# Patient Record
Sex: MALE | Race: WHITE | NOT HISPANIC OR LATINO | Employment: OTHER | URBAN - METROPOLITAN AREA
[De-identification: names, ages, dates, MRNs, and addresses within clinical notes are randomized per-mention and may not be internally consistent; named-entity substitution may affect disease eponyms.]

---

## 2017-12-11 ENCOUNTER — HOSPITAL ENCOUNTER (INPATIENT)
Facility: HOSPITAL | Age: 57
LOS: 6 days | Discharge: HOME | DRG: 280 | End: 2017-12-17
Attending: EMERGENCY MEDICINE | Admitting: INTERNAL MEDICINE
Payer: COMMERCIAL

## 2017-12-11 ENCOUNTER — APPOINTMENT (INPATIENT)
Dept: RADIOLOGY | Facility: HOSPITAL | Age: 57
DRG: 280 | End: 2017-12-11
Payer: COMMERCIAL

## 2017-12-11 ENCOUNTER — APPOINTMENT (EMERGENCY)
Dept: RADIOLOGY | Facility: HOSPITAL | Age: 57
DRG: 280 | End: 2017-12-11
Payer: COMMERCIAL

## 2017-12-11 ENCOUNTER — GENERIC CONVERSION - ENCOUNTER (OUTPATIENT)
Dept: OTHER | Facility: OTHER | Age: 57
End: 2017-12-11

## 2017-12-11 DIAGNOSIS — I50.9 ACUTE HEART FAILURE, UNSPECIFIED HEART FAILURE TYPE (HCC): ICD-10-CM

## 2017-12-11 DIAGNOSIS — I50.9 CHF EXACERBATION (HCC): ICD-10-CM

## 2017-12-11 DIAGNOSIS — I48.91 ATRIAL FIBRILLATION WITH RAPID VENTRICULAR RESPONSE (HCC): Primary | ICD-10-CM

## 2017-12-11 DIAGNOSIS — R17 TOTAL BILIRUBIN, ELEVATED: ICD-10-CM

## 2017-12-11 DIAGNOSIS — I50.21 ACUTE SYSTOLIC HEART FAILURE (HCC): ICD-10-CM

## 2017-12-11 PROBLEM — R03.0 ELEVATED BLOOD PRESSURE READING: Status: ACTIVE | Noted: 2017-12-11

## 2017-12-11 PROBLEM — R06.02 SOB (SHORTNESS OF BREATH): Status: ACTIVE | Noted: 2017-12-11

## 2017-12-11 PROBLEM — I21.4 NON-ST ELEVATION MYOCARDIAL INFARCTION (NSTEMI) (HCC): Status: ACTIVE | Noted: 2017-12-11

## 2017-12-11 LAB
ALBUMIN SERPL BCP-MCNC: 3.5 G/DL (ref 3.5–5)
ALP SERPL-CCNC: 79 U/L (ref 46–116)
ALT SERPL W P-5'-P-CCNC: 39 U/L (ref 12–78)
ANION GAP BLD CALC-SCNC: 16 MMOL/L (ref 4–13)
ANION GAP SERPL CALCULATED.3IONS-SCNC: 10 MMOL/L (ref 4–13)
AST SERPL W P-5'-P-CCNC: 23 U/L (ref 5–45)
ATRIAL RATE: 192 BPM
BASOPHILS # BLD AUTO: 0.05 THOUSANDS/ΜL (ref 0–0.1)
BASOPHILS NFR BLD AUTO: 1 % (ref 0–1)
BILIRUB DIRECT SERPL-MCNC: 0.81 MG/DL (ref 0–0.2)
BILIRUB SERPL-MCNC: 3.02 MG/DL (ref 0.2–1)
BILIRUB UR QL STRIP: NEGATIVE
BUN BLD-MCNC: 21 MG/DL (ref 5–25)
BUN SERPL-MCNC: 19 MG/DL (ref 5–25)
CA-I BLD-SCNC: 1.1 MMOL/L (ref 1.12–1.32)
CALCIUM SERPL-MCNC: 9.3 MG/DL (ref 8.3–10.1)
CHLORIDE BLD-SCNC: 103 MMOL/L (ref 100–108)
CHLORIDE SERPL-SCNC: 102 MMOL/L (ref 100–108)
CLARITY UR: CLEAR
CO2 SERPL-SCNC: 25 MMOL/L (ref 21–32)
COLOR UR: YELLOW
CREAT BLD-MCNC: 1.1 MG/DL (ref 0.6–1.3)
CREAT SERPL-MCNC: 1.21 MG/DL (ref 0.6–1.3)
EOSINOPHIL # BLD AUTO: 0.05 THOUSAND/ΜL (ref 0–0.61)
EOSINOPHIL NFR BLD AUTO: 1 % (ref 0–6)
ERYTHROCYTE [DISTWIDTH] IN BLOOD BY AUTOMATED COUNT: 13.6 % (ref 11.6–15.1)
GFR SERPL CREATININE-BSD FRML MDRD: 66 ML/MIN/1.73SQ M
GFR SERPL CREATININE-BSD FRML MDRD: 74 ML/MIN/1.73SQ M
GLUCOSE SERPL-MCNC: 101 MG/DL (ref 65–140)
GLUCOSE SERPL-MCNC: 107 MG/DL (ref 65–140)
GLUCOSE UR STRIP-MCNC: NEGATIVE MG/DL
HCT VFR BLD AUTO: 49.7 % (ref 36.5–49.3)
HCT VFR BLD CALC: 54 % (ref 36.5–49.3)
HGB BLD-MCNC: 16.6 G/DL (ref 12–17)
HGB BLDA-MCNC: 18.4 G/DL (ref 12–17)
HGB UR QL STRIP.AUTO: NEGATIVE
KETONES UR STRIP-MCNC: NEGATIVE MG/DL
LEUKOCYTE ESTERASE UR QL STRIP: NEGATIVE
LYMPHOCYTES # BLD AUTO: 1.4 THOUSANDS/ΜL (ref 0.6–4.47)
LYMPHOCYTES NFR BLD AUTO: 19 % (ref 14–44)
MCH RBC QN AUTO: 31.6 PG (ref 26.8–34.3)
MCHC RBC AUTO-ENTMCNC: 33.4 G/DL (ref 31.4–37.4)
MCV RBC AUTO: 95 FL (ref 82–98)
MONOCYTES # BLD AUTO: 0.57 THOUSAND/ΜL (ref 0.17–1.22)
MONOCYTES NFR BLD AUTO: 8 % (ref 4–12)
NEUTROPHILS # BLD AUTO: 5.46 THOUSANDS/ΜL (ref 1.85–7.62)
NEUTS SEG NFR BLD AUTO: 71 % (ref 43–75)
NITRITE UR QL STRIP: NEGATIVE
NRBC BLD AUTO-RTO: 0 /100 WBCS
NT-PROBNP SERPL-MCNC: 5242 PG/ML
PCO2 BLD: 25 MMOL/L (ref 21–32)
PH UR STRIP.AUTO: 5 [PH] (ref 4.5–8)
PLATELET # BLD AUTO: 275 THOUSANDS/UL (ref 149–390)
PMV BLD AUTO: 10.1 FL (ref 8.9–12.7)
POTASSIUM BLD-SCNC: 4.5 MMOL/L (ref 3.5–5.3)
POTASSIUM SERPL-SCNC: 4.4 MMOL/L (ref 3.5–5.3)
PROT SERPL-MCNC: 7.8 G/DL (ref 6.4–8.2)
PROT UR STRIP-MCNC: NEGATIVE MG/DL
QRS AXIS: 127 DEGREES
QRSD INTERVAL: 84 MS
QT INTERVAL: 248 MS
QTC INTERVAL: 419 MS
RBC # BLD AUTO: 5.26 MILLION/UL (ref 3.88–5.62)
SODIUM BLD-SCNC: 139 MMOL/L (ref 136–145)
SODIUM SERPL-SCNC: 137 MMOL/L (ref 136–145)
SP GR UR STRIP.AUTO: 1.01 (ref 1–1.03)
SPECIMEN SOURCE: ABNORMAL
SPECIMEN SOURCE: ABNORMAL
T WAVE AXIS: 25 DEGREES
T4 FREE SERPL-MCNC: 1.36 NG/DL (ref 0.76–1.46)
TROPONIN I BLD-MCNC: 0.14 NG/ML (ref 0–0.08)
TROPONIN I SERPL-MCNC: 0.78 NG/ML
TROPONIN I SERPL-MCNC: 0.83 NG/ML
TROPONIN I SERPL-MCNC: 0.88 NG/ML
TROPONIN I SERPL-MCNC: 0.9 NG/ML
TSH SERPL DL<=0.05 MIU/L-ACNC: 1.81 UIU/ML (ref 0.36–3.74)
UROBILINOGEN UR QL STRIP.AUTO: 0.2 E.U./DL
VENTRICULAR RATE: 172 BPM
WBC # BLD AUTO: 7.55 THOUSAND/UL (ref 4.31–10.16)

## 2017-12-11 PROCEDURE — 99285 EMERGENCY DEPT VISIT HI MDM: CPT

## 2017-12-11 PROCEDURE — 84484 ASSAY OF TROPONIN QUANT: CPT | Performed by: STUDENT IN AN ORGANIZED HEALTH CARE EDUCATION/TRAINING PROGRAM

## 2017-12-11 PROCEDURE — 36415 COLL VENOUS BLD VENIPUNCTURE: CPT

## 2017-12-11 PROCEDURE — 84439 ASSAY OF FREE THYROXINE: CPT | Performed by: EMERGENCY MEDICINE

## 2017-12-11 PROCEDURE — 82248 BILIRUBIN DIRECT: CPT | Performed by: INTERNAL MEDICINE

## 2017-12-11 PROCEDURE — 84443 ASSAY THYROID STIM HORMONE: CPT | Performed by: EMERGENCY MEDICINE

## 2017-12-11 PROCEDURE — 76705 ECHO EXAM OF ABDOMEN: CPT

## 2017-12-11 PROCEDURE — 96365 THER/PROPH/DIAG IV INF INIT: CPT

## 2017-12-11 PROCEDURE — 85014 HEMATOCRIT: CPT

## 2017-12-11 PROCEDURE — 81003 URINALYSIS AUTO W/O SCOPE: CPT | Performed by: INTERNAL MEDICINE

## 2017-12-11 PROCEDURE — 84484 ASSAY OF TROPONIN QUANT: CPT | Performed by: INTERNAL MEDICINE

## 2017-12-11 PROCEDURE — 96376 TX/PRO/DX INJ SAME DRUG ADON: CPT

## 2017-12-11 PROCEDURE — 84484 ASSAY OF TROPONIN QUANT: CPT

## 2017-12-11 PROCEDURE — 80053 COMPREHEN METABOLIC PANEL: CPT | Performed by: EMERGENCY MEDICINE

## 2017-12-11 PROCEDURE — 80047 BASIC METABLC PNL IONIZED CA: CPT

## 2017-12-11 PROCEDURE — 36415 COLL VENOUS BLD VENIPUNCTURE: CPT | Performed by: INTERNAL MEDICINE

## 2017-12-11 PROCEDURE — 71010 HB CHEST X-RAY 1 VIEW FRONTAL (PORTABLE): CPT

## 2017-12-11 PROCEDURE — 93005 ELECTROCARDIOGRAM TRACING: CPT | Performed by: STUDENT IN AN ORGANIZED HEALTH CARE EDUCATION/TRAINING PROGRAM

## 2017-12-11 PROCEDURE — 85025 COMPLETE CBC W/AUTO DIFF WBC: CPT | Performed by: EMERGENCY MEDICINE

## 2017-12-11 PROCEDURE — 93005 ELECTROCARDIOGRAM TRACING: CPT | Performed by: EMERGENCY MEDICINE

## 2017-12-11 PROCEDURE — 83880 ASSAY OF NATRIURETIC PEPTIDE: CPT | Performed by: EMERGENCY MEDICINE

## 2017-12-11 RX ORDER — ASPIRIN 81 MG/1
324 TABLET, CHEWABLE ORAL ONCE
Status: COMPLETED | OUTPATIENT
Start: 2017-12-11 | End: 2017-12-11

## 2017-12-11 RX ORDER — FUROSEMIDE 10 MG/ML
20 INJECTION INTRAMUSCULAR; INTRAVENOUS ONCE
Status: COMPLETED | OUTPATIENT
Start: 2017-12-11 | End: 2017-12-11

## 2017-12-11 RX ORDER — DILTIAZEM HYDROCHLORIDE 5 MG/ML
20 INJECTION INTRAVENOUS ONCE
Status: COMPLETED | OUTPATIENT
Start: 2017-12-11 | End: 2017-12-11

## 2017-12-11 RX ORDER — ONDANSETRON 2 MG/ML
4 INJECTION INTRAMUSCULAR; INTRAVENOUS EVERY 6 HOURS PRN
Status: DISCONTINUED | OUTPATIENT
Start: 2017-12-11 | End: 2017-12-11

## 2017-12-11 RX ORDER — DILTIAZEM HYDROCHLORIDE 5 MG/ML
INJECTION INTRAVENOUS
Status: COMPLETED
Start: 2017-12-11 | End: 2017-12-11

## 2017-12-11 RX ORDER — METOPROLOL TARTRATE 50 MG/1
50 TABLET, FILM COATED ORAL EVERY 12 HOURS SCHEDULED
Status: DISCONTINUED | OUTPATIENT
Start: 2017-12-11 | End: 2017-12-11

## 2017-12-11 RX ORDER — ACETAMINOPHEN 325 MG/1
650 TABLET ORAL EVERY 4 HOURS PRN
Status: DISCONTINUED | OUTPATIENT
Start: 2017-12-11 | End: 2017-12-17 | Stop reason: HOSPADM

## 2017-12-11 RX ORDER — FUROSEMIDE 10 MG/ML
40 INJECTION INTRAMUSCULAR; INTRAVENOUS
Status: DISCONTINUED | OUTPATIENT
Start: 2017-12-11 | End: 2017-12-11

## 2017-12-11 RX ORDER — HEPARIN SODIUM 5000 [USP'U]/ML
5000 INJECTION, SOLUTION INTRAVENOUS; SUBCUTANEOUS EVERY 8 HOURS SCHEDULED
Status: DISCONTINUED | OUTPATIENT
Start: 2017-12-12 | End: 2017-12-12

## 2017-12-11 RX ADMIN — FUROSEMIDE 40 MG: 10 INJECTION, SOLUTION INTRAMUSCULAR; INTRAVENOUS at 12:07

## 2017-12-11 RX ADMIN — DILTIAZEM HYDROCHLORIDE 15 MG/HR: 5 INJECTION INTRAVENOUS at 17:29

## 2017-12-11 RX ADMIN — DILTIAZEM HYDROCHLORIDE 20 MG: 5 INJECTION INTRAVENOUS at 08:40

## 2017-12-11 RX ADMIN — DILTIAZEM HYDROCHLORIDE 20 MG: 5 INJECTION INTRAVENOUS at 09:19

## 2017-12-11 RX ADMIN — METOPROLOL TARTRATE 50 MG: 25 TABLET ORAL at 18:43

## 2017-12-11 RX ADMIN — DILTIAZEM HYDROCHLORIDE 5 MG/HR: 5 INJECTION INTRAVENOUS at 09:24

## 2017-12-11 RX ADMIN — FUROSEMIDE 20 MG: 10 INJECTION, SOLUTION INTRAMUSCULAR; INTRAVENOUS at 11:00

## 2017-12-11 RX ADMIN — DILTIAZEM HYDROCHLORIDE 20 MG: 5 INJECTION INTRAVENOUS at 12:01

## 2017-12-11 RX ADMIN — ENOXAPARIN SODIUM 40 MG: 40 INJECTION SUBCUTANEOUS at 12:00

## 2017-12-11 RX ADMIN — SODIUM CHLORIDE 250 ML: 0.9 INJECTION, SOLUTION INTRAVENOUS at 21:29

## 2017-12-11 RX ADMIN — ONDANSETRON 4 MG: 2 INJECTION INTRAMUSCULAR; INTRAVENOUS at 21:54

## 2017-12-11 RX ADMIN — ASPIRIN 81 MG 324 MG: 81 TABLET ORAL at 10:16

## 2017-12-11 NOTE — ED NOTES
Spoke with SOB  And stated would be down shortly  House tray ordered       Vikas Care, RN  92/06/60 [phone]

## 2017-12-11 NOTE — ED NOTES
Dr Marlene Drake and Dr Lyric Ochoa notified of pt elevated trop     Gomez Montero RN  83/45/13 0709

## 2017-12-11 NOTE — ED NOTES
Pt found to have hr in 180s ER resident notified and pt bedside for pt evlauation     Gabino Ruby RN  16/84/72 [id number]

## 2017-12-11 NOTE — ED PROVIDER NOTES
History  Chief Complaint   Patient presents with    Shortness of Breath     Pt c o increased sob and feet and abdomen swelling for awhile now  pt states "I cant take it anymore"     68-year-old man with a history of gout and shingles presents for evaluation of dyspnea  Patient reports a 2-month history of progressive dyspnea, abdominal distension, and lower extremity edema  His dyspnea acutely worsened this morning prompting him to seek evaluation  No recent fevers, chills, chest pain, dyspnea, lightheadedness, dizziness, abdominal pain, nausea, vomiting, diarrhea, or urinary symptoms  He does not see a doctor regularly and does not take daily medications  No history of cardiac disease or VTE  No PE risk factors  Significant family cardiac history with his brother recently undergoing a CABG  Patient is a previous 1 pack per day smoker and denies drug use  On arrival, patient is afebrile, hypertensive to 200/100, and tachycardic to 190 in rapid AFib  Tachycardia responded to 20 mg bolus of diltiazem  Will give anoother 20 mg bolus and start a drip  Will evaluate for underlying cause of patient's new rapid AFib with troponin, CBC, CMP, BNP, TSH, T4, chest x-ray, and urine dip  None       Past Medical History:   Diagnosis Date    Coronary artery disease     Gout     Hypertension     Shingles        History reviewed  No pertinent surgical history  History reviewed  No pertinent family history  I have reviewed and agree with the history as documented  Social History   Substance Use Topics    Smoking status: Former Smoker     Quit date: 8/11/2017    Smokeless tobacco: Never Used    Alcohol use No        Review of Systems   Constitutional: Negative for chills and fever  HENT: Negative for rhinorrhea and sore throat  Eyes: Negative for photophobia and visual disturbance  Respiratory: Positive for shortness of breath  Negative for cough      Cardiovascular: Negative for chest pain, palpitations and leg swelling  Gastrointestinal: Positive for abdominal distention  Negative for abdominal pain, diarrhea, nausea and vomiting  Genitourinary: Negative for dysuria, frequency and hematuria  Musculoskeletal: Negative for back pain and neck pain  Skin: Negative for rash and wound  Neurological: Negative for light-headedness and headaches  Physical Exam  ED Triage Vitals   Temperature Pulse Respirations Blood Pressure SpO2   12/11/17 0836 12/11/17 0826 12/11/17 0826 12/11/17 0826 12/11/17 0826   (!) 97 1 °F (36 2 °C) (!) 182 22 (!) 207/100 93 %      Temp Source Heart Rate Source Patient Position - Orthostatic VS BP Location FiO2 (%)   12/11/17 1948 12/11/17 0826 12/11/17 1737 12/11/17 1737 --   Axillary Monitor Sitting Right arm       Pain Score       12/11/17 1045       No Pain           Orthostatic Vital Signs  Vitals:    12/12/17 0900 12/12/17 1117 12/12/17 1203 12/12/17 1303   BP: 102/62 117/72 105/50 100/50   Pulse:  (!) 131     Patient Position - Orthostatic VS:  Sitting Sitting Sitting       Physical Exam   Constitutional: He is oriented to person, place, and time  He appears well-developed and well-nourished  No distress  HENT:   Head: Normocephalic and atraumatic  Eyes: Conjunctivae are normal  Pupils are equal, round, and reactive to light  No scleral icterus  Neck: Neck supple  No JVD present  Cardiovascular: Normal heart sounds  Exam reveals no gallop and no friction rub  No murmur heard  Tachycardic, irregularly irregular rhythm   Pulmonary/Chest: Effort normal and breath sounds normal  No respiratory distress  He has no wheezes  He has no rales  Abdominal: Soft  He exhibits distension  There is no tenderness  There is no rebound and no guarding  Musculoskeletal: He exhibits edema (  2+ symmetric pitting lower extremity edema)  He exhibits no tenderness  Neurological: He is alert and oriented to person, place, and time  Skin: Skin is warm and dry  He is not diaphoretic  Psychiatric: He has a normal mood and affect  His behavior is normal  Thought content normal    Vitals reviewed        ED Medications  Medications   acetaminophen (TYLENOL) tablet 650 mg (not administered)   heparin (porcine) subcutaneous injection 5,000 Units (not administered)   metoprolol tartrate (LOPRESSOR) tablet 25 mg (25 mg Oral Given 12/12/17 1118)   furosemide (LASIX) injection 20 mg (not administered)   diltiazem (CARDIZEM) injection 20 mg (20 mg Intravenous Given 12/11/17 0840)   diltiazem (CARDIZEM) 125 mg in sodium chloride 0 9 % 125 mL infusion (0 mg/hr Intravenous Stopped 12/11/17 1729)   diltiazem (CARDIZEM) injection 20 mg (20 mg Intravenous Given 12/11/17 0919)   aspirin chewable tablet 324 mg (324 mg Oral Given 12/11/17 1016)   furosemide (LASIX) injection 20 mg (20 mg Intravenous Given 12/11/17 1100)   diltiazem (CARDIZEM) injection 20 mg (20 mg Intravenous Given 12/11/17 1201)   diltiazem (CARDIZEM) 125 mg in sodium chloride 0 9 % 125 mL infusion (0 mg/hr Intravenous Stopped 12/12/17 0927)   sodium chloride 0 9 % bolus 250 mL (0 mL Intravenous Stopped 12/12/17 0927)       Diagnostic Studies  Results Reviewed     Procedure Component Value Units Date/Time    Chronic Hepatitis Panel [[id number]]  (Normal) Collected:  12/12/17 0511    Lab Status:  Final result Specimen:  Blood from Arm, Right Updated:  12/12/17 1059     Hepatitis B Surface Ag Non-reactive     Hepatitis C Ab Non-reactive     Hep B C IgM Non-reactive     Hep B Core Total Ab Non-reactive    Lipid panel [[id number]]  (Abnormal) Collected:  12/12/17 0511    Lab Status:  Final result Specimen:  Blood from Arm, Right Updated:  12/12/17 0718     Cholesterol 120 mg/dL      Triglycerides 82 mg/dL      HDL, Direct 31 (L) mg/dL      LDL Calculated 73 mg/dL     Narrative:         Triglyceride:        Normal               <150 mg/dl        Borderline High     150-199 mg/dl        High               200-499 mg/dl        Very High           >499 mg/dl      Cholesterol:       Desirable         <200 mg/dl       Borderline High   200-239 mg/dl       High             >239 mg/dl      HDL Cholesterol:       High    >59 mg/dL       Low     <41 mg/dL      This screening LDL is a calculated result  It does not have the accuracy of the Direct Measured LDL in the monitoring of patients with hyperlipidemia and/or statin therapy  Direct Measure LDL (OUV934) must be ordered separately in these patients  Hepatic function panel [[id number]]  (Abnormal) Collected:  12/12/17 0511    Lab Status:  Final result Specimen:  Blood from Arm, Right Updated:  12/12/17 0718     Total Bilirubin 1 87 (H) mg/dL      Bilirubin, Direct 0 51 (H) mg/dL      Alkaline Phosphatase 62 U/L      AST 14 U/L      ALT 26 U/L      Total Protein 6 3 (L) g/dL      Albumin 2 7 (L) g/dL     Hemoglobin A1c [[id number]]  (Normal) Collected:  12/12/17 0511    Lab Status:  Final result Specimen:  Blood from Arm, Right Updated:  12/12/17 0714     Hemoglobin A1C 6 2 %       mg/dl     CBC (With Platelets) [[id number]]  (Normal) Collected:  12/12/17 0511    Lab Status:  Final result Specimen:  Blood from Arm, Right Updated:  12/12/17 0640     WBC 7 01 Thousand/uL      RBC 4 49 Million/uL      Hemoglobin 13 9 g/dL      Hematocrit 42 7 %      MCV 95 fL      MCH 31 0 pg      MCHC 32 6 g/dL      RDW 13 9 %      Platelets 287 Thousands/uL      MPV 11 0 fL     Troponin I [[id number]]  (Abnormal) Collected:  12/11/17 1955    Lab Status:  Final result Specimen:  Blood from Arm, Right Updated:  12/11/17 2026     Troponin I 0 83 (H) ng/mL     Narrative:         [employer] Chemistry analyzer 99% cutoff is > 0 04 ng/mL in network labs    o cTnI 99% cutoff is useful only when applied to patients in the clinical setting of myocardial ischemia  o cTnI 99% cutoff should be interpreted in the context of clinical history, ECG findings and possibly cardiac imaging to establish correct diagnosis    o cTnI 99% cutoff may be suggestive but clearly not indicative of a coronary event without the clinical setting of myocardial ischemia  UA w Reflex to Microscopic w Reflex to Culture [[id number]]  (Normal) Collected:  12/11/17 1731    Lab Status:  Final result Specimen:  Urine from Urine, Clean Catch Updated:  12/11/17 1813     Color, UA Yellow     Clarity, UA Clear     Specific Gravity, UA 1 010     pH, UA 5 0     Leukocytes, UA Negative     Nitrite, UA Negative     Protein, UA Negative mg/dl      Glucose, UA Negative mg/dl      Ketones, UA Negative mg/dl      Urobilinogen, UA 0 2 E U /dl      Bilirubin, UA Negative     Blood, UA Negative    Troponin I [[id number]]  (Abnormal) Collected:  12/11/17 1431    Lab Status:  Final result Specimen:  Blood from Arm, Left Updated:  12/11/17 1517     Troponin I 0 88 (H) ng/mL     Narrative:         [employer] Chemistry analyzer 99% cutoff is > 0 04 ng/mL in network labs    o cTnI 99% cutoff is useful only when applied to patients in the clinical setting of myocardial ischemia  o cTnI 99% cutoff should be interpreted in the context of clinical history, ECG findings and possibly cardiac imaging to establish correct diagnosis  o cTnI 99% cutoff may be suggestive but clearly not indicative of a coronary event without the clinical setting of myocardial ischemia      Bilirubin, direct [[id number]]  (Abnormal) Collected:  12/11/17 0834    Lab Status:  Final result Specimen:  Blood from Arm, Right Updated:  12/11/17 1334     Bilirubin, Direct 0 81 (H) mg/dL     Troponin I [[id number]]  (Abnormal) Collected:  12/11/17 1157    Lab Status:  Final result Specimen:  Blood from Arm, Left Updated:  12/11/17 1227     Troponin I 0 90 (H) ng/mL     Narrative:         [employer] Chemistry analyzer 99% cutoff is > 0 04 ng/mL in network labs    o cTnI 99% cutoff is useful only when applied to patients in the clinical setting of myocardial ischemia  o cTnI 99% cutoff should be interpreted in the context of clinical history, ECG findings and possibly cardiac imaging to establish correct diagnosis  o cTnI 99% cutoff may be suggestive but clearly not indicative of a coronary event without the clinical setting of myocardial ischemia  TSH [[id number]]  (Normal) Collected:  12/11/17 0834    Lab Status:  Final result Specimen:  Blood from Arm, Right Updated:  12/11/17 0949     TSH 3RD GENERATON 1 810 uIU/mL     Narrative:         Patients undergoing fluorescein dye angiography may retain small amounts of fluorescein in the body for 48-72 hours post procedure  Samples containing fluorescein can produce falsely depressed TSH values  If the patient had this procedure,a specimen should be resubmitted post fluorescein clearance  BNP [[id number]]  (Abnormal) Collected:  12/11/17 0834    Lab Status:  Final result Specimen:  Blood from Arm, Right Updated:  12/11/17 0949     NT-proBNP 5,242 (H) pg/mL     T4, free [[id number]]  (Normal) Collected:  12/11/17 0834    Lab Status:  Final result Specimen:  Blood from Arm, Right Updated:  12/11/17 0949     Free T4 1 36 ng/dL     Comprehensive metabolic panel [[id number]]  (Abnormal) Collected:  12/11/17 0834    Lab Status:  Final result Specimen:  Blood from Arm, Right Updated:  12/11/17 3316     Sodium 137 mmol/L      Potassium 4 4 mmol/L      Chloride 102 mmol/L      CO2 25 mmol/L      Anion Gap 10 mmol/L      BUN 19 mg/dL      Creatinine 1 21 mg/dL      Glucose 101 mg/dL      Calcium 9 3 mg/dL      AST 23 U/L      ALT 39 U/L      Alkaline Phosphatase 79 U/L      Total Protein 7 8 g/dL      Albumin 3 5 g/dL      Total Bilirubin 3 02 (H) mg/dL      eGFR 66 ml/min/1 73sq m     Narrative:         National Kidney Disease Education Program recommendations are as follows:  GFR calculation is accurate only with a steady state creatinine  Chronic Kidney disease less than 60 ml/min/1 73 sq  meters  Kidney failure less than 15 ml/min/1 73 sq  meters      POCT troponin [[id number]]  (Abnormal) Collected: 12/11/17 0836    Lab Status:  Final result Updated:  12/11/17 0850     POC Troponin I 0 14 (H) ng/ml      Specimen Type VENOUS    Narrative:         [employer] i-Stat handheld analyzer 99% cutoff is > 0 08ng/mL in [facility]    o cTnI 99% cutoff is useful only when applied to patients in the clinical setting of myocardial ischemia  o cTnI 99% cutoff should be interpreted in the context of clinical history, ECG findings and possibly cardiac imaging to establish correct diagnosis  o cTnI 99% cutoff may be suggestive but clearly not indicative of a coronary event without the clinical setting of myocardial ischemia      POCT Chem 8+ [[id number]]  (Abnormal) Collected:  12/11/17 0837    Lab Status:  Final result Updated:  12/11/17 0842     SODIUM, I-STAT 139 mmol/l      Potassium, i-STAT 4 5 mmol/L      Chloride, istat 103 mmol/L      CO2, i-STAT 25 mmol/L      Anion Gap, Istat 16 (H) mmol/L      Calcium, Ionized i-STAT 1 10 (L) mmol/L      BUN, I-STAT 21 mg/dl      Creatinine, i-STAT 1 1 mg/dl      eGFR 74 ml/min/1 73sq m      Glucose, i-STAT 107 mg/dl      Hct, i-STAT 54 (H) %      Hgb, i-STAT 18 4 (H) g/dl      Specimen Type VENOUS    CBC and differential [[id number]]  (Abnormal) Collected:  12/11/17 0834    Lab Status:  Final result Specimen:  Blood from Arm, Right Updated:  12/11/17 0841     WBC 7 55 Thousand/uL      RBC 5 26 Million/uL      Hemoglobin 16 6 g/dL      Hematocrit 49 7 (H) %      MCV 95 fL      MCH 31 6 pg      MCHC 33 4 g/dL      RDW 13 6 %      MPV 10 1 fL      Platelets 649 Thousands/uL      nRBC 0 /100 WBCs      Neutrophils Relative 71 %      Lymphocytes Relative 19 %      Monocytes Relative 8 %      Eosinophils Relative 1 %      Basophils Relative 1 %      Neutrophils Absolute 5 46 Thousands/µL      Lymphocytes Absolute 1 40 Thousands/µL      Monocytes Absolute 0 57 Thousand/µL      Eosinophils Absolute 0 05 Thousand/µL      Basophils Absolute 0 05 Thousands/µL                  US changes  Total bilirubin elevated >3 without transaminitis or elevated alk phos, unclear etiology  Patient was given aspirin, a dose of IV Lasix, and admitted to SOD for further management  CritCare Time    Disposition  Final diagnoses:   Atrial fibrillation with rapid ventricular response (HCC)   CHF exacerbation (HCC)   Total bilirubin, elevated     Time reflects when diagnosis was documented in both MDM as applicable and the Disposition within this note     Time User Action Codes Description Comment    12/11/2017 10:21 AM Myah Maxwell Add [I48 91] Atrial fibrillation with rapid ventricular response ([facility] [address] )     12/11/2017 10:21 AM Myah Maxwell Add [I50 9] CHF exacerbation ([facility] [address] )     12/11/2017 10:22 AM Myah Maxwell Add [R17] Total bilirubin, elevated     12/11/2017  4:10 PM Javier Solorio Add [I50 9] [facility] heart failure, [facility]     12/11/2017  4:10 PM Javier Solorio Modify [I50 9] Acute heart failure, unspecified heart failure type [facility])       ED Disposition     ED Disposition Condition Comment    Admit  Case was discussed with SOD senior resident and the patient's admission status was agreed to be Admission Status: inpatient status to the service of Dr Pa Rivera   Follow-up Information    None       There are no discharge medications for this patient  No discharge procedures on file  ED Provider  Attending physically available and evaluated Ashish Schaefer I managed the patient along with the ED Attending      Electronically Signed by         Mely Riggs MD  Resident  12/12/17 6922

## 2017-12-11 NOTE — PROGRESS NOTES
[facility] Senior Admission Note   Unit/Bed # @DBLINK (FARNAZ,26296)@ Encounter: [id number]  SOD Team Ova Men 62 y o  male [id number]       Patient seen and examined  Reviewed H&P per Dr Jayden Mathur  Agree with the assessment and plan       Assessment/Plan: Principal Problem:    Acute heart failure (HCC)  Active Problems:    Atrial fibrillation with rapid ventricular response (HCC)    Non-ST elevation myocardial infarction (NSTEMI) (HCC)    Serum total bilirubin elevated    Elevated blood pressure reading         Disposition:  INPATIENT     Expected LOS: >2 Midnights      Vangie Aschoff

## 2017-12-11 NOTE — ED ATTENDING ATTESTATION
Wallace Schmidt MD, saw and evaluated the patient  I have discussed the patient with the resident/non-physician practitioner and agree with the resident's/non-physician practitioner's findings, Plan of Care, and MDM as documented in the resident's/non-physician practitioner's note, except where noted  All available labs and Radiology studies were reviewed  At this point I agree with the current assessment done in the Emergency Department  I have conducted an independent evaluation of this patient a history and physical is as follows:      Critical Care Time  The patient presented with a condition in which there was a high probability of imminent or life-threatening deterioration, and critical care services (excluding separately billable procedures) totalled 30-74 minutes  61 yo male presents with sob for the last two months that worsened this morning  Pt with no palpitations  Pt with increased swelling, cough with white sputum production for two months  Pt with no fever  Pt has noted hoarse voice, swelling in legs for few months  Pt with no pmh, does not follow with pcp  Pt quit smoking  Vss, tachy, afebrile, hypertensive, lungs cta, rrr, abdomen soft nontender, pedal edema  Cardiac workup, rapid afib, tsh, cardizem and gtt for rate control  Bnp, urine

## 2017-12-11 NOTE — H&P
INTERNAL MEDICINE HISTORY AND PHYSICAL  ED 05 SOD Team B     NAME: Power Thomas  AGE: 62 y o  SEX: male  : 1960   MRN: [id number]  ENCOUNTER: [id number]    DATE: 2017  TIME: 2:17 PM    Primary Care Physician: No primary care provider on file  Admitting Provider: Vladimir Morales MD  Patient Active Problem List   Diagnosis    Acute heart failure ([facility] [address] )    Atrial fibrillation with rapid ventricular response (HCC)    Non-ST elevation myocardial infarction (NSTEMI) (HCC)    Serum total bilirubin elevated    Elevated blood pressure reading     Decompensated Heart Failure, with unknown chronicity (acute with possible chronic component), suspect tachycardia mediated cardiomyopathy  Overall, patient reports all signs and symptoms of classic CHF including dyspnea on exertion, PND, orthopnea, JVD, and peripheral edema with significant weight  Given that the patient's shortness of breath started suddenly and patient never had symptoms before 3 months ago, I believe this is most likely an acute exacerbation of CHF most likely secondary to atrial fibrillation with RVR causing tachycardia mediated cardiomyopathy  However, it could be possible that this is an acute exacerbation of chronic CHF, as it is hard to determine the chronicity due to the fact the patient has not seen a PCP since childhood  · IV Lasix BID  · Echo  · Cardiology consulted  · Cardizem drip for Afib, with plan to transition to po med (metoprolol)  · Telemetry  · Trend Troponins  · Daily Weights, I/O's  · Cardiac Diet with Fluid Restriction  · F/u Mg, BMP,CBC  · TSH/T4 normal     Atrial Fibrillation with RVR  Most likely occurring for at least the past 3 months and causing (or least worsening of CHF)     · Cardizem drip, with plan to transition to PO  · F/u Echo  · [id number] unable to be reliably calculated 2/2 to lack of medical f/u, though I suspect given the patient unhealthy lifestlye, he will have enough comorbidity to require AC   · See plan for #1    Type II NSTEMI 2/2 decompensated CHF  Patient is not experiencing any chest pain and no history of chest pain, so doubt type 2 NSTEMI  · Trend troponins    Distended Abdomen (likely Ascites) with Hyperbilirubinemia  Suspect distended abdomen and ascites is 2/2 to decompensated CHF, however can't r/o cirrhosis at this time (despite normal platelets)  Patient had extensive alcohol drinking history, stating he drinks 7-10 beers per day on Friday, Saturday, Sunday and also drinks 10 beers total on the other days of the week (with no hx of seizures or withdrawal) for a total of at least 30 years  Elevated Bilirubin is predominantly indirect bilirubin, so could be 2/2 Alcohol, McCarley syndrome, or even congestive hepatopathy  Pt does have Hep C risk factor by his date of birth, otherwise denies needle use and transfusions  Does report hx of unprotected sex, so at risk for Hep B  On Physical exam, patient was noted to have scleral icterus  Will need further to determine etiology  · F/U Chronic Hep Panel  · F/U U/S to assess for ascites, cirrhosis, and obstruction (less likely)  Patient will likely need peritoneal fluid analysis to determine etiology and r/o SBP (though highly doubt)  · F/u PT/INR  · F/u Hepatic Function Panel    Lack of PCP  Patient advised to follow up with PCP and possibly cardiologist outpatient and was agreeable  He states he is ready to start taking care of his health  · Lipid Panel, A1C  · Establish PCP outpatient after discharge    Elevated BP  · Resolved    Chief complaint:  Shortness of breath    History of Present Illness     Richarjulio Duarte is a 62 y o  male with no relevant past medical history (2/2 to not seeing a PCP since childhood) presents with shortness of breath  Shortness of breath began 3 months ago(on September 12th, 2017, he remembers the exact date as it was quite abnormal), began acutely, lasting 15 minutes, resolved spontaneously    Over the next 3 months symptoms would increase in frequency and duration, occurring daily, lasting all day  Shortness of breath is worse with exertion, as now patient can only walk a few steps before getting short of breath  He also endorses 2 pillow orthopnea and paroxysmal nocturnal dyspnea  During this time period, patient has also noticed increased swelling in his legs that has gradually worsened  Reports a productive cough of white sputum, that is worse at night  Patient has noticed that his abdomen has slowly become more distended and that his weight is increased 15-20 lb(dry weight is 210-215lbs, and pt weighed himself at 230lbs)  Associated with early satiety, fatigue, weakness and decreased urination  Patient denies chest pain, palpitations, sore throat, abdominal pain, nausea, vomiting, constipation, diarrhea, fevers/chills, URI symptoms  Patient has never had these symptoms previous to September 12, 2017  He takes no medicines at home  He reports a very poor diet, high in salt and fat, and eats whatever he wants  He does have a family history of coronary artery disease, as his 61year old brother recently had a stent placed in his LAD  Also, his mother has CAD and CABG in her 63's with a hx fo Afib  He quit smoking cigarettes for 6 months ago, but previously had a 20 pack-year history of smoking  He drinks 7-10 drinks for three days of the weeks and then drinks 10 total beers on the other days of the week (so a total of up to 40 drinks per week) for at least 3 decades  No hx of seizures or withdrawal   He denies IV drug abuse, recent travel  He does report of history of unprotected sex when he was younger, but can't articulate any further details  In the ED, patient arrived with a blood pressure of 207/100, pulse of 182  He is found to be in AFib and was started on a Cardizem drip  He was also given 2 doses of IV Lasix 20 mg    CBC was unremarkable CMP was unremarkable, aside from a total bilirubin of 3 02 with a direct bilirubin of 0 81  BNP was 5242  TSH/T4 were normal   Point of care troponin was 0 14 and next troponin was measured at 0 90  Patient was saturating at 93% on room air when I was in the room  Chest x-ray showed cardiomegaly    Review of Systems   Review of Systems   All other systems reviewed and are negative  Past Medical History     Past Medical History:   Diagnosis Date    Gout     Shingles        Past Surgical History   History reviewed  No pertinent surgical history  Social History     History   Alcohol Use No     History   Drug Use No     History   Smoking Status    Former Smoker   Smokeless Tobacco    Never Used       Family History   History reviewed  No pertinent family history  Medications Prior to Admission     Prior to Admission medications    Not on File       Allergies   No Known Allergies    Objective     Vitals:    12/11/17 1045 12/11/17 1145 12/11/17 1200 12/11/17 1345   BP: 126/98 135/95  108/69   Pulse: (!) 124 (!) 122 (!) 120 (!) 112   Resp: 20 20 22 22   Temp:       SpO2: 97% 96% 95% 94%   Weight:         There is no height or weight on file to calculate BMI  Intake/Output Summary (Last 24 hours) at 12/11/17 1417  Last data filed at 12/11/17 1232   Gross per 24 hour   Intake                0 ml   Output              450 ml   Net             -450 ml     Invasive Devices     Peripheral Intravenous Line            Peripheral IV 12/11/17 Right Antecubital less than 1 day                Physical Exam  GENERAL: Appears well-developed and well-nourished  Appears in no acute distress   HEENT: Normocephalic and atraumatic  Scleral Icterus  PERRLA  EOMI B/L  No oropharyngeal edema  MM moist    NECK: Positive JVD Neck supple with no lymphadenopathy  Trachea midline  No JVD  CARDIOVASCULAR: S1 and S2 are present  Irregular irregular rate and rhythm  No murmurs, rubs, or gallops  RESPIRATORY: Mild crackles in the lower lung bases, no rales, rhonci or wheezes  Normal respiratory expansion  BREAST: Deferred  ABDOMINAL: Hard, distended abdomen  Decreased bowel sounds  No tenderness to palpation  EXTREMITIES: 2+ edema in the bilateral lower extremities the from the distal extremities up to the knee  1+ pulses in DP/PT  Both feet are cold to touch, but no cyanosis noted  Lab Results: I have personally reviewed pertinent reports  Imaging: I have personally reviewed pertinent films in PACS  Xr Chest Portable    Result Date: 12/11/2017  Narrative: CHEST INDICATION:  Shortness of breath, leg swelling COMPARISON:  None VIEWS:   AP frontal IMAGES:  1 FINDINGS:     There is significant enlargement of the cardiac silhouette  There is mild pulmonary vascular congestion  No focal infiltrate is seen  No pneumothorax  Probable very small right-sided pleural effusion  Visualized osseous structures appear within normal limits for the patient's age  Impression: Significant enlargement of cardiac silhouette with mild pulmonary vascular congestion and probable very small right effusion  Findings concur with the referring clinician's preliminary interpretation already in the patient's electronic health record  Workstation performed: [id number]           Urinalysis:       Invalid input(s): URIBILINOGEN     Urine Micro:        EKG, Pathology, and Other Studies: I have personally reviewed pertinent reports        Medications given in Emergency Department     Medication Administration - last 24 hours from 12/10/2017 1417 to 12/11/2017 1417       Date/Time Order Dose Route Action Action by     12/11/2017 0840 diltiazem (CARDIZEM) injection 20 mg 20 mg Intravenous Given Dexter Veras RN     20/42/0152 1100 diltiazem (CARDIZEM) 125 mg in sodium chloride 0 9 % 125 mL infusion 15 mg/hr Intravenous Rate/Dose Change Dexter Veras RN     62/17/7268 1038 diltiazem (CARDIZEM) 125 mg in sodium chloride 0 9 % 125 mL infusion 12 5 mg/hr Intravenous Rate/Dose Change Vida Villavicencio RN     14/64/3618 1020 diltiazem (CARDIZEM) 125 mg in sodium chloride 0 9 % 125 mL infusion 10 mg/hr Intravenous Rate/Dose Change Vida Villavicencio RN     76/57/9150 1005 diltiazem (CARDIZEM) 125 mg in sodium chloride 0 9 % 125 mL infusion 7 5 mg/hr Intravenous Rate/Dose Change Vida Villavicencio RN     97/97/4188 0924 diltiazem (CARDIZEM) 125 mg in sodium chloride 0 9 % 125 mL infusion 5 mg/hr Intravenous New Bag Za aSntiago, ELLYN     32/66/4458 0919 diltiazem (CARDIZEM) injection 20 mg 20 mg Intravenous Given Vida Villavicencio RN     63/28/2384 1016 aspirin chewable tablet 324 mg 324 mg Oral Given Vida Villavicencio RN     10/75/0418 1100 furosemide (LASIX) injection 20 mg 20 mg Intravenous Given Vida Villavicencio RN     70/61/3890 1200 enoxaparin (LOVENOX) subcutaneous injection 40 mg 40 mg Subcutaneous Given Vida Villavicencio RN     60/54/8158 1207 furosemide (LASIX) injection 40 mg 40 mg Intravenous Given Vida Villavicencio RN     91/64/6672 1201 diltiazem (CARDIZEM) injection 20 mg 20 mg Intravenous Given Vida Villavicencio RN          Assessment and Plan     Problem List      Code Status: Level 1 - Full Code  VTE Pharmacologic Prophylaxis: Sequential compression device (Venodyne)    VTE Mechanical Prophylaxis: sequential compression device  Admission Status: INPATIENT     Admission Time  I spent 1 hour admitting the patient  This involved direct patient contact where I performed a full history and physical, reviewing previous records, and reviewing laboratory and other diagnostic studies      Aidee Hernandez MD  Internal Medicine  PGY-1

## 2017-12-12 ENCOUNTER — APPOINTMENT (INPATIENT)
Dept: NON INVASIVE DIAGNOSTICS | Facility: HOSPITAL | Age: 57
DRG: 280 | End: 2017-12-12
Payer: COMMERCIAL

## 2017-12-12 LAB
ALBUMIN SERPL BCP-MCNC: 2.7 G/DL (ref 3.5–5)
ALP SERPL-CCNC: 62 U/L (ref 46–116)
ALT SERPL W P-5'-P-CCNC: 26 U/L (ref 12–78)
ANION GAP SERPL CALCULATED.3IONS-SCNC: 9 MMOL/L (ref 4–13)
AST SERPL W P-5'-P-CCNC: 14 U/L (ref 5–45)
ATRIAL RATE: 357 BPM
BILIRUB DIRECT SERPL-MCNC: 0.51 MG/DL (ref 0–0.2)
BILIRUB SERPL-MCNC: 1.87 MG/DL (ref 0.2–1)
BUN SERPL-MCNC: 24 MG/DL (ref 5–25)
CALCIUM SERPL-MCNC: 8.4 MG/DL (ref 8.3–10.1)
CHLORIDE SERPL-SCNC: 104 MMOL/L (ref 100–108)
CHOLEST SERPL-MCNC: 120 MG/DL (ref 50–200)
CO2 SERPL-SCNC: 25 MMOL/L (ref 21–32)
CREAT SERPL-MCNC: 1.33 MG/DL (ref 0.6–1.3)
ERYTHROCYTE [DISTWIDTH] IN BLOOD BY AUTOMATED COUNT: 13.9 % (ref 11.6–15.1)
EST. AVERAGE GLUCOSE BLD GHB EST-MCNC: 131 MG/DL
GFR SERPL CREATININE-BSD FRML MDRD: 59 ML/MIN/1.73SQ M
GLUCOSE SERPL-MCNC: 98 MG/DL (ref 65–140)
HBA1C MFR BLD: 6.2 % (ref 4.2–6.3)
HBV CORE AB SER QL: NORMAL
HBV CORE IGM SER QL: NORMAL
HBV SURFACE AG SER QL: NORMAL
HCT VFR BLD AUTO: 42.7 % (ref 36.5–49.3)
HCV AB SER QL: NORMAL
HDLC SERPL-MCNC: 31 MG/DL (ref 40–60)
HGB BLD-MCNC: 13.9 G/DL (ref 12–17)
HIV 1+2 AB+HIV1 P24 AG SERPL QL IA: NORMAL
HIV1 P24 AG SER QL: NORMAL
INR PPP: 1.15 (ref 0.86–1.16)
LDLC SERPL CALC-MCNC: 73 MG/DL (ref 0–100)
MAGNESIUM SERPL-MCNC: 2.3 MG/DL (ref 1.6–2.6)
MCH RBC QN AUTO: 31 PG (ref 26.8–34.3)
MCHC RBC AUTO-ENTMCNC: 32.6 G/DL (ref 31.4–37.4)
MCV RBC AUTO: 95 FL (ref 82–98)
PLATELET # BLD AUTO: 264 THOUSANDS/UL (ref 149–390)
PMV BLD AUTO: 11 FL (ref 8.9–12.7)
POTASSIUM SERPL-SCNC: 4 MMOL/L (ref 3.5–5.3)
PROT SERPL-MCNC: 6.3 G/DL (ref 6.4–8.2)
PROTHROMBIN TIME: 14.7 SECONDS (ref 12.1–14.4)
QRS AXIS: 100 DEGREES
QRSD INTERVAL: 92 MS
QT INTERVAL: 484 MS
QTC INTERVAL: 544 MS
RBC # BLD AUTO: 4.49 MILLION/UL (ref 3.88–5.62)
SODIUM SERPL-SCNC: 138 MMOL/L (ref 136–145)
T WAVE AXIS: 133 DEGREES
TRIGL SERPL-MCNC: 82 MG/DL
VENTRICULAR RATE: 76 BPM
WBC # BLD AUTO: 7.01 THOUSAND/UL (ref 4.31–10.16)

## 2017-12-12 PROCEDURE — 93306 TTE W/DOPPLER COMPLETE: CPT

## 2017-12-12 PROCEDURE — 86705 HEP B CORE ANTIBODY IGM: CPT | Performed by: INTERNAL MEDICINE

## 2017-12-12 PROCEDURE — 80076 HEPATIC FUNCTION PANEL: CPT | Performed by: INTERNAL MEDICINE

## 2017-12-12 PROCEDURE — 84165 PROTEIN E-PHORESIS SERUM: CPT | Performed by: STUDENT IN AN ORGANIZED HEALTH CARE EDUCATION/TRAINING PROGRAM

## 2017-12-12 PROCEDURE — 85027 COMPLETE CBC AUTOMATED: CPT | Performed by: INTERNAL MEDICINE

## 2017-12-12 PROCEDURE — 86803 HEPATITIS C AB TEST: CPT | Performed by: INTERNAL MEDICINE

## 2017-12-12 PROCEDURE — 87806 HIV AG W/HIV1&2 ANTB W/OPTIC: CPT | Performed by: STUDENT IN AN ORGANIZED HEALTH CARE EDUCATION/TRAINING PROGRAM

## 2017-12-12 PROCEDURE — 86334 IMMUNOFIX E-PHORESIS SERUM: CPT | Performed by: STUDENT IN AN ORGANIZED HEALTH CARE EDUCATION/TRAINING PROGRAM

## 2017-12-12 PROCEDURE — 85610 PROTHROMBIN TIME: CPT | Performed by: INTERNAL MEDICINE

## 2017-12-12 PROCEDURE — 83036 HEMOGLOBIN GLYCOSYLATED A1C: CPT | Performed by: INTERNAL MEDICINE

## 2017-12-12 PROCEDURE — 80061 LIPID PANEL: CPT | Performed by: INTERNAL MEDICINE

## 2017-12-12 PROCEDURE — 83735 ASSAY OF MAGNESIUM: CPT | Performed by: INTERNAL MEDICINE

## 2017-12-12 PROCEDURE — 86038 ANTINUCLEAR ANTIBODIES: CPT | Performed by: STUDENT IN AN ORGANIZED HEALTH CARE EDUCATION/TRAINING PROGRAM

## 2017-12-12 PROCEDURE — 87340 HEPATITIS B SURFACE AG IA: CPT | Performed by: INTERNAL MEDICINE

## 2017-12-12 PROCEDURE — 80048 BASIC METABOLIC PNL TOTAL CA: CPT | Performed by: INTERNAL MEDICINE

## 2017-12-12 PROCEDURE — 86704 HEP B CORE ANTIBODY TOTAL: CPT | Performed by: INTERNAL MEDICINE

## 2017-12-12 RX ORDER — DIGOXIN 0.25 MG/ML
250 INJECTION INTRAMUSCULAR; INTRAVENOUS ONCE
Status: COMPLETED | OUTPATIENT
Start: 2017-12-12 | End: 2017-12-12

## 2017-12-12 RX ORDER — DIGOXIN 0.25 MG/ML
500 INJECTION INTRAMUSCULAR; INTRAVENOUS ONCE
Status: COMPLETED | OUTPATIENT
Start: 2017-12-12 | End: 2017-12-12

## 2017-12-12 RX ORDER — FUROSEMIDE 10 MG/ML
20 INJECTION INTRAMUSCULAR; INTRAVENOUS
Status: DISCONTINUED | OUTPATIENT
Start: 2017-12-12 | End: 2017-12-13

## 2017-12-12 RX ORDER — DIGOXIN 0.05 MG/ML
125 SOLUTION ORAL DAILY
Status: DISCONTINUED | OUTPATIENT
Start: 2017-12-13 | End: 2017-12-13

## 2017-12-12 RX ORDER — METOPROLOL TARTRATE 50 MG/1
50 TABLET, FILM COATED ORAL EVERY 12 HOURS SCHEDULED
Status: DISCONTINUED | OUTPATIENT
Start: 2017-12-12 | End: 2017-12-12

## 2017-12-12 RX ORDER — DIGOXIN 0.25 MG/ML
125 INJECTION INTRAMUSCULAR; INTRAVENOUS ONCE
Status: COMPLETED | OUTPATIENT
Start: 2017-12-13 | End: 2017-12-13

## 2017-12-12 RX ADMIN — METOPROLOL TARTRATE 25 MG: 25 TABLET ORAL at 17:05

## 2017-12-12 RX ADMIN — METOPROLOL TARTRATE 25 MG: 25 TABLET ORAL at 11:18

## 2017-12-12 RX ADMIN — DIGOXIN 250 MCG: 250 INJECTION, SOLUTION INTRAMUSCULAR; INTRAVENOUS; PARENTERAL at 20:18

## 2017-12-12 RX ADMIN — METOPROLOL TARTRATE 25 MG: 25 TABLET ORAL at 23:18

## 2017-12-12 RX ADMIN — APIXABAN 5 MG: 5 TABLET, FILM COATED ORAL at 17:05

## 2017-12-12 RX ADMIN — DIGOXIN 500 MCG: 250 INJECTION, SOLUTION INTRAMUSCULAR; INTRAVENOUS; PARENTERAL at 14:56

## 2017-12-12 RX ADMIN — FUROSEMIDE 20 MG: 10 INJECTION, SOLUTION INTRAMUSCULAR; INTRAVENOUS at 13:40

## 2017-12-12 RX ADMIN — FUROSEMIDE 20 MG: 10 INJECTION, SOLUTION INTRAMUSCULAR; INTRAVENOUS at 17:56

## 2017-12-12 NOTE — PROGRESS NOTES
Called to evaluate patient complaining of dizziness, shortness of breath; he was hypotensive to 80/60  Physical exam notable for crackles in all lung fields, patient appeared fluid overloaded  Likely etiology from cardizem drip  EKG notable for T wave inversions and prolonged Qt  Patient given 250 cc bolus  Discontinued hypertension medications  Will recheck a troponin as his was initially elevated in the 0 8 range

## 2017-12-12 NOTE — CONSULTS
Consultation - Cardiology   Angela July 62 y o  male MRN: [id number]  Unit/Bed#: -01 Encounter: [id number]      Assessment:  Principal Problem:    Acute heart failure ([facility] )  Active Problems:    Atrial fibrillation with rapid ventricular response (HCC)    Non-ST elevation myocardial infarction (NSTEMI) ([facility] )    Serum total bilirubin elevated    Elevated blood pressure reading      Plan:  1  New onset heart failure- mostly nonischemic related to tachycardia induced cardiomyopathy versus alcohol use- HFrEF- Stage C with NYHA Class III/IV symptoms - - clinically patient is hypervolemic and warm  - Recommend IV diuresis with lasix 20mg BID  Strict I&O, Daily standing weight, Fluid restriction to 1 5L, Na restriction to 2gm  - GDMT with Metoprolol tartrate 12 5mg twice daily  If BP tolerates will start low dose ACEI-tomorrow  - Check HIV, GRACE, Hepatitis C, SPEP  Outpatient sleep study  - Will need ischemic workup with nuclear stress test prior to discharge or as outpatient  - Echo reviewed LVEF-25-30%, severe global hypokinesis with regional variation, moderately reduced RV function  Dilated Left atrium  Pericardial effusion no signs of tamponade  2  New onset Atrial fibrillation with RVR  - Metoprolol tartrate 12 5mg twice daily  - Will start the patient on Digoxin loading dose  - After adequate diuresis with set the patient for JOSE LUIS cardioversion   - Recommend anticoagulation with Heparin drip  - CHADVASC-2(Hypertension, Cardiomyopathy)    3  Mild troponin elevation-  - Troponin flat at 0 88, due to RVR  History of Present Illness   Physician Requesting Consult: Antolin Temple MD  Reason for Consult / Principal Problem: new onset heart failure  HPI: Angela July is a 62y o  year old male with no significant past medical history (thinks he may have untreated hypertension) prior to coming in came in with a chief complaint of shortness of breath    Patient has been having ongoing shortness of breath for the past 3 months  Initially started off with exertion however in the past few weeks, has been having shortness of breath with daily activities and sometimes even at rest   Also has 2 pillow orthopnea and PND  No chest pain or pressure  No palpitations or feeling of skipped heartbeats  Also noticed swelling of the bilateral lower legs  Patient also reports of a cough with whitish expectoration  Also had Abdominal distension  Atleast 15-20 lb weight gain  Complains of generalized weakness and fatigue  No dizziness or diaphoresis  Smoked 1 pack per day for the past 30 years-quit 4 months ago  Alcohol use -10 beers over the weekend, 5-6 beers on a week day  No drug use  Denied any recent sick contacts  Strong family history of coronary artery disease-both grandfathers with MI at age 79, mother with CABG at age 72, brother with coronary artery disease at age 61  Works as a pest   In the ED, patient was found to be in new onset atrial fibrillation with RVR and was given IV Cardizem 15mg and started on Cardizem drip at 15mg/hr  However after started on a Cardizem drip, patient complained of worsening dizziness and hypotension to 80/60, his Cardizem drip was stopped and he was given 250 of fluid bolus  He was also given 40 mg of IV Lasix, chest x-ray showed mild pulmonary vascular congestion  During my encounter today, patient reports slight improvement and shortness of breath with IV diuretics  Denies any chest pain or pressure  No palpitations  No dizziness or diaphoresis  EKG on presentation-atrial fibrillation with a rapid ventricular rate of 170  Right axis deviation  Nonspecific T-wave abnormality  Incomplete right bundle branch block  Telemetry-atrial fibrillation with RVR on presentation, heart rate around  with episodes of RVR this morning  3-7 beats of NSVT with PVCs    Inpatient consult to Cardiology  Performed by: Tegan Laurent by: GIOVANNI VILA           Review of Systems:  Review of Systems   Constitutional: Positive for activity change, appetite change, fatigue and unexpected weight change  Negative for chills, diaphoresis and fever  HENT: Negative for congestion  Respiratory: Positive for shortness of breath  Negative for cough, chest tightness and wheezing  Cardiovascular: Positive for leg swelling  Negative for chest pain and palpitations  Gastrointestinal: Positive for abdominal distention  Negative for abdominal pain, constipation, diarrhea, nausea and vomiting  Genitourinary: Negative for difficulty urinating and dysuria  Musculoskeletal: Negative for arthralgias and back pain  Skin: Negative for color change  Neurological: Positive for dizziness  Negative for syncope, facial asymmetry, weakness, light-headedness, numbness and headaches  Hematological: Negative for adenopathy  Psychiatric/Behavioral: Negative for confusion  14 systems reviewed and negative with the exception of the above and the following    Historical Information   Past Medical History:   Diagnosis Date    Coronary artery disease     Gout     Hypertension     Shingles      History reviewed  No pertinent surgical history  History   Alcohol Use No     History   Drug Use No     History   Smoking Status    Former Smoker    Quit date: 8/11/2017   Smokeless Tobacco    Never Used     Family History: CAD in family as listed in HPI    Meds/Allergies   all current active meds have been reviewed  No Known Allergies    Objective   Vitals: Blood pressure 102/62, pulse (!) 111, temperature 98 5 °F (36 9 °C), temperature source Oral, resp  rate 18, weight 102 kg (223 lb 12 8 oz), SpO2 94 %  , There is no height or weight on file to calculate BMI , Orthostatic Blood Pressures    Flowsheet Row Most Recent Value   Blood Pressure  102/62 filed at 12/12/2017 0900   Patient Position - Orthostatic VS  Lying filed at 12/12/2017 3126 Intake/Output Summary (Last 24 hours) at 12/12/17 1029  Last data filed at 12/12/17 0501   Gross per 24 hour   Intake           110 13 ml   Output             1950 ml   Net         -1839 87 ml       Invasive Devices     Peripheral Intravenous Line            Peripheral IV 12/11/17 Right Antecubital 1 day                    Physical Exam:  Physical Exam   Constitutional: He is oriented to person, place, and time  No distress  Obese woman   HENT:   Head: Normocephalic and atraumatic  Eyes: Conjunctivae are normal  No scleral icterus  Neck: Neck supple  JVD present  Cardiovascular: Normal heart sounds and intact distal pulses  Exam reveals no gallop and no friction rub  No murmur heard  Tachycardic, irregular rhythm   Pulmonary/Chest: Effort normal  He has rales  Crackles at the right lung base   Abdominal: Soft  Bowel sounds are normal  He exhibits distension  There is no tenderness  Musculoskeletal: Normal range of motion  2+ pitting edema of bilateral lower extremities   Neurological: He is alert and oriented to person, place, and time  Skin: Skin is warm and dry  He is not diaphoretic  Psychiatric: He has a normal mood and affect       Lab Results:     Lab Results   Component Value Date    TROPONINI 0 78 (H) 12/11/2017    TROPONINI 0 83 (H) 12/11/2017    TROPONINI 0 88 (H) 12/11/2017       Lab Results   Component Value Date    GLUCOSE 98 12/12/2017    CALCIUM 8 4 12/12/2017     12/12/2017    K 4 0 12/12/2017    CO2 25 12/12/2017     12/12/2017    BUN 24 12/12/2017    CREATININE 1 33 (H) 12/12/2017       Lab Results   Component Value Date    WBC 7 01 12/12/2017    HGB 13 9 12/12/2017    HCT 42 7 12/12/2017    MCV 95 12/12/2017     12/12/2017       Lab Results   Component Value Date    CHOL 120 12/12/2017     Lab Results   Component Value Date    HDL 31 (L) 12/12/2017     Lab Results   Component Value Date    LDLCALC 73 12/12/2017     Lab Results   Component Value Date TRIG 82 12/12/2017       Lab Results   Component Value Date    ALT 26 12/12/2017    AST 14 12/12/2017         Results from last 7 days  Lab Units 12/12/17  0511   INR  1 15       Cardiac testing:   No results found for this or any previous visit  No results found for this or any previous visit  No procedure found  No results found for this or any previous visit  Imaging: I have personally reviewed pertinent reports  Xr Chest Portable    Result Date: 12/11/2017  Narrative: CHEST INDICATION:  Shortness of breath, leg swelling COMPARISON:  None VIEWS:   AP frontal IMAGES:  1 FINDINGS:     There is significant enlargement of the cardiac silhouette  There is mild pulmonary vascular congestion  No focal infiltrate is seen  No pneumothorax  Probable very small right-sided pleural effusion  Visualized osseous structures appear within normal limits for the patient's age  Impression: Significant enlargement of cardiac silhouette with mild pulmonary vascular congestion and probable very small right effusion  Findings concur with the referring clinician's preliminary interpretation already in the patient's electronic health record  Workstation performed: [id number]     Us Abdomen Limited    Result Date: 12/11/2017  Narrative: RIGHT UPPER QUADRANT ULTRASOUND INDICATION:  Ascites COMPARISON:  None  TECHNIQUE:   Real-time ultrasound of the right upper quadrant was performed with a curvilinear transducer with both volumetric sweeps and still imaging techniques  FINDINGS: PANCREAS:  Visualized portions of the pancreas are within normal limits  AORTA AND IVC:  Visualized portions are normal for patient age  LIVER: Size:  Within normal range  The liver measures 16 cm in the midclavicular line  Contour:  Surface contour is smooth  Parenchyma:  Mildly nodular contour of the liver  No evidence of suspicious mass   Limited imaging of the main portal vein shows it to be patent and hepatopetal although demonstrates pulsatility which could be due to heart disease  BILIARY: There is comet tail artifact/ringdown artifact along the gallbladder wall suggesting adenomyomatosis  5 mm mild gallbladder wall thickening  Multiple mobile dependent calculi  No sludge  No sonographic Walters's sign  No intrahepatic biliary dilatation  CBD measures 4 mm  No choledocholithiasis  KIDNEY: Right kidney normal size  Within normal limits  ASCITES:   Moderate perihepatic fluid  Impression: 1  Moderate perihepatic ascites  Mildly nodular contour of the liver  2  Cholelithiasis  5 mm mild bladder wall thickening  Negative sonographic Walters sign  3  Adenomyomatosis  4  Pulsatility of the portal vein could relate to heart disease, correlate clinically   Workstation performed: [id number]

## 2017-12-12 NOTE — SOCIAL WORK
Chart review shows no previous hospitalizations  CM met with patient to discuss DC plans  Patient reports living alone in a 2nd floor apartment without elevator access  IPTA  Drives  Employed  Preferred pharmacy is [facility] on [address]  No hx of VNA or STR  No living will or POA  No hx of MH or D+A treatment  Patient states that his DC plan is to return home and resume work  Patient educated on the importance of understanding their medical course and encouraged to ask questions of the medical team  [address] discussed  Patient has limited assistance around his apartment  Emergency Contact: Aye Barba (Mother) [phone]    Patient expresses no needs from CM at this time  Patient encouraged to request CM if any needs or questions arise

## 2017-12-12 NOTE — PLAN OF CARE
Problem: DISCHARGE PLANNING - CARE MANAGEMENT  Goal: Discharge to post-acute care or home with appropriate resources  INTERVENTIONS:  - Conduct assessment to determine patient/family and health care team treatment goals, and need for post-acute services based on payer coverage, community resources, and patient preferences, and barriers to discharge  - Address psychosocial, clinical, and financial barriers to discharge as identified in assessment in conjunction with the patient/family and health care team  - Arrange appropriate level of post-acute services according to patient's   needs and preference and payer coverage in collaboration with the physician and health care team  - Communicate with and update the patient/family, physician, and health care team regarding progress on the discharge plan  - Arrange appropriate transportation to post-acute venues  - Sindy Mason states that he will drive himself home once discharged  He does not currently have needs from CM     Outcome: Progressing

## 2017-12-12 NOTE — CASE MANAGEMENT
Initial Clinical Review    Admission: Date/Time/Statement: 12/11/17 @ 1030     Orders Placed This Encounter   Procedures    Inpatient Admission (expected length of stay for this patient is greater than two midnights)     Standing Status:   Standing     Number of Occurrences:   1     Order Specific Question:   Admitting Physician     Answer:   Bibiana Otoole     Order Specific Question:   Level of Care     Answer:   Level 2 Stepdown / HOT [14]     Order Specific Question:   Estimated length of stay     Answer:   More than 2 Midnights     Order Specific Question:   Certification     Answer:   I certify that inpatient services are medically necessary for this patient for a duration of greater than two midnights  See H&P and MD Progress Notes for additional information about the patient's course of treatment  ED: Date/Time/Mode of Arrival:   ED Arrival Information     Expected Arrival Acuity Means of Arrival Escorted By Service Admission Type    - 12/11/2017 08:26 Emergent Walk-In - General Medicine Emergency    Arrival Complaint    -          Chief Complaint:   Chief Complaint   Patient presents with    Shortness of Breath     Pt c o increased sob and feet and abdomen swelling for awhile now  pt states "I cant take it anymore"       History of Illness: 62 y o  male with no relevant past medical history (2/2 to not seeing a PCP since childhood) presents with shortness of breath  Shortness of breath began 3 months ago(on September 12th, 2017, he remembers the exact date as it was quite abnormal), began acutely, lasting 15 minutes, resolved spontaneously  Over the next 3 months symptoms would increase in frequency and duration, occurring daily, lasting all day  Shortness of breath is worse with exertion, as now patient can only walk a few steps before getting short of breath  He also endorses 2 pillow orthopnea and paroxysmal nocturnal dyspnea    During this time period, patient has also noticed increased swelling in his legs that has gradually worsened  Reports a productive cough of white sputum, that is worse at night  Patient has noticed that his abdomen has slowly become more distended and that his weight is increased 15-20 lb(dry weight is 210-215lbs, and pt weighed himself at 230lbs)  Associated with early satiety, fatigue, weakness and decreased urination  Patient denies chest pain, palpitations, sore throat, abdominal pain, nausea, vomiting, constipation, diarrhea, fevers/chills, URI symptoms  Patient has never had these symptoms previous to September 12, 2017  He takes no medicines at home  He reports a very poor diet, high in salt and fat, and eats whatever he wants  He does have a family history of coronary artery disease, as his 61year old brother recently had a stent placed in his LAD  Also, his mother has CAD and CABG in her 63's with a hx fo Afib  He quit smoking cigarettes for 6 months ago, but previously had a 20 pack-year history of smoking  He drinks 7-10 drinks for three days of the weeks and then drinks 10 total beers on the other days of the week (so a total of up to 40 drinks per week) for at least 3 decades  No hx of seizures or withdrawal   He denies IV drug abuse, recent travel  He does report of history of unprotected sex when he was younger, but can't articulate any further details  In the ED, patient arrived with a blood pressure of 207/100, pulse of 182  He is found to be in AFib and was started on a Cardizem drip  He was also given 2 doses of IV Lasix 20 mg  CBC was unremarkable CMP was unremarkable, aside from a total bilirubin of 3 02 with a direct bilirubin of 0 81  BNP was 5242  TSH/T4 were normal   Point of care troponin was 0 14 and next troponin was measured at 0 90  Patient was saturating at 93% on room air when I was in the room    Chest x-ray showed cardiomegaly       ED Vital Signs:   ED Triage Vitals   Temperature Pulse Respirations Blood Pressure SpO2   12/11/17 0836 12/11/17 0826 12/11/17 0826 12/11/17 0826 12/11/17 0826   (!) 97 1 °F (36 2 °C) (!) 182 22 (!) 207/100 93 %      Temp Source Heart Rate Source Patient Position - Orthostatic VS BP Location FiO2 (%)   12/11/17 1948 12/11/17 0826 12/11/17 1737 12/11/17 1737 --   Axillary Monitor Sitting Right arm       Pain Score       12/11/17 1045       No Pain        Wt Readings from Last 1 Encounters:   12/12/17 102 kg (223 lb 12 8 oz)       Vital Signs (abnormal):   Temp Pulse Resp BP SpO2 Weight [facility]    12/11/17 1815 --  108 20 126/79 95 % -- [facility]   12/11/17 1737 --  109 18 115/86 95 % -- [facility]   12/11/17 1615 --  114 18 123/77 95 % -- [facility]   12/11/17 1545 --  106 20 121/96 95 % -- [facility]   12/11/17 1430 --  114 20 115/78 93 % -- [facility]   12/11/17 1345 --  112 22 108/69 94 % -- [facility]   12/11/17 1200 --  120 22 -- 95 % -- [facility]   12/11/17 1145 --  122 20 135/95 96 % -- [facility]   12/11/17 1045 --  124 20 126/98 97 % -- [facility]   12/11/17 1015 --  122 20 144/91 97 % -- [facility]   12/11/17 0945 --  114 20 125/75 97 % -- [facility]   12/11/17 0915 --  138 20  146/109 97 % -- [facility]   12/11/17 0844 --  138 20  168/124 96 % -- [facility]   12/11/17 0836  97 1 °F (36 2 °C) -- -- -- -- 104 kg (230 lb) [facility]   12/11/17 0826 --  182 22  207/100            Abnormal Labs/Diagnostic Test Results: bnp 5242-------------t  Bili 3 02--------hct 49 7  Gp 16--- ca ionized 1 10--hgb 18 4/54  Trop 0 14    U/s of abd--       1  Moderate perihepatic ascites  Mildly nodular contour of the liver  2  Cholelithiasis  5 mm mild bladder wall thickening  Negative sonographic Walters sign  3  Adenomyomatosis  4  Pulsatility of the portal vein could relate to heart disease, correlate clinically        Chest-Significant enlargement of cardiac silhouette with mild pulmonary vascular congestion and probable very small right effusion     ekg--    Age and gender specific ECG analysis   Atrial fibrillation with rapid ventricular response  Right axis deviation  Low voltage QRS  Cannot rule out Anterior infarct , age undetermined  Abnormal ECG  No previous ECGs available          ED Treatment:   Medication Administration from 12/11/2017 0826 to 12/11/2017 1914       Date/Time Order Dose Route Action Action by Comments     12/11/2017 0840 diltiazem (CARDIZEM) injection 20 mg 20 mg Intravenous Given Sheri Crane RN      96/17/3178 1729 diltiazem (CARDIZEM) 125 mg in sodium chloride 0 9 % 125 mL infusion 0 mg/hr Intravenous Stopped Alpesh Watts RN      12/11/2017 1100 diltiazem (CARDIZEM) 125 mg in sodium chloride 0 9 % 125 mL infusion 15 mg/hr Intravenous Rate/Dose Change Sheri Crane RN      02/81/1259 1038 diltiazem (CARDIZEM) 125 mg in sodium chloride 0 9 % 125 mL infusion 12 5 mg/hr Intravenous Rate/Dose Change Sheri Crane RN      66/86/3368 1020 diltiazem (CARDIZEM) 125 mg in sodium chloride 0 9 % 125 mL infusion 10 mg/hr Intravenous Rate/Dose Change Sheri Crane RN      93/93/1563 1005 diltiazem (CARDIZEM) 125 mg in sodium chloride 0 9 % 125 mL infusion 7 5 mg/hr Intravenous Rate/Dose Change Sheri Crane RN      98/51/6895 0924 diltiazem (CARDIZEM) 125 mg in sodium chloride 0 9 % 125 mL infusion 5 mg/hr Intravenous New Bag Za Newell, ELLYN      15/44/0303 0919 diltiazem (CARDIZEM) injection 20 mg 20 mg Intravenous Given Sheri Crane RN      90/13/0915 1016 aspirin chewable tablet 324 mg 324 mg Oral Given hSeri Crane RN      46/93/5631 1100 furosemide (LASIX) injection 20 mg 20 mg Intravenous Given Sheri Crane RN      59/27/5325 1200 enoxaparin (LOVENOX) subcutaneous injection 40 mg 40 mg Subcutaneous Given Sheri Crane RN      88/13/2516 1207 furosemide (LASIX) injection 40 mg 40 mg Intravenous Given Sheri Crane RN      12/89/2997 1201 diltiazem (CARDIZEM) injection 20 mg 20 mg Intravenous Given Sheri Crane RN      53/31/8060 1843 metoprolol tartrate (LOPRESSOR) tablet 50 mg 50 mg Oral Given Alpesh Watts RN      12/11/2017 1729 diltiazem (CARDIZEM) 125 mg in sodium chloride 0 9 % 125 mL infusion 15 mg/hr Intravenous New Bag Johnnie Bumpers, RN           Past Medical/Surgical History: Active Ambulatory Problems     Diagnosis Date Noted    No Active Ambulatory Problems     Resolved Ambulatory Problems     Diagnosis Date Noted    No Resolved Ambulatory Problems     Past Medical History:   Diagnosis Date    Coronary artery disease     Gout     Hypertension     Shingles        Admitting Diagnosis: SOB (shortness of breath) [R06 02]  CHF exacerbation (HCC) [I50 9]  Atrial fibrillation with rapid ventricular response (HCC) [I48 91]  Total bilirubin, elevated [R17]  Acute heart failure, unspecified heart failure type ([facility] [address] ) [I50 9]    Age/Sex: 62 y o  male    Assessment/Plan:    Acute heart failure (HCC)    Atrial fibrillation with rapid ventricular response (HCC)    Non-ST elevation myocardial infarction (NSTEMI) (HCC)    Serum total bilirubin elevated    Elevated blood pressure reading      Decompensated Heart Failure, with unknown chronicity (acute with possible chronic component), suspect tachycardia mediated cardiomyopathy  Overall, patient reports all signs and symptoms of classic CHF including dyspnea on exertion, PND, orthopnea, JVD, and peripheral edema with significant weight  Given that the patient's shortness of breath started suddenly and patient never had symptoms before 3 months ago, I believe this is most likely an acute exacerbation of CHF most likely secondary to atrial fibrillation with RVR causing tachycardia mediated cardiomyopathy  However, it could be possible that this is an acute exacerbation of chronic CHF, as it is hard to determine the chronicity due to the fact the patient has not seen a PCP since childhood    · IV Lasix BID  · Echo  · Cardiology consulted  · Cardizem drip for Afib, with plan to transition to po med (metoprolol)  · Telemetry  · Trend Troponins  · Daily Weights, I/O's  · Cardiac Diet with Fluid Restriction  · F/u Mg, BMP,CBC  · TSH/T4 normal      Atrial Fibrillation with RVR  Most likely occurring for at least the past 3 months and causing (or least worsening of CHF)  · Cardizem drip, with plan to transition to PO  · F/u Echo  · [id number] unable to be reliably calculated 2/2 to lack of medical f/u, though I suspect given the patient unhealthy lifestlye, he will have enough comorbidity to require AC  · See plan for #1     Type II NSTEMI 2/2 decompensated CHF  Patient is not experiencing any chest pain and no history of chest pain, so doubt type 2 NSTEMI  · Trend troponins     Distended Abdomen (likely Ascites) with Hyperbilirubinemia  Suspect distended abdomen and ascites is 2/2 to decompensated CHF, however can't r/o cirrhosis at this time (despite normal platelets)  Patient had extensive alcohol drinking history, stating he drinks 7-10 beers per day on Friday, Saturday, Sunday and also drinks 10 beers total on the other days of the week (with no hx of seizures or withdrawal) for a total of at least 30 years  Elevated Bilirubin is predominantly indirect bilirubin, so could be 2/2 Alcohol, [place] syndrome, or even congestive hepatopathy  Pt does have Hep C risk factor by his date of birth, otherwise denies needle use and transfusions  Does report hx of unprotected sex, so at risk for Hep B  On Physical exam, patient was noted to have scleral icterus  Will need further to determine etiology  · F/U Chronic Hep Panel  · F/U U/S to assess for ascites, cirrhosis, and obstruction (less likely)  Patient will likely need peritoneal fluid analysis to determine etiology and r/o SBP (though highly doubt)  · F/u PT/INR  · F/u Hepatic Function Panel     Lack of PCP  Patient advised to follow up with PCP and possibly cardiologist outpatient and was agreeable  He states he is ready to start taking care of his health    · Lipid Panel, A1C  · Establish PCP outpatient after discharge     Elevated BP  · Resolved  A0 78--0 83--0 93  Admission Orders:  Scheduled Meds:   furosemide 20 mg Intravenous BID (diuretic)   heparin (porcine) 5,000 Units Subcutaneous Q8H [address]   metoprolol tartrate 25 mg Oral Q12H [address]     Continuous Infusions:  CARDIZEM INFUSION UNTIL 0927 12/12  PRN Meds:   acetaminophen   Echo  Consult cardiology  Low a cardiac diet with 1500 fluid restriticion  Daily wt  venodynes    TROP 078--0 83--0 90

## 2017-12-12 NOTE — PROGRESS NOTES
IM Residency Progress Note   Unit/Bed#: -01 Encounter: [id number]  SOD Team B       Elina Caro 62 y o  male [id number]    Hospital Stay Days: 1       Assessment/Plan:    Principal Problem:    Acute heart failure ([address] )  Active Problems:    Atrial fibrillation with rapid ventricular response (HCC)    Non-ST elevation myocardial infarction (NSTEMI) (HCC)    Serum total bilirubin elevated    Elevated blood pressure reading    Decompensated Heart Failure, with unknown chronicity (acute with possible chronic component), suspect tachycardia mediated cardiomyopathy vs alcohol cardiomyopathy  Overall, patient reports all signs and symptoms of classic CHF including dyspnea on exertion, PND, orthopnea, JVD, and peripheral edema with significant weight  Given that the patient's shortness of breath started suddenly and patient never had symptoms before 3 months ago, I believe this is most likely an acute exacerbation of CHF most likely secondary to atrial fibrillation with RVR causing tachycardia mediated cardiomyopathy  However, it could be possible that this is an acute exacerbation of chronic CHF, as it is hard to determine the chronicity due to the fact the patient has not seen a PCP since childhood  · IV Lasix 20mg BID  · Echo shows severe biventricular dysfunction with decreased EF (awaiting official read)  · Cardiology consulted, per recs, metoprolol 25mg QID and Digoxin 125mcg daily, if unable to rate control then low threshold for JOSE LUIS/CV  · Telemetry   · Troponin stable  · Daily Weights, I/O's (down 6lbs since admission)  · Cardiac Diet with Fluid Restriction  · TSH/T4, Mg normal      Atrial Fibrillation with RVR  Most likely occurring for at least the past 3 months and causing (or least worsening of CHF)     · Per Cardiology, Metoprolol 25mg QID and Digoxin 125mcg, Eloquis 5 BID ([id number] unable to be reliably calculated 2/2 to lack of medical f/u, though I suspect given the patient unhealthy lifestlye, he will have enough comorbidity to require Ac )  · F/u Echo  · See plan for #1     Type II NSTEMI 2/2 decompensated CHF  Patient is not experiencing any chest pain and no history of chest pain, so doubt type 2 NSTEMI  · Trend troponins      Ascites with Hyperbilirubinemia most likely 2/2 to Congestive Hepatopathy  Suspect distended abdomen and ascites is 2/2 to decompensated CHF  Will not tap fluid given clear etiology as this point  · F/U Chronic Hep Panel  · Ultrasounds shows moderate perihepatic ascites, mild nodular contour liver, pulsatility of portal vein most likely secondary to decompensated CHF  No obstruction noted  · Normal INR  · Tbili trending down, suspect this is most likely congestive hepatopathy     Lack of PCP  Patient advised to follow up with PCP and possibly cardiologist outpatient and was agreeable  He states he is ready to start taking care of his health  · Lipid Panel, A1C unremarkable  · Establish PCP outpatient after discharge     Elevated BP  · Resolved      Disposition:  Patient still in decompensated heart failure  Echo pending, cardiology consult pending  Subjective:   Yesterday, the patient complained of dizziness and shortness of breath was found to have a blood pressure of 80/60  Cardizem drip was stopped and IV Lasix was held  Patient was given a 250 cc bolus  Today, patient appeared comfortable and had no acute complaints  Patient denied chest pain, shortness of breath, palpitations, abdominal pain, nausea, vomiting, diarrhea, constipation  Still has productive cough of white sputum and peripheral edema  Patient desires to walk         Vitals: Temp (24hrs), Av 3 °F (36 8 °C), Min:97 1 °F (36 2 °C), Max:99 2 °F (37 3 °C)  Current: Temperature: 98 5 °F (36 9 °C)  Vitals:    17 0100 17 0406 17 0600 17 0721   BP: 90/64 100/82  120/75   Pulse:  90  (!) 111   Resp:  18  18   Temp:  98 2 °F (36 8 °C)  98 5 °F (36 9 °C)   TempSrc:  Oral  Oral SpO2:  92%  95%   Weight:   102 kg (223 lb 12 8 oz)     There is no height or weight on file to calculate BMI  I/O last 24 hours:   In: 110 1 [I V :110 1]  Out: 1950 [GSOGW:3102]      Physical Exam: General appearance: alert, appears stated age and cooperative  Lungs: Crackles in the lung base  Heart: irregularly irregular rhythm  Abdomen: Distended, shifting dullness noted, bowel sounds normal, no tenderness to palpation  Extremities: 2+ edema up to the knees in the lower extremities  Pulses: 2+ and symmetric  Skin: Skin color, texture, turgor normal  No rashes or lesions     Invasive Devices     Peripheral Intravenous Line            Peripheral IV 12/11/17 Right Antecubital less than 1 day                          Labs:   Recent Results (from the past 24 hour(s))   ECG 12 lead    Collection Time: 12/11/17  8:31 AM   Result Value Ref Range    Ventricular Rate 172 BPM    Atrial Rate 192 BPM    WA Interval  ms    QRSD Interval 84 ms    QT Interval 248 ms    QTC Interval 419 ms    P Axis  degrees    QRS Axis 127 degrees    T Wave Axis 25 degrees   Comprehensive metabolic panel    Collection Time: 12/11/17  8:34 AM   Result Value Ref Range    Sodium 137 136 - 145 mmol/L    Potassium 4 4 3 5 - 5 3 mmol/L    Chloride 102 100 - 108 mmol/L    CO2 25 21 - 32 mmol/L    Anion Gap 10 4 - 13 mmol/L    BUN 19 5 - 25 mg/dL    Creatinine 1 21 0 60 - 1 30 mg/dL    Glucose 101 65 - 140 mg/dL    Calcium 9 3 8 3 - 10 1 mg/dL    AST 23 5 - 45 U/L    ALT 39 12 - 78 U/L    Alkaline Phosphatase 79 46 - 116 U/L    Total Protein 7 8 6 4 - 8 2 g/dL    Albumin 3 5 3 5 - 5 0 g/dL    Total Bilirubin 3 02 (H) 0 20 - 1 00 mg/dL    eGFR 66 ml/min/1 73sq m   CBC and differential    Collection Time: 12/11/17  8:34 AM   Result Value Ref Range    WBC 7 55 4 31 - 10 16 Thousand/uL    RBC 5 26 3 88 - 5 62 Million/uL    Hemoglobin 16 6 12 0 - 17 0 g/dL    Hematocrit 49 7 (H) 36 5 - 49 3 %    MCV 95 82 - 98 fL    MCH 31 6 26 8 - 34 3 pg    MCHC 33 4 31 4 - 37 4 g/dL    RDW 13 6 11 6 - 15 1 %    MPV 10 1 8 9 - 12 7 fL    Platelets 403 226 - 270 Thousands/uL    nRBC 0 /100 WBCs    Neutrophils Relative 71 43 - 75 %    Lymphocytes Relative 19 14 - 44 %    Monocytes Relative 8 4 - 12 %    Eosinophils Relative 1 0 - 6 %    Basophils Relative 1 0 - 1 %    Neutrophils Absolute 5 46 1 85 - 7 62 Thousands/µL    Lymphocytes Absolute 1 40 0 60 - 4 47 Thousands/µL    Monocytes Absolute 0 57 0 17 - 1 22 Thousand/µL    Eosinophils Absolute 0 05 0 00 - 0 61 Thousand/µL    Basophils Absolute 0 05 0 00 - 0 10 Thousands/µL   TSH    Collection Time: 12/11/17  8:34 AM   Result Value Ref Range    TSH 3RD GENERATON 1 810 0 358 - 3 740 uIU/mL   BNP    Collection Time: 12/11/17  8:34 AM   Result Value Ref Range    NT-proBNP 5,242 (H) <125 pg/mL   T4, free    Collection Time: 12/11/17  8:34 AM   Result Value Ref Range    Free T4 1 36 0 76 - 1 46 ng/dL   Bilirubin, direct    Collection Time: 12/11/17  8:34 AM   Result Value Ref Range    Bilirubin, Direct 0 81 (H) 0 00 - 0 20 mg/dL   POCT troponin    Collection Time: 12/11/17  8:36 AM   Result Value Ref Range    POC Troponin I 0 14 (H) 0 00 - 0 08 ng/ml    Specimen Type VENOUS    POCT Chem 8+    Collection Time: 12/11/17  8:37 AM   Result Value Ref Range    SODIUM, I-STAT 139 136 - 145 mmol/l    Potassium, i-STAT 4 5 3 5 - 5 3 mmol/L    Chloride, istat 103 100 - 108 mmol/L    CO2, i-STAT 25 21 - 32 mmol/L    Anion Gap, Istat 16 (H) 4 - 13 mmol/L    Calcium, Ionized i-STAT 1 10 (L) 1 12 - 1 32 mmol/L    BUN, I-STAT 21 5 - 25 mg/dl    Creatinine, i-STAT 1 1 0 6 - 1 3 mg/dl    eGFR 74 ml/min/1 73sq m    Glucose, i-STAT 107 65 - 140 mg/dl    Hct, i-STAT 54 (H) 36 5 - 49 3 %    Hgb, i-STAT 18 4 (H) 12 0 - 17 0 g/dl    Specimen Type VENOUS    Troponin I    Collection Time: 12/11/17 11:57 AM   Result Value Ref Range    Troponin I 0 90 (H) <=0 04 ng/mL   Troponin I    Collection Time: 12/11/17  2:31 PM   Result Value Ref Range    Troponin I 0 88 (H) <=0 04 ng/mL   UA w Reflex to Microscopic w Reflex to Culture    Collection Time: 12/11/17  5:31 PM   Result Value Ref Range    Color, UA Yellow     Clarity, UA Clear     Specific [place], UA 1 010 1 003 - 1 030    pH, UA 5 0 4 5 - 8 0    Leukocytes, UA Negative Negative    Nitrite, UA Negative Negative    Protein, UA Negative Negative mg/dl    Glucose, UA Negative Negative mg/dl    Ketones, UA Negative Negative mg/dl    Urobilinogen, UA 0 2 0 2, 1 0 E U /dl E U /dl    Bilirubin, UA Negative Negative    Blood, UA Negative Negative   Troponin I    Collection Time: 12/11/17  7:55 PM   Result Value Ref Range    Troponin I 0 83 (H) <=0 04 ng/mL   Troponin I    Collection Time: 12/11/17 10:15 PM   Result Value Ref Range    Troponin I 0 78 (H) <=0 04 ng/mL   CBC (With Platelets)    Collection Time: 12/12/17  5:11 AM   Result Value Ref Range    WBC 7 01 4 31 - 10 16 Thousand/uL    RBC 4 49 3 88 - 5 62 Million/uL    Hemoglobin 13 9 12 0 - 17 0 g/dL    Hematocrit 42 7 36 5 - 49 3 %    MCV 95 82 - 98 fL    MCH 31 0 26 8 - 34 3 pg    MCHC 32 6 31 4 - 37 4 g/dL    RDW 13 9 11 6 - 15 1 %    Platelets 543 192 - 831 Thousands/uL    MPV 11 0 8 9 - 12 7 fL   Lipid panel    Collection Time: 12/12/17  5:11 AM   Result Value Ref Range    Cholesterol 120 50 - 200 mg/dL    Triglycerides 82 <=150 mg/dL    HDL, Direct 31 (L) 40 - 60 mg/dL    LDL Calculated 73 0 - 100 mg/dL   Hemoglobin A1c    Collection Time: 12/12/17  5:11 AM   Result Value Ref Range    Hemoglobin A1C 6 2 4 2 - 6 3 %     mg/dl   Hepatic function panel    Collection Time: 12/12/17  5:11 AM   Result Value Ref Range    Total Bilirubin 1 87 (H) 0 20 - 1 00 mg/dL    Bilirubin, Direct 0 51 (H) 0 00 - 0 20 mg/dL    Alkaline Phosphatase 62 46 - 116 U/L    AST 14 5 - 45 U/L    ALT 26 12 - 78 U/L    Total Protein 6 3 (L) 6 4 - 8 2 g/dL    Albumin 2 7 (L) 3 5 - 5 0 g/dL   Protime-INR    Collection Time: 12/12/17  5:11 AM   Result Value Ref Range    Protime 14 7 (H) 12 1 - 14 4 seconds    INR 1 15 0 86 - 1 16   Magnesium    Collection Time: 12/12/17  5:11 AM   Result Value Ref Range    Magnesium 2 3 1 6 - 2 6 mg/dL   Basic metabolic panel    Collection Time: 12/12/17  5:11 AM   Result Value Ref Range    Sodium 138 136 - 145 mmol/L    Potassium 4 0 3 5 - 5 3 mmol/L    Chloride 104 100 - 108 mmol/L    CO2 25 21 - 32 mmol/L    Anion Gap 9 4 - 13 mmol/L    BUN 24 5 - 25 mg/dL    Creatinine 1 33 (H) 0 60 - 1 30 mg/dL    Glucose 98 65 - 140 mg/dL    Calcium 8 4 8 3 - 10 1 mg/dL    eGFR 59 ml/min/1 73sq m       Radiology Results: I have personally reviewed pertinent reports  Other Diagnostic Testing:   I have personally reviewed pertinent reports          Active Meds:   Current Facility-Administered Medications   Medication Dose Route Frequency    acetaminophen (TYLENOL) tablet 650 mg  650 mg Oral Q4H PRN    heparin (porcine) subcutaneous injection 5,000 Units  5,000 Units Subcutaneous Q8H [address]         VTE Pharmacologic Prophylaxis: Heparin  VTE Mechanical Prophylaxis: sequential compression device    Subha La MD

## 2017-12-13 LAB
ALBUMIN SERPL BCP-MCNC: 2.7 G/DL (ref 3.5–5)
ALP SERPL-CCNC: 67 U/L (ref 46–116)
ALT SERPL W P-5'-P-CCNC: 25 U/L (ref 12–78)
ANION GAP SERPL CALCULATED.3IONS-SCNC: 7 MMOL/L (ref 4–13)
AST SERPL W P-5'-P-CCNC: 15 U/L (ref 5–45)
BILIRUB DIRECT SERPL-MCNC: 0.43 MG/DL (ref 0–0.2)
BILIRUB SERPL-MCNC: 1.47 MG/DL (ref 0.2–1)
BUN SERPL-MCNC: 24 MG/DL (ref 5–25)
CALCIUM SERPL-MCNC: 8.5 MG/DL (ref 8.3–10.1)
CHLORIDE SERPL-SCNC: 105 MMOL/L (ref 100–108)
CO2 SERPL-SCNC: 28 MMOL/L (ref 21–32)
CREAT SERPL-MCNC: 1.25 MG/DL (ref 0.6–1.3)
GFR SERPL CREATININE-BSD FRML MDRD: 64 ML/MIN/1.73SQ M
GLUCOSE SERPL-MCNC: 87 MG/DL (ref 65–140)
POTASSIUM SERPL-SCNC: 3.8 MMOL/L (ref 3.5–5.3)
PROT SERPL-MCNC: 6.5 G/DL (ref 6.4–8.2)
RYE IGE QN: NEGATIVE
SODIUM SERPL-SCNC: 140 MMOL/L (ref 136–145)

## 2017-12-13 PROCEDURE — 80076 HEPATIC FUNCTION PANEL: CPT | Performed by: INTERNAL MEDICINE

## 2017-12-13 PROCEDURE — 80048 BASIC METABOLIC PNL TOTAL CA: CPT | Performed by: INTERNAL MEDICINE

## 2017-12-13 RX ORDER — POTASSIUM CHLORIDE 20 MEQ/1
20 TABLET, EXTENDED RELEASE ORAL 2 TIMES DAILY WITH MEALS
Status: DISCONTINUED | OUTPATIENT
Start: 2017-12-13 | End: 2017-12-14

## 2017-12-13 RX ORDER — DIGOXIN 125 MCG
125 TABLET ORAL DAILY
Status: DISCONTINUED | OUTPATIENT
Start: 2017-12-13 | End: 2017-12-17 | Stop reason: HOSPADM

## 2017-12-13 RX ORDER — FUROSEMIDE 10 MG/ML
40 INJECTION INTRAMUSCULAR; INTRAVENOUS
Status: DISCONTINUED | OUTPATIENT
Start: 2017-12-13 | End: 2017-12-16

## 2017-12-13 RX ORDER — METOPROLOL TARTRATE 50 MG/1
50 TABLET, FILM COATED ORAL EVERY 6 HOURS
Status: COMPLETED | OUTPATIENT
Start: 2017-12-13 | End: 2017-12-15

## 2017-12-13 RX ADMIN — POTASSIUM CHLORIDE 20 MEQ: 1500 TABLET, EXTENDED RELEASE ORAL at 10:07

## 2017-12-13 RX ADMIN — METOPROLOL TARTRATE 50 MG: 50 TABLET ORAL at 17:16

## 2017-12-13 RX ADMIN — DIGOXIN 125 MCG: 0.12 TABLET ORAL at 10:08

## 2017-12-13 RX ADMIN — FUROSEMIDE 40 MG: 10 INJECTION, SOLUTION INTRAMUSCULAR; INTRAVENOUS at 10:11

## 2017-12-13 RX ADMIN — APIXABAN 5 MG: 5 TABLET, FILM COATED ORAL at 17:16

## 2017-12-13 RX ADMIN — DIGOXIN 125 MCG: 0.25 INJECTION INTRAMUSCULAR; INTRAVENOUS at 01:44

## 2017-12-13 RX ADMIN — POTASSIUM CHLORIDE 20 MEQ: 1500 TABLET, EXTENDED RELEASE ORAL at 17:17

## 2017-12-13 RX ADMIN — APIXABAN 5 MG: 5 TABLET, FILM COATED ORAL at 10:08

## 2017-12-13 RX ADMIN — METOPROLOL TARTRATE 50 MG: 50 TABLET ORAL at 11:25

## 2017-12-13 RX ADMIN — FUROSEMIDE 40 MG: 10 INJECTION, SOLUTION INTRAMUSCULAR; INTRAVENOUS at 17:17

## 2017-12-13 RX ADMIN — METOPROLOL TARTRATE 25 MG: 25 TABLET ORAL at 05:58

## 2017-12-13 RX ADMIN — ACETAMINOPHEN 650 MG: 325 TABLET, FILM COATED ORAL at 05:58

## 2017-12-13 NOTE — PROGRESS NOTES
Pt is resting in bed and has no complaints this am Rounded with Dr Remberto Blanton and they are not going to do the madalyn/cardioversion at this time wants to diuresis more and maybe do this Friday will continue to monitor

## 2017-12-13 NOTE — PROGRESS NOTES
Progress Note - Cardiology   María Polanco 62 y o  male MRN: [id number]  Unit/Bed#: -01 Encounter: [id number]  12/13/17  8:31 AM        Assessment/Plan:    1  Acute systolic and right-sided heart failure  Presumed tachycardia mediated at this time given the fact that the patient presented with atrial fibrillation with rapid ventricular response of unknown duration  However, the patient has a strong family history of coronary artery disease  Will eventually need ischemic evaluation, likely outpatient stress test   He may also have had a history of hypertension, although this cannot be diagnosed as the patient was never seen by a doctor for over 20 years  Currently on beta-blocker  I will titrate this up as blood pressure tolerates  Will increase diuretic as he did tolerate 20 IV of the Lasix  Will increase to 40 IV b i d   Once the patient is more euvolemic, we will arrange for JOSE LUIS and cardioversion, likely Friday  2   Persistent atrial fibrillation with rapid ventricular response  The patient became hypotensive with IV diltiazem drip  We then started oral beta-blocker as well as digoxin with an IV load for rate control instead  Heart rate this morning is improved  He was started on Eliquis 5 b i d  for oral anticoagulation in preparation for likely JOSE LUIS/cardioversion on Friday  The patient is agreeable to this plan  3   Impaired fasting glucose  Hemoglobin A1c was checked and was mildly elevated at 6 2%  He will need dietary education regarding this  4   NSTEMI type 2  This is likely due to AFib with rapid ventricular response as well as acute systolic heart failure  Subjective/Objective   Chief Complaint:   Chief Complaint   Patient presents with    Shortness of Breath     Pt c o increased sob and feet and abdomen swelling for awhile now    pt states "I cant take it anymore"         Subjective:  51-year-old man who had not seen a doctor for over 20 years, who presents with approximately 3 months of shortness of breath, increasing lower extremity edema, as well as increasing abdominal girth  He believes he has gained about 20 lb over the last 3 months  He was found to be volume overloaded  Echocardiogram showed an ejection fraction of 78% with RV systolic dysfunction as well  He was found to be in atrial fibrillation with rapid ventricular response on admission  IV diltiazem drip was started for rate control, but he developed significant hypotension which required discontinuation  He was started on a beta-blocker and given a digoxin load  He was also given IV Lasix for diuresis  Heart rate control has improved  He reports he is urinating well  His lower extremity edema that is improving  His abdominal girth is decreasing  No chest pain  Shortness of breath at rest is stable  He has not attempted to exert himself as yet  No fevers  No dizziness or lightheadedness        Patient Active Problem List   Diagnosis    Acute heart failure (HCC)    Atrial fibrillation with rapid ventricular response (HCC)    Non-ST elevation myocardial infarction (NSTEMI) ([facility] [address] )    Serum total bilirubin elevated    Elevated blood pressure reading     Past Medical History:   Diagnosis Date    Coronary artery disease     Gout     Hypertension     Shingles        No Known Allergies    Current Facility-Administered Medications   Medication Dose Route Frequency Provider Last Rate Last Dose    acetaminophen (TYLENOL) tablet 650 mg  650 mg Oral Q4H PRN Javier Solorio MD   650 mg at 12/13/17 0558    apixaban (ELIQUIS) tablet 5 mg  5 mg Oral BID Lio Guzman MD   5 mg at 12/12/17 1705    digoxin (LANOXIN) tablet 125 mcg  125 mcg Oral Daily Lio Guzman MD        furosemide (LASIX) injection 40 mg  40 mg Intravenous BID (diuretic) Lio Guzman MD        metoprolol tartrate (LOPRESSOR) tablet 50 mg  50 mg Oral Q6H Lio Guzman MD           Vitals: /85   Pulse (!) 110   Temp 97 9 °F (36 6 °C) (Oral)   Resp 18   Ht 5' 9" (1 753 m) Comment: per pt  Wt 100 kg (221 lb 4 8 oz)   SpO2 91%   BMI 03 59 kg/m²     Systolic (04KKX), EES:118 , Min:100 , EWN:658     Diastolic (48DML), SMO:61, Min:50, Max:97      Vitals:    12/12/17 0600 12/13/17 0552   Weight: 102 kg (223 lb 12 8 oz) 100 kg (221 lb 4 8 oz)     Orthostatic Blood Pressures    Flowsheet Row Most Recent Value   Blood Pressure  138/85 filed at 12/13/2017 5054   Patient Position - Orthostatic VS  Lying filed at 12/13/2017 0738            Intake/Output Summary (Last 24 hours) at 12/13/17 0831  Last data filed at 12/13/17 0740   Gross per 24 hour   Intake            239 5 ml   Output             2250 ml   Net          -2010 5 ml       Invasive Devices     Peripheral Intravenous Line            Peripheral IV 12/11/17 Right Antecubital 1 day                  Physical Exam:     GEN: Awake and alert, in no acute distress  HEENT: Sclera anicteric, conjunctivae pink, mucous membranes moist   NECK: Supple, no carotid bruits, JVD present  HEART: Irregularly irregular rhythm, HR controlled, no murmurs, clicks, gallops or rubs  LUNGS: Decreased breath sounds bilaterally; no wheezes, rales, or rhonchi   ABDOMEN: Obese, soft, nontender, nondistended, normoactive bowel sounds  EXTREMITIES: Skin warm and well perfused, no clubbing, cyanosis, 2 +edema  NEURO: No focal findings  SKIN: Normal without suspicious lesions on exposed skin        Lab Results:     Troponins:   Results from last 7 days  Lab Units 12/11/17  2215 12/11/17  1955 12/11/17  1431   TROPONIN I ng/mL 0 78* 0 83* 0 88*       CBC with diff:   Results from last 7 days  Lab Units 12/12/17  0511 12/11/17  0837 12/11/17  0834   WBC Thousand/uL 7 01  --  7 55   HEMOGLOBIN g/dL 13 9  --  16 6   I STAT HEMOGLOBIN g/dl  --  18 4*  --    HEMATOCRIT % 42 7  --  49 7*   MCV fL 95  --  95   PLATELETS Thousands/uL 264  --  275   MCH pg 31 0  --  31 6   MCHC g/dL 32 6  --  33 4   RDW % 13 9  --  13 6   MPV fL 11 0  --  10 1   NRBC AUTO /100 WBCs  --   --  0         CMP:  Results from last 7 days  Lab Units 17  0546 17  0511 17  0837 17  0834   SODIUM mmol/L 140 138  --  137   POTASSIUM mmol/L 3 8 4 0  --  4 4   CHLORIDE mmol/L 105 104  --  102   CO2 mmol/L 28 25  --  25   ANION GAP mmol/L 7 9  --  10   BUN mg/dL 24 24  --  19   CREATININE mg/dL 1 25 1 33*  --  1 21   GLUCOSE RANDOM mg/dL 87 98  --  101   GLUCOSE, ISTAT mg/dl  --   --  107  --    CALCIUM mg/dL 8 5 8 4  --  9 3   AST U/L 15 14  --  23   ALT U/L 25 26  --  39   ALK PHOS U/L 67 62  --  79   TOTAL PROTEIN g/dL 6 5 6 3*  --  7 8   ALBUMIN g/dL 2 7* 2 7*  --  3 5   BILIRUBIN TOTAL mg/dL 1 47* 1 87*  --  3 02*   EGFR ml/min/1 73sq m 64 59 74 66       Magnesium:   Results from last 7 days  Lab Units 17  0511   MAGNESIUM mg/dL 2 3       Coags:   Results from last 7 days  Lab Units 17  0511   INR  1 15     Lipid Profile:   Results from last 7 days  Lab Units 17  0511   CHOLESTEROL mg/dL 120   TRIGLYCERIDES mg/dL 82   HDL mg/dL 31*   LDL CALC mg/dL 73       Hgb A1c:   Results from last 7 days  Lab Units 17  0511   HEMOGLOBIN A1C % 6 2         Cardiac testing:   Results for orders placed during the hospital encounter of 17   Echo complete with contrast if indicated    Narrative [address] [facility], [address] [facility]  [phone]    Transthoracic Echocardiogram  2D, M-mode, Doppler, and Color Doppler    Study date:  12-Dec-2017    Patient: Tom Taylor  MR number: [id number]  Account number: [de-identified]  : 1960  Age: 62 years  Gender: Male  Status: Inpatient  Location: Bedside  Height: 69 in  Weight: 229 5 lb  BP: 207/ 100 mmHg    Indications: Afib    Diagnoses: I48 0 - Atrial fibrillation    Sonographer:  Christel Grieves, RCS  Referring Physician:  Theresa Sierra MD  Group:  [facility]  Interpreting Physician:  Ino Jackson, MD    SUMMARY    LEFT VENTRICLE:  The ventricle was mildly dilated  Systolic function was severely reduced  Ejection fraction was estimated to be 20 %  There was severe diffuse hypokinesis  Wall thickness was mildly increased  RIGHT VENTRICLE:  The ventricle was mildly dilated  Systolic function was markedly reduced  LEFT ATRIUM:  The atrium was mildly dilated  RIGHT ATRIUM:  The atrium was mildly dilated  MITRAL VALVE:  There was trace to mild regurgitation  TRICUSPID VALVE:  There was trace to mild regurgitation  IVC, HEPATIC VEINS:  The inferior vena cava was mildly dilated  Respirophasic changes were blunted (less than 50% variation)  PERICARDIUM:  A small pericardial effusion was identified circumferential to the heart  There was no evidence of hemodynamic compromise  HISTORY: PRIOR HISTORY: Dyspnea Smoker    PROCEDURE: The procedure was performed at the bedside  This was a routine study  The transthoracic approach was used  The study included complete 2D imaging, M-mode, complete spectral Doppler, and color Doppler  The heart rate was 114 bpm,  at the start of the study  Images were obtained from the parasternal, apical, subcostal, and suprasternal notch acoustic windows  This was a technically difficult study  LEFT VENTRICLE: The ventricle was mildly dilated  Systolic function was severely reduced  Ejection fraction was estimated to be 20 %  There was severe diffuse hypokinesis  Wall thickness was mildly increased  DOPPLER: Transmitral flow  pattern: atrial fibrillation  The study was not technically sufficient to allow evaluation of LV diastolic function  RIGHT VENTRICLE: The ventricle was mildly dilated  Systolic function was markedly reduced  Wall thickness was normal     LEFT ATRIUM: The atrium was mildly dilated  RIGHT ATRIUM: The atrium was mildly dilated  MITRAL VALVE: Valve structure was normal  There was normal leaflet separation   DOPPLER: The transmitral velocity was within the normal range  There was no evidence for stenosis  There was trace to mild regurgitation  AORTIC VALVE: The valve was trileaflet  Leaflets exhibited normal thickness and normal cuspal separation  DOPPLER: Transaortic velocity was within the normal range  There was no evidence for stenosis  There was no regurgitation  TRICUSPID VALVE: The valve structure was normal  There was normal leaflet separation  DOPPLER: The transtricuspid velocity was within the normal range  There was no evidence for stenosis  There was trace to mild regurgitation  The  tricuspid jet envelope definition was inadequate for estimation of RV systolic pressure  There are no indirect findings suggestive of moderate or severe pulmonary hypertension  PULMONIC VALVE: Leaflets exhibited normal thickness, no calcification, and normal cuspal separation  DOPPLER: The transpulmonic velocity was within the normal range  There was trace regurgitation  PERICARDIUM: A small pericardial effusion was identified circumferential to the heart  There was no evidence of hemodynamic compromise  The pericardium was normal in appearance  AORTA: The root exhibited normal size  SYSTEMIC VEINS: IVC: The inferior vena cava was mildly dilated  Respirophasic changes were blunted (less than 50% variation)      SYSTEM MEASUREMENT TABLES    2D  %FS: 12 37 %  Ao Diam: 3 71 cm  EDV(Teich): 160 47 ml  EF Biplane: 35 31 %  EF(Teich): 26 3 %  ESV(Teich): 118 28 ml  IVSd: 1 31 cm  LA Area: 26 18 cm2  LA Diam: 3 93 cm  LVEDV MOD A2C: 171 7 ml  LVEDV MOD A4C: 136 01 ml  LVEDV MOD BP: 158 12 ml  LVEF MOD A2C: 35 64 %  LVEF MOD A4C: 34 47 %  LVESV MOD A2C: 110 51 ml  LVESV MOD A4C: 89 12 ml  LVESV MOD BP: 102 29 ml  LVIDd: 5 71 cm  LVIDs: 5 cm  LVLd A2C: 9 27 cm  LVLd A4C: 8 59 cm  LVLs A2C: 8 34 cm  LVLs A4C: 7 79 cm  LVPWd: 1 22 cm  RA Area: 24 02 cm2  RVIDd: 4 41 cm  SV MOD A2C: 61 19 ml  SV MOD A4C: 46 89 ml  SV(Teich): 42 2 ml    CW  TR Vmax: 2 31 m/s  TR maxP 32 mmHg    MM  TAPSE: 1 39 cm    [facility] Accredited [facility]chocardi[facility]    Prepared and electronically signed by    Emma Patel MD  Signed 12-Dec-2017 16:04:40         Imaging: I have personally reviewed pertinent reports  Telemetry: personally reviewed, afib, HR in low 100s, overall HR control improved

## 2017-12-13 NOTE — PROGRESS NOTES
IM Residency Progress Note   Unit/Bed#: -01 Encounter: [id number]  SOD Team B       Kayla Ronquillo 62 y o  male [id number]    Hospital Stay Days: 2      Assessment/Plan:    Principal Problem:    Acute heart failure ([address] )  Active Problems:    Atrial fibrillation with rapid ventricular response (HCC)    Non-ST elevation myocardial infarction (NSTEMI) (HCC)    Serum total bilirubin elevated    Elevated blood pressure reading    Decompensated Heart Failure, with unknown chronicity (acute with possible chronic component), suspect tachycardia mediated cardiomyopathy   · Echo shows severe biventricular dysfunction with decreased EF of 20%  Only mild dilation noted in the heart diffusely  Suspect this will be at least partially reversed once Afib is under control  · Cardiology consulted, per recs, IV Lasix 40mg BID, metoprolol 50mg QID and Digoxin 125mcg daily, if unable to rate control then low threshold for JOSE LUIS/CV  · Telemetry   · Daily Weights, I/O's (down 9lbs since admission)  · Cardiac Diet with Fluid Restriction     Atrial Fibrillation with RVR  Most likely occurring for at least the past 3 months and causing (or least worsening of CHF)  · Per Cardiology, Metoprolol 50mg QID and Digoxin 125mcg, Eloquis 5 BID   · [id number] at least 2 with HTN and CHF  · See plan for #1      Type II NSTEMI 2/2 decompensated CHF  Patient is not experiencing any chest pain and no history of chest pain, so doubt type 2 NSTEMI  · Stable, monitor clinically      Ascites with Hyperbilirubinemia most likely 2/2 to Congestive Hepatopathy  Suspect distended abdomen and ascites is 2/2 to decompensated CHF  Will not tap fluid given clear etiology as this point  · Chronic Hep Panel negative  · Ultrasounds shows moderate perihepatic ascites, mild nodular contour liver, pulsatility of portal vein most likely secondary to decompensated CHF  No obstruction noted    · Normal INR  · Tbili trending down, suspect this is most likely congestive hepatopathy     Lack of PCP  Patient advised to follow up with PCP and possibly cardiologist outpatient and was agreeable  He states he is ready to start taking care of his health  · Lipid Panel, A1C unremarkable  · Establish PCP outpatient after discharge     Elevated BP  · Resolved    Pre-Diabetes  · Will  on diet and exercise    Disposition: Cont care for Afib control and CHF management  Subjective:   Spoke with overnight nurse, no significant overnight events  Patient complaints pain in the IV sites and soreness  Otherwise , Patient has no acute complaints  He says his stomach was distended and swelling in his legs has decreased markedly, and he has regained his appetite  Vitals: Temp (24hrs), Av 7 °F (37 1 °C), Min:97 8 °F (36 6 °C), Max:99 2 °F (37 3 °C)  Current: Temperature: 99 °F (37 2 °C)  Vitals:    17 1857 17 2326 17 0300 17 0552   BP: 120/78 118/81 124/89    Pulse: 104 101 96    Resp: 18 18 18    Temp: 98 6 °F (37 °C) 99 2 °F (37 3 °C) 99 °F (37 2 °C)    TempSrc: Oral Oral Oral    SpO2: 93% 93% 90%    Weight:    100 kg (221 lb 4 8 oz)   Height:        Body mass index is 32 68 kg/m²  I/O last 24 hours: In: 379 5 [P O :140;  I V :239 5]  Out: 2600 [Urine:2600]      Physical Exam: /89   Pulse 96   Temp 99 °F (37 2 °C) (Oral)   Resp 18   Ht 5' 9" (1 753 m) Comment: per pt  Wt 100 kg (221 lb 4 8 oz)   SpO2 90%   BMI 32 68 kg/m²   Lungs: clear to auscultation bilaterally  Heart: irregularly irregular rhythm  Abdomen: Abdominal distention improved, no tenderness to palpation, bowel sounds normal  Extremities: edema 2+ in the bilateral lower extremities up to the knees  Pulses: 2+ and symmetric  Skin: Skin color, texture, turgor normal  No rashes or lesions     Invasive Devices     Peripheral Intravenous Line            Peripheral IV 17 Right Antecubital 1 day                          Labs:   Recent Results (from the past 24 hour(s)) Rapid HIV 1/2 AB-AG Combo    Collection Time: 12/12/17  1:51 PM   Result Value Ref Range    Rapid HIV 1 AND 2 Non-Reactive Non-Reactive    HIV-1 P24 Ag Screen Non-Reactive Non-Reactive       Radiology Results: I have personally reviewed pertinent reports  Other Diagnostic Testing:   I have personally reviewed pertinent reports          Active Meds:   Current Facility-Administered Medications   Medication Dose Route Frequency    acetaminophen (TYLENOL) tablet 650 mg  650 mg Oral Q4H PRN    apixaban (ELIQUIS) tablet 5 mg  5 mg Oral BID    digoxin (LANOXIN) oral solution 125 mcg  125 mcg Oral Daily    furosemide (LASIX) injection 20 mg  20 mg Intravenous BID (diuretic)    metoprolol tartrate (LOPRESSOR) tablet 25 mg  25 mg Oral Q6H         VTE Pharmacologic Prophylaxis:  Eliquis  VTE Mechanical Prophylaxis: sequential compression device    Palak Field MD

## 2017-12-14 LAB
ALBUMIN SERPL ELPH-MCNC: 3.45 G/DL (ref 3.5–5)
ALBUMIN SERPL ELPH-MCNC: 52.3 % (ref 52–65)
ALPHA1 GLOB SERPL ELPH-MCNC: 0.45 G/DL (ref 0.1–0.4)
ALPHA1 GLOB SERPL ELPH-MCNC: 6.8 % (ref 2.5–5)
ALPHA2 GLOB SERPL ELPH-MCNC: 0.76 G/DL (ref 0.4–1.2)
ALPHA2 GLOB SERPL ELPH-MCNC: 11.5 % (ref 7–13)
ANION GAP SERPL CALCULATED.3IONS-SCNC: 6 MMOL/L (ref 4–13)
BETA GLOB ABNORMAL SERPL ELPH-MCNC: 0.42 G/DL (ref 0.4–0.8)
BETA1 GLOB SERPL ELPH-MCNC: 6.3 % (ref 5–13)
BETA2 GLOB SERPL ELPH-MCNC: 11.6 % (ref 2–8)
BETA2+GAMMA GLOB SERPL ELPH-MCNC: 0.77 G/DL (ref 0.2–0.5)
BUN SERPL-MCNC: 24 MG/DL (ref 5–25)
CALCIUM SERPL-MCNC: 8.3 MG/DL (ref 8.3–10.1)
CHLORIDE SERPL-SCNC: 103 MMOL/L (ref 100–108)
CO2 SERPL-SCNC: 31 MMOL/L (ref 21–32)
CREAT SERPL-MCNC: 1.2 MG/DL (ref 0.6–1.3)
GAMMA GLOB ABNORMAL SERPL ELPH-MCNC: 0.76 G/DL (ref 0.5–1.6)
GAMMA GLOB SERPL ELPH-MCNC: 11.5 % (ref 12–22)
GFR SERPL CREATININE-BSD FRML MDRD: 67 ML/MIN/1.73SQ M
GLUCOSE SERPL-MCNC: 95 MG/DL (ref 65–140)
IGG/ALB SER: 1.1 {RATIO} (ref 1.1–1.8)
MAGNESIUM SERPL-MCNC: 2.1 MG/DL (ref 1.6–2.6)
POTASSIUM SERPL-SCNC: 3.6 MMOL/L (ref 3.5–5.3)
PROT SERPL-MCNC: 6.6 G/DL (ref 6.4–8.2)
SODIUM SERPL-SCNC: 140 MMOL/L (ref 136–145)
URATE SERPL-MCNC: 10.2 MG/DL (ref 4.2–8)

## 2017-12-14 PROCEDURE — 83735 ASSAY OF MAGNESIUM: CPT | Performed by: INTERNAL MEDICINE

## 2017-12-14 PROCEDURE — 80048 BASIC METABOLIC PNL TOTAL CA: CPT | Performed by: INTERNAL MEDICINE

## 2017-12-14 PROCEDURE — 84550 ASSAY OF BLOOD/URIC ACID: CPT | Performed by: INTERNAL MEDICINE

## 2017-12-14 RX ORDER — COLCHICINE 0.6 MG/1
1.2 TABLET ORAL ONCE
Status: COMPLETED | OUTPATIENT
Start: 2017-12-14 | End: 2017-12-14

## 2017-12-14 RX ORDER — POTASSIUM CHLORIDE 20 MEQ/1
20 TABLET, EXTENDED RELEASE ORAL ONCE
Status: COMPLETED | OUTPATIENT
Start: 2017-12-14 | End: 2017-12-14

## 2017-12-14 RX ORDER — ATORVASTATIN CALCIUM 40 MG/1
40 TABLET, FILM COATED ORAL
Status: DISCONTINUED | OUTPATIENT
Start: 2017-12-14 | End: 2017-12-14

## 2017-12-14 RX ORDER — NAPROXEN 250 MG/1
250 TABLET ORAL ONCE
Status: COMPLETED | OUTPATIENT
Start: 2017-12-14 | End: 2017-12-14

## 2017-12-14 RX ORDER — POTASSIUM CHLORIDE 20 MEQ/1
40 TABLET, EXTENDED RELEASE ORAL 2 TIMES DAILY WITH MEALS
Status: DISCONTINUED | OUTPATIENT
Start: 2017-12-14 | End: 2017-12-15

## 2017-12-14 RX ORDER — COLCHICINE 0.6 MG/1
0.6 TABLET ORAL 2 TIMES DAILY
Status: COMPLETED | OUTPATIENT
Start: 2017-12-14 | End: 2017-12-14

## 2017-12-14 RX ADMIN — POTASSIUM CHLORIDE 20 MEQ: 1500 TABLET, EXTENDED RELEASE ORAL at 08:31

## 2017-12-14 RX ADMIN — METOPROLOL TARTRATE 50 MG: 50 TABLET ORAL at 00:06

## 2017-12-14 RX ADMIN — APIXABAN 5 MG: 5 TABLET, FILM COATED ORAL at 18:36

## 2017-12-14 RX ADMIN — METOPROLOL TARTRATE 50 MG: 50 TABLET ORAL at 18:35

## 2017-12-14 RX ADMIN — APIXABAN 5 MG: 5 TABLET, FILM COATED ORAL at 08:31

## 2017-12-14 RX ADMIN — COLCHICINE 0.6 MG: 0.6 TABLET, FILM COATED ORAL at 10:52

## 2017-12-14 RX ADMIN — NAPROXEN 250 MG: 250 TABLET ORAL at 02:56

## 2017-12-14 RX ADMIN — METOPROLOL TARTRATE 50 MG: 50 TABLET ORAL at 06:22

## 2017-12-14 RX ADMIN — POTASSIUM CHLORIDE 40 MEQ: 1500 TABLET, EXTENDED RELEASE ORAL at 16:02

## 2017-12-14 RX ADMIN — COLCHICINE 1.2 MG: 0.6 TABLET, FILM COATED ORAL at 19:20

## 2017-12-14 RX ADMIN — FUROSEMIDE 40 MG: 10 INJECTION, SOLUTION INTRAMUSCULAR; INTRAVENOUS at 16:02

## 2017-12-14 RX ADMIN — DIGOXIN 125 MCG: 0.12 TABLET ORAL at 08:32

## 2017-12-14 RX ADMIN — METOPROLOL TARTRATE 50 MG: 50 TABLET ORAL at 13:12

## 2017-12-14 RX ADMIN — COLCHICINE 0.6 MG: 0.6 TABLET, FILM COATED ORAL at 18:40

## 2017-12-14 RX ADMIN — ACETAMINOPHEN 650 MG: 325 TABLET, FILM COATED ORAL at 18:40

## 2017-12-14 RX ADMIN — POTASSIUM CHLORIDE 20 MEQ: 1500 TABLET, EXTENDED RELEASE ORAL at 10:53

## 2017-12-14 RX ADMIN — FUROSEMIDE 40 MG: 10 INJECTION, SOLUTION INTRAMUSCULAR; INTRAVENOUS at 08:31

## 2017-12-14 NOTE — SOCIAL WORK
CM received a phone call from Jessie at [facility], reporting Eliquis cost is $40/month, is eligible for 30 days free  CM inquired if patient were eligible for the copay coupon $10/month, Jessie reported yes  CM met with patient, gave him the above information,gave him $10 coupon to call and activate for refills  Patient called, card is activated and placed with Eliquis literature to be taken home and used for refills  b CM will continue to follow

## 2017-12-14 NOTE — PLAN OF CARE
CARDIOVASCULAR - ADULT     Maintains optimal cardiac output and hemodynamic stability Progressing     Absence of cardiac dysrhythmias or at baseline rhythm Progressing        DISCHARGE PLANNING - CARE MANAGEMENT     Discharge to post-acute care or home with appropriate resources Progressing        METABOLIC, FLUID AND ELECTROLYTES - ADULT     Electrolytes maintained within normal limits Progressing     Fluid balance maintained Progressing        Nutrition/Hydration-ADULT     Nutrient/Hydration intake appropriate for improving, restoring or maintaining nutritional needs Progressing

## 2017-12-14 NOTE — PROGRESS NOTES
IM Residency Progress Note   Unit/Bed#: -01 Encounter: [id number]  SOD Team B       Stefany Anglin 62 y o  male [id number]    Hospital Stay Days: 3      Assessment/Plan:    Principal Problem:    Acute heart failure ([address] )  Active Problems:    Atrial fibrillation with rapid ventricular response (HCC)    Non-ST elevation myocardial infarction (NSTEMI) (HCC)    Serum total bilirubin elevated    Elevated blood pressure reading    Decompensated Heart Failure, with unknown chronicity (acute with possible chronic component), suspect tachycardia mediated cardiomyopathy   · Echo shows severe biventricular dysfunction with decreased EF of 20%  Only mild dilation noted in the heart diffusely  Suspect his EF will be at least partially reversed once Afib is under control  · Cardiology consulted, per recs, IV Lasix 40mg BID, metoprolol 50mg QID and Digoxin 125mcg daily, if unable to rate control then low threshold for JOSE LUIS/CV  · Telemetry   · Daily Weights, I/O's (down 15lbs since admission)  · Cardiac Diet with Fluid Restriction     Atrial Fibrillation with RVR  Most likely occurring for at least the past 3 months and causing (or least worsening of CHF)  · Per Cardiology, Metoprolol 50mg QID and Digoxin 125mcg, Eloquis 5 BID   · [id number] at least 2 with HTN and CHF  · See plan for #1      Type II NSTEMI 2/2 decompensated CHF  Patient is not experiencing any chest pain and no history of chest pain, so doubt type 1 NSTEMI  · Stable, monitor clinically      Ascites with Hyperbilirubinemia most likely 2/2 to Congestive Hepatopathy  Suspect distended abdomen and ascites is 2/2 to decompensated CHF  Will not tap fluid given clear etiology as this point  · Chronic Hep Panel negative  · Ultrasounds shows moderate perihepatic ascites, mild nodular contour liver, pulsatility of portal vein most likely secondary to decompensated CHF  No obstruction noted    · Normal INR  · Tbili trending down, suspect this is most likely congestive hepatopathy  Stop trending      Lack of PCP  Patient advised to follow up with PCP and possibly cardiologist outpatient and was agreeable  He states he is ready to start taking care of his health  · Lipid Panel, A1C unremarkable  · Establish PCP outpatient after discharge     Elevated BP  · Resolved     Pre-Diabetes  · Will  on diet and exercise      Disposition: [facility] care, still decompensated HF      Subjective:   Spoke with overnight nurse, no significant overnight events aside from acute episode of gout  Patient reports marked improvement in breathing, ascites, peripheral edema  Denies chest pain, palpitations, abdominal pain, nausea, vomiting, constipation, diarrhea  Patient is urinating frequently, as expected  Vitals: Temp (24hrs), Av 1 °F (36 7 °C), Min:97 9 °F (36 6 °C), Max:98 3 °F (36 8 °C)  Current: Temperature: 98 2 °F (36 8 °C)  Vitals:    17 1927 17 2353 17 0313 17 0552   BP: 153/88 144/90 132/88    Pulse: (!) 109 (!) 109 (!) 111    Resp: 18 18 18    Temp: 98 2 °F (36 8 °C) 98 °F (36 7 °C) 98 2 °F (36 8 °C)    TempSrc: Oral Oral Oral    SpO2: 94% 94% 96%    Weight:    97 5 kg (215 lb)   Height:        Body mass index is 31 75 kg/m²  I/O last 24 hours:   In: 350 [P O :350]  Out: 4700 [Urine:4700]      Physical Exam: General appearance: alert, appears stated age and cooperative  Lungs: clear to auscultation bilaterally  Heart: irregularly irregular rhythm  Abdomen: Mildly distended, no tenderness to palpation, bowel sounds normal  Extremities: edema 2+ in the bilateral lower extremities up to the calf  Pulses: 2+ and symmetric  Skin: Skin color, texture, turgor normal  No rashes or lesions     Invasive Devices     Peripheral Intravenous Line            Peripheral IV 17 Right Antecubital 2 days                          Labs:   Recent Results (from the past 24 hour(s))   Basic metabolic panel    Collection Time: 17  5:47 AM   Result Value Ref Range    Sodium 140 136 - 145 mmol/L    Potassium 3 6 3 5 - 5 3 mmol/L    Chloride 103 100 - 108 mmol/L    CO2 31 21 - 32 mmol/L    Anion Gap 6 4 - 13 mmol/L    BUN 24 5 - 25 mg/dL    Creatinine 1 20 0 60 - 1 30 mg/dL    Glucose 95 65 - 140 mg/dL    Calcium 8 3 8 3 - 10 1 mg/dL    eGFR 67 ml/min/1 73sq m   Magnesium    Collection Time: 12/14/17  5:47 AM   Result Value Ref Range    Magnesium 2 1 1 6 - 2 6 mg/dL       Radiology Results: I have personally reviewed pertinent reports  Other Diagnostic Testing:   I have personally reviewed pertinent reports          Active Meds:   Current Facility-Administered Medications   Medication Dose Route Frequency    acetaminophen (TYLENOL) tablet 650 mg  650 mg Oral Q4H PRN    apixaban (ELIQUIS) tablet 5 mg  5 mg Oral BID    digoxin (LANOXIN) tablet 125 mcg  125 mcg Oral Daily    furosemide (LASIX) injection 40 mg  40 mg Intravenous BID (diuretic)    metoprolol tartrate (LOPRESSOR) tablet 50 mg  50 mg Oral Q6H    potassium chloride (K-DUR,KLOR-CON) CR tablet 20 mEq  20 mEq Oral BID With Meals         VTE Pharmacologic Prophylaxis:   Eliquis    VTE Mechanical Prophylaxis: sequential compression device    Sarina Arguelles MD

## 2017-12-14 NOTE — PROGRESS NOTES
Progress Note - Cardiology   Miguelina Ladd 62 y o  male MRN: [id number]  Unit/Bed#: -01 Encounter: [id number]  12/14/17  8:27 AM        Assessment/Plan:    1  Acute systolic and right-sided heart failure  Presumed tachycardia mediated at this time given the fact that the patient presented with atrial fibrillation with rapid ventricular response of unknown duration  However, the patient has a strong family history of coronary artery disease  Will eventually need ischemic evaluation, likely outpatient stress test   He may also have had a history of hypertension, although this cannot be diagnosed as the patient was never seen by a doctor for over 20 years  Currently on beta-blocker  I will titrate this up as blood pressure tolerates  Currently tolerating Lasix 40 IV b i d     Will arrange for JOSE LUIS and cardioversion Friday  NPO after MN  2   Persistent atrial fibrillation with rapid ventricular response  The patient became hypotensive with IV diltiazem drip  We then started oral beta-blocker as well as digoxin with an IV load for rate control instead  Heart rate this morning is improved, but still elevated with minimal exertion  He was started on Eliquis 5 b i d  for oral anticoagulation in preparation for JOSE LUIS/cardioversion, will schedule for Friday  The patient is agreeable to this plan  NPO after MN  3   Impaired fasting glucose  Hemoglobin A1c was checked and was mildly elevated at 6 2%  He will need dietary education regarding this  4   NSTEMI type 2  This is likely due to AFib with rapid ventricular response as well as acute systolic heart failure  On beta blocker, no aspirin due to Eliquis use  Start statin tomorrow after colchicine discontinued  5  Gout  Will treat with colchicine  Given one dose of Naprosyn as well, which is contraindicated in HF, one dose is OK, but would not continue after  Addendum:    7  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%   Given unknown chronicity, will need medical management for 3 months and reassessment of LV function in order to qualify for possible ICD for primary prevention  In meantime, patient would be candidate for Lifevest, will speak with CM to arrange for discharge  Subjective/Objective   Chief Complaint:   Chief Complaint   Patient presents with    Shortness of Breath     Pt c o increased sob and feet and abdomen swelling for awhile now  pt states "I cant take it anymore"         Subjective:  60-year-old man who had not seen a doctor for over 20 years, who presents with approximately 3 months of shortness of breath, increasing lower extremity edema, as well as increasing abdominal girth  He believes he has gained about 20 lb over the last 3 months  He was found to be volume overloaded  Echocardiogram showed an ejection fraction of 17% with RV systolic dysfunction as well  He was found to be in atrial fibrillation with rapid ventricular response on admission  IV diltiazem drip was started for rate control, but he developed significant hypotension which required discontinuation  He was started on a beta-blocker and given a digoxin load  He was also given IV Lasix for diuresis  Heart rate control has improved  He reports he is urinating well  His lower extremity edema is improving  His abdominal girth is decreasing  No chest pain  Shortness of breath at rest is stable  He has not attempted to exert himself as yet  No orthopnea, but still sleeping at an angle, has not tried to lie flat  No fevers  No dizziness or lightheadedness  Has gout with pain and redness in his left second toe, reports he has had gout flares in the past, treats himself with NSAIDs, never used anything else         Patient Active Problem List   Diagnosis    Acute heart failure (HCC)    Atrial fibrillation with rapid ventricular response (HCC)    Non-ST elevation myocardial infarction (NSTEMI) (HCC)    Serum total bilirubin elevated  Elevated blood pressure reading     Past Medical History:   Diagnosis Date    Coronary artery disease     Gout     Hypertension     Shingles        No Known Allergies    Current Facility-Administered Medications   Medication Dose Route Frequency Provider Last Rate Last Dose    acetaminophen (TYLENOL) tablet 650 mg  650 mg Oral Q4H PRN Javier Solorio MD   650 mg at 12/13/17 0558    apixaban (ELIQUIS) tablet 5 mg  5 mg Oral BID Nadia Oneil MD   5 mg at 12/13/17 1716    digoxin (LANOXIN) tablet 125 mcg  125 mcg Oral Daily Nadia Oneil MD   125 mcg at 12/13/17 1008    furosemide (LASIX) injection 40 mg  40 mg Intravenous BID (diuretic) Nadia Oneil MD   40 mg at 12/13/17 1717    metoprolol tartrate (LOPRESSOR) tablet 50 mg  50 mg Oral Q6H Nadia Oniel MD   50 mg at 12/14/17 2055    potassium chloride (K-DUR,KLOR-CON) CR tablet 20 mEq  20 mEq Oral BID With Meals Nadia Oneil MD   20 mEq at 12/13/17 1717       Vitals: /89   Pulse (!) 116   Temp 99 1 °F (37 3 °C) (Oral)   Resp 18   Ht 5' 9" (1 753 m) Comment: per pt  Wt 97 5 kg (215 lb)   SpO2 92%   BMI 09 70 kg/m²     Systolic (63NFR), QOR:149 , Min:106 , VIU:602     Diastolic (17PWL), RACHELLE:48, Min:64, Max:96      Vitals:    12/13/17 0552 12/14/17 0552   Weight: 100 kg (221 lb 4 8 oz) 97 5 kg (215 lb)     Orthostatic Blood Pressures    Flowsheet Row Most Recent Value   Blood Pressure  136/89 filed at 12/14/2017 3022   Patient Position - Orthostatic VS  Lying filed at 12/14/2017 0313            Intake/Output Summary (Last 24 hours) at 12/14/17 0827  Last data filed at 12/14/17 0001   Gross per 24 hour   Intake              120 ml   Output             4700 ml   Net            -4580 ml       Invasive Devices     Peripheral Intravenous Line            Peripheral IV 12/11/17 Right Antecubital 2 days                  Physical Exam:     GEN: Awake and alert, in no acute distress      HEENT: Sclera anicteric, conjunctivae pink, mucous membranes moist   NECK: Supple, no carotid bruits, JVD present  HEART: Irregularly irregular rhythm, HR controlled, no murmurs, clicks, gallops or rubs  LUNGS: Decreased breath sounds bilaterally; no wheezes, rales, or rhonchi   ABDOMEN: Obese, soft, nontender, nondistended, normoactive bowel sounds  EXTREMITIES: Skin warm and well perfused, no clubbing, cyanosis, 1+edema, improving, erythema of left foot second toe  NEURO: No focal findings  SKIN: Normal without suspicious lesions on exposed skin        Lab Results:     Troponins:     Results from last 7 days  Lab Units 12/11/17  2215 12/11/17  1955 12/11/17  1431   TROPONIN I ng/mL 0 78* 0 83* 0 88*       CBC with diff:     Results from last 7 days  Lab Units 12/12/17  0511 12/11/17  0837 12/11/17  0834   WBC Thousand/uL 7 01  --  7 55   HEMOGLOBIN g/dL 13 9  --  16 6   I STAT HEMOGLOBIN g/dl  --  18 4*  --    HEMATOCRIT % 42 7  --  49 7*   MCV fL 95  --  95   PLATELETS Thousands/uL 264  --  275   MCH pg 31 0  --  31 6   MCHC g/dL 32 6  --  33 4   RDW % 13 9  --  13 6   MPV fL 11 0  --  10 1   NRBC AUTO /100 WBCs  --   --  0         CMP:    Results from last 7 days  Lab Units 12/14/17  0547 12/13/17  0546 12/12/17  0511 12/11/17  0837 12/11/17  0834   SODIUM mmol/L 140 140 138  --  137   POTASSIUM mmol/L 3 6 3 8 4 0  --  4 4   CHLORIDE mmol/L 103 105 104  --  102   CO2 mmol/L 31 28 25  --  25   ANION GAP mmol/L 6 7 9  --  10   BUN mg/dL 24 24 24  --  19   CREATININE mg/dL 1 20 1 25 1 33*  --  1 21   GLUCOSE RANDOM mg/dL 95 87 98  --  101   GLUCOSE, ISTAT mg/dl  --   --   --  107  --    CALCIUM mg/dL 8 3 8 5 8 4  --  9 3   AST U/L  --  15 14  --  23   ALT U/L  --  25 26  --  39   ALK PHOS U/L  --  67 62  --  79   TOTAL PROTEIN g/dL  --  6 5 6 3*  --  7 8   ALBUMIN g/dL  --  2 7* 2 7*  --  3 5   BILIRUBIN TOTAL mg/dL  --  1 47* 1 87*  --  3 02*   EGFR ml/min/1 73sq m 67 64 59 74 66       Magnesium:     Results from last 7 days  Lab Units 12/14/17  0547 12/12/17  0511   MAGNESIUM mg/dL 2 1 2 3       Coags:     Results from last 7 days  Lab Units 17  0511   INR  1 15     Lipid Profile:     Results from last 7 days  Lab Units 17  0511   CHOLESTEROL mg/dL 120   TRIGLYCERIDES mg/dL 82   HDL mg/dL 31*   LDL CALC mg/dL 73       Hgb A1c:     Results from last 7 days  Lab Units 17  0511   HEMOGLOBIN A1C % 6 2         Cardiac testing:   Results for orders placed during the hospital encounter of 17   Echo complete with contrast if indicated    Narrative [address], [address] [facility] [facility]  [phone]    Transthoracic Echocardiogram  2D, M-mode, Doppler, and Color Doppler    Study date:  12-Dec-2017    Patient: Kimmy Vick  MR number: [id number]  Account number: [de-identified]  : 1960  Age: 62 years  Gender: Male  Status: Inpatient  Location: Bedside  Height: 69 in  Weight: 229 5 lb  BP: 207/ 100 mmHg    Indications: Afib    Diagnoses: I48 0 - Atrial fibrillation    Sonographer:  SANKET Hannah  Referring Physician:  Silvia Omalley MD  Group:  [facility]  Interpreting Physician:  Yousuf Cifuentes MD    SUMMARY    LEFT VENTRICLE:  The ventricle was mildly dilated  Systolic function was severely reduced  Ejection fraction was estimated to be 20 %  There was severe diffuse hypokinesis  Wall thickness was mildly increased  RIGHT VENTRICLE:  The ventricle was mildly dilated  Systolic function was markedly reduced  LEFT ATRIUM:  The atrium was mildly dilated  RIGHT ATRIUM:  The atrium was mildly dilated  MITRAL VALVE:  There was trace to mild regurgitation  TRICUSPID VALVE:  There was trace to mild regurgitation  IVC, HEPATIC VEINS:  The inferior vena cava was mildly dilated  Respirophasic changes were blunted (less than 50% variation)  PERICARDIUM:  A small pericardial effusion was identified circumferential to the heart   There was no evidence of hemodynamic compromise  HISTORY: PRIOR HISTORY: Dyspnea Smoker    PROCEDURE: The procedure was performed at the bedside  This was a routine study  The transthoracic approach was used  The study included complete 2D imaging, M-mode, complete spectral Doppler, and color Doppler  The heart rate was 114 bpm,  at the start of the study  Images were obtained from the parasternal, apical, subcostal, and suprasternal notch acoustic windows  This was a technically difficult study  LEFT VENTRICLE: The ventricle was mildly dilated  Systolic function was severely reduced  Ejection fraction was estimated to be 20 %  There was severe diffuse hypokinesis  Wall thickness was mildly increased  DOPPLER: Transmitral flow  pattern: atrial fibrillation  The study was not technically sufficient to allow evaluation of LV diastolic function  RIGHT VENTRICLE: The ventricle was mildly dilated  Systolic function was markedly reduced  Wall thickness was normal     LEFT ATRIUM: The atrium was mildly dilated  RIGHT ATRIUM: The atrium was mildly dilated  MITRAL VALVE: Valve structure was normal  There was normal leaflet separation  DOPPLER: The transmitral velocity was within the normal range  There was no evidence for stenosis  There was trace to mild regurgitation  AORTIC VALVE: The valve was trileaflet  Leaflets exhibited normal thickness and normal cuspal separation  DOPPLER: Transaortic velocity was within the normal range  There was no evidence for stenosis  There was no regurgitation  TRICUSPID VALVE: The valve structure was normal  There was normal leaflet separation  DOPPLER: The transtricuspid velocity was within the normal range  There was no evidence for stenosis  There was trace to mild regurgitation  The  tricuspid jet envelope definition was inadequate for estimation of RV systolic pressure  There are no indirect findings suggestive of moderate or severe pulmonary hypertension      PULMONIC VALVE: Leaflets exhibited normal thickness, no calcification, and normal cuspal separation  DOPPLER: The transpulmonic velocity was within the normal range  There was trace regurgitation  PERICARDIUM: A small pericardial effusion was identified circumferential to the heart  There was no evidence of hemodynamic compromise  The pericardium was normal in appearance  AORTA: The root exhibited normal size  SYSTEMIC VEINS: IVC: The inferior vena cava was mildly dilated  Respirophasic changes were blunted (less than 50% variation)  SYSTEM MEASUREMENT TABLES    2D  %FS: 12 37 %  Ao Diam: 3 71 cm  EDV(Teich): 160 47 ml  EF Biplane: 35 31 %  EF(Teich): 26 3 %  ESV(Teich): 118 28 ml  IVSd: 1 31 cm  LA Area: 26 18 cm2  LA Diam: 3 93 cm  LVEDV MOD A2C: 171 7 ml  LVEDV MOD A4C: 136 01 ml  LVEDV MOD BP: 158 12 ml  LVEF MOD A2C: 35 64 %  LVEF MOD A4C: 34 47 %  LVESV MOD A2C: 110 51 ml  LVESV MOD A4C: 89 12 ml  LVESV MOD BP: 102 29 ml  LVIDd: 5 71 cm  LVIDs: 5 cm  LVLd A2C: 9 27 cm  LVLd A4C: 8 59 cm  LVLs A2C: 8 34 cm  LVLs A4C: 7 79 cm  LVPWd: 1 22 cm  RA Area: 24 02 cm2  RVIDd: 4 41 cm  SV MOD A2C: 61 19 ml  SV MOD A4C: 46 89 ml  SV(Teich): 42 2 ml    CW  TR Vmax: 2 31 m/s  TR maxP 32 mmHg    MM  TAPSE: 1 39 cm    [employer] Accredited [facility]    Prepared and electronically signed by    Alden Alonso MD  Signed 12-Dec-2017 16:04:40         Imaging: I have personally reviewed pertinent reports  Telemetry: personally reviewed, afib, HR in low 100s, occ up to 160s with exertion, overall HR control improved

## 2017-12-14 NOTE — SOCIAL WORK
CM received a phone call from [facility] Dr Justin Johnson has completed the [employer] life vest form, and would like CM to fax to [place] [phone], along with paperwork needed with referral  CM faxed the completed script, facesheet, ECHO results, and Cardiologist progress note to [phone]  CM met with patient, gave [employer] card with explanation to assist with finding a PCP  CM discussed the need to check on the cost of Eliquis, patient agreeable to sending to [address]  CM received script for Eliquis to check for cost  CM tubes script and facesheet to [address]  CM will continue to follow

## 2017-12-15 ENCOUNTER — GENERIC CONVERSION - ENCOUNTER (OUTPATIENT)
Dept: CARDIOLOGY CLINIC | Facility: CLINIC | Age: 57
End: 2017-12-15

## 2017-12-15 ENCOUNTER — APPOINTMENT (INPATIENT)
Dept: NON INVASIVE DIAGNOSTICS | Facility: HOSPITAL | Age: 57
DRG: 280 | End: 2017-12-15
Attending: INTERNAL MEDICINE
Payer: COMMERCIAL

## 2017-12-15 ENCOUNTER — ANESTHESIA (INPATIENT)
Dept: NON INVASIVE DIAGNOSTICS | Facility: HOSPITAL | Age: 57
DRG: 280 | End: 2017-12-15
Payer: COMMERCIAL

## 2017-12-15 LAB
ANION GAP SERPL CALCULATED.3IONS-SCNC: 5 MMOL/L (ref 4–13)
ATRIAL RATE: 70 BPM
ATRIAL RATE: 74 BPM
BUN SERPL-MCNC: 24 MG/DL (ref 5–25)
CALCIUM SERPL-MCNC: 8.7 MG/DL (ref 8.3–10.1)
CHLORIDE SERPL-SCNC: 104 MMOL/L (ref 100–108)
CO2 SERPL-SCNC: 31 MMOL/L (ref 21–32)
CREAT SERPL-MCNC: 0.99 MG/DL (ref 0.6–1.3)
GFR SERPL CREATININE-BSD FRML MDRD: 84 ML/MIN/1.73SQ M
GLUCOSE SERPL-MCNC: 99 MG/DL (ref 65–140)
INTERPRETATION UR IFE-IMP: NORMAL
MAGNESIUM SERPL-MCNC: 2.2 MG/DL (ref 1.6–2.6)
P AXIS: 33 DEGREES
P AXIS: 37 DEGREES
POTASSIUM SERPL-SCNC: 3.7 MMOL/L (ref 3.5–5.3)
PR INTERVAL: 142 MS
PR INTERVAL: 150 MS
QRS AXIS: 88 DEGREES
QRS AXIS: 96 DEGREES
QRSD INTERVAL: 90 MS
QRSD INTERVAL: 92 MS
QT INTERVAL: 408 MS
QT INTERVAL: 412 MS
QTC INTERVAL: 440 MS
QTC INTERVAL: 457 MS
SODIUM SERPL-SCNC: 140 MMOL/L (ref 136–145)
T WAVE AXIS: -18 DEGREES
T WAVE AXIS: -5 DEGREES
VENTRICULAR RATE: 70 BPM
VENTRICULAR RATE: 74 BPM

## 2017-12-15 PROCEDURE — 83735 ASSAY OF MAGNESIUM: CPT | Performed by: INTERNAL MEDICINE

## 2017-12-15 PROCEDURE — 92960 CARDIOVERSION ELECTRIC EXT: CPT | Performed by: INTERNAL MEDICINE

## 2017-12-15 PROCEDURE — 93005 ELECTROCARDIOGRAM TRACING: CPT

## 2017-12-15 PROCEDURE — 80048 BASIC METABOLIC PNL TOTAL CA: CPT | Performed by: INTERNAL MEDICINE

## 2017-12-15 PROCEDURE — 5A2204Z RESTORATION OF CARDIAC RHYTHM, SINGLE: ICD-10-PCS | Performed by: INTERNAL MEDICINE

## 2017-12-15 PROCEDURE — 93312 ECHO TRANSESOPHAGEAL: CPT

## 2017-12-15 RX ORDER — PROPOFOL 10 MG/ML
INJECTION, EMULSION INTRAVENOUS AS NEEDED
Status: DISCONTINUED | OUTPATIENT
Start: 2017-12-15 | End: 2017-12-15 | Stop reason: SURG

## 2017-12-15 RX ORDER — METOPROLOL SUCCINATE 100 MG/1
100 TABLET, EXTENDED RELEASE ORAL 2 TIMES DAILY
Status: DISCONTINUED | OUTPATIENT
Start: 2017-12-16 | End: 2017-12-17 | Stop reason: HOSPADM

## 2017-12-15 RX ORDER — ATORVASTATIN CALCIUM 40 MG/1
40 TABLET, FILM COATED ORAL
Status: DISCONTINUED | OUTPATIENT
Start: 2017-12-15 | End: 2017-12-17

## 2017-12-15 RX ORDER — KETAMINE HYDROCHLORIDE 50 MG/ML
INJECTION, SOLUTION, CONCENTRATE INTRAMUSCULAR; INTRAVENOUS AS NEEDED
Status: DISCONTINUED | OUTPATIENT
Start: 2017-12-15 | End: 2017-12-15 | Stop reason: SURG

## 2017-12-15 RX ORDER — SODIUM CHLORIDE 9 MG/ML
INJECTION, SOLUTION INTRAVENOUS CONTINUOUS PRN
Status: DISCONTINUED | OUTPATIENT
Start: 2017-12-15 | End: 2017-12-15 | Stop reason: SURG

## 2017-12-15 RX ORDER — POTASSIUM CHLORIDE 20 MEQ/1
40 TABLET, EXTENDED RELEASE ORAL
Status: DISCONTINUED | OUTPATIENT
Start: 2017-12-15 | End: 2017-12-17

## 2017-12-15 RX ORDER — COLCHICINE 0.6 MG/1
0.6 TABLET ORAL ONCE
Status: COMPLETED | OUTPATIENT
Start: 2017-12-15 | End: 2017-12-15

## 2017-12-15 RX ORDER — PROPOFOL 10 MG/ML
INJECTION, EMULSION INTRAVENOUS CONTINUOUS PRN
Status: DISCONTINUED | OUTPATIENT
Start: 2017-12-15 | End: 2017-12-15 | Stop reason: SURG

## 2017-12-15 RX ADMIN — ACETAMINOPHEN 650 MG: 325 TABLET, FILM COATED ORAL at 03:32

## 2017-12-15 RX ADMIN — APIXABAN 5 MG: 5 TABLET, FILM COATED ORAL at 18:35

## 2017-12-15 RX ADMIN — APIXABAN 5 MG: 5 TABLET, FILM COATED ORAL at 08:18

## 2017-12-15 RX ADMIN — METOPROLOL TARTRATE 50 MG: 50 TABLET ORAL at 18:35

## 2017-12-15 RX ADMIN — PROPOFOL 40 MG: 10 INJECTION, EMULSION INTRAVENOUS at 09:54

## 2017-12-15 RX ADMIN — POTASSIUM CHLORIDE 40 MEQ: 1500 TABLET, EXTENDED RELEASE ORAL at 14:35

## 2017-12-15 RX ADMIN — FUROSEMIDE 40 MG: 10 INJECTION, SOLUTION INTRAMUSCULAR; INTRAVENOUS at 16:42

## 2017-12-15 RX ADMIN — PROPOFOL 50 MCG/KG/MIN: 10 INJECTION, EMULSION INTRAVENOUS at 09:55

## 2017-12-15 RX ADMIN — COLCHICINE 0.6 MG: 0.6 TABLET, FILM COATED ORAL at 03:54

## 2017-12-15 RX ADMIN — ATORVASTATIN CALCIUM 40 MG: 40 TABLET, FILM COATED ORAL at 16:42

## 2017-12-15 RX ADMIN — KETAMINE HYDROCHLORIDE 30 MG: 50 INJECTION, SOLUTION INTRAMUSCULAR; INTRAVENOUS at 09:54

## 2017-12-15 RX ADMIN — METOPROLOL TARTRATE 50 MG: 50 TABLET ORAL at 00:51

## 2017-12-15 RX ADMIN — METOPROLOL TARTRATE 50 MG: 50 TABLET ORAL at 12:05

## 2017-12-15 RX ADMIN — POTASSIUM CHLORIDE 40 MEQ: 1500 TABLET, EXTENDED RELEASE ORAL at 08:19

## 2017-12-15 RX ADMIN — FUROSEMIDE 40 MG: 10 INJECTION, SOLUTION INTRAMUSCULAR; INTRAVENOUS at 08:19

## 2017-12-15 RX ADMIN — POTASSIUM CHLORIDE 40 MEQ: 1500 TABLET, EXTENDED RELEASE ORAL at 18:35

## 2017-12-15 RX ADMIN — METOPROLOL TARTRATE 50 MG: 50 TABLET ORAL at 05:42

## 2017-12-15 RX ADMIN — DIGOXIN 125 MCG: 0.12 TABLET ORAL at 08:18

## 2017-12-15 RX ADMIN — SODIUM CHLORIDE: 0.9 INJECTION, SOLUTION INTRAVENOUS at 09:45

## 2017-12-15 NOTE — PROGRESS NOTES
Pt back on the floor from Cath lab, pt VS stable, report given by [facility]  Pt is alert and awake no c/o of pain  Will continue to monitor

## 2017-12-15 NOTE — ANESTHESIA PREPROCEDURE EVALUATION
Review of Systems/Medical History  Patient summary reviewed  Chart reviewed  No history of anesthetic complications     Cardiovascular  Hypertension , Past MI (NSTEMI) , CAD, , Dysrhythmias (AF with RVR), , CHF (Acute systolic and Right sided HF) ,   Comment: Non-ischemic dilated cardiomyopathy EF 20%,  Pulmonary       GI/Hepatic            Endo/Other    Comment: Gout  Impaired fasting glucose   GYN  Negative gynecology ROS          Hematology   Musculoskeletal  Obesity (BMI 30) ,        Neurology   Psychology   Negative psychology ROS            Physical Exam    Airway    Mallampati score: II  TM Distance: >3 FB       Dental   Comment: Broken teeth/Missing teeth,     Cardiovascular  Rhythm: irregular, Rate: abnormal,     Pulmonary  Breath sounds clear to auscultation,     Other Findings        Anesthesia Plan  ASA Score- 3       Anesthesia Type- IV sedation with anesthesia with ASA Monitors  Additional Monitors:   Airway Plan:     Comment: Pt has not seen MD since childhood  Admitted to hospital for SOB, fluid overload  Induction- intravenous  Informed Consent- Anesthetic plan and risks discussed with patient  I personally reviewed this patient with the CRNA  Discussed and agreed on the Anesthesia Plan with the CRNA  Marga Sevilla

## 2017-12-15 NOTE — DISCHARGE INSTRUCTIONS
Please check daily weights  If you gain more than 3 lbs in one day or 5 lbs in one week, please call the Heart Failure Hotline at [phone] so we can adjust your medications if needed  Please limit your salt intake to less than 2000 mg a day  Please limit your fluid intake to a total of less than 1500 mL a day       You have an appointment with cardiologist on December 27, 2017 at 11:20am

## 2017-12-15 NOTE — PROCEDURES
PROCEDURE:   Direct Current Cardioversion (DCCV)    :  Oneida Larson MD, [facility]    Informed consent was obtained from the patient prior to the procedure  Anesthesiologist administered anesthetic  After JOSE LUIS was performed that ruled out any left atrial appendage thrombus or left atrial thrombus, 200 Joules of biphasic synchronized energy was administered via subcutaneous patches placed anteriorly and posteriorly on patient's chest     RESULTS:  Patient's rhythm was restored to Normal Sinus Rhythm  EKG was performed after the procedure to document  COMPLICATIONS:  None

## 2017-12-15 NOTE — PROGRESS NOTES
IM Residency Progress Note   Unit/Bed#: -01 Encounter: [id number]  SOD Team B       Apple FDC 62 y o  male [id number]    Hospital Stay Days: 4      Assessment/Plan:    Principal Problem:    Acute heart failure ([address] )  Active Problems:    Atrial fibrillation with rapid ventricular response (HCC)    Non-ST elevation myocardial infarction (NSTEMI) (HCC)    Serum total bilirubin elevated    Elevated blood pressure reading    Decompensated Heart Failure, with unknown chronicity (acute with possible chronic component), suspect tachycardia mediated cardiomyopathy   · Cardiology following, appreciate recs  · JOSE LUIS/CV planned for today  · Plan for LifeVest on D/C  Will need medical management for 3 months to reassess LV fx  · Echo shows severe biventricular dysfunction with decreased EF of 20%  Only mild dilation noted in the heart diffusely  Suspect his EF will be at least partially reversed once Afib is under control  · Per Cards recs, IV Lasix 40mg BID, metoprolol 50mg QID and Digoxin 125mcg daily,   · Telemetry   · Daily Weights, I/O's (down 15lbs since admission)  · Cardiac Diet with Fluid Restriction     Atrial Fibrillation with RVR  Most likely occurring for at least the past 3 months and causing (or least worsening of CHF)  · Per Cardiology, Metoprolol 50mg QID and Digoxin 125mcg, Eloquis 5 BID   · [id number] at least 2 with HTN and CHF  · See plan for #1      Type II NSTEMI 2/2 decompensated CHF  Patient is not experiencing any chest pain and no history of chest pain, so doubt type 1 NSTEMI  · Stable, monitor clinically      Ascites with Hyperbilirubinemia most likely 2/2 to Congestive Hepatopathy  Suspect distended abdomen and ascites is 2/2 to decompensated CHF  Will not tap fluid given clear etiology as this point  · Chronic Hep Panel negative  · Ultrasounds shows moderate perihepatic ascites, mild nodular contour liver, pulsatility of portal vein most likely secondary to decompensated CHF   No obstruction noted  · Normal INR  · Tbili trending down, suspect this is most likely congestive hepatopathy  Stop trending      Lack of PCP  Patient advised to follow up with PCP and possibly cardiologist outpatient and was agreeable  He states he is ready to start taking care of his health  · Lipid Panel, A1C unremarkable  · Establish PCP outpatient after discharge     Elevated BP  · Resolved     Pre-Diabetes  · Will  on diet and exercise    Gout  · Colchicine held for now, Tylenol PRN for now  · Allopurinol on D/C    Disposition:  JOSE LUIS/CV for today      Subjective:   Spoke with nurse, no significant overnight events patient reports feeling much better and the great night's sleep  He is ready to have his DAMARIS/CV procedure in its scheduled for 9:00 p m  denies chest pain, shortness breath, nausea vomiting, abdominal pain, palpitations  Vitals: Temp (24hrs), Av 2 °F (36 8 °C), Min:97 9 °F (36 6 °C), Max:98 8 °F (37 1 °C)  Current: Temperature: 98 °F (36 7 °C)  Vitals:    17 2329 12/15/17 0315 12/15/17 0555 12/15/17 0736   BP: 131/85 128/83  125/87   Pulse: 95 (!) 106  97   Resp: 18 18  18   Temp: 98 °F (36 7 °C) 97 9 °F (36 6 °C)  98 °F (36 7 °C)   TempSrc: Oral Oral  Oral   SpO2: 96% 94%  97%   Weight:   92 5 kg (204 lb)    Height:        Body mass index is 30 13 kg/m²  I/O last 24 hours:   In: 480 [P O :480]  Out: 4500 [Urine:4500]      Physical Exam: /87   Pulse 97   Temp 98 °F (36 7 °C) (Oral)   Resp 18   Ht 5' 9" (1 753 m) Comment: per pt  Wt 92 5 kg (204 lb)   SpO2 97%   BMI 30 13 kg/m²   Lungs: clear to auscultation bilaterally  Heart: regular rate and rhythm, S1, S2 normal, no murmur, click, rub or gallop  Abdomen: soft, non-tender; bowel sounds normal; no masses,  no organomegaly  Extremities: edema 1+ up to the ankles bilaterally in lower extremities  Pulses: 2+ and symmetric  Skin: Skin color, texture, turgor normal  No rashes or lesions     Invasive Devices Peripheral Intravenous Line            Peripheral IV 12/14/17 Right Wrist less than 1 day                          Labs:   Recent Results (from the past 24 hour(s))   Basic metabolic panel    Collection Time: 12/15/17  5:47 AM   Result Value Ref Range    Sodium 140 136 - 145 mmol/L    Potassium 3 7 3 5 - 5 3 mmol/L    Chloride 104 100 - 108 mmol/L    CO2 31 21 - 32 mmol/L    Anion Gap 5 4 - 13 mmol/L    BUN 24 5 - 25 mg/dL    Creatinine 0 99 0 60 - 1 30 mg/dL    Glucose 99 65 - 140 mg/dL    Calcium 8 7 8 3 - 10 1 mg/dL    eGFR 84 ml/min/1 73sq m   Magnesium    Collection Time: 12/15/17  5:47 AM   Result Value Ref Range    Magnesium 2 2 1 6 - 2 6 mg/dL       Radiology Results: I have personally reviewed pertinent reports  Other Diagnostic Testing:   I have personally reviewed pertinent reports          Active Meds:   Current Facility-Administered Medications   Medication Dose Route Frequency    acetaminophen (TYLENOL) tablet 650 mg  650 mg Oral Q4H PRN    apixaban (ELIQUIS) tablet 5 mg  5 mg Oral BID    atorvastatin (LIPITOR) tablet 40 mg  40 mg Oral Daily With Dinner    digoxin (LANOXIN) tablet 125 mcg  125 mcg Oral Daily    furosemide (LASIX) injection 40 mg  40 mg Intravenous BID (diuretic)    [START ON 12/16/2017] metoprolol succinate (TOPROL-XL) 24 hr tablet 100 mg  100 mg Oral BID    metoprolol tartrate (LOPRESSOR) tablet 50 mg  50 mg Oral Q6H    potassium chloride (K-DUR,KLOR-CON) CR tablet 40 mEq  40 mEq Oral BID With Meals         VTE Pharmacologic Prophylaxis:Eliquis  VTE Mechanical Prophylaxis: sequential compression device    West Ricketts MD

## 2017-12-15 NOTE — PROGRESS NOTES
Progress Note - Cardiology   Andre Fonseca 62 y o  male MRN: [id number]  Unit/Bed#: -01 Encounter: [id number]  12/15/17  7:34 AM        Assessment/Plan:    1  Acute systolic and right-sided heart failure  Presumed tachycardia mediated at this time given the fact that the patient presented with atrial fibrillation with rapid ventricular response of unknown duration  However, the patient has a strong family history of coronary artery disease  Will eventually need ischemic evaluation, likely outpatient stress test   He may also have had a history of hypertension, although this cannot be diagnosed as the patient was never seen by a doctor for over 20 years  Currently on beta-blocker, transition to Metoprolol succinate 100 bid  Currently tolerating Lasix 40 IV b i d  JOSE LUIS and cardioversion scheduled for today  2   Persistent atrial fibrillation with rapid ventricular response  The patient became hypotensive with IV diltiazem drip  We then started oral beta-blocker as well as digoxin with an IV load for rate control instead  Heart rate overall improved, but still elevated up to 50s with minimal exertion  He was started on Eliquis 5 b i d  for oral anticoagulation in preparation for JOSE LUIS/cardioversion, scheduled for today  The patient is agreeable to this plan  NPO  3   Impaired fasting glucose  Hemoglobin A1c was checked and was mildly elevated at 6 2%  He will need dietary education regarding this  4   NSTEMI type 2  This is likely due to AFib with rapid ventricular response as well as acute systolic heart failure  On beta blocker, no aspirin due to Eliquis use  Start statin now that colchicine discontinued  5  Gout  Treated with colchicine  Improving per patient  Will stop colchicine, treat with Tylenol PRN for now, start allopurinol once acute gout resolved  6  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%   Given unknown chronicity, will need medical management for 3 months and reassessment of LV function in order to qualify for possible ICD for primary prevention  In meantime, patient would be candidate for [facility], spoke with him and he is agreeable, CM to arrange for discharge  Will need outpt stress test for risk stratification after discharge  Subjective/Objective   Chief Complaint:   Chief Complaint   Patient presents with    Shortness of Breath     Pt c o increased sob and feet and abdomen swelling for awhile now  pt states "I cant take it anymore"         Subjective:  80-year-old man who had not seen a doctor for over 20 years, who presents with approximately 3 months of shortness of breath, increasing lower extremity edema, as well as increasing abdominal girth  He believes he has gained about 20 lb over the last 3 months  He was found to be volume overloaded  Echocardiogram showed an ejection fraction of 65% with RV systolic dysfunction as well  He was found to be in atrial fibrillation with rapid ventricular response on admission  IV diltiazem drip was started for rate control, but he developed significant hypotension which required discontinuation  He was started on a beta-blocker and given a digoxin load  He was also given IV Lasix for diuresis  Heart rate control has improved  He reports he is urinating well  His lower extremity edema is improving  His abdominal girth is decreasing  No chest pain  Shortness of breath at rest is stable  He has not attempted to exert himself as yet  No orthopnea, but still sleeping at an angle, has not tried to lie flat  No fevers  No dizziness or lightheadedness  Has gout with pain and redness in his left second toe, reports he has had gout flares in the past, treats himself with NSAIDs, never used anything else         Patient Active Problem List   Diagnosis    Acute heart failure (HCC)    Atrial fibrillation with rapid ventricular response (HCC)    Non-ST elevation myocardial infarction (NSTEMI) ([facility] [address] )  Serum total bilirubin elevated    Elevated blood pressure reading     Past Medical History:   Diagnosis Date    Coronary artery disease     Gout     Hypertension     Shingles        No Known Allergies    Current Facility-Administered Medications   Medication Dose Route Frequency Provider Last Rate Last Dose    acetaminophen (TYLENOL) tablet 650 mg  650 mg Oral Q4H PRN Javier Solorio MD   650 mg at 12/15/17 0332    apixaban (ELIQUIS) tablet 5 mg  5 mg Oral BID Jason Alexander MD   5 mg at 12/14/17 1836    digoxin (LANOXIN) tablet 125 mcg  125 mcg Oral Daily Jason Alexander MD   125 mcg at 12/14/17 0677    furosemide (LASIX) injection 40 mg  40 mg Intravenous BID (diuretic) Jason Alexander MD   40 mg at 12/14/17 1602    metoprolol tartrate (LOPRESSOR) tablet 50 mg  50 mg Oral Q6H Jason Alexander MD   50 mg at 12/15/17 0542    potassium chloride (K-DUR,KLOR-CON) CR tablet 40 mEq  40 mEq Oral BID With Meals Jason Alexander MD   40 mEq at 12/14/17 1602       Vitals: /83   Pulse (!) 106   Temp 97 9 °F (36 6 °C) (Oral)   Resp 18   Ht 5' 9" (1 753 m) Comment: per pt  Wt 92 5 kg (204 lb)   SpO2 94%   BMI 47 13 kg/m²     Systolic (75YLE), BXE:288 , Min:110 , GYS:032     Diastolic (12VEK), XJC:41, Min:65, Max:90      Vitals:    12/14/17 0552 12/15/17 0555   Weight: 97 5 kg (215 lb) 92 5 kg (204 lb)     Orthostatic Blood Pressures    Flowsheet Row Most Recent Value   Blood Pressure  128/83 filed at 12/15/2017 0315   Patient Position - Orthostatic VS  Lying filed at 12/15/2017 0315            Intake/Output Summary (Last 24 hours) at 12/15/17 0734  Last data filed at 12/15/17 2985   Gross per 24 hour   Intake              480 ml   Output             4500 ml   Net            -4020 ml       Invasive Devices     Peripheral Intravenous Line            Peripheral IV 12/14/17 Right Wrist less than 1 day                  Physical Exam:     GEN: Awake and alert, in no acute distress      HEENT: Sclera anicteric, conjunctivae pink, mucous membranes moist   NECK: Supple, no carotid bruits, JVD present  HEART: Irregularly irregular rhythm, HR controlled, no murmurs, clicks, gallops or rubs  LUNGS: Decreased breath sounds bilaterally; no wheezes, rales, or rhonchi   ABDOMEN: Obese, soft, nontender, nondistended, normoactive bowel sounds  EXTREMITIES: Skin warm and well perfused, no clubbing, cyanosis, 1+edema, improving, erythema of left foot second toe  NEURO: No focal findings  SKIN: Normal without suspicious lesions on exposed skin        Lab Results:     Troponins:     Results from last 7 days  Lab Units 12/11/17  2215 12/11/17  1955 12/11/17  1431   TROPONIN I ng/mL 0 78* 0 83* 0 88*       CBC with diff:     Results from last 7 days  Lab Units 12/12/17  0511 12/11/17  0837 12/11/17  0834   WBC Thousand/uL 7 01  --  7 55   HEMOGLOBIN g/dL 13 9  --  16 6   I STAT HEMOGLOBIN g/dl  --  18 4*  --    HEMATOCRIT % 42 7  --  49 7*   MCV fL 95  --  95   PLATELETS Thousands/uL 264  --  275   MCH pg 31 0  --  31 6   MCHC g/dL 32 6  --  33 4   RDW % 13 9  --  13 6   MPV fL 11 0  --  10 1   NRBC AUTO /100 WBCs  --   --  0         CMP:    Results from last 7 days  Lab Units 12/15/17  0547 12/14/17  0547 12/13/17  0546 12/12/17  1351 12/12/17  0511 12/11/17  0837 12/11/17  0834   SODIUM mmol/L 140 140 140  --  138  --  137   POTASSIUM mmol/L 3 7 3 6 3 8  --  4 0  --  4 4   CHLORIDE mmol/L 104 103 105  --  104  --  102   CO2 mmol/L 31 31 28  --  25  --  25   ANION GAP mmol/L 5 6 7  --  9  --  10   BUN mg/dL 24 24 24  --  24  --  19   CREATININE mg/dL 0 99 1 20 1 25  --  1 33*  --  1 21   GLUCOSE RANDOM mg/dL 99 95 87  --  98  --  101   GLUCOSE, ISTAT mg/dl  --   --   --   --   --  107  --    CALCIUM mg/dL 8 7 8 3 8 5  --  8 4  --  9 3   AST U/L  --   --  15  --  14  --  23   ALT U/L  --   --  25  --  26  --  39   ALK PHOS U/L  --   --  67  --  62  --  79   TOTAL PROTEIN g/dL  --   --  6 5 6 6 6 3*  --  7 8   ALBUMIN g/dL  --   --  2 7*  --  2 7*  -- 3  5   BILIRUBIN TOTAL mg/dL  --   --  1 47*  --  1 87*  --  3 02*   EGFR ml/min/1 73sq m 84 67 64  --  59 74 66       Magnesium:     Results from last 7 days  Lab Units 12/15/17  0547 17  0547 17  0511   MAGNESIUM mg/dL 2 2 2 1 2 3       Coags:     Results from last 7 days  Lab Units 17  0511   INR  1 15     Lipid Profile:     Results from last 7 days  Lab Units 17  0511   CHOLESTEROL mg/dL 120   TRIGLYCERIDES mg/dL 82   HDL mg/dL 31*   LDL CALC mg/dL 73       Hgb A1c:     Results from last 7 days  Lab Units 17  0511   HEMOGLOBIN A1C % 6 2         Cardiac testing:   Results for orders placed during the hospital encounter of 17   Echo complete with contrast if indicated    [address] [facility], [address] [facility]  [phone]    Transthoracic Echocardiogram  2D, M-mode, Doppler, and Color Doppler    Study date:  12-Dec-2017    Patient: Sarah Hummel  MR number: [id number]  Account number: [de-identified]  : 1960  Age: 62 years  Gender: Male  Status: Inpatient  Location: Bedside  Height: 69 in  Weight: 229 5 lb  BP: 207/ 100 mmHg    Indications: Afib    Diagnoses: I48 0 - Atrial fibrillation    Sonographer:  SANKET George  Referring Physician:  Theresa Loya MD  Group:  [facility]  Interpreting Physician:  Mark Sesay MD    SUMMARY    LEFT VENTRICLE:  The ventricle was mildly dilated  Systolic function was severely reduced  Ejection fraction was estimated to be 20 %  There was severe diffuse hypokinesis  Wall thickness was mildly increased  RIGHT VENTRICLE:  The ventricle was mildly dilated  Systolic function was markedly reduced  LEFT ATRIUM:  The atrium was mildly dilated  RIGHT ATRIUM:  The atrium was mildly dilated  MITRAL VALVE:  There was trace to mild regurgitation  TRICUSPID VALVE:  There was trace to mild regurgitation      IVC, HEPATIC VEINS:  The inferior vena cava was mildly dilated  Respirophasic changes were blunted (less than 50% variation)  PERICARDIUM:  A small pericardial effusion was identified circumferential to the heart  There was no evidence of hemodynamic compromise  HISTORY: PRIOR HISTORY: Dyspnea Smoker    PROCEDURE: The procedure was performed at the bedside  This was a routine study  The transthoracic approach was used  The study included complete 2D imaging, M-mode, complete spectral Doppler, and color Doppler  The heart rate was 114 bpm,  at the start of the study  Images were obtained from the parasternal, apical, subcostal, and suprasternal notch acoustic windows  This was a technically difficult study  LEFT VENTRICLE: The ventricle was mildly dilated  Systolic function was severely reduced  Ejection fraction was estimated to be 20 %  There was severe diffuse hypokinesis  Wall thickness was mildly increased  DOPPLER: Transmitral flow  pattern: atrial fibrillation  The study was not technically sufficient to allow evaluation of LV diastolic function  RIGHT VENTRICLE: The ventricle was mildly dilated  Systolic function was markedly reduced  Wall thickness was normal     LEFT ATRIUM: The atrium was mildly dilated  RIGHT ATRIUM: The atrium was mildly dilated  MITRAL VALVE: Valve structure was normal  There was normal leaflet separation  DOPPLER: The transmitral velocity was within the normal range  There was no evidence for stenosis  There was trace to mild regurgitation  AORTIC VALVE: The valve was trileaflet  Leaflets exhibited normal thickness and normal cuspal separation  DOPPLER: Transaortic velocity was within the normal range  There was no evidence for stenosis  There was no regurgitation  TRICUSPID VALVE: The valve structure was normal  There was normal leaflet separation  DOPPLER: The transtricuspid velocity was within the normal range  There was no evidence for stenosis  There was trace to mild regurgitation  The  tricuspid jet envelope definition was inadequate for estimation of RV systolic pressure  There are no indirect findings suggestive of moderate or severe pulmonary hypertension  PULMONIC VALVE: Leaflets exhibited normal thickness, no calcification, and normal cuspal separation  DOPPLER: The transpulmonic velocity was within the normal range  There was trace regurgitation  PERICARDIUM: A small pericardial effusion was identified circumferential to the heart  There was no evidence of hemodynamic compromise  The pericardium was normal in appearance  AORTA: The root exhibited normal size  SYSTEMIC VEINS: IVC: The inferior vena cava was mildly dilated  Respirophasic changes were blunted (less than 50% variation)  SYSTEM MEASUREMENT TABLES    2D  %FS: 12 37 %  Ao Diam: 3 71 cm  EDV(Teich): 160 47 ml  EF Biplane: 35 31 %  EF(Teich): 26 3 %  ESV(Teich): 118 28 ml  IVSd: 1 31 cm  LA Area: 26 18 cm2  LA Diam: 3 93 cm  LVEDV MOD A2C: 171 7 ml  LVEDV MOD A4C: 136 01 ml  LVEDV MOD BP: 158 12 ml  LVEF MOD A2C: 35 64 %  LVEF MOD A4C: 34 47 %  LVESV MOD A2C: 110 51 ml  LVESV MOD A4C: 89 12 ml  LVESV MOD BP: 102 29 ml  LVIDd: 5 71 cm  LVIDs: 5 cm  LVLd A2C: 9 27 cm  LVLd A4C: 8 59 cm  LVLs A2C: 8 34 cm  LVLs A4C: 7 79 cm  LVPWd: 1 22 cm  RA Area: 24 02 cm2  RVIDd: 4 41 cm  SV MOD A2C: 61 19 ml  SV MOD A4C: 46 89 ml  SV(Teich): 42 2 ml    CW  TR Vmax: 2 31 m/s  TR maxP 32 mmHg    MM  TAPSE: 1 39 cm    [employer] Accredited [facility]    Prepared and electronically signed by    Jose Barahona MD  Signed 12-Dec-2017 16:04:40         Imaging: I have personally reviewed pertinent reports  Telemetry: personally reviewed, afib, HR in low 100s, occ up to 160s with exertion, overall HR control improved

## 2017-12-15 NOTE — ANESTHESIA POSTPROCEDURE EVALUATION
Post-Op Assessment Note      CV Status:  Stable    Mental Status:  Awake    Hydration Status:  Stable    PONV Controlled:  Controlled    Airway Patency:  Patent    Post Op Vitals Reviewed: Yes          Staff: Anesthesiologist, CRNA           BP   122/95   Temp      Pulse  66   Resp   18   SpO2   98

## 2017-12-15 NOTE — PROGRESS NOTES
Patient is complaining of severe "gout pain" in toe and knee  Shantelle rose Eugene made aware, 1 time dose of colchicine given as ordered

## 2017-12-15 NOTE — PROGRESS NOTES
Patient complained of feeling "funny"  I further assessed what "funny" meant and patient stated similar to when his blood pressures drops  Vital signs /90 and HR 84  Patient states he may have been a little paranoid due to everything he has been going through  Will continue to monitor

## 2017-12-16 LAB
ANION GAP SERPL CALCULATED.3IONS-SCNC: 7 MMOL/L (ref 4–13)
BUN SERPL-MCNC: 24 MG/DL (ref 5–25)
CALCIUM SERPL-MCNC: 9 MG/DL (ref 8.3–10.1)
CHLORIDE SERPL-SCNC: 103 MMOL/L (ref 100–108)
CO2 SERPL-SCNC: 30 MMOL/L (ref 21–32)
CREAT SERPL-MCNC: 1.01 MG/DL (ref 0.6–1.3)
GFR SERPL CREATININE-BSD FRML MDRD: 82 ML/MIN/1.73SQ M
GLUCOSE SERPL-MCNC: 97 MG/DL (ref 65–140)
MAGNESIUM SERPL-MCNC: 2.2 MG/DL (ref 1.6–2.6)
POTASSIUM SERPL-SCNC: 3.9 MMOL/L (ref 3.5–5.3)
SODIUM SERPL-SCNC: 140 MMOL/L (ref 136–145)

## 2017-12-16 PROCEDURE — 80048 BASIC METABOLIC PNL TOTAL CA: CPT | Performed by: INTERNAL MEDICINE

## 2017-12-16 PROCEDURE — 83735 ASSAY OF MAGNESIUM: CPT | Performed by: INTERNAL MEDICINE

## 2017-12-16 RX ORDER — COLCHICINE 0.6 MG/1
1.2 TABLET ORAL 2 TIMES DAILY
Status: DISCONTINUED | OUTPATIENT
Start: 2017-12-16 | End: 2017-12-17

## 2017-12-16 RX ORDER — FUROSEMIDE 40 MG/1
40 TABLET ORAL
Status: DISCONTINUED | OUTPATIENT
Start: 2017-12-16 | End: 2017-12-16

## 2017-12-16 RX ORDER — FUROSEMIDE 80 MG
80 TABLET ORAL
Status: DISCONTINUED | OUTPATIENT
Start: 2017-12-16 | End: 2017-12-17 | Stop reason: HOSPADM

## 2017-12-16 RX ADMIN — FUROSEMIDE 40 MG: 10 INJECTION, SOLUTION INTRAMUSCULAR; INTRAVENOUS at 08:12

## 2017-12-16 RX ADMIN — ATORVASTATIN CALCIUM 40 MG: 40 TABLET, FILM COATED ORAL at 16:13

## 2017-12-16 RX ADMIN — APIXABAN 5 MG: 5 TABLET, FILM COATED ORAL at 08:12

## 2017-12-16 RX ADMIN — APIXABAN 5 MG: 5 TABLET, FILM COATED ORAL at 17:50

## 2017-12-16 RX ADMIN — COLCHICINE 1.2 MG: 0.6 TABLET, FILM COATED ORAL at 17:48

## 2017-12-16 RX ADMIN — METOPROLOL SUCCINATE 100 MG: 100 TABLET, EXTENDED RELEASE ORAL at 17:50

## 2017-12-16 RX ADMIN — FUROSEMIDE 80 MG: 80 TABLET ORAL at 16:13

## 2017-12-16 RX ADMIN — POTASSIUM CHLORIDE 40 MEQ: 1500 TABLET, EXTENDED RELEASE ORAL at 12:00

## 2017-12-16 RX ADMIN — DIGOXIN 125 MCG: 0.12 TABLET ORAL at 08:12

## 2017-12-16 RX ADMIN — METOPROLOL SUCCINATE 100 MG: 100 TABLET, EXTENDED RELEASE ORAL at 08:12

## 2017-12-16 RX ADMIN — POTASSIUM CHLORIDE 40 MEQ: 1500 TABLET, EXTENDED RELEASE ORAL at 05:09

## 2017-12-16 RX ADMIN — COLCHICINE 1.2 MG: 0.6 TABLET, FILM COATED ORAL at 11:24

## 2017-12-16 RX ADMIN — POTASSIUM CHLORIDE 40 MEQ: 1500 TABLET, EXTENDED RELEASE ORAL at 17:51

## 2017-12-16 NOTE — PROGRESS NOTES
Progress Note - Cardiology   Ann Marie Patel 62 y o  male MRN: [id number]  Unit/Bed#: -01 Encounter: [id number]  12/16/17  7:42 AM        Assessment/Plan:    1  Acute systolic and right-sided heart failure  Presumed tachycardia mediated at this time given the fact that the patient presented with atrial fibrillation with rapid ventricular response of unknown duration  However, the patient has a strong family history of coronary artery disease  Will eventually need ischemic evaluation, likely outpatient stress test   He may also have had a history of hypertension, although this cannot be diagnosed as the patient was never seen by a doctor for over 20 years  Currently on beta-blocker, transitioned to Metoprolol succinate 100 bid  Currently tolerating Lasix 40 IV b i d    Will transition to Lasix 40 po bid for discharge, possibly Sunday depending on urine response to oral Lasix  S/p JOSE LUIS/CV 12/15  2   Persistent atrial fibrillation with rapid ventricular response  The patient became hypotensive with IV diltiazem drip  We then started oral beta-blocker as well as digoxin with an IV load for rate control instead  Heart rate overall improved, but was still elevated up to 150s with minimal exertion  He was started on Eliquis 5 b i d  for oral anticoagulation and underwent JOSE LUIS/cardioversion 12/15/17  Currently maintaining SR  Continue digoxin for now, may be able to discontinue as outpt if maintaining SR      3   Impaired fasting glucose  Hemoglobin A1c was checked and was mildly elevated at 6 2%  He will need dietary education regarding this  4   NSTEMI type 2  This is likely due to AFib with rapid ventricular response as well as acute systolic heart failure  On beta blocker, no aspirin due to Eliquis use  Started statin  Will need outpt exercise nuclear stress test for risk stratification/evaluation for underlying CAD  5  Gout  Treated with colchicine  Improving per patient   Still on colchicine due to continued pain  6  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%  Given unknown chronicity, will need medical management for 3 months and reassessment of LV function in order to qualify for possible ICD for primary prevention  In meantime, patient would be candidate for Lifevest, spoke with him and he is agreeable, CM to arrange for discharge  Will need outpt stress test for risk stratification after discharge as well  Subjective/Objective   Chief Complaint:   Chief Complaint   Patient presents with    Shortness of Breath     Pt c o increased sob and feet and abdomen swelling for awhile now  pt states "I cant take it anymore"         Subjective:  72-year-old man who had not seen a doctor for over 20 years, who presents with approximately 3 months of shortness of breath, increasing lower extremity edema, as well as increasing abdominal girth  He believes he has gained about 20 lb over the last 3 months  He was found to be volume overloaded  Echocardiogram showed an ejection fraction of 38% with RV systolic dysfunction as well  He was found to be in atrial fibrillation with rapid ventricular response on admission  IV diltiazem drip was started for rate control, but he developed significant hypotension which required discontinuation  He was started on a beta-blocker and given a digoxin load  He was also given IV Lasix for diuresis  Heart rate control has improved  Has gout with pain and redness in his left second toe, reports he has had gout flares in the past, treated himself with NSAIDs, never used anything else  Currently being treated with colchicine, gout slowly improving  He underwent JOSE LUIS/CV 12/15, has been maintaining SR  JOSE LUIS showed EF 20% as well  RV function also still decreased  He reports he is still urinating well  His lower extremity edema is improving  His abdominal girth is decreasing  No chest pain  Shortness of breath at rest is stable    Due to his gout he did not do any ambulation yesterday  No orthopnea, but still sleeping at an angle, has not tried to lie flat, I encouraged him to try to ensure no significant orthopnea  No fevers  No dizziness or lightheadedness        Patient Active Problem List   Diagnosis    [facility] ([facility] [address] )    Atrial fibrillation with rapid ventricular response (HCC)    [facility]    Serum total bilirubin elevated    Elevated blood pressure reading     Past Medical History:   Diagnosis Date    Coronary artery disease     Gout     Hypertension     Shingles        No Known Allergies    Current Facility-Administered Medications   Medication Dose Route Frequency Provider Last Rate Last Dose    acetaminophen (TYLENOL) tablet 650 mg  650 mg Oral Q4H PRN Javier Solorio MD   650 mg at 12/15/17 0332    apixaban (ELIQUIS) tablet 5 mg  5 mg Oral BID Nadia Oneil MD   5 mg at 12/15/17 1835    atorvastatin (LIPITOR) tablet 40 mg  40 mg Oral Daily With Melina Cox MD   40 mg at 12/15/17 1642    digoxin (LANOXIN) tablet 125 mcg  125 mcg Oral Daily Nadia Oneil MD   125 mcg at 12/15/17 0818    furosemide (LASIX) injection 40 mg  40 mg Intravenous BID (diuretic) Nadia Oneil MD   40 mg at 12/15/17 1642    metoprolol succinate (TOPROL-XL) 24 hr tablet 100 mg  100 mg Oral BID Nadia Oneil MD        potassium chloride (K-DUR,KLOR-CON) CR tablet 40 mEq  40 mEq Oral TID (diuretic) Nadia Oneil MD   40 mEq at 12/16/17 0509       Vitals: /83   Pulse 74   Temp 98 °F (36 7 °C) (Oral)   Resp 18   Ht 5' 9" (1 753 m) Comment: per pt  Wt 93 5 kg (206 lb 2 1 oz)   SpO2 95%   BMI 51 64 kg/m²     Systolic (69QCJ), OII:621 , Min:117 , GZD:273     Diastolic (68KLB), IIC:15, Min:65, Max:93      Vitals:    12/16/17 0509 12/16/17 0539   Weight: 93 5 kg (206 lb 2 1 oz) 93 5 kg (206 lb 2 1 oz)     Orthostatic Blood Pressures    Flowsheet Row Most Recent Value   Blood Pressure  139/83 filed at 12/16/2017 8513 Patient Position - Orthostatic VS  Lying filed at 12/16/2017 0309            Intake/Output Summary (Last 24 hours) at 12/16/17 0742  Last data filed at 12/16/17 0501   Gross per 24 hour   Intake              795 ml   Output             2545 ml   Net            -1750 ml       Invasive Devices     Peripheral Intravenous Line            Peripheral IV 12/14/17 Right Wrist 1 day                  Physical Exam:     GEN: Awake and alert, in no acute distress  HEENT: Sclera anicteric, conjunctivae pink, mucous membranes moist   NECK: Supple, no carotid bruits, JVD present  HEART: Irregularly irregular rhythm, HR controlled, no murmurs, clicks, gallops or rubs  LUNGS: clear bilaterally; no wheezes, rales, or rhonchi   ABDOMEN: Obese, soft, nontender, nondistended, normoactive bowel sounds  EXTREMITIES: Skin warm and well perfused, no clubbing, cyanosis, 1+edema, improving in terms of size of legs, erythema of left foot second toe  NEURO: No focal findings  SKIN: Normal without suspicious lesions on exposed skin        Lab Results:     Troponins:     Results from last 7 days  Lab Units 12/11/17  2215 12/11/17  1955 12/11/17  1431   TROPONIN I ng/mL 0 78* 0 83* 0 88*       CBC with diff:     Results from last 7 days  Lab Units 12/12/17  0511 12/11/17  0837 12/11/17  0834   WBC Thousand/uL 7 01  --  7 55   HEMOGLOBIN g/dL 13 9  --  16 6   I STAT HEMOGLOBIN g/dl  --  18 4*  --    HEMATOCRIT % 42 7  --  49 7*   MCV fL 95  --  95   PLATELETS Thousands/uL 264  --  275   MCH pg 31 0  --  31 6   MCHC g/dL 32 6  --  33 4   RDW % 13 9  --  13 6   MPV fL 11 0  --  10 1   NRBC AUTO /100 WBCs  --   --  0         CMP:    Results from last 7 days  Lab Units 12/16/17  0509 12/15/17  0547 12/14/17  0547 12/13/17  0546 12/12/17  1351 12/12/17  0511 12/11/17  0837 12/11/17  0834   SODIUM mmol/L 140 140 140 140  --  138  --  137   POTASSIUM mmol/L 3 9 3 7 3 6 3 8  --  4 0  --  4 4   CHLORIDE mmol/L 103 104 103 105  --  104  --  102 CO2 mmol/L 30 31 31 28  --  25  --  25   ANION GAP mmol/L 7 5 6 7  --  9  --  10   BUN mg/dL 24 24 24 24  --  24  --  19   CREATININE mg/dL 1 01 0 99 1 20 1 25  --  1 33*  --  1 21   GLUCOSE RANDOM mg/dL 97 99 95 87  --  98  --  101   GLUCOSE, ISTAT mg/dl  --   --   --   --   --   --  107  --    CALCIUM mg/dL 9 0 8 7 8 3 8 5  --  8 4  --  9 3   AST U/L  --   --   --  15  --  14  --  23   ALT U/L  --   --   --  25  --  26  --  39   ALK PHOS U/L  --   --   --  67  --  62  --  79   TOTAL PROTEIN g/dL  --   --   --  6 5 6 6 6 3*  --  7 8   ALBUMIN g/dL  --   --   --  2 7*  --  2 7*  --  3 5   BILIRUBIN TOTAL mg/dL  --   --   --  1 47*  --  1 87*  --  3 02*   EGFR ml/min/1 73sq m 82 84 67 64  --  59 74 66       Magnesium:     Results from last 7 days  Lab Units 17  0509 12/15/17  0547 17  0547 17  0511   MAGNESIUM mg/dL 2 2 2 2 2 1 2 3       Coags:     Results from last 7 days  Lab Units 17  0511   INR  1 15     Lipid Profile:     Results from last 7 days  Lab Units 17  0511   CHOLESTEROL mg/dL 120   TRIGLYCERIDES mg/dL 82   HDL mg/dL 31*   LDL CALC mg/dL 73       Hgb A1c:     Results from last 7 days  Lab Units 17  0511   HEMOGLOBIN A1C % 6 2         Cardiac testing:   Results for orders placed during the hospital encounter of 17   Echo complete with contrast if indicated    Narrative [address]  [address]  [phone]    Transthoracic Echocardiogram  2D, M-mode, Doppler, and Color Doppler    Study date:  12-Dec-2017    Patient: Marva Carr  MR number: [id number]  Account number: [de-identified]  : 1960  Age: 62 years  Gender: Male  Status: Inpatient  Location: Bedside  Height: 69 in  Weight: 229 5 lb  BP: 207/ 100 mmHg    Indications: Afib    Diagnoses: I48 0 - Atrial fibrillation    Sonographer:  SANKET Weston  Referring Physician:  Pablo Morfin MD  Group:  [facility]  Interpreting Physician:  Bharathi Lobo MD    SUMMARY    LEFT VENTRICLE:  The ventricle was mildly dilated  Systolic function was severely reduced  Ejection fraction was estimated to be 20 %  There was severe diffuse hypokinesis  Wall thickness was mildly increased  RIGHT VENTRICLE:  The ventricle was mildly dilated  Systolic function was markedly reduced  LEFT ATRIUM:  The atrium was mildly dilated  RIGHT ATRIUM:  The atrium was mildly dilated  MITRAL VALVE:  There was trace to mild regurgitation  TRICUSPID VALVE:  There was trace to mild regurgitation  IVC, HEPATIC VEINS:  The inferior vena cava was mildly dilated  Respirophasic changes were blunted (less than 50% variation)  PERICARDIUM:  A small pericardial effusion was identified circumferential to the heart  There was no evidence of hemodynamic compromise  HISTORY: PRIOR HISTORY: Dyspnea Smoker    PROCEDURE: The procedure was performed at the bedside  This was a routine study  The transthoracic approach was used  The study included complete 2D imaging, M-mode, complete spectral Doppler, and color Doppler  The heart rate was 114 bpm,  at the start of the study  Images were obtained from the parasternal, apical, subcostal, and suprasternal notch acoustic windows  This was a technically difficult study  LEFT VENTRICLE: The ventricle was mildly dilated  Systolic function was severely reduced  Ejection fraction was estimated to be 20 %  There was severe diffuse hypokinesis  Wall thickness was mildly increased  DOPPLER: Transmitral flow  pattern: atrial fibrillation  The study was not technically sufficient to allow evaluation of LV diastolic function  RIGHT VENTRICLE: The ventricle was mildly dilated  Systolic function was markedly reduced  Wall thickness was normal     LEFT ATRIUM: The atrium was mildly dilated  RIGHT ATRIUM: The atrium was mildly dilated      MITRAL VALVE: Valve structure was normal  There was normal leaflet separation  DOPPLER: The transmitral velocity was within the normal range  There was no evidence for stenosis  There was trace to mild regurgitation  AORTIC VALVE: The valve was trileaflet  Leaflets exhibited normal thickness and normal cuspal separation  DOPPLER: Transaortic velocity was within the normal range  There was no evidence for stenosis  There was no regurgitation  TRICUSPID VALVE: The valve structure was normal  There was normal leaflet separation  DOPPLER: The transtricuspid velocity was within the normal range  There was no evidence for stenosis  There was trace to mild regurgitation  The  tricuspid jet envelope definition was inadequate for estimation of RV systolic pressure  There are no indirect findings suggestive of moderate or severe pulmonary hypertension  PULMONIC VALVE: Leaflets exhibited normal thickness, no calcification, and normal cuspal separation  DOPPLER: The transpulmonic velocity was within the normal range  There was trace regurgitation  PERICARDIUM: A small pericardial effusion was identified circumferential to the heart  There was no evidence of hemodynamic compromise  The pericardium was normal in appearance  AORTA: The root exhibited normal size  SYSTEMIC VEINS: IVC: The inferior vena cava was mildly dilated  Respirophasic changes were blunted (less than 50% variation)      SYSTEM MEASUREMENT TABLES    2D  %FS: 12 37 %  Ao Diam: 3 71 cm  EDV(Teich): 160 47 ml  EF Biplane: 35 31 %  EF(Teich): 26 3 %  ESV(Teich): 118 28 ml  IVSd: 1 31 cm  LA Area: 26 18 cm2  LA Diam: 3 93 cm  LVEDV MOD A2C: 171 7 ml  LVEDV MOD A4C: 136 01 ml  LVEDV MOD BP: 158 12 ml  LVEF MOD A2C: 35 64 %  LVEF MOD A4C: 34 47 %  LVESV MOD A2C: 110 51 ml  LVESV MOD A4C: 89 12 ml  LVESV MOD BP: 102 29 ml  LVIDd: 5 71 cm  LVIDs: 5 cm  LVLd A2C: 9 27 cm  LVLd A4C: 8 59 cm  LVLs A2C: 8 34 cm  LVLs A4C: 7 79 cm  LVPWd: 1 22 cm  RA Area: 24 02 cm2  RVIDd: 4 41 cm  SV MOD A2C: 61 19 ml  SV MOD A4C: 46 89 ml  SV(Teich): 42 2 ml    CW  TR Vmax: 2 31 m/s  TR maxP 32 mmHg    MM  TAPSE: 1 39 cm    [facility] Commission Accredited Echocardiography Laboratory    Prepared and electronically signed by    Corwin Spencer MD  Signed 12-Dec-2017 16:04:40         Imaging: I have personally reviewed pertinent reports  Telemetry: personally reviewed, currently maintaining SR, no events

## 2017-12-16 NOTE — SOCIAL WORK
CM received a phone call from Oanh Pineda at [facility], inquiring about Life Vest fitting for today  CM spoke with Dr Cricket Mcmanus, who requested the life vest to be fitted today  CM notified Oanh Pineda, who will arrange for patient to be fitted today  CM continues to follow,

## 2017-12-16 NOTE — PROGRESS NOTES
IM Residency Progress Note   Unit/Bed#: -01 Encounter: [id number]  SOD Team B       Adamaris Price 62 y o  male [id number]    Hospital Stay Days: 5      Assessment/Plan:    Principal Problem:    Acute heart failure ([address] )  Active Problems:    Atrial fibrillation with rapid ventricular response (HCC)    Non-ST elevation myocardial infarction (NSTEMI) (HCC)    Serum total bilirubin elevated    Elevated blood pressure reading    Decompensated Heart Failure, with unknown chronicity (acute with possible chronic component), suspect tachycardia mediated cardiomyopathy   · Cardiology following, appreciate recs  · JOSE LUIS/CV complete, pt in NSR  · Plan for LifeVest on D/C  Will need medical management for 3 months to reassess LV fx  · Echo shows severe biventricular dysfunction with decreased EF of 20%  Only mild dilation noted in the heart diffusely  Suspect his EF will be at least partially reversed once Afib is under control  · Per Cards recs, Lasix 80mg BID, metoprolol 100mg BID and Digoxin 125mcg daily, Potassium 40meq TID  · Telemetry   · Daily Weights, I/O's (down ~30lbs since admission, still hypervolemic)  · Cardiac Diet with Fluid Restriction     Atrial Fibrillation with RVR  Most likely occurring for at least the past 3 months and causing (or least worsening of CHF)  · Resolved s/p JOSE LUIS/CV  · [id number] possibly 2 with HTN and CHF  · See plan for #1      Type II NSTEMI 2/2 decompensated CHF  Patient is not experiencing any chest pain and no history of chest pain, so doubt type 1 NSTEMI  · Stable, monitor clinically      Ascites with Hyperbilirubinemia most likely 2/2 to Congestive Hepatopathy  Suspect distended abdomen and ascites is 2/2 to decompensated CHF  Will not tap fluid given clear etiology as this point  · Chronic Hep Panel negative  · Ultrasounds shows moderate perihepatic ascites, mild nodular contour liver, pulsatility of portal vein most likely secondary to decompensated CHF   No obstruction noted   · Normal INR  · Tbili trending down, suspect this is most likely congestive hepatopathy  Stop trending      Lack of PCP  Patient advised to follow up with PCP and possibly cardiologist outpatient and was agreeable  He states he is ready to start taking care of his health  · Lipid Panel, A1C unremarkable  · Establish PCP outpatient after discharge     Elevated BP  · Resolved     Pre-Diabetes  · Will  on diet and exercise     Gout  · Colchicine BID restarted, Tylenol PRN for now  · Allopurinol on D/C    Disposition:  Await LifeVest before discharge  Subjective:   Patient seen examined, no overnight events according to nurse  Overall he feels better, states his shortness of breath has improved and so has a swelling  His gout pain has also improved, he is requesting colchicine  He has been having multiple episodes of loose brown stools without blood for the past couple days  Denies chest pain, palpitations, shortness of breath, nausea, vomiting  Vitals: Temp (24hrs), Av 3 °F (36 8 °C), Min:98 °F (36 7 °C), Max:98 7 °F (37 1 °C)  Current: Temperature: 98 7 °F (37 1 °C)  Vitals:    17 0309 17 0509 17 0539 17 0753   BP: 139/83   122/78   Pulse: 74   77   Resp: 18   18   Temp: 98 °F (36 7 °C)   98 7 °F (37 1 °C)   TempSrc: Oral   Oral   SpO2: 95%   98%   Weight:  93 5 kg (206 lb 2 1 oz) 93 5 kg (206 lb 2 1 oz)    Height:        Body mass index is 30 44 kg/m²  I/O last 24 hours: In: 795 [P O :720;  I V :75]  Out: 2545 [Urine:2545]      Physical Exam: General appearance: alert, appears stated age and cooperative  Lungs: clear to auscultation bilaterally  Heart: regular rate and rhythm, S1, S2 normal, no murmur, click, rub or gallop  Abdomen: soft, non-tender; bowel sounds normal; no masses,  no organomegaly  Extremities: edema 1+ in the bilateral lower extremities  Pulses: 2+ and symmetric  Skin: Skin color, texture, turgor normal  No rashes or lesions Invasive Devices     Peripheral Intravenous Line            Peripheral IV 12/14/17 Right Wrist 1 day                          Labs:   Recent Results (from the past 24 hour(s))   ECG 12 lead    Collection Time: 12/15/17 10:12 AM   Result Value Ref Range    Ventricular Rate 74 BPM    Atrial Rate 74 BPM    NM Interval 150 ms    QRSD Interval 92 ms    QT Interval 412 ms    QTC Interval 457 ms    P Axis 37 degrees    QRS Axis 96 degrees    T Wave Axis -5 degrees   ECG 12 lead    Collection Time: 12/15/17 10:12 AM   Result Value Ref Range    Ventricular Rate 70 BPM    Atrial Rate 70 BPM    NM Interval 142 ms    QRSD Interval 90 ms    QT Interval 408 ms    QTC Interval 440 ms    P [place] 33 degrees    QRS Axis 88 degrees    T Wave Axis -18 degrees   Magnesium    Collection Time: 12/16/17  5:09 AM   Result Value Ref Range    Magnesium 2 2 1 6 - 2 6 mg/dL   Basic metabolic panel    Collection Time: 12/16/17  5:09 AM   Result Value Ref Range    Sodium 140 136 - 145 mmol/L    Potassium 3 9 3 5 - 5 3 mmol/L    Chloride 103 100 - 108 mmol/L    CO2 30 21 - 32 mmol/L    Anion Gap 7 4 - 13 mmol/L    BUN 24 5 - 25 mg/dL    Creatinine 1 01 0 60 - 1 30 mg/dL    Glucose 97 65 - 140 mg/dL    Calcium 9 0 8 3 - 10 1 mg/dL    eGFR 82 ml/min/1 73sq m       Radiology Results: I have personally reviewed pertinent reports  Other Diagnostic Testing:   I have personally reviewed pertinent reports          Active Meds:   Current Facility-Administered Medications   Medication Dose Route Frequency    acetaminophen (TYLENOL) tablet 650 mg  650 mg Oral Q4H PRN    apixaban (ELIQUIS) tablet 5 mg  5 mg Oral BID    atorvastatin (LIPITOR) tablet 40 mg  40 mg Oral Daily With Dinner    digoxin (LANOXIN) tablet 125 mcg  125 mcg Oral Daily    furosemide (LASIX) injection 40 mg  40 mg Intravenous BID (diuretic)    metoprolol succinate (TOPROL-XL) 24 hr tablet 100 mg  100 mg Oral BID    potassium chloride (K-DUR,KLOR-CON) CR tablet 40 mEq  40 mEq Oral TID (diuretic)         VTE Pharmacologic Prophylaxis:Eliquis  VTE Mechanical Prophylaxis: sequential compression device    Darin Jacome MD

## 2017-12-16 NOTE — SOCIAL WORK
CM received a phone call from  Oanh Pineda from [facility], reporting someone will be over to hospital after 6:00 PM to fit the patient for Life Ves  CM will continue to follow

## 2017-12-17 VITALS
HEART RATE: 73 BPM | SYSTOLIC BLOOD PRESSURE: 125 MMHG | OXYGEN SATURATION: 94 % | RESPIRATION RATE: 18 BRPM | HEIGHT: 69 IN | WEIGHT: 202.6 LBS | BODY MASS INDEX: 30.01 KG/M2 | DIASTOLIC BLOOD PRESSURE: 81 MMHG | TEMPERATURE: 97.9 F

## 2017-12-17 PROBLEM — I21.4 NON-ST ELEVATION MYOCARDIAL INFARCTION (NSTEMI) (HCC): Status: RESOLVED | Noted: 2017-12-11 | Resolved: 2017-12-17

## 2017-12-17 LAB
ANION GAP SERPL CALCULATED.3IONS-SCNC: 7 MMOL/L (ref 4–13)
BUN SERPL-MCNC: 25 MG/DL (ref 5–25)
CALCIUM SERPL-MCNC: 8.7 MG/DL (ref 8.3–10.1)
CHLORIDE SERPL-SCNC: 104 MMOL/L (ref 100–108)
CO2 SERPL-SCNC: 28 MMOL/L (ref 21–32)
CREAT SERPL-MCNC: 0.95 MG/DL (ref 0.6–1.3)
GFR SERPL CREATININE-BSD FRML MDRD: 88 ML/MIN/1.73SQ M
GLUCOSE SERPL-MCNC: 95 MG/DL (ref 65–140)
POTASSIUM SERPL-SCNC: 4.1 MMOL/L (ref 3.5–5.3)
SODIUM SERPL-SCNC: 139 MMOL/L (ref 136–145)

## 2017-12-17 PROCEDURE — 80048 BASIC METABOLIC PNL TOTAL CA: CPT | Performed by: INTERNAL MEDICINE

## 2017-12-17 RX ORDER — POTASSIUM CHLORIDE 20 MEQ/1
20 TABLET, EXTENDED RELEASE ORAL 2 TIMES DAILY WITH MEALS
Qty: 20 TABLET | Refills: 0 | Status: SHIPPED | OUTPATIENT
Start: 2017-12-17 | End: 2018-03-02 | Stop reason: HOSPADM

## 2017-12-17 RX ORDER — ATORVASTATIN CALCIUM 40 MG/1
40 TABLET, FILM COATED ORAL
Qty: 30 TABLET | Refills: 0 | Status: SHIPPED | OUTPATIENT
Start: 2017-12-17 | End: 2018-03-05 | Stop reason: SDUPTHER

## 2017-12-17 RX ORDER — FUROSEMIDE 80 MG
80 TABLET ORAL 2 TIMES DAILY
Qty: 30 TABLET | Refills: 0 | Status: SHIPPED | OUTPATIENT
Start: 2017-12-17 | End: 2018-02-09 | Stop reason: SDUPTHER

## 2017-12-17 RX ORDER — POTASSIUM CHLORIDE 20 MEQ/1
20 TABLET, EXTENDED RELEASE ORAL DAILY
Status: DISCONTINUED | OUTPATIENT
Start: 2017-12-18 | End: 2017-12-17

## 2017-12-17 RX ORDER — POTASSIUM CHLORIDE 20 MEQ/1
20 TABLET, EXTENDED RELEASE ORAL 2 TIMES DAILY WITH MEALS
Status: DISCONTINUED | OUTPATIENT
Start: 2017-12-17 | End: 2017-12-17 | Stop reason: HOSPADM

## 2017-12-17 RX ORDER — ATORVASTATIN CALCIUM 40 MG/1
40 TABLET, FILM COATED ORAL
Status: DISCONTINUED | OUTPATIENT
Start: 2017-12-17 | End: 2017-12-17 | Stop reason: HOSPADM

## 2017-12-17 RX ORDER — DIGOXIN 125 MCG
125 TABLET ORAL DAILY
Qty: 15 TABLET | Refills: 0 | Status: SHIPPED | OUTPATIENT
Start: 2017-12-18 | End: 2018-03-05 | Stop reason: SDUPTHER

## 2017-12-17 RX ORDER — POTASSIUM CHLORIDE 20 MEQ/1
40 TABLET, EXTENDED RELEASE ORAL DAILY
Status: DISCONTINUED | OUTPATIENT
Start: 2017-12-18 | End: 2017-12-17

## 2017-12-17 RX ORDER — FUROSEMIDE 80 MG
80 TABLET ORAL DAILY
Qty: 30 TABLET | Refills: 0 | Status: SHIPPED | OUTPATIENT
Start: 2017-12-17 | End: 2017-12-17

## 2017-12-17 RX ORDER — METOPROLOL SUCCINATE 100 MG/1
100 TABLET, EXTENDED RELEASE ORAL 2 TIMES DAILY
Qty: 60 TABLET | Refills: 0 | Status: SHIPPED | OUTPATIENT
Start: 2017-12-17 | End: 2018-03-05 | Stop reason: SDUPTHER

## 2017-12-17 RX ORDER — ALLOPURINOL 100 MG/1
100 TABLET ORAL DAILY
Qty: 15 TABLET | Refills: 0 | Status: SHIPPED | OUTPATIENT
Start: 2017-12-17 | End: 2018-02-09 | Stop reason: SDUPTHER

## 2017-12-17 RX ADMIN — COLCHICINE 1.2 MG: 0.6 TABLET, FILM COATED ORAL at 08:17

## 2017-12-17 RX ADMIN — FUROSEMIDE 80 MG: 80 TABLET ORAL at 08:17

## 2017-12-17 RX ADMIN — POTASSIUM CHLORIDE 40 MEQ: 1500 TABLET, EXTENDED RELEASE ORAL at 05:05

## 2017-12-17 RX ADMIN — METOPROLOL SUCCINATE 100 MG: 100 TABLET, EXTENDED RELEASE ORAL at 08:17

## 2017-12-17 RX ADMIN — DIGOXIN 125 MCG: 0.12 TABLET ORAL at 08:16

## 2017-12-17 RX ADMIN — APIXABAN 5 MG: 5 TABLET, FILM COATED ORAL at 08:17

## 2017-12-17 NOTE — PROGRESS NOTES
IM Residency Progress Note   Unit/Bed#: -01 Encounter: [id number]  SOD Team B       Hadley Littlejohn 62 y o  male [id number]    Hospital Stay Days: 6      Assessment/Plan:    Principal Problem:    Acute heart failure ([address] )  Active Problems:    Atrial fibrillation with rapid ventricular response (HCC)    Non-ST elevation myocardial infarction (NSTEMI) (HCC)    Serum total bilirubin elevated    Elevated blood pressure reading      Decompensated Heart Failure, with unknown chronicity (acute with possible chronic component)- likely nonischemic dilated cardiomyopathy  Cardiology following, stable for dc  S/p JOSE LUIS/CV patient is currently in [facility] [employer] for [place] on D/C today  Will need medical management for 3 months to reassess LV fx    12/12/17-Echo shows severe biventricular dysfunction with decreased EF of 20%  Only mild dilation noted in the heart diffusely    Per Cards recs, Lasix 80mg BID, metoprolol 100mg BID and Digoxin 125mcg daily, Potassium 40meq TID  DC Telemetry   Daily Weights, I/O's (down ~30lbs since admission, still hypervolemic on examination  Cardiac Diet with Fluid Restriction  Lipid panel: Cholesterol 120, TAG 82, HDL 31, LDL- 73 - start atorvastatin 40 mg at DC       Atrial Fibrillation with RVR  Resolved s/p JOSE LUIS/CV on 12/15/17  [id number] possibly 2 with HTN and CHF  Continue Eliquis 5 mg b i d - no need for aspirin since he is already anticoagulated on Eliquis  Continue taking digoxin- follow up with cardiologist as outpatient to assess need for this medication if he continues to maintain sinus rhythm          Type II NSTEMI 2/2 decompensated CHF  Patient is not experiencing any chest pain and no history of chest pain, so doubt type 1 NSTEMI  Stable  Will need ischemic workout as outpatient and cardiac follow-up     Outpatient exercise nuclear stress test ordered for risk stratification      Ascites with Hyperbilirubinemia most likely 2/2 to Congestive Hepatopathy  Suspect distended abdomen and ascites is 2/2 to decompensated CHF  Will not tap fluid given clear etiology as this point  Chronic Hep Panel negative  -Ultrasounds shows moderate perihepatic ascites, mild nodular contour liver, pulsatility of portal vein most likely secondary to decompensated CHF  No obstruction noted  Normal INR  Tbili trended down  No further workup       Elevated BP  Resolved  Stable blood pressure      Pre-Diabetes  Hb1AC was 6 2%  Counseled on lifestyle modifications     Gout  Colchicine BID restarted, Tylenol PRN for now  Allopurinol on D/C         Disposition: Stable for discharge      Subjective:   Patient seen and examined  Joint pain secondary to gout feels improved  Currently denies any chest pain, palpitations, shortness of breath, abdominal pain, nausea  vomiting  Stable for discharge today  Vitals: Temp (24hrs), Av 5 °F (36 9 °C), Min:97 9 °F (36 6 °C), Max:99 4 °F (37 4 °C)  Current: Temperature: 98 2 °F (36 8 °C)  Vitals:    17 1924 17 2319 17 0257 17 0537   BP: 110/85 115/72 122/84    Pulse: 79 73 76    Resp: 18 20 18    Temp: 99 4 °F (37 4 °C) 98 3 °F (36 8 °C) 98 2 °F (36 8 °C)    TempSrc: Oral Oral Oral    SpO2: 96% 94% 96%    Weight:    91 9 kg (202 lb 9 6 oz)   Height:        Body mass index is 29 92 kg/m²  I/O last 24 hours:   In: 0 [P O :880]  Out: 2500 [Urine:2500]      Physical Exam: /84   Pulse 76   Temp 98 2 °F (36 8 °C) (Oral)   Resp 18   Ht 5' 9" (1 753 m) Comment: per pt  Wt 91 9 kg (202 lb 9 6 oz)   SpO2 96%   BMI 29 92 kg/m²   General appearance: alert, appears stated age and cooperative  HEENT:  Moist mucous membranes, no JVD  Lungs: clear to auscultation bilaterally  Heart: regular rate and rhythm, S1, S2 normal, no murmur, click, rub or gallop  Extremities: +2 pitting edema bilaterally  Pulses: 2+ and symmetric     Invasive Devices     Peripheral Intravenous Line            Peripheral IV 17 Right Wrist 2 days Labs:   Recent Results (from the past 24 hour(s))   Basic metabolic panel    Collection Time: 12/17/17  5:23 AM   Result Value Ref Range    Sodium 139 136 - 145 mmol/L    Potassium 4 1 3 5 - 5 3 mmol/L    Chloride 104 100 - 108 mmol/L    CO2 28 21 - 32 mmol/L    Anion Gap 7 4 - 13 mmol/L    BUN 25 5 - 25 mg/dL    Creatinine 0 95 0 60 - 1 30 mg/dL    Glucose 95 65 - 140 mg/dL    Calcium 8 7 8 3 - 10 1 mg/dL    eGFR 88 ml/min/1 73sq m       Radiology Results: I have personally reviewed pertinent reports  Other Diagnostic Testing:   I have personally reviewed pertinent reports  Active Meds:   Current Facility-Administered Medications   Medication Dose Route Frequency    acetaminophen (TYLENOL) tablet 650 mg  650 mg Oral Q4H PRN    apixaban (ELIQUIS) tablet 5 mg  5 mg Oral BID    atorvastatin (LIPITOR) tablet 40 mg  40 mg Oral Daily With Dinner    colchicine (COLCRYS) tablet 1 2 mg  1 2 mg Oral BID    digoxin (LANOXIN) tablet 125 mcg  125 mcg Oral Daily    furosemide (LASIX) tablet 80 mg  80 mg Oral BID (diuretic)    metoprolol succinate (TOPROL-XL) 24 hr tablet 100 mg  100 mg Oral BID    potassium chloride (K-DUR,KLOR-CON) CR tablet 40 mEq  40 mEq Oral TID (diuretic)         VTE Pharmacologic Prophylaxis: Eliquis  VTE Mechanical Prophylaxis: sequential compression device    LYUDMILA Ledbetter    Internal Medicine PGY-1  12/17/2017 7:55 AM

## 2017-12-17 NOTE — PLAN OF CARE
CARDIOVASCULAR - ADULT     Maintains optimal cardiac output and hemodynamic stability Not Progressing     Absence of cardiac dysrhythmias or at baseline rhythm Not Progressing        DISCHARGE PLANNING - CARE MANAGEMENT     Discharge to post-acute care or home with appropriate resources Not Progressing        METABOLIC, FLUID AND ELECTROLYTES - ADULT     Electrolytes maintained within normal limits Not Progressing     Fluid balance maintained Not Progressing        Nutrition/Hydration-ADULT     Nutrient/Hydration intake appropriate for improving, restoring or maintaining nutritional needs Not Progressing        Potential for Falls     Patient will remain free of falls Not Progressing

## 2017-12-17 NOTE — PROGRESS NOTES
Progress Note - Cardiology   Bob Meraz 62 y o  male MRN: [id number]  Unit/Bed#: -01 Encounter: [id number]  12/17/17  8:07 AM        Assessment/Plan:    1  Acute systolic and right-sided heart failure  Presumed tachycardia mediated at this time given the fact that the patient presented with atrial fibrillation with rapid ventricular response of unknown duration  However, the patient has a strong family history of coronary artery disease  Will eventually need ischemic evaluation, likely outpatient stress test   He may also have had a history of hypertension, although this cannot be diagnosed as the patient was never seen by a doctor for over 20 years  Currently on beta-blocker, transitioned to Metoprolol succinate 100 bid  Transitioned to Lasix 80 po bid for discharge, with adequate urine response, continue this dose for now, will follow up BM next week prior to follow up appt  S/p JOSE LUIS/CV 12/15  Consider addition of Entresto once patient seen in follow up as outpt if BP stable and creatinine stable  2   Persistent atrial fibrillation with rapid ventricular response  The patient became hypotensive with IV diltiazem drip  We then started oral beta-blocker as well as digoxin with an IV load for rate control instead  Heart rate overall improved, but was still elevated up to 150s with minimal exertion  He was started on Eliquis 5 b i d  for oral anticoagulation and underwent OJSE LUIS/cardioversion 12/15/17  Currently maintaining SR  Continue digoxin for now, may be able to discontinue as outpt if maintaining SR      3   Impaired fasting glucose  Hemoglobin A1c was checked and was mildly elevated at 6 2%  He will need dietary education regarding this  4   NSTEMI type 2  This is likely due to AFib with rapid ventricular response as well as acute systolic heart failure  On beta blocker, no aspirin due to Eliquis use  Started statin   Will need outpt exercise nuclear stress test for risk stratification/evaluation for underlying CAD, which I did order in EPIC  5  Gout  Treated with colchicine  Improving per patient  Still on colchicine due to continued pain  Will stop statin for now until colchicine discontinued  6  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%  Given unknown chronicity, will need medical management for 3 months and reassessment of LV function in order to qualify for possible ICD for primary prevention  In meantime, patient would be candidate for Life[facility], spoke with him and he is agreeable, Daisy Bourgeois has been fitted and ready for patient discharge  Will need outpt stress test for risk stratification after discharge as well  Ordered for cardiac rehab referral after discharge as well  7  NSVT  Up to 15 beats NSVT seen on telemetry, patient arranged for Lifevest, potassium and magnesium adequate, on high dose beta blocker  Patient stable from cardiac standpoint for discharge  Spoke with him regarding sodium and fluid restriction, need for daily weights, etc  He reports he will need note for work stating he was admitted in hospital      Subjective/Objective   Chief Complaint:   Chief Complaint   Patient presents with    Shortness of Breath     Pt c o increased sob and feet and abdomen swelling for awhile now  pt states "I cant take it anymore"         Subjective:  59-year-old man who had not seen a doctor for over 20 years, who presents with approximately 3 months of shortness of breath, increasing lower extremity edema, as well as increasing abdominal girth  He believes he has gained about 20 lb over the last 3 months  He was found to be volume overloaded  Echocardiogram showed an ejection fraction of 99% with RV systolic dysfunction as well  He was found to be in atrial fibrillation with rapid ventricular response on admission  IV diltiazem drip was started for rate control, but he developed significant hypotension which required discontinuation      He was started on a beta-blocker and given a digoxin load  He was also given IV Lasix for diuresis  Heart rate control has improved  Has gout with pain and redness in his left second toe, reports he has had gout flares in the past, treated himself with NSAIDs, never used anything else  Currently being treated with colchicine, gout slowly improving  He underwent JOSE LUIS/CV 12/15, has been maintaining SR  JOSE LUIS showed EF 20% as well  RV function also still decreased  He reports he is still urinating well  His lower extremity edema is improving  His abdominal girth is decreasing  No chest pain  He has ambulated around halls, no significant MARION, but has not yet attempted stairs  No orthopnea  No fevers  No dizziness or lightheadedness  Occasionally gets a strange sensation, can't explain it, but doesn't believe it is true dizziness         Patient Active Problem List   Diagnosis    [facility] failure [facility] )    Atrial fibrillation with rapid ventricular response (HCC)    Non-ST elevation myocardial infarction (NSTEMI) ([facility] )    Serum total bilirubin elevated    Elevated blood pressure reading     Past Medical History:   Diagnosis Date    Coronary artery disease     Gout     Hypertension     Shingles        No Known Allergies    Current Facility-Administered Medications   Medication Dose Route Frequency Provider Last Rate Last Dose    acetaminophen (TYLENOL) tablet 650 mg  650 mg Oral Q4H PRN Javier Solorio MD   650 mg at 12/15/17 0332    apixaban (ELIQUIS) tablet 5 mg  5 mg Oral BID Daina Coe MD   5 mg at 12/16/17 1750    atorvastatin (LIPITOR) tablet 40 mg  40 mg Oral Daily With Mukesh Larios MD   40 mg at 12/16/17 1613    colchicine (COLCRYS) tablet 1 2 mg  1 2 mg Oral BID Javier Solorio MD   1 2 mg at 12/16/17 1748    digoxin (LANOXIN) tablet 125 mcg  125 mcg Oral Daily Daina Coe MD   125 mcg at 12/16/17 2602    furosemide (LASIX) tablet 80 mg  80 mg Oral BID (diuretic) Daina Coe MD   80 mg at 12/16/17 1613    metoprolol succinate (TOPROL-XL) 24 hr tablet 100 mg  100 mg Oral BID Tank Flores MD   100 mg at 12/16/17 1750    potassium chloride (K-DUR,KLOR-CON) CR tablet 40 mEq  40 mEq Oral TID (diuretic) Tank Flores MD   40 mEq at 12/17/17 0505       Vitals: /84   Pulse 76   Temp 98 2 °F (36 8 °C) (Oral)   Resp 18   Ht 5' 9" (1 753 m) Comment: per pt  Wt 91 9 kg (202 lb 9 6 oz)   SpO2 96%   BMI 39 17 kg/m²     Systolic (66UEJ), ZEU:841 , Min:110 , HIS:192     Diastolic (56GGX), EOO:93, Min:72, Max:85      Vitals:    12/16/17 0539 12/17/17 0537   Weight: 93 5 kg (206 lb 2 1 oz) 91 9 kg (202 lb 9 6 oz)     Orthostatic Blood Pressures    Flowsheet Row Most Recent Value   Blood Pressure  122/84 filed at 12/17/2017 0257   Patient Position - Orthostatic VS  Lying filed at 12/17/2017 0257            Intake/Output Summary (Last 24 hours) at 12/17/17 0807  Last data filed at 12/17/17 0501   Gross per 24 hour   Intake              720 ml   Output             2500 ml   Net            -1780 ml       Invasive Devices     Peripheral Intravenous Line            Peripheral IV 12/14/17 Right Wrist 2 days                  Physical Exam:     GEN: Awake and alert, in no acute distress  HEENT: Sclera anicteric, conjunctivae pink, mucous membranes moist   NECK: Supple, no carotid bruits, JVD present  HEART: regular rhythm, HR controlled, no murmurs, clicks, gallops or rubs  LUNGS: clear bilaterally; no wheezes, rales, or rhonchi   ABDOMEN: Obese, soft, nontender, nondistended, normoactive bowel sounds  EXTREMITIES: Skin warm and well perfused, no clubbing, cyanosis, 1+edema, improving in terms of size of legs, erythema of left foot second toe  NEURO: No focal findings  SKIN: Normal without suspicious lesions on exposed skin        Lab Results:     Troponins:     Results from last 7 days  Lab Units 12/11/17  2215 12/11/17  1955 12/11/17  1431   TROPONIN I ng/mL 0 78* 0 83* 0 88*       CBC with diff: Results from last 7 days  Lab Units 12/12/17  0511 12/11/17  0837 12/11/17  0834   WBC Thousand/uL 7 01  --  7 55   HEMOGLOBIN g/dL 13 9  --  16 6   I STAT HEMOGLOBIN g/dl  --  18 4*  --    HEMATOCRIT % 42 7  --  49 7*   MCV fL 95  --  95   PLATELETS Thousands/uL 264  --  275   MCH pg 31 0  --  31 6   MCHC g/dL 32 6  --  33 4   RDW % 13 9  --  13 6   MPV fL 11 0  --  10 1   NRBC AUTO /100 WBCs  --   --  0         CMP:    Results from last 7 days  Lab Units 12/17/17  0523 12/16/17  0509 12/15/17  0547 12/14/17  0547 12/13/17  0546 12/12/17  1351 12/12/17  0511 12/11/17  0837 12/11/17  0834   SODIUM mmol/L 139 140 140 140 140  --  138  --  137   POTASSIUM mmol/L 4 1 3 9 3 7 3 6 3 8  --  4 0  --  4 4   CHLORIDE mmol/L 104 103 104 103 105  --  104  --  102   CO2 mmol/L 28 30 31 31 28  --  25  --  25   ANION GAP mmol/L 7 7 5 6 7  --  9  --  10   BUN mg/dL 25 24 24 24 24  --  24  --  19   CREATININE mg/dL 0 95 1 01 0 99 1 20 1 25  --  1 33*  --  1 21   GLUCOSE RANDOM mg/dL 95 97 99 95 87  --  98  --  101   GLUCOSE, ISTAT mg/dl  --   --   --   --   --   --   --  107  --    CALCIUM mg/dL 8 7 9 0 8 7 8 3 8 5  --  8 4  --  9 3   AST U/L  --   --   --   --  15  --  14  --  23   ALT U/L  --   --   --   --  25  --  26  --  39   ALK PHOS U/L  --   --   --   --  67  --  62  --  79   TOTAL PROTEIN g/dL  --   --   --   --  6 5 6 6 6 3*  --  7 8   ALBUMIN g/dL  --   --   --   --  2 7*  --  2 7*  --  3 5   BILIRUBIN TOTAL mg/dL  --   --   --   --  1 47*  --  1 87*  --  3 02*   EGFR ml/min/1 73sq m 88 82 84 67 64  --  59 74 66       Magnesium:     Results from last 7 days  Lab Units 12/16/17  0509 12/15/17  0547 12/14/17  0547 12/12/17  0511   MAGNESIUM mg/dL 2 2 2 2 2 1 2 3       Coags:     Results from last 7 days  Lab Units 12/12/17  0511   INR  1 15     Lipid Profile:     Results from last 7 days  Lab Units 12/12/17  0511   CHOLESTEROL mg/dL 120   TRIGLYCERIDES mg/dL 82   HDL mg/dL 31*   LDL CALC mg/dL 73       Hgb A1c: Results from last 7 days  Lab Units 17  0511   HEMOGLOBIN A1C % 6 2         Cardiac testing:   Results for orders placed during the hospital encounter of 17   Echo complete with contrast if indicated    Narrative [address]  [facility], [address] [facility]  [phone]    Transthoracic Echocardiogram  2D, M-mode, Doppler, and Color Doppler    Study date:  12-Dec-2017    Patient: Josey Isaacs  MR number: [id number]  Account number: [de-identified]  : 1960  Age: 62 years  Gender: Male  Status: Inpatient  Location: Bedside  Height: 69 in  Weight: 229 5 lb  BP: 207/ 100 mmHg    Indications: Afib    Diagnoses: I48 0 - Atrial fibrillation    Sonographer:  SANKET Burgess  Referring Physician:  Antolin Temple MD  Group:  [facility]  Interpreting Physician:  Martha Baker MD    SUMMARY    LEFT VENTRICLE:  The ventricle was mildly dilated  Systolic function was severely reduced  Ejection fraction was estimated to be 20 %  There was severe diffuse hypokinesis  Wall thickness was mildly increased  RIGHT VENTRICLE:  The ventricle was mildly dilated  Systolic function was markedly reduced  LEFT ATRIUM:  The atrium was mildly dilated  RIGHT ATRIUM:  The atrium was mildly dilated  MITRAL VALVE:  There was trace to mild regurgitation  TRICUSPID VALVE:  There was trace to mild regurgitation  IVC, HEPATIC VEINS:  The inferior vena cava was mildly dilated  Respirophasic changes were blunted (less than 50% variation)  PERICARDIUM:  A small pericardial effusion was identified circumferential to the heart  There was no evidence of hemodynamic compromise  HISTORY: PRIOR HISTORY: Dyspnea Smoker    PROCEDURE: The procedure was performed at the bedside  This was a routine study  The transthoracic approach was used  The study included complete 2D imaging, M-mode, complete spectral Doppler, and color Doppler   The heart rate was 114 bpm,  at the start of the study  Images were obtained from the parasternal, apical, subcostal, and suprasternal notch acoustic windows  This was a technically difficult study  LEFT VENTRICLE: The ventricle was mildly dilated  Systolic function was severely reduced  Ejection fraction was estimated to be 20 %  There was severe diffuse hypokinesis  Wall thickness was mildly increased  DOPPLER: Transmitral flow  pattern: atrial fibrillation  The study was not technically sufficient to allow evaluation of LV diastolic function  RIGHT VENTRICLE: The ventricle was mildly dilated  Systolic function was markedly reduced  Wall thickness was normal     LEFT ATRIUM: The atrium was mildly dilated  RIGHT ATRIUM: The atrium was mildly dilated  MITRAL VALVE: Valve structure was normal  There was normal leaflet separation  DOPPLER: The transmitral velocity was within the normal range  There was no evidence for stenosis  There was trace to mild regurgitation  AORTIC VALVE: The valve was trileaflet  Leaflets exhibited normal thickness and normal cuspal separation  DOPPLER: Transaortic velocity was within the normal range  There was no evidence for stenosis  There was no regurgitation  TRICUSPID VALVE: The valve structure was normal  There was normal leaflet separation  DOPPLER: The transtricuspid velocity was within the normal range  There was no evidence for stenosis  There was trace to mild regurgitation  The  tricuspid jet envelope definition was inadequate for estimation of RV systolic pressure  There are no indirect findings suggestive of moderate or severe pulmonary hypertension  PULMONIC VALVE: Leaflets exhibited normal thickness, no calcification, and normal cuspal separation  DOPPLER: The transpulmonic velocity was within the normal range  There was trace regurgitation  PERICARDIUM: A small pericardial effusion was identified circumferential to the heart   There was no evidence of hemodynamic compromise  The pericardium was normal in appearance  AORTA: The root exhibited normal size  SYSTEMIC VEINS: IVC: The inferior vena cava was mildly dilated  Respirophasic changes were blunted (less than 50% variation)  SYSTEM MEASUREMENT TABLES    2D  %FS: 12 37 %  Ao Diam: 3 71 cm  EDV(Teich): 160 47 ml  EF Biplane: 35 31 %  EF(Teich): 26 3 %  ESV(Teich): 118 28 ml  IVSd: 1 31 cm  LA Area: 26 18 cm2  LA Diam: 3 93 cm  LVEDV MOD A2C: 171 7 ml  LVEDV MOD A4C: 136 01 ml  LVEDV MOD BP: 158 12 ml  LVEF MOD A2C: 35 64 %  LVEF MOD A4C: 34 47 %  LVESV MOD A2C: 110 51 ml  LVESV MOD A4C: 89 12 ml  LVESV MOD BP: 102 29 ml  LVIDd: 5 71 cm  LVIDs: 5 cm  LVLd A2C: 9 27 cm  LVLd A4C: 8 59 cm  LVLs A2C: 8 34 cm  LVLs A4C: 7 79 cm  LVPWd: 1 22 cm  RA Area: 24 02 cm2  RVIDd: 4 41 cm  SV MOD A2C: 61 19 ml  SV MOD A4C: 46 89 ml  SV(Teich): 42 2 ml    CW  TR Vmax: 2 31 m/s  TR maxP 32 mmHg    MM  TAPSE: 1 39 cm    [employer] Accredited Echocardiography Laboratory    Prepared and electronically signed by    Dick Taylor MD  Signed 12-Dec-2017 16:04:40         Imaging: I have personally reviewed pertinent reports  Telemetry: personally reviewed, currently maintaining SR, up to 15 beats NSVT

## 2017-12-17 NOTE — DISCHARGE INSTR - AVS FIRST PAGE
Please follow up with your cardiologist  Appointment was made for December 27 2017 at 11:20 am  Monitor closely your daily weights and avoid salty food     Establish primary care with FATUMA JIMENES  [facility]-Dr Rober Garcia

## 2017-12-18 ENCOUNTER — GENERIC CONVERSION - ENCOUNTER (OUTPATIENT)
Dept: OTHER | Facility: OTHER | Age: 57
End: 2017-12-18

## 2017-12-18 NOTE — CASE MANAGEMENT
[address] [place] [address] in the [facility] by Rui Schumacher for 2017  Network Utilization Review Department  Phone: [phone]; Fax [phone]  ATTENTION: The Network Utilization Review Department is now centralized for our 7 Facilities  Make a note that we have a new phone and fax numbers for our Department  Please call with any questions or concerns to [phone] and carefully follow the prompts so that you are directed to the right person  All voicemails are confidential  Fax any determinations, approvals, denials, and requests for initial or continue stay review clinical to [phone]  Due to HIGH CALL volume, it would be easier if you could please send faxed requests to expedite your requests and in part, help us provide discharge notifications faster  Continued Stay Review    Date:   17 ACUTE MED SURG LEVEL OF CARE    Vital Signs: /81   Pulse 73   Temp 97 9 °F (36 6 °C) (Oral)   Resp 18   Ht 5' 9" (1 753 m) Comment: per pt  Wt 91 9 kg (202 lb 9 6 oz)   SpO2 94%   BMI 29 92 kg/m²     Vitals: Temp (24hrs), Av 3 °F (36 8 °C), Min:98 °F (36 7 °C), Max:98 7 °F (37 1 °C)  Current: Temperature: 98 7 °F (37 1 °C)  Vitals          Vitals:     17 0309 17 0509 17 0539 17 0753   BP: 139/83     122/78   Pulse: 74     77   Resp: 18     18   Temp: 98 °F (36 7 °C)     98 7 °F (37 1 °C)   TempSrc: Oral     Oral   SpO2: 95%     98%   Weight:   93 5 kg (206 lb 2 1 oz) 93 5 kg (206 lb 2 1 oz)     Height:               Body mass index is 30 44 kg/m²         I/O last 24 hours: In: 795 [P O :720;  I V :75]  Out: 2545 [urine:2545]      Diet Cardiac; Cardiac TLC 2 3 GM NA; Cardiac Step 1, Fluid Restriction 1500 ML       IV ACCESS    Medications:   Current Medications          Current Facility-Administered Medications   Medication Dose Route Frequency    acetaminophen (TYLENOL) tablet 650 mg  650 mg Oral Q4H PRN    apixaban (ELIQUIS) tablet 5 mg  5 mg Oral BID    atorvastatin (LIPITOR) tablet 40 mg  40 mg Oral Daily With Dinner    digoxin (LANOXIN) tablet 125 mcg  125 mcg Oral Daily    furosemide (LASIX) injection 40 mg  40 mg Intravenous BID (diuretic)    metoprolol succinate (TOPROL-XL) 24 hr tablet 100 mg  100 mg Oral BID    potassium chloride (K-DUR,KLOR-CON) CR tablet 40 mEq  40 mEq Oral TID (diuretic)           LABS/Diagnostic Results:   TROPONIN  Results from last 7 days  Lab Units 12/11/17  2215 12/11/17  1955 12/11/17  1431   TROPONIN I ng/mL 0 78* 0 83* 0 88*         CBC with diff  Results from last 7 days  Lab Units 12/12/17  0511 12/11/17  0837 12/11/17  0834   WBC Thousand/uL 7 01  --  7 55   HEMOGLOBIN g/dL 13 9  --  16 6   I STAT HEMOGLOBIN g/dl  --  18 4*  --    HEMATOCRIT % 42 7  --  49 7*   MCV fL 95  --  95   PLATELETS Thousands/uL 264  --  275   MCH pg 31 0  --  31 6   MCHC g/dL 32 6  --  33 4   RDW % 13 9  --  13 6   MPV fL 11 0  --  10 1   NRBC AUTO /100 WBCs  --   --  0      CMP  Results from last 7 days  Lab Units 12/16/17  0509 12/15/17  0547 12/14/17  0547 12/13/17  0546 12/12/17  1351 12/12/17  0511 12/11/17  0837 12/11/17  0834   SODIUM mmol/L 140 140 140 140  --  138  --  137   POTASSIUM mmol/L 3 9 3 7 3 6 3 8  --  4 0  --  4 4   CHLORIDE mmol/L 103 104 103 105  --  104  --  102   CO2 mmol/L 30 31 31 28  --  25  --  25   ANION GAP mmol/L 7 5 6 7  --  9  --  10   BUN mg/dL 24 24 24 24  --  24  --  19   CREATININE mg/dL 1 01 0 99 1 20 1 25  --  1 33*  --  1 21   GLUCOSE RANDOM mg/dL 97 99 95 87  --  98  --  101   GLUCOSE, ISTAT mg/dl  --   --   --   --   --   --  107  --    CALCIUM mg/dL 9 0 8 7 8 3 8 5  --  8 4  --  9 3   AST U/L  --   --   --  15  --  14  --  23   ALT U/L  --   --   --  25  --  26  --  39   ALK PHOS U/L  --   --   --  67  --  62  --  79   TOTAL PROTEIN g/dL  --   --   --  6 5 6 6 6 3*  --  7 8   ALBUMIN g/dL  --   --   --  2 7*  --  2 7*  --  3 5   BILIRUBIN TOTAL mg/dL  --   --   -- 1 47*  --  1 87*  --  3 02*   EGFR ml/min/1 73sq m 82 84 67 64  --  59 74 66         Magnesium  Results from last 7 days  Lab Units 12/16/17  0509 12/15/17  0547 12/14/17  0547 12/12/17  0511   MAGNESIUM mg/dL 2 2 2 2 2 1 2 3        Age/Sex: 62 y o  male        Assessment/Plan:   Principal Problem:    Acute heart failure (HCC)  Active Problems:    Atrial fibrillation with rapid ventricular response (HCC)    Non-ST elevation myocardial infarction (NSTEMI) (HCC)    Serum total bilirubin elevated    Elevated blood pressure reading     Decompensated Heart Failure, with unknown chronicity (acute with possible chronic component), suspect tachycardia mediated cardiomyopathy   · Cardiology following, appreciate recs  · JOSE LUIS/CV complete, pt in NSR  · Plan for LifeVest on D/C  Will need medical management for 3 months to reassess LV fx  · Echo shows severe biventricular dysfunction with decreased EF of 20%  Only mild dilation noted in the heart diffusely  Suspect his EF will be at least partially reversed once Afib is under control  · Per Cards recs, Lasix 80mg BID, metoprolol 100mg BID and Digoxin 125mcg daily, Potassium 40meq TID  · Telemetry   · Daily Weights, I/O's (down ~30lbs since admission, still hypervolemic)  · Cardiac Diet with Fluid Restriction     Atrial Fibrillation with RVR  Most likely occurring for at least the past 3 months and causing (or least worsening of CHF)  · Resolved s/p JOSE LUIS/CV  · [id number] possibly 2 with HTN and CHF  · See plan for #1      Type II NSTEMI 2/2 decompensated CHF  Patient is not experiencing any chest pain and no history of chest pain, so doubt type 1 NSTEMI  · Stable, monitor clinically      Ascites with Hyperbilirubinemia most likely 2/2 to Congestive Hepatopathy  Suspect distended abdomen and ascites is 2/2 to decompensated CHF  Will not tap fluid given clear etiology as this point    · Chronic Hep Panel negative  · Ultrasounds shows moderate perihepatic ascites, mild nodular contour liver, pulsatility of portal vein most likely secondary to decompensated CHF  No obstruction noted  · Normal INR  · Tbili trending down, suspect this is most likely congestive hepatopathy  Stop trending      Lack of PCP  Patient advised to follow up with PCP and possibly cardiologist outpatient and was agreeable  He states he is ready to start taking care of his health    · Lipid Panel, A1C unremarkable  · Establish PCP outpatient after discharge     Elevated BP  · Resolved     Pre-Diabetes  · Will  on diet and exercise     Gout  · Colchicine BID restarted, Tylenol PRN for now  · Allopurinol on D/C     Disposition:  Await LifeVest before discharge          Discharge Plan:   [address] [facility] MEDICALLY CLEARED    CASE MANAGEMENT FOLLOWING CLOSELY FOR ALL DISCHARGE NEEDS

## 2017-12-18 NOTE — DISCHARGE SUMMARY
[facility] CENTRAL Discharge Summary - Medical Leydi Else 62 y o  male MRN: [id number]    [address] [facility]  Room / Bed: /-01 Encounter: [id number]    BRIEF OVERVIEW    Admitting Provider: Dylan Wong MD  Discharge Provider: No att  providers found  Primary Care Physician at Discharge:  No PCP, Recommended [facility] Dr Juliann Calix    Discharge To: Home/Self Care    Admission Date: 12/11/2017     Discharge Date: 12/17/2017  1:14 PM    Primary Discharge Diagnosis  Principal Problem:    Acute heart failure (HCC)  Active Problems:    Atrial fibrillation with rapid ventricular response (HCC)    Serum total bilirubin elevated    Elevated blood pressure reading  Resolved Problems:    Non-ST elevation myocardial infarction (NSTEMI) ([address] )      Other Problems Addressed: none    Consulting Providers   Cardiology Dr Ubaldo Vaughn         Therapeutic Operative Procedures Performed  JSOE LUIS/CV    Diagnostic Procedures Performed  Xr Chest Portable    Result Date: 12/11/2017  Impression: Significant enlargement of cardiac silhouette with mild pulmonary vascular congestion and probable very small right effusion  Findings concur with the referring clinician's preliminary interpretation already in the patient's electronic health record  Workstation performed: [id number]     Us Abdomen Limited    Result Date: 12/11/2017  Impression: 1  Moderate perihepatic ascites  Mildly nodular contour of the liver  2  Cholelithiasis  5 mm mild bladder wall thickening  Negative sonographic Walters sign  3  Adenomyomatosis  4  Pulsatility of the portal vein could relate to heart disease, correlate clinically  Workstation performed: [id number]       Discharge Disposition: Home/Self Care  Discharged With Lines: no        Outpatient JORDAN Marley [facility]  Cardiology  Cardiology Imaging [phone] [id number] [address]     Next Steps:  Follow up on 12/27/2017      Instructions: Your appt is on 12/27/17 at 11:20 AM  Please arrive 15 minutes prior to your scheduled appt and bring a list of your current medications  [employer]      [phone]     Next Steps: Follow up      Instructions: Call phone n umber for assistance in finding primary care provider  Yadiel Delarosa   [phone] 1 [facility] [address]  [address] [place] [address]     Next Steps: Follow up      Instructions: Follow up with Dr Darshan Christopher in 2 weeks            Code Status: Prior    Medications   See after visit summary for reconciled discharge medications provided to patient and family     allopurinol 100 mg tablet   Commonly known as: ZYLOPRIM   Take 1 tablet by mouth daily for 15 days   Refills: 0     12/18/2017         apixaban 5 mg   Commonly known as: ELIQUIS   Take 1 tablet by mouth 2 (two) times a day for 30 days   Refills: 0       12/17/17       atorvastatin 40 mg tablet   Commonly known as: LIPITOR   Take 1 tablet by mouth daily with dinner   Refills: 0       12/17/17       digoxin 0 125 mg tablet   Commonly known as: LANOXIN   Take 1 tablet by mouth daily   Refills: 0     12/18/2017         furosemide 80 mg tablet   Commonly known as: LASIX   Take 1 tablet by mouth 2 (two) times a day for 15 days   Refills: 0       12/17/17       metoprolol succinate 100 mg 24 hr tablet   Commonly known as: TOPROL-XL   Take 1 tablet by mouth 2 (two) times a day for 30 days   Refills: 0       12/17/17       potassium chloride 20 mEq tablet   Commonly known as: K-DUR,KLOR-CON   Take 1 tablet by mouth 2 (two) times a day with meals for 10 days   Refills: 0             Allergies  No Known Allergies  Discharge Diet: cardiac diet  Activity restrictions: none    [address] [facility]  Patient is a 66-year-old male with no relevant past medical history secondary to not seen a regular PCP since childhood who presented with typical signs of CHF exacerbation including shortness of breath, dyspnea on exertion, orthopnea, PND that has progressively been worsening over the 3 months prior to admission  When he arrived to the ED is found to be in AFib with RVR with a heart rate close to the 200  He had 3+ pitting edema up to the midthigh and abdominal ascites  He was started on a diltiazem drip, but could not tolerate due to hypotension and lightheadedness, so it was stopped abruptly  Cardiology was consulted and attempted medical management, but ultimately he needed JOSE LUIS/CV and was brought back to normal sinus rhythm  Throughout his stay he was diuresed and lost about 30 lb, and shortness of breath resolved  He was discharged on allopurinol (for chronic gout), Eliquis, digoxin, Lasix 80 mg b i d , metoprolol succinate, potassium chloride  He was also fitted with the life vest and instructed on how to use it  He was told to follow up at [facility] for PCP care and follow up with [address] [place] [address] cardiology for possible addition of Entresto  He will likely need outpatient stress test, workup ischemic cardiomyopathy, but ultimately his acute presentation of CHF was most likely due to tachycardia mediated cardiomyopathy secondary to AFib with RVR  Presenting Problem/History of Present Illness  Principal Problem:    Acute heart failure (HCC)  Active Problems:    Atrial fibrillation with rapid ventricular response (HCC)    Serum total bilirubin elevated    Elevated blood pressure reading  Resolved Problems:    Non-ST elevation myocardial infarction (NSTEMI) [facility])        Other Pertinent Test Results   none    Discharge Condition: stable      Discharge  Statement   I spent 1 hour minutes discharging the patient  This time was spent on the day of discharge  I had direct contact with the patient on the day of discharge  Additional documentation is required if more than 30 minutes were spent on discharge

## 2017-12-19 ENCOUNTER — GENERIC CONVERSION - ENCOUNTER (OUTPATIENT)
Dept: OTHER | Facility: OTHER | Age: 57
End: 2017-12-19

## 2017-12-19 DIAGNOSIS — I48.19 PERSISTENT ATRIAL FIBRILLATION (HCC): ICD-10-CM

## 2017-12-19 LAB
ATRIAL RATE: 70 BPM
P AXIS: 33 DEGREES
PR INTERVAL: 142 MS
QRS AXIS: 88 DEGREES
QRSD INTERVAL: 90 MS
QT INTERVAL: 408 MS
QTC INTERVAL: 440 MS
T WAVE AXIS: -18 DEGREES
VENTRICULAR RATE: 70 BPM

## 2017-12-20 ENCOUNTER — TRANSCRIBE ORDERS (OUTPATIENT)
Dept: ADMINISTRATIVE | Age: 57
End: 2017-12-20

## 2017-12-20 ENCOUNTER — APPOINTMENT (OUTPATIENT)
Dept: LAB | Age: 57
End: 2017-12-20
Payer: COMMERCIAL

## 2017-12-20 DIAGNOSIS — I50.21 ACUTE SYSTOLIC HEART FAILURE (HCC): ICD-10-CM

## 2017-12-20 LAB
ANION GAP SERPL CALCULATED.3IONS-SCNC: 7 MMOL/L (ref 4–13)
BUN SERPL-MCNC: 29 MG/DL (ref 5–25)
CALCIUM SERPL-MCNC: 9.2 MG/DL (ref 8.3–10.1)
CHLORIDE SERPL-SCNC: 103 MMOL/L (ref 100–108)
CO2 SERPL-SCNC: 29 MMOL/L (ref 21–32)
CREAT SERPL-MCNC: 1.18 MG/DL (ref 0.6–1.3)
GFR SERPL CREATININE-BSD FRML MDRD: 68 ML/MIN/1.73SQ M
GLUCOSE P FAST SERPL-MCNC: 92 MG/DL (ref 65–99)
POTASSIUM SERPL-SCNC: 4.1 MMOL/L (ref 3.5–5.3)
SODIUM SERPL-SCNC: 139 MMOL/L (ref 136–145)

## 2017-12-20 PROCEDURE — 36415 COLL VENOUS BLD VENIPUNCTURE: CPT

## 2017-12-20 PROCEDURE — 80048 BASIC METABOLIC PNL TOTAL CA: CPT

## 2017-12-26 NOTE — CASE MANAGEMENT
Notification of Discharge  This is a Notification of Discharge from our facility [address]  Please be advised that this patient has been discharge from our facility  Below you will find the admission and discharge date and time including the patients disposition  PRESENTATION DATE: 12/11/2017  8:26 AM  IP ADMISSION DATE: 12/11/17 1030  DISCHARGE DATE: 12/17/2017  1:14 PM  DISPOSITION: [facility] in the [facility] by Rui Schumacher for 2017  Network Utilization Review Department  Phone: [phone]; Fax [phone]  ATTENTION: The Network Utilization Review Department is now centralized for our 7 Facilities  Make a note that we have a new phone and fax numbers for our Department  Please call with any questions or concerns to [phone] and carefully follow the prompts so that you are directed to the right person  All voicemails are confidential  Fax any determinations, approvals, denials, and requests for initial or continue stay review clinical to [phone]  Due to HIGH CALL volume, it would be easier if you could please send faxed requests to expedite your requests and in part, help us provide discharge notifications faster

## 2017-12-27 ENCOUNTER — ALLSCRIPTS OFFICE VISIT (OUTPATIENT)
Dept: OTHER | Facility: OTHER | Age: 57
End: 2017-12-27

## 2017-12-28 ENCOUNTER — ALLSCRIPTS OFFICE VISIT (OUTPATIENT)
Dept: OTHER | Facility: OTHER | Age: 57
End: 2017-12-28

## 2017-12-29 NOTE — PROGRESS NOTES
Assessment   1  Acute heart failure (428 9) (I50 9)   2  Paroxysmal atrial flutter (427 32) (I48 92)   3  Gout, unspecified cause, unspecified chronicity, unspecified site (274 9) (M10 9)   4  PFO (patent foramen ovale) (745 5) (Q21 1)    Discussion/Summary   Discussion Summary:    1  CHF with LVEF 20% continue 2gm sodium restriction continue 1500mL fluid restriction Continue Lasix 60mg BID had declined entresto therapy wearing life vest follow up with Dr Emma Gann on 1/26/18   Afib s/p JOSE LUIS with cardioversion  With HR 72 today continue with Eliquis continue digoxin and metoprolol   Gout continue with allopurinol   - Before hospitalization had not followed with PCP  Will address at next appointment  Counseling Documentation With Imm: The patient was counseled regarding instructions for management,-- risk factor reductions,-- impressions,-- importance of compliance with treatment  Medication SE Review and Pt Understands Tx: Possible side effects of new medications were reviewed with the patient/guardian today  The treatment plan was reviewed with the patient/guardian  The patient/guardian understands and agrees with the treatment plan      Chief Complaint   Chief Complaint Free Text Note Form: hospital follow up      History of Present Illness   HPI: Patient is here for hospital follow-up  Patient had not been seen by PCP and present to the hospital with acute exacerbation of heart failure  He was also found to be in AFib with RVR with heart rate in the 200 range  Patient was diuresed over 30 pounds well in the hospital and underwent JOSE LUIS with cardioversion  He was started on Eliquis, digoxin, Lasix, potassium chloride as well as p o  Lasix  Patient had follow-up with Cardiology yesterday who noted that he declined a cardiac catheterization in order to rule out ischemic cardiomyopathy  It was thought that acute heart failure was mostly driven by the written ER   Patient is currently wearing a life vest  He reports that he has been compliant with that and will continue to wear it until he has discussion with Dr Dilcia Iniguez in the end of January his appointment  He also reports that he has made dramatic changes to his diet to be compliant with the 1500 milliliter fluid restriction and 2 gram sodium restriction  Hospital Based Practices Required Assessment:      Pain Assessment      the patient states they do not have pain  (on a scale of 0 to 10, the patient rates the pain at 0 )       Prefered Language is  english  Primary Language is  english  Review of Systems   Complete-Male:      Constitutional: no fever-- and-- no chills  Cardiovascular: no chest pain,-- the heart rate was not fast-- and-- no palpitations  Respiratory: no shortness of breath-- and-- no shortness of breath during exertion  ROS Reviewed:    ROS reviewed  Active Problems   1  Acute heart failure (428 9) (I50 9)   2  CAD (coronary artery disease) (414 00) (I25 10)   3  Cardiomyopathy, dilated (425 4) (I42 0)   4  Chronic systolic heart failure (342 57) (I50 22)   5  Gout, unspecified cause, unspecified chronicity, unspecified site (274 9) (M10 9)   6  Non-ST elevation myocardial infarction, initial hospitalization (410 71) (I21 4)   7  Paroxysmal atrial flutter (427 32) (I48 92)   8  Persistent atrial fibrillation (427 31) (I48 1)   9  Serum total bilirubin elevated (277 4) (R17)    Past Medical History   1  History of herpes zoster (V12 09) (Z86 19)   2  History of hypertension (V12 59) (Z86 79)  Active Problems And Past Medical History Reviewed: The active problems and past medical history were reviewed and updated today  Surgical History   Surgical History Reviewed: The surgical history was reviewed and updated today  Family History   Mother    1  Family history of coronary artery disease (V17 3) (Z82 49)  Father    2  Family history of ETOH abuse   3   Family history of coronary artery disease (V17 3) (Z82 49)  Family History Reviewed: The family history was reviewed and updated today  Social History    · Former consumption of alcohol (V11 3) (P56 127)   · Former smoker (J51 19) (K87 240)   · Single  Social History Reviewed: The social history was reviewed and updated today  The social history was reviewed and is unchanged  Current Meds    1  Allopurinol 100 MG Oral Tablet; TAKE 1 TABLET DAILY AS DIRECTED; Therapy: 42NBM2383 to 0472 94 41 68)  Requested for: 98Uen8873; Last     Rx:[id number] Ordered   2  Atorvastatin Calcium 40 MG Oral Tablet; TAKE 1 TABLET DAILY AS DIRECTED; Therapy: 67KTX2202 to [phone])  Requested for: ; Last     Rx:[id number]; Status: ACTIVE - Retrospective By Protocol Authorization Ordered   3  Digoxin 125 MCG Oral Tablet; TAKE 1 TABLET DAILY; Therapy: 51GSQ1032 to [phone])  Requested for: ; Last     Rx:[id number]; Status: ACTIVE - Retrospective By Protocol Authorization Ordered   4  Eliquis 5 MG Oral Tablet; Take 1 tablet twice daily; Therapy: 02PKH4770 to 0472 94 41 68)  Requested for: ; Last     Rx:[id number]; Status: ACTIVE - Retrospective By Protocol Authorization Ordered   5  Furosemide 40 MG Oral Tablet; 1 5 tabs twice a day; Therapy: 10AWY5883 to 0472 94 41 68)  Requested for: ; Last     Rx:25Pmd3210; Status: ACTIVE - Retrospective By Protocol Authorization Ordered   6  Klor-Con M20 20 MEQ Oral Tablet Extended Release; TAKE 1 TABLET TWICE DAILY; Therapy: 79AKT7614 to 0472 94 41 68)  Requested for: ; Last     Rx:[id number]; Status: ACTIVE - Retrospective By Protocol Authorization Ordered   7  Metoprolol Succinate  MG Oral Tablet Extended Release 24 Hour; Take 1 tablet     twice daily;      Therapy: 32ZRK0820 to (Evaluate:23Sco6771)  Requested for: ; Last     Rx:[id number]; Status: ACTIVE - Retrospective By Protocol Authorization Ordered    Vitals Signs   Recorded: 28Dec2017 09:28AM   Temperature: 98 F  Heart Rate: 72  Systolic: 079  Diastolic: 82  Height: 5 ft 9 in  Weight: 189 lb 9 50 oz  BMI Calculated: 28  BSA Calculated: 2 02    Physical Exam        Constitutional      General appearance: No acute distress, well appearing and well nourished  Eyes      Conjunctiva and lids: No swelling, erythema, or discharge  Ears, Nose, Mouth, and Throat      External inspection of ears and nose: Normal        Pulmonary      Respiratory effort: No increased work of breathing or signs of respiratory distress  Auscultation of lungs: Clear to auscultation, equal breath sounds bilaterally, no wheezes, no rales, no rhonci  Cardiovascular life vest is in place  Auscultation of heart: Normal rate and rhythm, normal S1 and S2, without murmurs  Skin      Skin and subcutaneous tissue: Normal without rashes or lesions  Psychiatric      Orientation to person, place and time: Normal        Mood and affect: Normal           Results/Data   PHQ-2 Adult Depression Screening 25Wug3321 09:32AM User, Ahs      Test Name Result Flag Reference   PHQ-2 Adult Depression Score 0     Over the last two weeks, how often have you been bothered by any of the following problems? Little interest or pleasure in doing things: Not at all - 0     Feeling down, depressed, or hopeless: Not at all - 0   PHQ-2 Adult Depression Screening Negative          Attending Note   Attending Note: Attending Note: I discussed the case with the Resident and reviewed the Resident's note,-- I supervised the Resident-- and-- I agree with the Resident management plan as it was presented to me  Level of Participation: I was present in clinic, but did not examine the patient  I agree with the Resident's note        Future Appointments      Date/Time Provider Specialty Site   01/26/2018 04:20 PM LYUDMILA Olson , PhD Cardiology [facility] Electronically signed by : Blanca Schrader DO; Dec 28 2017 11:15AM EST                       (Author)     Electronically signed by : Aryan Coleman DO; Dec 28 2017 11:21AM EST                       (Co-author)

## 2018-01-03 ENCOUNTER — GENERIC CONVERSION - ENCOUNTER (OUTPATIENT)
Dept: OTHER | Facility: OTHER | Age: 58
End: 2018-01-03

## 2018-01-03 NOTE — PROGRESS NOTES
Assessment   Assessed   1  Cardiomyopathy, dilated (425 4) (I42 0)  2  Paroxysmal atrial flutter (427 32) (I48 92)  3  Chronic systolic heart failure (052 22) (I50 22)  4  Family history of coronary artery disease (V17 3) (Z82 49) : Mother, Father  5  Family history of ETOH abuse : Father  6  Former consumption of alcohol (V11 3) (Z87 898)  7  Single  8  Persistent atrial fibrillation (427 31) (I48 1)    Plan   Cardiomyopathy, dilated, Chronic systolic heart failure    · Start: Lisinopril 5 MG Oral Tablet; TAKE 1 TABLET DAILY AS DIRECTED  Rx By: Winston Akins; Dispense: 90 Days ; #:1 X 90 Tablet Bottle; Refill: 0;For:     Cardiomyopathy, dilated, Chronic systolic heart failure; GREYSON = N; Verified Transmission     to [facility] #[id number] Msg to Pharmacy: take daily at bedtime; Last Updated     By: System, SureScripts; 12/29/2017 12:14:19 PM  Chronic systolic heart failure    · Limit your liquids to 6 glasses throughout the day ; Status:Complete;   Done: 14OQN9952  Ordered; For:Chronic systolic heart failure; Ordered By:Luna Bryant;   · Restrict the salt in your diet by avoiding highly salted foods ; Status:Complete;   Done:    [id number]  Ordered; For:Chronic systolic heart failure; Ordered By:Denae Bryant;   · Weigh yourself every day ; Status:Complete;   Done: 58DYN2824  Ordered; For:Chronic systolic heart failure; Ordered By:Denae Bryant;   · 1 Yesica Jones MD, [facility]Management  *  Status: Complete  Done:    38VOH9674  Ordered; For: Chronic systolic heart failure;  Ordered By: Winston Akins  Performed:       Due: 38ZQR9320; Last Updated By: Charlie Rogers; 12/27/2017 12:04:14 PM  () Care Summary provided  : Yes  Gout, unspecified cause, unspecified chronicity, unspecified site    · Renew: Allopurinol 100 MG Oral Tablet; TAKE 1 TABLET DAILY AS DIRECTED  Rx By: Winston Akins; Dispense: 30 Days ; #:90 Tablet;  Refill: 3;For: Gout, unspecified     cause, unspecified chronicity, unspecified site; GREYSON = N; Verified Transmission to     [address] [place] [address]; Last Updated By: System, SureScripts; 12/27/2017     1:02:02 PM  Persistent atrial fibrillation    · EKG/ECG- POC; Status:Complete;   Done: 32DNU6580  Perform: In Office; 650 5630; Last Updated By:Christine Gambino; 12/27/2017 11:26:20     AM;Ordered; For:Persistent atrial fibrillation; Ordered By:Britton Bryant;    Discussion/Summary   Cardiology Discussion Summary Free Text Note Form [facility]:    I had the pleasure of seeing Mr Analisa Puente in our office today for a recent hospitalization follow-up visit  He is euvolemic and compensated in regards to chronic biventricular systolic heart failure NYHA Class II stage C, Most likely Dilated CM LVEF 20% secondary to atrial fibrillation with RVR  HIs weight in the office is 189 2 pouds and at home is 189 pounds  /78 and HR 73 BPM, continues in NSR  I have re enforced a 2gm sodium diet 1500cc to 2 liter fluid restriction  He will continue with daily weights and call our office if you gain 3 pounds in one day 5 pounds in one week, experiences worsening shortness of breath or lower leg swelling please call our office at [phone]  I have decreased the dose of Lasix from 80mg twice a day to 60mg twice a day  Mary Anne Session is refusing a cardiac catheterization at this time  He is also refusing Entresto at this time  I will start him on low dose Lisinopril 5mg daily to be taken at bedtime  He continues to wear his Life Vest  I have made no other changes in his medical regimen at this time   He continues on Eliquis for stroke prevention  He will follow up with Dr Walt Meier in one month  He will undergo a limited TTE to evaluate if LVEF has improved  Chief Complaint   Chief Complaint Free Text Note Form: Hosp  f/u , CM and A-fib      History of Present Illness   Cardiology HPI Free Text Note Form [facility]: Mr Analisa Puente is a 62year old male who has not followed with a physician  He has a known family history of CAD, grandfather, father, mother and brother  He may have had untreated HTN, tobacco abuse 1 pack a day for 30 years  Mr Saira Davison was recently admitted to [facility] on 12/11-12/17/17 with Acute Biventricular Systolic heart failure, presumed DCM LVEF 20%, Non STEMI type 2, impaired fasting glucose with Hgb A1C 6 2, Gout, and NSVT  He presented with shortness of breath which was progressive over the past 3 months  He admits to 2 pillow orthopnea and PND  He admitted to productive cough with white sputum, abdominal distention  He had gained at least 15-20 pounds  He was experiencing generalized weakness and fatigue  In ER he was found to be in atrial fibrillation with RVR  He was treated with IV Cardizem and start an IV Cardizem drip  However, his blood pressure dropped to 80/60  Cardizem drip was stopped  Chest x-ray showed mild pulmonary vascular congestion  NT pro BNP 5242  He was diuresed with IV Lasix  TTE showed left ventricle mildly dilated  Systolic function severely reduced ejection fraction 20%  There was severe diffuse hypokinesis  Wall thickness mildly increased  The right ventricle mildly dilated  Systolic function markedly reduced  Trace to mild mitral valve regurgitation, trace to mild tricuspid valve regurgitation  LVIDd 5 71 cm  Bernardino underwent JOSE LUIS/CV and reverted back to normal sinus rhythm  He diuresed 30 pounds [place] Michael of breath resolved  He was discharged on [place]'s digoxin, Lasix 80 mg twice a day Toprol succinate and potassium chloride  He was discharged with the [facility]  Cardiology felt he was appropriate for outpatient stress test  CM was likely tachycardia mediated due to A  fib with RVR  Yvonna Fall presents to our office today for a recent hospitalization follow-up visit  He is feeling well  He denies dyspnea with minimal R exertion, chest pain, palpitations, lightheadedness or dizziness  His weight at home is 189 pounds   His weight in the office 189 2 pounds  Recent lab studies show sodium 139, potassium 4 1, BUN 29, creatinine 1 1  GFR 68  He is anxious to remove his LifeVest  Twelve-lead EKG reveals continued normal sinus rhythm at 73 bpm  He is taking all his medications as prescribed  Review of Systems   Cardiology Male ROS:         Cardiac: No complaints of chest pain, no palpitations, no fainiting  Skin: No complaints of nonhealing sores or skin rash  Genitourinary: No complaints of recurrent urinary tract infections, frequent urination at night, difficult urination, blood in urine, kidney stones, loss of bladder control, no kidney or prostate problems, no erectile dysfunction  Psychological: No complaints of feeling depressed, anxiety, panic attacks, or difficulty concentrating  General: No complaints of trouble sleeping, lack of energy, fatigue, appetite changes, weight changes, fever, frequent infections, or night sweats  Respiratory: No complaints of shortness of breath, cough with sputum, or wheezing  HEENT: No complaints of serious problems, hearing problems, nose problems, throat problems, or snoring  Gastrointestinal: No complaints of liver problems, nausea, vomiting, heartburn, constipation, bloody stools, diarrhea, problems swallowing, adbominal pain, or rectal bleeding  Hematologic: No complaints of bleeding disorders, anemia, blood clots, or excessive brusing  Neurological: No complaints of numbness, tingling, dizziness, weakness, seizures, headaches, syncope or fainting, AM fatigue, daytime sleepiness, no witnessed apnea episodes  Musculoskeletal: No complaints of arthritis, back pain, or painfull swelling  Active Problems   Problems   1  CAD (coronary artery disease) (414 00) (I25 10)  2  Non-ST elevation myocardial infarction, initial hospitalization (410 71) (I21 4)  3  Persistent atrial fibrillation (427 31) (I48 1)  4   Serum total bilirubin elevated (277 4) (R17)    Past Medical History   Problems   1  History of herpes zoster (V12 09) (Z86 19)  2  History of hypertension (V12 59) (Z86 79)  Active Problems And Past Medical History Reviewed: The active problems and past medical history were reviewed and updated today  Family History   Mother   1  Family history of coronary artery disease (V17 3) (Z82 49)  Father   2  Family history of ETOH abuse  3  Family history of coronary artery disease (V17 3) (Z82 49)  Family History Reviewed: The family history was reviewed and updated today  Social History   Problems    · Former consumption of alcohol (V11 3) (R40 060)   · Former smoker (M03 19) (F26 994)   · Single  Social History Reviewed: The social history was reviewed and updated today  The social history was reviewed and is unchanged  Current Meds   1  Allopurinol 100 MG Oral Tablet; TAKE 1 TABLET DAILY AS DIRECTED; Therapy: 06FZK8760 to Recorded  2  Atorvastatin Calcium 40 MG Oral Tablet; TAKE 1 TABLET DAILY AS DIRECTED; Therapy: 92ZMS9009 to (Evaluate:24Jun2018) Recorded  3  Digoxin 125 MCG Oral Tablet; TAKE 1 TABLET DAILY; Therapy: 33KUP8598 to (Evaluate:26Mar2018) Recorded  4  Eliquis 5 MG Oral Tablet; Take 1 tablet twice daily; Therapy: 16FEK4289 to (Evaluate:25Apr2018) Recorded  5  Furosemide 80 MG Oral Tablet; TAKE 1 TABLET TWICE DAILY; Therapy: 07GYP2564 to Recorded  6  Klor-Con M20 20 MEQ Oral Tablet Extended Release; TAKE 1 TABLET TWICE DAILY; Therapy: 78ASS3013 to Recorded  7  Metoprolol Succinate  MG Oral Tablet Extended Release 24 Hour; Take 1 tablet     twice daily; Therapy: 20VXY9310 to (Evaluate:98Izv3620) Recorded  Medication List Reviewed: The medication list was reviewed and updated today  Allergies   Medication   1   No Known Drug Allergies    Vitals   Vital Signs    Recorded: 77BWS9099 11:20AM   Heart Rate 73   Systolic 062, RUE, Sitting   Diastolic 78, RUE, Sitting   Height 5 ft 9 in Weight 189 lb 2 oz   BMI Calculated 27 93   BSA Calculated 2 02     Physical Exam        Constitutional      General appearance: No acute distress, well appearing and well nourished  Neck      Neck and thyroid: Normal, supple, trachea midline, no thyromegaly  Pulmonary      Respiratory effort: No increased work of breathing or signs of respiratory distress  Auscultation of lungs: Clear to auscultation, no rales, no rhonchi, no wheezing, good air movement  Cardiovascular      Auscultation of heart: Normal rate and rhythm, normal S1 and S2, no murmurs  Peripheral vascular exam: Normal pulses throughout, no tenderness, erythema or swelling  Examination of extremities for edema and/or varicosities: Normal        Abdomen      Abdomen: Non-tender and no distention  Musculoskeletal Gait and station: Normal gait  Skin - Skin and subcutaneous tissue: Normal without rashes or lesions  Skin is warm and well perfused, normal turgor  Neurologic - Speech: Normal        Psychiatric - Orientation to person, place, and time: Normal -- Mood and affect: Normal       Results/Data   ECG Report:      Rhythm and rate:  ventricular rate is 73 beats per minute  -- normal sinus rhythm  NJ Interval: 142 seconds        QRS: QRS interval: 88 seconds      ST segment: QTc interval: 431      Future Appointments      Date/Time Provider Specialty Site   01/26/2018 04:20 PM LYUDMILA Addison , PhD [employer]    Electronically signed by : Lauren Hyde, [address] [place] [address]; Dec 29 2017 12:13PM EST                       (Author)     Electronically signed by : LYUDMILA Chavez ; Jan 2 2018  8:24AM EST                       (Author)     Electronically signed by : LYUDMILA Chavez ; Jan 2 2018  8:24AM EST                       (Author)

## 2018-01-05 ENCOUNTER — GENERIC CONVERSION - ENCOUNTER (OUTPATIENT)
Dept: OTHER | Facility: OTHER | Age: 58
End: 2018-01-05

## 2018-01-22 VITALS
HEART RATE: 72 BPM | HEIGHT: 69 IN | DIASTOLIC BLOOD PRESSURE: 82 MMHG | BODY MASS INDEX: 28.08 KG/M2 | TEMPERATURE: 98 F | WEIGHT: 189.6 LBS | SYSTOLIC BLOOD PRESSURE: 116 MMHG

## 2018-01-23 VITALS
DIASTOLIC BLOOD PRESSURE: 78 MMHG | HEART RATE: 73 BPM | SYSTOLIC BLOOD PRESSURE: 110 MMHG | BODY MASS INDEX: 28.01 KG/M2 | WEIGHT: 189.13 LBS | HEIGHT: 69 IN

## 2018-01-23 NOTE — MISCELLANEOUS
History of Present Illness    The patient is being contacted for follow-up after hospitalization  No answer/no voicemail available  This was not a readmission  Attempts to call patient at 3 different phone numbers unsuccessful  The date of discharge was 12/17/17            Future Appointments    Date/Time Provider Specialty Site   12/27/2017 11:20 AM Kayli Dias, [address]  Cardiology [facility]     Signatures   Electronically signed by : Deirdre Kramer, ; Dec 18 2017  3:25PM EST                       (Author)

## 2018-01-23 NOTE — MISCELLANEOUS
To whom it may concern,    Mr Shana Garcia is under my medical care and in my opinion is stable to return to work        Sincerely,    Brittany Yoder DO      Electronically signed by:Jean-Paul Montana DO  [place]  3 2018  2:58PM EST Author

## 2018-01-23 NOTE — MISCELLANEOUS
History of Present Illness  A call was received from the patient/patient's family  Status Check: today's weight is 199  Patient is experiencing the following symptoms:  The patient is not experiencing signs of heart failure  HFCC Additional Notes: Patient was engaged in self care but was upset about paper work he received that had a different patients name  Unable to discuss anything past his weight        Future Appointments    Date/Time Provider Specialty Site   12/27/2017 11:20 AM Lizbet Hooker, [address] Cardiology [facility] CARDIOLOGY [place]   12/28/2017 09:15 AM Anisa Rolle, DO Internal Medicine [address] [place] [address] PCP     Signatures   Electronically signed by : Brook Herman, ; Dec 19 2017  2:04PM EST                       (Author)

## 2018-02-09 ENCOUNTER — OFFICE VISIT (OUTPATIENT)
Dept: CARDIOLOGY CLINIC | Facility: CLINIC | Age: 58
End: 2018-02-09
Payer: COMMERCIAL

## 2018-02-09 VITALS
OXYGEN SATURATION: 99 % | SYSTOLIC BLOOD PRESSURE: 132 MMHG | DIASTOLIC BLOOD PRESSURE: 80 MMHG | HEIGHT: 69 IN | WEIGHT: 205 LBS | HEART RATE: 71 BPM | BODY MASS INDEX: 30.36 KG/M2

## 2018-02-09 DIAGNOSIS — I50.22 CHRONIC SYSTOLIC HEART FAILURE (HCC): Primary | ICD-10-CM

## 2018-02-09 PROCEDURE — 99215 OFFICE O/P EST HI 40 MIN: CPT | Performed by: INTERNAL MEDICINE

## 2018-02-09 RX ORDER — ALLOPURINOL 100 MG/1
100 TABLET ORAL DAILY
Qty: 90 TABLET | Refills: 3 | Status: SHIPPED | OUTPATIENT
Start: 2018-02-09 | End: 2018-03-05 | Stop reason: SDUPTHER

## 2018-02-09 RX ORDER — LISINOPRIL 5 MG/1
1 TABLET ORAL DAILY
COMMUNITY
Start: 2017-12-29 | End: 2018-02-09 | Stop reason: ALTCHOICE

## 2018-02-09 RX ORDER — FUROSEMIDE 40 MG/1
TABLET ORAL
Refills: 3 | COMMUNITY
Start: 2017-12-27 | End: 2019-03-19 | Stop reason: SDUPTHER

## 2018-02-09 NOTE — PROGRESS NOTES
Heart Failure Outpatient Progress Note - Angeline Pair 62 y o  male MRN: [id number]    @ Encounter: [id number]      Assessment/Plan:    # CM (likely NICM; LVEF ~20%, LVIDd 5 7 cm) with biventricular dysfunction, NYHA II, ACC/AHA Stage C: Most likely 2/2 tachy (AFib w/ RVR) as had BiV dysfunction +/- HTN  Has not completed stress test as of yet  Diag:  --Stress test - pharm nuc reordered  --Check CMP next week  --TTE based on results    Therapeutic:  --2g sodium diet  --2 L fluid restriction  --Continue EtOH cessation  --Given active work, minimal symptoms, and demanding work schedule will defer cardiac rehab for now    Neurohormonal Blockade:  --Beta-Blocker: Continue metoprolol succinate 100 mg PO daily  --ACEi, ARB or ARNi: Stop lisinopril  In 2 days, start Entresto 24-26 mg PO BID  --Aldosterone Receptor Blocker: Will consider once above meds are max tolerated doses  --Diuretic: Lasix 80 mg PO BID; may have to dose reduce Lasix 2/2 to enhanced diuretic effect of sacubitril  Continue KDur 20 mEq PO BID    Electrical Resynchronization/Sudden Cardiac Death Risk Reduction:  --Currently wearing LifeVest  Will repeat TTE based on above  Advanced Therapies: Currently does not meet criteria as minimal symptoms  # NSVT: Continue LifeVest  # AFib s/p DCCV on 12/15/17 on Eliquis  # NSTEMI type II: Demand from tachycardia  # IFG  # Hx of Gout: Continue Allopurinol    RTC in 2 weeks    HPI: Very pleasant 62year old gentleman out of care seen by Shantel Peralta referred for further optimization  He has a PMHx of FHx of CAD (grandfather, father, mother and brother), HTN, tobacco abuse w/ 30 pack year history admitted to B w/ worsening MARION, 2 pillow orthopnea, and PND  He was admitted from 12/11-17/2017  He had a TTE that showed Biventricular dysfunction w/ LVEF ~20%  He was also noted to have IFG and NSTEMI II, gout and NSVT  He had gained at least 15-20 pounds   He was experiencing generalized weakness and fatigue  In the ER he was found to be in atrial fibrillation with RVR  He was treated with IV Cardizem and start an IV Cardizem drip  However, his blood pressure dropped to 80/60  Cardizem drip was stopped  Chest x-ray showed mild pulmonary vascular congestion  NT pro BNP 5242  He was diuresed with IV Lasix  TTE showed left ventricle mildly dilated  Systolic function severely reduced ejection fraction 20%  There was severe diffuse hypokinesis  Wall thickness mildly increased  The right ventricle mildly dilated  Trace to mild mitral valve regurgitation, trace to mild tricuspid valve regurgitation  LVIDd 5 71 cm  He underwent JOSE LUIS/DCCV and reverted back to normal sinus rhythm  He diuresed 30 pounds w/ resolution of SOB  He was discharged on GDMT and LifeVest  Cardiology felt he was appropriate for outpatient stress test  CM was likely tachycardia mediated due to A  fib with RVR  He was seen by Vero Gonzalez in f/u at which time he was doing well  He was in NSR at that time and taking his medications  Today, 2/9/18, he comes in for f/u  He is walking miles on flat ground  He can go up and down stairs without difficulty  He is also going up hills  He works as an  and is very active  Past Medical History:   Diagnosis Date    Coronary artery disease     Gout     Hypertension     Shingles        Review of Systems - 12 point ROS was done and is negative, except as noted above  No Known Allergies        Current Outpatient Prescriptions:     atorvastatin (LIPITOR) 40 mg tablet, Take 1 tablet by mouth daily with dinner, Disp: 30 tablet, Rfl: 0    digoxin (LANOXIN) 0 125 mg tablet, Take 1 tablet by mouth daily, Disp: 15 tablet, Rfl: 0    furosemide (LASIX) 40 mg tablet, TAKE 1 5 TABS TWICE A DAY, Disp: , Rfl: 3    lisinopril (ZESTRIL) 5 mg tablet, Take 1 tablet by mouth daily, Disp: , Rfl:     allopurinol (ZYLOPRIM) 100 mg tablet, Take 1 tablet by mouth daily for 15 days, Disp: 15 tablet, Rfl: 0   apixaban (ELIQUIS) 5 mg, Take 1 tablet by mouth 2 (two) times a day for 30 days, Disp: 60 tablet, Rfl: 0    metoprolol succinate (TOPROL-XL) 100 mg 24 hr tablet, Take 1 tablet by mouth 2 (two) times a day for 30 days, Disp: 60 tablet, Rfl: 0    potassium chloride (K-DUR,KLOR-CON) 20 mEq tablet, Take 1 tablet by mouth 2 (two) times a day with meals for 10 days, Disp: 20 tablet, Rfl: 0    Social History     Social History    Marital status: Single     Spouse name: N/A    Number of children: N/A    Years of education: N/A     Occupational History    Not on file  Social History Main Topics    Smoking status: Former Smoker     Quit date: 8/11/2017    Smokeless tobacco: Never Used    Alcohol use No    Drug use: No    Sexual activity: Yes     Partners: Female     Other Topics Concern    Not on file     Social History Narrative    No narrative on file       No family history on file  Physical Exam:    Vitals: Blood pressure 132/80, pulse 71, height 5' 9" (1 753 m), weight 93 kg (205 lb), SpO2 99 %  , Body mass index is 30 27 kg/m² ,   Wt Readings from Last 3 Encounters:   02/09/18 93 kg (205 lb)   12/28/17 86 kg (189 lb 9 5 oz)   12/27/17 85 8 kg (189 lb 2 1 oz)         Physical Exam:  Vitals:    02/09/18 1625   BP: 132/80   BP Location: Right arm   Patient Position: Sitting   Cuff Size: Standard   Pulse: 71   SpO2: 99%   Weight: 93 kg (205 lb)   Height: 5' 9" (1 753 m)       GEN: Anca Quintero appears well, alert and oriented x 3, pleasant and cooperative   HEENT: pupils equal, round, and reactive to light; extraocular muscles intact  NECK: supple, no carotid bruits   HEART: regular rhythm, normal S1 and S2, no murmurs, clicks, gallops or rubs, JVD is flat    LUNGS: clear to auscultation bilaterally; no wheezes, rales, or rhonchi   ABDOMEN: normal bowel sounds, soft, no tenderness, no distention  EXTREMITIES: peripheral pulses normal; no clubbing, cyanosis, or edema  NEURO: no focal findings   SKIN: normal without suspicious lesions on exposed skin    Labs & Results:  Lab Results   Component Value Date    WBC 7 01 12/12/2017    HGB 13 9 12/12/2017    HCT 42 7 12/12/2017    MCV 95 12/12/2017     12/12/2017       Chemistry        Component Value Date/Time     12/20/2017 0851    K 4 1 12/20/2017 0851     12/20/2017 0851    CO2 29 12/20/2017 0851    BUN 29 (H) 12/20/2017 0851    CREATININE 1 18 12/20/2017 0851        Component Value Date/Time    CALCIUM 9 2 12/20/2017 0851    ALKPHOS 67 12/13/2017 0546    AST 15 12/13/2017 0546    ALT 25 12/13/2017 0546    BILITOT 1 47 (H) 12/13/2017 0546        EKG personally reviewed by Pa Moffett MD      Counseling / Coordination of Care  Total floor / unit time spent today 40 minutes  Greater than 50% of total time was spent with the patient and / or family counseling and / or coordination of care  A description of the counseling / coordination of care: 25 min  Thank you for the opportunity to participate in the care of this patient      Pa Moffett MD, PhD   [place]

## 2018-02-09 NOTE — PATIENT INSTRUCTIONS
1) Stop lisinopril; put it away  2) In 2 days (Sunday AM), start Entresto 24-26 mg PO BID  3) Repeat blood work next Thursday  4) Stress test

## 2018-02-19 ENCOUNTER — HOSPITAL ENCOUNTER (OUTPATIENT)
Dept: NON INVASIVE DIAGNOSTICS | Facility: CLINIC | Age: 58
Discharge: HOME | End: 2018-02-19
Payer: COMMERCIAL

## 2018-02-19 ENCOUNTER — TELEPHONE (OUTPATIENT)
Dept: CARDIOLOGY CLINIC | Facility: CLINIC | Age: 58
End: 2018-02-19

## 2018-02-19 DIAGNOSIS — I50.22 CHRONIC SYSTOLIC HEART FAILURE (HCC): ICD-10-CM

## 2018-02-19 DIAGNOSIS — I25.10 CORONARY ARTERY DISEASE INVOLVING NATIVE HEART, ANGINA PRESENCE UNSPECIFIED, UNSPECIFIED VESSEL OR LESION TYPE: Primary | ICD-10-CM

## 2018-02-19 LAB
CHEST PAIN STATEMENT: NORMAL
MAX DIASTOLIC BP: 82 MMHG
MAX HEART RATE: 91 BPM
MAX PREDICTED HEART RATE: 163 BPM
MAX. SYSTOLIC BP: 122 MMHG
PROTOCOL NAME: NORMAL
REASON FOR TERMINATION: NORMAL
TARGET HR FORMULA: NORMAL
TEST INDICATION: NORMAL
TIME IN EXERCISE PHASE: NORMAL

## 2018-02-19 PROCEDURE — 78452 HT MUSCLE IMAGE SPECT MULT: CPT

## 2018-02-19 PROCEDURE — 93017 CV STRESS TEST TRACING ONLY: CPT

## 2018-02-19 PROCEDURE — A9502 TC99M TETROFOSMIN: HCPCS

## 2018-02-19 RX ADMIN — REGADENOSON 0.4 MG: 0.08 INJECTION, SOLUTION INTRAVENOUS at 09:30

## 2018-02-19 NOTE — TELEPHONE ENCOUNTER
Called patient and spoke to him about SPECT MPI results  Specifically discussed the fact that he had an abnormal study after vasodilation and low level exercise without reproduction of symptoms  There was a moderate-sized, moderately severe, partially reversible myocardial perfusion defect of the basal to mid inferolateral wall with a fixed defect apical lateral  Left ventricular systolic function was reduced (47%), but appeared improved from initial TTE (~20%)  Recommended cardiac cath and the patient was agreeable  Order placed

## 2018-02-27 DIAGNOSIS — R94.39 ABNORMAL STRESS TEST: Primary | ICD-10-CM

## 2018-02-28 ENCOUNTER — LAB (OUTPATIENT)
Dept: LAB | Age: 58
End: 2018-02-28
Payer: COMMERCIAL

## 2018-02-28 ENCOUNTER — TRANSCRIBE ORDERS (OUTPATIENT)
Dept: ADMINISTRATIVE | Age: 58
End: 2018-02-28

## 2018-02-28 DIAGNOSIS — I50.23 ACUTE ON CHRONIC SYSTOLIC HEART FAILURE (HCC): ICD-10-CM

## 2018-02-28 DIAGNOSIS — I50.23 ACUTE ON CHRONIC SYSTOLIC HEART FAILURE (HCC): Primary | ICD-10-CM

## 2018-02-28 LAB
ALBUMIN SERPL BCP-MCNC: 4 G/DL (ref 3.5–5)
ALP SERPL-CCNC: 64 U/L (ref 46–116)
ALT SERPL W P-5'-P-CCNC: 30 U/L (ref 12–78)
ANION GAP SERPL CALCULATED.3IONS-SCNC: 5 MMOL/L (ref 4–13)
AST SERPL W P-5'-P-CCNC: 18 U/L (ref 5–45)
BILIRUB SERPL-MCNC: 1.58 MG/DL (ref 0.2–1)
BUN SERPL-MCNC: 18 MG/DL (ref 5–25)
CALCIUM SERPL-MCNC: 9 MG/DL (ref 8.3–10.1)
CHLORIDE SERPL-SCNC: 106 MMOL/L (ref 100–108)
CO2 SERPL-SCNC: 29 MMOL/L (ref 21–32)
CREAT SERPL-MCNC: 1 MG/DL (ref 0.6–1.3)
GFR SERPL CREATININE-BSD FRML MDRD: 83 ML/MIN/1.73SQ M
GLUCOSE P FAST SERPL-MCNC: 97 MG/DL (ref 65–99)
POTASSIUM SERPL-SCNC: 4.6 MMOL/L (ref 3.5–5.3)
PROT SERPL-MCNC: 7.8 G/DL (ref 6.4–8.2)
SODIUM SERPL-SCNC: 140 MMOL/L (ref 136–145)

## 2018-02-28 PROCEDURE — 80053 COMPREHEN METABOLIC PANEL: CPT

## 2018-02-28 PROCEDURE — 36415 COLL VENOUS BLD VENIPUNCTURE: CPT

## 2018-02-28 NOTE — PROCEDURES
Procedures by Lilly Smith MD  at 12/15/2017 10:12 AM      Author:  Lilly Smith MD Service:  Cardiology Author Type:  Physician    Filed:  12/15/2017 10:14 AM Date of Service:  12/15/2017 10:12 AM Status:  Signed    :  Lilly Smith MD (Physician)        Pre-procedure Diagnoses:       1  Atrial fibrillation ([address] ) [I48 91]                Post-procedure Diagnoses:       1  Sinus bradycardia [R00 1]                Procedures:       1  ELECTRICAL CARDIOVERSION [STW631 (Custom)]                 PROCEDURE:   Direct Current Cardioversion (DCCV)    :  Oneida Cloud MD, [facility]    Informed consent was obtained from the patient prior to the procedure  Anesthesiologist administered anesthetic  After JOSE LUIS was performed that ruled out any left atrial appendage thrombus or left atrial thrombus, 200 Joules of biphasic synchronized energy was administered via subcutaneous patches placed anteriorly and posteriorly on patient's chest     RESULTS:  Patient's rhythm was restored to Normal Sinus Rhythm  EKG was performed after the procedure to document  COMPLICATIONS:  None  Marcelo TRAN    Dec 15 2017 10:14AM [facility] Standard Time

## 2018-03-01 ENCOUNTER — TELEPHONE (OUTPATIENT)
Dept: SURGERY | Facility: HOSPITAL | Age: 58
End: 2018-03-01

## 2018-03-01 RX ORDER — CLOPIDOGREL BISULFATE 75 MG/1
600 TABLET ORAL ONCE
Status: CANCELLED | OUTPATIENT
Start: 2018-03-01 | End: 2018-03-01

## 2018-03-01 RX ORDER — SODIUM CHLORIDE 9 MG/ML
100 INJECTION, SOLUTION INTRAVENOUS CONTINUOUS
Status: CANCELLED | OUTPATIENT
Start: 2018-03-02

## 2018-03-01 RX ORDER — ASPIRIN 81 MG/1
324 TABLET, CHEWABLE ORAL ONCE
Status: CANCELLED | OUTPATIENT
Start: 2018-03-01 | End: 2018-03-01

## 2018-03-02 ENCOUNTER — HOSPITAL ENCOUNTER (OUTPATIENT)
Dept: NON INVASIVE DIAGNOSTICS | Facility: HOSPITAL | Age: 58
Discharge: HOME | End: 2018-03-02
Attending: INTERNAL MEDICINE | Admitting: INTERNAL MEDICINE
Payer: COMMERCIAL

## 2018-03-02 VITALS
SYSTOLIC BLOOD PRESSURE: 113 MMHG | OXYGEN SATURATION: 96 % | TEMPERATURE: 98 F | RESPIRATION RATE: 18 BRPM | DIASTOLIC BLOOD PRESSURE: 70 MMHG | HEART RATE: 73 BPM

## 2018-03-02 DIAGNOSIS — I25.10 CORONARY ARTERY DISEASE INVOLVING NATIVE HEART, ANGINA PRESENCE UNSPECIFIED, UNSPECIFIED VESSEL OR LESION TYPE: ICD-10-CM

## 2018-03-02 LAB
ANION GAP SERPL CALCULATED.3IONS-SCNC: 8 MMOL/L (ref 4–13)
BUN SERPL-MCNC: 15 MG/DL (ref 5–25)
CALCIUM SERPL-MCNC: 8.9 MG/DL (ref 8.3–10.1)
CHLORIDE SERPL-SCNC: 105 MMOL/L (ref 100–108)
CO2 SERPL-SCNC: 26 MMOL/L (ref 21–32)
CREAT SERPL-MCNC: 0.97 MG/DL (ref 0.6–1.3)
ERYTHROCYTE [DISTWIDTH] IN BLOOD BY AUTOMATED COUNT: 15.3 % (ref 11.6–15.1)
GFR SERPL CREATININE-BSD FRML MDRD: 86 ML/MIN/1.73SQ M
GLUCOSE P FAST SERPL-MCNC: 93 MG/DL (ref 65–99)
GLUCOSE SERPL-MCNC: 93 MG/DL (ref 65–140)
HCT VFR BLD AUTO: 41.9 % (ref 36.5–49.3)
HGB BLD-MCNC: 14.1 G/DL (ref 12–17)
INR PPP: 1.01 (ref 0.86–1.16)
MCH RBC QN AUTO: 30.1 PG (ref 26.8–34.3)
MCHC RBC AUTO-ENTMCNC: 33.7 G/DL (ref 31.4–37.4)
MCV RBC AUTO: 90 FL (ref 82–98)
PLATELET # BLD AUTO: 229 THOUSANDS/UL (ref 149–390)
PMV BLD AUTO: 10.3 FL (ref 8.9–12.7)
POTASSIUM SERPL-SCNC: 4.4 MMOL/L (ref 3.5–5.3)
PROTHROMBIN TIME: 13.3 SECONDS (ref 12.1–14.4)
RBC # BLD AUTO: 4.68 MILLION/UL (ref 3.88–5.62)
SODIUM SERPL-SCNC: 139 MMOL/L (ref 136–145)
WBC # BLD AUTO: 5.79 THOUSAND/UL (ref 4.31–10.16)

## 2018-03-02 PROCEDURE — C1769 GUIDE WIRE: HCPCS | Performed by: INTERNAL MEDICINE

## 2018-03-02 PROCEDURE — 99153 MOD SED SAME PHYS/QHP EA: CPT | Performed by: INTERNAL MEDICINE

## 2018-03-02 PROCEDURE — 99152 MOD SED SAME PHYS/QHP 5/>YRS: CPT | Performed by: INTERNAL MEDICINE

## 2018-03-02 PROCEDURE — 85610 PROTHROMBIN TIME: CPT | Performed by: NURSE PRACTITIONER

## 2018-03-02 PROCEDURE — 80048 BASIC METABOLIC PNL TOTAL CA: CPT | Performed by: NURSE PRACTITIONER

## 2018-03-02 PROCEDURE — C1894 INTRO/SHEATH, NON-LASER: HCPCS | Performed by: INTERNAL MEDICINE

## 2018-03-02 PROCEDURE — 93005 ELECTROCARDIOGRAM TRACING: CPT

## 2018-03-02 PROCEDURE — 85027 COMPLETE CBC AUTOMATED: CPT | Performed by: NURSE PRACTITIONER

## 2018-03-02 PROCEDURE — 93458 L HRT ARTERY/VENTRICLE ANGIO: CPT | Performed by: INTERNAL MEDICINE

## 2018-03-02 RX ORDER — SODIUM CHLORIDE 9 MG/ML
100 INJECTION, SOLUTION INTRAVENOUS CONTINUOUS
Status: DISCONTINUED | OUTPATIENT
Start: 2018-03-02 | End: 2018-03-02

## 2018-03-02 RX ORDER — ATORVASTATIN CALCIUM 40 MG/1
40 TABLET, FILM COATED ORAL
Status: DISCONTINUED | OUTPATIENT
Start: 2018-03-02 | End: 2018-03-02 | Stop reason: HOSPADM

## 2018-03-02 RX ORDER — ASPIRIN 81 MG/1
324 TABLET, CHEWABLE ORAL ONCE
Status: COMPLETED | OUTPATIENT
Start: 2018-03-02 | End: 2018-03-02

## 2018-03-02 RX ORDER — FENTANYL CITRATE 50 UG/ML
INJECTION, SOLUTION INTRAMUSCULAR; INTRAVENOUS CODE/TRAUMA/SEDATION MEDICATION
Status: COMPLETED | OUTPATIENT
Start: 2018-03-02 | End: 2018-03-02

## 2018-03-02 RX ORDER — METOPROLOL SUCCINATE 100 MG/1
100 TABLET, EXTENDED RELEASE ORAL 2 TIMES DAILY
Status: DISCONTINUED | OUTPATIENT
Start: 2018-03-02 | End: 2018-03-02 | Stop reason: HOSPADM

## 2018-03-02 RX ORDER — LIDOCAINE HYDROCHLORIDE 10 MG/ML
INJECTION, SOLUTION INFILTRATION; PERINEURAL CODE/TRAUMA/SEDATION MEDICATION
Status: COMPLETED | OUTPATIENT
Start: 2018-03-02 | End: 2018-03-02

## 2018-03-02 RX ORDER — CLOPIDOGREL BISULFATE 75 MG/1
600 TABLET ORAL ONCE
Status: COMPLETED | OUTPATIENT
Start: 2018-03-02 | End: 2018-03-02

## 2018-03-02 RX ORDER — MIDAZOLAM HYDROCHLORIDE 1 MG/ML
INJECTION INTRAMUSCULAR; INTRAVENOUS CODE/TRAUMA/SEDATION MEDICATION
Status: COMPLETED | OUTPATIENT
Start: 2018-03-02 | End: 2018-03-02

## 2018-03-02 RX ORDER — NITROGLYCERIN 20 MG/100ML
INJECTION INTRAVENOUS CODE/TRAUMA/SEDATION MEDICATION
Status: COMPLETED | OUTPATIENT
Start: 2018-03-02 | End: 2018-03-02

## 2018-03-02 RX ORDER — LISINOPRIL 5 MG/1
5 TABLET ORAL DAILY
COMMUNITY
End: 2018-03-02 | Stop reason: HOSPADM

## 2018-03-02 RX ORDER — HEPARIN SODIUM 1000 [USP'U]/ML
INJECTION, SOLUTION INTRAVENOUS; SUBCUTANEOUS CODE/TRAUMA/SEDATION MEDICATION
Status: COMPLETED | OUTPATIENT
Start: 2018-03-02 | End: 2018-03-02

## 2018-03-02 RX ORDER — SODIUM CHLORIDE 9 MG/ML
100 INJECTION, SOLUTION INTRAVENOUS CONTINUOUS
Status: DISCONTINUED | OUTPATIENT
Start: 2018-03-02 | End: 2018-03-02 | Stop reason: HOSPADM

## 2018-03-02 RX ORDER — VERAPAMIL HCL 2.5 MG/ML
AMPUL (ML) INTRAVENOUS CODE/TRAUMA/SEDATION MEDICATION
Status: COMPLETED | OUTPATIENT
Start: 2018-03-02 | End: 2018-03-02

## 2018-03-02 RX ADMIN — MIDAZOLAM 1 MG: 1 INJECTION INTRAMUSCULAR; INTRAVENOUS at 13:09

## 2018-03-02 RX ADMIN — FENTANYL CITRATE 25 MCG: 50 INJECTION, SOLUTION INTRAMUSCULAR; INTRAVENOUS at 13:09

## 2018-03-02 RX ADMIN — VERAPAMIL HYDROCHLORIDE 1.25 MG: 2.5 INJECTION, SOLUTION INTRAVENOUS at 13:03

## 2018-03-02 RX ADMIN — IOHEXOL 85 ML: 350 INJECTION, SOLUTION INTRAVENOUS at 13:15

## 2018-03-02 RX ADMIN — ASPIRIN 81 MG 324 MG: 81 TABLET ORAL at 12:06

## 2018-03-02 RX ADMIN — HEPARIN SODIUM 4000 UNITS: 1000 INJECTION INTRAVENOUS; SUBCUTANEOUS at 13:03

## 2018-03-02 RX ADMIN — LIDOCAINE HYDROCHLORIDE 0.1 ML: 10 INJECTION, SOLUTION INFILTRATION; PERINEURAL at 13:00

## 2018-03-02 RX ADMIN — MIDAZOLAM 2 MG: 1 INJECTION INTRAMUSCULAR; INTRAVENOUS at 13:00

## 2018-03-02 RX ADMIN — NITROGLYCERIN 200 MCG: 20 INJECTION INTRAVENOUS at 13:03

## 2018-03-02 RX ADMIN — FENTANYL CITRATE 50 MCG: 50 INJECTION, SOLUTION INTRAMUSCULAR; INTRAVENOUS at 12:55

## 2018-03-02 RX ADMIN — CLOPIDOGREL BISULFATE 600 MG: 75 TABLET ORAL at 12:06

## 2018-03-02 RX ADMIN — SODIUM CHLORIDE 100 ML/HR: 0.9 INJECTION, SOLUTION INTRAVENOUS at 12:05

## 2018-03-02 RX ADMIN — MIDAZOLAM 2 MG: 1 INJECTION INTRAMUSCULAR; INTRAVENOUS at 12:55

## 2018-03-02 RX ADMIN — FENTANYL CITRATE 50 MCG: 50 INJECTION, SOLUTION INTRAMUSCULAR; INTRAVENOUS at 13:00

## 2018-03-02 NOTE — DISCHARGE INSTRUCTIONS
1  Please see the post cardiac catheterization dishcarge instructions  No heavy lifting, greater than 10 lbs  or strenuous  activity for 48 hrs  2 Remove band aid tomorrow  Shower and wash area- wrist gently with soap and water- beginning tomorrow  Rinse and pat dry  Apply new water seal band aid  Repeat this process for 5 days  No powders, creams lotions or antibiotic ointments  for 5 days  No tub baths, hot tubs or swimming for 5 days  3  Please call our office ([phone]) if you have any fever, redness, swelling, discharge from your wrist access site  4 No driving for 1 day  5  Please resume eliquis tomorrow  6  We have not made any changes to your medications  7  Keep the lifevest on until you see Dr Sam Meckel  After Radial Heart Catheterization   WHAT YOU NEED TO KNOW:   What will happen after a radial heart catheterization? · You will be attached to a heart monitor until you are fully awake  A heart monitor is an EKG that stays on continuously to record your heart's electrical activity  Healthcare providers will monitor your vital signs and pulses in your arm  They will frequently check your pressure bandage for bleeding or swelling  · You may have a band wrapped tightly around your wrist  The band puts pressure on your wound and helps prevent bleeding  A healthcare provider can put air into the band or remove air from the band  A healthcare provider will gradually remove air from the band and decrease pressure on your wrist  The band may be removed in 2 hours or when your wound stops bleeding  · You will need to keep your wrist straight for 2 to 4 hours  Do not  push or pull with your arm  Arm movements can cause serious bleeding  After you are monitored for several hours, you may go home or may need to stay in the hospital overnight  What do I need to know before I go home? · Care for your wound as directed  Remove the pressure bandage in 24 hours or as directed  Mild bruising is normal and expected  A small bandage can be placed on your wound after you remove the pressure bandage  Do not put powders, lotions, or creams on your wound  They may cause your wound to get infected  Monitor your wound every day for signs of infection, such as redness, swelling, or pus  · Shower the day after your procedure or as directed  Remove your pressure bandage before you shower  Do not take baths or go in hot tubs or pools  Carefully wash the wound with soap and water  Pat the area dry  A small bandage can be placed on your wound after you shower  · Apply firm, steady pressure to your wound if it bleeds  Apply pressure with a clean gauze or towel for 5 to 10 minutes  Call 911 if bleeding becomes heavy or does not stop  · Drink liquids as directed  Liquids will help flush the contrast liquid from your body  Ask how much liquid to drink each day and which liquids are best for you  · Do not lift anything heavier than 5 pounds until directed by your healthcare provider  Heavy lifting can put stress on your wound and cause bleeding  Do not push or pull with the arm that was used for the procedure  Do not do vigorous activity for at least 48 hours  Vigorous activity may cause bleeding from your wound  Rest and do quiet activities  Take short walks around the house to prevent a blood clot  Ask your healthcare provider when you can return to your normal activities  · Do not drive or return to work until your healthcare provider says it is okay  Your healthcare provider may tell you to wait 48 hours before you drive to decrease your risk for bleeding  You may not be able to return to work for at least 2 days after your procedure if your job involves heavy lifting  What medicines may I need? You may need any of the following:  · Blood thinners    help prevent blood clots  Examples of blood thinners include heparin and warfarin   Clots can cause strokes, heart attacks, and death  The following are general safety guidelines to follow while you are taking a blood thinner:    ¨ Watch for bleeding and bruising while you take blood thinners  Watch for bleeding from your gums or nose  Watch for blood in your urine and bowel movements  Use a soft washcloth on your skin, and a soft toothbrush to brush your teeth  This can keep your skin and gums from bleeding  If you shave, use an electric shaver  Do not play contact sports  ¨ Tell your dentist and other healthcare providers that you take anticoagulants  Wear a bracelet or necklace that says you take this medicine  ¨ Do not start or stop any medicines unless your healthcare provider tells you to  Many medicines cannot be used with blood thinners  ¨ Tell your healthcare provider right away if you forget to take the medicine, or if you take too much  ¨ Warfarin  is a blood thinner that you may need to take  The following are things you should be aware of if you take warfarin  § Foods and medicines can affect the amount of warfarin in your blood  Do not make major changes to your diet while you take warfarin  Warfarin works best when you eat about the same amount of vitamin K every day  Vitamin K is found in green leafy vegetables and certain other foods  Ask for more information about what to eat when you are taking warfarin  § You will need to see your healthcare provider for follow-up visits when you are on warfarin  You will need regular blood tests  These tests are used to decide how much medicine you need  · Acetaminophen  helps decrease pain and fever  This medicine is available without a doctor's order  Ask how much medicine is safe to take, and how often to take it  Acetaminophen can cause liver damage if not taken correctly  · Take your medicine as directed  Contact your healthcare provider if you think your medicine is not helping or if you have side effects   Tell him or her if you are allergic to any medicine  Keep a list of the medicines, vitamins, and herbs you take  Include the amounts, and when and why you take them  Bring the list or the pill bottles to follow-up visits  Carry your medicine list with you in case of an emergency  Call 911 for any of the following:   · You have any of the following signs of a heart attack:      ¨ Squeezing, pressure, or pain in your chest that lasts longer than 5 minutes or returns    ¨ Discomfort or pain in your back, neck, jaw, stomach, or arm     ¨ Trouble breathing    ¨ Nausea or vomiting    ¨ Lightheadedness or a sudden cold sweat, especially with chest pain or trouble breathing    · You have any of the following signs of a stroke:      ¨ Numbness or drooping on one side of your face     ¨ Weakness in an arm or leg    ¨ Confusion or difficulty speaking    ¨ Dizziness, a severe headache, or vision loss    · You feel lightheaded, short of breath, and have chest pain  · You cough up blood  · You have trouble breathing  · You cannot stop the bleeding from your wound even after you hold firm pressure for 10 minutes  When should I seek immediate care? · Blood soaks through your bandage  · Your stitches come apart  · Your hand or arm feels numb, cool, or looks pale  · Your wound gets swollen quickly  When should I contact my healthcare provider? · You have a fever or chills  · Your wound is red, swollen, or draining pus  · Your wound looks more bruised or you have new bruising on the side of your wrist      · You have nausea or are vomiting  · Your skin is itchy, swollen, or you have a rash  · You have questions or concerns about your condition or care  CARE AGREEMENT:   You have the right to help plan your care  Learn about your health condition and how it may be treated  Discuss treatment options with your caregivers to decide what care you want to receive  You always have the right to refuse treatment   The above information is an  only  It is not intended as medical advice for individual conditions or treatments  Talk to your doctor, nurse or pharmacist before following any medical regimen to see if it is safe and effective for you  © 2017 [employer] Information is for End User's use only and may not be sold, redistributed or otherwise used for commercial purposes  All illustrations and images included in CareNotes® are the copyrighted property of [employer]  or Rui Schumacher

## 2018-03-05 ENCOUNTER — OFFICE VISIT (OUTPATIENT)
Dept: CARDIOLOGY CLINIC | Facility: CLINIC | Age: 58
End: 2018-03-05
Payer: COMMERCIAL

## 2018-03-05 VITALS
BODY MASS INDEX: 31.16 KG/M2 | SYSTOLIC BLOOD PRESSURE: 110 MMHG | HEART RATE: 75 BPM | WEIGHT: 211 LBS | OXYGEN SATURATION: 97 % | DIASTOLIC BLOOD PRESSURE: 70 MMHG

## 2018-03-05 DIAGNOSIS — I50.22 CHRONIC SYSTOLIC HEART FAILURE (HCC): ICD-10-CM

## 2018-03-05 LAB
ATRIAL RATE: 70 BPM
P AXIS: 33 DEGREES
PR INTERVAL: 144 MS
QRS AXIS: 56 DEGREES
QRSD INTERVAL: 94 MS
QT INTERVAL: 426 MS
QTC INTERVAL: 460 MS
T WAVE AXIS: 53 DEGREES
VENTRICULAR RATE: 70 BPM

## 2018-03-05 PROCEDURE — 99213 OFFICE O/P EST LOW 20 MIN: CPT | Performed by: INTERNAL MEDICINE

## 2018-03-05 PROCEDURE — 93010 ELECTROCARDIOGRAM REPORT: CPT | Performed by: INTERNAL MEDICINE

## 2018-03-05 RX ORDER — METOPROLOL SUCCINATE 100 MG/1
100 TABLET, EXTENDED RELEASE ORAL 2 TIMES DAILY
Qty: 180 TABLET | Refills: 3 | Status: SHIPPED | OUTPATIENT
Start: 2018-03-05 | End: 2019-03-19 | Stop reason: SDUPTHER

## 2018-03-05 RX ORDER — DIGOXIN 125 MCG
125 TABLET ORAL DAILY
Qty: 90 TABLET | Refills: 3 | Status: SHIPPED | OUTPATIENT
Start: 2018-03-05 | End: 2018-10-17 | Stop reason: SDUPTHER

## 2018-03-05 RX ORDER — ATORVASTATIN CALCIUM 40 MG/1
40 TABLET, FILM COATED ORAL
Qty: 90 TABLET | Refills: 3 | Status: SHIPPED | OUTPATIENT
Start: 2018-03-05 | End: 2019-02-19 | Stop reason: SDUPTHER

## 2018-03-05 RX ORDER — ALLOPURINOL 100 MG/1
100 TABLET ORAL DAILY
Qty: 90 TABLET | Refills: 3 | Status: SHIPPED | OUTPATIENT
Start: 2018-03-05 | End: 2019-03-07 | Stop reason: SDUPTHER

## 2018-03-05 NOTE — PROGRESS NOTES
Heart Failure Outpatient Progress Note - Juan Carlos Sifuentes 62 y o  male MRN: [id number]    @ Encounter: [id number]    Assessment/Plan:    # CM (NICM; LVEF ~20%, LVIDd 5 7 cm) with biventricular dysfunction, NYHA II, ACC/AHA Stage C: Most likely 2/2 tachy (AFib w/ RVR) as had BiV dysfunction +/- HTN  See cath below  Diag:  --Stress test - pharm nuc w/ moderate-sized, moderately severe, partially reversible myocardial perfusion defect of the basal to mid inferolateral wall  --Cardiac cath w/ single vessel CAD, with an 80% proximal stenosis of the ramus artery  As no ischemic symptoms, and LVEF out of proportion decreased to degree of CAD, no stent placed  LVEDP normal    --TTE prior to next visit    Therapeutic:  --2g sodium diet  --2 L fluid restriction  --Continue EtOH cessation  --Given active work, minimal symptoms, and demanding work schedule will defer cardiac rehab for now    Neurohormonal Blockade:  --Beta-Blocker: Continue metoprolol succinate 100 mg PO BID  --ACEi, ARB or ARNi: Continue Entresto 24-26 mg PO BID  --Aldosterone Receptor Blocker: Will consider once above meds are max tolerated doses  --Diuretic: Lasix 60 mg PO BID; stopped K 2/2 to K~4 4 on lab check    Electrical Resynchronization/Sudden Cardiac Death Risk Reduction:  --Currently wearing LifeVest  Will repeat TTE based on above  Advanced Therapies: Currently does not meet criteria as minimal symptoms  # NSVT: Continue LifeVest  # AFib s/p DCCV on 12/15/17 on Eliquis  # NSTEMI type II: Demand from tachycardia  # IFG  # Hx of Gout: Continue Allopurinol    RTC in 2 weeks    HPI: Very pleasant 62year old gentleman out of care seen by Marc Mao referred for further optimization  He has a PMHx of FHx of CAD (grandfather, father, mother and brother), HTN, tobacco abuse w/ 30 pack year history admitted to [facility] w/ worsening MARION, 2 pillow orthopnea, and PND  He was admitted from 12/11-17/2017   He had a TTE that showed Biventricular dysfunction w/ LVEF ~20%  He was also noted to have IFG and NSTEMI II, gout and NSVT  He had gained at least 15-20 pounds  He was experiencing generalized weakness and fatigue  In the ER he was found to be in atrial fibrillation with RVR  He was treated with IV Cardizem and start an IV Cardizem drip  However, his blood pressure dropped to 80/60  Cardizem drip was stopped  Chest x-ray showed mild pulmonary vascular congestion  NT pro BNP 5242  He was diuresed with IV Lasix  TTE showed left ventricle mildly dilated  Systolic function severely reduced ejection fraction 20%  There was severe diffuse hypokinesis  Wall thickness mildly increased  The right ventricle mildly dilated  Trace to mild mitral valve regurgitation, trace to mild tricuspid valve regurgitation  LVIDd 5 71 cm  He underwent JOSE LUIS/DCCV and reverted back to normal sinus rhythm  He diuresed 30 pounds w/ resolution of SOB  He was discharged on GDMT and LifeVest  Cardiology felt he was appropriate for outpatient stress test  CM was likely tachycardia mediated due to A  fib with RVR  He was seen by Nancy Hale in f/u at which time he was doing well  He was in NSR at that time and taking his medications  Today, 2/9/18, he comes in for f/u  He is walking miles on flat ground  He can go up and down stairs but has to pause now  He is also going up hills  He works as an  and is very active  Today, 3/5/18, he comes in for f/u  Pharm nuc showed moderate-sized, moderately severe, partially reversible myocardial perfusion defect of the basal to mid inferolateral wall  Cardiac cath w/ 80% prox [facility] mgmt as LVEF reduction out of proportion and no ischemic symptoms  He feels the same as last visit  Past Medical History:   Diagnosis Date    Coronary artery disease     Gout     Hypertension     Shingles        Review of Systems - 12 point ROS was done and is negative, except as noted above  No Known Allergies        Current Outpatient Prescriptions:     atorvastatin (LIPITOR) 40 mg tablet, Take 1 tablet (40 mg total) by mouth daily with dinner, Disp: 90 tablet, Rfl: 3    digoxin (LANOXIN) 0 125 mg tablet, Take 1 tablet (125 mcg total) by mouth daily, Disp: 90 tablet, Rfl: 3    furosemide (LASIX) 40 mg tablet, TAKE 1 5 TABS TWICE A DAY, Disp: , Rfl: 3    sacubitril-valsartan (ENTRESTO) 24-26 MG TABS, Take 1 tablet by mouth 2 (two) times a day, Disp: 180 tablet, Rfl: 3    allopurinol (ZYLOPRIM) 100 mg tablet, Take 1 tablet (100 mg total) by mouth daily, Disp: 90 tablet, Rfl: 3    apixaban (ELIQUIS) 5 mg, Take 1 tablet by mouth 2 (two) times a day for 30 days, Disp: 60 tablet, Rfl: 0    metoprolol succinate (TOPROL-XL) 100 mg 24 hr tablet, Take 1 tablet (100 mg total) by mouth 2 (two) times a day, Disp: 180 tablet, Rfl: 3    Social History     Social History    Marital status: Single     Spouse name: N/A    Number of children: N/A    Years of education: N/A     Occupational History    Not on file  Social History Main Topics    Smoking status: Former Smoker     Quit date: 8/11/2017    Smokeless tobacco: Never Used    Alcohol use No    Drug use: No    Sexual activity: Yes     Partners: Female     Other Topics Concern    Not on file     Social History Narrative    No narrative on file       No family history on file  Physical Exam:    Vitals: Blood pressure 110/70, pulse 75, weight 95 7 kg (211 lb), SpO2 97 %  , Body mass index is 31 16 kg/m² ,   Wt Readings from Last 3 Encounters:   03/05/18 95 7 kg (211 lb)   02/09/18 93 kg (205 lb)   12/28/17 86 kg (189 lb 9 5 oz)     Vitals:    03/05/18 1624   BP: 110/70   BP Location: Right arm   Patient Position: Standing   Cuff Size: Standard   Pulse: 75   SpO2: 97%   Weight: 95 7 kg (211 lb)       GEN: Rosa Kay appears well, alert and oriented x 3, pleasant and cooperative   HEENT: pupils equal, round, and reactive to light; extraocular muscles intact  NECK: supple, no carotid bruits   HEART: regular rhythm, normal S1 and S2, no murmurs, clicks, gallops or rubs, JVD is flat    LUNGS: clear to auscultation bilaterally; no wheezes, rales, or rhonchi   ABDOMEN: normal bowel sounds, soft, no tenderness, no distention  EXTREMITIES: peripheral pulses normal; no clubbing, cyanosis, or edema  NEURO: no focal findings   SKIN: normal without suspicious lesions on exposed skin    Labs & Results:  Lab Results   Component Value Date    WBC 5 79 03/02/2018    HGB 14 1 03/02/2018    HCT 41 9 03/02/2018    MCV 90 03/02/2018     03/02/2018       Chemistry        Component Value Date/Time     03/02/2018 1204    K 4 4 03/02/2018 1204     03/02/2018 1204    CO2 26 03/02/2018 1204    BUN 15 03/02/2018 1204    CREATININE 0 97 03/02/2018 1204        Component Value Date/Time    CALCIUM 8 9 03/02/2018 1204    ALKPHOS 64 02/28/2018 1416    AST 18 02/28/2018 1416    ALT 30 02/28/2018 1416    BILITOT 1 58 (H) 02/28/2018 1416        EKG personally reviewed by Carlos Restrepo MD      Counseling / Coordination of Care  Total floor / unit time spent today 40 minutes  Greater than 50% of total time was spent with the patient and / or family counseling and / or coordination of care  A description of the counseling / coordination of care: 25 min  Thank you for the opportunity to participate in the care of this patient      Carlos Restrepo MD, PhD   Alana Fitzgerald

## 2018-03-16 ENCOUNTER — TELEPHONE (OUTPATIENT)
Dept: CARDIOLOGY CLINIC | Facility: CLINIC | Age: 58
End: 2018-03-16

## 2018-03-26 ENCOUNTER — TELEPHONE (OUTPATIENT)
Dept: CARDIOLOGY CLINIC | Facility: CLINIC | Age: 58
End: 2018-03-26

## 2018-03-29 ENCOUNTER — HOSPITAL ENCOUNTER (OUTPATIENT)
Dept: NON INVASIVE DIAGNOSTICS | Facility: CLINIC | Age: 58
Discharge: HOME | End: 2018-03-29
Payer: COMMERCIAL

## 2018-03-29 DIAGNOSIS — I50.22 CHRONIC SYSTOLIC HEART FAILURE (HCC): ICD-10-CM

## 2018-03-29 PROCEDURE — 93306 TTE W/DOPPLER COMPLETE: CPT

## 2018-03-30 PROCEDURE — 93306 TTE W/DOPPLER COMPLETE: CPT | Performed by: INTERNAL MEDICINE

## 2018-04-03 NOTE — PROGRESS NOTES
Patient called back , I advised him that his ejection fraction has improved to close to ~50% (almost normal)  He can take the LifeVest off and send it back to [facility]

## 2018-04-05 ENCOUNTER — OFFICE VISIT (OUTPATIENT)
Dept: CARDIOLOGY CLINIC | Facility: CLINIC | Age: 58
End: 2018-04-05
Payer: COMMERCIAL

## 2018-04-05 VITALS
DIASTOLIC BLOOD PRESSURE: 90 MMHG | WEIGHT: 215 LBS | HEART RATE: 77 BPM | OXYGEN SATURATION: 98 % | SYSTOLIC BLOOD PRESSURE: 148 MMHG | BODY MASS INDEX: 31.75 KG/M2

## 2018-04-05 DIAGNOSIS — I42.8 OTHER CARDIOMYOPATHY (HCC): Primary | ICD-10-CM

## 2018-04-05 PROCEDURE — 99213 OFFICE O/P EST LOW 20 MIN: CPT | Performed by: INTERNAL MEDICINE

## 2018-04-05 RX ORDER — POTASSIUM CHLORIDE 1500 MG/1
TABLET, EXTENDED RELEASE ORAL
COMMUNITY
Start: 2018-03-28 | End: 2018-06-28 | Stop reason: ALTCHOICE

## 2018-04-05 NOTE — PROGRESS NOTES
Heart Failure Outpatient Progress Note - Divya Ward 62 y o  male MRN: [id number]    @ Encounter: [id number]    Assessment/Plan:    # CM (NICM; LVEF ~20%, improved to ~40-45% w/ LVIDd decreased from 5 7 to 4 5 cm) initally with biventricular dysfunction (now RV normal), NYHA II, ACC/AHA Stage C: Most likely 2/2 tachy (AFib w/ RVR) as had BiV dysfunction +/- HTN  See cath below  Diag:  --Stress test - pharm nuc w/ moderate-sized, moderately severe, partially reversible myocardial perfusion defect of the basal to mid inferolateral wall  --Cardiac cath w/ single vessel CAD, with an 80% proximal stenosis of the ramus artery  As no ischemic symptoms, and LVEF out of proportion decreased to degree of CAD, no stent placed  LVEDP normal    --TTE shows LVEF ~40-45% (visual inspection), but LVIDd now 4 5 cm and RV size and function now normal  Small pericardial effusion persists  Therapeutic:  --2g sodium diet  --2 L fluid restriction  --Continue EtOH cessation  --Given active work, minimal symptoms, and demanding work schedule will defer cardiac rehab for now    Neurohormonal Blockade:  --Beta-Blocker: Continue metoprolol succinate 100 mg PO BID  --ACEi, ARB or ARNi: Continue Entresto 24-26 mg PO BID (LVEF improved on Entresto - was on samples previously)  --Aldosterone Receptor Blocker: Will consider once above meds are max tolerated doses  --Diuretic: Lasix 60 mg PO BID; stopped K 2/2 to K~4 4 on lab check    Electrical Resynchronization/Sudden Cardiac Death Risk Reduction:  --Stop LifeVest 2/2 to improved LVEF    Advanced Therapies: Currently does not meet criteria as minimal symptoms  # NSVT: LifeVest stopped  Continue BB as above  # AFib s/p DCCV on 12/15/17 on Eliquis  # NSTEMI type II: Demand from tachycardia  # IFG  # Hx of Gout: Continue Allopurinol    RTC in 2 months    HPI: Very pleasant 62year old gentleman out of care seen by Josh Rosas referred for further optimization   He has a PMHx of FHx of CAD (grandfather, father, mother and brother), HTN, tobacco abuse w/ 30 pack year history admitted to [facility] w/ worsening MARION, 2 pillow orthopnea, and PND  He was admitted from 12/11-17/2017  He had a TTE that showed Biventricular dysfunction w/ LVEF ~20%  He was also noted to have IFG and NSTEMI II, gout and NSVT  He had gained at least 15-20 pounds  He was experiencing generalized weakness and fatigue  In the ER he was found to be in atrial fibrillation with RVR  He was treated with IV Cardizem and start an IV Cardizem drip  However, his blood pressure dropped to 80/60  Cardizem drip was stopped  Chest x-ray showed mild pulmonary vascular congestion  NT pro BNP 5242  He was diuresed with IV Lasix  TTE showed left ventricle mildly dilated  Systolic function severely reduced ejection fraction 20%  There was severe diffuse hypokinesis  Wall thickness mildly increased  The right ventricle mildly dilated  Trace to mild mitral valve regurgitation, trace to mild tricuspid valve regurgitation  LVIDd 5 71 cm  He underwent JOSE LUIS/DCCV and reverted back to normal sinus rhythm  He diuresed 30 pounds w/ resolution of SOB  He was discharged on GDMT and LifeVest  Cardiology felt he was appropriate for outpatient stress test  CM was likely tachycardia mediated due to A  fib with RVR  He was seen by Saima Whitehead in f/u at which time he was doing well  He was in NSR at that time and taking his medications  Today, 2/9/18, he comes in for f/u  He is walking miles on flat ground  He can go up and down stairs but has to pause now  He is also going up hills  He works as an  and is very active  Today, 3/5/18, he comes in for f/u  Pharm nuc showed moderate-sized, moderately severe, partially reversible myocardial perfusion defect of the basal to mid inferolateral wall  [facility]/ [facility]  Medical mgmt as LVEF reduction out of proportion and no ischemic symptoms  He feels the same as last visit      Today, 4/5/18, he comes in for f/u  He feels well  TTE showed LVEF on visual inspection ~40-45%  LVIDd decreased  Past Medical History:   Diagnosis Date    Coronary artery disease     Gout     Hypertension     Shingles        Review of Systems - 12 point ROS was done and is negative, except as noted above  No Known Allergies    Current Outpatient Prescriptions:     allopurinol (ZYLOPRIM) 100 mg tablet, Take 1 tablet (100 mg total) by mouth daily, Disp: 90 tablet, Rfl: 3    atorvastatin (LIPITOR) 40 mg tablet, Take 1 tablet (40 mg total) by mouth daily with dinner, Disp: 90 tablet, Rfl: 3    digoxin (LANOXIN) 0 125 mg tablet, Take 1 tablet (125 mcg total) by mouth daily, Disp: 90 tablet, Rfl: 3    furosemide (LASIX) 40 mg tablet, TAKE 1 5 TABS TWICE A DAY, Disp: , Rfl: 3    KLOR-CON M20 20 MEQ tablet, , Disp: , Rfl:     metoprolol succinate (TOPROL-XL) 100 mg 24 hr tablet, Take 1 tablet (100 mg total) by mouth 2 (two) times a day, Disp: 180 tablet, Rfl: 3    sacubitril-valsartan (ENTRESTO) 24-26 MG TABS, Take 1 tablet by mouth 2 (two) times a day, Disp: 180 tablet, Rfl: 3    apixaban (ELIQUIS) 5 mg, Take 1 tablet by mouth 2 (two) times a day for 30 days, Disp: 60 tablet, Rfl: 0    Social History     Social History    Marital status: Single     Spouse name: N/A    Number of children: N/A    Years of education: N/A     Occupational History    Not on file  Social History Main Topics    Smoking status: Former Smoker     Quit date: 8/11/2017    Smokeless tobacco: Never Used    Alcohol use No    Drug use: No    Sexual activity: Yes     Partners: Female     Other Topics Concern    Not on file     Social History Narrative    No narrative on file       No family history on file  Physical Exam:    Vitals: Blood pressure 148/90, pulse 77, weight 97 5 kg (215 lb), SpO2 98 %  , Body mass index is 31 75 kg/m² ,   Wt Readings from Last 3 Encounters:   04/05/18 97 5 kg (215 lb)   03/05/18 95 7 kg (211 lb) 02/09/18 93 kg (205 lb)     Vitals:    04/05/18 1649   BP: 148/90   BP Location: Right arm   Patient Position: Sitting   Cuff Size: Standard   Pulse: 77   SpO2: 98%   Weight: 97 5 kg (215 lb)       GEN: Angela July appears well, alert and oriented x 3, pleasant and cooperative   HEENT: pupils equal, round, and reactive to light; extraocular muscles intact  NECK: supple, no carotid bruits   HEART: regular rhythm, normal S1 and S2, no murmurs, clicks, gallops or rubs, JVD is flat    LUNGS: clear to auscultation bilaterally; no wheezes, rales, or rhonchi   ABDOMEN: normal bowel sounds, soft, no tenderness, no distention  EXTREMITIES: peripheral pulses normal; no clubbing, cyanosis, or edema  NEURO: no focal findings   SKIN: normal without suspicious lesions on exposed skin    Labs & Results:  Lab Results   Component Value Date    WBC 5 79 03/02/2018    HGB 14 1 03/02/2018    HCT 41 9 03/02/2018    MCV 90 03/02/2018     03/02/2018       Chemistry        Component Value Date/Time     03/02/2018 1204    K 4 4 03/02/2018 1204     03/02/2018 1204    CO2 26 03/02/2018 1204    BUN 15 03/02/2018 1204    CREATININE 0 97 03/02/2018 1204        Component Value Date/Time    CALCIUM 8 9 03/02/2018 1204    ALKPHOS 64 02/28/2018 1416    AST 18 02/28/2018 1416    ALT 30 02/28/2018 1416    BILITOT 1 58 (H) 02/28/2018 1416        EKG personally reviewed by Delos Buerger, MD      Counseling / Coordination of Care  Total floor / unit time spent today 40 minutes  Greater than 50% of total time was spent with the patient and / or family counseling and / or coordination of care  A description of the counseling / coordination of care: 25 min  Thank you for the opportunity to participate in the care of this patient      Delos Buerger MD, PhD   Cherylene Hakim

## 2018-04-24 ENCOUNTER — OFFICE VISIT (OUTPATIENT)
Dept: URGENT CARE | Age: 58
End: 2018-04-24
Payer: COMMERCIAL

## 2018-04-24 VITALS
HEIGHT: 69 IN | BODY MASS INDEX: 31.84 KG/M2 | DIASTOLIC BLOOD PRESSURE: 92 MMHG | TEMPERATURE: 98.7 F | SYSTOLIC BLOOD PRESSURE: 144 MMHG | OXYGEN SATURATION: 97 % | RESPIRATION RATE: 18 BRPM | WEIGHT: 215 LBS | HEART RATE: 110 BPM

## 2018-04-24 DIAGNOSIS — M10.9 ACUTE GOUT OF RIGHT ANKLE, UNSPECIFIED CAUSE: Primary | ICD-10-CM

## 2018-04-24 PROCEDURE — 99213 OFFICE O/P EST LOW 20 MIN: CPT | Performed by: FAMILY MEDICINE

## 2018-04-24 RX ORDER — COLCHICINE 0.6 MG/1
TABLET ORAL
Qty: 3 TABLET | Refills: 0 | Status: SHIPPED | OUTPATIENT
Start: 2018-04-24 | End: 2018-07-27 | Stop reason: HOSPADM

## 2018-04-24 NOTE — PATIENT INSTRUCTIONS
Take colchicine as directed  Follow up with your cardiologist  Follow a low purine diet  Go to the ER for worsening symptoms  Gout   WHAT YOU NEED TO KNOW:   Gout is a form of arthritis that causes severe joint pain, redness, swelling, and stiffness  Acute gout pain starts suddenly, gets worse quickly, and stops on its own  Acute gout can become chronic and cause permanent damage to the joints  DISCHARGE INSTRUCTIONS:   Return to the emergency department if:   · You have severe pain in one or more of your joints that you cannot tolerate  · You have a fever or redness that spreads beyond the joint area  Contact your healthcare provider if:   · You have new symptoms, such as a rash, after you start gout treatment  · Your joint pain and swelling do not go away, even after treatment  · You are not urinating as much or as often as you usually do  · You have trouble taking your gout medicines  · You have questions or concerns about your condition or care  Medicines: You may need any of the following:  · Prescription pain medicine  may be given  Ask your healthcare provider how to take this medicine safely  Some prescription pain medicines contain acetaminophen  Do not take other medicines that contain acetaminophen without talking to your healthcare provider  Too much acetaminophen may cause liver damage  Prescription pain medicine may cause constipation  Ask your healthcare provider how to prevent or treat constipation  · NSAIDs , such as ibuprofen, help decrease swelling, pain, and fever  This medicine is available with or without a doctor's order  NSAIDs can cause stomach bleeding or kidney problems in certain people  If you take blood thinner medicine, always ask your healthcare provider if NSAIDs are safe for you  Always read the medicine label and follow directions  · Gout medicine  decreases joint pain and swelling  It may also be given to prevent new gout attacks       · Steroids reduce inflammation and can help your joint stiffness and pain during gout attacks  · Uric acid medicine  may be given to reduce the amount of uric acid your body makes  Some medicines may help you pass more uric acid when you urinate  · Take your medicine as directed  Contact your healthcare provider if you think your medicine is not helping or if you have side effects  Tell him or her if you are allergic to any medicine  Keep a list of the medicines, vitamins, and herbs you take  Include the amounts, and when and why you take them  Bring the list or the pill bottles to follow-up visits  Carry your medicine list with you in case of an emergency  Follow up with your healthcare provider as directed:  Write down your questions so you remember to ask them during your visits  Manage gout:   · Rest your painful joint so it can heal   Your healthcare provider may recommend crutches or a walker if the affected joint is in a leg  · Apply ice to your joint  Ice decreases pain and swelling  Use an ice pack, or put crushed ice in a plastic bag  Cover the ice pack or bag with a towel before you apply it to your painful joint  Apply ice for 15 to 20 minutes every hour, or as directed  · Elevate your joint  Elevation helps reduce swelling and pain  Raise your joint above the level of your heart as often as you can  Prop your painful joint on pillows to keep it above your heart comfortably  · Go to physical therapy if directed  A physical therapist can teach you exercises to improve flexibility and range of motion  Prevent gout attacks:   · Do not eat high-purine foods  These foods include meats, seafood, asparagus, spinach, cauliflower, and some types of beans  Healthcare providers may tell you to eat more low-fat milk products, such as yogurt  Milk products may decrease your risk for gout attacks  Vitamin C and coffee may also help   Your healthcare provider or dietitian can help you create a meal plan     · Drink more liquids as directed  Liquids such as water help remove uric acid from your body  Ask how much liquid to drink each day and which liquids are best for you  · Manage your weight  Weight loss may decrease the amount of uric acid in your body  Exercise can help you lose weight  Talk to your healthcare provider about the best exercises for you  · Control your blood sugar level if you have diabetes  Keep your blood sugar level in a normal range  This can help prevent gout attacks  · Limit or do not drink alcohol as directed  Alcohol can trigger a gout attack  Alcohol also increases your risk for dehydration  Ask your healthcare provider if alcohol is safe for you  © 2017 [employer]  Information is for End User's use only and may not be sold, redistributed or otherwise used for commercial purposes  All illustrations and images included in CareNotes® are the copyrighted property of [employer]  or [employer]umacher  The above information is an  only  It is not intended as medical advice for individual conditions or treatments  Talk to your doctor, nurse or pharmacist before following any medical regimen to see if it is safe and effective for you

## 2018-04-24 NOTE — LETTER
April 24, 2018     Patient: James Taylor   YOB: 1960   Date of Visit: 4/24/2018       To Whom It May Concern: It is my medical opinion that James Taylor may return to work on 04/25/2018 or 4/26/2018 if no better       If you have any questions or concerns, please don't hesitate to call           Sincerely,        Cristhian Parker PA-C    CC: No Recipients

## 2018-04-24 NOTE — PROGRESS NOTES
[facility]        NAME: Lam Hurst is a 62 y o  male  : 1960    MRN: [id number]  DATE: 2018  TIME: 8:42 AM    Assessment and Plan   Acute gout of right ankle, unspecified cause [M10 9]  1  Acute gout of right ankle, unspecified cause  colchicine (COLCRYS) 0 6 mg tablet         Patient Instructions     Take colchicine as directed  Follow up with your cardiologist  Follow a low purine diet  Go to the ER for worsening symptoms  Chief Complaint     Chief Complaint   Patient presents with    Foot Pain     right foot pain x , hx  of gout, denies injury         History of Present Illness       61 y/o male presents with a gout attack of his right foot/ankle x 2 days  He has had gout multiple times in the past, and states that this feels exactly like previous gout attacks  He states he usually takes aleve, but was recently advised by his cardiologist to avoid NSAIDs  Review of Systems   Review of Systems   Constitutional: Negative  Respiratory: Negative for shortness of breath  Cardiovascular: Negative for chest pain  Musculoskeletal: Positive for gait problem (right ankle pain)  All other systems reviewed and are negative          Current Medications       Current Outpatient Prescriptions:     allopurinol (ZYLOPRIM) 100 mg tablet, Take 1 tablet (100 mg total) by mouth daily, Disp: 90 tablet, Rfl: 3    apixaban (ELIQUIS) 5 mg, Take 1 tablet by mouth 2 (two) times a day for 30 days, Disp: 60 tablet, Rfl: 0    atorvastatin (LIPITOR) 40 mg tablet, Take 1 tablet (40 mg total) by mouth daily with dinner, Disp: 90 tablet, Rfl: 3    colchicine (COLCRYS) 0 6 mg tablet, Take 2 tablets po x 1, then 1 tablet po 1 hour later x 1 , Disp: 3 tablet, Rfl: 0    digoxin (LANOXIN) 0 125 mg tablet, Take 1 tablet (125 mcg total) by mouth daily, Disp: 90 tablet, Rfl: 3    furosemide (LASIX) 40 mg tablet, TAKE 1 5 TABS TWICE A DAY, Disp: , Rfl: 3    KLOR-CON M20 20 MEQ tablet, , Disp: , Rfl:     metoprolol succinate (TOPROL-XL) 100 mg 24 hr tablet, Take 1 tablet (100 mg total) by mouth 2 (two) times a day, Disp: 180 tablet, Rfl: 3    sacubitril-valsartan (ENTRESTO) 24-26 MG TABS, Take 1 tablet by mouth 2 (two) times a day, Disp: 180 tablet, Rfl: 3    Current Allergies     Allergies as of 04/24/2018    (No Known Allergies)            The following portions of the patient's history were reviewed and updated as appropriate: allergies, current medications, past family history, past medical history, past social history, past surgical history and problem list    Past Medical History:   Diagnosis Date    Coronary artery disease     Gout     Hypertension     Shingles      Past Surgical History:   Procedure Laterality Date    ELECTRICAL CARDIOVERSION  12/15/2017                Objective   /92   Pulse (!) 110   Temp 98 7 °F (37 1 °C) (Temporal)   Resp 18   Ht 5' 9" (1 753 m)   Wt 97 5 kg (215 lb)   SpO2 97%   BMI 31 75 kg/m²        Physical Exam     Physical Exam   Constitutional: He is oriented to person, place, and time  He appears well-developed and well-nourished  Cardiovascular: Normal rate, regular rhythm and normal heart sounds  Pulmonary/Chest: Effort normal and breath sounds normal    Musculoskeletal:        Right ankle: He exhibits decreased range of motion and swelling (erythema of right ankle with +++ TTP)  Neurological: He is alert and oriented to person, place, and time  Psychiatric: He has a normal mood and affect  His behavior is normal    Nursing note and vitals reviewed

## 2018-06-28 ENCOUNTER — OFFICE VISIT (OUTPATIENT)
Dept: CARDIOLOGY CLINIC | Facility: CLINIC | Age: 58
End: 2018-06-28
Payer: COMMERCIAL

## 2018-06-28 VITALS
WEIGHT: 210 LBS | HEART RATE: 80 BPM | DIASTOLIC BLOOD PRESSURE: 62 MMHG | BODY MASS INDEX: 31.01 KG/M2 | OXYGEN SATURATION: 98 % | SYSTOLIC BLOOD PRESSURE: 140 MMHG

## 2018-06-28 DIAGNOSIS — I50.22 CHRONIC SYSTOLIC HEART FAILURE (HCC): Primary | ICD-10-CM

## 2018-06-28 PROCEDURE — 99244 OFF/OP CNSLTJ NEW/EST MOD 40: CPT | Performed by: INTERNAL MEDICINE

## 2018-06-28 RX ORDER — SPIRONOLACTONE 25 MG/1
12.5 TABLET ORAL DAILY
Qty: 90 TABLET | Refills: 3 | Status: SHIPPED | OUTPATIENT
Start: 2018-06-28 | End: 2018-09-18 | Stop reason: SDUPTHER

## 2018-06-28 NOTE — PROGRESS NOTES
Heart Failure Outpatient Progress Note - Angela July 62 y o  male MRN: [id number]    @ Encounter: [id number]    Assessment/Plan:    # CM (NICM; LVEF ~20%, improved to ~40-45% w/ LVIDd decreased from 5 7 to 4 5 cm) initally with biventricular dysfunction (now RV normal), NYHA II, ACC/AHA Stage C: Most likely 2/2 tachy (AFib w/ RVR) as had BiV dysfunction +/- HTN  See cath below  Diag:  --Stress test 2/19/18 - pharm nuc w/ moderate-sized, moderately severe, partially reversible myocardial perfusion defect of the basal to mid inferolateral wall  --Cardiac cath 3/2/18 w/ single vessel CAD, with an 80% proximal stenosis of the ramus artery  As no ischemic symptoms, and LVEF out of proportion decreased to degree of CAD, no stent placed  LVEDP normal    --TTE 3/29/18 shows LVEF ~40-45% (visual inspection), but LVIDd now 4 5 cm and RV size and function now normal  Small pericardial effusion persists  Therapeutic:  --2g sodium diet  --2 L fluid restriction  --Continue EtOH cessation  --Given active work, minimal symptoms, and demanding work schedule will defer cardiac rehab for now    Neurohormonal Blockade:  --Beta-Blocker: Continue metoprolol succinate 100 mg PO BID  --ACEi, ARB or ARNi: Continue Entresto 24-26 mg PO BID (LVEF improved on Entresto - was on samples previously)  --Aldosterone Receptor Blocker: Start spironolactone 12 5 mg PO daily; check BMP in 1 week  --Diuretic: Lasix 60 mg PO BID    Electrical Resynchronization/Sudden Cardiac Death Risk Reduction:  --Stop LifeVest 2/2 to improved LVEF    Advanced Therapies: Currently does not meet criteria as minimal symptoms  # NSVT: LifeVest stopped  Continue BB as above  # AFib s/p DCCV on 12/15/17 on Eliquis  # NSTEMI type II: Demand from tachycardia  # IFG  # Hx of Gout: Continue Allopurinol    RTC in 3 months    HPI: Very pleasant 62year old gentleman out of care seen by Brooklyn Cordon referred for further optimization   He has a PMHx of FHx of CAD (grandfather, father, mother and brother), HTN, tobacco abuse w/ 30 pack year history admitted to [facility] w/ worsening MARION, 2 pillow orthopnea, and PND  He was admitted from 12/11-17/2017  He had a TTE that showed Biventricular dysfunction w/ LVEF ~20%  He was also noted to have IFG and NSTEMI II, gout and NSVT  He had gained at least 15-20 pounds  He was experiencing generalized weakness and fatigue  In the ER he was found to be in atrial fibrillation with RVR  He was treated with IV Cardizem and start an IV Cardizem drip  However, his blood pressure dropped to 80/60  Cardizem drip was stopped  Chest x-ray showed mild pulmonary vascular congestion  NT pro BNP 5242  He was diuresed with IV Lasix  TTE showed left ventricle mildly dilated  Systolic function severely reduced ejection fraction 20%  There was severe diffuse hypokinesis  Wall thickness mildly increased  The right ventricle mildly dilated  Trace to mild mitral valve regurgitation, trace to mild tricuspid valve regurgitation  LVIDd 5 71 cm  He underwent JOSE LUIS/DCCV and reverted back to normal sinus rhythm  He diuresed 30 pounds w/ resolution of SOB  He was discharged on GDMT and LifeVest  Cardiology felt he was appropriate for outpatient stress test  CM was likely tachycardia mediated due to A  fib with RVR  He was seen by Verona Ramirez in f/u at which time he was doing well  He was in NSR at that time and taking his medications  Today, 2/9/18, he comes in for f/u  He is walking miles on flat ground  He can go up and down stairs but has to pause now  He is also going up hills  He works as an  and is very active  Today, 3/5/18, he comes in for f/u  Pharm nuc showed moderate-sized, moderately severe, partially reversible myocardial perfusion defect of the basal to mid inferolateral wall  Cardiac cath w/ 80% prox ramus  Medical mgmt as LVEF reduction out of proportion and no ischemic symptoms  He feels the same as last visit      Today, 4/5/18, he comes in for f/u  He feels well  TTE showed LVEF on visual inspection ~40-45%  LVIDd decreased  Today 6/28/18, he comes in for f/u  He feels well  Playing golf and working full schedule  Has diarrhea (started after meds)  Gout flare in 5/2018  Past Medical History:   Diagnosis Date    Coronary artery disease     Gout     Hypertension     Shingles        Review of Systems - 12 point ROS was done and is negative, except as noted above  No Known Allergies    Current Outpatient Prescriptions:     allopurinol (ZYLOPRIM) 100 mg tablet, Take 1 tablet (100 mg total) by mouth daily, Disp: 90 tablet, Rfl: 3    apixaban (ELIQUIS) 5 mg, Take 1 tablet by mouth 2 (two) times a day for 30 days, Disp: 60 tablet, Rfl: 0    atorvastatin (LIPITOR) 40 mg tablet, Take 1 tablet (40 mg total) by mouth daily with dinner, Disp: 90 tablet, Rfl: 3    colchicine (COLCRYS) 0 6 mg tablet, Take 2 tablets po x 1, then 1 tablet po 1 hour later x 1 , Disp: 3 tablet, Rfl: 0    digoxin (LANOXIN) 0 125 mg tablet, Take 1 tablet (125 mcg total) by mouth daily, Disp: 90 tablet, Rfl: 3    furosemide (LASIX) 40 mg tablet, TAKE 1 5 TABS TWICE A DAY, Disp: , Rfl: 3    KLOR-CON M20 20 MEQ tablet, , Disp: , Rfl:     metoprolol succinate (TOPROL-XL) 100 mg 24 hr tablet, Take 1 tablet (100 mg total) by mouth 2 (two) times a day, Disp: 180 tablet, Rfl: 3    sacubitril-valsartan (ENTRESTO) 24-26 MG TABS, Take 1 tablet by mouth 2 (two) times a day, Disp: 180 tablet, Rfl: 3    Social History     Social History    Marital status: Single     Spouse name: N/A    Number of children: N/A    Years of education: N/A     Occupational History    Not on file       Social History Main Topics    Smoking status: Former Smoker     Quit date: 8/11/2017    Smokeless tobacco: Never Used    Alcohol use No    Drug use: No    Sexual activity: Yes     Partners: Female     Other Topics Concern    Not on file     Social History Narrative    No narrative on file       No family history on file  Physical Exam:    Vitals: There were no vitals taken for this visit  , There is no height or weight on file to calculate BMI ,   Wt Readings from Last 3 Encounters:   04/24/18 97 5 kg (215 lb)   04/05/18 97 5 kg (215 lb)   03/05/18 95 7 kg (211 lb)     There were no vitals filed for this visit  GEN: Power Thomas appears well, alert and oriented x 3, pleasant and cooperative   HEENT: pupils equal, round, and reactive to light; extraocular muscles intact  NECK: supple, no carotid bruits   HEART: regular rhythm, normal S1 and S2, no murmurs, clicks, gallops or rubs, JVP is    LUNGS: clear to auscultation bilaterally; no wheezes, rales, or rhonchi   ABDOMEN: normal bowel sounds, soft, no tenderness, no distention  EXTREMITIES: peripheral pulses normal; no clubbing, cyanosis, or edema  NEURO: no focal findings   SKIN: normal without suspicious lesions on exposed skin    Labs & Results:  Lab Results   Component Value Date    WBC 5 79 03/02/2018    HGB 14 1 03/02/2018    HCT 41 9 03/02/2018    MCV 90 03/02/2018     03/02/2018       Chemistry        Component Value Date/Time     03/02/2018 1204    K 4 4 03/02/2018 1204     03/02/2018 1204    CO2 26 03/02/2018 1204    BUN 15 03/02/2018 1204    CREATININE 0 97 03/02/2018 1204        Component Value Date/Time    CALCIUM 8 9 03/02/2018 1204    ALKPHOS 64 02/28/2018 1416    AST 18 02/28/2018 1416    ALT 30 02/28/2018 1416    BILITOT 1 58 (H) 02/28/2018 1416        EKG personally reviewed by Chata Castorena MD      Counseling / Coordination of Care  Total floor / unit time spent today 40 minutes  Greater than 50% of total time was spent with the patient and / or family counseling and / or coordination of care  A description of the counseling / coordination of care: 25 min  Thank you for the opportunity to participate in the care of this patient      Chata Castorena MD, PhD   Eloy Beaulieu  [facility]

## 2018-07-24 DIAGNOSIS — I48.91 ATRIAL FIBRILLATION, UNSPECIFIED TYPE (HCC): Primary | ICD-10-CM

## 2018-07-26 ENCOUNTER — APPOINTMENT (EMERGENCY)
Dept: RADIOLOGY | Facility: HOSPITAL | Age: 58
End: 2018-07-26
Payer: COMMERCIAL

## 2018-07-26 ENCOUNTER — HOSPITAL ENCOUNTER (OUTPATIENT)
Facility: HOSPITAL | Age: 58
Setting detail: OBSERVATION
Discharge: HOME | End: 2018-07-27
Attending: EMERGENCY MEDICINE | Admitting: INTERNAL MEDICINE
Payer: COMMERCIAL

## 2018-07-26 DIAGNOSIS — R61 DIAPHORESIS: ICD-10-CM

## 2018-07-26 DIAGNOSIS — R55 SYNCOPE AND COLLAPSE: Primary | ICD-10-CM

## 2018-07-26 DIAGNOSIS — R77.8 ELEVATED TROPONIN: ICD-10-CM

## 2018-07-26 LAB
ALBUMIN SERPL BCP-MCNC: 3.7 G/DL (ref 3.5–5)
ALP SERPL-CCNC: 55 U/L (ref 46–116)
ALT SERPL W P-5'-P-CCNC: 28 U/L (ref 12–78)
ANION GAP SERPL CALCULATED.3IONS-SCNC: 9 MMOL/L (ref 4–13)
APTT PPP: 19 SECONDS (ref 24–36)
AST SERPL W P-5'-P-CCNC: 15 U/L (ref 5–45)
BASOPHILS # BLD AUTO: 0.05 THOUSANDS/ΜL (ref 0–0.1)
BASOPHILS NFR BLD AUTO: 1 % (ref 0–1)
BILIRUB SERPL-MCNC: 1.07 MG/DL (ref 0.2–1)
BUN SERPL-MCNC: 16 MG/DL (ref 5–25)
CALCIUM SERPL-MCNC: 8.6 MG/DL (ref 8.3–10.1)
CHLORIDE SERPL-SCNC: 105 MMOL/L (ref 100–108)
CO2 SERPL-SCNC: 21 MMOL/L (ref 21–32)
CREAT SERPL-MCNC: 0.94 MG/DL (ref 0.6–1.3)
EOSINOPHIL # BLD AUTO: 0.11 THOUSAND/ΜL (ref 0–0.61)
EOSINOPHIL NFR BLD AUTO: 2 % (ref 0–6)
ERYTHROCYTE [DISTWIDTH] IN BLOOD BY AUTOMATED COUNT: 13.5 % (ref 11.6–15.1)
GFR SERPL CREATININE-BSD FRML MDRD: 89 ML/MIN/1.73SQ M
GLUCOSE SERPL-MCNC: 95 MG/DL (ref 65–140)
HCT VFR BLD AUTO: 40.9 % (ref 36.5–49.3)
HGB BLD-MCNC: 13.4 G/DL (ref 12–17)
IMM GRANULOCYTES # BLD AUTO: 0.02 THOUSAND/UL (ref 0–0.2)
IMM GRANULOCYTES NFR BLD AUTO: 0 % (ref 0–2)
INR PPP: 1 (ref 0.86–1.17)
LYMPHOCYTES # BLD AUTO: 1.75 THOUSANDS/ΜL (ref 0.6–4.47)
LYMPHOCYTES NFR BLD AUTO: 31 % (ref 14–44)
MCH RBC QN AUTO: 31.2 PG (ref 26.8–34.3)
MCHC RBC AUTO-ENTMCNC: 32.8 G/DL (ref 31.4–37.4)
MCV RBC AUTO: 95 FL (ref 82–98)
MONOCYTES # BLD AUTO: 0.59 THOUSAND/ΜL (ref 0.17–1.22)
MONOCYTES NFR BLD AUTO: 11 % (ref 4–12)
NEUTROPHILS # BLD AUTO: 3.07 THOUSANDS/ΜL (ref 1.85–7.62)
NEUTS SEG NFR BLD AUTO: 55 % (ref 43–75)
NRBC BLD AUTO-RTO: 0 /100 WBCS
PLATELET # BLD AUTO: 212 THOUSANDS/UL (ref 149–390)
PMV BLD AUTO: 10.6 FL (ref 8.9–12.7)
POTASSIUM SERPL-SCNC: 3.7 MMOL/L (ref 3.5–5.3)
PROT SERPL-MCNC: 7.7 G/DL (ref 6.4–8.2)
PROTHROMBIN TIME: 13.3 SECONDS (ref 11.8–14.2)
RBC # BLD AUTO: 4.3 MILLION/UL (ref 3.88–5.62)
SODIUM SERPL-SCNC: 135 MMOL/L (ref 136–145)
TROPONIN I SERPL-MCNC: 0.16 NG/ML
WBC # BLD AUTO: 5.59 THOUSAND/UL (ref 4.31–10.16)

## 2018-07-26 PROCEDURE — 93005 ELECTROCARDIOGRAM TRACING: CPT

## 2018-07-26 PROCEDURE — 85730 THROMBOPLASTIN TIME PARTIAL: CPT | Performed by: EMERGENCY MEDICINE

## 2018-07-26 PROCEDURE — 36415 COLL VENOUS BLD VENIPUNCTURE: CPT

## 2018-07-26 PROCEDURE — 84484 ASSAY OF TROPONIN QUANT: CPT | Performed by: EMERGENCY MEDICINE

## 2018-07-26 PROCEDURE — 85610 PROTHROMBIN TIME: CPT | Performed by: EMERGENCY MEDICINE

## 2018-07-26 PROCEDURE — 85025 COMPLETE CBC W/AUTO DIFF WBC: CPT | Performed by: EMERGENCY MEDICINE

## 2018-07-26 PROCEDURE — 80053 COMPREHEN METABOLIC PANEL: CPT | Performed by: EMERGENCY MEDICINE

## 2018-07-26 PROCEDURE — 71046 X-RAY EXAM CHEST 2 VIEWS: CPT

## 2018-07-26 RX ORDER — ASPIRIN 81 MG/1
324 TABLET, CHEWABLE ORAL ONCE
Status: COMPLETED | OUTPATIENT
Start: 2018-07-26 | End: 2018-07-26

## 2018-07-26 RX ADMIN — SODIUM CHLORIDE 250 ML: 0.9 INJECTION, SOLUTION INTRAVENOUS at 21:26

## 2018-07-26 RX ADMIN — ASPIRIN 81 MG 324 MG: 81 TABLET ORAL at 21:21

## 2018-07-27 VITALS
HEART RATE: 73 BPM | OXYGEN SATURATION: 97 % | RESPIRATION RATE: 18 BRPM | SYSTOLIC BLOOD PRESSURE: 142 MMHG | BODY MASS INDEX: 30.81 KG/M2 | DIASTOLIC BLOOD PRESSURE: 90 MMHG | TEMPERATURE: 98.9 F | WEIGHT: 208 LBS | HEIGHT: 69 IN

## 2018-07-27 PROBLEM — R55 SYNCOPE AND COLLAPSE: Status: ACTIVE | Noted: 2018-07-27

## 2018-07-27 PROBLEM — I47.29 NSVT (NONSUSTAINED VENTRICULAR TACHYCARDIA): Status: ACTIVE | Noted: 2018-07-27

## 2018-07-27 PROBLEM — I50.20 SYSTOLIC CHF (HCC): Status: ACTIVE | Noted: 2018-07-27

## 2018-07-27 PROBLEM — I47.2 NSVT (NONSUSTAINED VENTRICULAR TACHYCARDIA): Status: ACTIVE | Noted: 2018-07-27

## 2018-07-27 PROBLEM — I48.0 PAF (PAROXYSMAL ATRIAL FIBRILLATION) (HCC): Status: ACTIVE | Noted: 2018-07-27

## 2018-07-27 LAB
ANION GAP SERPL CALCULATED.3IONS-SCNC: 5 MMOL/L (ref 4–13)
ATRIAL RATE: 66 BPM
BUN SERPL-MCNC: 16 MG/DL (ref 5–25)
CALCIUM SERPL-MCNC: 8.3 MG/DL (ref 8.3–10.1)
CHLORIDE SERPL-SCNC: 106 MMOL/L (ref 100–108)
CO2 SERPL-SCNC: 25 MMOL/L (ref 21–32)
CREAT SERPL-MCNC: 0.92 MG/DL (ref 0.6–1.3)
ERYTHROCYTE [DISTWIDTH] IN BLOOD BY AUTOMATED COUNT: 13.6 % (ref 11.6–15.1)
GFR SERPL CREATININE-BSD FRML MDRD: 91 ML/MIN/1.73SQ M
GLUCOSE SERPL-MCNC: 88 MG/DL (ref 65–140)
HCT VFR BLD AUTO: 40.1 % (ref 36.5–49.3)
HGB BLD-MCNC: 13 G/DL (ref 12–17)
MAGNESIUM SERPL-MCNC: 2.3 MG/DL (ref 1.6–2.6)
MCH RBC QN AUTO: 31 PG (ref 26.8–34.3)
MCHC RBC AUTO-ENTMCNC: 32.4 G/DL (ref 31.4–37.4)
MCV RBC AUTO: 96 FL (ref 82–98)
P AXIS: 30 DEGREES
PHOSPHATE SERPL-MCNC: 3.6 MG/DL (ref 2.7–4.5)
PLATELET # BLD AUTO: 206 THOUSANDS/UL (ref 149–390)
PMV BLD AUTO: 10.2 FL (ref 8.9–12.7)
POTASSIUM SERPL-SCNC: 3.8 MMOL/L (ref 3.5–5.3)
PR INTERVAL: 164 MS
QRS AXIS: 44 DEGREES
QRSD INTERVAL: 98 MS
QT INTERVAL: 392 MS
QTC INTERVAL: 410 MS
RBC # BLD AUTO: 4.2 MILLION/UL (ref 3.88–5.62)
SODIUM SERPL-SCNC: 136 MMOL/L (ref 136–145)
T WAVE AXIS: 49 DEGREES
TROPONIN I SERPL-MCNC: 0.14 NG/ML
TROPONIN I SERPL-MCNC: 0.15 NG/ML
VENTRICULAR RATE: 66 BPM
WBC # BLD AUTO: 5.54 THOUSAND/UL (ref 4.31–10.16)

## 2018-07-27 PROCEDURE — 93010 ELECTROCARDIOGRAM REPORT: CPT | Performed by: INTERNAL MEDICINE

## 2018-07-27 PROCEDURE — 84100 ASSAY OF PHOSPHORUS: CPT | Performed by: INTERNAL MEDICINE

## 2018-07-27 PROCEDURE — 99219 PR INITIAL OBSERVATION CARE/DAY 50 MINUTES: CPT | Performed by: INTERNAL MEDICINE

## 2018-07-27 PROCEDURE — 80048 BASIC METABOLIC PNL TOTAL CA: CPT | Performed by: INTERNAL MEDICINE

## 2018-07-27 PROCEDURE — 99285 EMERGENCY DEPT VISIT HI MDM: CPT

## 2018-07-27 PROCEDURE — 83735 ASSAY OF MAGNESIUM: CPT | Performed by: INTERNAL MEDICINE

## 2018-07-27 PROCEDURE — 85027 COMPLETE CBC AUTOMATED: CPT | Performed by: INTERNAL MEDICINE

## 2018-07-27 PROCEDURE — 84484 ASSAY OF TROPONIN QUANT: CPT | Performed by: INTERNAL MEDICINE

## 2018-07-27 RX ORDER — DIGOXIN 125 MCG
125 TABLET ORAL DAILY
Status: DISCONTINUED | OUTPATIENT
Start: 2018-07-27 | End: 2018-07-27

## 2018-07-27 RX ORDER — SPIRONOLACTONE 25 MG/1
12.5 TABLET ORAL DAILY
Status: DISCONTINUED | OUTPATIENT
Start: 2018-07-27 | End: 2018-07-27 | Stop reason: HOSPADM

## 2018-07-27 RX ORDER — ASPIRIN 81 MG/1
324 TABLET, CHEWABLE ORAL ONCE
Status: DISCONTINUED | OUTPATIENT
Start: 2018-07-27 | End: 2018-07-27

## 2018-07-27 RX ORDER — SODIUM CHLORIDE 9 MG/ML
100 INJECTION, SOLUTION INTRAVENOUS CONTINUOUS
Status: DISCONTINUED | OUTPATIENT
Start: 2018-07-27 | End: 2018-07-27 | Stop reason: HOSPADM

## 2018-07-27 RX ORDER — ALLOPURINOL 100 MG/1
100 TABLET ORAL DAILY
Status: DISCONTINUED | OUTPATIENT
Start: 2018-07-27 | End: 2018-07-27 | Stop reason: HOSPADM

## 2018-07-27 RX ORDER — ATORVASTATIN CALCIUM 40 MG/1
40 TABLET, FILM COATED ORAL
Status: DISCONTINUED | OUTPATIENT
Start: 2018-07-27 | End: 2018-07-27 | Stop reason: HOSPADM

## 2018-07-27 RX ORDER — METOPROLOL SUCCINATE 100 MG/1
100 TABLET, EXTENDED RELEASE ORAL 2 TIMES DAILY
Status: DISCONTINUED | OUTPATIENT
Start: 2018-07-27 | End: 2018-07-27 | Stop reason: HOSPADM

## 2018-07-27 RX ADMIN — SODIUM CHLORIDE 100 ML/HR: 0.9 INJECTION, SOLUTION INTRAVENOUS at 08:11

## 2018-07-27 RX ADMIN — SPIRONOLACTONE 12.5 MG: 25 TABLET, FILM COATED ORAL at 08:11

## 2018-07-27 RX ADMIN — APIXABAN 5 MG: 5 TABLET, FILM COATED ORAL at 08:12

## 2018-07-27 RX ADMIN — FUROSEMIDE 60 MG: 40 TABLET ORAL at 08:12

## 2018-07-27 RX ADMIN — METOPROLOL SUCCINATE 100 MG: 100 TABLET, EXTENDED RELEASE ORAL at 08:11

## 2018-07-27 NOTE — CASE MANAGEMENT
Initial Clinical Review    Admission: Date/Time/Statement: Observation 7/26 @ 2124    Orders Placed This Encounter   Procedures    Place in Observation (expected length of stay for this patient is less than two midnights)     Standing Status:   Standing     Number of Occurrences:   1     Order Specific Question:   Admitting Physician     Answer:   Destin Bhatt [[id number]]     Order Specific Question:   Level of Care     Answer:   Med Surg [16]       ED: Date/Time/Mode of Arrival:   ED Arrival Information     Expected Arrival Acuity Means of Arrival Escorted By Service Admission Type    - 7/26/2018 19:48 Urgent Ambulance [facility] General Medicine Urgent    Arrival Complaint    syncope          Chief Complaint:   Chief Complaint   Patient presents with    Syncope     Pt was at the bar and just finished 2 drinks and had a syncopal episode  Stated that he felt a little dizzy at the time  "I spun out big time and I thought I could fight it but I couldn't " Denies falling or hitting his head  Also c/o diaphoresis after episode  History of Illness:  62 y o  male with past medical history of heart failure and atrial fibrillation who presents to the emergency department after syncopal event  The patient reports that he had been working outside in the heat all day and went to a bar after work with friends  The patient states that he had about 2 beers and then got up and felt lightheaded  He then passed out, falling onto one of his friends  The patient did not suffer any trauma as result of his fall as he was caught by bystanders  He is uncertain how long he was unconscious but believes that it was no longer than 10-30 seconds  Patient does report diaphoresis following his syncopal event  The patient reports having a syncopal event years prior, but no such events recently        In the emergency department vital signs were stable  BMP had a sodium of 135  CBC was unremarkable    Coagulation studies were significant for a PTT of 19  The 1st troponin was 0 16, 2nd and 3rd troponins have not yet been drawn  EKG demonstrated normal sinus rhythm without T-wave or ST segment abnormality  Chest x-ray did not show any acute findings, but did demonstrate a mildly enlarged cardiac silhouette  ED Vital Signs:   ED Triage Vitals   Temperature Pulse Respirations Blood Pressure SpO2   07/26/18 1951 07/26/18 2020 07/26/18 2020 07/26/18 2020 07/26/18 2020   97 7 °F (36 5 °C) 68 20 154/89 97 %      Temp Source Heart Rate Source Patient Position - Orthostatic VS BP Location FiO2 (%)   07/26/18 1951 07/26/18 2020 07/26/18 2020 07/26/18 2020 --   Oral Monitor Sitting Right arm       Pain Score       --               Wt Readings from Last 1 Encounters:   07/27/18 94 3 kg (208 lb)       Vital Signs (abnormal): B/P = 151/101    Abnormal Labs:    07/26/18 2005    Sodium 136 - 145 mmol/L 135        07/26/18 2005    Troponin I <=0 04 ng/mL 0 16       Total Bilirubin 0 20 - 1 00 mg/dL 1 07         Diagnostic Test Results: CXR - No acute findings  Diminished prominence of the heart shadow compared with the prior  ED Treatment:   Medication Administration from 07/26/2018 1948 to 07/27/2018 0012       Date/Time Order Dose Route Action     07/26/2018 2126 sodium chloride 0 9 % bolus 250 mL 250 mL Intravenous New Bag     07/26/2018 2121 aspirin chewable tablet 324 mg 324 mg Oral Given          Past Medical/Surgical History:    Active Ambulatory Problems     Diagnosis Date Noted    [facility] 12/11/2017    Atrial fibrillation with rapid ventricular response ([facility] ) 12/11/2017    Serum total bilirubin elevated 12/11/2017    Elevated blood pressure reading 12/11/2017     Resolved Ambulatory Problems     Diagnosis Date Noted    Non-ST elevation myocardial infarction (NSTEMI) ([facility] ) 12/11/2017     Past Medical History:   Diagnosis Date    A-fib [facility])     Coronary artery disease     Gout     Hypertension     Shingles        Admitting Diagnosis: Syncope and collapse [R55]  Syncope [R55]  NSTEMI (non-ST elevated myocardial infarction) ([facility] ) [I21 4]    Age/Sex: 62 y o  male    Assessment/Plan:   Syncopal event  -vasovagal versus cardiac  -1st troponin 0 16, continue to trend  -EKG did not show any signs of ischemia  -no chest pain, shortness of breath  -continue telemetry     Atrial fibrillation  -continue Eliquis, continue entresto, continue metoprolol     Heart failure  -continue Lasix 60 mg, spironolactone 12 5 mg, metoprolol, Entresto  -no signs of acute heart failure exacerbation     Gout  -continue allopurinol     Code Status: Level 1 - Full Code  VTE Pharmacologic Prophylaxis:  Eliquis  VTE Mechanical Prophylaxis: sequential compression device      Admission Orders:  Tele monitoring    Scheduled Meds:   Current Facility-Administered Medications:  allopurinol 100 mg Oral Daily   apixaban 5 mg Oral BID   atorvastatin 40 mg Oral Daily With Dinner   furosemide 60 mg Oral BID (diuretic)   metoprolol succinate 100 mg Oral BID   sacubitril-valsartan 1 tablet Oral BID   spironolactone 12 5 mg Oral Daily     Continuous Infusions:   sodium chloride 100 mL/hr Last Rate: 100 mL/hr (07/27/18 0811)     PRN Meds:

## 2018-07-27 NOTE — DISCHARGE SUMMARY
[facility] CENTRAL Discharge Summary - José Miller July 62 y o  male MRN: [id number]    [facility]  Room / Bed: [facility] 217/[facility] 217-01 Encounter: [id number]    BRIEF OVERVIEW    Admitting Provider: Yadiel Tse MD  Discharge Provider: Dr Montes Favorite  Primary Care Physician at Discharge: [facility]     Discharge To: Home     Admission Date: 7/26/2018     Discharge Date: 7/27/2018 10:22 AM    Primary Discharge Diagnosis  Principal Problem:    Syncope and collapse  Active Problems:    PAF (paroxysmal atrial fibrillation) ([facility])    Systolic CHF (HCC)    NSVT (nonsustained ventricular tachycardia) ([facility])  Resolved Problems:    * No resolved hospital problems  *      Other Problems Addressed:none     Consulting Providers: none        Therapeutic Operative Procedures Performed: none     Diagnostic Procedures Performed    X-ray Chest 2 Views  Result Date: 7/26/2018  Impression: No acute findings  Diminished prominence of the heart shadow compared with the prior  Discharge Disposition: Home/Self Care  Discharged With Lines: No   Test Results Pending at Discharge: none     Outpatient Follow-Up  Please see your DC summary for TCM appt   Follow up with consulting providers  Please follow up with Dr Gerald Benavidez in September   Active Issues Requiring Follow-up no    Code Status: Prior  Advance Directive and Living Will: <no information>  Power of :    POLST:      Medications       Allergies  No Known Allergies  Discharge Diet: cardiac diet  Activity restrictions: none    [address] [facility] Course    This is a 62 tear old male with past medical history of heart failure and atrial fibrillation who presented to [facility] ED with complaint of syncopal event  Pt had reported that he had been out working in the sun all day and then went to bar with friends where he had 2 drinks  He reported getting lightheaded and then having a syncopal event   In the ED EKG was wnl, pt was monitored on telemetry which did not have any events  This morning pt reported that he wanted to go home  Pt was deemed stable to be discharged  Pt has a follow up appointment at the clinic and is amendable to following up  No changes were made to his medications  Presenting Problem/History of Present Illness  Principal Problem:    Syncope and collapse  Active Problems:    PAF (paroxysmal atrial fibrillation) ([facility])    Systolic CHF ([facility])    NSVT (nonsustained ventricular tachycardia) ([facility])  Resolved Problems:    * No resolved hospital problems  *    Physical Exam   Constitutional: He appears well-developed and well-nourished  No distress  HENT:   Head: Normocephalic and atraumatic  Eyes: Conjunctivae are normal  Right eye exhibits no discharge  Left eye exhibits no discharge  Neck: Neck supple  No JVD present  Cardiovascular: Normal rate and regular rhythm  No murmur heard  Pulmonary/Chest: No respiratory distress  He has no wheezes  Abdominal: Soft  He exhibits no distension  There is no tenderness  There is no rebound and no guarding  Musculoskeletal: Normal range of motion  He exhibits no edema  Neurological: He is alert  No cranial nerve deficit  Skin: Skin is warm and dry  No rash noted  He is not diaphoretic  No erythema  Other Pertinent Test Results: none     Discharge Condition: good    Discharge  Statement   I spent 30 minutes minutes discharging the patient  This time was spent on the day of discharge  I had direct contact with the patient on the day of discharge  Additional documentation is required if more than 30 minutes were spent on discharge

## 2018-07-27 NOTE — ED PROVIDER NOTES
History  Chief Complaint   Patient presents with    Syncope     Pt was at the bar and just finished 2 drinks and had a syncopal episode  Stated that he felt a little dizzy at the time  "I spun out big time and I thought I could fight it but I couldn't " Denies falling or hitting his head  Also c/o diaphoresis after episode  Was at the bar prior to arrival; states had 2 drinks; he works since  did not drink as much today and then while @ bar developed few seconds of vertigo and then woke up leaning on his friend next to him at the bar  WAS out for 10-30 seconds  Denies any chest pain; shortness of breath  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 without chest pain shortness of breath  With this episode he did have diaphoresis  Denies any numbness tingling; or weakness  States only had this sensation 1 time before and that is when he was hypotensive  Patient has severe ischemic cardiomyopathy; is on Eliquis; had AFib has not been in recently he states  Right now denies any symptoms besides still feeling slightly warm  There is no radiation of chest pain; there is no chest tightness or discomfort  There is no shortness of breath  Denies fevers; chills  States only 2 drinks; no other drinks/drugs  Has been following a salt restricted diet and is not smoking  Denies any neurologic symptoms at this time  Prior to Admission Medications   Prescriptions Last Dose Informant Patient Reported?  Taking?   allopurinol (ZYLOPRIM) 100 mg tablet 7/26/2018 at Unknown time  No Yes   Sig: Take 1 tablet (100 mg total) by mouth daily   apixaban (ELIQUIS) 5 mg 7/26/2018 at Unknown time  No Yes   Sig: Take 1 tablet (5 mg total) by mouth 2 (two) times a day for 30 days   atorvastatin (LIPITOR) 40 mg tablet 7/26/2018 at Unknown time  No Yes   Sig: Take 1 tablet (40 mg total) by mouth daily with dinner   colchicine (COLCRYS) 0 6 mg tablet 7/26/2018 at Unknown time  No Yes   Sig: Take 2 tablets po x 1, then 1 tablet po 1 hour later x 1    digoxin (LANOXIN) 0 125 mg tablet 7/26/2018 at Unknown time  No Yes   Sig: Take 1 tablet (125 mcg total) by mouth daily   furosemide (LASIX) 40 mg tablet 7/26/2018 at Unknown time  Yes Yes   Sig: TAKE 1 5 TABS TWICE A DAY   metoprolol succinate (TOPROL-XL) 100 mg 24 hr tablet 7/26/2018 at Unknown time  No Yes   Sig: Take 1 tablet (100 mg total) by mouth 2 (two) times a day   sacubitril-valsartan (ENTRESTO) 24-26 MG TABS 7/26/2018 at Unknown time  No Yes   Sig: Take 1 tablet by mouth 2 (two) times a day   spironolactone (ALDACTONE) 25 mg tablet 7/26/2018 at Unknown time  No Yes   Sig: Take 0 5 tablets (12 5 mg total) by mouth daily      Facility-Administered Medications: None       Past Medical History:   Diagnosis Date    A-fib ([facility] )     Coronary artery disease     Gout     Hypertension     Shingles        Past Surgical History:   Procedure Laterality Date    ELECTRICAL CARDIOVERSION  12/15/2017            History reviewed  No pertinent family history  I have reviewed and agree with the history as documented  Social History   Substance Use Topics    Smoking status: Former Smoker     Quit date: 8/11/2017    Smokeless tobacco: Never Used    Alcohol use Yes      Comment: Socially        Review of Systems   Constitutional: Negative for activity change, appetite change, chills and fever  HENT: Negative for congestion, rhinorrhea and sore throat  Eyes: Negative for photophobia and pain  Respiratory: Negative for cough, chest tightness and wheezing  Cardiovascular: Negative for chest pain, palpitations and leg swelling  Gastrointestinal: Negative for abdominal distention, abdominal pain, constipation, diarrhea and nausea  Genitourinary: Negative for dysuria, flank pain, frequency and hematuria  Musculoskeletal: Negative for arthralgias, back pain, myalgias, neck pain and neck stiffness  Skin: Negative for color change and rash     Neurological: Positive for dizziness and syncope  Negative for seizures, speech difficulty, weakness, light-headedness and headaches  Hematological: Negative for adenopathy  Psychiatric/Behavioral: Negative for agitation, confusion, hallucinations and suicidal ideas  Physical Exam  ED Triage Vitals   Temperature Pulse Respirations Blood Pressure SpO2   07/26/18 1951 07/26/18 2020 07/26/18 2020 07/26/18 2020 07/26/18 2020   97 7 °F (36 5 °C) 68 20 154/89 97 %      Temp Source Heart Rate Source Patient Position - Orthostatic VS BP Location FiO2 (%)   07/26/18 1951 07/26/18 2020 07/26/18 2020 07/26/18 2020 --   Oral Monitor Sitting Right arm       Pain Score       --                  Orthostatic Vital Signs  Vitals:    07/26/18 2120 07/26/18 2150 07/26/18 2220 07/26/18 2300   BP: 139/80 140/82 125/69 126/78   Pulse: 65 67 66 64   Patient Position - Orthostatic VS: Sitting Sitting Sitting Lying       Physical Exam   Constitutional: He is oriented to person, place, and time  He appears well-developed and well-nourished  No distress  HENT:   Head: Normocephalic and atraumatic  Mouth/Throat: Oropharynx is clear and moist  No oropharyngeal exudate  Eyes: EOM are normal  Pupils are equal, round, and reactive to light  Right eye exhibits no discharge  Left eye exhibits no discharge  Neck: Normal range of motion  Neck supple  No JVD present  No tracheal deviation present  Full range of motion   Cardiovascular: Normal rate and normal heart sounds  No murmur heard  Pulmonary/Chest: Effort normal and breath sounds normal  No respiratory distress  He has no wheezes  He has no rales  No increased work of breathing   Abdominal: Soft  Bowel sounds are normal  He exhibits no distension  There is no tenderness  There is no rebound and no guarding  No tenderness x4   Musculoskeletal: Normal range of motion  He exhibits no edema or deformity  No swelling in the extremities     Lymphadenopathy:     He has no cervical adenopathy  Neurological: He is alert and oriented to person, place, and time  No cranial nerve deficit  He exhibits normal muscle tone  GCS 15  No dysarthria  EOMI  Motor 5/5  Symmetrical    Skin: Skin is warm and dry  Capillary refill takes less than 2 seconds  He is not diaphoretic  No erythema  No pallor  Psychiatric: He has a normal mood and affect   His behavior is normal  Judgment and thought content normal        ED Medications  Medications   allopurinol (ZYLOPRIM) tablet 100 mg (not administered)   apixaban (ELIQUIS) tablet 5 mg (not administered)   atorvastatin (LIPITOR) tablet 40 mg (not administered)   metoprolol succinate (TOPROL-XL) 24 hr tablet 100 mg (not administered)   sacubitril-valsartan (ENTRESTO) 24-26 MG per tablet 1 tablet (not administered)   spironolactone (ALDACTONE) tablet 12 5 mg (not administered)   furosemide (LASIX) tablet 60 mg (not administered)   sodium chloride 0 9 % bolus 250 mL (250 mL Intravenous New Bag 7/26/18 2126)   aspirin chewable tablet 324 mg (324 mg Oral Given 7/26/18 2121)       Diagnostic Studies  Results Reviewed     Procedure Component Value Units Date/Time    Protime-INR [[id number]]  (Normal) Collected:  07/26/18 2005    Lab Status:  Final result Specimen:  Blood from Hand, Left Updated:  07/26/18 2036     Protime 13 3 seconds      INR 1 00    APTT [[id number]]  (Abnormal) Collected:  07/26/18 2005    Lab Status:  Final result Specimen:  Blood from Hand, Left Updated:  07/26/18 2036     PTT 19 (L) seconds     Troponin I [[id number]]  (Abnormal) Collected:  07/26/18 2005    Lab Status:  Final result Specimen:  Blood from Hand, Left Updated:  07/26/18 2034     Troponin I 0 16 (H) ng/mL     Comprehensive metabolic panel [[id number]]  (Abnormal) Collected:  07/26/18 2005    Lab Status:  Final result Specimen:  Blood from Hand, Left Updated:  07/26/18 2033     Sodium 135 (L) mmol/L      Potassium 3 7 mmol/L      Chloride 105 mmol/L      CO2 21 mmol/L      Anion Gap 9 mmol/L      BUN 16 mg/dL      Creatinine 0 94 mg/dL      Glucose 95 mg/dL      Calcium 8 6 mg/dL      AST 15 U/L      ALT 28 U/L      Alkaline Phosphatase 55 U/L      Total Protein 7 7 g/dL      Albumin 3 7 g/dL      Total Bilirubin 1 07 (H) mg/dL      eGFR 89 ml/min/1 73sq m     Narrative:         National Kidney Disease Education Program recommendations are as follows:  GFR calculation is accurate only with a steady state creatinine  Chronic Kidney disease less than 60 ml/min/1 73 sq  meters  Kidney failure less than 15 ml/min/1 73 sq  meters  CBC and differential [[id number]] Collected:  07/26/18 2005    Lab Status:  Final result Specimen:  Blood from Hand, Left Updated:  07/26/18 2014     WBC 5 59 Thousand/uL      RBC 4 30 Million/uL      Hemoglobin 13 4 g/dL      Hematocrit 40 9 %      MCV 95 fL      MCH 31 2 pg      MCHC 32 8 g/dL      RDW 13 5 %      MPV 10 6 fL      Platelets 304 Thousands/uL      nRBC 0 /100 WBCs      Neutrophils Relative 55 %      Immat GRANS % 0 %      Lymphocytes Relative 31 %      Monocytes Relative 11 %      Eosinophils Relative 2 %      Basophils Relative 1 %      Neutrophils Absolute 3 07 Thousands/µL      Immature Grans Absolute 0 02 Thousand/uL      Lymphocytes Absolute 1 75 Thousands/µL      Monocytes Absolute 0 59 Thousand/µL      Eosinophils Absolute 0 11 Thousand/µL      Basophils Absolute 0 05 Thousands/µL                  X-ray chest 2 views   Final Result by Laureen Mitchell MD (07/26 2055)      No acute findings  Diminished prominence of the heart shadow compared with the prior              Workstation performed: [id number]               Procedures  ECG 12 Lead Documentation  Date/Time: 7/26/2018 8:16 PM  Performed by: Shannon Schmid  Authorized by: Shannon Schmid     Indications / Diagnosis:  Syncope  ECG reviewed by me, the ED Provider: yes    Patient location:  ED  Previous ECG:     Previous ECG:  Compared to current    Similarity:  No change  Interpretation: Interpretation: normal    Rate:     ECG rate:  66    ECG rate assessment: normal    Rhythm:     Rhythm: sinus rhythm    Ectopy:     Ectopy: none    QRS:     QRS axis:  Normal    QRS intervals:  Normal  Conduction:     Conduction: normal    ST segments:     ST segments:  Normal  T waves:     T waves: normal            Phone Consults  ED Phone Contact    ED Course  ED Course as of Jul 27 0106   Thu Jul 26, 2018 2033 Decreased from prior Total Bilirubin: (!) 1 07   2034 Troponin I: (!) 0 16   2040 Ischemic cardiomyopathy has troponin elevation will give aspirin is on Eliquis will need admission                                MDM  Number of Diagnoses or Management Options  NSTEMI (non-ST elevated myocardial infarction) [facility]):   Syncope and collapse:   Diagnosis management comments: Significant cardiac history including arrhythmia  Troponin leak with normal renal function so suspect this is new  EKG without acute ischemic findings  Given full dose of aspirin  Vital signs unremarkable  States asymptomatic at this time but has troponin leak and syncope  Will need telemetry monitoring and further evaluation  Is on Eliquis did not give heparin  Also EKG not overtly ischemic and not currently having acute chest pain therefore did not consult Cardiology at this  Admitted to Medicine      CritCare Time    Disposition  Final diagnoses:   Syncope and collapse   Elevated troponin   Diaphoresis     Time reflects when diagnosis was documented in both MDM as applicable and the Disposition within this note     Time User Action Codes Description Comment    7/26/2018  9:24 PM Suze Girt Add [R55] Syncope and collapse     7/26/2018  9:24 PM Suze Girt Add [I21 4] NSTEMI (non-ST elevated myocardial infarction) ([facility] 75 )     7/27/2018  1:05 AM Suze Girt Modify [I21 4] [facility]non[facility]ST [facility]     7/27/2018  1:05 AM Suze Girt Remove [R55] Syncope and collapse     7/27/2018  1:05 AM Eryn Kay Rl Remove [I21 4] NSTEMI (non-ST elevated myocardial infarction) ([facility] )     7/27/2018  1:05 AM Ramos Purl Add [R55] Syncope and collapse     7/27/2018  1:05 AM Beasley Purl Add [R74 8] Elevated troponin     7/27/2018  1:06 AM Ramos Purl Add [R61] Diaphoresis       ED Disposition     ED Disposition Condition Comment    Admit  Case was discussed with Dr Adam Mann and the patient's admission status was agreed to be Admission Status: observation status to the service of Dr Samantha Winslow   Follow-up Information    None         Current Discharge Medication List      CONTINUE these medications which have NOT CHANGED    Details   allopurinol (ZYLOPRIM) 100 mg tablet Take 1 tablet (100 mg total) by mouth daily  Qty: 90 tablet, Refills: 3    Associated Diagnoses: Chronic systolic heart failure (HCC)      apixaban (ELIQUIS) 5 mg Take 1 tablet (5 mg total) by mouth 2 (two) times a day for 30 days  Qty: 60 tablet, Refills: 10    Associated Diagnoses: Atrial fibrillation, unspecified type (HCC)      atorvastatin (LIPITOR) 40 mg tablet Take 1 tablet (40 mg total) by mouth daily with dinner  Qty: 90 tablet, Refills: 3    Associated Diagnoses: Chronic systolic heart failure (HCC)      colchicine (COLCRYS) 0 6 mg tablet Take 2 tablets po x 1, then 1 tablet po 1 hour later x 1    Qty: 3 tablet, Refills: 0    Associated Diagnoses: Acute gout of right ankle, unspecified cause      digoxin (LANOXIN) 0 125 mg tablet Take 1 tablet (125 mcg total) by mouth daily  Qty: 90 tablet, Refills: 3    Associated Diagnoses: Chronic systolic heart failure (HCC)      furosemide (LASIX) 40 mg tablet TAKE 1 5 TABS TWICE A DAY  Refills: 3      metoprolol succinate (TOPROL-XL) 100 mg 24 hr tablet Take 1 tablet (100 mg total) by mouth 2 (two) times a day  Qty: 180 tablet, Refills: 3    Associated Diagnoses: Chronic systolic heart failure (HCC)      sacubitril-valsartan (ENTRESTO) 24-26 MG TABS Take 1 tablet by mouth 2 (two) times a day  Qty: 180 tablet, Refills: 3    Associated Diagnoses: Chronic systolic heart failure (HCC)      spironolactone (ALDACTONE) 25 mg tablet Take 0 5 tablets (12 5 mg total) by mouth daily  Qty: 90 tablet, Refills: 3    Associated Diagnoses: Chronic systolic heart failure (HCC)           No discharge procedures on file  ED Provider  Attending physically available and evaluated Gail Sanchez I managed the patient along with the ED Attending      Electronically Signed by         Junior Hernadez DO  07/27/18 0104       Junior Hernadez DO  07/27/18 8682

## 2018-07-27 NOTE — H&P
INTERNAL MEDICINE HISTORY AND PHYSICAL  CW2 217-01 SOD Team A    NAME: Elina Caro  AGE: 62 y o  SEX: male  : 1960   MRN: [id number]  ENCOUNTER: [id number]    DATE: 2018  TIME: 1:29 AM    Primary Care Physician: Michelle Mitchell DO  Admitting Provider: Herman Gamez MD    Chief complaint:  Syncope    History of Present Illness     Elina Caro is a 62 y o  male with past medical history of heart failure and atrial fibrillation who presents to the emergency department after syncopal event  The patient reports that he had been working outside in the heat all day and went to a bar after work with friends  The patient states that he had about 2 beers and then got up and felt lightheaded  He then passed out, falling onto one of his friends  The patient did not suffer any trauma as result of his fall as he was caught by bystanders  He is uncertain how long he was unconscious but believes that it was no longer than 10-30 seconds  Patient denied any feelings of palpitations, chest pain, or shortness of breath  Patient does report diaphoresis following his syncopal event  The patient reports having a syncopal event years prior, but no such events recently  The patient denies fever, chills, nausea, vomiting, or any other complaint  In the emergency department vital signs were stable  BMP had a sodium of 135  CBC was unremarkable  Coagulation studies were significant for a PTT of 19  The 1st troponin was 0 16, 2nd and 3rd troponins have not yet been drawn  EKG demonstrated normal sinus rhythm without T-wave or ST segment abnormality  Chest x-ray did not show any acute findings, but did demonstrate a mildly enlarged cardiac silhouette  Review of Systems   Review of Systems   Constitutional: Positive for diaphoresis  Negative for chills and fatigue  HENT: Negative  Negative for congestion  Eyes: Negative  Negative for discharge and redness     Respiratory: Positive for cough (Non-productive cough)  Negative for shortness of breath  Cardiovascular: Negative for chest pain and leg swelling  Gastrointestinal: Positive for diarrhea  Negative for nausea  Endocrine: Negative for cold intolerance and heat intolerance  Genitourinary: Negative for difficulty urinating and hematuria  Musculoskeletal: Negative for arthralgias and myalgias  Skin: Negative  Allergic/Immunologic: Negative  Neurological: Positive for dizziness and light-headedness  Negative for headaches  Hematological: Negative  Psychiatric/Behavioral: Negative  Past Medical History     Past Medical History:   Diagnosis Date    A-fib [facility])     Coronary artery disease     Gout     Hypertension     Shingles        Past Surgical History     Past Surgical History:   Procedure Laterality Date    ELECTRICAL CARDIOVERSION  12/15/2017            Social History     History   Alcohol Use    Yes     Comment: Socially     History   Drug Use No     History   Smoking Status    Former Smoker    Quit date: 8/11/2017   Smokeless Tobacco    Never Used       Family History   History reviewed  No pertinent family history  Medications Prior to Admission     Prior to Admission medications    Medication Sig Start Date End Date Taking?  Authorizing Provider   allopurinol (ZYLOPRIM) 100 mg tablet Take 1 tablet (100 mg total) by mouth daily 3/5/18  Yes Kamila Medina MD   apixaban (ELIQUIS) 5 mg Take 1 tablet (5 mg total) by mouth 2 (two) times a day for 30 days 7/25/18 8/24/18 Yes Kamila Medina MD   atorvastatin (LIPITOR) 40 mg tablet Take 1 tablet (40 mg total) by mouth daily with dinner 3/5/18  Yes Kamila Medina MD   colchicine (COLCRYS) 0 6 mg tablet Take 2 tablets po x 1, then 1 tablet po 1 hour later x 1  4/24/18  Yes Opal Carroll PA-C   digoxin (LANOXIN) 0 125 mg tablet Take 1 tablet (125 mcg total) by mouth daily 3/5/18  Yes Kamila Medina MD   furosemide (LASIX) 40 mg tablet TAKE 1 5 TABS TWICE A DAY 12/27/17  Yes Jaime Negro MD   metoprolol succinate (TOPROL-XL) 100 mg 24 hr tablet Take 1 tablet (100 mg total) by mouth 2 (two) times a day 3/5/18  Yes Aravind Gaines MD   sacubitril-valsartan (ENTRESTO) 24-26 MG TABS Take 1 tablet by mouth 2 (two) times a day 3/5/18  Yes Aravind Gaines MD   spironolactone (ALDACTONE) 25 mg tablet Take 0 5 tablets (12 5 mg total) by mouth daily 6/28/18  Yes Aravind Gaines MD       Allergies   No Known Allergies    Objective     Vitals:    07/26/18 2150 07/26/18 2220 07/26/18 2300 07/27/18 0100   BP: 140/82 125/69 126/78 130/69   BP Location: Right arm Right arm Right arm Right arm   Pulse: 67 66 64 66   Resp: 20 20 16 18   Temp:    97 5 °F (36 4 °C)   TempSrc:    Oral   SpO2: 96% 96% 96% 95%   Weight:    94 3 kg (208 lb)   Height:    5' 9" (1 753 m)     Body mass index is 30 72 kg/m²  Intake/Output Summary (Last 24 hours) at 07/27/18 0129  Last data filed at 07/27/18 0107   Gross per 24 hour   Intake              250 ml   Output                0 ml   Net              250 ml     Invasive Devices     Peripheral Intravenous Line            Peripheral IV 03/02/18 Left Wrist 146 days    Peripheral IV 07/26/18 Left Hand less than 1 day                Physical Exam  Physical Exam   Constitutional: He is oriented to person, place, and time  He appears well-developed and well-nourished  No distress  HENT:   Head: Normocephalic and atraumatic  Eyes: Conjunctivae are normal  Right eye exhibits no discharge  Left eye exhibits no discharge  Cardiovascular: Normal rate, regular rhythm and normal heart sounds  Exam reveals no gallop and no friction rub  No murmur heard  Pulmonary/Chest: Effort normal and breath sounds normal  No respiratory distress  He has no wheezes  He has no rales  Abdominal: Soft  He exhibits distension  He exhibits no mass  There is no tenderness  There is no rebound and no guarding  Musculoskeletal: He exhibits edema (trace b/l)   He exhibits no tenderness or deformity  Neurological: He is alert and oriented to person, place, and time  Skin: Skin is warm and dry  No rash noted  He is not diaphoretic  Psychiatric: He has a normal mood and affect  His behavior is normal  Judgment and thought content normal      Lab Results: I have personally reviewed pertinent reports      CBC:   Results from last 7 days  Lab Units 07/26/18 2005   WBC Thousand/uL 5 59   RBC Million/uL 4 30   HEMOGLOBIN g/dL 13 4   HEMATOCRIT % 40 9   MCV fL 95   MCH pg 31 2   MCHC g/dL 32 8   RDW % 13 5   MPV fL 10 6   PLATELETS Thousands/uL 212   NRBC AUTO /100 WBCs 0   NEUTROS PCT % 55   LYMPHS PCT % 31   MONOS PCT % 11   EOS PCT % 2   BASOS PCT % 1   NEUTROS ABS Thousands/µL 3 07   LYMPHS ABS Thousands/µL 1 75   MONOS ABS Thousand/µL 0 59   EOS ABS Thousand/µL 0 11   , Chemistry Profile:   Results from last 7 days  Lab Units 07/26/18 2005   SODIUM mmol/L 135*   POTASSIUM mmol/L 3 7   CHLORIDE mmol/L 105   CO2 mmol/L 21   ANION GAP mmol/L 9   BUN mg/dL 16   CREATININE mg/dL 0 94   GLUCOSE RANDOM mg/dL 95   CALCIUM mg/dL 8 6   AST U/L 15   ALT U/L 28   ALK PHOS U/L 55   TOTAL PROTEIN g/dL 7 7   BILIRUBIN TOTAL mg/dL 1 07*   EGFR ml/min/1 73sq m 89   , Coagulation Studies:   Results from last 7 days  Lab Units 07/26/18 2005   PROTIME seconds 13 3   INR  1 00   PTT seconds 19*   , Cardiac Studies:   Results from last 7 days  Lab Units 07/26/18 2005   TROPONIN I ng/mL 0 16*   , Additional Labs:   , iSTAT CHEM 8:   Results from last 7 days  Lab Units 07/26/18 2005   EGFR ml/min/1 73sq m 89   GLUCOSE RANDOM mg/dL 95   HEMOGLOBIN g/dL 13 4   , ABG:   , Toxicology:   , Last A1C/Lipid Panel/Thyroid Panel:   Lab Results   Component Value Date    HGBA1C 6 2 12/12/2017    TRIG 82 12/12/2017    CHOL 120 12/12/2017    HDL 31 (L) 12/12/2017    LDLCALC 73 12/12/2017    JJY5TEHQUECA 1 810 12/11/2017    FREET4 1 36 12/11/2017       Imaging: I have personally reviewed pertinent films in PACS  X-ray Chest 2 Views    Result Date: 7/26/2018  Narrative: CHEST INDICATION:   chest pain  Smoker COMPARISON:  December 11, 2017 EXAM PERFORMED/VIEWS:  XR CHEST PA & LATERAL  The frontal view was performed utilizing dual energy radiographic technique  FINDINGS: The heart appears mildly enlarged although much less so than on the prior study  The aorta is tortuous  The lungs are clear  No pneumothorax or pleural effusion  Osseous structures appear within normal limits for patient age  Impression: No acute findings  Diminished prominence of the heart shadow compared with the prior  Workstation performed: [id number]       Microbiology: cultures obtained in emergency department include none    Urinalysis:       Invalid input(s): URIBILINOGEN     Urine Micro:        EKG, Pathology, and Other Studies: I have personally reviewed pertinent reports        Medications given in Emergency Department     Medication Administration - last 24 hours from 07/26/2018 0129 to 07/27/2018 0129       Date/Time Order Dose Route Action Action by     07/27/2018 0107 sodium chloride 0 9 % bolus 250 mL 0 mL Intravenous Stopped Ronald Bennett RN     07/26/2018 2126 sodium chloride 0 9 % bolus 250 mL 250 mL Intravenous New Bag Jd Meraz RN     07/26/2018 2121 aspirin chewable tablet 324 mg 324 mg Oral Given Jd Meraz RN          Assessment and Plan     Syncopal event  -vasovagal versus cardiac  -1st troponin 0 16, continue to trend  -EKG did not show any signs of ischemia  -no chest pain, shortness of breath  -continue telemetry    Atrial fibrillation  -continue Eliquis, continue entresto, continue metoprolol    Heart failure  -continue Lasix 60 mg, spironolactone 12 5 mg, metoprolol, Entresto  -no signs of acute heart failure exacerbation    Gout  -continue allopurinol    Code Status: Level 1 - Full Code  VTE Pharmacologic Prophylaxis:  Eliquis  VTE Mechanical Prophylaxis: sequential compression device  Admission Status: OBSERVATION    Admission Time  I spent 20 minutes admitting the patient  This involved direct patient contact where I performed a full history and physical, reviewing previous records, and reviewing laboratory and other diagnostic studies      Pradeep Darnell MD  Internal Medicine  PGY-1

## 2018-07-27 NOTE — PLAN OF CARE
Problem: SAFETY ADULT  Goal: Maintain or return to baseline ADL function  INTERVENTIONS:  -  Assess patient's ability to carry out ADLs; assess patient's baseline for ADL function and identify physical deficits which impact ability to perform ADLs (bathing, care of mouth/teeth, toileting, grooming, dressing, etc )  - Assess/evaluate cause of self-care deficits   - Assess range of motion  - Assess patient's mobility; develop plan if impaired  - Assess patient's need for assistive devices and provide as appropriate  - Encourage maximum independence but intervene and supervise when necessary  ¯ Involve family in performance of ADLs  ¯ Assess for home care needs following discharge   ¯ Request OT consult to assist with ADL evaluation and planning for discharge  ¯ Provide patient education as appropriate  Outcome: Progressing    Goal: Maintain or return mobility status to optimal level  INTERVENTIONS:  - Assess patient's baseline mobility status (ambulation, transfers, stairs, etc )    - Identify cognitive and physical deficits and behaviors that affect mobility  - Identify mobility aids required to assist with transfers and/or ambulation (gait belt, sit-to-stand, lift, walker, cane, etc )  - Harlan fall precautions as indicated by assessment  - Record patient progress and toleration of activity level on Mobility SBAR; progress patient to next Phase/Stage  - Instruct patient to call for assistance with activity based on assessment  - Request Rehabilitation consult to assist with strengthening/weightbearing, etc   Outcome: Progressing      Problem: DISCHARGE PLANNING  Goal: Discharge to home or other facility with appropriate resources  INTERVENTIONS:  - Identify barriers to discharge w/patient and caregiver  - Arrange for needed discharge resources and transportation as appropriate  - Identify discharge learning needs (meds, wound care, etc )  - Arrange for interpretive services to assist at discharge as needed  - Refer to Case Management Department for coordinating discharge planning if the patient needs post-hospital services based on physician/advanced practitioner order or complex needs related to functional status, cognitive ability, or social support system  Outcome: Progressing      Problem: Knowledge Deficit  Goal: Patient/family/caregiver demonstrates understanding of disease process, treatment plan, medications, and discharge instructions  Complete learning assessment and assess knowledge base    Interventions:  - Provide teaching at level of understanding  - Provide teaching via preferred learning methods  Outcome: Progressing

## 2018-07-27 NOTE — PROGRESS NOTES
[facility] Senior Admission Note   Unit/Bed # @DBLINK ([facility],[address]62)@ Encounter: [id number]  SOD Team A          Betsy Mclaughlin 62 y o  male [id number]       Patient seen and examined  Reviewed H&P per Dr Janelle Bangura  Agree with the assessment and plan except where otherwise noted  Assessment/Plan:   Principal Problem:    Syncope and collapse  Active Problems:    PAF (paroxysmal atrial fibrillation) (HCC)    Systolic CHF (HCC)    NSVT (nonsustained ventricular tachycardia) (HCC)       HPI:  Please refer to detailed HPI as documented by Dr Janelle Bangura  Plan:  Syncope:  -Per HPI, appears to likely be vasovagal given diaphoresis prior to event, mild alcohol consumption and working outside all day without eating/drinking much  He was possibly dehydrated/volume-depleted  He does complain of ongoing diarrhea since starting Entresto   -However given cardiac history, especially history of NSVT, cannot exclude intermittent arrhythmia  -EKG without acute abnormalities, NSR  -Initial troponin 0 16, likely 2/2 demand, will trend x3  -Continue to monitor on telemetry  -Currently asymptomatic    A  Fib s/p cardioversion  -C/t eliquis, meteprolol  -Currently in NSR    Systolic CHF  -Not currently in acute exacerbation  -Likely 2/2 tachycardia/a  fib induced CM  -EF was previously 20%, but repeat ECHO March 2018 after cardioversion revealed improved   EF of 50% with no major valvular abnormalities  -Follows with Dr Lane Watson outpatient, last saw him in June    HX NSTEMI  -Likely 2/2 demand from a   Fib  -Cardiac cath in March revealed single vessel disease, 80% blockage of ramus artery -   no stent placed as pt had and continues to not have any ischemic symptoms      Disposition:  OBSERVATION    Expected LOS: <2 98 Kristina Delarosa DO, Internal Medicine PGY-3  7/27/2018 2:20 AM

## 2018-09-17 NOTE — PROGRESS NOTES
Heart Failure Outpatient Progress Note - Nichole Garcia 62 y o  male MRN: [id number]    @ Encounter: [id number]    Assessment/Plan:  # CM (NICM; LVEF ~20%, improved to ~40-45% w/ LVIDd decreased from 5 7 to 4 5 cm) initally with biventricular dysfunction (now RV normal), NYHA II, ACC/AHA Stage C: Most likely 2/2 tachy (AFib w/ RVR) as had BiV dysfunction +/- HTN  See cath below  Diag:  --Stress test 2/19/18 - pharm nuc w/ moderate-sized, moderately severe, partially reversible myocardial perfusion defect of the basal to mid inferolateral wall  --Cardiac cath 3/2/18 w/ single vessel CAD, with an 80% proximal stenosis of the ramus artery  As no ischemic symptoms, and LVEF out of proportion decreased to degree of CAD, no stent placed  LVEDP normal    --TTE 3/29/18 shows LVEF ~40-45% (visual inspection), but LVIDd now 4 5 cm and RV size and function now normal  Small pericardial effusion persists  --Repeat TTE    Therapeutic:  --2g sodium diet  --2 L fluid restriction  --Continue EtOH cessation    Neurohormonal Blockade:  --Beta-Blocker: Continue metoprolol succinate 100 mg PO BID  --ACEi, ARB or ARNi: Continue Entresto 24-26 mg PO BID  --Aldosterone Receptor Blocker: Continue spironolactone 12 5 mg PO daily  --Diuretic: Lasix 60 mg PO BID    Electrical Resynchronization/Sudden Cardiac Death Risk Reduction:  --Stopped LifeVest 2/2 to improved LVEF    Advanced Therapies: Currently does not meet criteria as minimal symptoms  # NSVT: LifeVest stopped  Continue BB as above  # AFib s/p DCCV on 12/15/17 on Eliquis  # IFG  # Hx of Gout: Continue Allopurinol    RTC in 6 months    HPI: Very pleasant 62year old gentleman out of care seen by Todd Tanner referred for further optimization  He has a PMHx of FHx of CAD (grandfather, father, mother and brother), HTN, tobacco abuse w/ 30 pack year history admitted to B w/ worsening MARION, 2 pillow orthopnea, and PND  He was admitted from 12/11-17/2017   He had a TTE that showed Biventricular dysfunction w/ LVEF ~20%  He was also noted to have IFG and NSTEMI II, gout and NSVT  He had gained at least 15-20 pounds  He was experiencing generalized weakness and fatigue  In the ER he was found to be in atrial fibrillation with RVR  He was treated with IV Cardizem and start an IV Cardizem drip  However, his blood pressure dropped to 80/60  Cardizem drip was stopped  Chest x-ray showed mild pulmonary vascular congestion  NT pro BNP 5242  He was diuresed with IV Lasix  TTE showed left ventricle mildly dilated  Systolic function severely reduced ejection fraction 20%  There was severe diffuse hypokinesis  Wall thickness mildly increased  The right ventricle mildly dilated  Trace to mild mitral valve regurgitation, trace to mild tricuspid valve regurgitation  LVIDd 5 71 cm  He underwent JOSE LUIS/DCCV and reverted back to normal sinus rhythm  He diuresed 30 pounds w/ resolution of SOB  He was discharged on GDMT and LifeVest  Cardiology felt he was appropriate for outpatient stress test  CM was likely tachycardia mediated due to A  fib with RVR  He was seen by Arsalan Mccray in f/u at which time he was doing well  He was in NSR at that time and taking his medications  Today, 2/9/18, he comes in for f/u  He is walking miles on flat ground  He can go up and down stairs but has to pause now  He is also going up hills  He works as an  and is very active  Today, 3/5/18, he comes in for f/u  Pharm nuc showed moderate-sized, moderately severe, partially reversible myocardial perfusion defect of the basal to mid inferolateral wall  Cardiac cath w/ 80% prox ramus  Medical mgmt as LVEF reduction out of proportion and no ischemic symptoms  He feels the same as last visit  Today, 4/5/18, he comes in for f/u  He feels well  TTE showed LVEF on visual inspection ~40-45%  LVIDd decreased  Today 6/28/18, he comes in for f/u  He feels well  Playing golf and working full schedule   Has diarrhea (started after meds)  Gout flare in 5/2018  Today 9/18/2018, he returns for f/u  He had a syncopal episode on 7/26/2018 in the setting of working in the sun all day followed by several drinks with friends at a bar  He as monitored on tele in the ER  He continues to have ~3 BMs/day on [place]  He states he feels well  He denies any current cardiac symptoms  Feels back to normal        Past Medical History:   Diagnosis Date    A-fib [facility])     Coronary artery disease     Gout     Herpes zoster     last assessed 12/18/17    Hypertension     Shingles        Review of Systems - 12 point ROS was done and is negative, except as noted above  No Known Allergies    Current Outpatient Prescriptions:     allopurinol (ZYLOPRIM) 100 mg tablet, Take 1 tablet (100 mg total) by mouth daily, Disp: 90 tablet, Rfl: 3    atorvastatin (LIPITOR) 40 mg tablet, Take 1 tablet (40 mg total) by mouth daily with dinner, Disp: 90 tablet, Rfl: 3    digoxin (LANOXIN) 0 125 mg tablet, Take 1 tablet (125 mcg total) by mouth daily, Disp: 90 tablet, Rfl: 3    furosemide (LASIX) 40 mg tablet, TAKE 1 5 TABS TWICE A DAY, Disp: , Rfl: 3    metoprolol succinate (TOPROL-XL) 100 mg 24 hr tablet, Take 1 tablet (100 mg total) by mouth 2 (two) times a day, Disp: 180 tablet, Rfl: 3    sacubitril-valsartan (ENTRESTO) 24-26 MG TABS, Take 1 tablet by mouth 2 (two) times a day, Disp: 180 tablet, Rfl: 3    spironolactone (ALDACTONE) 25 mg tablet, Take 0 5 tablets (12 5 mg total) by mouth daily, Disp: 90 tablet, Rfl: 3    apixaban (ELIQUIS) 5 mg, Take 1 tablet (5 mg total) by mouth 2 (two) times a day for 30 days, Disp: 60 tablet, Rfl: 10    Social History     Social History    Marital status: Single     Spouse name: N/A    Number of children: N/A    Years of education: N/A     Occupational History    Not on file       Social History Main Topics    Smoking status: Former Smoker     Quit date: 8/11/2017    Smokeless tobacco: Never Used    Alcohol use Yes      Comment: Socially, former alcohol    Drug use: No    Sexual activity: Yes     Partners: Female     Other Topics Concern    Not on file     Social History Narrative    No narrative on file       Family History   Problem Relation Age of Onset    Coronary artery disease Mother     Alcohol abuse Father     Coronary artery disease Father        Physical Exam:    Vitals: Blood pressure 160/90, pulse 76, weight 98 kg (216 lb), SpO2 98 %  , Body mass index is 31 9 kg/m² ,   Wt Readings from Last 3 Encounters:   09/18/18 98 kg (216 lb)   07/27/18 94 3 kg (208 lb)   06/28/18 95 3 kg (210 lb)     Vitals:    09/18/18 1556   BP: 160/90   BP Location: Right arm   Patient Position: Sitting   Cuff Size: Standard   Pulse: 76   SpO2: 98%   Weight: 98 kg (216 lb)       GEN: Hellen Brower appears well, alert and oriented x 3, pleasant and cooperative   HEENT: pupils equal, round, and reactive to light; extraocular muscles intact  NECK: supple, no carotid bruits   HEART: regular rhythm, normal S1 and S2, no murmurs, clicks, gallops or rubs, JVD is flat  LUNGS: clear to auscultation bilaterally; no wheezes, rales, or rhonchi   ABDOMEN: normal bowel sounds, soft, no tenderness, no distention  EXTREMITIES: peripheral pulses normal; no clubbing, cyanosis, or edema  NEURO: no focal findings   SKIN: normal without suspicious lesions on exposed skin    Labs & Results:  Lab Results   Component Value Date    WBC 5 54 07/27/2018    HGB 13 0 07/27/2018    HCT 40 1 07/27/2018    MCV 96 07/27/2018     07/27/2018       Chemistry        Component Value Date/Time     07/27/2018 0508    K 3 8 07/27/2018 0508     07/27/2018 0508    CO2 25 07/27/2018 0508    BUN 16 07/27/2018 0508    CREATININE 0 92 07/27/2018 0508        Component Value Date/Time    CALCIUM 8 3 07/27/2018 0508    ALKPHOS 55 07/26/2018 2005    AST 15 07/26/2018 2005    ALT 28 07/26/2018 2005        EKG personally reviewed by Marline Ramsay MD  Counseling / Coordination of Care  Total floor / unit time spent today 40 minutes  Greater than 50% of total time was spent with the patient and / or family counseling and / or coordination of care  A description of the counseling / coordination of care: 25 min  Thank you for the opportunity to participate in the care of this patient      Socorro Trimble MD, PhD   Carmen Deal

## 2018-09-18 ENCOUNTER — OFFICE VISIT (OUTPATIENT)
Dept: CARDIOLOGY CLINIC | Facility: CLINIC | Age: 58
End: 2018-09-18
Payer: COMMERCIAL

## 2018-09-18 VITALS
OXYGEN SATURATION: 98 % | HEART RATE: 76 BPM | WEIGHT: 216 LBS | SYSTOLIC BLOOD PRESSURE: 160 MMHG | BODY MASS INDEX: 31.9 KG/M2 | DIASTOLIC BLOOD PRESSURE: 90 MMHG

## 2018-09-18 DIAGNOSIS — I50.22 CHRONIC SYSTOLIC HEART FAILURE (HCC): ICD-10-CM

## 2018-09-18 PROCEDURE — 99214 OFFICE O/P EST MOD 30 MIN: CPT | Performed by: INTERNAL MEDICINE

## 2018-09-18 RX ORDER — SPIRONOLACTONE 25 MG/1
12.5 TABLET ORAL DAILY
Qty: 90 TABLET | Refills: 3 | Status: SHIPPED | OUTPATIENT
Start: 2018-09-18 | End: 2019-03-19 | Stop reason: SDUPTHER

## 2018-09-18 NOTE — LETTER
September 18, 2018     Levi Mckeon, [address] [facility]    Patient: Marcello Collet   YOB: 1960   Date of Visit: 9/18/2018       Dear Dr Abdulkadir Montgomery:    Thank you for referring Marcello Collet to me for evaluation  Below are my notes for this consultation  If you have questions, please do not hesitate to call me  I look forward to following your patient along with you  Sincerely,        Rossana Schneider MD        CC: No Recipients  Rossana Schneider MD  9/18/2018  4:07 PM  Sign at close encounter  Heart Failure Outpatient Progress Note - Marcello Collet 62 y o  male MRN: [id number]    @ Encounter: [id number]    Assessment/Plan:  # CM (NICM; LVEF ~20%, improved to ~40-45% w/ LVIDd decreased from 5 7 to 4 5 cm) initally with biventricular dysfunction (now RV normal), NYHA II, ACC/AHA Stage C: Most likely 2/2 tachy (AFib w/ RVR) as had BiV dysfunction +/- HTN  See cath below  Diag:  --Stress test 2/19/18 - pharm nuc w/ moderate-sized, moderately severe, partially reversible myocardial perfusion defect of the basal to mid inferolateral wall  --Cardiac cath 3/2/18 w/ single vessel CAD, with an 80% proximal stenosis of the ramus artery  As no ischemic symptoms, and LVEF out of proportion decreased to degree of CAD, no stent placed  LVEDP normal    --TTE 3/29/18 shows LVEF ~40-45% (visual inspection), but LVIDd now 4 5 cm and RV size and function now normal  Small pericardial effusion persists     --Repeat TTE    Therapeutic:  --2g sodium diet  --2 L fluid restriction  --Continue EtOH cessation  --Given active work, minimal symptoms, and demanding work schedule will defer cardiac rehab for now    Neurohormonal Blockade:  --Beta-Blocker: Continue metoprolol succinate 100 mg PO BID  --ACEi, ARB or ARNi: Continue Entresto 24-26 mg PO BID  --Aldosterone Receptor Blocker: Continue spironolactone 12 5 mg PO daily  --Diuretic: Lasix 60 mg PO BID    Electrical Resynchronization/Sudden Cardiac Death Risk Reduction:  --Stopped LifeVest 2/2 to improved LVEF    Advanced Therapies: Currently does not meet criteria as minimal symptoms  # NSVT: LifeVest stopped  Continue BB as above  # AFib s/p DCCV on 12/15/17 on Eliquis  # IFG  # Hx of Gout: Continue Allopurinol    RTC in 6 months    HPI: Very pleasant 62year old gentleman out of care seen by Juan Miguel Robin referred for further optimization  He has a PMHx of FHx of CAD (grandfather, father, mother and brother), HTN, tobacco abuse w/ 30 pack year history admitted to [facility] w/ worsening MARION, 2 pillow orthopnea, and PND  He was admitted from 12/11-17/2017  He had a TTE that showed Biventricular dysfunction w/ LVEF ~20%  He was also noted to have IFG and NSTEMI II, gout and NSVT  He had gained at least 15-20 pounds  He was experiencing generalized weakness and fatigue  In the ER he was found to be in atrial fibrillation with RVR  He was treated with IV Cardizem and start an IV Cardizem drip  However, his blood pressure dropped to 80/60  Cardizem drip was stopped  Chest x-ray showed mild pulmonary vascular congestion  NT pro BNP 5242  He was diuresed with IV Lasix  TTE showed left ventricle mildly dilated  Systolic function severely reduced ejection fraction 20%  There was severe diffuse hypokinesis  Wall thickness mildly increased  The right ventricle mildly dilated  Trace to mild mitral valve regurgitation, trace to mild tricuspid valve regurgitation  LVIDd 5 71 cm  He underwent JOSE LUIS/DCCV and reverted back to normal sinus rhythm  He diuresed 30 pounds w/ resolution of SOB  He was discharged on GDMT and LifeVest  Cardiology felt he was appropriate for outpatient stress test  CM was likely tachycardia mediated due to A  fib with RVR  He was seen by Jing Reyes in f/u at which time he was doing well  He was in NSR at that time and taking his medications  Today, 2/9/18, he comes in for f/u  He is walking miles on flat ground   He can go up and down stairs but has to pause now  He is also going up hills  He works as an  and is very active  Today, 3/5/18, he comes in for f/u  Pharm nuc showed moderate-sized, moderately severe, partially reversible myocardial perfusion defect of the basal to mid inferolateral wall  Cardiac cath w/ 80% prox nancy  Medical ghanshyam as LVEF reduction out of proportion and no ischemic symptoms  He feels the same as last visit  Today, 4/5/18, he comes in for f/u  He feels well  TTE showed LVEF on visual inspection ~40-45%  LVIDd decreased  Today 6/28/18, he comes in for f/u  He feels well  Playing golf and working full schedule  Has diarrhea (started after meds)  Gout flare in 5/2018  Today 9/18/2018, he returns for f/u  He had a syncopal episode on 7/26/2018 in the setting of working in the sun all day followed by several drinks with friends at a bar  He as monitored on tele in the ER  He continues to have ~3 BMs/day on [employer] [place] [employer]  He states he feels well  He denies any current cardiac symptoms  Feels back to normal        Past Medical History:   Diagnosis Date    A-fib [facility])     Coronary artery disease     Gout     Herpes zoster     last assessed 12/18/17    Hypertension     Shingles        Review of Systems - 12 point ROS was done and is negative, except as noted above  No Known Allergies        Current Outpatient Prescriptions:     allopurinol (ZYLOPRIM) 100 mg tablet, Take 1 tablet (100 mg total) by mouth daily, Disp: 90 tablet, Rfl: 3    atorvastatin (LIPITOR) 40 mg tablet, Take 1 tablet (40 mg total) by mouth daily with dinner, Disp: 90 tablet, Rfl: 3    digoxin (LANOXIN) 0 125 mg tablet, Take 1 tablet (125 mcg total) by mouth daily, Disp: 90 tablet, Rfl: 3    furosemide (LASIX) 40 mg tablet, TAKE 1 5 TABS TWICE A DAY, Disp: , Rfl: 3    metoprolol succinate (TOPROL-XL) 100 mg 24 hr tablet, Take 1 tablet (100 mg total) by mouth 2 (two) times a day, Disp: 180 tablet, Rfl: 3    sacubitril-valsartan (ENTRESTO) 24-26 MG TABS, Take 1 tablet by mouth 2 (two) times a day, Disp: 180 tablet, Rfl: 3    spironolactone (ALDACTONE) 25 mg tablet, Take 0 5 tablets (12 5 mg total) by mouth daily, Disp: 90 tablet, Rfl: 3    apixaban (ELIQUIS) 5 mg, Take 1 tablet (5 mg total) by mouth 2 (two) times a day for 30 days, Disp: 60 tablet, Rfl: 10    Social History     Social History    Marital status: Single     Spouse name: N/A    Number of children: N/A    Years of education: N/A     Occupational History    Not on file  Social History Main Topics    Smoking status: Former Smoker     Quit date: 8/11/2017    Smokeless tobacco: Never Used    Alcohol use Yes      Comment: Socially, former alcohol    Drug use: No    Sexual activity: Yes     Partners: Female     Other Topics Concern    Not on file     Social History Narrative    No narrative on file       Family History   Problem Relation Age of Onset    Coronary artery disease Mother     Alcohol abuse Father     Coronary artery disease Father        Physical Exam:    Vitals: Blood pressure 160/90, pulse 76, weight 98 kg (216 lb), SpO2 98 %  , Body mass index is 31 9 kg/m² ,   Wt Readings from Last 3 Encounters:   09/18/18 98 kg (216 lb)   07/27/18 94 3 kg (208 lb)   06/28/18 95 3 kg (210 lb)     Vitals:    09/18/18 1556   BP: 160/90   BP Location: Right arm   Patient Position: Sitting   Cuff Size: Standard   Pulse: 76   SpO2: 98%   Weight: 98 kg (216 lb)       GEN: Adamaris Olden appears well, alert and oriented x 3, pleasant and cooperative   HEENT: pupils equal, round, and reactive to light; extraocular muscles intact  NECK: supple, no carotid bruits   HEART: regular rhythm, normal S1 and S2, no murmurs, clicks, gallops or rubs, JVD is flat  LUNGS: clear to auscultation bilaterally; no wheezes, rales, or rhonchi   ABDOMEN: normal bowel sounds, soft, no tenderness, no distention  EXTREMITIES: peripheral pulses normal; no clubbing, cyanosis, or edema  NEURO: no focal findings   SKIN: normal without suspicious lesions on exposed skin    Labs & Results:  Lab Results   Component Value Date    WBC 5 54 07/27/2018    HGB 13 0 07/27/2018    HCT 40 1 07/27/2018    MCV 96 07/27/2018     07/27/2018       Chemistry        Component Value Date/Time     07/27/2018 0508    K 3 8 07/27/2018 0508     07/27/2018 0508    CO2 25 07/27/2018 0508    BUN 16 07/27/2018 0508    CREATININE 0 92 07/27/2018 0508        Component Value Date/Time    CALCIUM 8 3 07/27/2018 0508    ALKPHOS 55 07/26/2018 2005    AST 15 07/26/2018 2005    ALT 28 07/26/2018 2005        EKG personally reviewed by Tesha Verdin MD      Counseling / Coordination of Care  Total floor / unit time spent today 40 minutes  Greater than 50% of total time was spent with the patient and / or family counseling and / or coordination of care  A description of the counseling / coordination of care: 25 min  Thank you for the opportunity to participate in the care of this patient      Tesha Verdin MD, PhD   Cheri Dear

## 2018-09-18 NOTE — LETTER
September 18, 2018     Wallie Ahumada, [address] [facility] [address]    Patient: Stefany Anglin   YOB: 1960   Date of Visit: 9/18/2018       Dear Dr Yuan Rousseau:    Thank you for referring Stefany Anglin to me for evaluation  Below are my notes for this consultation  If you have questions, please do not hesitate to call me  I look forward to following your patient along with you  Sincerely,        Kamila Medina MD        CC: No Recipients  Kamila Medina MD  9/18/2018  4:07 PM  Sign at close encounter  Heart Failure Outpatient Progress Note - Stefany Anglin 62 y o  male MRN: [id number]    @ Encounter: [id number]    Assessment/Plan:  # CM (NICM; LVEF ~20%, improved to ~40-45% w/ LVIDd decreased from 5 7 to 4 5 cm) initally with biventricular dysfunction (now RV normal), NYHA II, ACC/AHA Stage C: Most likely 2/2 tachy (AFib w/ RVR) as had BiV dysfunction +/- HTN  See cath below  Diag:  --Stress test 2/19/18 - pharm nuc w/ moderate-sized, moderately severe, partially reversible myocardial perfusion defect of the basal to mid inferolateral wall  --Cardiac cath 3/2/18 w/ single vessel CAD, with an 80% proximal stenosis of the ramus artery  As no ischemic symptoms, and LVEF out of proportion decreased to degree of CAD, no stent placed  LVEDP normal    --TTE 3/29/18 shows LVEF ~40-45% (visual inspection), but LVIDd now 4 5 cm and RV size and function now normal  Small pericardial effusion persists     --Repeat TTE    Therapeutic:  --2g sodium diet  --2 L fluid restriction  --Continue EtOH cessation    Neurohormonal Blockade:  --Beta-Blocker: Continue metoprolol succinate 100 mg PO BID  --ACEi, ARB or ARNi: Continue Entresto 24-26 mg PO BID  --Aldosterone Receptor Blocker: Continue spironolactone 12 5 mg PO daily  --Diuretic: Lasix 60 mg PO BID    Electrical Resynchronization/Sudden Cardiac Death Risk Reduction:  --Stopped LifeVest 2/2 to improved LVEF    Advanced Therapies: Currently does not meet criteria as minimal symptoms  # NSVT: LifeVest stopped  Continue BB as above  # AFib s/p DCCV on 12/15/17 on Eliquis  # IFG  # Hx of Gout: Continue Allopurinol    RTC in 6 months    HPI: Very pleasant 62year old gentleman out of care seen by John Martinez referred for further optimization  He has a PMHx of FHx of CAD (grandfather, father, mother and brother), HTN, tobacco abuse w/ 30 pack year history admitted to [facility] w/ worsening MARION, 2 pillow orthopnea, and PND  He was admitted from 12/11-17/2017  He had a TTE that showed Biventricular dysfunction w/ LVEF ~20%  He was also noted to have IFG and NSTEMI II, gout and NSVT  He had gained at least 15-20 pounds  He was experiencing generalized weakness and fatigue  In the ER he was found to be in atrial fibrillation with RVR  He was treated with IV Cardizem and start an IV Cardizem drip  However, his blood pressure dropped to 80/60  Cardizem drip was stopped  Chest x-ray showed mild pulmonary vascular congestion  NT pro BNP 5242  He was diuresed with IV Lasix  TTE showed left ventricle mildly dilated  Systolic function severely reduced ejection fraction 20%  There was severe diffuse hypokinesis  Wall thickness mildly increased  The right ventricle mildly dilated  Trace to mild mitral valve regurgitation, trace to mild tricuspid valve regurgitation  LVIDd 5 71 cm  He underwent JOSE LUIS/DCCV and reverted back to normal sinus rhythm  He diuresed 30 pounds w/ resolution of SOB  He was discharged on GDMT and LifeVest  Cardiology felt he was appropriate for outpatient stress test  CM was likely tachycardia mediated due to A  fib with RVR  He was seen by Doroteo Joseph in f/u at which time he was doing well  He was in NSR at that time and taking his medications  Today, 2/9/18, he comes in for f/u  He is walking miles on flat ground  He can go up and down stairs but has to pause now  He is also going up hills  He works as an  and is very active       Today, 3/5/18, he comes in for f/u  Pharm nuc showed moderate-sized, moderately severe, partially reversible myocardial perfusion defect of the basal to mid inferolateral wall  Cardiac cath w/ 80% prox ramus  Medical mgmt as LVEF reduction out of proportion and no ischemic symptoms  He feels the same as last visit  Today, 4/5/18, he comes in for f/u  He feels well  TTE showed LVEF on visual inspection ~40-45%  LVIDd decreased  Today 6/28/18, he comes in for f/u  He feels well  Playing golf and working full schedule  Has diarrhea (started after meds)  Gout flare in 5/2018  Today 9/18/2018, he returns for f/u  He had a syncopal episode on 7/26/2018 in the setting of working in the sun all day followed by several drinks with friends at a bar  He as monitored on tele in the ER  He continues to have ~3 BMs/day on [employer] [place] [employer]  He states he feels well  He denies any current cardiac symptoms  Feels back to normal        Past Medical History:   Diagnosis Date    A-fib [facility])     Coronary artery disease     Gout     Herpes zoster     last assessed 12/18/17    Hypertension     Shingles        Review of Systems - 12 point ROS was done and is negative, except as noted above  No Known Allergies        Current Outpatient Prescriptions:     allopurinol (ZYLOPRIM) 100 mg tablet, Take 1 tablet (100 mg total) by mouth daily, Disp: 90 tablet, Rfl: 3    atorvastatin (LIPITOR) 40 mg tablet, Take 1 tablet (40 mg total) by mouth daily with dinner, Disp: 90 tablet, Rfl: 3    digoxin (LANOXIN) 0 125 mg tablet, Take 1 tablet (125 mcg total) by mouth daily, Disp: 90 tablet, Rfl: 3    furosemide (LASIX) 40 mg tablet, TAKE 1 5 TABS TWICE A DAY, Disp: , Rfl: 3    metoprolol succinate (TOPROL-XL) 100 mg 24 hr tablet, Take 1 tablet (100 mg total) by mouth 2 (two) times a day, Disp: 180 tablet, Rfl: 3    sacubitril-valsartan (ENTRESTO) 24-26 MG TABS, Take 1 tablet by mouth 2 (two) times a day, Disp: 180 tablet, Rfl: 3   spironolactone (ALDACTONE) 25 mg tablet, Take 0 5 tablets (12 5 mg total) by mouth daily, Disp: 90 tablet, Rfl: 3    apixaban (ELIQUIS) 5 mg, Take 1 tablet (5 mg total) by mouth 2 (two) times a day for 30 days, Disp: 60 tablet, Rfl: 10    Social History     Social History    Marital status: Single     Spouse name: N/A    Number of children: N/A    Years of education: N/A     Occupational History    Not on file  Social History Main Topics    Smoking status: Former Smoker     Quit date: 8/11/2017    Smokeless tobacco: Never Used    Alcohol use Yes      Comment: Socially, former alcohol    Drug use: No    Sexual activity: Yes     Partners: Female     Other Topics Concern    Not on file     Social History Narrative    No narrative on file       Family History   Problem Relation Age of Onset    Coronary artery disease Mother     Alcohol abuse Father     Coronary artery disease Father        Physical Exam:    Vitals: Blood pressure 160/90, pulse 76, weight 98 kg (216 lb), SpO2 98 %  , Body mass index is 31 9 kg/m² ,   Wt Readings from Last 3 Encounters:   09/18/18 98 kg (216 lb)   07/27/18 94 3 kg (208 lb)   06/28/18 95 3 kg (210 lb)     Vitals:    09/18/18 1556   BP: 160/90   BP Location: Right arm   Patient Position: Sitting   Cuff Size: Standard   Pulse: 76   SpO2: 98%   Weight: 98 kg (216 lb)       GEN: Ann Marie Nails appears well, alert and oriented x 3, pleasant and cooperative   HEENT: pupils equal, round, and reactive to light; extraocular muscles intact  NECK: supple, no carotid bruits   HEART: regular rhythm, normal S1 and S2, no murmurs, clicks, gallops or rubs, JVD is flat  LUNGS: clear to auscultation bilaterally; no wheezes, rales, or rhonchi   ABDOMEN: normal bowel sounds, soft, no tenderness, no distention  EXTREMITIES: peripheral pulses normal; no clubbing, cyanosis, or edema  NEURO: no focal findings   SKIN: normal without suspicious lesions on exposed skin    Labs & Results:  Lab Results   Component Value Date    WBC 5 54 07/27/2018    HGB 13 0 07/27/2018    HCT 40 1 07/27/2018    MCV 96 07/27/2018     07/27/2018       Chemistry        Component Value Date/Time     07/27/2018 0508    K 3 8 07/27/2018 0508     07/27/2018 0508    CO2 25 07/27/2018 0508    BUN 16 07/27/2018 0508    CREATININE 0 92 07/27/2018 0508        Component Value Date/Time    CALCIUM 8 3 07/27/2018 0508    ALKPHOS 55 07/26/2018 2005    AST 15 07/26/2018 2005    ALT 28 07/26/2018 2005        EKG personally reviewed by Fabiana Torres MD      Counseling / Coordination of Care  Total floor / unit time spent today 40 minutes  Greater than 50% of total time was spent with the patient and / or family counseling and / or coordination of care  A description of the counseling / coordination of care: 25 min  Thank you for the opportunity to participate in the care of this patient      Fabiana Torres MD, PhD   Adrianne Ocampo

## 2018-10-17 ENCOUNTER — HOSPITAL ENCOUNTER (OUTPATIENT)
Dept: NON INVASIVE DIAGNOSTICS | Facility: CLINIC | Age: 58
Discharge: HOME | End: 2018-10-17
Payer: COMMERCIAL

## 2018-10-17 DIAGNOSIS — I50.22 CHRONIC SYSTOLIC HEART FAILURE (HCC): ICD-10-CM

## 2018-10-17 PROCEDURE — 93306 TTE W/DOPPLER COMPLETE: CPT

## 2018-10-17 RX ORDER — DIGOXIN 125 MCG
TABLET ORAL
Qty: 30 TABLET | Refills: 6 | Status: SHIPPED | OUTPATIENT
Start: 2018-10-17 | End: 2019-03-19 | Stop reason: SDUPTHER

## 2018-10-18 PROCEDURE — 93306 TTE W/DOPPLER COMPLETE: CPT | Performed by: INTERNAL MEDICINE

## 2018-11-07 ENCOUNTER — TELEPHONE (OUTPATIENT)
Dept: CARDIOLOGY CLINIC | Facility: CLINIC | Age: 58
End: 2018-11-07

## 2018-11-07 NOTE — TELEPHONE ENCOUNTER
Called patient about the results being normal, patient understood     ===View-only below this line===    ----- Message -----  From: Stephanie Howard MD  Sent: 10/26/2018  12:28 PM  To: Priyank Hendrickson MA    The echo is unchanged from prior  LVEF is around 50%  Continue current medications and see you in followup  Thanks

## 2019-02-19 DIAGNOSIS — I50.22 CHRONIC SYSTOLIC HEART FAILURE (HCC): ICD-10-CM

## 2019-02-19 RX ORDER — ATORVASTATIN CALCIUM 40 MG/1
40 TABLET, FILM COATED ORAL
Qty: 90 TABLET | Refills: 3 | Status: SHIPPED | OUTPATIENT
Start: 2019-02-19 | End: 2019-03-19 | Stop reason: SDUPTHER

## 2019-03-07 DIAGNOSIS — I50.22 CHRONIC SYSTOLIC HEART FAILURE (HCC): ICD-10-CM

## 2019-03-07 RX ORDER — ALLOPURINOL 100 MG/1
100 TABLET ORAL DAILY
Qty: 90 TABLET | Refills: 3 | Status: SHIPPED | OUTPATIENT
Start: 2019-03-07 | End: 2020-03-03

## 2019-03-19 ENCOUNTER — OFFICE VISIT (OUTPATIENT)
Dept: CARDIOLOGY CLINIC | Facility: CLINIC | Age: 59
End: 2019-03-19
Payer: COMMERCIAL

## 2019-03-19 VITALS
HEIGHT: 69 IN | OXYGEN SATURATION: 96 % | SYSTOLIC BLOOD PRESSURE: 130 MMHG | BODY MASS INDEX: 33.18 KG/M2 | DIASTOLIC BLOOD PRESSURE: 80 MMHG | HEART RATE: 80 BPM | WEIGHT: 224 LBS

## 2019-03-19 DIAGNOSIS — I50.22 CHRONIC SYSTOLIC HEART FAILURE (HCC): ICD-10-CM

## 2019-03-19 DIAGNOSIS — I48.91 ATRIAL FIBRILLATION, UNSPECIFIED TYPE (HCC): ICD-10-CM

## 2019-03-19 PROCEDURE — 99214 OFFICE O/P EST MOD 30 MIN: CPT | Performed by: INTERNAL MEDICINE

## 2019-03-19 RX ORDER — METOPROLOL SUCCINATE 100 MG/1
100 TABLET, EXTENDED RELEASE ORAL 2 TIMES DAILY
Qty: 180 TABLET | Refills: 3 | Status: SHIPPED | OUTPATIENT
Start: 2019-03-19 | End: 2020-06-14

## 2019-03-19 RX ORDER — DIGOXIN 125 MCG
125 TABLET ORAL DAILY
Qty: 30 TABLET | Refills: 6 | Status: SHIPPED | OUTPATIENT
Start: 2019-03-19 | End: 2020-02-05 | Stop reason: SDUPTHER

## 2019-03-19 RX ORDER — ATORVASTATIN CALCIUM 40 MG/1
40 TABLET, FILM COATED ORAL
Qty: 90 TABLET | Refills: 3 | Status: SHIPPED | OUTPATIENT
Start: 2019-03-19 | End: 2020-07-27 | Stop reason: SDUPTHER

## 2019-03-19 RX ORDER — FUROSEMIDE 40 MG/1
60 TABLET ORAL DAILY
Qty: 180 TABLET | Refills: 3 | Status: SHIPPED | OUTPATIENT
Start: 2019-03-19 | End: 2020-04-08 | Stop reason: SDUPTHER

## 2019-03-19 RX ORDER — SPIRONOLACTONE 25 MG/1
12.5 TABLET ORAL DAILY
Qty: 90 TABLET | Refills: 3 | Status: SHIPPED | OUTPATIENT
Start: 2019-03-19 | End: 2019-08-19 | Stop reason: SDUPTHER

## 2019-03-19 NOTE — PROGRESS NOTES
Heart Failure Outpatient Progress Note - Judi Christianson 62 y o  male MRN: [id number]    @ Encounter: [id number]    Assessment/Plan:  # Chronic recovered HFrEF (NICM; LVEF ~20%, improved to ~50% w/ LVIDd decreased from 5 7 to mid 4 cm) initally with biventricular dysfunction (now RV normal), NYHA II, ACC/AHA Stage C: Most likely 2/2 tachy (AFib w/ RVR) as had BiV dysfunction +/- HTN  See cath below  Diag:  --Stress test 2/19/18 - pharm nuc w/ moderate-sized, moderately severe, partially reversible myocardial perfusion defect of the basal to mid inferolateral wall  --Cardiac cath 3/2/18 w/ single vessel CAD, with an 80% proximal stenosis of the ramus artery  As no ischemic symptoms, and LVEF out of proportion decreased to degree of CAD, no stent placed  LVEDP normal    --TTE 3/29/18 shows LVEF ~40-45% (visual inspection), but LVIDd now 4 5 cm and RV size and function now normal  Small pericardial effusion persists  --TTE 10/17/18: LVEF 50%  LVIDd 4 8 cm  Grade 1 diastology  Normal RV  Therapeutic:  --2g sodium diet  --2 L fluid restriction  --Continue EtOH cessation    Neurohormonal Blockade:  --Beta-Blocker: Continue metoprolol succinate 100 mg PO BID  --ACEi, ARB or ARNi: Continue Entresto 24-26 mg PO BID  --Aldosterone Receptor Blocker: Continue spironolactone 12 5 mg PO daily  --Diuretic: Lasix 60 mg PO daily    Electrical Resynchronization/Sudden Cardiac Death Risk Reduction:  --Stopped LifeVest 2/2 to improved LVEF    Advanced Therapies: Currently does not meet criteria as minimal symptoms  # NSVT: LifeVest stopped  Continue BB as above  # AFib s/p DCCV on 12/15/17 on Eliquis  # IFG  # Hx of Gout: Continue Allopurinol    RTC in 6 months    HPI: Very pleasant 62 y o  gentleman out of care seen by Mariah Bravo referred for further optimization   He has a PMHx of FHx of CAD (grandfather, father, mother and brother), HTN, tobacco abuse w/ 30 pack year history admitted to [facility] w/ worsening MARION, 2 pillow orthopnea, and PND  He was admitted from 12/11-17/2017  He had a TTE that showed Biventricular dysfunction w/ LVEF ~20%  He was also noted to have IFG and NSTEMI II, gout and NSVT  He had gained at least 15-20 pounds  He was experiencing generalized weakness and fatigue  In the ER he was found to be in atrial fibrillation with RVR  He was treated with IV Cardizem and start an IV Cardizem drip  However, his blood pressure dropped to 80/60  Cardizem drip was stopped  Chest x-ray showed mild pulmonary vascular congestion  NT pro BNP 5242  He was diuresed with IV Lasix  TTE showed left ventricle mildly dilated  Systolic function severely reduced ejection fraction 20%  There was severe diffuse hypokinesis  Wall thickness mildly increased  The right ventricle mildly dilated  Trace to mild mitral valve regurgitation, trace to mild tricuspid valve regurgitation  LVIDd 5 71 cm  He underwent JOSE LUIS/DCCV and reverted back to normal sinus rhythm  He diuresed 30 pounds w/ resolution of SOB  He was discharged on GDMT and LifeVest  Cardiology felt he was appropriate for outpatient stress test  CM was likely tachycardia mediated due to A  fib with RVR  He was seen by Gagan Byers in f/u at which time he was doing well  He was in NSR at that time and taking his medications  Today, 2/9/18, he comes in for f/u  He is walking miles on flat ground  He can go up and down stairs but has to pause now  He is also going up hills  He works as an  and is very active  Today, 3/5/18, he comes in for f/u  Pharm nuc showed moderate-sized, moderately severe, partially reversible myocardial perfusion defect of the basal to mid inferolateral wall  Cardiac cath w/ 80% prox [facility]  Medical [facility] as LVEF reduction out of proportion and no ischemic symptoms  He feels the same as last visit  Today, 4/5/18, he comes in for f/u  He feels well  TTE showed LVEF on visual inspection ~40-45%  LVIDd decreased      Today 6/28/18, he comes in for f/u  He feels well  Playing golf and working full schedule  Has diarrhea (started after meds)  Gout flare in 5/2018  Today 9/18/2018, he returns for f/u  He had a syncopal episode on 7/26/2018 in the setting of working in the sun all day followed by several drinks with friends at a bar  He as monitored on tele in the ER  He continues to have ~3 BMs/day on [place]  He states he feels well  He denies any current cardiac symptoms  Feels back to normal      Today, 3/19/19, he returns for f/u  He feels well  Denies CP, palpitations, presyncope, or syncope  Continues to have 6 BMs/day  Past Medical History:   Diagnosis Date    A-fib [facility])     Coronary artery disease     Gout     Herpes zoster     last assessed 12/18/17    Hypertension     Shingles      Review of Systems - 12 point ROS was done and is negative, except as noted above  No Known Allergies        Current Outpatient Medications:     allopurinol (ZYLOPRIM) 100 mg tablet, TAKE 1 TABLET (100 MG TOTAL) BY MOUTH DAILY, Disp: 90 tablet, Rfl: 3    apixaban (ELIQUIS) 5 mg, Take 1 tablet (5 mg total) by mouth 2 (two) times a day for 30 days, Disp: 60 tablet, Rfl: 10    atorvastatin (LIPITOR) 40 mg tablet, TAKE 1 TABLET (40 MG TOTAL) BY MOUTH DAILY WITH DINNER, Disp: 90 tablet, Rfl: 3    digoxin (LANOXIN) 0 125 mg tablet, TAKE 1 TABLET BY MOUTH EVERY DAY, Disp: 30 tablet, Rfl: 6    furosemide (LASIX) 40 mg tablet, TAKE 1 5 TABS TWICE A DAY, Disp: , Rfl: 3    metoprolol succinate (TOPROL-XL) 100 mg 24 hr tablet, Take 1 tablet (100 mg total) by mouth 2 (two) times a day, Disp: 180 tablet, Rfl: 3    sacubitril-valsartan (ENTRESTO) 24-26 MG TABS, Take 1 tablet by mouth 2 (two) times a day, Disp: 180 tablet, Rfl: 3    spironolactone (ALDACTONE) 25 mg tablet, Take 0 5 tablets (12 5 mg total) by mouth daily, Disp: 90 tablet, Rfl: 3    Social History     Socioeconomic History    Marital status: Single     Spouse name: Not on file    Number of children: Not on file    Years of education: Not on file    Highest education level: Not on file   Occupational History    Not on file   Social Needs    Financial resource strain: Not on file    Food insecurity:     Worry: Not on file     Inability: Not on file    Transportation needs:     Medical: Not on file     Non-medical: Not on file   Tobacco Use    Smoking status: Former Smoker     Last attempt to quit: 2017     Years since quittin 6    Smokeless tobacco: Never Used   Substance and Sexual Activity    Alcohol use: Yes     Comment: Socially, former alcohol    Drug use: No    Sexual activity: Yes     Partners: Female   Lifestyle    Physical activity:     Days per week: Not on file     Minutes per session: Not on file    Stress: Not on file   Relationships    Social connections:     Talks on phone: Not on file     Gets together: Not on file     Attends Orthodoxy service: Not on file     Active member of club or organization: Not on file     Attends meetings of clubs or organizations: Not on file     Relationship status: Not on file    Intimate partner violence:     Fear of current or ex partner: Not on file     Emotionally abused: Not on file     Physically abused: Not on file     Forced sexual activity: Not on file   Other Topics Concern    Not on file   Social History Narrative    Not on file       Family History   Problem Relation Age of Onset    Coronary artery disease Mother     Alcohol abuse Father     Coronary artery disease Father        Physical Exam:    Vitals: There were no vitals taken for this visit  , There is no height or weight on file to calculate BMI ,   Wt Readings from Last 3 Encounters:   18 98 kg (216 lb)   18 94 3 kg (208 lb)   18 95 3 kg (210 lb)     There were no vitals filed for this visit      GEN: Juan Carlos Sifuentes appears well, alert and oriented x 3, pleasant and cooperative   HEENT: pupils equal, round, and reactive to light; extraocular muscles intact  NECK: supple, no carotid bruits   HEART: regular rhythm, normal S1 and S2, no murmurs, clicks, gallops or rubs, JVP is    LUNGS: clear to auscultation bilaterally; no wheezes, rales, or rhonchi   ABDOMEN: normal bowel sounds, soft, no tenderness, no distention  EXTREMITIES: peripheral pulses normal; no clubbing, cyanosis, or edema  NEURO: no focal findings   SKIN: normal without suspicious lesions on exposed skin    Labs & Results:  Lab Results   Component Value Date    WBC 5 54 07/27/2018    HGB 13 0 07/27/2018    HCT 40 1 07/27/2018    MCV 96 07/27/2018     07/27/2018       Chemistry        Component Value Date/Time    K 3 8 07/27/2018 0508     07/27/2018 0508    CO2 25 07/27/2018 0508    CO2 25 12/11/2017 0837    BUN 16 07/27/2018 0508    CREATININE 0 92 07/27/2018 0508        Component Value Date/Time    CALCIUM 8 3 07/27/2018 0508    ALKPHOS 55 07/26/2018 2005    AST 15 07/26/2018 2005    ALT 28 07/26/2018 2005        EKG personally reviewed by Matias Drummond MD      Counseling / Coordination of Care  Total floor / unit time spent today 40 minutes  Greater than 50% of total time was spent with the patient and / or family counseling and / or coordination of care  A description of the counseling / coordination of care: 25 min  Thank you for the opportunity to participate in the care of this patient      Matias Drummond MD, PhD   Arvind Matamoros

## 2019-08-14 ENCOUNTER — TELEPHONE (OUTPATIENT)
Dept: CARDIOLOGY CLINIC | Facility: CLINIC | Age: 59
End: 2019-08-14

## 2019-08-14 NOTE — TELEPHONE ENCOUNTER
Patient is stating he is taking spironolactone 25 mg daily  Your note has 12 5 mg daily  I do not see an increase anywhere    Is it ok to fill at 25 mg daily

## 2019-08-19 DIAGNOSIS — I50.22 CHRONIC SYSTOLIC HEART FAILURE (HCC): ICD-10-CM

## 2019-08-19 RX ORDER — SPIRONOLACTONE 25 MG/1
25 TABLET ORAL DAILY
Qty: 90 TABLET | Refills: 1 | Status: SHIPPED | OUTPATIENT
Start: 2019-08-19 | End: 2019-12-05 | Stop reason: SDUPTHER

## 2019-12-05 DIAGNOSIS — I50.22 CHRONIC SYSTOLIC HEART FAILURE (HCC): ICD-10-CM

## 2019-12-05 RX ORDER — SPIRONOLACTONE 25 MG/1
TABLET ORAL
Qty: 90 TABLET | Refills: 1 | Status: SHIPPED | OUTPATIENT
Start: 2019-12-05 | End: 2020-05-02

## 2020-02-04 ENCOUNTER — TELEPHONE (OUTPATIENT)
Dept: CARDIOLOGY CLINIC | Facility: CLINIC | Age: 60
End: 2020-02-04

## 2020-02-04 DIAGNOSIS — I50.22 CHRONIC SYSTOLIC HEART FAILURE (HCC): ICD-10-CM

## 2020-02-05 RX ORDER — DIGOXIN 125 MCG
125 TABLET ORAL DAILY
Qty: 30 TABLET | Refills: 6 | Status: SHIPPED | OUTPATIENT
Start: 2020-02-05 | End: 2020-07-04

## 2020-03-03 DIAGNOSIS — I50.22 CHRONIC SYSTOLIC HEART FAILURE (HCC): ICD-10-CM

## 2020-03-03 RX ORDER — ALLOPURINOL 100 MG/1
100 TABLET ORAL DAILY
Qty: 90 TABLET | Refills: 3 | Status: SHIPPED | OUTPATIENT
Start: 2020-03-03 | End: 2021-04-30 | Stop reason: SDUPTHER

## 2020-04-08 DIAGNOSIS — I50.22 CHRONIC SYSTOLIC HEART FAILURE (HCC): ICD-10-CM

## 2020-04-08 RX ORDER — FUROSEMIDE 40 MG/1
60 TABLET ORAL DAILY
Qty: 135 TABLET | Refills: 5 | Status: SHIPPED | OUTPATIENT
Start: 2020-04-08 | End: 2021-02-11

## 2020-04-14 DIAGNOSIS — I50.22 CHRONIC SYSTOLIC HEART FAILURE (HCC): ICD-10-CM

## 2020-04-14 DIAGNOSIS — I48.91 ATRIAL FIBRILLATION, UNSPECIFIED TYPE (HCC): ICD-10-CM

## 2020-04-15 RX ORDER — SACUBITRIL AND VALSARTAN 24; 26 MG/1; MG/1
TABLET, FILM COATED ORAL
Qty: 60 TABLET | Refills: 11 | Status: SHIPPED | OUTPATIENT
Start: 2020-04-15 | End: 2021-10-06

## 2020-04-15 RX ORDER — APIXABAN 5 MG/1
TABLET, FILM COATED ORAL
Qty: 60 TABLET | Refills: 10 | Status: SHIPPED | OUTPATIENT
Start: 2020-04-15 | End: 2020-04-24

## 2020-04-24 DIAGNOSIS — I48.91 ATRIAL FIBRILLATION, UNSPECIFIED TYPE (HCC): ICD-10-CM

## 2020-04-24 RX ORDER — APIXABAN 5 MG/1
TABLET, FILM COATED ORAL
Qty: 60 TABLET | Refills: 10 | Status: SHIPPED | OUTPATIENT
Start: 2020-04-24 | End: 2020-04-27

## 2020-04-27 RX ORDER — APIXABAN 5 MG/1
TABLET, FILM COATED ORAL
Qty: 60 TABLET | Refills: 10 | Status: SHIPPED | OUTPATIENT
Start: 2020-04-27 | End: 2021-10-06

## 2020-05-02 DIAGNOSIS — I50.22 CHRONIC SYSTOLIC HEART FAILURE (HCC): ICD-10-CM

## 2020-05-02 RX ORDER — SPIRONOLACTONE 25 MG/1
TABLET ORAL
Qty: 45 TABLET | Refills: 7 | Status: SHIPPED | OUTPATIENT
Start: 2020-05-02 | End: 2020-10-07 | Stop reason: SDUPTHER

## 2020-06-14 DIAGNOSIS — I50.22 CHRONIC SYSTOLIC HEART FAILURE (HCC): ICD-10-CM

## 2020-06-14 RX ORDER — METOPROLOL SUCCINATE 100 MG/1
TABLET, EXTENDED RELEASE ORAL
Qty: 180 TABLET | Refills: 3 | Status: SHIPPED | OUTPATIENT
Start: 2020-06-14 | End: 2021-12-06 | Stop reason: SDUPTHER

## 2020-07-03 DIAGNOSIS — I50.22 CHRONIC SYSTOLIC HEART FAILURE (HCC): ICD-10-CM

## 2020-07-04 RX ORDER — DIGOXIN 125 MCG
TABLET ORAL
Qty: 90 TABLET | Refills: 3 | Status: SHIPPED | OUTPATIENT
Start: 2020-07-04 | End: 2020-09-01 | Stop reason: HOSPADM

## 2020-07-27 ENCOUNTER — OFFICE VISIT (OUTPATIENT)
Dept: CARDIOLOGY CLINIC | Facility: CLINIC | Age: 60
End: 2020-07-27
Payer: COMMERCIAL

## 2020-07-27 VITALS
HEART RATE: 64 BPM | DIASTOLIC BLOOD PRESSURE: 102 MMHG | TEMPERATURE: 96.9 F | HEIGHT: 69 IN | OXYGEN SATURATION: 98 % | WEIGHT: 222 LBS | BODY MASS INDEX: 32.88 KG/M2 | SYSTOLIC BLOOD PRESSURE: 154 MMHG

## 2020-07-27 DIAGNOSIS — I50.32 CHRONIC HEART FAILURE WITH PRESERVED EJECTION FRACTION (HCC): Primary | ICD-10-CM

## 2020-07-27 DIAGNOSIS — R06.00 DYSPNEA ON EXERTION: ICD-10-CM

## 2020-07-27 DIAGNOSIS — I50.22 CHRONIC SYSTOLIC HEART FAILURE (HCC): ICD-10-CM

## 2020-07-27 DIAGNOSIS — E78.5 HYPERLIPIDEMIA, UNSPECIFIED HYPERLIPIDEMIA TYPE: ICD-10-CM

## 2020-07-27 PROCEDURE — 99214 OFFICE O/P EST MOD 30 MIN: CPT | Performed by: INTERNAL MEDICINE

## 2020-07-27 RX ORDER — ATORVASTATIN CALCIUM 40 MG/1
40 TABLET, FILM COATED ORAL
Qty: 90 TABLET | Refills: 3 | Status: SHIPPED | OUTPATIENT
Start: 2020-07-27

## 2020-07-27 NOTE — PROGRESS NOTES
Advanced Heart Failure/Pulmonary Hypertension Outpatient Progress Note - Carlos Yao 61 y o  male MRN: [id number]    @ Encounter: [id number]  Assessment/Plan:  # Chronic recovered HFrEF (NICM; LVEF ~20%, improved to ~50% w/ LVIDd decreased from 5 7 to mid 4 cm) initally with biventricular dysfunction (now RV normal), NYHA II, ACC/AHA Stage C: Most likely 2/2 tachy (AFib w/ RVR) as had BiV dysfunction +/- HTN  See cath below  Diag:  --Stress test 2/19/18 - pharm nuc w/ moderate-sized, moderately severe, partially reversible myocardial perfusion defect of the basal to mid inferolateral wall  --Cardiac cath 3/2/18 w/ single vessel CAD, with an 80% proximal stenosis of the ramus artery  As no ischemic symptoms, and LVEF out of proportion decreased to degree of CAD, no stent placed  LVEDP normal    --TTE 3/29/18 shows LVEF ~40-45% (visual inspection), but LVIDd now 4 5 cm and RV size and function now normal  Small pericardial effusion persists  --TTE 10/17/18: LVEF 50%  LVIDd 4 8 cm  Grade 1 diastology  Normal RV  To do:  --Stress echo to assess for further assessment given progressive MARION (although in the heat) and EF reassessment  --Lipid panel    Therapeutic:  --2g sodium diet  --2 L fluid restriction  --Continue EtOH cessation    Neurohormonal Blockade:  --Beta-Blocker: Continue metoprolol succinate 100 mg PO BID  --ACEi, ARB or ARNi: Continue Entresto 24-26 mg PO BID  --Aldosterone Receptor Blocker: Continue spironolactone 12 5 mg PO daily  --Diuretic: Lasix 60 mg PO daily    Electrical Resynchronization/Sudden Cardiac Death Risk Reduction:  --Stopped LifeVest 2/2 to improved LVEF    Advanced Therapies: Currently does not meet criteria as minimal symptoms  # NSVT: LifeVest stopped  Continue BB as above     # AFib s/p DCCV on 12/15/17 on Eliquis  # IFG  # Hx of Gout: Continue Allopurinol    RTC in 6 months    HPI: Very pleasant 61 y o  gentleman out of care seen by Stephanie Johns referred for further optimization  He has a PMHx of FHx of CAD (grandfather, father, mother and brother), HTN, tobacco abuse w/ 30 pack year history admitted to [facility] w/ worsening MARION, 2 pillow orthopnea, and PND  He was admitted from 12/11-17/2017  He had a TTE that showed Biventricular dysfunction w/ LVEF ~20%  He was also noted to have IFG and NSTEMI II, gout and NSVT  He had gained at least 15-20 pounds  He was experiencing generalized weakness and fatigue  In the ER he was found to be in atrial fibrillation with RVR  He was treated with IV Cardizem and start an IV Cardizem drip  However, his blood pressure dropped to 80/60  Cardizem drip was stopped  Chest x-ray showed mild pulmonary vascular congestion  NT pro BNP 5242  He was diuresed with IV Lasix  TTE showed left ventricle mildly dilated  Systolic function severely reduced ejection fraction 20%  There was severe diffuse hypokinesis  Wall thickness mildly increased  The right ventricle mildly dilated  Trace to mild mitral valve regurgitation, trace to mild tricuspid valve regurgitation  LVIDd 5 71 cm  He underwent JOSE LUIS/DCCV and reverted back to normal sinus rhythm  He diuresed 30 pounds w/ resolution of SOB  He was discharged on GDMT and LifeVest  Cardiology felt he was appropriate for outpatient stress test  CM was likely tachycardia mediated due to A  fib with RVR  He was seen by Ryder Messing in f/u at which time he was doing well  He was in NSR at that time and taking his medications  Today, 2/9/18, he comes in for f/u  He is walking miles on flat ground  He can go up and down stairs but has to pause now  He is also going up hills  He works as an  and is very active  Today, 3/5/18, he comes in for f/u  Pharm nuc showed moderate-sized, moderately severe, partially reversible myocardial perfusion defect of the basal to mid inferolateral wall  Cardiac cath w/ 80% prox ramus  Medical mgmt as LVEF reduction out of proportion and no ischemic symptoms   He feels the same as last visit  Today, 4/5/18, he comes in for f/u  He feels well  TTE showed LVEF on visual inspection ~40-45%  LVIDd decreased  Today 6/28/18, he comes in for f/u  He feels well  Playing golf and working full schedule  Has diarrhea (started after meds)  Gout flare in 5/2018  Today 9/18/2018, he returns for f/u  He had a syncopal episode on 7/26/2018 in the setting of working in the sun all day followed by several drinks with friends at a bar  He as monitored on tele in the ER  He continues to have ~3 BMs/day on [place]  He states he feels well  He denies any current cardiac symptoms  Feels back to normal      Today, 3/19/19, he returns for f/u  He feels well  Denies CP, palpitations, presyncope, or syncope  Continues to have 6 BMs/day      7/27/2020: He states he felt well until the heat  He says with the heat and humidity he feels that after a short while he has to start to pace himself, and eventually stop  Denies CP, palpitations, presyncope, or syncope  Denies orthopnea, PND, or LE edema  Past Medical History:   Diagnosis Date    A-fib [facility])     Coronary artery disease     Gout     Herpes zoster     last assessed 12/18/17    Hypertension     Shingles      Review of Systems - 12 point ROS was done and is negative, except as noted above  No Known Allergies        Current Outpatient Medications:     allopurinol (ZYLOPRIM) 100 mg tablet, TAKE 1 TABLET (100 MG TOTAL) BY MOUTH DAILY, Disp: 90 tablet, Rfl: 3    digoxin (LANOXIN) 0 125 mg tablet, TAKE 1 TABLET BY MOUTH EVERY DAY, Disp: 90 tablet, Rfl: 3    ELIQUIS 5 MG, TAKE 1 TABLET (5 MG TOTAL) BY MOUTH 2 (TWO) TIMES A DAY, Disp: 60 tablet, Rfl: 10    ENTRESTO 24-26 MG TABS, TAKE 1 TABLET BY MOUTH TWICE A DAY, Disp: 60 tablet, Rfl: 11    furosemide (LASIX) 40 mg tablet, TAKE 1 5 TABLETS (60 MG TOTAL) BY MOUTH DAILY, Disp: 135 tablet, Rfl: 5    metoprolol succinate (TOPROL-XL) 100 mg 24 hr tablet, TAKE 1 TABLET BY MOUTH TWICE A DAY, Disp: 180 tablet, Rfl: 3    spironolactone (ALDACTONE) 25 mg tablet, TAKE 0 5 TABLETS (12 5 MG TOTAL) BY MOUTH DAILY, Disp: 45 tablet, Rfl: 7    atorvastatin (LIPITOR) 40 mg tablet, Take 1 tablet (40 mg total) by mouth daily with dinner (Patient not taking: Reported on 2020), Disp: 90 tablet, Rfl: 3    Social History     Socioeconomic History    Marital status: Single     Spouse name: Not on file    Number of children: Not on file    Years of education: Not on file    Highest education level: Not on file   Occupational History    Not on file   Social Needs    Financial resource strain: Not on file    Food insecurity:     Worry: Not on file     Inability: Not on file    Transportation needs:     Medical: Not on file     Non-medical: Not on file   Tobacco Use    Smoking status: Former Smoker     Last attempt to quit: 2017     Years since quittin 9    Smokeless tobacco: Never Used   Substance and Sexual Activity    Alcohol use: Yes     Comment: Socially, former alcohol    Drug use: No    Sexual activity: Yes     Partners: Female   Lifestyle    Physical activity:     Days per week: Not on file     Minutes per session: Not on file    Stress: Not on file   Relationships    Social connections:     Talks on phone: Not on file     Gets together: Not on file     Attends Taoism service: Not on file     Active member of club or organization: Not on file     Attends meetings of clubs or organizations: Not on file     Relationship status: Not on file    Intimate partner violence:     Fear of current or ex partner: Not on file     Emotionally abused: Not on file     Physically abused: Not on file     Forced sexual activity: Not on file   Other Topics Concern    Not on file   Social History Narrative    Not on file       Family History   Problem Relation Age of Onset    Coronary artery disease Mother     Alcohol abuse Father     Coronary artery disease Father        Physical Exam:    Vitals: Blood pressure (!) 154/102, pulse 64, temperature (!) 96 9 °F (36 1 °C), height 5' 9" (1 753 m), weight 101 kg (222 lb), SpO2 98 %  , Body mass index is 32 78 kg/m² ,   Wt Readings from Last 3 Encounters:   07/27/20 101 kg (222 lb)   03/19/19 102 kg (224 lb)   09/18/18 98 kg (216 lb)     Vitals:    07/27/20 1625   BP: (!) 154/102   BP Location: Right arm   Patient Position: Sitting   Cuff Size: Adult   Pulse: 64   Temp: (!) 96 9 °F (36 1 °C)   SpO2: 98%   Weight: 101 kg (222 lb)   Height: 5' 9" (1 753 m)     GEN: Brie Flores appears well, alert and oriented x 3, pleasant and cooperative   HEENT: pupils equal, round, and reactive to light; extraocular muscles intact  NECK: supple, no carotid bruits   HEART: regular rhythm, normal S1 and S2, no murmurs, clicks, gallops or rubs, JVP is    LUNGS: clear to auscultation bilaterally; no wheezes, rales, or rhonchi   ABDOMEN: normal bowel sounds, soft, no tenderness, no distention  EXTREMITIES: peripheral pulses normal; no clubbing, cyanosis, or edema  NEURO: no focal findings   SKIN: normal without suspicious lesions on exposed skin    Labs & Results:  Lab Results   Component Value Date    WBC 5 54 07/27/2018    HGB 13 0 07/27/2018    HCT 40 1 07/27/2018    MCV 96 07/27/2018     07/27/2018       Chemistry        Component Value Date/Time    K 3 8 07/27/2018 0508     07/27/2018 0508    CO2 25 07/27/2018 0508    CO2 25 12/11/2017 0837    BUN 16 07/27/2018 0508    CREATININE 0 92 07/27/2018 0508        Component Value Date/Time    CALCIUM 8 3 07/27/2018 0508    ALKPHOS 55 07/26/2018 2005    AST 15 07/26/2018 2005    ALT 28 07/26/2018 2005        EKG personally reviewed by Skylar Stoddard MD      Counseling / Coordination of Care  Total floor / unit time spent today 40 minutes  Greater than 50% of total time was spent with the patient and / or family counseling and / or coordination of care  A description of the counseling / coordination of care: 25 min        Thank you for the opportunity to participate in the care of this patient      Naye Monge MD, PhD   Alexa Jones

## 2020-08-21 ENCOUNTER — HOSPITAL ENCOUNTER (INPATIENT)
Facility: HOSPITAL | Age: 60
LOS: 11 days | Discharge: HOME HEALTH | DRG: 330 | End: 2020-09-01
Attending: EMERGENCY MEDICINE | Admitting: HOSPITALIST
Payer: COMMERCIAL

## 2020-08-21 ENCOUNTER — TELEPHONE (OUTPATIENT)
Dept: CARDIOLOGY CLINIC | Facility: CLINIC | Age: 60
End: 2020-08-21

## 2020-08-21 ENCOUNTER — HOSPITAL ENCOUNTER (OUTPATIENT)
Dept: NON INVASIVE DIAGNOSTICS | Facility: CLINIC | Age: 60
Discharge: HOME | DRG: 330 | End: 2020-08-21
Payer: COMMERCIAL

## 2020-08-21 ENCOUNTER — APPOINTMENT (EMERGENCY)
Dept: RADIOLOGY | Facility: HOSPITAL | Age: 60
DRG: 330 | End: 2020-08-21
Payer: COMMERCIAL

## 2020-08-21 DIAGNOSIS — N17.9 AKI (ACUTE KIDNEY INJURY) (HCC): ICD-10-CM

## 2020-08-21 DIAGNOSIS — D64.9 ANEMIA: ICD-10-CM

## 2020-08-21 DIAGNOSIS — R06.00 DYSPNEA ON EXERTION: ICD-10-CM

## 2020-08-21 DIAGNOSIS — I48.91 ATRIAL FIBRILLATION WITH RAPID VENTRICULAR RESPONSE (HCC): Primary | ICD-10-CM

## 2020-08-21 DIAGNOSIS — R77.8 ELEVATED TROPONIN: ICD-10-CM

## 2020-08-21 DIAGNOSIS — R33.9 URINARY RETENTION: ICD-10-CM

## 2020-08-21 DIAGNOSIS — D50.9 MICROCYTIC ANEMIA: ICD-10-CM

## 2020-08-21 DIAGNOSIS — K63.89 MASS OF CECUM: ICD-10-CM

## 2020-08-21 DIAGNOSIS — K63.89 COLONIC MASS: ICD-10-CM

## 2020-08-21 PROBLEM — K92.1 GASTROINTESTINAL HEMORRHAGE WITH MELENA: Status: ACTIVE | Noted: 2020-08-21

## 2020-08-21 PROBLEM — I95.9 HYPOTENSION: Status: ACTIVE | Noted: 2020-08-21

## 2020-08-21 LAB
ABO GROUP BLD: NORMAL
ABO GROUP BLD: NORMAL
ANION GAP SERPL CALCULATED.3IONS-SCNC: 6 MMOL/L (ref 4–13)
BASOPHILS # BLD AUTO: 0.07 THOUSANDS/ΜL (ref 0–0.1)
BASOPHILS NFR BLD AUTO: 1 % (ref 0–1)
BLD GP AB SCN SERPL QL: NEGATIVE
BUN SERPL-MCNC: 37 MG/DL (ref 5–25)
CALCIUM SERPL-MCNC: 8.9 MG/DL (ref 8.3–10.1)
CHLORIDE SERPL-SCNC: 108 MMOL/L (ref 100–108)
CO2 SERPL-SCNC: 24 MMOL/L (ref 21–32)
CREAT SERPL-MCNC: 2.31 MG/DL (ref 0.6–1.3)
DIGOXIN SERPL-MCNC: 0.6 NG/ML (ref 0.8–2)
EOSINOPHIL # BLD AUTO: 0.08 THOUSAND/ΜL (ref 0–0.61)
EOSINOPHIL NFR BLD AUTO: 1 % (ref 0–6)
ERYTHROCYTE [DISTWIDTH] IN BLOOD BY AUTOMATED COUNT: 16.2 % (ref 11.6–15.1)
GFR SERPL CREATININE-BSD FRML MDRD: 30 ML/MIN/1.73SQ M
GLUCOSE SERPL-MCNC: 100 MG/DL (ref 65–140)
HCT VFR BLD AUTO: 32 % (ref 36.5–49.3)
HGB BLD-MCNC: 9.3 G/DL (ref 12–17)
IMM GRANULOCYTES # BLD AUTO: 0.01 THOUSAND/UL (ref 0–0.2)
IMM GRANULOCYTES NFR BLD AUTO: 0 % (ref 0–2)
LYMPHOCYTES # BLD AUTO: 1.21 THOUSANDS/ΜL (ref 0.6–4.47)
LYMPHOCYTES NFR BLD AUTO: 15 % (ref 14–44)
MAGNESIUM SERPL-MCNC: 2.4 MG/DL (ref 1.6–2.6)
MCH RBC QN AUTO: 22.7 PG (ref 26.8–34.3)
MCHC RBC AUTO-ENTMCNC: 29.1 G/DL (ref 31.4–37.4)
MCV RBC AUTO: 78 FL (ref 82–98)
MONOCYTES # BLD AUTO: 1.02 THOUSAND/ΜL (ref 0.17–1.22)
MONOCYTES NFR BLD AUTO: 13 % (ref 4–12)
NEUTROPHILS # BLD AUTO: 5.67 THOUSANDS/ΜL (ref 1.85–7.62)
NEUTS SEG NFR BLD AUTO: 70 % (ref 43–75)
NRBC BLD AUTO-RTO: 0 /100 WBCS
PHOSPHATE SERPL-MCNC: 4.1 MG/DL (ref 2.3–4.1)
PLATELET # BLD AUTO: 457 THOUSANDS/UL (ref 149–390)
PMV BLD AUTO: 10 FL (ref 8.9–12.7)
POTASSIUM SERPL-SCNC: 4.3 MMOL/L (ref 3.5–5.3)
RBC # BLD AUTO: 4.1 MILLION/UL (ref 3.88–5.62)
RH BLD: POSITIVE
RH BLD: POSITIVE
SODIUM SERPL-SCNC: 138 MMOL/L (ref 136–145)
SPECIMEN EXPIRATION DATE: NORMAL
TROPONIN I SERPL-MCNC: 0.1 NG/ML
TSH SERPL DL<=0.05 MIU/L-ACNC: 1.19 UIU/ML (ref 0.36–3.74)
WBC # BLD AUTO: 8.06 THOUSAND/UL (ref 4.31–10.16)

## 2020-08-21 PROCEDURE — 83735 ASSAY OF MAGNESIUM: CPT | Performed by: EMERGENCY MEDICINE

## 2020-08-21 PROCEDURE — 84484 ASSAY OF TROPONIN QUANT: CPT | Performed by: EMERGENCY MEDICINE

## 2020-08-21 PROCEDURE — 93005 ELECTROCARDIOGRAM TRACING: CPT

## 2020-08-21 PROCEDURE — 36415 COLL VENOUS BLD VENIPUNCTURE: CPT | Performed by: EMERGENCY MEDICINE

## 2020-08-21 PROCEDURE — 86900 BLOOD TYPING SEROLOGIC ABO: CPT | Performed by: EMERGENCY MEDICINE

## 2020-08-21 PROCEDURE — 99285 EMERGENCY DEPT VISIT HI MDM: CPT

## 2020-08-21 PROCEDURE — 86920 COMPATIBILITY TEST SPIN: CPT

## 2020-08-21 PROCEDURE — 85025 COMPLETE CBC W/AUTO DIFF WBC: CPT | Performed by: EMERGENCY MEDICINE

## 2020-08-21 PROCEDURE — 93350 STRESS TTE ONLY: CPT

## 2020-08-21 PROCEDURE — 80048 BASIC METABOLIC PNL TOTAL CA: CPT | Performed by: EMERGENCY MEDICINE

## 2020-08-21 PROCEDURE — P9016 RBC LEUKOCYTES REDUCED: HCPCS

## 2020-08-21 PROCEDURE — 93308 TTE F-UP OR LMTD: CPT | Performed by: EMERGENCY MEDICINE

## 2020-08-21 PROCEDURE — 86850 RBC ANTIBODY SCREEN: CPT | Performed by: EMERGENCY MEDICINE

## 2020-08-21 PROCEDURE — 96376 TX/PRO/DX INJ SAME DRUG ADON: CPT

## 2020-08-21 PROCEDURE — 96365 THER/PROPH/DIAG IV INF INIT: CPT

## 2020-08-21 PROCEDURE — 96375 TX/PRO/DX INJ NEW DRUG ADDON: CPT

## 2020-08-21 PROCEDURE — 99222 1ST HOSP IP/OBS MODERATE 55: CPT | Performed by: INTERNAL MEDICINE

## 2020-08-21 PROCEDURE — 96366 THER/PROPH/DIAG IV INF ADDON: CPT

## 2020-08-21 PROCEDURE — 84443 ASSAY THYROID STIM HORMONE: CPT | Performed by: EMERGENCY MEDICINE

## 2020-08-21 PROCEDURE — 71045 X-RAY EXAM CHEST 1 VIEW: CPT

## 2020-08-21 PROCEDURE — 80162 ASSAY OF DIGOXIN TOTAL: CPT | Performed by: EMERGENCY MEDICINE

## 2020-08-21 PROCEDURE — 86901 BLOOD TYPING SEROLOGIC RH(D): CPT | Performed by: EMERGENCY MEDICINE

## 2020-08-21 PROCEDURE — 84100 ASSAY OF PHOSPHORUS: CPT | Performed by: EMERGENCY MEDICINE

## 2020-08-21 PROCEDURE — 99291 CRITICAL CARE FIRST HOUR: CPT | Performed by: EMERGENCY MEDICINE

## 2020-08-21 PROCEDURE — 93351 STRESS TTE COMPLETE: CPT | Performed by: INTERNAL MEDICINE

## 2020-08-21 RX ORDER — DOFETILIDE 0.12 MG/1
125 CAPSULE ORAL EVERY 12 HOURS SCHEDULED
Status: DISCONTINUED | OUTPATIENT
Start: 2020-08-21 | End: 2020-08-22

## 2020-08-21 RX ORDER — DILTIAZEM HYDROCHLORIDE 5 MG/ML
15 INJECTION INTRAVENOUS ONCE
Status: COMPLETED | OUTPATIENT
Start: 2020-08-21 | End: 2020-08-21

## 2020-08-21 RX ORDER — FUROSEMIDE 10 MG/ML
80 INJECTION INTRAMUSCULAR; INTRAVENOUS ONCE
Status: COMPLETED | OUTPATIENT
Start: 2020-08-21 | End: 2020-08-21

## 2020-08-21 RX ADMIN — DILTIAZEM HYDROCHLORIDE 5 MG/HR: 5 INJECTION INTRAVENOUS at 17:29

## 2020-08-21 RX ADMIN — DILTIAZEM HYDROCHLORIDE 10 MG: 5 INJECTION INTRAVENOUS at 17:08

## 2020-08-21 RX ADMIN — DOFETILIDE 125 MCG: 0.12 CAPSULE ORAL at 22:27

## 2020-08-21 RX ADMIN — FUROSEMIDE 80 MG: 10 INJECTION, SOLUTION INTRAMUSCULAR; INTRAVENOUS at 18:15

## 2020-08-21 NOTE — ED PROVIDER NOTES
History  Chief Complaint   Patient presents with    Atrial Fibrillation     pt sent from outpatient stress test to ed for eval due to high heart rate     HPI     51-year-old male past of AFib on Eliquis, non-ischemic cardiomyopathy with systolic HF with EF of 91% and hypertension who presents for evaluation AFib with RVR  Patient has been experiencing dyspnea with exertion over the past month which has been progressively worsening  Patient states he was scheduled for an outpatient stress today  When patient arrived, he was found to be in AFib with RVR and had a blood pressure of 90's/50's  Patient did not have stress test performed and was instead advised to go to the emergency department for further cardiology evaluation  On arrival to the emergency department, patient states he is feeling slightly lightheaded  Denies any chest pain, shortness of breath at rest, diaphoresis, nausea, vomiting, fevers, chills  Denies any palpitations, Patient states he is not able to feel when he is in A fib  Patient endorses mild cough over the past month  Patient denies any recent weight gain, abdominal distention or leg swelling  Patient states he has been compliant with all of his medications  Patient denies any alcohol use in the past one week  Patient follows with Dr Jewell Orellana as his outpatient cardiologist      Prior to Admission Medications   Prescriptions Last Dose Informant Patient Reported? Taking?    ELIQUIS 5 MG 8/21/2020 at Unknown time  No Yes   Sig: TAKE 1 TABLET (5 MG TOTAL) BY MOUTH 2 (TWO) TIMES A DAY   ENTRESTO 24-26 MG TABS 8/21/2020 at Unknown time  No Yes   Sig: TAKE 1 TABLET BY MOUTH TWICE A DAY   allopurinol (ZYLOPRIM) 100 mg tablet 8/21/2020 at Unknown time  No Yes   Sig: TAKE 1 TABLET (100 MG TOTAL) BY MOUTH DAILY   atorvastatin (LIPITOR) 40 mg tablet 8/21/2020 at Unknown time  No Yes   Sig: Take 1 tablet (40 mg total) by mouth daily with dinner   digoxin (LANOXIN) 0 125 mg tablet 8/21/2020 at Unknown time  No Yes   Sig: TAKE 1 TABLET BY MOUTH EVERY DAY   furosemide (LASIX) 40 mg tablet 8/21/2020 at Unknown time  No Yes   Sig: TAKE 1 5 TABLETS (60 MG TOTAL) BY MOUTH DAILY   metoprolol succinate (TOPROL-XL) 100 mg 24 hr tablet 8/21/2020 at Unknown time  No Yes   Sig: TAKE 1 TABLET BY MOUTH TWICE A DAY   spironolactone (ALDACTONE) 25 mg tablet 8/21/2020 at Unknown time  No Yes   Sig: TAKE 0 5 TABLETS (12 5 MG TOTAL) BY MOUTH DAILY      Facility-Administered Medications: None       Past Medical History:   Diagnosis Date    A[facility]fib ([facility] [address] )     Coronary artery disease     Gout     Herpes zoster     last assessed 12/18/17    Hypertension     Shingles        Past Surgical History:   Procedure Laterality Date    ELECTRICAL CARDIOVERSION  12/15/2017            Family History   Problem Relation Age of Onset    Coronary artery disease Mother     Alcohol abuse Father     Coronary artery disease Father      I have reviewed and agree with the history as documented  E-Cigarette/Vaping    E-Cigarette Use Never User      E-Cigarette/Vaping Substances     Social History     Tobacco Use    Smoking status: Former Smoker     Last attempt to quit: 8/11/2017     Years since quitting: 3 0    Smokeless tobacco: Never Used   Substance Use Topics    Alcohol use: Yes     Comment: Socially, former alcohol    Drug use: No        Review of Systems   Constitutional: Negative for appetite change, chills, fatigue, fever and unexpected weight change  HENT: Negative for congestion, rhinorrhea and sore throat  Eyes: Negative for visual disturbance  Respiratory: Positive for cough and shortness of breath  Negative for chest tightness and wheezing  Cardiovascular: Negative for chest pain, palpitations and leg swelling  Gastrointestinal: Positive for blood in stool  Negative for abdominal distention, abdominal pain, constipation, diarrhea, nausea and vomiting     Genitourinary: Negative for dysuria, frequency, hematuria and urgency  Musculoskeletal: Negative for arthralgias and myalgias  Skin: Negative for color change, pallor and rash  Neurological: Positive for light-headedness  Negative for dizziness, weakness, numbness and headaches  All other systems reviewed and are negative  Physical Exam  ED Triage Vitals [08/21/20 1613]   Temperature Pulse Respirations Blood Pressure SpO2   99 °F (37 2 °C) (!) 122 20 118/64 98 %      Temp Source Heart Rate Source Patient Position - Orthostatic VS BP Location FiO2 (%)   Oral Monitor Lying Left arm --      Pain Score       --             Orthostatic Vital Signs  Vitals:    08/21/20 2030 08/21/20 2045 08/21/20 2117 08/21/20 2204   BP: 95/56 92/61 91/68 125/81   Pulse: 96 95     Patient Position - Orthostatic VS: Lying Lying         Physical Exam  Vitals signs and nursing note reviewed  Exam conducted with a chaperone present  Constitutional:       General: He is not in acute distress  Appearance: Normal appearance  He is well-developed  He is obese  He is not ill-appearing, toxic-appearing or diaphoretic  HENT:      Head: Normocephalic and atraumatic  Nose: Nose normal       Mouth/Throat:      Mouth: Mucous membranes are moist       Pharynx: Oropharynx is clear  Eyes:      Conjunctiva/sclera: Conjunctivae normal       Pupils: Pupils are equal, round, and reactive to light  Neck:      Musculoskeletal: Neck supple  Cardiovascular:      Rate and Rhythm: Tachycardia present  Rhythm irregular  Pulses: Normal pulses  Heart sounds: Normal heart sounds  No murmur  No friction rub  No gallop  Pulmonary:      Effort: Pulmonary effort is normal  No respiratory distress  Breath sounds: Normal breath sounds  No wheezing or rales  Abdominal:      General: Bowel sounds are normal  There is no distension  Palpations: Abdomen is soft  Tenderness: There is no abdominal tenderness  There is no guarding or rebound     Genitourinary:     Rectum: Guaiac result positive  Musculoskeletal:         General: No swelling or tenderness  Right lower leg: No edema  Left lower leg: No edema  Skin:     General: Skin is warm and dry  Coloration: Skin is not pale  Findings: No erythema or rash  Neurological:      General: No focal deficit present  Mental Status: He is alert and oriented to person, place, and time  Cranial Nerves: No cranial nerve deficit  Sensory: No sensory deficit  Motor: No weakness     Psychiatric:         Mood and Affect: Mood normal          Behavior: Behavior normal          ED Medications  Medications   dofetilide (TIKOSYN) capsule 125 mcg (125 mcg Oral Given 8/21/20 2227)   diltiazem (CARDIZEM) 125 mg in sodium chloride 0 9 % 125 mL infusion (0 mg/hr Intravenous Hold 8/21/20 2022)   diltiazem (CARDIZEM) injection 15 mg (10 mg Intravenous Given 8/21/20 1708)   furosemide (LASIX) injection 80 mg (80 mg Intravenous Given 8/21/20 1815)       Diagnostic Studies  Results Reviewed     Procedure Component Value Units Date/Time    Digoxin level [[id number]]  (Abnormal) Collected:  08/21/20 1647    Lab Status:  Final result Specimen:  Blood from Arm, Right Updated:  08/21/20 1730     Digoxin Lvl 0 6 ng/mL     Basic metabolic panel [[id number]]  (Abnormal) Collected:  08/21/20 1647    Lab Status:  Final result Specimen:  Blood from Arm, Right Updated:  08/21/20 1725     Sodium 138 mmol/L      Potassium 4 3 mmol/L      Chloride 108 mmol/L      CO2 24 mmol/L      ANION GAP 6 mmol/L      BUN 37 mg/dL      Creatinine 2 31 mg/dL      Glucose 100 mg/dL      Calcium 8 9 mg/dL      eGFR 30 ml/min/1 73sq m     Narrative:       [place] guidelines for Chronic Kidney Disease (CKD):     Stage 1 with normal or high GFR (GFR > 90 mL/min/1 73 square meters)    Stage 2 Mild CKD (GFR = 60-89 mL/min/1 73 square meters)    Stage 3A Moderate CKD (GFR = 45-59 mL/min/1 73 square meters)    Stage 3B Moderate CKD (GFR = 30-44 mL/min/1 73 square meters)    Stage 4 Severe CKD (GFR = 15-29 mL/min/1 73 square meters)    Stage 5 End Stage CKD (GFR <15 mL/min/1 73 square meters)  Note: GFR calculation is accurate only with a steady state creatinine    TSH [[id number]]  (Normal) Collected:  08/21/20 1647    Lab Status:  Final result Specimen:  Blood from Arm, Right Updated:  08/21/20 1725     TSH 3RD GENERATON 1 190 uIU/mL     Narrative:       Patients undergoing fluorescein dye angiography may retain small amounts of fluorescein in the body for 48-72 hours post procedure  Samples containing fluorescein can produce falsely depressed TSH values  If the patient had this procedure,a specimen should be resubmitted post fluorescein clearance        Phosphorus [[id number]]  (Normal) Collected:  08/21/20 1647    Lab Status:  Final result Specimen:  Blood from Arm, Right Updated:  08/21/20 1725     Phosphorus 4 1 mg/dL     Magnesium [[id number]]  (Normal) Collected:  08/21/20 1647    Lab Status:  Final result Specimen:  Blood from Arm, Right Updated:  08/21/20 1725     Magnesium 2 4 mg/dL     Troponin I [[id number]]  (Abnormal) Collected:  08/21/20 1647    Lab Status:  Final result Specimen:  Blood from Arm, Right Updated:  08/21/20 1719     Troponin I 0 10 ng/mL     CBC and differential [[id number]]  (Abnormal) Collected:  08/21/20 1647    Lab Status:  Final result Specimen:  Blood from Arm, Right Updated:  08/21/20 1659     WBC 8 06 Thousand/uL      RBC 4 10 Million/uL      Hemoglobin 9 3 g/dL      Hematocrit 32 0 %      MCV 78 fL      MCH 22 7 pg      MCHC 29 1 g/dL      RDW 16 2 %      MPV 10 0 fL      Platelets 016 Thousands/uL      nRBC 0 /100 WBCs      Neutrophils Relative 70 %      Immat GRANS % 0 %      Lymphocytes Relative 15 %      Monocytes Relative 13 %      Eosinophils Relative 1 %      Basophils Relative 1 %      Neutrophils Absolute 5 67 Thousands/µL      Immature Grans Absolute 0 01 Thousand/uL      Lymphocytes Absolute 1 21 Thousands/µL      Monocytes Absolute 1 02 Thousand/µL      Eosinophils Absolute 0 08 Thousand/µL      Basophils Absolute 0 07 Thousands/µL                  XR chest 1 view portable   Final Result by Wellington Vegas DO (08/21 1807)      No acute cardiopulmonary disease  Workstation performed: [id number]               Procedures  ECG 12 Lead Documentation Only    Date/Time: 8/21/2020 7:20 PM  Performed by: Zoya Gu MD  Authorized by: Zoya Gu MD     Indications / Diagnosis:  A fib  ECG reviewed by me, the ED Provider: yes    Patient location:  ED  Previous ECG:     Previous ECG:  Compared to current    Similarity:  Changes noted    Comparison to cardiac monitor: Yes    Interpretation:     Interpretation: abnormal    Rate:     ECG rate:  117    ECG rate assessment: tachycardic    Rhythm:     Rhythm: atrial fibrillation    Ectopy:     Ectopy: none    QRS:     QRS axis:  Normal    QRS intervals:  Normal  Conduction:     Conduction: normal    ST segments:     ST segments:  Normal  T waves:     T waves: non-specific            ED Course                   PATRICK/DAST-10      Most Recent Value   How many times in the past year have you    Used an illegal drug or used a prescription medication for non-medical reasons? Never Filed at: 08/21/2020 1621                              [facility]    80-year-old male past of AFib on Eliquis, non-ischemic cardiomyopathy with systolic HF with EF of 68% presents for evaluation AFib with RVR  Differential diagnosis includes: hyperthyroidism, alcohol induced a fib, CHF, electrolyte abnormality, reduced medication levels, ACS, anemia  Will evaluate with TSH, BMP, Mg, Phos, digoxin level, troponin, CBC, EKG, CXR and bedside echo  EKG shows A fib with RVR, rate of 117  Bedside limited echo shows grossly normal EF with small pericardial effusion, no tamponade physiology  Will start on diltiazem 10mg bolus followed by gtt at 5mg/hour to control HR less than 120   Patient's BP improved with diltiazem to systolic 408'A-206'P  Reviewed labs and imaging  Hb 9 3 (previous baseline 13-14), Creatinine 2 31 (baseline 0 9-1 0), Troponin 0 10, TSH and electrolytes within normal limits  Digoxin level 0 6  CXR shows no acute abnormalities  Discussed with patient about low Hb, patient admits to having dark tarry stools for the past one month  Will perform rectal exam and obtain hemoccult stool test   Hemoccult positive, no active bleeding on exam  Patient is having dyspnea with exertion, hypotension, slightly positive troponin and SCARLETT, will transfuse 1U pRBC for symptomatic anemia/end organ damage  Consent for transfusion obtained  Discussed with Dr Jonny Abarca from cardiology, will come to evaluate patient  Cardiology recommends 80 mg lasix IV  Patient had an episode of feeling diaphoretic and lightheaded, BP 85/50  Held diltiazem gtt and gave 500 cc bolus IVF  Patient feeling symptomatically better, BP improved to 95/56  Still awaiting 1U pRBCs  Discussed with Dr Negro Thornton from [facility], will admit inpatient to med/surg with tele to the service of Dr Erin Reddy             Disposition  Final diagnoses:   Atrial fibrillation with rapid ventricular response (HCC)   Anemia   SCARLETT (acute kidney injury) ([facility] [address] )   Elevated troponin     Time reflects when diagnosis was documented in both MDM as applicable and the Disposition within this note     Time User Action Codes Description Comment    8/21/2020  7:49 PM Jose Peacock Add [I48 91] Atrial fibrillation with rapid ventricular response ([facility] [address] )     8/21/2020  7:49 PM Diannia Clore [D64 9] Anemia     8/21/2020  7:49 PM Jose Peacock Add [N17 9] SCARLETT (acute kidney injury) ([facility] [address] )     8/21/2020  8:42 PM Mitchell Console Add [R79 89] Elevated troponin       ED Disposition     ED Disposition Condition Date/Time Comment    Admit Stable Fri Aug 21, 2020  8:20 PM Case was discussed with [facility] and the patient's admission status was agreed to be Admission Status: inpatient status to the service of Dr Ana Blood  Follow-up Information    None         Current Discharge Medication List      CONTINUE these medications which have NOT CHANGED    Details   allopurinol (ZYLOPRIM) 100 mg tablet TAKE 1 TABLET (100 MG TOTAL) BY MOUTH DAILY  Qty: 90 tablet, Refills: 3    Associated Diagnoses: Chronic systolic heart failure (HCC)      atorvastatin (LIPITOR) 40 mg tablet Take 1 tablet (40 mg total) by mouth daily with dinner  Qty: 90 tablet, Refills: 3    Associated Diagnoses: Chronic systolic heart failure (HCC)      digoxin (LANOXIN) 0 125 mg tablet TAKE 1 TABLET BY MOUTH EVERY DAY  Qty: 90 tablet, Refills: 3    Associated Diagnoses: Chronic systolic heart failure (HCC)      ELIQUIS 5 MG TAKE 1 TABLET (5 MG TOTAL) BY MOUTH 2 (TWO) TIMES A DAY  Qty: 60 tablet, Refills: 10    Associated Diagnoses: Atrial fibrillation, unspecified type (HCC)      ENTRESTO 24-26 MG TABS TAKE 1 TABLET BY MOUTH TWICE A DAY  Qty: 60 tablet, Refills: 11    Associated Diagnoses: Chronic systolic heart failure (HCC)      furosemide (LASIX) 40 mg tablet TAKE 1 5 TABLETS (60 MG TOTAL) BY MOUTH DAILY  Qty: 135 tablet, Refills: 5    Associated Diagnoses: Chronic systolic heart failure (HCC)      metoprolol succinate (TOPROL-XL) 100 mg 24 hr tablet TAKE 1 TABLET BY MOUTH TWICE A DAY  Qty: 180 tablet, Refills: 3    Associated Diagnoses: Chronic systolic heart failure (HCC)      spironolactone (ALDACTONE) 25 mg tablet TAKE 0 5 TABLETS (12 5 MG TOTAL) BY MOUTH DAILY  Qty: 45 tablet, Refills: 7    Associated Diagnoses: Chronic systolic heart failure (HCC)           No discharge procedures on file  PDMP Review     None           ED Provider  Attending physically available and evaluated Efrem Rashaun AWAD managed the patient along with the ED Attending      Electronically Signed by         Antonia Julio MD  08/21/20 8088

## 2020-08-21 NOTE — TELEPHONE ENCOUNTER
Presented to [address] [facility] office for outpatient stress echo  On connecting to ECG, was found to be in rapid atrial fibrillation  Patient with history of tachycardia induced CMP with afib in the past, also with CAD  Worsening symptoms of MARION for > 1 month  Limited echo showed mildly decreased LV function, EF approximately 45%, RV dysfunction noted  BP is on the lower side, unable to safely titrate medical therapy without careful monitoring  He feels unwell also, quite short of breath  Will not perform stress test   Referred patient to ER, needs cardiology evaluation, will need to restore sinus rhythm    Notified hospital team

## 2020-08-21 NOTE — CONSULTS
Consultation - Cardiology   Jade Calderón 61 y o  male MRN: [id number]  Unit/Bed#: ED 06 Encounter: [id number]      Assessment and Plan:   Active Problems:    Atrial fibrillation with RVR  -history of paroxysmal atrial fibrillation, status post DC cardioversion 12/15/2017, now back in atrial fibrillation  -anticoagulated on Eliquis, can hold if patient is having an active GI bleed  -plan was for repeat cardioversion on Monday, but this may be postponed if anticoagulation is stopped due to GI bleed  -will start Tikosyn 125 mcg b i d , will need to check EKG 2 hours post each dose this weekend  -if patient's creatinine improves back to baseline, will need to increase Tikosyn dose to 250 mcg b i d   -discontinue digoxin  -plan for EP evaluation on Monday  -started on low dose Cardizem drip in ED, heart rate and blood pressure significantly improved with rate control  -can continue Cardizem drip with metoprolol 100 mg p o  b i d , if patient becomes hypotensive recommend stopping the Cardizem drip    Acute kidney injury  -unclear if from cardiorenal from decreased cardiac output given recurrent atrial fibrillation versus prerenal from acute blood loss anemia  -gave 80 mg IV Lasix x1 in the ED given small pericardial effusion, and evidence of increased right-sided filling pressures, hold off on further diuresis for now  -also giving 1 unit of PRBCs    Anemia  -concern for GI bleed given history of tarry black stools  -transfuse 1 unit PRBCs  -check fecal occult, consider GI consult    Nonischemic cardiomyopathy  -prior Bi V failure with EF of 20% in 2018 likely secondary to tachy mediated cardiomyopathy in setting of new onset atrial fibrillation with RVR, EF recovered to 50%  -recommend holding Entresto and spironolactone given acute heart failure exacerbation and ARF  -continue with metoprolol 100 mg p o  B i d     CAD  -left heart catheterization on 03/02/2018 showed 80% prox stenosis of the ramus intermedius, 60% ostial stenosis of the RPDA, no interventions were performed at that time  -LV dysfunction at that time was out of proportion to CAD found  -no significant wall motion abnormalities on limited TTE earlier today    Hypertension              History of Present Illness   Physician Requesting Consult: Vickey Ormond, MD  Reason for Consult / Principal Problem:  AFib with RVR, heart failure  HPI: Sushila Valadez is a 61y o  year old male who presents with worsening fatigue and recurrent AFib  He has past medical history of nonischemic cardiomyopathy with EF recovery to 50% from 20% (likely tachy mediated from AFib with RVR with Bi V dysfunction), CAD, hypertension, prior tobacco abuse with 30 year history, and gout  Around the middle of July, patient reports developing worsening fatigue and dyspnea on exertion which have continued to worsen and have severely limited his daily activities  He saw his cardiologist at the end of July who ordered a exercise stress echocardiogram   He presented earlier today for this test and was found to be in recurrent AFib with RVR  Echo at that time showed mildly reduced LV function, RV dysfunction, and a small pericardial effusion with no evidence of hemodynamic compromise  He was referred to the emergency department for further evaluation and treatment  Initial labs show an elevated creatinine and low hemoglobin  Upon further discussion with the patient, he knowledge is having black tarry stools for the last few weeks  Consults    Review of Systems:  Review of Systems   Constitutional: Positive for fatigue  Negative for chills and fever  HENT: Negative for congestion  Eyes: Negative for photophobia and visual disturbance  Respiratory: Positive for shortness of breath  Negative for chest tightness  Cardiovascular: Negative for chest pain, palpitations and leg swelling     Gastrointestinal: Negative for abdominal distention, abdominal pain, diarrhea, nausea and vomiting         + black tarry stools   Genitourinary: Negative for difficulty urinating and dysuria  Musculoskeletal: Negative for back pain  Skin: Negative for color change and rash  Neurological: Positive for weakness  Negative for dizziness and syncope  Psychiatric/Behavioral: Negative for agitation and confusion  Historical Information   Past Medical History:   Diagnosis Date    A-fib [facility])     Coronary artery disease     Gout     Herpes zoster     last assessed 12/18/17    Hypertension     Shingles      Past Surgical History:   Procedure Laterality Date    ELECTRICAL CARDIOVERSION  12/15/2017          Social History     Substance and Sexual Activity   Alcohol Use Yes    Comment: Socially, former alcohol     Social History     Substance and Sexual Activity   Drug Use No     Social History     Tobacco Use   Smoking Status Former Smoker    Last attempt to quit: 8/11/2017    Years since quitting: 3 0   Smokeless Tobacco Never Used     Family History: non-contributory    Meds/Allergies   all current active meds have been reviewed and current meds:   No current facility-administered medications for this encounter  No Known Allergies    Objective   Vitals: Blood pressure 125/79, pulse 95, temperature 99 °F (37 2 °C), temperature source Oral, resp  rate 20, height 5' 9" (1 753 m), weight 99 8 kg (220 lb), SpO2 98 %  , Body mass index is 32 49 kg/m² ,   Orthostatic Blood Pressures      Most Recent Value   Blood Pressure  125/79 filed at 08/21/2020 1805   Patient Position - Orthostatic VS  Lying filed at 08/21/2020 1805          No intake or output data in the 24 hours ending 08/21/20 1820    Invasive Devices     Peripheral Intravenous Line            Peripheral IV 08/21/20 Right Antecubital less than 1 day                    Physical Exam:  Physical Exam  Constitutional:       General: He is not in acute distress  Appearance: He is well-developed  He is not diaphoretic     Eyes: Conjunctiva/sclera: Conjunctivae normal       Pupils: Pupils are equal, round, and reactive to light  Neck:      Musculoskeletal: Normal range of motion and neck supple  Vascular: No JVD  Cardiovascular:      Rate and Rhythm: Normal rate  Rhythm irregularly irregular  Heart sounds: Normal heart sounds  No murmur  No friction rub  No gallop  Pulmonary:      Effort: Pulmonary effort is normal  No respiratory distress  Breath sounds: Normal breath sounds  No stridor  No wheezing or rales  Abdominal:      General: Bowel sounds are normal  There is no distension  Palpations: Abdomen is soft  Tenderness: There is no abdominal tenderness  Musculoskeletal: Normal range of motion  Skin:     General: Skin is warm and dry  Neurological:      Mental Status: He is alert and oriented to person, place, and time  Psychiatric:         Behavior: Behavior normal            Lab Results:     Lab Results   Component Value Date    TROPONINI 0 10 (H) 08/21/2020    TROPONINI 0 15 (H) 07/27/2018    TROPONINI 0 14 (H) 07/27/2018       Lab Results   Component Value Date    GLUCOSE 107 12/11/2017    CALCIUM 8 9 08/21/2020    K 4 3 08/21/2020    CO2 24 08/21/2020     08/21/2020    BUN 37 (H) 08/21/2020    CREATININE 2 31 (H) 08/21/2020       Lab Results   Component Value Date    WBC 8 06 08/21/2020    HGB 9 3 (L) 08/21/2020    HCT 32 0 (L) 08/21/2020    MCV 78 (L) 08/21/2020     (H) 08/21/2020       No results found for: CHOL  Lab Results   Component Value Date    HDL 31 (L) 12/12/2017     Lab Results   Component Value Date    LDLCALC 73 12/12/2017     Lab Results   Component Value Date    TRIG 82 12/12/2017       Lab Results   Component Value Date    ALT 28 07/26/2018    AST 15 07/26/2018               Imaging: I have personally reviewed pertinent reports        EKG:  AFib with RVR

## 2020-08-22 LAB
ABO GROUP BLD BPU: NORMAL
ALBUMIN SERPL BCP-MCNC: 3.6 G/DL (ref 3.5–5)
ALP SERPL-CCNC: 53 U/L (ref 46–116)
ALT SERPL W P-5'-P-CCNC: 23 U/L (ref 12–78)
ANION GAP SERPL CALCULATED.3IONS-SCNC: 5 MMOL/L (ref 4–13)
AST SERPL W P-5'-P-CCNC: 8 U/L (ref 5–45)
ATRIAL RATE: 394 BPM
BASOPHILS # BLD AUTO: 0.07 THOUSANDS/ΜL (ref 0–0.1)
BASOPHILS NFR BLD AUTO: 1 % (ref 0–1)
BILIRUB DIRECT SERPL-MCNC: 0.2 MG/DL (ref 0–0.2)
BILIRUB SERPL-MCNC: 2.73 MG/DL (ref 0.2–1)
BPU ID: NORMAL
BUN SERPL-MCNC: 42 MG/DL (ref 5–25)
CALCIUM SERPL-MCNC: 8.2 MG/DL (ref 8.3–10.1)
CHLORIDE SERPL-SCNC: 109 MMOL/L (ref 100–108)
CHOLEST SERPL-MCNC: 96 MG/DL (ref 50–200)
CO2 SERPL-SCNC: 25 MMOL/L (ref 21–32)
CREAT SERPL-MCNC: 1.96 MG/DL (ref 0.6–1.3)
CROSSMATCH: NORMAL
EOSINOPHIL # BLD AUTO: 0.11 THOUSAND/ΜL (ref 0–0.61)
EOSINOPHIL NFR BLD AUTO: 2 % (ref 0–6)
ERYTHROCYTE [DISTWIDTH] IN BLOOD BY AUTOMATED COUNT: 16.6 % (ref 11.6–15.1)
FERRITIN SERPL-MCNC: 9 NG/ML (ref 8–388)
GFR SERPL CREATININE-BSD FRML MDRD: 36 ML/MIN/1.73SQ M
GLUCOSE SERPL-MCNC: 102 MG/DL (ref 65–140)
HCT VFR BLD AUTO: 32.7 % (ref 36.5–49.3)
HCT VFR BLD AUTO: 33 % (ref 36.5–49.3)
HDLC SERPL-MCNC: 41 MG/DL
HGB BLD-MCNC: 9.4 G/DL (ref 12–17)
HGB BLD-MCNC: 9.8 G/DL (ref 12–17)
IMM GRANULOCYTES # BLD AUTO: 0.02 THOUSAND/UL (ref 0–0.2)
IMM GRANULOCYTES NFR BLD AUTO: 0 % (ref 0–2)
INR PPP: 1.11 (ref 0.84–1.19)
IRON SATN MFR SERPL: 43 %
IRON SERPL-MCNC: 189 UG/DL (ref 65–175)
LDLC SERPL CALC-MCNC: 40 MG/DL (ref 0–100)
LYMPHOCYTES # BLD AUTO: 1.77 THOUSANDS/ΜL (ref 0.6–4.47)
LYMPHOCYTES NFR BLD AUTO: 26 % (ref 14–44)
MAGNESIUM SERPL-MCNC: 2.4 MG/DL (ref 1.6–2.6)
MCH RBC QN AUTO: 23.2 PG (ref 26.8–34.3)
MCHC RBC AUTO-ENTMCNC: 28.7 G/DL (ref 31.4–37.4)
MCV RBC AUTO: 81 FL (ref 82–98)
MONOCYTES # BLD AUTO: 1.02 THOUSAND/ΜL (ref 0.17–1.22)
MONOCYTES NFR BLD AUTO: 15 % (ref 4–12)
NEUTROPHILS # BLD AUTO: 3.91 THOUSANDS/ΜL (ref 1.85–7.62)
NEUTS SEG NFR BLD AUTO: 56 % (ref 43–75)
NONHDLC SERPL-MCNC: 55 MG/DL
NRBC BLD AUTO-RTO: 0 /100 WBCS
NT-PROBNP SERPL-MCNC: 393 PG/ML
PLATELET # BLD AUTO: 363 THOUSANDS/UL (ref 149–390)
PMV BLD AUTO: 10.3 FL (ref 8.9–12.7)
POTASSIUM SERPL-SCNC: 4 MMOL/L (ref 3.5–5.3)
PROT SERPL-MCNC: 7.5 G/DL (ref 6.4–8.2)
PROTHROMBIN TIME: 14.4 SECONDS (ref 11.6–14.5)
QRS AXIS: 28 DEGREES
QRSD INTERVAL: 88 MS
QT INTERVAL: 356 MS
QTC INTERVAL: 456 MS
RBC # BLD AUTO: 4.06 MILLION/UL (ref 3.88–5.62)
SODIUM SERPL-SCNC: 139 MMOL/L (ref 136–145)
T WAVE AXIS: 5 DEGREES
TIBC SERPL-MCNC: 444 UG/DL (ref 250–450)
TRIGL SERPL-MCNC: 74 MG/DL
TSH SERPL DL<=0.05 MIU/L-ACNC: 1.05 UIU/ML (ref 0.36–3.74)
UNIT DISPENSE STATUS: NORMAL
UNIT PRODUCT CODE: NORMAL
UNIT RH: NORMAL
VENTRICULAR RATE: 99 BPM
WBC # BLD AUTO: 6.9 THOUSAND/UL (ref 4.31–10.16)

## 2020-08-22 PROCEDURE — 80061 LIPID PANEL: CPT | Performed by: INTERNAL MEDICINE

## 2020-08-22 PROCEDURE — 82248 BILIRUBIN DIRECT: CPT | Performed by: INTERNAL MEDICINE

## 2020-08-22 PROCEDURE — 80053 COMPREHEN METABOLIC PANEL: CPT | Performed by: INTERNAL MEDICINE

## 2020-08-22 PROCEDURE — 83540 ASSAY OF IRON: CPT | Performed by: INTERNAL MEDICINE

## 2020-08-22 PROCEDURE — 83735 ASSAY OF MAGNESIUM: CPT | Performed by: INTERNAL MEDICINE

## 2020-08-22 PROCEDURE — 83550 IRON BINDING TEST: CPT | Performed by: INTERNAL MEDICINE

## 2020-08-22 PROCEDURE — 85014 HEMATOCRIT: CPT | Performed by: STUDENT IN AN ORGANIZED HEALTH CARE EDUCATION/TRAINING PROGRAM

## 2020-08-22 PROCEDURE — 93010 ELECTROCARDIOGRAM REPORT: CPT | Performed by: INTERNAL MEDICINE

## 2020-08-22 PROCEDURE — 93005 ELECTROCARDIOGRAM TRACING: CPT

## 2020-08-22 PROCEDURE — 85610 PROTHROMBIN TIME: CPT | Performed by: INTERNAL MEDICINE

## 2020-08-22 PROCEDURE — 85018 HEMOGLOBIN: CPT | Performed by: STUDENT IN AN ORGANIZED HEALTH CARE EDUCATION/TRAINING PROGRAM

## 2020-08-22 PROCEDURE — 30233N1 TRANSFUSION OF NONAUTOLOGOUS RED BLOOD CELLS INTO PERIPHERAL VEIN, PERCUTANEOUS APPROACH: ICD-10-PCS | Performed by: EMERGENCY MEDICINE

## 2020-08-22 PROCEDURE — 99223 1ST HOSP IP/OBS HIGH 75: CPT | Performed by: INTERNAL MEDICINE

## 2020-08-22 PROCEDURE — 83880 ASSAY OF NATRIURETIC PEPTIDE: CPT | Performed by: INTERNAL MEDICINE

## 2020-08-22 PROCEDURE — 85025 COMPLETE CBC W/AUTO DIFF WBC: CPT | Performed by: INTERNAL MEDICINE

## 2020-08-22 PROCEDURE — 84443 ASSAY THYROID STIM HORMONE: CPT | Performed by: STUDENT IN AN ORGANIZED HEALTH CARE EDUCATION/TRAINING PROGRAM

## 2020-08-22 PROCEDURE — 99222 1ST HOSP IP/OBS MODERATE 55: CPT | Performed by: HOSPITALIST

## 2020-08-22 PROCEDURE — 82728 ASSAY OF FERRITIN: CPT | Performed by: INTERNAL MEDICINE

## 2020-08-22 RX ORDER — LANOLIN ALCOHOL/MO/W.PET/CERES
3 CREAM (GRAM) TOPICAL
Status: DISCONTINUED | OUTPATIENT
Start: 2020-08-22 | End: 2020-09-01 | Stop reason: HOSPADM

## 2020-08-22 RX ORDER — ATORVASTATIN CALCIUM 40 MG/1
40 TABLET, FILM COATED ORAL
Status: DISCONTINUED | OUTPATIENT
Start: 2020-08-22 | End: 2020-08-26

## 2020-08-22 RX ORDER — DIGOXIN 125 MCG
125 TABLET ORAL DAILY
Status: DISCONTINUED | OUTPATIENT
Start: 2020-08-22 | End: 2020-08-31

## 2020-08-22 RX ORDER — SODIUM CHLORIDE 9 MG/ML
100 INJECTION, SOLUTION INTRAVENOUS CONTINUOUS
Status: DISCONTINUED | OUTPATIENT
Start: 2020-08-22 | End: 2020-08-22

## 2020-08-22 RX ORDER — DIGOXIN 250 MCG
250 TABLET ORAL ONCE
Status: COMPLETED | OUTPATIENT
Start: 2020-08-22 | End: 2020-08-22

## 2020-08-22 RX ORDER — ALLOPURINOL 100 MG/1
100 TABLET ORAL DAILY
Status: DISCONTINUED | OUTPATIENT
Start: 2020-08-22 | End: 2020-09-01 | Stop reason: HOSPADM

## 2020-08-22 RX ORDER — METOPROLOL SUCCINATE 100 MG/1
100 TABLET, EXTENDED RELEASE ORAL 2 TIMES DAILY
Status: DISCONTINUED | OUTPATIENT
Start: 2020-08-22 | End: 2020-08-28

## 2020-08-22 RX ORDER — DILTIAZEM HYDROCHLORIDE 5 MG/ML
10 INJECTION INTRAVENOUS EVERY 8 HOURS PRN
Status: DISCONTINUED | OUTPATIENT
Start: 2020-08-22 | End: 2020-09-01 | Stop reason: HOSPADM

## 2020-08-22 RX ADMIN — ALLOPURINOL 100 MG: 100 TABLET ORAL at 08:13

## 2020-08-22 RX ADMIN — MELATONIN 3 MG: at 01:00

## 2020-08-22 RX ADMIN — DOFETILIDE 125 MCG: 0.12 CAPSULE ORAL at 08:13

## 2020-08-22 RX ADMIN — ATORVASTATIN CALCIUM 40 MG: 40 TABLET, FILM COATED ORAL at 17:28

## 2020-08-22 RX ADMIN — METOPROLOL SUCCINATE 100 MG: 100 TABLET, EXTENDED RELEASE ORAL at 21:48

## 2020-08-22 RX ADMIN — DIGOXIN 125 MCG: 125 TABLET ORAL at 17:28

## 2020-08-22 RX ADMIN — SODIUM CHLORIDE 100 ML/HR: 0.9 INJECTION, SOLUTION INTRAVENOUS at 12:16

## 2020-08-22 RX ADMIN — DILTIAZEM HYDROCHLORIDE 10 MG: 5 INJECTION INTRAVENOUS at 18:24

## 2020-08-22 RX ADMIN — DIGOXIN 250 MCG: 250 TABLET ORAL at 12:13

## 2020-08-22 NOTE — ASSESSMENT & PLAN NOTE
Known history of AFib, being followed by cardiology outpatient  Patient presented to ED on 8/22 in AFib with RVR (rate 122), despite medication compliance  Patient also found to be hypotensive  Home medications for rate control include digoxin 0 125 mg and metoprolol succinate 100 mg b i d  Follows with Dr Jac Coleman out patient  Had been scheduled for a stress test today outpatient, but came to the ED instead due to his hypotension (90/60s)  No valvular disease per past cardiology work-up  Plan:  -Increased rate likely exacerbated by current blood-loss anemia 2/2 GI bleed (suspect GI malignancy as source)  -Cardizem started in ED to control rate, but then d/c prior to admission because of decreasing BP again  -Cardiology following, appreciate their recommendations     -QTc of 459    -CHADS-VASC score 2, HAS BLED 1  -Telemetry discontinued  -Eliquis held, heparin discontinued, heparin SQ started 8/26  -Heparin discontinued for surgery today  -Per cardiology, continue Toprol  mg BID and digoxin 125 mcg daily  -Cardizem 60 mg Q12H started 8/25  -Consider checking digoxin level

## 2020-08-22 NOTE — ASSESSMENT & PLAN NOTE
Patient presented from outpatient clinic with hypotension (90s/60s)  Found to have anemia (Hb 9 3) in setting of melena and heme-occult positive test in ED  Last known Hb from two years ago was 13  Plan:  -hypotension 2/2 blood loss from GI bleed (likely GI malignancy)  -1 unit pRBC transfused  -repeat am CBC  -Blood pressure stable (systolics to 305); on rate control Tikosyn  -Holding: lasix, aldactone and entresto given current hypotension and likely pre-renal SCARLETT 2/2 hypotension/volume depletion   Restart when BP and creatinine stable   -holding Eliquis

## 2020-08-22 NOTE — PROGRESS NOTES
INTERNAL MEDICINE RESIDENCY SENIOR ADMISSION NOTE     Name: Shana Garcia   Age & Sex: 61 y o  male   MRN: [id number]  Unit/Bed#: -01   Encounter: [id number]  Primary Care Provider: No primary care provider on file  Admit to team: SOD Team A    Patient seen and examined  Reviewed H&P per Dr Kimo Herman   Agree with the assessment and plan with any exception/addition as noted below:         40-year-old male with a past medical history of coronary artery disease, hypertension, AFib on Eliquis, digoxin, and metoprolol succinate, gout on allopurinol  Patient presented to the ED due to worsening dyspnea on exertion and fatigue which has been ongoing for the past month  He also reports noticing melenic stools over the past month as well  He denies having any chest pain during exertion or at rest, or any sensation of palpitations, although historically he does not feel his Afib  He presented for stress echo that was ordered by Dr Ike Julien from Cardiology in order to assess his progressive dyspnea on exertion  When he arrived for testing it was noted that his heart rate was significantly elevated in the 120s to 150s with soft blood pressures in the 43W systolic  He was told to present to the ER for further evaluation  In the ER he was in AFib RVR and was started on diltiazem bolus with infusion    -on exam I do not note noticed any signs of volume overload, no lower extremity edema, no JVD, no abdominal distension  -CBC was remarkable for a hemoglobin of 9, prior hemoglobin was 14 to 15 in 2018, patient also reports having diarrhea, melena, tenesmus  He denies any change in stool caliber, denies constipation, denies abdominal pain  He reports a weight loss of about 10 lb, however when reviewing his records his weight appears to be stable from earlier this year  He has never had a colonoscopy    He denies a family history of colon cancer             Plan:  Patient is status post 1 unit of packed red blood cells, currently heart rate is in 80s to 90s still in AFib with blood pressures systolic 394  His Cardizem was stopped in the ER due to the low blood pressure    -I will hold his Cardizem drip and continue beta-blocker and take a since started by cardiology with EKG checks to assess QT interval   Current QT interval is 430 after nighttime dose of Tikosyn  -I will hold his anticoagulation due to low chads Vasc of 2 with low risk for day-to-day cardiovascular events  This can be restarted if hemoglobin is stable, at this time we currently do not know the rate of hemoglobin drop since there are no recent lab records    -due to a SCARLETT I will hold further Lasix, Aldactone, Entresto  He received 1 dose of IV Lasix 80 IV in the ED due to concern for small pericardial effusion on echo    -will add on Nt-proBNP to current labs  -echo once stable   -holding cardizem secondary to hypotension and current rate controlled    -continue home allopurinol and lipitor  -continue toprol xl 100mg BID  -continue tikosyn 125mcg bid per cardiology  -CBC, CMP, Mag,  iron panel in AM        Principal Problem:    Atrial fibrillation with rapid ventricular response (HCC)  Active Problems:    Systolic CHF (HCC)    Microcytic anemia    Hypotension    SCARLETT (acute kidney injury) ([facility] [address] )    Gastrointestinal hemorrhage with melena      Code Status: Level 1 - Full Code  Admission Status: INPATIENT   Disposition: Patient requires Med/Surg with Telemetry  Expected Length of Stay: 2

## 2020-08-22 NOTE — ED PROCEDURE NOTE
Procedure  POC Cardiac US    Date/Time: 8/21/2020 5:25 PM  Performed by: Alvina Barcenas MD  Authorized by: Alvina Barcenas MD     Patient location:  ED  Other Assisting Provider: Yes (comment) Yadi Espinal MD)    Procedure details:     Exam Type:  Diagnostic    Indications: hypotension      Indications comment:  Atrial fibrillation    Assessment / Evaluation for: cardiac function and pericardial effusion      Exam Type: initial exam      Image quality: limited diagnostic      Image availability:  Images available in PACS, still images obtained and video obtained  Patient Details:     Cardiac Rhythm:  Irregular    Mechanical ventilation: No    Cardiac findings:     Echo technique: limited 2D and M-mode      Views obtained: parasternal long axis, parasternal short axis, subcostal and apical      Pericardial effusion: trace      Tamponade physiology: absent      Wall motion: normal      LV systolic function: normal      RV dilation: none                       Alvina Barcenas MD  08/24/20 1006

## 2020-08-22 NOTE — PLAN OF CARE
Problem: Potential for Falls  Goal: Patient will remain free of falls  Description: INTERVENTIONS:  - Assess patient frequently for physical needs  -  Identify cognitive and physical deficits and behaviors that affect risk of falls    -  [place] fall precautions as indicated by assessment   - Educate patient/family on patient safety including physical limitations  - Instruct patient to call for assistance with activity based on assessment  - Modify environment to reduce risk of injury  - Consider OT/PT consult to assist with strengthening/mobility  Outcome: Progressing     Problem: CARDIOVASCULAR - ADULT  Goal: Maintains optimal cardiac output and hemodynamic stability  Description: INTERVENTIONS:  - Monitor I/O, vital signs and rhythm  - Monitor for S/S and trends of decreased cardiac output  - Administer and titrate ordered vasoactive medications to optimize hemodynamic stability  - Assess quality of pulses, skin color and temperature  - Assess for signs of decreased coronary artery perfusion  - Instruct patient to report change in severity of symptoms  Outcome: Progressing  Goal: Absence of cardiac dysrhythmias or at baseline rhythm  Description: INTERVENTIONS:  - Continuous cardiac monitoring, vital signs, obtain 12 lead EKG if ordered  - Administer antiarrhythmic and heart rate control medications as ordered  - Monitor electrolytes and administer replacement therapy as ordered  Outcome: Progressing     Problem: PAIN - ADULT  Goal: Verbalizes/displays adequate comfort level or baseline comfort level  Description: Interventions:  - Encourage patient to monitor pain and request assistance  - Assess pain using appropriate pain scale  - Administer analgesics based on type and severity of pain and evaluate response  - Implement non-pharmacological measures as appropriate and evaluate response  - Consider cultural and social influences on pain and pain management  - Notify physician/advanced practitioner if interventions unsuccessful or patient reports new pain  Outcome: Progressing     Problem: INFECTION - ADULT  Goal: Absence or prevention of progression during hospitalization  Description: INTERVENTIONS:  - Assess and monitor for signs and symptoms of infection  - Monitor lab/diagnostic results  - Monitor all insertion sites, i e  indwelling lines, tubes, and drains  - Monitor endotracheal if appropriate and nasal secretions for changes in amount and color  - [place] appropriate cooling/warming therapies per order  - Administer medications as ordered  - Instruct and encourage patient and family to use good hand hygiene technique  - Identify and instruct in appropriate isolation precautions for identified infection/condition  Outcome: Progressing  Goal: Absence of fever/infection during neutropenic period  Description: INTERVENTIONS:  - Monitor WBC    Outcome: Progressing     Problem: INFECTION - ADULT  Goal: Absence or prevention of progression during hospitalization  Description: INTERVENTIONS:  - Assess and monitor for signs and symptoms of infection  - Monitor lab/diagnostic results  - Monitor all insertion sites, i e  indwelling lines, tubes, and drains  - Monitor endotracheal if appropriate and nasal secretions for changes in amount and color  - [place] appropriate cooling/warming therapies per order  - Administer medications as ordered  - Instruct and encourage patient and family to use good hand hygiene technique  - Identify and instruct in appropriate isolation precautions for identified infection/condition  Outcome: Progressing  Goal: Absence of fever/infection during neutropenic period  Description: INTERVENTIONS:  - Monitor WBC    Outcome: Progressing     Problem: SAFETY ADULT  Goal: Patient will remain free of falls  Description: INTERVENTIONS:  - Assess patient frequently for physical needs  -  Identify cognitive and physical deficits and behaviors that affect risk of falls    -  [place] fall precautions as indicated by assessment   - Educate patient/family on patient safety including physical limitations  - Instruct patient to call for assistance with activity based on assessment  - Modify environment to reduce risk of injury  - Consider OT/PT consult to assist with strengthening/mobility  Outcome: Progressing  Goal: Maintain or return to baseline ADL function  Description: INTERVENTIONS:  -  Assess patient's ability to carry out ADLs; assess patient's baseline for ADL function and identify physical deficits which impact ability to perform ADLs (bathing, care of mouth/teeth, toileting, grooming, dressing, etc )  - Assess/evaluate cause of self-care deficits   - Assess range of motion  - Assess patient's mobility; develop plan if impaired  - Assess patient's need for assistive devices and provide as appropriate  - Encourage maximum independence but intervene and supervise when necessary  - Involve family in performance of ADLs  - Assess for home care needs following discharge   - Consider OT consult to assist with ADL evaluation and planning for discharge  - Provide patient education as appropriate  Outcome: Progressing  Goal: Maintain or return mobility status to optimal level  Description: INTERVENTIONS:  - Assess patient's baseline mobility status (ambulation, transfers, stairs, etc )    - Identify cognitive and physical deficits and behaviors that affect mobility  - Identify mobility aids required to assist with transfers and/or ambulation (gait belt, sit-to-stand, lift, walker, cane, etc )  - [place] fall precautions as indicated by assessment  - Record patient progress and toleration of activity level on Mobility SBAR; progress patient to next Phase/Stage  - Instruct patient to call for assistance with activity based on assessment  - Consider rehabilitation consult to assist with strengthening/weightbearing, etc   Outcome: Progressing     Problem: DISCHARGE PLANNING  Goal: Discharge to home or other facility with appropriate resources  Description: INTERVENTIONS:  - Identify barriers to discharge w/patient and caregiver  - Arrange for needed discharge resources and transportation as appropriate  - Identify discharge learning needs (meds, wound care, etc )  - Arrange for interpretive services to assist at discharge as needed  - Refer to Case Management Department for coordinating discharge planning if the patient needs post-hospital services based on physician/advanced practitioner order or complex needs related to functional status, cognitive ability, or social support system  Outcome: Progressing     Problem: Knowledge Deficit  Goal: Patient/family/caregiver demonstrates understanding of disease process, treatment plan, medications, and discharge instructions  Description: Complete learning assessment and assess knowledge base    Interventions:  - Provide teaching at level of understanding  - Provide teaching via preferred learning methods  Outcome: Progressing

## 2020-08-22 NOTE — ASSESSMENT & PLAN NOTE
Found to have Hb of 9 3 on admission  Last known hemoglobin from two years ago was 13  Patient endorses a one month history of consistently black, tarry stools, which is new for him  Heme-occult in ED was positive  Patient has never had a colonoscopy  Patient also hypotensive on arrival (90/60s)      Plan:  -Suspected anemia as cause of SCARLETT and contributing to uncontrolled a fib RVR, Cr today 1 20  -Received 1 unit of PRBC this admission  -Hgb today is up to 10 3  -Continue to monitor BP and Hb  -Heparin drip discontinued, SQ started on 8/26  -Heparin now held for OR today

## 2020-08-22 NOTE — UTILIZATION REVIEW
Initial Clinical Review    Admission: Date/Time/Statement:   Admission Orders (From admission, onward)     Ordered        08/21/20 2021  Inpatient Admission  Once                   Orders Placed This Encounter   Procedures    Inpatient Admission     Standing Status:   Standing     Number of Occurrences:   1     Order Specific Question:   Admitting Physician     Answer:   Nakita Rocha     Order Specific Question:   Level of Care     Answer:   Med Surg [16]     Order Specific Question:   Estimated length of stay     Answer:   More than 2 Midnights     Order Specific Question:   Certification     Answer:   I certify that inpatient services are medically necessary for this patient for a duration of greater than two midnights  See H&P and MD Progress Notes for additional information about the patient's course of treatment  ED Arrival Information     Expected Arrival Acuity Means of Arrival Escorted By Service Admission Type    8/21/2020 8/21/2020 16:04 Emergent Walk-In Self General Medicine Emergency    Arrival Complaint    Atrial fibrillation with rapid ventricular response [facility])        Chief Complaint   Patient presents with    Atrial Fibrillation     pt sent from outpatient stress test to ed for eval due to high heart rate     Assessment/Plan: 60 y/o male with PMhx of ischemic cardiomyopathy, CHF with EF 50%, HTN, gout, who presents to ED from the op clinic where he presented for a stress test today and was found to have a BP on arrival of 90/50 and was advised to present to the ED  Upon ED arrival pt in A-fib with RVR  Pt states he has dyspnea on exertion for past 2 yrs when dx'd with CHF  States he is compliant with his meds  In past month he states hs SOB with exertion is steadily getting worse  In ED started on a cardizem gtt which improved his HR to 88 but stopped d/t Bp dops to 90/60's  Cardiology was consulted who advised giving Tikosyn  Hgb 9 3 in ED with last know 2 yrs ago of 13     Admit inpatient to M/S/Tele unit with Atrial fib with RVR, microcytic anemia  Tele monitoring  Continue TIksoyn  QTc monitoring  Diltiazem drip re-started back on floor after repeat BP of 125/81  Continue to monitor BP and rate  Hold for systolic <640 or rate <00  Hold Eliquis for now  Transfuse 1 units PRBC's  Monitor Hgb and BP  I/O's  Cardiac diet, fluid restriction  GI consult    8/22 -- continue tele monitoring s/p 1 unit PRBC's with Hgb now 9 4  Holding cardizem 2/2 hypotension and current rate controlled  Continue home allopurinol and lipitor, continue toprol xl 100mg BID, continue tikosyn 125mcg bid per cardiology  CBC, CMP, Mag,  iron panel in AM      GI consult 8/22--  suspect subacute/chronic GI bleed with increased risk due to anticoagulation with Eliquis  -given need for continued anticoagulation would recommend endoscopic EGD and colonoscopy as an inpatient-will plan for this on Monday given no signs of overt active bleed and last dose of Eliquis on Friday a m  with his CKD  Cardiac diet for now  Clear liquids tomorrow  Prep tomorrow   NPO after midnight Monday      ED Triage Vitals   Temperature Pulse Respirations Blood Pressure SpO2   08/21/20 1613 08/21/20 1613 08/21/20 1613 08/21/20 1613 08/21/20 1613   99 °F (37 2 °C) (!) 122 20 118/64 98 %      Temp Source Heart Rate Source Patient Position - Orthostatic VS BP Location FiO2 (%)   08/21/20 1613 08/21/20 1613 08/21/20 1613 08/21/20 1613 --   Oral Monitor Lying Left arm       Pain Score       08/21/20 2117       No Pain          Wt Readings from Last 1 Encounters:   08/22/20 96 6 kg (212 lb 15 4 oz)     Additional Vital Signs:   Date/Time   Temp   Pulse   Resp   BP   MAP (mmHg)   SpO2   O2 Device    08/22/20 15:36:19   98 1 °F (36 7 °C)      18   110/77   88          08/22/20 11:10:41   98 4 °F (36 9 °C)   108Abnormal     18   107/78   88   97 %   None (Room air)    08/22/20 0815            95/62             08/22/20 07:41:47   98 5 °F (36 9 °C)   93   20   122/83   96   97 %   None (Room air)    08/22/20 01:00:56   98 3 °F (36 8 °C)         108/72   84          08/22/20 0100   98 3 °F (36 8 °C)   100   18   108/72             08/22/20 0000                     None (Room air)    08/21/20 23:27:29   98 2 °F (36 8 °C)      18   105/70   82          08/21/20 22:54:55   97 7 °F (36 5 °C)   95   18   104/72   83          08/21/20 22:39:15   98 5 °F (36 9 °C)   90   16   109/75   86          08/21/20 22:04:29         14   125/81   96          08/21/20 21:17:26   98 8 °F (37 1 °C)      18   91/68   76          08/21/20 2045      95   20   92/61   72   96 %   None (Room air)    08/21/20 2030      96   19   95/56   68   97 %   None (Room air)    08/21/20 2015      101   22   102/56   72   96 %   None (Room air)    08/21/20 1915      98   22   120/77   93   97 %   None (Room air)    08/21/20 1805      95   20   125/79      98 %   None (Room air)    08/21/20 1718      88   20   98/59   73          08/21/20 1700      104   26Abnormal     110/59   78   96 %   None (Room air)    08/21/20 1613   99 °F (37 2 °C)   122Abnormal     20   118/64       98 %           Pertinent Labs/Diagnostic Test Results:   CXR 8/21 -- No acute cardiopulmonary disease         Results from last 7 days   Lab Units 08/22/20  0444 08/21/20  1647   WBC Thousand/uL 6 90 8 06   HEMOGLOBIN g/dL 9 4* 9 3*   HEMATOCRIT % 32 7* 32 0*   PLATELETS Thousands/uL 363 457*   NEUTROS ABS Thousands/µL 3 91 5 67     Results from last 7 days   Lab Units 08/22/20  0444 08/21/20  1647   SODIUM mmol/L 139 138   POTASSIUM mmol/L 4 0 4 3   CHLORIDE mmol/L 109* 108   CO2 mmol/L 25 24   ANION GAP mmol/L 5 6   BUN mg/dL 42* 37*   CREATININE mg/dL 1 96* 2 31*   EGFR ml/min/1 73sq m 36 30   CALCIUM mg/dL 8 2* 8 9   MAGNESIUM mg/dL 2 4 2 4   PHOSPHORUS mg/dL  --  4 1     Results from last 7 days   Lab Units 08/22/20  0444   AST U/L 8   ALT U/L 23   ALK PHOS U/L 53   TOTAL PROTEIN g/dL 7 5   ALBUMIN g/dL 3 6   TOTAL BILIRUBIN mg/dL 2 73*   BILIRUBIN DIRECT mg/dL 0 20     Results from last 7 days   Lab Units 08/22/20  0444 08/21/20  1647   GLUCOSE RANDOM mg/dL 102 100     Results from last 7 days   Lab Units 08/21/20  1647   TROPONIN I ng/mL 0 10*     Results from last 7 days   Lab Units 08/22/20  0815   PROTIME seconds 14 4   INR  1 11     Results from last 7 days   Lab Units 08/22/20  0444 08/21/20  1647   TSH 3RD GENERATON uIU/mL 1 050 1  190     Results from last 7 days   Lab Units 08/21/20  1647   DIGOXIN LVL ng/mL 0 6*     Results from last 7 days   Lab Units 08/22/20  0444   NT-PRO BNP pg/mL 393*     Results from last 7 days   Lab Units 08/22/20  0444   FERRITIN ng/mL 9           ED Treatment:   Medication Administration from 08/21/2020 1549 to 08/21/2020 2108       Date/Time Order Dose Route Action     08/21/2020 1729 diltiazem (CARDIZEM) 125 mg in sodium chloride 0 9 % 125 mL infusion 5 mg/hr Intravenous New Bag     08/21/2020 1708 diltiazem (CARDIZEM) injection 15 mg 10 mg Intravenous Given     08/21/2020 1815 furosemide (LASIX) injection 80 mg 80 mg Intravenous Given     Past Medical History:   Diagnosis Date    A-fib ([place] [address] )     Coronary artery disease     Gout     Herpes zoster     last assessed 12/18/17    Hypertension     Shingles      Present on Admission:   Atrial fibrillation with rapid ventricular response (HCC)   Systolic CHF ([place] [address] )      Admitting Diagnosis: A-fib ([place] [address] ) [I48 91]  Anemia [D64 9]  Elevated troponin [R79 89]  SCARLETT (acute kidney injury) ([place] [address] ) [N17 9]  Atrial fibrillation with rapid ventricular response ([place] [address] ) [I48 91]  Age/Sex: 61 y o  male  Admission Orders:  Scheduled Medications:  allopurinol, 100 mg, Oral, Daily  atorvastatin, 40 mg, Oral, Daily With Dinner  digoxin, 125 mcg, Oral, Daily  metoprolol succinate, 100 mg, Oral, BID    dofetilide (TIKOSYN) capsule 125 mcg    Dose: 125 mcg  Freq: Every 12 hours scheduled Route: PO  Start: 08/21/20 2100 End: 08/22/20 1054      PRN Meds:  diltiazem, 10 mg, Intravenous, Q8H PRN  melatonin, 3 mg, Oral, HS PRN        IP CONSULT TO CARDIOLOGY  IP CONSULT TO GASTROENTEROLOGY    Network Utilization Review Department  [email or web address]  ATTENTION: Please call with any questions or concerns to [phone] and carefully listen to the prompts so that you are directed to the right person  All voicemails are confidential   Farrel Bodily all requests for admission clinical reviews, approved or denied determinations and any other requests to dedicated fax number below belonging to the campus where the patient is receiving treatment   List of dedicated fax numbers for the Facilities:  [address] DENIALS (Administrative/Medical Necessity) [phone]   [address] (Maternity/NICU/Pediatrics) [phone]   Luana Moctezuma [phone]   Maddi Robertson [phone]   Marcia Cagle [phone]   Amauri Reagan [phone]   [address] [phone]   [facility]  [phone]   [address] [facility] [address]  [address] [place] [address] [address] [facility] [phone]

## 2020-08-22 NOTE — H&P
INTERNAL MEDICINE RESIDENCY ADMISSION H&P     Name: Richard Nation   Age & Sex: 61 y o  male   MRN: [id number]  Unit/Bed#: -01   Encounter: [id number]  Primary Care Provider: No primary care provider on file  Code Status: Prior  Admission Status: INPATIENT   Disposition: Patient requires Med/Surg with Telemetry    Admit to team: SOD Team A     ASSESSMENT/PLAN     Principal Problem:    Atrial fibrillation with rapid ventricular response (HCC)  Active Problems:    Systolic CHF (HCC)    Microcytic anemia    Hypotension    SCARLETT (acute kidney injury) ([facility] [address] )    Gastrointestinal hemorrhage with melena      Microcytic anemia  Assessment & Plan  Found to have Hb of 9 3 on admission  Last known hemoglobin from two years ago was 13  Patient endorses a one month history of consistently black, tarry stools, which is new for him  Heme-occult in ED was positive  Patient has never had a colonoscopy  Patient also hypotensive on arrival (90/60s)  Plan:  -suspect GI malignancy as source of GI bleed with recent hx or melena and no abdominal or intestinal symptoms  GI consult this admission or outpatient GI follow-up for colonoscopy   -Suspected anemia as cause of SCARLETT and contributing to uncontrolled a fib RVR  -consented for and transfused 1 unit pRBC on admission for symptomatic anemia  -Pending am iron panel and CBC  -continue to monitor BP and Hb  -Holding home Eliquis for now given ongoing slow GI bleed and extremely low VTE risk  CHADS-VASC score of 2 puts the patient at an extremely low risk for a cardiovascular event  * Atrial fibrillation with rapid ventricular response (HCC)  Assessment & Plan  Known history of a fib, being followed by cardiology outpatient  Patient presented to ED today in a fib with RVR (rate 122), despite taking medication today  Patient also found to be hypotensive  Home medications for rate control include digoxin 0 125 mg and metoprolol succinate 100 mg b i d   Follows with Dr Mariam King out patient  Had been scheduled for a stress test today outpatient, but came to the ED instead due to his hypotension (90/60s)  Plan:  -increased rate likely exacerbated by current blood-loss anemia 2/2 GI bleed (suspect GI malignancy as source)  -Cardizem started in ED to control rate, but then d/c prior to admission because of decreasing BP again  -cardiology following, appreciate their recommendations  -QTc of 459, so Tikosyn started per cardiology  Continue with Tikosyn 125 mcg b i d  Check EKG 2 hours post each dose  -Diltiazem drip re-started back on floor after repeat BP of 125/81  Continue to monitor BP and rate  Hold for systolic <463 or rate <16  Systolic CHF:    Pt with hx of systolic CHF with EF 04% (per Echo 1 year ago)  Compliant with home medication lasix 60 mg, aldoctone 25 mg, entresto 24-26 mg  States he has lost 8 lbs recently, based off of his home scale and the ED scale  However the differences in two difference scale measurements is not necessarily reliable  Patient admits to one month worsening SOB, now with dyspnea with ADL  Baseline sleeps with 3 pillows under his head, and this is unchanged in the last month  Patient denies episodes of lower extremity edema     -2018 stress test showed partially reversible myocardial perfusion defect of basal to mid inferolateral wall   -2018 cardiac cath (s/p NSTEMI type II) showed single vessel CAD with 80% stenosis of the proximal ramus artery  No stent was placed    -2018: Small persistent pericardial effusion present  -2018 TTE: LVEF 50%    Wt Readings from Last 3 Encounters:   08/21/20 99 8 kg (220 lb)   07/27/20 101 kg (222 lb)   03/19/19 102 kg (224 lb)     Plan:  -CXR negative for acute cardiopulmonary disease  -Holding lasix, entresto and aldactone for now given patient's current SCARLETT  Consider restarting once creatinine trends down to baseline   -Cardiology following  Their recommendations appreciated   Continuing with a fib rate control with Tikosyn, discontinuing home digoxin   -Resuming home Metoprolol in am as patient's blood pressure is now stable s/p 1 unit pRBC  -patient does not currently appear volume overloaded due to hypotension 2/2 blood loss anemia and current SCARLETT, likely pre-renal  -am BNP, CBC, CBP, Mag pending  -daily standing weights  -cardiac diet: <2L fluids, <2g Na+, alcohol cessation    Hypotension:    Patient presented from outpatient clinic with hypotension (90s/60s)  Found to have anemia (Hb 9 3) in setting of melena and heme-occult positive test in ED  Last known Hb from two years ago was 13  Plan:  -hypotension 2/2 blood loss from GI bleed (likely GI malignancy)  -1 unit pRBC transfused  -repeat am CBC  -Blood pressure stable (systolics to 265); on rate control Tikosyn  -Holding: lasix, aldactone and entresto given current hypotension and likely pre-renal SCARLETT 2/2 hypotension/volume depletion  Restart when BP and creatinine stable   -holding Eliquis    SCARLETT (acute kidney injury):  Presented with creatinine of 2 31  Last creatinine 0 92-1  Plan:  -likely pre-renal 2/2 blood loss (volume depletion) and hypotension   -transfused 1 unit pRBC  -trend Cr  -hold nephrotoxic agents  -holding home entresto, lasix and aldactone for now  -electrolytes stable  -euvolemic  -bicarb 24, stable  -am BNP, CBC, CBP, Mag pending    Gastrointestinal hemorrhage with melena:    Patient endorses 1 month history of melena, which is new  He has never had a colonscopy  BP on admission 90s/60s  Hb on admission 9 3, patient symptomatic with light-headedness, SOB and hypotension    Plan:  -transfused 1 unit pRBC  -likely underlying GI malignancy   Likely f/u as an outpatient for melena w/u and colonoscopy  -BUN elevated (37)  -CBC in am  -iron panel in am  -consider am LUIS MANUEL  -hold Eliquis      VTE Pharmacologic Prophylaxis: Sequential compression device (Venodyne)   VTE Mechanical Prophylaxis: not indicated with VTE score 3 (low risk)    CHIEF COMPLAINT     Chief Complaint   Patient presents with    Atrial Fibrillation     pt sent from outpatient stress test to ed for eval due to high heart rate      HISTORY OF PRESENT ILLNESS     61year old male with past medical history of non-ischemic cardiomyopathy, systolic heart failure with EF 50%, HTN, gout, and previous 30 year smoking history, presents to [facility] ED from outpatient clinic where he presented for his stress test appointment today  Upon arrival he was found to have a BP of 90/50, so they advised he go to the ER  Upon arrival he was stable, but in a fib with RVR  Patient states he has had dyspnea on exertion since his diagnosis of CHF two years ago, when he presented to [facility] for medical care for the first time  He states that since then he has been on a steady medication regimen of: Eliqus 5 mg b i d , Entresto 24-26 mg, Allopurinol 100 mg, Lipitor 40 mg, Digoxin 0 125 mg, Lasix 60 mg, metoprolol succinate  mg b i d , and aldactone 25 mg  He says he is compliant with all of his medications and since his initial hospitalization with CHF two years ago, he has not had lower extremity edema or symptom exacerbation  However in the last month he states his SOB with exertion has been steadily worsening  He now gets SOB with his ADLs  He denies having chest pain now or during the last month  He denies cough, abdominal pain/distention, nausea/vomiting, headache, visual changes, dizziness, or yellow-tinged vision  He says he now feels light-headed when he stands, but denies falls or syncopal events  Also in the last month he has noticed black tarry stools (diarrhea), which has been consistent for this last month  He has never had a colonoscopy  He continues to drink a few beers a day  Patient formerly had a 30 year smoking history, but quit after his initial diagnosis of CHF 2 years ago  He denies illicit drug use   Patient admits he is not compliant with the recommended cardiology diet (<2 g sodium/day, <2 L fluid/day, discontinuing alcohol)  In the ED, pt was started on a Cardizem drip, which improved his heart rate to 88, but was then d/c because his blood pressure dropped to 90/60s  Cardiology was consulted prior to pt's admission, and they advised administeringTikosyn  Pt was also found to have a Hb of 9 3  Last known hemoglobin was from two years ago and was 13  Patient's weight on admission was 220 lb  Last weight at Dr Pavon Riding office was 222 lb  Previous weights in Epic also 220 lb  REVIEW OF SYSTEMS     Review of Systems   Constitutional: Positive for activity change (new SOB with ADL), fatigue (fatigue now with ADL in addition to walking) and unexpected weight change (patient states his weight in the ED is 8 lbs less than his home weight one week ago)  Negative for appetite change, chills, diaphoresis and fever  Respiratory: Positive for shortness of breath (worsening SOB, now with ADL)  Negative for cough and chest tightness  Cardiovascular: Negative for chest pain, palpitations and leg swelling  Gastrointestinal: Positive for blood in stool (one month tarry black stools) and diarrhea  Negative for abdominal distention (baseline obese belly), abdominal pain, constipation, nausea, rectal pain and vomiting  Genitourinary: Negative for decreased urine volume, difficulty urinating, flank pain, frequency and hematuria  Skin: Negative for color change and pallor  Peeling skin from recent sunburn   Neurological: Positive for light-headedness  Negative for dizziness, syncope, weakness and headaches       OBJECTIVE     Vitals:    20 2204 20 2239 20 2254 20 2327   BP: 125/81 109/75 104/72 105/70   BP Location:       Pulse:  90 95    Resp: 14 16 18 18   Temp:  98 5 °F (36 9 °C) 97 7 °F (36 5 °C) 98 2 °F (36 8 °C)   TempSrc:  Tympanic Oral    SpO2:       Weight:       Height:          Temperature:   Temp (24hrs), Av 4 °F (36 9 °C), Min:97 7 °F (36 5 °C), Max:99 °F (37 2 °C)    Temperature: 98 2 °F (36 8 °C)  Intake & Output:  I/O       08/20 0701 - 08/21 0700 08/21 0701 - 08/22 0700    P  O   480    Total Intake(mL/kg)  480 (4 8)    Urine (mL/kg/hr)  525    Total Output  525    Net  -45              Weights:   IBW: 70 7 kg    Body mass index is 32 49 kg/m²  Weight (last 2 days)     Date/Time   Weight    08/21/20 1613   99 8 (220)            Physical Exam  Constitutional:       General: He is not in acute distress  Appearance: Normal appearance  He is obese  He is not ill-appearing, toxic-appearing or diaphoretic  HENT:      Mouth/Throat:      Mouth: Mucous membranes are moist    Eyes:      Extraocular Movements: Extraocular movements intact  Pupils: Pupils are equal, round, and reactive to light  Cardiovascular:      Rate and Rhythm: Tachycardia present  Rhythm irregular  Pulses: Normal pulses  Heart sounds: No murmur  No gallop  Pulmonary:      Effort: Pulmonary effort is normal  No respiratory distress  Breath sounds: Normal breath sounds  No wheezing or rales  Abdominal:      General: Bowel sounds are normal  There is no distension  Palpations: Abdomen is soft  Tenderness: There is no abdominal tenderness  There is no right CVA tenderness, left CVA tenderness or guarding  Musculoskeletal:      Right lower leg: No edema  Left lower leg: No edema  Skin:     General: Skin is warm and dry  Capillary Refill: Capillary refill takes less than 2 seconds  Coloration: Skin is not jaundiced  Neurological:      General: No focal deficit present  Mental Status: He is alert and oriented to person, place, and time  Cranial Nerves: No cranial nerve deficit  Motor: No weakness  Psychiatric:         Mood and Affect: Mood normal          Behavior: Behavior normal          Thought Content:  Thought content normal          Judgment: Judgment normal        PAST MEDICAL HISTORY     Past Medical History:   Diagnosis Date    A-fib [facility])     Coronary artery disease     Gout     Herpes zoster     last assessed 12/18/17    Hypertension     Shingles      PAST SURGICAL HISTORY     Past Surgical History:   Procedure Laterality Date    ELECTRICAL CARDIOVERSION  12/15/2017          SOCIAL & FAMILY HISTORY     Social History     Substance and Sexual Activity   Alcohol Use Yes    Comment: Socially, former alcohol     Substance and Sexual Activity   Alcohol Use Yes    Comment: Socially, former alcohol        Substance and Sexual Activity   Drug Use No     Social History     Tobacco Use   Smoking Status Former Smoker    Last attempt to quit: 8/11/2017    Years since quitting: 3 0   Smokeless Tobacco Never Used     Family History   Problem Relation Age of Onset    Coronary artery disease Mother     Alcohol abuse Father     Coronary artery disease Father      LABORATORY DATA     Labs: I have personally reviewed pertinent reports  Results from last 7 days   Lab Units 08/21/20  1647   WBC Thousand/uL 8 06   HEMOGLOBIN g/dL 9 3*   HEMATOCRIT % 32 0*   PLATELETS Thousands/uL 457*   NEUTROS PCT % 70   MONOS PCT % 13*      Results from last 7 days   Lab Units 08/21/20  1647   POTASSIUM mmol/L 4 3   CHLORIDE mmol/L 108   CO2 mmol/L 24   BUN mg/dL 37*   CREATININE mg/dL 2 31*   CALCIUM mg/dL 8 9     Results from last 7 days   Lab Units 08/21/20  1647   MAGNESIUM mg/dL 2 4     Results from last 7 days   Lab Units 08/21/20  1647   PHOSPHORUS mg/dL 4 1              Results from last 7 days   Lab Units 08/21/20  1647   TROPONIN I ng/mL 0 10*     Micro:  No results found for: Dailey Midget, WOUNDCULT, SPUTUMCULTUR  IMAGING & DIAGNOSTIC TESTS     Imaging: I have personally reviewed pertinent reports  Xr Chest 1 View Portable    Result Date: 8/21/2020  Impression: No acute cardiopulmonary disease  Workstation performed: [id number]     EKG, Pathology, and Other Studies: I have personally reviewed pertinent reports  ALLERGIES   No Known Allergies  MEDICATIONS PRIOR TO ARRIVAL     Prior to Admission medications    Medication Sig Start Date End Date Taking?  Authorizing Provider   allopurinol (ZYLOPRIM) 100 mg tablet TAKE 1 TABLET (100 MG TOTAL) BY MOUTH DAILY 3/3/20  Yes Augustin Garcia MD   atorvastatin (LIPITOR) 40 mg tablet Take 1 tablet (40 mg total) by mouth daily with dinner 7/27/20  Yes Augustin Garcia MD   digoxin (LANOXIN) 0 125 mg tablet TAKE 1 TABLET BY MOUTH EVERY DAY 7/4/20  Yes Augustin Garcia MD   ELIQUIS 5 MG TAKE 1 TABLET (5 MG TOTAL) BY MOUTH 2 (TWO) TIMES A DAY 4/27/20  Yes Augustin Garcia MD   ENTRESTO 24-26 MG TABS TAKE 1 TABLET BY MOUTH TWICE A DAY 4/15/20  Yes Augustin Garcia MD   furosemide (LASIX) 40 mg tablet TAKE 1 5 TABLETS (60 MG TOTAL) BY MOUTH DAILY 4/8/20  Yes Augustin Garcia MD   metoprolol succinate (TOPROL-XL) 100 mg 24 hr tablet TAKE 1 TABLET BY MOUTH TWICE A DAY 6/14/20  Yes Augustin Garcia MD   spironolactone (ALDACTONE) 25 mg tablet TAKE 0 5 TABLETS (12 5 MG TOTAL) BY MOUTH DAILY 5/2/20  Yes Augustin Garcia MD     MEDICATIONS ADMINISTERED IN LAST 24 HOURS     Medication Administration - last 24 hours from 08/20/2020 2341 to 08/21/2020 2341       Date/Time Order Dose Route Action Action by     08/21/2020 2022 diltiazem (CARDIZEM) 125 mg in sodium chloride 0 9 % 125 mL infusion 0 mg/hr Intravenous Hold Dilan Lopez RN     08/21/2020 1729 diltiazem (CARDIZEM) 125 mg in sodium chloride 0 9 % 125 mL infusion 5 mg/hr Intravenous Daxa 37 Dilan Lopez RN     08/21/2020 1708 diltiazem (CARDIZEM) injection 15 mg 10 mg Intravenous Given Dilan Lopez RN     08/21/2020 1815 furosemide (LASIX) injection 80 mg 80 mg Intravenous Given Keyona Azul RN     08/21/2020 2227 dofetilide (TIKOSYN) capsule 125 mcg 125 mcg Oral Given Risa Sherman RN        CURRENT MEDICATIONS     Current Facility-Administered Medications   Medication Dose Route Frequency Provider Last Rate    dofetilide 125 mcg Oral Q12H [address] Dana Richard MD               Admission Time  I spent 30 minutes admitting the patient  This involved direct patient contact where I performed a full history and physical, reviewing previous records, and reviewing laboratory and other diagnostic studies  Portions of the record may have been created with voice recognition software  Occasional wrong word or "sound a like" substitutions may have occurred due to the inherent limitations of voice recognition software    Read the chart carefully and recognize, using context, where substitutions have occurred     ==  Kassi Vega, [address] [facility]  Internal Medicine Residency PGY-1

## 2020-08-22 NOTE — ASSESSMENT & PLAN NOTE
Pt with hx of systolic CHF with EF 58% (per Echo 1 year ago)  Compliant with home medication lasix 60 mg, aldoctone 25 mg, entresto 24-26 mg  States he has lost 8 lbs recently, based off of his home scale and the ED scale  However the differences in two difference scale measurements is not necessarily reliable  Patient admits to one month worsening SOB, now with dyspnea with ADL  Baseline sleeps with 3 pillows under his head, and this is unchanged in the last month  Patient denies episodes of lower extremity edema     -2018 stress test showed partially reversible myocardial perfusion defect of basal to mid inferolateral wall   -2018 cardiac cath (s/p NSTEMI type II) showed single vessel CAD with 80% stenosis of the proximal ramus artery  No stent was placed    -2018: Small persistent pericardial effusion present  -2018 TTE: LVEF 50%    Wt Readings from Last 3 Encounters:   08/21/20 99 8 kg (220 lb)   07/27/20 101 kg (222 lb)   03/19/19 102 kg (224 lb)     Plan:  -CXR negative for acute cardiopulmonary disease  -Aldactone 12 5 mg  -Lasix 40 mg restarted 8/25- discontinued 8/28 for surgery  -Cardiology following  Their recommendations appreciated   Continuing with a fib rate control with digoxin and metoprolol (restarted 8/22), cardizem (restarted 8/25)  -JOSE LUIS planned while in OR for cecal mass on Friday  -Patient appears euvolemic  -Hypotension likely 2/2 blood loss anemia- resolved, /91  -SCARLETT- decrease Cr 1 20  -Daily standing weights, today 214 lbs  -Cardiac diet: <2L fluids, <2g Na+, alcohol cessation- now NPO for surgery

## 2020-08-22 NOTE — ASSESSMENT & PLAN NOTE
Patient endorses 1 month history of melena, which is new  He has never had a colonscopy  BP on admission 90s/60s  Hb on admission 9 3, patient symptomatic with light-headedness, SOB and hypotension    Plan:  -Transfused 1 unit pRBC  -CBC in am, Hgb 10 3 stable  -Hold Eliquis, restart post op when able  -EGD: mild erythematous mucosa with erosion in the GE junction (43 cm from the incisors)  LA grade A  Mild, localized erosion in the stomach; performed cold biopsy  R/o H pylori  Mild, localized erythematous mucosa in the duodenal bulb; performed cold biopsy  -Colonoscopy: Malignant-appearing, multilobular and ulcerated mass in the proximal ascending colon, covering one third of the circumference; performed cold biopsy  One adenomatous-appearing polyp measuring smaller than 5 mm in the cecum; removed en bloc by cold snare and retrieved specimen 15 mm sessile, adenomatous-appearing polyp in the transverse colon; removed en bloc by hot snare and retrieved specimen; placed 2 clips successfully (clips were MRI compatible)  The examination was otherwise normal on direct and retroflexion views    -OR today with colorectal surgery for resection

## 2020-08-22 NOTE — PROGRESS NOTES
INTERNAL MEDICINE RESIDENCY SENIOR ADMISSION NOTE     Name: Rosa Kay   Age & Sex: 61 y o  male   MRN: [id number]  Unit/Bed#: -01   Encounter: [id number]  Primary Care Provider: No primary care provider on file  Admit to team: SOD Team B     Patient seen and examined  Reviewed H&P per Dr Liz Holt   Agree with the assessment and plan with any exception/addition as noted below:    44-year-old male with a past medical history of coronary artery disease, hypertension, AFib on Eliquis, digoxin, and metoprolol succinate, gout on allopurinol  Patient presented to the ED due to worsening dyspnea on exertion and fatigue which has been ongoing for the past month  He also reports noticing melenic stools over the past month as well  He denies having any chest pain during exertion or at rest, or any sensation of palpitations, although historically he does not feel his Afib  He presented for stress echo that was ordered by Dr Nuno Cintron from Cardiology in order to assess his progressive dyspnea on exertion  When he arrived for testing it was noted that his heart rate was significantly elevated in the 120s to 150s with soft blood pressures in the 79Y systolic  He was told to present to the ER for further evaluation  In the ER he was in AFib RVR and was started on diltiazem bolus with infusion    -on exam I do not note noticed any signs of volume overload, no lower extremity edema, no JVD, no abdominal distension  -CBC was remarkable for a hemoglobin of 9, prior hemoglobin was 14 to 15 in 2018, patient also reports having diarrhea, melena, tenesmus  He denies any change in stool caliber, denies constipation, denies abdominal pain  He reports a weight loss of about 10 lb, however when reviewing his records his weight appears to be stable from earlier this year  He has never had a colonoscopy  He denies a family history of colon cancer           Plan:  Patient is status post 1 unit of packed red blood cells, currently heart rate is in 80s to 90s still in AFib with blood pressures systolic 886  His Cardizem was stopped in the ER due to the low blood pressure    -I will hold his Cardizem drip and continue beta-blocker and take a since started by cardiology with EKG checks to assess QT interval   Current QT interval is 430 after nighttime dose of Tikosyn  -I will hold his anticoagulation due to low chads Vasc of 2 with low risk for day-to-day cardiovascular events  This can be restarted if hemoglobin is stable, at this time we currently do not know the rate of hemoglobin drop since there are no recent lab records    -due to a SCARLETT I will hold further Lasix, Aldactone, Entresto    He received 1 dose of IV Lasix 80 IV in the ED due to concern for small pericardial effusion on echo    -will add on Nt-proBNP to current labs  -echo once stable   -continue home allopurinol and lipitor  -continue toprol xl 100mg BID  -continue tikosyn 125mcg bid  -CBC, CMP, Mag,  iron panel in AM        Principal Problem:    Atrial fibrillation with rapid ventricular response (HCC)  Active Problems:    Systolic CHF (HCC)    Microcytic anemia    Hypotension    SCARLETT (acute kidney injury) ([facility] [address] )    Gastrointestinal hemorrhage with melena      Code Status: Prior  Admission Status: INPATIENT   Disposition: Patient requires Med/Surg with Telemetry  Expected Length of Stay: 2+

## 2020-08-22 NOTE — ED ATTENDING ATTESTATION
8/21/2020  Yadi AWAD MD, saw and evaluated the patient  I have discussed the patient with the resident and agree with the resident's findings, Plan of Care, and MDM as documented in the resident's note, unless otherwise documented below  All available laboratory and imaging studies were reviewed by myself  I was present for key portions of any procedure(s) performed by the resident and I was immediately available to provide assistance  I agree with the current assessment done in the Emergency Department  I have conducted an independent evaluation of this patient  79-year-old male with past medical history of coronary artery disease, atrial fibrillation, hypertension, and gout, anticoagulated on Eliquis, presenting with worsening dyspnea on exertion, fatigue, and intermittent sweats  Patient reports that he started feeling poorly over the past 1 month with worsened dyspnea on exertion  He has not had any chest pains  He is not aware whether he is in atrial fibrillation or not  He saw Dr Jahaira Galindo in Cardiology on July 27th for these symptoms, at that time, they planned on obtaining stress echo as well as a lipid panel  Patient presented for his stress echo today, however, was found to be in rapid atrial fibrillation  His blood pressure was on the lower side", and patient was referred to the emergency department for further evaluation  Patient reports that while at rest, he feels okay"  However, as soon as he starts moving or walking even a little bit, he becomes short of breath  He does not feel his atrial fibrillation  He denies chest pain  He has not had any cough, wheezing, and has not noted any lower extremity edema  He has been compliant with his medications, last took his digoxin dose this morning  He has also been compliant with his Eliquis  On further review of systems, patient notes dark tarry bowel movements over the past month      On review of systems, patient denies fevers, chills, he feels intermittent generalized weakness and sweats, he denies headache, he denies chest pain, he reports shortness of breath with exertion, he denies abdominal pain, nausea, or vomiting  He reports dark tarry stools  He has never had a colonoscopy or an endoscopy  He denies focal weakness or numbness  He does report intermittent alcohol intake, last drink was 1 week ago  Physical Exam  Vitals:    08/21/20 1718 08/21/20 1805 08/21/20 1915 08/21/20 2015   BP: 98/59 125/79 120/77 102/56   TempSrc:       Pulse: 88 95 98 101   Resp: 20 20 22 22   Patient Position - Orthostatic VS: Lying Lying Lying Lying   Temp:         Constitutional:  Awake, alert, oriented  No acute distress  HEENT:  Normocephalic, atraumatic  Sclera anicteric, conjunctiva not injected  Moist oral mucosa  Cardiac:  Tachycardic, irregularly irregular, no murmurs, rubs, or gallops  2+ radial pulses, 2+ dorsalis pedis pulses  Respiratory:  Lungs are clear to auscultation bilaterally, no wheezes or rales  Abdomen:  Nondistended  Bowel sounds present  No tenderness to palpation  No rebound or guarding  Extremities:  No deformities, no edema  No calf asymmetry  Integument:  No rashes over exposed areas, cap refill less than 2 seconds  Neurologic:  Awake, alert, and oriented x3    Nonfocal exam   Psychiatric:  Normal affect    Labs  Labs Reviewed   CBC AND DIFFERENTIAL - Abnormal       Result Value Ref Range Status    WBC 8 06  4 31 - 10 16 Thousand/uL Final    RBC 4 10  3 88 - 5 62 Million/uL Final    Hemoglobin 9 3 (*) 12 0 - 17 0 g/dL Final    Hematocrit 32 0 (*) 36 5 - 49 3 % Final    MCV 78 (*) 82 - 98 fL Final    MCH 22 7 (*) 26 8 - 34 3 pg Final    MCHC 29 1 (*) 31 4 - 37 4 g/dL Final    RDW 16 2 (*) 11 6 - 15 1 % Final    MPV 10 0  8 9 - 12 7 fL Final    Platelets 927 (*) 317 - 390 Thousands/uL Final    nRBC 0  /100 WBCs Final    Neutrophils Relative 70  43 - 75 % Final    Immat GRANS % 0  0 - 2 % Final    Lymphocytes Relative 15  14 - 44 % Final    Monocytes Relative 13 (*) 4 - 12 % Final    Eosinophils Relative 1  0 - 6 % Final    Basophils Relative 1  0 - 1 % Final    Neutrophils Absolute 5 67  1 85 - 7 62 Thousands/µL Final    Immature Grans Absolute 0 01  0 00 - 0 20 Thousand/uL Final    Lymphocytes Absolute 1 21  0 60 - 4 47 Thousands/µL Final    Monocytes Absolute 1 02  0 17 - 1 22 Thousand/µL Final    Eosinophils Absolute 0 08  0 00 - 0 61 Thousand/µL Final    Basophils Absolute 0 07  0 00 - 0 10 Thousands/µL Final   BASIC METABOLIC PANEL - Abnormal    Sodium 138  136 - 145 mmol/L Final    Potassium 4 3  3 5 - 5 3 mmol/L Final    Chloride 108  100 - 108 mmol/L Final    CO2 24  21 - 32 mmol/L Final    ANION GAP 6  4 - 13 mmol/L Final    BUN 37 (*) 5 - 25 mg/dL Final    Creatinine 2 31 (*) 0 60 - 1 30 mg/dL Final    Comment: Standardized to IDMS reference method    Glucose 100  65 - 140 mg/dL Final    Comment: If the patient is fasting, the ADA then defines impaired fasting glucose as > 100 mg/dL and diabetes as > or equal to 123 mg/dL  Specimen collection should occur prior to Sulfasalazine administration due to the potential for falsely depressed results  Specimen collection should occur prior to Sulfapyridine administration due to the potential for falsely elevated results      Calcium 8 9  8 3 - 10 1 mg/dL Final    eGFR 30  ml/min/1 73sq m Final    Narrative:     [place] guidelines for Chronic Kidney Disease (CKD):     Stage 1 with normal or high GFR (GFR > 90 mL/min/1 73 square meters)    Stage 2 Mild CKD (GFR = 60-89 mL/min/1 73 square meters)    Stage 3A Moderate CKD (GFR = 45-59 mL/min/1 73 square meters)    Stage 3B Moderate CKD (GFR = 30-44 mL/min/1 73 square meters)    Stage 4 Severe CKD (GFR = 15-29 mL/min/1 73 square meters)    Stage 5 End Stage CKD (GFR <15 mL/min/1 73 square meters)  Note: GFR calculation is accurate only with a steady state creatinine   TROPONIN I - Abnormal Troponin I 0 10 (*) <=0 04 ng/mL Final    Comment: Siemens Chemistry analyzer 99% cutoff is > 0 04 ng/mL in network labs     o cTnI 99% cutoff is useful only when applied to patients in the clinical setting of myocardial ischemia   o cTnI 99% cutoff should be interpreted in the context of clinical history, ECG findings and possibly cardiac imaging to establish correct diagnosis  o cTnI 99% cutoff may be suggestive but clearly not indicative of a coronary event without the clinical setting of myocardial ischemia  Results indicate test should be repeated on new specimen collected within 4-6 hours of the original   DIGOXIN LEVEL - Abnormal    Digoxin Lvl 0 6 (*) 0 8 - 2 0 ng/mL Final   TSH, 3RD GENERATION - Normal    TSH 3RD GENERATON 1 190  0 358 - 3 740 uIU/mL Final    Comment: Using supplements with high doses of biotin 20 to more than 300 times greater than the adequate daily intake for adults of 30 mcg/day as established by the [place] [employer], can cause falsely depress results  Narrative:     Patients undergoing fluorescein dye angiography may retain small amounts of fluorescein in the body for 48-72 hours post procedure  Samples containing fluorescein can produce falsely depressed TSH values  If the patient had this procedure,a specimen should be resubmitted post fluorescein clearance  PHOSPHORUS - Normal    Phosphorus 4 1  2 3 - 4 1 mg/dL Final   MAGNESIUM - Normal    Magnesium 2 4  1 6 - 2 6 mg/dL Final   TYPE AND SCREEN    ABO Grouping A   Final    Rh Factor Positive   Final    Antibody Screen Negative   Final    Specimen Expiration Date [id number]   Final   PREPARE LEUKOREDUCED RBC   ABORH RECHECK       Tests  XR chest 1 view portable   Final Result      No acute cardiopulmonary disease              Workstation performed: [id number]             Procedures  ECG 12 Lead Documentation Only    Date/Time: 8/21/2020 4:20 PM  Performed by: Jose Juan Marroquin MD  Authorized by: Jose Juan Marroquin MD Comments:      Atrial fibrillation with rapid ventricular response, ventricular rate 117, QRS 86, , normal axis, there are nonspecific T-wave changes in lateral precordial leads as well as in inferior leads, this EKG is significantly changed from prior EKG dated 07/26/2018  CriticalCare Time  Performed by: Dawn Stephens MD  Authorized by: Dawn Stephens MD     Critical care provider statement:     Critical care time (minutes):  30    Critical care start time:  8/21/2020 5:08 PM    Critical care end time:  8/21/2020 8:43 PM    Critical care time was exclusive of:  Separately billable procedures and treating other patients and teaching time    Critical care was necessary to treat or prevent imminent or life-threatening deterioration of the following conditions:  Cardiac failure, circulatory failure, dehydration and shock    Critical care was time spent personally by me on the following activities:  Blood draw for specimens, obtaining history from patient or surrogate, discussions with consultants, evaluation of patient's response to treatment, examination of patient, ordering and performing treatments and interventions, ordering and review of laboratory studies, ordering and review of radiographic studies, re-evaluation of patient's condition and review of old charts  Comments:      Atrial fibrillation with RVR, dyspnea, acute blood loss anemia,        ED Course  Medications   diltiazem (CARDIZEM) 125 mg in sodium chloride 0 9 % 125 mL infusion (0 mg/hr Intravenous Hold 8/21/20 2022)   diltiazem (CARDIZEM) injection 15 mg (10 mg Intravenous Given 8/21/20 1708)   furosemide (LASIX) injection 80 mg (80 mg Intravenous Given 8/21/20 1815)       20-year-old male presenting with worsening dyspnea on exertion, found to be in atrial fibrillation with rapid ventricular response during attempt at outpatient stress echo today    Vital signs reviewed, tachycardic, blood pressure is normal at present, although patient does intermittently drop down into hypotensive range to 98/59, not hypoxic, not tachypneic  Differential diagnosis is broad, includes heart failure in setting of atrial fibrillation with RVR, electrolyte abnormality, hypothyroidism, [place] heart due to alcohol abuse, acute blood loss anemia, PE, infection, versus another etiology of symptoms  EKG obtained, is reviewed by me, as above, there is atrial fibrillation with RVR as well as some ST/T-wave abnormalities in lateral and inferior precordial leads which are new compared to prior EKG  A limited bedside ultrasound of the heart was performed by resident physician under my direct supervision with the purpose of assessing for left ventricular ejection fraction, assessment of RV strain, and assessment for possible pericardial effusion  Using a phased array probe, standard views including parasternal long axis, parasternal short axis, apical 4 chamber view as well as subxiphoid views were obtained, patient's ePSS is 0 71 suggestive of preserved left ventricular ejection fraction  There is no evidence of RV strain  Patient does have mild pericardial effusion which, as far as I am aware, is new  There is no tamponade physiology  Chest x-ray to my review reveals cardiomegaly, reveals no evidence of pulmonary edema or pleural effusions  Patient's labs reveal BMP with creatinine of 2 31 consistent with acute kidney injury  Patient's magnesium is normal as is phosphorus  Troponin x1 is 0 10, concerning for troponin leak such as due to heart failure  Patient has no chest pain and no evidence of STEMI on EKG  CBC reveals anemia with hemoglobin of 9 3 which is changed from hemoglobin of 13 on 07/27/2018  Concern for acute blood loss anemia due to GI losses in setting of Eliquis  Patient does have Hemoccult-positive stool  TSH is within normal limits  Digoxin level is low at 0 6    Given atrial fibrillation with RVR, dyspnea on exertion, positive troponin, acute kidney injury, as well as intermittent soft pressures, I believe patient would benefit from a blood transfusion  Informed consent obtained from patient for blood transfusion  1 unit of packed red blood cells ordered to be transfused  Given atrial fibrillation with RVR, we are attempting to bring patient's heart rate down with diltiazem, patient's heart rate improved into the low 90s range, still in atrial fibrillation  We are starting patient on diltiazem 5 milligram/hour drip  Cardiology consulted, consultant evaluated the patient at bedside  20:25:  I was called into patient's room as he was feeling diaphoretic  Patient's heart rate remains in 100s atrial fibrillation, his blood pressures systolic 86  He is awake and alert  Diltiazem drip pause  Were starting patient on normal saline 500 mL bolus  We are still awaiting for patient's unit of blood to be available  Patient will likely require diuresis after blood transfusion  Patient will be admitted to as so D for further evaluation and care        Clinical Impression  Final diagnoses:   Atrial fibrillation with rapid ventricular response ([address] )   Anemia   SCARLETT (acute kidney injury) ([address] )       Patient's Medications   Discharge Prescriptions    No medications on file

## 2020-08-22 NOTE — ASSESSMENT & PLAN NOTE
Presented with creatinine of 2 31  Last creatinine 0 92-1      Plan:  -Likely pre-renal 2/2 blood loss (volume depletion) and hypotension   -Transfused 1 unit pRBC  -Trend Cr, today 1 20  -Still elevated above baseline  -Hold nephrotoxic agents

## 2020-08-23 PROBLEM — I95.9 HYPOTENSION: Status: RESOLVED | Noted: 2020-08-21 | Resolved: 2020-08-23

## 2020-08-23 LAB
ALBUMIN SERPL BCP-MCNC: 3.5 G/DL (ref 3.5–5)
ALP SERPL-CCNC: 51 U/L (ref 46–116)
ALT SERPL W P-5'-P-CCNC: 22 U/L (ref 12–78)
ANION GAP SERPL CALCULATED.3IONS-SCNC: 5 MMOL/L (ref 4–13)
AST SERPL W P-5'-P-CCNC: 8 U/L (ref 5–45)
BASOPHILS # BLD AUTO: 0.08 THOUSANDS/ΜL (ref 0–0.1)
BASOPHILS NFR BLD AUTO: 1 % (ref 0–1)
BILIRUB DIRECT SERPL-MCNC: 0.28 MG/DL (ref 0–0.2)
BILIRUB SERPL-MCNC: 0.94 MG/DL (ref 0.2–1)
BUN SERPL-MCNC: 29 MG/DL (ref 5–25)
CALCIUM SERPL-MCNC: 8.7 MG/DL (ref 8.3–10.1)
CHLORIDE SERPL-SCNC: 112 MMOL/L (ref 100–108)
CO2 SERPL-SCNC: 24 MMOL/L (ref 21–32)
CREAT SERPL-MCNC: 1.3 MG/DL (ref 0.6–1.3)
EOSINOPHIL # BLD AUTO: 0.08 THOUSAND/ΜL (ref 0–0.61)
EOSINOPHIL NFR BLD AUTO: 1 % (ref 0–6)
ERYTHROCYTE [DISTWIDTH] IN BLOOD BY AUTOMATED COUNT: 16.5 % (ref 11.6–15.1)
GFR SERPL CREATININE-BSD FRML MDRD: 59 ML/MIN/1.73SQ M
GLUCOSE SERPL-MCNC: 103 MG/DL (ref 65–140)
GLUCOSE SERPL-MCNC: 97 MG/DL (ref 65–140)
HCT VFR BLD AUTO: 33.8 % (ref 36.5–49.3)
HEMOCCULT STL QL: POSITIVE
HGB BLD-MCNC: 9.8 G/DL (ref 12–17)
IMM GRANULOCYTES # BLD AUTO: 0.01 THOUSAND/UL (ref 0–0.2)
IMM GRANULOCYTES NFR BLD AUTO: 0 % (ref 0–2)
LYMPHOCYTES # BLD AUTO: 1.54 THOUSANDS/ΜL (ref 0.6–4.47)
LYMPHOCYTES NFR BLD AUTO: 21 % (ref 14–44)
MCH RBC QN AUTO: 23.3 PG (ref 26.8–34.3)
MCHC RBC AUTO-ENTMCNC: 29 G/DL (ref 31.4–37.4)
MCV RBC AUTO: 80 FL (ref 82–98)
MONOCYTES # BLD AUTO: 0.85 THOUSAND/ΜL (ref 0.17–1.22)
MONOCYTES NFR BLD AUTO: 12 % (ref 4–12)
NEUTROPHILS # BLD AUTO: 4.7 THOUSANDS/ΜL (ref 1.85–7.62)
NEUTS SEG NFR BLD AUTO: 65 % (ref 43–75)
NRBC BLD AUTO-RTO: 0 /100 WBCS
PLATELET # BLD AUTO: 374 THOUSANDS/UL (ref 149–390)
PMV BLD AUTO: 10.3 FL (ref 8.9–12.7)
POTASSIUM SERPL-SCNC: 4.3 MMOL/L (ref 3.5–5.3)
PROT SERPL-MCNC: 7.6 G/DL (ref 6.4–8.2)
RBC # BLD AUTO: 4.21 MILLION/UL (ref 3.88–5.62)
SODIUM SERPL-SCNC: 141 MMOL/L (ref 136–145)
WBC # BLD AUTO: 7.26 THOUSAND/UL (ref 4.31–10.16)

## 2020-08-23 PROCEDURE — 99232 SBSQ HOSP IP/OBS MODERATE 35: CPT | Performed by: HOSPITALIST

## 2020-08-23 PROCEDURE — 80048 BASIC METABOLIC PNL TOTAL CA: CPT | Performed by: STUDENT IN AN ORGANIZED HEALTH CARE EDUCATION/TRAINING PROGRAM

## 2020-08-23 PROCEDURE — 80076 HEPATIC FUNCTION PANEL: CPT | Performed by: STUDENT IN AN ORGANIZED HEALTH CARE EDUCATION/TRAINING PROGRAM

## 2020-08-23 PROCEDURE — 85025 COMPLETE CBC W/AUTO DIFF WBC: CPT | Performed by: STUDENT IN AN ORGANIZED HEALTH CARE EDUCATION/TRAINING PROGRAM

## 2020-08-23 PROCEDURE — 82272 OCCULT BLD FECES 1-3 TESTS: CPT | Performed by: STUDENT IN AN ORGANIZED HEALTH CARE EDUCATION/TRAINING PROGRAM

## 2020-08-23 PROCEDURE — 99232 SBSQ HOSP IP/OBS MODERATE 35: CPT | Performed by: INTERNAL MEDICINE

## 2020-08-23 PROCEDURE — 82948 REAGENT STRIP/BLOOD GLUCOSE: CPT

## 2020-08-23 RX ORDER — SPIRONOLACTONE 25 MG/1
12.5 TABLET ORAL DAILY
Status: DISCONTINUED | OUTPATIENT
Start: 2020-08-23 | End: 2020-08-28

## 2020-08-23 RX ADMIN — METOPROLOL SUCCINATE 100 MG: 100 TABLET, EXTENDED RELEASE ORAL at 21:00

## 2020-08-23 RX ADMIN — SPIRONOLACTONE 12.5 MG: 25 TABLET ORAL at 11:27

## 2020-08-23 RX ADMIN — BISACODYL 20 MG: 5 TABLET, COATED ORAL at 11:27

## 2020-08-23 RX ADMIN — ALLOPURINOL 100 MG: 100 TABLET ORAL at 08:14

## 2020-08-23 RX ADMIN — IRON SUCROSE 200 MG: 20 INJECTION, SOLUTION INTRAVENOUS at 08:14

## 2020-08-23 RX ADMIN — ATORVASTATIN CALCIUM 40 MG: 40 TABLET, FILM COATED ORAL at 17:45

## 2020-08-23 RX ADMIN — POLYETHYLENE GLYCOL 3350, SODIUM SULFATE ANHYDROUS, SODIUM BICARBONATE, SODIUM CHLORIDE, POTASSIUM CHLORIDE 4000 ML: 236; 22.74; 6.74; 5.86; 2.97 POWDER, FOR SOLUTION ORAL at 17:45

## 2020-08-23 RX ADMIN — METOPROLOL SUCCINATE 100 MG: 100 TABLET, EXTENDED RELEASE ORAL at 08:15

## 2020-08-23 RX ADMIN — DIGOXIN 125 MCG: 125 TABLET ORAL at 17:45

## 2020-08-23 NOTE — PLAN OF CARE
Problem: Potential for Falls  Goal: Patient will remain free of falls  Description: INTERVENTIONS:  - Assess patient frequently for physical needs  -  Identify cognitive and physical deficits and behaviors that affect risk of falls    -  [place] fall precautions as indicated by assessment   - Educate patient/family on patient safety including physical limitations  - Instruct patient to call for assistance with activity based on assessment  - Modify environment to reduce risk of injury  - Consider OT/PT consult to assist with strengthening/mobility  Outcome: Progressing     Problem: CARDIOVASCULAR - ADULT  Goal: Maintains optimal cardiac output and hemodynamic stability  Description: INTERVENTIONS:  - Monitor I/O, vital signs and rhythm  - Monitor for S/S and trends of decreased cardiac output  - Administer and titrate ordered vasoactive medications to optimize hemodynamic stability  - Assess quality of pulses, skin color and temperature  - Assess for signs of decreased coronary artery perfusion  - Instruct patient to report change in severity of symptoms  Outcome: Progressing  Goal: Absence of cardiac dysrhythmias or at baseline rhythm  Description: INTERVENTIONS:  - Continuous cardiac monitoring, vital signs, obtain 12 lead EKG if ordered  - Administer antiarrhythmic and heart rate control medications as ordered  - Monitor electrolytes and administer replacement therapy as ordered  Outcome: Progressing     Problem: PAIN - ADULT  Goal: Verbalizes/displays adequate comfort level or baseline comfort level  Description: Interventions:  - Encourage patient to monitor pain and request assistance  - Assess pain using appropriate pain scale  - Administer analgesics based on type and severity of pain and evaluate response  - Implement non-pharmacological measures as appropriate and evaluate response  - Consider cultural and social influences on pain and pain management  - Notify physician/advanced practitioner if interventions unsuccessful or patient reports new pain  Outcome: Progressing     Problem: INFECTION - ADULT  Goal: Absence or prevention of progression during hospitalization  Description: INTERVENTIONS:  - Assess and monitor for signs and symptoms of infection  - Monitor lab/diagnostic results  - Monitor all insertion sites, i e  indwelling lines, tubes, and drains  - Monitor endotracheal if appropriate and nasal secretions for changes in amount and color  - [place] appropriate cooling/warming therapies per order  - Administer medications as ordered  - Instruct and encourage patient and family to use good hand hygiene technique  - Identify and instruct in appropriate isolation precautions for identified infection/condition  Outcome: Progressing  Goal: Absence of fever/infection during neutropenic period  Description: INTERVENTIONS:  - Monitor WBC    Outcome: Progressing     Problem: SAFETY ADULT  Goal: Patient will remain free of falls  Description: INTERVENTIONS:  - Assess patient frequently for physical needs  -  Identify cognitive and physical deficits and behaviors that affect risk of falls    -  [place] fall precautions as indicated by assessment   - Educate patient/family on patient safety including physical limitations  - Instruct patient to call for assistance with activity based on assessment  - Modify environment to reduce risk of injury  - Consider OT/PT consult to assist with strengthening/mobility  Outcome: Progressing  Goal: Maintain or return to baseline ADL function  Description: INTERVENTIONS:  -  Assess patient's ability to carry out ADLs; assess patient's baseline for ADL function and identify physical deficits which impact ability to perform ADLs (bathing, care of mouth/teeth, toileting, grooming, dressing, etc )  - Assess/evaluate cause of self-care deficits   - Assess range of motion  - Assess patient's mobility; develop plan if impaired  - Assess patient's need for assistive devices and provide as appropriate  - Encourage maximum independence but intervene and supervise when necessary  - Involve family in performance of ADLs  - Assess for home care needs following discharge   - Consider OT consult to assist with ADL evaluation and planning for discharge  - Provide patient education as appropriate  Outcome: Progressing  Goal: Maintain or return mobility status to optimal level  Description: INTERVENTIONS:  - Assess patient's baseline mobility status (ambulation, transfers, stairs, etc )    - Identify cognitive and physical deficits and behaviors that affect mobility  - Identify mobility aids required to assist with transfers and/or ambulation (gait belt, sit-to-stand, lift, walker, cane, etc )  - [place] fall precautions as indicated by assessment  - Record patient progress and toleration of activity level on Mobility SBAR; progress patient to next Phase/Stage  - Instruct patient to call for assistance with activity based on assessment  - Consider rehabilitation consult to assist with strengthening/weightbearing, etc   Outcome: Progressing     Problem: DISCHARGE PLANNING  Goal: Discharge to home or other facility with appropriate resources  Description: INTERVENTIONS:  - Identify barriers to discharge w/patient and caregiver  - Arrange for needed discharge resources and transportation as appropriate  - Identify discharge learning needs (meds, wound care, etc )  - Arrange for interpretive services to assist at discharge as needed  - Refer to Case Management Department for coordinating discharge planning if the patient needs post-hospital services based on physician/advanced practitioner order or complex needs related to functional status, cognitive ability, or social support system  Outcome: Progressing     Problem: Knowledge Deficit  Goal: Patient/family/caregiver demonstrates understanding of disease process, treatment plan, medications, and discharge instructions  Description: Complete learning assessment and assess knowledge base    Interventions:  - Provide teaching at level of understanding  - Provide teaching via preferred learning methods  Outcome: Progressing

## 2020-08-23 NOTE — PROGRESS NOTES
Heart Failure/ Pulmonary Hypertension Progress Note - Janice Judd 61 y o  male MRN: [id number]    Unit/Bed#: -01 Encounter: [id number]      Assessment:    Principal Problem:    Atrial fibrillation with rapid ventricular response (HCC)  Active Problems:    Systolic CHF (HCC)    Microcytic anemia    Hypotension    SCARLETT (acute kidney injury) ([facility] [address] )    Gastrointestinal hemorrhage with melena        Objective: Intake/ Output: 480/975: -495  Weight: 210 lbs  Tele:     # Afib with RVR  Rate: metoprolol succinate 100 mg BID, digoxin 125 mcg daily  AC: hold with GI bleeding  Rhythm: stop tikosyn, no plan for immediate CV as GI bleeding and CV would obligate AC     # Chronic recovered HFrEF (NICM; LVEF ~20%, improved to ~50% w/ LVIDd decreased from 5 7 to mid 4 cm) initally with biventricular dysfunction (now RV normal), NYHA II, ACC/AHA Stage C: Most likely 2/2 tachy (AFib w/ RVR) as had BiV dysfunction +/- HTN  See cath below  Diag:  --Stress Echo 8/21/20 (stress not done): LVEF: 45%  --Stress test 2/19/18 - pharm nuc w/ moderate-sized, moderately severe, partially reversible myocardial perfusion defect of the basal to mid inferolateral wall  --Cardiac cath 3/2/18 w/ single vessel CAD, with an 80% proximal stenosis of the ramus artery  As no ischemic symptoms, and LVEF out of proportion decreased to degree of CAD, no stent placed  LVEDP normal    --TTE 3/29/18 shows LVEF ~40-45% (visual inspection), but LVIDd now 4 5 cm and RV size and function now normal  Small pericardial effusion persists  --TTE 10/17/18: LVEF 50%  LVIDd 4 8 cm  Grade 1 diastology   Normal RV       Neurohormonal Blockade:  --Beta-Blocker: Continue metoprolol succinate 100 mg PO BID  --ACEi, ARB or ARNi: Continue Entresto 24-26 mg PO BID  --Aldosterone Receptor Blocker: Continue spironolactone 12 5 mg PO daily  --Diuretic: Lasix 60 mg PO daily     Electrical Resynchronization/Sudden Cardiac Death Risk Reduction:  --Stopped LifeVest 2/2 to improved LVEF     Advanced Therapies: Currently does not meet criteria as minimal symptoms       # SCARLETT, baseline Cr under 1, up to 2 3, improved to 1 9 this am     # GI bleed, melena- getting a unit of pRBC  Hold AC  GI consult     # CAD  -left heart catheterization on 03/02/2018 showed 80% prox stenosis of the ramus intermedius, 60% ostial stenosis of the RPDA, no interventions were performed at that time  -LV dysfunction at that time was out of proportion to CAD found  -no significant wall motion abnormalities on limited TTE earlier today  # NSVT: LifeVest stopped  Continue BB as above  # IFG  # Hx of Gout: Continue Allopurinol     Plan:  EGD and colonoscopy Monday  Hold AC  Rate control strategy mostly with beta clocker,   Hold diuretic one more day  Restart spironolactone 12 5 mg daily  May need to slightly escalate Toprol, will follow  GI workup       Review of Systems   Constitutional: Negative for activity change, appetite change, fatigue and unexpected weight change  HENT: Negative for congestion and nosebleeds  Eyes: Negative  Respiratory: Negative for cough, chest tightness and shortness of breath  Cardiovascular: Negative for chest pain, palpitations and leg swelling  Gastrointestinal: Negative for abdominal distention  Endocrine: Negative  Genitourinary: Negative  Musculoskeletal: Negative  Skin: Negative  Neurological: Negative for dizziness, syncope and weakness  Hematological: Negative  Psychiatric/Behavioral: Negative  [employer] (day, reason): Johnson catheter (day, reason):    Vitals: Blood pressure 125/83, pulse 97, temperature 98 °F (36 7 °C), temperature source Oral, resp  rate 20, height 5' 9" (1 753 m), weight 95 3 kg (210 lb 1 6 oz), SpO2 97 %  , Body mass index is 31 03 kg/m² , I/O last 3 completed shifts:   In: 0 [P O :960; Blood:350]  Out: 1850 [Urine:1850]  I/O this shift:  In: -   Out: 250 [Urine:250]  Wt Readings from Last 3 Encounters: 08/23/20 95 3 kg (210 lb 1 6 oz)   07/27/20 101 kg (222 lb)   03/19/19 102 kg (224 lb)       Intake/Output Summary (Last 24 hours) at 8/23/2020 0935  Last data filed at 8/23/2020 0714  Gross per 24 hour   Intake 480 ml   Output 925 ml   Net -445 ml     I/O last 3 completed shifts: In: 0 [P O :960; Blood:350]  Out: 7720 [Urine:1850]    No significant arrhythmias seen on telemetry review         Physical Exam:  Vitals:    08/23/20 0236 08/23/20 0500 08/23/20 0714 08/23/20 0815   BP: 128/82  127/83 125/83   BP Location:   Left arm    Pulse:   100 97   Resp: 18  20    Temp: 97 8 °F (36 6 °C)  98 °F (36 7 °C)    TempSrc:   Oral    SpO2:   97%    Weight:  95 3 kg (210 lb 1 6 oz)     Height:           GEN: Joshua Hazel appears well, alert and oriented x 3, pleasant and cooperative   HEENT: pupils equal, round, and reactive to light; extraocular muscles intact  NECK: supple, no carotid bruits   HEART: regular rhythm, normal S1 and S2, no murmurs, clicks, gallops or rubs, JVP is    LUNGS: clear to auscultation bilaterally; no wheezes, rales, or rhonchi   ABDOMEN: normal bowel sounds, soft, no tenderness, no distention  EXTREMITIES: peripheral pulses normal; no clubbing, cyanosis, or edema  NEURO: no focal findings   SKIN: normal without suspicious lesions on exposed skin      Current Facility-Administered Medications:     allopurinol (ZYLOPRIM) tablet 100 mg, 100 mg, Oral, Daily, Renny Banai, DO, 100 mg at 08/23/20 2907    atorvastatin (LIPITOR) tablet 40 mg, 40 mg, Oral, Daily With Dinner, Renny Banai, DO, 40 mg at 08/22/20 1728    bisacodyl (DULCOLAX) EC tablet 20 mg, 20 mg, Oral, Once, Ramona Brantley MD    digoxin (LANOXIN) tablet 125 mcg, 125 mcg, Oral, Daily, Zenaida Liu MD, 125 mcg at 08/22/20 1728    diltiazem (CARDIZEM) injection 10 mg, 10 mg, Intravenous, Q8H PRN, Alex Alexandra DO, 10 mg at 08/22/20 1824    iron sucrose (VENOFER) 200 mg in sodium chloride 0 9 % 100 mL IVPB, 200 mg, Intravenous, Daily, Letty Bañuelos MD, Last Rate: 100 mL/hr at 08/23/20 0814, 200 mg at 08/23/20 0814    melatonin tablet 3 mg, 3 mg, Oral, HS PRN, Renny Banai, DO, 3 mg at 08/22/20 0100    metoprolol succinate (TOPROL-XL) 24 hr tablet 100 mg, 100 mg, Oral, BID, Renny Banai, DO, 100 mg at 08/23/20 0815    polyethylene glycol (GOLYTELY) bowel prep 4,000 mL, 4,000 mL, Oral, Once, Zoraida Elias MD      Labs & Results:    Results from last 7 days   Lab Units 08/21/20  1647   TROPONIN I ng/mL 0 10*     Results from last 7 days   Lab Units 08/23/20  0510 08/22/20  2000 08/22/20  0444 08/21/20  1647   WBC Thousand/uL 7 26  --  6 90 8 06   HEMOGLOBIN g/dL 9 8* 9 8* 9 4* 9 3*   HEMATOCRIT % 33 8* 33 0* 32 7* 32 0*   PLATELETS Thousands/uL 374  --  363 457*         Results from last 7 days   Lab Units 08/23/20  0510 08/22/20  0444 08/21/20  1647   POTASSIUM mmol/L 4 3 4 0 4 3   CHLORIDE mmol/L 112* 109* 108   CO2 mmol/L 24 25 24   BUN mg/dL 29* 42* 37*   CREATININE mg/dL 1 30 1 96* 2 31*   CALCIUM mg/dL 8 7 8 2* 8 9   ALK PHOS U/L 51 53  --    ALT U/L 22 23  --    AST U/L 8 8  --      Results from last 7 days   Lab Units 08/22/20  0815   INR  1 11         Counseling / Coordination of Care  Total floor / unit time spent today 25 minutes  Greater than 50% of total time was spent with the patient and / or family counseling and / or coordination of care  A description of the counseling / coordination of care: 15  Thank you for the opportunity to participate in the care of this patient  Siomara GONZALEZ    Director Heart Failure/ Medical Director [address] [facility]

## 2020-08-23 NOTE — SOCIAL WORK
PT IS NOT A 30 DAYS READMISSION  PT IS NOT A BUNDLE  CM met with pt to discuss DCP and cm role  Pt lives alone in a 2nd floor apt has 15 stairs up  Prior to admission pt was independent with ambulation and with ADLS  No prior hx of VNA  Pt's preference for pharmacy is [facility] on [address]  Pt denies any hx of drugs/ETOH or inpt psych stays  Pt states that his car is in the ER parking lot and hopes to drive home at d/c   CM reviewed d/c planning process including the following: identifying help at home, patient preference for d/c planning needs, Discharge Lounge, [facility] Meds to Bed program, availability of treatment team to discuss questions or concerns patient and/or family may have regarding understanding medications and recognizing signs and symptoms once discharged  CM also encouraged patient to follow up with all recommended appointments after discharge  Patient advised of importance for patient and family to participate in managing patients medical well being

## 2020-08-23 NOTE — UTILIZATION REVIEW
Notification of Inpatient Admission/Inpatient Authorization Request   This is a Notification of Inpatient Admission for [address]  Be advised that this patient was admitted to our facility under Inpatient Status  Contact Bess Nelson at [phone] for additional admission information  [facility]  DEDICATED [phone]  Patient Name:   Lita George   YOB: 1960       [address]   [address]:   [facility]  Tax ID: [id number]  NPI: [id number] Attending Provider/NPI:  Phone:  Address: Julian Reed [[id number]]   [phone]  Same as KEELY/Daniela Cohn 1106 of Service Code: 24 Place of Service Name:  [address] [place] [address]   Start Date: 8/21/20 2021 Discharge Date & Time: No discharge date for patient encounter  Type of Admission: Inpatient Status Discharge Disposition (if discharged): Home/Self Care   Patient Diagnoses: A-fib ([facility] [address] ) [I48 91]  Anemia [D64 9]  Elevated troponin [R79 89]  SCARLETT (acute kidney injury) ([facility] [address] ) [N17 9]  Atrial fibrillation with rapid ventricular response ([facility] [address] ) [I48 91]     Orders: Admission Orders (From admission, onward)     Ordered        08/21/20 2021  Inpatient Admission  Once                    Assigned Utilization Review Contact: Bess Nelson  Utilization   Network Utilization Review Department  Phone: [phone]; Fax [phone]  Email: [email or web address]   ATTENTION PAYERS: Please call the assigned Utilization  directly with any questions or concerns ALL voicemails in the department are confidential  Send all requests for admission clinical reviews, approved or denied determinations and any other requests to dedicated fax number belonging to the campus where the patient is receiving treatment

## 2020-08-24 ENCOUNTER — ANESTHESIA EVENT (INPATIENT)
Dept: GASTROENTEROLOGY | Facility: HOSPITAL | Age: 60
DRG: 330 | End: 2020-08-24
Payer: COMMERCIAL

## 2020-08-24 ENCOUNTER — APPOINTMENT (INPATIENT)
Dept: GASTROENTEROLOGY | Facility: HOSPITAL | Age: 60
DRG: 330 | End: 2020-08-24
Attending: INTERNAL MEDICINE
Payer: COMMERCIAL

## 2020-08-24 ENCOUNTER — APPOINTMENT (INPATIENT)
Dept: RADIOLOGY | Facility: HOSPITAL | Age: 60
DRG: 330 | End: 2020-08-24
Payer: COMMERCIAL

## 2020-08-24 ENCOUNTER — ANESTHESIA (INPATIENT)
Dept: GASTROENTEROLOGY | Facility: HOSPITAL | Age: 60
DRG: 330 | End: 2020-08-24
Payer: COMMERCIAL

## 2020-08-24 LAB
ANION GAP SERPL CALCULATED.3IONS-SCNC: 7 MMOL/L (ref 4–13)
APTT PPP: 102 SECONDS (ref 23–37)
BUN SERPL-MCNC: 18 MG/DL (ref 5–25)
CALCIUM SERPL-MCNC: 8.6 MG/DL (ref 8.3–10.1)
CHLORIDE SERPL-SCNC: 107 MMOL/L (ref 100–108)
CO2 SERPL-SCNC: 24 MMOL/L (ref 21–32)
CREAT SERPL-MCNC: 1.31 MG/DL (ref 0.6–1.3)
ERYTHROCYTE [DISTWIDTH] IN BLOOD BY AUTOMATED COUNT: 16.6 % (ref 11.6–15.1)
ERYTHROCYTE [DISTWIDTH] IN BLOOD BY AUTOMATED COUNT: 16.8 % (ref 11.6–15.1)
GFR SERPL CREATININE-BSD FRML MDRD: 59 ML/MIN/1.73SQ M
GLUCOSE SERPL-MCNC: 95 MG/DL (ref 65–140)
HCT VFR BLD AUTO: 35 % (ref 36.5–49.3)
HCT VFR BLD AUTO: 36.6 % (ref 36.5–49.3)
HGB BLD-MCNC: 10.5 G/DL (ref 12–17)
HGB BLD-MCNC: 9.9 G/DL (ref 12–17)
INR PPP: 1.17 (ref 0.84–1.19)
MCH RBC QN AUTO: 22.9 PG (ref 26.8–34.3)
MCH RBC QN AUTO: 23.1 PG (ref 26.8–34.3)
MCHC RBC AUTO-ENTMCNC: 28.3 G/DL (ref 31.4–37.4)
MCHC RBC AUTO-ENTMCNC: 28.7 G/DL (ref 31.4–37.4)
MCV RBC AUTO: 80 FL (ref 82–98)
MCV RBC AUTO: 81 FL (ref 82–98)
PLATELET # BLD AUTO: 371 THOUSANDS/UL (ref 149–390)
PLATELET # BLD AUTO: 385 THOUSANDS/UL (ref 149–390)
PMV BLD AUTO: 10.5 FL (ref 8.9–12.7)
PMV BLD AUTO: 10.5 FL (ref 8.9–12.7)
POTASSIUM SERPL-SCNC: 4.1 MMOL/L (ref 3.5–5.3)
PROTHROMBIN TIME: 14.9 SECONDS (ref 11.6–14.5)
RBC # BLD AUTO: 4.32 MILLION/UL (ref 3.88–5.62)
RBC # BLD AUTO: 4.55 MILLION/UL (ref 3.88–5.62)
SARS-COV-2 RNA RESP QL NAA+PROBE: NEGATIVE
SODIUM SERPL-SCNC: 138 MMOL/L (ref 136–145)
WBC # BLD AUTO: 7.16 THOUSAND/UL (ref 4.31–10.16)
WBC # BLD AUTO: 8.1 THOUSAND/UL (ref 4.31–10.16)

## 2020-08-24 PROCEDURE — 88305 TISSUE EXAM BY PATHOLOGIST: CPT | Performed by: PATHOLOGY

## 2020-08-24 PROCEDURE — 85027 COMPLETE CBC AUTOMATED: CPT | Performed by: STUDENT IN AN ORGANIZED HEALTH CARE EDUCATION/TRAINING PROGRAM

## 2020-08-24 PROCEDURE — 43239 EGD BIOPSY SINGLE/MULTIPLE: CPT | Performed by: INTERNAL MEDICINE

## 2020-08-24 PROCEDURE — 45385 COLONOSCOPY W/LESION REMOVAL: CPT | Performed by: INTERNAL MEDICINE

## 2020-08-24 PROCEDURE — 85610 PROTHROMBIN TIME: CPT | Performed by: STUDENT IN AN ORGANIZED HEALTH CARE EDUCATION/TRAINING PROGRAM

## 2020-08-24 PROCEDURE — 85730 THROMBOPLASTIN TIME PARTIAL: CPT | Performed by: STUDENT IN AN ORGANIZED HEALTH CARE EDUCATION/TRAINING PROGRAM

## 2020-08-24 PROCEDURE — 88342 IMHCHEM/IMCYTCHM 1ST ANTB: CPT | Performed by: PATHOLOGY

## 2020-08-24 PROCEDURE — 71260 CT THORAX DX C+: CPT

## 2020-08-24 PROCEDURE — 0DB68ZX EXCISION OF STOMACH, VIA NATURAL OR ARTIFICIAL OPENING ENDOSCOPIC, DIAGNOSTIC: ICD-10-PCS | Performed by: INTERNAL MEDICINE

## 2020-08-24 PROCEDURE — 88341 IMHCHEM/IMCYTCHM EA ADD ANTB: CPT | Performed by: PATHOLOGY

## 2020-08-24 PROCEDURE — NC001 PR NO CHARGE: Performed by: INTERNAL MEDICINE

## 2020-08-24 PROCEDURE — 0DBL8ZX EXCISION OF TRANSVERSE COLON, VIA NATURAL OR ARTIFICIAL OPENING ENDOSCOPIC, DIAGNOSTIC: ICD-10-PCS | Performed by: INTERNAL MEDICINE

## 2020-08-24 PROCEDURE — U0003 INFECTIOUS AGENT DETECTION BY NUCLEIC ACID (DNA OR RNA); SEVERE ACUTE RESPIRATORY SYNDROME CORONAVIRUS 2 (SARS-COV-2) (CORONAVIRUS DISEASE [COVID-19]), AMPLIFIED PROBE TECHNIQUE, MAKING USE OF HIGH THROUGHPUT TECHNOLOGIES AS DESCRIBED BY CMS-2020-01-R: HCPCS | Performed by: STUDENT IN AN ORGANIZED HEALTH CARE EDUCATION/TRAINING PROGRAM

## 2020-08-24 PROCEDURE — 0DBH8ZX EXCISION OF CECUM, VIA NATURAL OR ARTIFICIAL OPENING ENDOSCOPIC, DIAGNOSTIC: ICD-10-PCS | Performed by: INTERNAL MEDICINE

## 2020-08-24 PROCEDURE — 0DBK8ZX EXCISION OF ASCENDING COLON, VIA NATURAL OR ARTIFICIAL OPENING ENDOSCOPIC, DIAGNOSTIC: ICD-10-PCS | Performed by: INTERNAL MEDICINE

## 2020-08-24 PROCEDURE — 99232 SBSQ HOSP IP/OBS MODERATE 35: CPT | Performed by: INTERNAL MEDICINE

## 2020-08-24 PROCEDURE — 74177 CT ABD & PELVIS W/CONTRAST: CPT

## 2020-08-24 PROCEDURE — 80048 BASIC METABOLIC PNL TOTAL CA: CPT | Performed by: STUDENT IN AN ORGANIZED HEALTH CARE EDUCATION/TRAINING PROGRAM

## 2020-08-24 PROCEDURE — 0DB98ZX EXCISION OF DUODENUM, VIA NATURAL OR ARTIFICIAL OPENING ENDOSCOPIC, DIAGNOSTIC: ICD-10-PCS | Performed by: INTERNAL MEDICINE

## 2020-08-24 PROCEDURE — 45380 COLONOSCOPY AND BIOPSY: CPT | Performed by: INTERNAL MEDICINE

## 2020-08-24 RX ORDER — HEPARIN SODIUM 1000 [USP'U]/ML
4000 INJECTION, SOLUTION INTRAVENOUS; SUBCUTANEOUS
Status: DISCONTINUED | OUTPATIENT
Start: 2020-08-24 | End: 2020-08-26

## 2020-08-24 RX ORDER — LIDOCAINE HYDROCHLORIDE 10 MG/ML
INJECTION, SOLUTION EPIDURAL; INFILTRATION; INTRACAUDAL; PERINEURAL AS NEEDED
Status: DISCONTINUED | OUTPATIENT
Start: 2020-08-24 | End: 2020-08-24

## 2020-08-24 RX ORDER — KETAMINE HYDROCHLORIDE 50 MG/ML
INJECTION, SOLUTION, CONCENTRATE INTRAMUSCULAR; INTRAVENOUS AS NEEDED
Status: DISCONTINUED | OUTPATIENT
Start: 2020-08-24 | End: 2020-08-24

## 2020-08-24 RX ORDER — HEPARIN SODIUM 1000 [USP'U]/ML
4000 INJECTION, SOLUTION INTRAVENOUS; SUBCUTANEOUS ONCE
Status: COMPLETED | OUTPATIENT
Start: 2020-08-24 | End: 2020-08-24

## 2020-08-24 RX ORDER — SODIUM CHLORIDE 9 MG/ML
INJECTION, SOLUTION INTRAVENOUS CONTINUOUS PRN
Status: DISCONTINUED | OUTPATIENT
Start: 2020-08-24 | End: 2020-08-24

## 2020-08-24 RX ORDER — PROPOFOL 10 MG/ML
INJECTION, EMULSION INTRAVENOUS AS NEEDED
Status: DISCONTINUED | OUTPATIENT
Start: 2020-08-24 | End: 2020-08-24

## 2020-08-24 RX ORDER — HEPARIN SODIUM 10000 [USP'U]/100ML
3-20 INJECTION, SOLUTION INTRAVENOUS
Status: DISCONTINUED | OUTPATIENT
Start: 2020-08-24 | End: 2020-08-25

## 2020-08-24 RX ORDER — HEPARIN SODIUM 1000 [USP'U]/ML
2000 INJECTION, SOLUTION INTRAVENOUS; SUBCUTANEOUS
Status: DISCONTINUED | OUTPATIENT
Start: 2020-08-24 | End: 2020-08-26

## 2020-08-24 RX ORDER — PROPOFOL 10 MG/ML
INJECTION, EMULSION INTRAVENOUS CONTINUOUS PRN
Status: DISCONTINUED | OUTPATIENT
Start: 2020-08-24 | End: 2020-08-24

## 2020-08-24 RX ADMIN — ALLOPURINOL 100 MG: 100 TABLET ORAL at 08:50

## 2020-08-24 RX ADMIN — ATORVASTATIN CALCIUM 40 MG: 40 TABLET, FILM COATED ORAL at 17:30

## 2020-08-24 RX ADMIN — IOHEXOL 100 ML: 350 INJECTION, SOLUTION INTRAVENOUS at 20:36

## 2020-08-24 RX ADMIN — SPIRONOLACTONE 12.5 MG: 25 TABLET ORAL at 08:50

## 2020-08-24 RX ADMIN — SODIUM CHLORIDE: 0.9 INJECTION, SOLUTION INTRAVENOUS at 11:27

## 2020-08-24 RX ADMIN — DIGOXIN 125 MCG: 125 TABLET ORAL at 17:31

## 2020-08-24 RX ADMIN — IRON SUCROSE 200 MG: 20 INJECTION, SOLUTION INTRAVENOUS at 08:50

## 2020-08-24 RX ADMIN — LIDOCAINE HYDROCHLORIDE 50 MG: 10 INJECTION, SOLUTION EPIDURAL; INFILTRATION; INTRACAUDAL; PERINEURAL at 11:05

## 2020-08-24 RX ADMIN — PROPOFOL 100 MCG/KG/MIN: 10 INJECTION, EMULSION INTRAVENOUS at 11:05

## 2020-08-24 RX ADMIN — PHENYLEPHRINE HYDROCHLORIDE 100 MCG: 10 INJECTION INTRAVENOUS at 11:39

## 2020-08-24 RX ADMIN — SODIUM CHLORIDE: 0.9 INJECTION, SOLUTION INTRAVENOUS at 10:57

## 2020-08-24 RX ADMIN — PROPOFOL 70 MG: 10 INJECTION, EMULSION INTRAVENOUS at 11:05

## 2020-08-24 RX ADMIN — METOPROLOL SUCCINATE 100 MG: 100 TABLET, EXTENDED RELEASE ORAL at 22:11

## 2020-08-24 RX ADMIN — KETAMINE HYDROCHLORIDE 25 MG: 50 INJECTION, SOLUTION INTRAMUSCULAR; INTRAVENOUS at 11:05

## 2020-08-24 RX ADMIN — HEPARIN SODIUM AND DEXTROSE 11.1 UNITS/KG/HR: 10000; 5 INJECTION INTRAVENOUS at 18:27

## 2020-08-24 RX ADMIN — PHENYLEPHRINE HYDROCHLORIDE 100 MCG: 10 INJECTION INTRAVENOUS at 11:58

## 2020-08-24 RX ADMIN — METOPROLOL SUCCINATE 100 MG: 100 TABLET, EXTENDED RELEASE ORAL at 08:50

## 2020-08-24 RX ADMIN — PHENYLEPHRINE HYDROCHLORIDE 100 MCG: 10 INJECTION INTRAVENOUS at 11:44

## 2020-08-24 RX ADMIN — HEPARIN SODIUM 4000 UNITS: 1000 INJECTION INTRAVENOUS; SUBCUTANEOUS at 18:24

## 2020-08-24 NOTE — PROGRESS NOTES
INTERNAL MEDICINE RESIDENCY PROGRESS NOTE     Name: Danielito Huang   Age & Sex: 61 y o  male   MRN: [id number]  Unit/Bed#: -01   Encounter: [id number]  Team: SOD Team A    PATIENT INFORMATION     Name: Danielito Huang   Age & Sex: 61 y o  male   MRN: [id number]  Hospital Stay Days: 3    ASSESSMENT/PLAN     Principal Problem:    Atrial fibrillation with rapid ventricular response ([facility] )  Active Problems:    Systolic CHF ([facility] )    Microcytic anemia    SCARLETT (acute kidney injury) ([facility] )    Gastrointestinal hemorrhage with melena    Coronary artery disease    GI bleeding      Gastrointestinal hemorrhage with melena  Assessment & Plan  Patient endorses 1 month history of melena, which is new  He has never had a colonscopy  BP on admission 90s/60s  Hb on admission 9 3, patient symptomatic with light-headedness, SOB and hypotension    Plan:  -Transfused 1 unit pRBC  -BUN elevated (37)  -CBC in am  -Hold Eliquis  -EGD and colonoscopy today    SCARLETT (acute kidney injury) ([facility] )  Assessment & Plan  Presented with creatinine of 2 31  Last creatinine 0 92-1  Plan:  -Likely pre-renal 2/2 blood loss (volume depletion) and hypotension   -Transfused 1 unit pRBC  -Trend Cr, today 1 3  -Hold nephrotoxic agents  -Consider restarting outpatient lasix 60 mg tomorrow if Cr trends back to baseline  Microcytic anemia  Assessment & Plan  Found to have Hb of 9 3 on admission  Last known hemoglobin from two years ago was 13  Patient endorses a one month history of consistently black, tarry stools, which is new for him  Heme-occult in ED was positive  Patient has never had a colonoscopy  Patient also hypotensive on arrival (90/60s)      Plan:  -suspect upper GI bleed as source due to recent hx of melena and being on eliquis    -Suspected anemia as cause of SCARLETT and contributing to uncontrolled a fib RVR, Cr today 1 31  -Received 1 unit of PRBC this admission, Hgb today is 9 9  -continue to monitor BP and Hb  -Holding home Eliquis for now given ongoing slow GI bleed  Systolic CHF ([address] )  Assessment & Plan  Pt with hx of systolic CHF with EF 01% (per Echo 1 year ago)  Compliant with home medication lasix 60 mg, aldoctone 25 mg, entresto 24-26 mg  States he has lost 8 lbs recently, based off of his home scale and the ED scale  However the differences in two difference scale measurements is not necessarily reliable  Patient admits to one month worsening SOB, now with dyspnea with ADL  Baseline sleeps with 3 pillows under his head, and this is unchanged in the last month  Patient denies episodes of lower extremity edema     -2018 stress test showed partially reversible myocardial perfusion defect of basal to mid inferolateral wall   -2018 cardiac cath (s/p NSTEMI type II) showed single vessel CAD with 80% stenosis of the proximal ramus artery  No stent was placed    -2018: Small persistent pericardial effusion present  -2018 TTE: LVEF 50%    Wt Readings from Last 3 Encounters:   08/21/20 99 8 kg (220 lb)   07/27/20 101 kg (222 lb)   03/19/19 102 kg (224 lb)     Plan:  -CXR negative for acute cardiopulmonary disease  -Aldactone 12 5 mg, possibly restart lasix tomorrow per cardiology  -Cardiology following  Their recommendations appreciated  Continuing with a fib rate control with digoxin and metoprolol (restarted 8/22)  -Patient appears euvolemic  -Hypotension likely 2/2 blood loss anemia and current SCARLETT, likely pre-renal  -Daily standing weights  -Cardiac diet: <2L fluids, <2g Na+, alcohol cessation        * Atrial fibrillation with rapid ventricular response (HCC)  Assessment & Plan  Known history of AFib, being followed by cardiology outpatient  Patient presented to ED on 8/22 in AFib with RVR (rate 122), despite medication compliance  Patient also found to be hypotensive  Home medications for rate control include digoxin 0 125 mg and metoprolol succinate 100 mg b i d  Follows with Dr Aziza Whatley out patient   Had been scheduled for a stress test today outpatient, but came to the ED instead due to his hypotension (90/60s)  No valvular disease per past cardiology work-up  Plan:  -Increased rate likely exacerbated by current blood-loss anemia 2/2 GI bleed (suspect GI malignancy as source)  -Cardizem started in ED to control rate, but then d/c prior to admission because of decreasing BP again  -Cardiology following, appreciate their recommendations  -QTc of 459   -Holding home Eliquis for now given ongoing slow GI bleed  -CHADS-VASC score 2, HAS BLED 1  -Telemetry  -Per cardiology, continue Toprol  mg BID and digoxin 125 mcg daily  -Consider checking digoxin level         Hypotensionresolved as of 8/23/2020  Assessment & Plan  Patient presented from outpatient clinic with hypotension (90s/60s)  Found to have anemia (Hb 9 3) in setting of melena and heme-occult positive test in ED  Last known Hb from two years ago was 13  Plan:  -hypotension 2/2 blood loss from GI bleed (likely GI malignancy)  -1 unit pRBC transfused  -repeat am CBC  -Blood pressure stable (systolics to 754); on rate control Tikosyn  -Holding: lasix, aldactone and entresto given current hypotension and likely pre-renal SCARLETT 2/2 hypotension/volume depletion  Restart when BP and creatinine stable   -holding Eliquis      Disposition: Med/surg telemetry     SUBJECTIVE     Patient seen and examined  No acute events overnight  Patient was resting comfortably in bed  Denies hearing/vision changes, fevers, chills, night sweats, dysphagia, sore throat, shortness of breath, chest pain, abdominal pain, nausea, vomiting, dysuria, constipation  Patient states that he completed bowel prep last night  His bowel movements are now clear  He is scheduled for an EGD and colonoscopy today to evaluate his anemia      OBJECTIVE     Vitals:    08/23/20 2100 08/24/20 0230 08/24/20 0707 08/24/20 1045   BP: 166/97  155/94 (!) 150/107   BP Location:       Pulse: (!) 106   65   Resp:   16 18   Temp: 98 1 °F (36 7 °C) 98 4 °F (36 9 °C)   TempSrc:    Oral   SpO2:    100%   Weight:  97 2 kg (214 lb 4 6 oz)     Height:          Temperature:   Temp (24hrs), Av 2 °F (36 8 °C), Min:98 °F (36 7 °C), Max:98 4 °F (36 9 °C)    Temperature: 98 4 °F (36 9 °C)  Intake & Output:  I/O        07 -  0700  07 -  0700 701 -  0700    P  O  480 3780     Blood       Total Intake(mL/kg) 480 (5) 3780 (38 9)     Urine (mL/kg/hr) 975 (0 4) 350 (0 2)     Total Output 975 350     Net -495 +3430                Weights:   IBW: 70 7 kg    Body mass index is 31 64 kg/m²  Weight (last 2 days)     Date/Time   Weight    20 0230   97 2 (214 29)    20 0500   95 3 (210 1)    20 0536   96 6 (212 96)    20 0448   96 6 (212 96)            Physical Exam  Constitutional:       General: He is not in acute distress  Appearance: Normal appearance  HENT:      Head: Normocephalic and atraumatic  Right Ear: External ear normal       Left Ear: External ear normal       Nose: Nose normal       Mouth/Throat:      Mouth: Mucous membranes are moist       Pharynx: Oropharynx is clear  Eyes:      Extraocular Movements: Extraocular movements intact  Pupils: Pupils are equal, round, and reactive to light  Neck:      Musculoskeletal: Normal range of motion  Cardiovascular:      Rate and Rhythm: Rhythm irregular  Comments: Afib on telemetry  Pulmonary:      Effort: Pulmonary effort is normal       Breath sounds: Normal breath sounds  No wheezing or rales  Abdominal:      General: Abdomen is flat  Bowel sounds are normal  There is no distension  Palpations: Abdomen is soft  Tenderness: There is no abdominal tenderness  Musculoskeletal: Normal range of motion  Skin:     General: Skin is warm and dry  Capillary Refill: Capillary refill takes less than 2 seconds  Coloration: Skin is not pale  Neurological:      General: No focal deficit present        Mental Status: He is alert  Psychiatric:         Mood and Affect: Mood normal          Behavior: Behavior normal        LABORATORY DATA     Labs: I have personally reviewed pertinent reports  Results from last 7 days   Lab Units 08/24/20  0510 08/23/20  0510 08/22/20 2000 08/22/20  0444 08/21/20  1647   WBC Thousand/uL 7 16 7 26  --  6 90 8 06   HEMOGLOBIN g/dL 9 9* 9 8* 9 8* 9 4* 9 3*   HEMATOCRIT % 35 0* 33 8* 33 0* 32 7* 32 0*   PLATELETS Thousands/uL 371 374  --  363 457*   NEUTROS PCT %  --  65  --  56 70   MONOS PCT %  --  12  --  15* 13*      Results from last 7 days   Lab Units 08/24/20  0510 08/23/20  0510 08/22/20  0444   POTASSIUM mmol/L 4 1 4 3 4 0   CHLORIDE mmol/L 107 112* 109*   CO2 mmol/L 24 24 25   BUN mg/dL 18 29* 42*   CREATININE mg/dL 1 31* 1 30 1 96*   CALCIUM mg/dL 8 6 8 7 8 2*   ALK PHOS U/L  --  51 53   ALT U/L  --  22 23   AST U/L  --  8 8     Results from last 7 days   Lab Units 08/22/20  0444 08/21/20  1647   MAGNESIUM mg/dL 2 4 2 4     Results from last 7 days   Lab Units 08/21/20  1647   PHOSPHORUS mg/dL 4 1      Results from last 7 days   Lab Units 08/22/20  0815   INR  1 11         Results from last 7 days   Lab Units 08/21/20  1647   TROPONIN I ng/mL 0 10*       IMAGING & DIAGNOSTIC TESTING     Radiology Results: I have personally reviewed pertinent reports  Xr Chest 1 View Portable    Result Date: 8/21/2020  Impression: No acute cardiopulmonary disease  Workstation performed: [id number]     Other Diagnostic Testing: I have personally reviewed pertinent reports      ACTIVE MEDICATIONS     Current Facility-Administered Medications   Medication Dose Route Frequency    allopurinol (ZYLOPRIM) tablet 100 mg  100 mg Oral Daily    atorvastatin (LIPITOR) tablet 40 mg  40 mg Oral Daily With Dinner    digoxin (LANOXIN) tablet 125 mcg  125 mcg Oral Daily    diltiazem (CARDIZEM) injection 10 mg  10 mg Intravenous Q8H PRN    iron sucrose (VENOFER) 200 mg in sodium chloride 0 9 % 100 mL IVPB  200 mg Intravenous Daily    melatonin tablet 3 mg  3 mg Oral HS PRN    metoprolol succinate (TOPROL-XL) 24 hr tablet 100 mg  100 mg Oral BID    spironolactone (ALDACTONE) tablet 12 5 mg  12 5 mg Oral Daily     Facility-Administered Medications Ordered in Other Encounters   Medication Dose Route Frequency    ketamine (KETALAR) 50 mg/mL    PRN    lidocaine (PF) (XYLOCAINE-MPF) 1 % injection    PRN    propofol (DIPRIVAN) 1000 mg in 100 mL infusion (premix)   Intravenous Continuous PRN    propofol (DIPRIVAN) 200 MG/20ML bolus injection   Intravenous PRN    sodium chloride 0 9 % infusion   Intravenous Continuous PRN       VTE Pharmacologic Prophylaxis: Sequential compression device (Venodyne)   VTE Mechanical Prophylaxis: sequential compression device    Portions of the record may have been created with voice recognition software  Occasional wrong word or "sound a like" substitutions may have occurred due to the inherent limitations of voice recognition software    Read the chart carefully and recognize, using context, where substitutions have occurred   ==  Dayday Carrasco, [facility]  Internal Medicine Residency PGY-1

## 2020-08-24 NOTE — PLAN OF CARE
Problem: Potential for Falls  Goal: Patient will remain free of falls  Description: INTERVENTIONS:  - Assess patient frequently for physical needs  -  Identify cognitive and physical deficits and behaviors that affect risk of falls    -  [place] fall precautions as indicated by assessment   - Educate patient/family on patient safety including physical limitations  - Instruct patient to call for assistance with activity based on assessment  - Modify environment to reduce risk of injury  - Consider OT/PT consult to assist with strengthening/mobility  Outcome: Progressing     Problem: CARDIOVASCULAR - ADULT  Goal: Maintains optimal cardiac output and hemodynamic stability  Description: INTERVENTIONS:  - Monitor I/O, vital signs and rhythm  - Monitor for S/S and trends of decreased cardiac output  - Administer and titrate ordered vasoactive medications to optimize hemodynamic stability  - Assess quality of pulses, skin color and temperature  - Assess for signs of decreased coronary artery perfusion  - Instruct patient to report change in severity of symptoms  Outcome: Progressing  Goal: Absence of cardiac dysrhythmias or at baseline rhythm  Description: INTERVENTIONS:  - Continuous cardiac monitoring, vital signs, obtain 12 lead EKG if ordered  - Administer antiarrhythmic and heart rate control medications as ordered  - Monitor electrolytes and administer replacement therapy as ordered  Outcome: Progressing     Problem: PAIN - ADULT  Goal: Verbalizes/displays adequate comfort level or baseline comfort level  Description: Interventions:  - Encourage patient to monitor pain and request assistance  - Assess pain using appropriate pain scale  - Administer analgesics based on type and severity of pain and evaluate response  - Implement non-pharmacological measures as appropriate and evaluate response  - Consider cultural and social influences on pain and pain management  - Notify physician/advanced practitioner if interventions unsuccessful or patient reports new pain  Outcome: Progressing     Problem: INFECTION - ADULT  Goal: Absence or prevention of progression during hospitalization  Description: INTERVENTIONS:  - Assess and monitor for signs and symptoms of infection  - Monitor lab/diagnostic results  - Monitor all insertion sites, i e  indwelling lines, tubes, and drains  - Monitor endotracheal if appropriate and nasal secretions for changes in amount and color  - [place] appropriate cooling/warming therapies per order  - Administer medications as ordered  - Instruct and encourage patient and family to use good hand hygiene technique  - Identify and instruct in appropriate isolation precautions for identified infection/condition  Outcome: Progressing  Goal: Absence of fever/infection during neutropenic period  Description: INTERVENTIONS:  - Monitor WBC    Outcome: Progressing     Problem: SAFETY ADULT  Goal: Patient will remain free of falls  Description: INTERVENTIONS:  - Assess patient frequently for physical needs  -  Identify cognitive and physical deficits and behaviors that affect risk of falls    -  [place] fall precautions as indicated by assessment   - Educate patient/family on patient safety including physical limitations  - Instruct patient to call for assistance with activity based on assessment  - Modify environment to reduce risk of injury  - Consider OT/PT consult to assist with strengthening/mobility  Outcome: Progressing  Goal: Maintain or return to baseline ADL function  Description: INTERVENTIONS:  -  Assess patient's ability to carry out ADLs; assess patient's baseline for ADL function and identify physical deficits which impact ability to perform ADLs (bathing, care of mouth/teeth, toileting, grooming, dressing, etc )  - Assess/evaluate cause of self-care deficits   - Assess range of motion  - Assess patient's mobility; develop plan if impaired  - Assess patient's need for assistive devices and provide as appropriate  - Encourage maximum independence but intervene and supervise when necessary  - Involve family in performance of ADLs  - Assess for home care needs following discharge   - Consider OT consult to assist with ADL evaluation and planning for discharge  - Provide patient education as appropriate  Outcome: Progressing  Goal: Maintain or return mobility status to optimal level  Description: INTERVENTIONS:  - Assess patient's baseline mobility status (ambulation, transfers, stairs, etc )    - Identify cognitive and physical deficits and behaviors that affect mobility  - Identify mobility aids required to assist with transfers and/or ambulation (gait belt, sit-to-stand, lift, walker, cane, etc )  - [place] fall precautions as indicated by assessment  - Record patient progress and toleration of activity level on Mobility SBAR; progress patient to next Phase/Stage  - Instruct patient to call for assistance with activity based on assessment  - Consider rehabilitation consult to assist with strengthening/weightbearing, etc   Outcome: Progressing     Problem: DISCHARGE PLANNING  Goal: Discharge to home or other facility with appropriate resources  Description: INTERVENTIONS:  - Identify barriers to discharge w/patient and caregiver  - Arrange for needed discharge resources and transportation as appropriate  - Identify discharge learning needs (meds, wound care, etc )  - Arrange for interpretive services to assist at discharge as needed  - Refer to Case Management Department for coordinating discharge planning if the patient needs post-hospital services based on physician/advanced practitioner order or complex needs related to functional status, cognitive ability, or social support system  Outcome: Progressing     Problem: Knowledge Deficit  Goal: Patient/family/caregiver demonstrates understanding of disease process, treatment plan, medications, and discharge instructions  Description: Complete learning assessment and assess knowledge base    Interventions:  - Provide teaching at level of understanding  - Provide teaching via preferred learning methods  Outcome: Progressing

## 2020-08-24 NOTE — UTILIZATION REVIEW
Continued Stay Review    Date: 8/24/2020                        Current Patient Class: IP  Current Level of Care: Med/Surg    HPI:60 y o  male initially admitted on 8/21 with Atrial fib with RVR, microcytic anemia  Assessment/Plan:  S/p transfusion 1 U PRBC's  Plan for EGD/Colonscopy today to evaluate his anemia  Bowel prep completed last night  Continue to hold Eliquis  Trend creatinine, today 1 3  Hold nephrotoxic agents  Consider restarting outpatient lasix 60 mg tomorrow if Cr trends back to baseline  Continue to monitor H&H       Pertinent Labs/Diagnostic Results:   Results from last 7 days   Lab Units 08/24/20  0836   SARS-COV-2  Negative     Results from last 7 days   Lab Units 08/24/20  0510 08/23/20  0510 08/22/20 2000 08/22/20  0444 08/21/20  1647   WBC Thousand/uL 7 16 7 26  --  6 90 8 06   HEMOGLOBIN g/dL 9 9* 9 8* 9 8* 9 4* 9 3*   HEMATOCRIT % 35 0* 33 8* 33 0* 32 7* 32 0*   PLATELETS Thousands/uL 371 374  --  363 457*   NEUTROS ABS Thousands/µL  --  4 70  --  3 91 5 67     Results from last 7 days   Lab Units 08/24/20  0510 08/23/20  0510 08/22/20 0444 08/21/20  1647   SODIUM mmol/L 138 141 139 138   POTASSIUM mmol/L 4 1 4 3 4 0 4 3   CHLORIDE mmol/L 107 112* 109* 108   CO2 mmol/L 24 24 25 24   ANION GAP mmol/L 7 5 5 6   BUN mg/dL 18 29* 42* 37*   CREATININE mg/dL 1 31* 1 30 1 96* 2 31*   EGFR ml/min/1 73sq m 59 59 36 30   CALCIUM mg/dL 8 6 8 7 8 2* 8 9   MAGNESIUM mg/dL  --   --  2 4 2 4   PHOSPHORUS mg/dL  --   --   --  4 1     Results from last 7 days   Lab Units 08/23/20  0510 08/22/20  0444   AST U/L 8 8   ALT U/L 22 23   ALK PHOS U/L 51 53   TOTAL PROTEIN g/dL 7 6 7 5   ALBUMIN g/dL 3 5 3 6   TOTAL BILIRUBIN mg/dL 0 94 2 73*   BILIRUBIN DIRECT mg/dL 0 28* 0 20     Results from last 7 days   Lab Units 08/24/20  0510 08/23/20  0510 08/22/20  0444 08/21/20  1647   GLUCOSE RANDOM mg/dL 95 97 102 100       Results from last 7 days   Lab Units 08/21/20  1647   TROPONIN I ng/mL 0 10* Results from last 7 days   Lab Units 08/22/20  0815   PROTIME seconds 14 4   INR  1 11     Results from last 7 days   Lab Units 08/21/20  1647   DIGOXIN LVL ng/mL 0 6*     Results from last 7 days   Lab Units 08/22/20  0444   NT-PRO BNP pg/mL 393*     Results from last 7 days   Lab Units 08/22/20  0444   FERRITIN ng/mL 9         Vital Signs:   Date/Time   Temp   Pulse   Resp   BP   MAP (mmHg)   SpO2   O2 Flow Rate (L/min)   O2 Device    08/24/20 15:15:04   97 9 °F (36 6 °C)      18   131/80   97             08/24/20 1230      96   18   148/92      99 %      None (Room air)    08/24/20 1216      86   16   110/79      100 %   6 L/min   Simple mask    08/24/20 1045   98 4 °F (36 9 °C)   65   18   150/107Abnormal        100 %      None (Room air)    08/24/20 07:07:31   98 1 °F (36 7 °C)      16   155/94   114             08/23/20 2100      106Abnormal        166/97                08/23/20 2000                        None (Room air)    08/23/20 19:21:32   98 °F (36 7 °C)      20   155/110Abnormal     125             08/23/20 1745      92                      08/23/20 15:39:42   98 1 °F (36 7 °C)      18   124/80   95             08/23/20 10:53:14   98 3 °F (36 8 °C)   88   18   121/87   98   97 %      None (Room air)    08/23/20 0815      97      125/83                08/23/20 07:14:18   98 °F (36 7 °C)   100   20   127/83   98   97 %      None (Room air)          Medications:   Scheduled Medications:  allopurinol, 100 mg, Oral, Daily  apixaban, 5 mg, Oral, BID  atorvastatin, 40 mg, Oral, Daily With Dinner  digoxin, 125 mcg, Oral, Daily  iron sucrose, 200 mg, Intravenous, Daily  metoprolol succinate, 100 mg, Oral, BID  spironolactone, 12 5 mg, Oral, Daily      PRN Meds:  diltiazem, 10 mg, Intravenous, Q8H PRN  melatonin, 3 mg, Oral, HS PRN        Discharge Plan: TBD    Network Utilization Review Department  [email or web address]  ATTENTION: Please call with any questions or concerns to [phone] and carefully listen to the prompts so that you are directed to the right person  All voicemails are confidential   Candia Siemens all requests for admission clinical reviews, approved or denied determinations and any other requests to dedicated fax number below belonging to the campus where the patient is receiving treatment   List of dedicated fax numbers for the Facilities:  [address] DENIALS (Administrative/Medical Necessity) [phone]   1000  [facility] (Maternity/NICU/Pediatrics) [phone] [address] [facility] [phone]   Linn Ramirez [phone]   [facility] [phone]   Ronna Govea [phone]   [address] [place] [address] [phone]   [facility]  [phone]   [address] [facility]  [address] [facility] [address] [facility] [phone]

## 2020-08-24 NOTE — CONSULTS
Consultation - Colorectal Surgery   Michelle Puga 61 y o  male MRN: [id number]  Unit/Bed#: -01 Encounter: [id number]    Assessment/Plan     Assessment:  62 yo male who presents with 1 month of fatigue, which is new  Was found to have melanotic stools  Patient with no history of previous colonoscopy  Patient on admission had a hemglobin of 9 3 baseline around 13  Colonoscopy today demonstrated: Malignant-appearing multilobular and ulcerated mass in the proximal ascending colon, covering one third of circumference, cold biopsy was performed  An additional adenomatous appearing polyp measuring smaller than 5mm in cecum-removed en bloc by hot snare  Additional 15mm sessile adenomatous-appearing polyp in transverse colon removed en bloc by hot snare    EGD-mild erythematous mucosa, localized alexandra in stomach, erythematous mucosa of duodenal bulb  Last hgb stable at 9 9 from 9 8  AVSS    Metastatic colon workup has already begun by primary team     Plan:  Hold anticoagulation  Follow up metastatic work up  Will discuss with patient if he would be willing for surgery  If willing given oral neomycin and flagyl, miralax x4, and NPO at midnight with fluids  Care per primary team   History of Present Illness     HPI:  Michelle Puga is a 61 y o  male who presents with fatigue for 1 month of fatigue  Patient went to his cardiologist and reported symptoms which they recommended echo and ekg  On scheduled echo patient was hypotensive and advised to go to the ER  In er patient was found to have a hemoglobin of 9 3 which was down from baseline of 13  Patient was found to melanotic stools  Patient reports darker stools over the past month, but denies any change in his stool caliber  Denies any abd pain, nausea, vomiting, or subjective fevers chills  Family history negative for colon cancer, however, uterine cancer in mother  Patient has not had any unintentional weight loss            Inpatient consult to Colorectal Surgery     Date/Time 8/24/2020 5:15 PM     Performed by  Rashida Martinez DO     Authorized by Arthur Hunter MD              Review of Systems   Constitutional: Positive for activity change and fatigue  Negative for chills, fever and unexpected weight change  HENT: Negative for congestion and rhinorrhea  Eyes: Negative for visual disturbance  Respiratory: Negative for cough and shortness of breath  Cardiovascular: Negative for chest pain and palpitations  Gastrointestinal: Positive for blood in stool  Negative for abdominal distention, abdominal pain, nausea and vomiting  Endocrine: Negative for polyuria  Genitourinary: Negative for difficulty urinating and dysuria  Musculoskeletal: Negative for back pain  Skin: Negative for wound  Allergic/Immunologic: Negative for immunocompromised state  Neurological: Negative for headaches  Hematological: Does not bruise/bleed easily  Psychiatric/Behavioral: Negative for agitation         Historical Information   Past Medical History:   Diagnosis Date    A-fib [facility])     Coronary artery disease     Gout     Herpes zoster     last assessed 12/18/17    Hypertension     Shingles      Past Surgical History:   Procedure Laterality Date    ELECTRICAL CARDIOVERSION  12/15/2017          Social History   Social History     Substance and Sexual Activity   Alcohol Use Yes    Comment: Socially, former alcohol     Social History     Substance and Sexual Activity   Drug Use No     E-Cigarette/Vaping    E-Cigarette Use Never User      E-Cigarette/Vaping Substances     Social History     Tobacco Use   Smoking Status Former Smoker    Last attempt to quit: 8/11/2017    Years since quitting: 3 0   Smokeless Tobacco Never Used     Family History:   Family History   Problem Relation Age of Onset    Coronary artery disease Mother     Alcohol abuse Father     Coronary artery disease Father        Meds/Allergies   all current active meds have been reviewed  No Known Allergies    Objective   First Vitals:   Blood Pressure: 118/64(pt states he usually runs around 061-855 systolic) (76/92/37 5648)  Pulse: (!) 122 (08/21/20 1613)  Temperature: 99 °F (37 2 °C) (08/21/20 1613)  Temp Source: Oral (08/21/20 1613)  Respirations: 20 (08/21/20 1613)  Height: 5' 9" (175 3 cm) (08/21/20 1613)  Weight - Scale: 99 8 kg (220 lb) (08/21/20 1613)  SpO2: 98 % (08/21/20 1613)    Current Vitals:   Blood Pressure: 131/80 (08/24/20 1515)  Pulse: 96 (08/24/20 1230)  Temperature: 97 9 °F (36 6 °C) (08/24/20 1515)  Temp Source: Oral (08/24/20 1045)  Respirations: 18 (08/24/20 1515)  Height: 5' 9" (175 3 cm) (08/21/20 1613)  Weight - Scale: 97 2 kg (214 lb 4 6 oz) (08/24/20 0230)  SpO2: 99 % (08/24/20 1230)      Intake/Output Summary (Last 24 hours) at 8/24/2020 1715  Last data filed at 8/24/2020 1127  Gross per 24 hour   Intake 3500 ml   Output 0 ml   Net 3500 ml       Invasive Devices     Peripheral Intravenous Line            Peripheral IV 08/21/20 Right Antecubital 3 days    Peripheral IV 08/21/20 Distal;Right Forearm 2 days    Peripheral IV 08/24/20 Right Hand less than 1 day                Physical Exam  Vitals signs and nursing note reviewed  Constitutional:       General: He is not in acute distress  Appearance: He is well-developed  HENT:      Head: Normocephalic and atraumatic  Nose: No rhinorrhea  Mouth/Throat:      Mouth: Mucous membranes are moist    Eyes:      General: No scleral icterus  Conjunctiva/sclera: Conjunctivae normal       Pupils: Pupils are equal, round, and reactive to light  Neck:      Musculoskeletal: Normal range of motion  Cardiovascular:      Rate and Rhythm: Normal rate and regular rhythm  Pulmonary:      Effort: Pulmonary effort is normal       Breath sounds: Normal breath sounds  Abdominal:      General: There is no distension  Palpations: Abdomen is soft  Tenderness: There is no abdominal tenderness   There is no guarding or rebound  Musculoskeletal:      Right lower leg: No edema  Left lower leg: No edema  Skin:     General: Skin is warm  Capillary Refill: Capillary refill takes less than 2 seconds  Neurological:      Mental Status: He is alert and oriented to person, place, and time  Psychiatric:         Mood and Affect: Mood normal          Thought Content:  Thought content normal          Lab Results:   CBC:   Lab Results   Component Value Date    WBC 7 16 08/24/2020    HGB 9 9 (L) 08/24/2020    HCT 35 0 (L) 08/24/2020    MCV 81 (L) 08/24/2020     08/24/2020    MCH 22 9 (L) 08/24/2020    MCHC 28 3 (L) 08/24/2020    RDW 16 6 (H) 08/24/2020    MPV 10 5 08/24/2020   , CMP:   Lab Results   Component Value Date    SODIUM 138 08/24/2020    K 4 1 08/24/2020     08/24/2020    CO2 24 08/24/2020    BUN 18 08/24/2020    CREATININE 1 31 (H) 08/24/2020    CALCIUM 8 6 08/24/2020    EGFR 59 08/24/2020   , Coagulation: No results found for: PT, INR, APTT  Imaging: I have personally reviewed pertinent films in PACS  EKG, Pathology, and Other Studies: I have personally reviewed pertinent films in PACS

## 2020-08-24 NOTE — PROGRESS NOTES
Advanced Heart Failure Team Progress Note - Chey Stafford 61 y o  male MRN: [id number]    Unit/Bed#: -01 Encounter: [id number]        Subjective:   Patient seen and examined  Intermittent RVR on telemetry overnight but otherwise no issues  Patient reports not liking bowel prep which is common  Otherwise he feels symptomatically improved with no shortness of breath, no orthopnea, no PND or lower extremity edema  Objective:     Vitals: Blood pressure 155/94, pulse (!) 106, temperature 98 1 °F (36 7 °C), resp  rate 16, height 5' 9" (1 753 m), weight 97 2 kg (214 lb 4 6 oz), SpO2 97 %  , Body mass index is 31 64 kg/m² ,   Orthostatic Blood Pressures      Most Recent Value   Blood Pressure  155/94 filed at 08/24/2020 0707   Patient Position - Orthostatic VS  Lying filed at 08/23/2020 1053            Intake/Output Summary (Last 24 hours) at 8/24/2020 1039  Last data filed at 8/24/2020 0238  Gross per 24 hour   Intake 3600 ml   Output 100 ml   Net 3500 ml         Physical Exam:    GEN: Chey Stafford appears well, alert and oriented x 3, pleasant and cooperative   HEENT:  Normocephalic, atraumatic, anicteric, moist mucous membranes  NECK: No JVD or carotid bruits   HEART: Irregular rhythm normal rate, normal S1 and S2, no murmurs, clicks, gallops or rubs   LUNGS: Clear to auscultation bilaterally; no wheezes, rales, or rhonchi; respiration nonlabored   ABDOMEN:  Normoactive bowel sounds, soft, no tenderness, no distention  EXTREMITIES: peripheral pulses palpable; no edema  NEURO: no gross focal findings; cranial nerves grossly intact   SKIN:  Dry, intact, warm to touch      Current Facility-Administered Medications:     allopurinol (ZYLOPRIM) tablet 100 mg, 100 mg, Oral, Daily, Renny Banai, DO, 100 mg at 08/24/20 0850    atorvastatin (LIPITOR) tablet 40 mg, 40 mg, Oral, Daily With Dinner, Renny Banai, DO, 40 mg at 08/23/20 1745    digoxin (LANOXIN) tablet 125 mcg, 125 mcg, Oral, Daily, Jhonatan Payan MD, 125 mcg at 08/23/20 1745    diltiazem (CARDIZEM) injection 10 mg, 10 mg, Intravenous, Q8H PRN, Fredaalexa Whitfield, DO, 10 mg at 08/22/20 1824    iron sucrose (VENOFER) 200 mg in sodium chloride 0 9 % 100 mL IVPB, 200 mg, Intravenous, Daily, Agustin Barcenas MD, Last Rate: 100 mL/hr at 08/23/20 0814, 200 mg at 08/24/20 0850    melatonin tablet 3 mg, 3 mg, Oral, HS PRN, Renny Banai, DO, 3 mg at 08/22/20 0100    metoprolol succinate (TOPROL-XL) 24 hr tablet 100 mg, 100 mg, Oral, BID, Renny Banai, DO, 100 mg at 08/24/20 0850    spironolactone (ALDACTONE) tablet 12 5 mg, 12 5 mg, Oral, Daily, Daisy Needy, DO, 12 5 mg at 08/24/20 0850    Labs & Results:    Lab Results   Component Value Date    TROPONINI 0 10 (H) 08/21/2020    TROPONINI 0 15 (H) 07/27/2018    TROPONINI 0 14 (H) 07/27/2018       Lab Results   Component Value Date    GLUCOSE 107 12/11/2017    CALCIUM 8 6 08/24/2020    K 4 1 08/24/2020    CO2 24 08/24/2020     08/24/2020    BUN 18 08/24/2020    CREATININE 1 31 (H) 08/24/2020       Lab Results   Component Value Date    WBC 7 16 08/24/2020    HGB 9 9 (L) 08/24/2020    HCT 35 0 (L) 08/24/2020    MCV 81 (L) 08/24/2020     08/24/2020     Results from last 7 days   Lab Units 08/22/20  0815   INR  1 11       No results found for: CHOL  Lab Results   Component Value Date    HDL 41 08/22/2020    HDL 31 (L) 12/12/2017     Lab Results   Component Value Date    LDLCALC 40 08/22/2020    LDLCALC 73 12/12/2017     Lab Results   Component Value Date    TRIG 74 08/22/2020    TRIG 82 12/12/2017       Lab Results   Component Value Date    ALT 22 08/23/2020    AST 8 08/23/2020       Telemetry:   Personally reviewed by Tana Mckeon MD:  Marginally controlled atrial fibrillation with heart rate 90-140s in the last 16 hours with rare PVCs and 2 episodes of NSVT lasting 4 beats at the longest     Imaging:  No new cardiac images to be reviewed    VTE Prophylaxis: Sequential compression device (Venodyne)  and Reason for no pharmacologic prophylaxis active bleed       Assessment:  Principal Problem:    Atrial fibrillation with rapid ventricular response (HCC)  Active Problems:    Systolic CHF (HCC)    Microcytic anemia    SCARLETT (acute kidney injury) ([facility] [address] )    Gastrointestinal hemorrhage with melena    Coronary artery disease    GI bleeding      Neurohormonal Blockade:  --Beta-Blocker:  Metoprolol succinate 100 mg b i d   --ACEi, ARB or ARNi:  None   --Aldosterone Receptor Blocker:  Spironolactone 12 5 mg q d   --Diuretic:  None      Sudden Cardiac Death Risk Reduction:  --ICD:  Completed LifeVest with recovered    Electrical Resynchronization:  --Candidacy for BiV device: N/A    Advanced Therapies: Will continue to monitor  --Inotrope:  None  --LVAD/Transplant Candidacy: N/A    ===============================================================    1  Acute blood loss anemia  -baseline HGB 13 in 2018, 9 4 on presentation  -iron panel consistent with iron deficiency anemia with nearly undetectable ferritin  -s/p 1 unit of PRBC, maintain on iron sucrose ordered for 5 doses  -pending EGD/colonoscopy    2  Rapid atrial fibrillation  -known history, likely exacerbated by acute blood loss  -[id number] 2, HAS-BLED 1  -metoprolol succinate, digoxin, apixaban held due to active bleed  -rate improving     3  Nonischemic cardiomyopathy with recovered EF  -resume tachy mediated  -8/21 TTE:  LVEF 50% with no RWMA, LVIDd 4 cm, mild RVE with mild reduction in SF, mild TR with trace pericardial effusion  -metoprolol succinate, low-dose spironolactone    4  Coronary artery disease  -02/2018 Rx NMST:  Partially reversible inferolateral and fixed apical lateral defect  -03/2018 LHC:  80% prox small RI, 60% ostial PDA otherwise no significant residual disease in the remaining vessels  -8/22 lipids: Total 96, TG 74, HDL 41, calc LDL 40 (73)  -metoprolol succinate, high-intensity atorvastatin  -aspirin held due to bleeding      Plan:  1   Pending EGD and colonoscopy today    2  Resumption of anticoagulation and antiplatelet therapy based on findings identified     3  Advanced heart failure to continue following along      Case discussed and reviewed with Dr Aisha Amaya who agrees with my assessment and plan  Thank you for involving us in the care of your patient  [facility]/ [address]/Care Everywhere records reviewed: yes    ** Please Note: Fluency DirectDictation voice to text software may have been used in the creation of this document   **

## 2020-08-24 NOTE — ANESTHESIA POSTPROCEDURE EVALUATION
Post-Op Assessment Note    CV Status:  Stable  Pain Score: 0    Pain management: adequate     Mental Status:  Alert and awake   Hydration Status:  Euvolemic   PONV Controlled:  Controlled   Airway Patency:  Patent      Post Op Vitals Reviewed: Yes      Staff: Anesthesiologist, CRNA         No complications documented      /79 (08/24/20 1216)    Temp      Pulse 86 (08/24/20 1216)   Resp 16 (08/24/20 1216)    SpO2 100 % (08/24/20 1216)

## 2020-08-24 NOTE — ANESTHESIA PREPROCEDURE EVALUATION
Procedure:  COLONOSCOPY  EGD    Relevant Problems   ANESTHESIA (within normal limits)      CARDIO   (+) Atrial fibrillation with rapid ventricular response (HCC)   (+) Coronary artery disease (1 vessel CAD)   (+) PAF (paroxysmal atrial fibrillation) (HCC)   (+) Systolic CHF (HCC) (LVEF 92%)      ENDO (within normal limits)      GI/HEPATIC   (+) GI bleeding (s/p 1 unit pRBC)   (+) Gastrointestinal hemorrhage with melena      /RENAL   (+) SCARLETT (acute kidney injury) (HCC)      HEMATOLOGY   (+) Microcytic anemia      MUSCULOSKELETAL (within normal limits)      NEURO/PSYCH (within normal limits)      PULMONARY (within normal limits)      Other   (+) NSVT (nonsustained ventricular tachycardia) ([facility] [address] )      EKG 8/22/2020:  Atrial fibrillation  Abnormal ECG  When compared with ECG of 22-AUG-2020 00:21, (unconfirmed)  No significant change was found    Stress Echo 8/21/20 (stress not done): LVEF: 45%    TTE 2018:  LEFT VENTRICLE:  Systolic function was at the lower limits of normal  Ejection fraction was estimated to be 50 %  There were no regional wall motion abnormalities  Wall thickness was mildly to moderately increased  Doppler parameters were consistent with abnormal left ventricular relaxation (grade 1 diastolic dysfunction)      RIGHT VENTRICLE:  The size was normal   Systolic function was normal      PERICARDIUM:  A trace pericardial effusion was identified  There was no evidence of hemodynamic compromise      COMPARISONS:  There has been no significant interval change  Comparison was made with the previous study of 29-Mar-2018    Cardiac Cath 3/2018:  Left main: Normal   LAD: The vessel was normal sized  There was moderate plaque  There were no obstructive lesions  The diagonal branches were also free of obstructive diseease  Circumflex: Normal  The major OM branch was also normal   Ramus intermedius: The vessel was medium sized  There was an 80% proximal stenosis    RCA: The vessel was large sized and dominant, giving rise to the PDA and a posterolateral branch  There was a 60% ostial stenosis of the PDA  Summary:  1  Single vessel CAD, with an 80% proximal stenosis of the ramus artery  2  Normal LVEDP  Although this corresponds to the region identified on SPECT imaging, the patient is without ischemic symptoms, and the lesion clearly does not account for his recent episode of severe LV dysfunction  Lab Results   Component Value Date    WBC 7 16 08/24/2020    HGB 9 9 (L) 08/24/2020    HCT 35 0 (L) 08/24/2020    MCV 81 (L) 08/24/2020     08/24/2020     Lab Results   Component Value Date    SODIUM 138 08/24/2020    K 4 1 08/24/2020     08/24/2020    CO2 24 08/24/2020    BUN 18 08/24/2020    CREATININE 1 31 (H) 08/24/2020    GLUC 95 08/24/2020    CALCIUM 8 6 08/24/2020     Lab Results   Component Value Date    INR 1 11 08/22/2020    INR 1 00 07/26/2018    INR 1 01 03/02/2018    PROTIME 14 4 08/22/2020    PROTIME 13 3 07/26/2018    PROTIME 13 3 03/02/2018     Lab Results   Component Value Date    HGBA1C 6 2 12/12/2017          Physical Exam    Airway    Mallampati score: II  TM Distance: >3 FB  Neck ROM: full     Dental       Cardiovascular  Cardiovascular exam normal    Pulmonary  Pulmonary exam normal     Other Findings        Anesthesia Plan  ASA Score- 3     Anesthesia Type- IV sedation with anesthesia with ASA Monitors  Additional Monitors:   Airway Plan:           Plan Factors-    Chart reviewed  EKG reviewed  Existing labs reviewed  Patient summary reviewed  Induction- intravenous  Postoperative Plan-     Informed Consent- Anesthetic plan and risks discussed with patient  I personally reviewed this patient with the CRNA  Discussed and agreed on the Anesthesia Plan with the CRNA  Violetta Dalton

## 2020-08-25 PROBLEM — K63.89 MASS OF CECUM: Status: ACTIVE | Noted: 2020-08-25

## 2020-08-25 PROBLEM — Z87.39 HISTORY OF GOUT: Status: ACTIVE | Noted: 2020-08-25

## 2020-08-25 LAB
ANION GAP SERPL CALCULATED.3IONS-SCNC: 7 MMOL/L (ref 4–13)
APTT PPP: 35 SECONDS (ref 23–37)
APTT PPP: 56 SECONDS (ref 23–37)
APTT PPP: 58 SECONDS (ref 23–37)
APTT PPP: 92 SECONDS (ref 23–37)
ATRIAL RATE: 159 BPM
ATRIAL RATE: 241 BPM
ATRIAL RATE: 394 BPM
BASOPHILS # BLD AUTO: 0.07 THOUSANDS/ΜL (ref 0–0.1)
BASOPHILS NFR BLD AUTO: 1 % (ref 0–1)
BUN SERPL-MCNC: 14 MG/DL (ref 5–25)
CALCIUM SERPL-MCNC: 8.9 MG/DL (ref 8.3–10.1)
CEA SERPL-MCNC: 1.7 NG/ML (ref 0–3)
CHLORIDE SERPL-SCNC: 109 MMOL/L (ref 100–108)
CO2 SERPL-SCNC: 23 MMOL/L (ref 21–32)
CREAT SERPL-MCNC: 1.16 MG/DL (ref 0.6–1.3)
EOSINOPHIL # BLD AUTO: 0.03 THOUSAND/ΜL (ref 0–0.61)
EOSINOPHIL NFR BLD AUTO: 0 % (ref 0–6)
ERYTHROCYTE [DISTWIDTH] IN BLOOD BY AUTOMATED COUNT: 17 % (ref 11.6–15.1)
GFR SERPL CREATININE-BSD FRML MDRD: 68 ML/MIN/1.73SQ M
GLUCOSE SERPL-MCNC: 108 MG/DL (ref 65–140)
HCT VFR BLD AUTO: 35.3 % (ref 36.5–49.3)
HGB BLD-MCNC: 10.3 G/DL (ref 12–17)
IMM GRANULOCYTES # BLD AUTO: 0.03 THOUSAND/UL (ref 0–0.2)
IMM GRANULOCYTES NFR BLD AUTO: 0 % (ref 0–2)
LYMPHOCYTES # BLD AUTO: 1.41 THOUSANDS/ΜL (ref 0.6–4.47)
LYMPHOCYTES NFR BLD AUTO: 18 % (ref 14–44)
MCH RBC QN AUTO: 23.4 PG (ref 26.8–34.3)
MCHC RBC AUTO-ENTMCNC: 29.2 G/DL (ref 31.4–37.4)
MCV RBC AUTO: 80 FL (ref 82–98)
MONOCYTES # BLD AUTO: 0.88 THOUSAND/ΜL (ref 0.17–1.22)
MONOCYTES NFR BLD AUTO: 11 % (ref 4–12)
NEUTROPHILS # BLD AUTO: 5.47 THOUSANDS/ΜL (ref 1.85–7.62)
NEUTS SEG NFR BLD AUTO: 70 % (ref 43–75)
NRBC BLD AUTO-RTO: 0 /100 WBCS
PLATELET # BLD AUTO: 357 THOUSANDS/UL (ref 149–390)
PMV BLD AUTO: 10.6 FL (ref 8.9–12.7)
POTASSIUM SERPL-SCNC: 3.9 MMOL/L (ref 3.5–5.3)
QRS AXIS: 34 DEGREES
QRS AXIS: 63 DEGREES
QRS AXIS: 66 DEGREES
QRSD INTERVAL: 86 MS
QRSD INTERVAL: 92 MS
QRSD INTERVAL: 94 MS
QT INTERVAL: 314 MS
QT INTERVAL: 360 MS
QT INTERVAL: 360 MS
QTC INTERVAL: 433 MS
QTC INTERVAL: 438 MS
QTC INTERVAL: 459 MS
RBC # BLD AUTO: 4.4 MILLION/UL (ref 3.88–5.62)
SODIUM SERPL-SCNC: 139 MMOL/L (ref 136–145)
T WAVE AXIS: -80 DEGREES
T WAVE AXIS: 28 DEGREES
T WAVE AXIS: 56 DEGREES
VENTRICULAR RATE: 117 BPM
VENTRICULAR RATE: 87 BPM
VENTRICULAR RATE: 98 BPM
WBC # BLD AUTO: 7.89 THOUSAND/UL (ref 4.31–10.16)

## 2020-08-25 PROCEDURE — 82378 CARCINOEMBRYONIC ANTIGEN: CPT | Performed by: HOSPITALIST

## 2020-08-25 PROCEDURE — 99233 SBSQ HOSP IP/OBS HIGH 50: CPT | Performed by: INTERNAL MEDICINE

## 2020-08-25 PROCEDURE — 85730 THROMBOPLASTIN TIME PARTIAL: CPT | Performed by: HOSPITALIST

## 2020-08-25 PROCEDURE — 93010 ELECTROCARDIOGRAM REPORT: CPT | Performed by: INTERNAL MEDICINE

## 2020-08-25 PROCEDURE — 99232 SBSQ HOSP IP/OBS MODERATE 35: CPT | Performed by: INTERNAL MEDICINE

## 2020-08-25 PROCEDURE — 80048 BASIC METABOLIC PNL TOTAL CA: CPT | Performed by: HOSPITALIST

## 2020-08-25 PROCEDURE — 85025 COMPLETE CBC W/AUTO DIFF WBC: CPT | Performed by: HOSPITALIST

## 2020-08-25 PROCEDURE — 99255 IP/OBS CONSLTJ NEW/EST HI 80: CPT | Performed by: COLON & RECTAL SURGERY

## 2020-08-25 RX ORDER — HEPARIN SODIUM 10000 [USP'U]/100ML
3-20 INJECTION, SOLUTION INTRAVENOUS
Status: DISCONTINUED | OUTPATIENT
Start: 2020-08-25 | End: 2020-08-26

## 2020-08-25 RX ORDER — DEXTROSE, SODIUM CHLORIDE, AND POTASSIUM CHLORIDE 5; .45; .15 G/100ML; G/100ML; G/100ML
100 INJECTION INTRAVENOUS CONTINUOUS
Status: DISCONTINUED | OUTPATIENT
Start: 2020-08-28 | End: 2020-08-27

## 2020-08-25 RX ORDER — NEOMYCIN SULFATE 500 MG/1
1000 TABLET ORAL ONCE
Status: DISCONTINUED | OUTPATIENT
Start: 2020-08-28 | End: 2020-08-27

## 2020-08-25 RX ORDER — FUROSEMIDE 40 MG/1
40 TABLET ORAL DAILY
Status: COMPLETED | OUTPATIENT
Start: 2020-08-25 | End: 2020-08-27

## 2020-08-25 RX ORDER — METRONIDAZOLE 500 MG/1
500 TABLET ORAL EVERY 12 HOURS SCHEDULED
Status: DISCONTINUED | OUTPATIENT
Start: 2020-08-28 | End: 2020-08-27

## 2020-08-25 RX ORDER — METRONIDAZOLE 500 MG/1
500 TABLET ORAL EVERY 12 HOURS SCHEDULED
Status: DISCONTINUED | OUTPATIENT
Start: 2020-08-27 | End: 2020-08-27

## 2020-08-25 RX ORDER — DILTIAZEM HYDROCHLORIDE 60 MG/1
60 CAPSULE, EXTENDED RELEASE ORAL EVERY 12 HOURS SCHEDULED
Status: DISCONTINUED | OUTPATIENT
Start: 2020-08-25 | End: 2020-08-28

## 2020-08-25 RX ORDER — CEFAZOLIN SODIUM 2 G/50ML
2000 SOLUTION INTRAVENOUS
Status: COMPLETED | OUTPATIENT
Start: 2020-08-28 | End: 2020-08-28

## 2020-08-25 RX ORDER — NEOMYCIN SULFATE 500 MG/1
1000 TABLET ORAL 2 TIMES DAILY
Status: DISCONTINUED | OUTPATIENT
Start: 2020-08-27 | End: 2020-08-27

## 2020-08-25 RX ORDER — DEXTROSE, SODIUM CHLORIDE, AND POTASSIUM CHLORIDE 5; .45; .15 G/100ML; G/100ML; G/100ML
100 INJECTION INTRAVENOUS CONTINUOUS
Status: DISCONTINUED | OUTPATIENT
Start: 2020-08-25 | End: 2020-08-25

## 2020-08-25 RX ADMIN — HEPARIN SODIUM 4000 UNITS: 1000 INJECTION INTRAVENOUS; SUBCUTANEOUS at 01:59

## 2020-08-25 RX ADMIN — DILTIAZEM HYDROCHLORIDE 60 MG: 60 CAPSULE, EXTENDED RELEASE ORAL at 10:34

## 2020-08-25 RX ADMIN — SPIRONOLACTONE 12.5 MG: 25 TABLET ORAL at 08:31

## 2020-08-25 RX ADMIN — METOPROLOL SUCCINATE 100 MG: 100 TABLET, EXTENDED RELEASE ORAL at 21:49

## 2020-08-25 RX ADMIN — ATORVASTATIN CALCIUM 40 MG: 40 TABLET, FILM COATED ORAL at 17:06

## 2020-08-25 RX ADMIN — METOPROLOL SUCCINATE 100 MG: 100 TABLET, EXTENDED RELEASE ORAL at 08:31

## 2020-08-25 RX ADMIN — HEPARIN SODIUM 2000 UNITS: 1000 INJECTION INTRAVENOUS; SUBCUTANEOUS at 17:10

## 2020-08-25 RX ADMIN — FUROSEMIDE 40 MG: 40 TABLET ORAL at 17:07

## 2020-08-25 RX ADMIN — IRON SUCROSE 200 MG: 20 INJECTION, SOLUTION INTRAVENOUS at 09:00

## 2020-08-25 RX ADMIN — HEPARIN SODIUM 2000 UNITS: 1000 INJECTION INTRAVENOUS; SUBCUTANEOUS at 08:34

## 2020-08-25 RX ADMIN — DIGOXIN 125 MCG: 125 TABLET ORAL at 17:07

## 2020-08-25 RX ADMIN — HEPARIN SODIUM AND DEXTROSE 17.1 UNITS/KG/HR: 10000; 5 INJECTION INTRAVENOUS at 15:28

## 2020-08-25 RX ADMIN — ALLOPURINOL 100 MG: 100 TABLET ORAL at 08:32

## 2020-08-25 RX ADMIN — DILTIAZEM HYDROCHLORIDE 60 MG: 60 CAPSULE, EXTENDED RELEASE ORAL at 21:50

## 2020-08-25 NOTE — PROGRESS NOTES
Teaching Physician Statement  I performed history and exam of patient  I discussed with the Cardiology Fellow  I agree with the [facility] documented findings and plan of care  I personally interviewed and examined patient, reviewed labs, telemetry, vitals, and relevant studies  Plan:  --Can trial low dose calcium channel blocker for rate control, but will need to monitor for progressive myocardial suppression 2/2 to negative inotropy from Ca channel blocker  Anti-arrhythmic/DCCV currently precluded by GI bleed as re-establishing NSR would require anticoagulation post CV    --Ultimately, can consider JOSE LUIS/DCCV intraop, however, anticoagulation may be precluded in postop state given planned hemicolectomy  As currently HD stable, can always re-consider JOSE LUIS/DCCV postop as well  Assessment:  # AFib w/ RVR: Currently on metoprolol 100 mg PO BID and digoxin  Continues to have RVR  Was on tikosyn as outpatient  --Can trial low dose calcium channel blocker for rate control  --Ultimately, can consider JOSE LUIS/DCCV intraop, however, anticoagulation may be precluded in postop state given planned hemicolectomy  As currently HD stable, can always re-consider JOSE LUIS/DCCV postop as well  # Chronic recovered HFrEF (NICM; LVEF ~20%, improved to ~50% w/ LVIDd decreased from 5 7 to mid 4 cm) initally with biventricular dysfunction (now RV normal), NYHA II, ACC/AHA Stage C: Most likely 2/2 tachy (AFib w/ RVR) as had BiV dysfunction +/- HTN  See cath below  Diag:  --Stress test 2/19/18 - pharm nuc w/ moderate-sized, moderately severe, partially reversible myocardial perfusion defect of the basal to mid inferolateral wall  --Cardiac cath 3/2/18 w/ single vessel CAD, with an 80% proximal stenosis of the ramus artery  As no ischemic symptoms, and LVEF out of proportion decreased to degree of CAD, no stent placed   LVEDP normal    --TTE 3/29/18 shows LVEF ~40-45% (visual inspection), but LVIDd now 4 5 cm and RV size and function now normal  Small pericardial effusion persists  --TTE 10/17/18: LVEF 50%  LVIDd 4 8 cm  Grade 1 diastology  Normal RV  --Stress echo 8/21/2020: Stress note done  LVEF ~45%  Therapeutic:  --2g sodium diet  --2 L fluid restriction  --Continue EtOH cessation     Neurohormonal Blockade:  --Beta-Blocker: Continue metoprolol succinate 100 mg PO BID  --ACEi, ARB or ARNi: Continue Entresto 24-26 mg PO BID  --Aldosterone Receptor Blocker: Continue spironolactone 12 5 mg PO daily  --Diuretic: Lasix 60 mg PO daily     Electrical Resynchronization/Sudden Cardiac Death Risk Reduction:  --Stopped LifeVest 2/2 to improved LVEF     Advanced Therapies: Currently does not meet criteria as minimal symptoms  # GI Bleed: Most likely 2/2 to malignant-appearing multilobular and ulcerated mass in the proximal ascending colon, covering one third of circumference, cold biopsy was performed  An additional adenomatous appearing polyp measuring smaller than 5mm in cecum-removed en bloc by hot snare   Additional 15mm sessile adenomatous-appearing polyp in transverse colon removed en bloc by hot snare  --Plan for hemicolectomy on Friday    # NSVT  # IFG  # Hx of Gout: Continue Allopurinol  # CAD    Rest of plan per fellow note

## 2020-08-25 NOTE — PROGRESS NOTES
Progress Note - Colorectal Surgery   Sushila Valadez 61 y o  male MRN: [id number]  Unit/Bed#: -01 Encounter: [id number]    Assessment:  60 yo male with ascending colonic mass that was found on colonscopy    CT chest abd and pelvis-Cecal mass likely representing primary neoplasm, no definite evidence of metastatic disease in chest, abdomen, or pelvis  Tiny lung nodule nonspecific and doubtful for metastatic disease  Tachycardic OVSS  CEA pending    Plan:  -Patient did not want bowel regimen last night, CT scan shows now evidence of metastatic disease   -Will proceed with surgery Friday  -Patient can be on a lo residue diet starting today  -Keep patient on heparin gtt  -Start clears and bowel prep on Thursday   -Care per primary team      Subjective/Objective   Chief Complaint: No complaints  No acute overnight events  Patient doing well no nausea vomiting  Passsing gas no bms since Saturday  Subjective: no acute overnight events  Patient did well    Objective: Physical Exam  Vitals signs and nursing note reviewed  Constitutional:       General: He is not in acute distress  Appearance: He is well-developed  HENT:      Head: Normocephalic and atraumatic  Eyes:      General: No scleral icterus  Cardiovascular:      Rate and Rhythm: Normal rate and regular rhythm  Pulmonary:      Effort: Pulmonary effort is normal       Breath sounds: Normal breath sounds  Abdominal:      General: There is no distension  Palpations: Abdomen is soft  Tenderness: There is no abdominal tenderness  There is no guarding or rebound  Skin:     General: Skin is warm  Capillary Refill: Capillary refill takes less than 2 seconds  Neurological:      Mental Status: He is alert and oriented to person, place, and time  Blood pressure 133/88, pulse (!) 123, temperature 97 9 °F (36 6 °C), resp  rate 16, height 5' 9" (1 753 m), weight 97 kg (213 lb 13 5 oz), SpO2 99 %  ,Body mass index is 31 58 kg/m²  Intake/Output Summary (Last 24 hours) at 8/25/2020 0545  Last data filed at 8/25/2020 0525  Gross per 24 hour   Intake 1102 15 ml   Output 1450 ml   Net -347 85 ml       Invasive Devices     Peripheral Intravenous Line            Peripheral IV 08/24/20 Left Antecubital less than 1 day                    Lab, Imaging and other studies:I have personally reviewed pertinent lab results      VTE Pharmacologic Prophylaxis: Heparin  VTE Mechanical Prophylaxis: sequential compression device

## 2020-08-25 NOTE — QUICK NOTE
Patient informed us he did not take his prep  Patient can be restarted on his home medication and regular diet  No metastatic lesions seen on CT  Will follow in office as outpatient for elective surgery instead of immediate  Addendum: Rounded with Dr Julita Burnett  Will add patient on the operating room schedule for this Friday early afternoon  Patient can be on low residue diet for now  Clear liquid diet to start this Thursday morning  We will write for the bowel prep Thursday  OR Friday for laparoscopic right hemicolectomy  Heparin gtt to continue  To be stopped 6 hours prior to the OR  Eliquis is to be held still  Dr Julita Burnett explained the procedure and all the complications that can arise including possible colostomy  Patient agreeable to proceed with the surgery

## 2020-08-25 NOTE — PROGRESS NOTES
Advanced Heart Failure Team Progress Note - Brie Flores 61 y o  male MRN: [id number]    Unit/Bed#: -01 Encounter: [id number]        Subjective:   Patient seen and examined  No significant telemetry events overnight  Objective:     Vitals: Blood pressure 131/89, pulse (!) 131, temperature 98 1 °F (36 7 °C), temperature source Oral, resp  rate 18, height 5' 9" (1 753 m), weight 97 kg (213 lb 13 5 oz), SpO2 98 %  , Body mass index is 31 58 kg/m² ,   Orthostatic Blood Pressures      Most Recent Value   Blood Pressure  131/89 filed at 08/25/2020 0750   Patient Position - Orthostatic VS  Lying filed at 08/25/2020 0750            Intake/Output Summary (Last 24 hours) at 8/25/2020 0759  Last data filed at 8/25/2020 0750  Gross per 24 hour   Intake 1282 15 ml   Output 1700 ml   Net -417 85 ml         Physical Exam:    GEN: Brie Flores appears well, alert and oriented x 3, pleasant and cooperative   HEENT:  Normocephalic, atraumatic, anicteric, moist mucous membranes  NECK: No JVD or carotid bruits   HEART: Irregular rhythm normal rate, normal S1 and S2, no murmurs, clicks, gallops or rubs   LUNGS: Clear to auscultation bilaterally; no wheezes, rales, or rhonchi; respiration nonlabored   ABDOMEN:  Normoactive bowel sounds, soft, no tenderness, no distention  EXTREMITIES: peripheral pulses palpable; no edema  NEURO: no gross focal findings; cranial nerves grossly intact   SKIN:  Dry, intact, warm to touch    Current Facility-Administered Medications:     allopurinol (ZYLOPRIM) tablet 100 mg, 100 mg, Oral, Daily, Javad Duran MD, 100 mg at 08/24/20 0850    atorvastatin (LIPITOR) tablet 40 mg, 40 mg, Oral, Daily With Camelia Meier MD, 40 mg at 08/24/20 1730    digoxin (LANOXIN) tablet 125 mcg, 125 mcg, Oral, Daily, Javad Duran MD, 125 mcg at 08/24/20 1731    diltiazem (CARDIZEM) injection 10 mg, 10 mg, Intravenous, Q8H PRN, Javad Duran MD, 10 mg at 08/22/20 1824   heparin (porcine) 25,000 units in 250 mL infusion (premix), 3-20 Units/kg/hr (Order-Specific), Intravenous, Titrated, Sergey Nair DO, Last Rate: 13 6 mL/hr at 08/25/20 0157, 15 1 Units/kg/hr at 08/25/20 0157    heparin (porcine) injection 2,000 Units, 2,000 Units, Intravenous, Q1H PRN, Elsworth Breezy, DO    heparin (porcine) injection 4,000 Units, 4,000 Units, Intravenous, Q1H PRN, Elsworth Breezy, DO, 4,000 Units at 08/25/20 0159    iohexol (OMNIPAQUE) 240 MG/ML solution 50 mL, 50 mL, Oral, 90 min pre-procedure, Gabby Valdivia MD    iron sucrose (VENOFER) 200 mg in sodium chloride 0 9 % 100 mL IVPB, 200 mg, Intravenous, Daily, Izabela Rivera MD, Last Rate: 100 mL/hr at 08/23/20 0814, 200 mg at 08/24/20 0850    melatonin tablet 3 mg, 3 mg, Oral, HS PRN, Izabela Rivera MD, 3 mg at 08/22/20 0100    metoprolol succinate (TOPROL-XL) 24 hr tablet 100 mg, 100 mg, Oral, BID, Izabela Rivera MD, 100 mg at 08/24/20 2211    spironolactone (ALDACTONE) tablet 12 5 mg, 12 5 mg, Oral, Daily, Izabela Rivera MD, 12 5 mg at 08/24/20 0850    Labs & Results:    Lab Results   Component Value Date    TROPONINI 0 10 (H) 08/21/2020    TROPONINI 0 15 (H) 07/27/2018    TROPONINI 0 14 (H) 07/27/2018       Lab Results   Component Value Date    GLUCOSE 107 12/11/2017    CALCIUM 8 6 08/24/2020    K 4 1 08/24/2020    CO2 24 08/24/2020     08/24/2020    BUN 18 08/24/2020    CREATININE 1 31 (H) 08/24/2020       Lab Results   Component Value Date    WBC 8 10 08/24/2020    HGB 10 5 (L) 08/24/2020    HCT 36 6 08/24/2020    MCV 80 (L) 08/24/2020     08/24/2020     Results from last 7 days   Lab Units 08/24/20  1844   INR  1 17       No results found for: CHOL  Lab Results   Component Value Date    HDL 41 08/22/2020    HDL 31 (L) 12/12/2017     Lab Results   Component Value Date    LDLCALC 40 08/22/2020    LDLCALC 73 12/12/2017     Lab Results   Component Value Date    TRIG 74 08/22/2020    TRIG 82 12/12/2017       Lab Results   Component Value Date    ALT 22 08/23/2020    AST 8 08/23/2020       Telemetry:   Personally reviewed by Princess Quick MD: Uncontrolled atrial fibrillation with HR 's  Imaging: Contrast CT abdomen/pelvis and EGD/colonoscopy reviewed    VTE Prophylaxis: Sequential compression device (Venodyne)  and Reason for no pharmacologic prophylaxis active GI pathology      Assessment:  Principal Problem:    Atrial fibrillation with rapid ventricular response (HCC)  Active Problems:    Systolic CHF (HCC)    Microcytic anemia    SCARLETT (acute kidney injury) ([facility] [address] )    Gastrointestinal hemorrhage with melena    Coronary artery disease    GI bleeding    Neurohormonal Blockade:  --Beta-Blocker:  Metoprolol succinate 100 mg b i d   --ACEi, ARB or ARNi:  None   --Aldosterone Receptor Blocker:  Spironolactone 12 5 mg q d   --Diuretic:  None       Sudden Cardiac Death Risk Reduction:  --ICD:  Completed LifeVest with recovered     Electrical Resynchronization:  --Candidacy for BiV device: N/A     Advanced Therapies: Will continue to monitor  --Inotrope:  None  --LVAD/Transplant Candidacy: N/A    ===============================================================    1  Acute blood loss anemia  -baseline HGB 13 in 2018, 9 4 on presentation  -iron panel consistent with iron deficiency anemia with nearly undetectable ferritin  -s/p 1 unit of PRBC, maintain on iron sucrose ordered for 5 doses  -EGD showed mild erosions in the GE junction, stomach, and erythema in the duodenum  -colonoscopy: malignant-appearing multilobular and ulcerated mass in the proximal ascending segment and 2 polyps which were all biopsied     2  Rapid atrial fibrillation  -known history, likely exacerbated by acute blood loss  -[id number] 2, HAS-BLED 1  -metoprolol succinate, digoxin, apixaban held due to active bleed  -rate remains uncontrolled     3   Nonischemic cardiomyopathy with recovered EF  -resume tachy mediated  -8/21 TTE: LVEF 50% with no RWMA, LVIDd 4 cm, mild RVE with mild reduction in SF, mild TR with trace pericardial effusion  -metoprolol succinate, low-dose spironolactone     4  Coronary artery disease  -02/2018 Rx NMST:  Partially reversible inferolateral and fixed apical lateral defect  -03/2018 LHC:  80% prox small RI, 60% ostial PDA otherwise no significant residual disease in the remaining vessels  -8/22 lipids: Total 96, TG 74, HDL 41, calc LDL 40 (73)  -metoprolol succinate, high-intensity atorvastatin  -aspirin held due to bleeding      Plan:  1  Started low dose diltiazem 60 mg b i d  to help with rate control in anticipation of colorectal surgery  Due to active GI bleed, would delay rhythm control strategy or cardioversion as resumption of sinus mechanism would require anticoagulation  2  Continue holding anticoagulation and antiplatelet  Case discussed and reviewed with Dr Mariam King who agrees with my assessment and plan  Thank you for involving us in the care of your patient  [employer]/Care Everywhere records reviewed: Yes    ** Please Note: Fluency DirectDictation voice to text software may have been used in the creation of this document   **

## 2020-08-25 NOTE — PROGRESS NOTES
INTERNAL MEDICINE RESIDENCY PROGRESS NOTE     Name: Angeline Adkins   Age & Sex: 61 y o  male   MRN: [id number]  Unit/Bed#: -01   Encounter: [id number]  Team: SOD Team A    PATIENT INFORMATION     Name: Angeline Adkins   Age & Sex: 61 y o  male   MRN: [id number]  Hospital Stay Days: 4    ASSESSMENT/PLAN     Principal Problem:    Atrial fibrillation with rapid ventricular response ([address] )  Active Problems:    Systolic CHF ([facility] [address] )    Mass of cecum    Gastrointestinal hemorrhage with melena    Microcytic anemia    SCARLETT (acute kidney injury) ([facility] [address] )    History of gout    Coronary artery disease    GI bleeding      * Atrial fibrillation with rapid ventricular response ([facility] [address] )  Assessment & Plan  Known history of AFib, being followed by cardiology outpatient  Patient presented to ED on 8/22 in AFib with RVR (rate 122), despite medication compliance  Patient also found to be hypotensive  Home medications for rate control include digoxin 0 125 mg and metoprolol succinate 100 mg b i d  Follows with Dr Carlos Melgoza out patient  Had been scheduled for a stress test today outpatient, but came to the ED instead due to his hypotension (90/60s)  No valvular disease per past cardiology work-up  Plan:  -Increased rate likely exacerbated by current blood-loss anemia 2/2 GI bleed (suspect GI malignancy as source)  -Cardizem started in ED to control rate, but then d/c prior to admission because of decreasing BP again  -Cardiology following, appreciate their recommendations  -QTc of 459    -CHADS-VASC score 2, HAS BLED 1  -Telemetry  -Eliquis held, heparin drip started per colorectal surgery for OR on Friday  -Per cardiology, continue Toprol  mg BID and digoxin 125 mcg daily  -Cardizem 60 mg Q12H started 8/25  -Consider checking digoxin level         Systolic CHF (HCC)  Assessment & Plan  Pt with hx of systolic CHF with EF 59% (per Echo 1 year ago)  Compliant with home medication lasix 60 mg, aldoctone 25 mg, entresto 24-26 mg  States he has lost 8 lbs recently, based off of his home scale and the ED scale  However the differences in two difference scale measurements is not necessarily reliable  Patient admits to one month worsening SOB, now with dyspnea with ADL  Baseline sleeps with 3 pillows under his head, and this is unchanged in the last month  Patient denies episodes of lower extremity edema     -2018 stress test showed partially reversible myocardial perfusion defect of basal to mid inferolateral wall   -2018 cardiac cath (s/p NSTEMI type II) showed single vessel CAD with 80% stenosis of the proximal ramus artery  No stent was placed    -2018: Small persistent pericardial effusion present  -2018 TTE: LVEF 50%    Wt Readings from Last 3 Encounters:   08/21/20 99 8 kg (220 lb)   07/27/20 101 kg (222 lb)   03/19/19 102 kg (224 lb)     Plan:  -CXR negative for acute cardiopulmonary disease  -Aldactone 12 5 mg, possibly restart lasix tomorrow per cardiology  -Cardiology following  Their recommendations appreciated  Continuing with a fib rate control with digoxin and metoprolol (restarted 8/22), cardizem (restarted 8/25)  -Patient appears euvolemic  -Hypotension likely 2/2 blood loss anemia- resolved, /39  -SCARLETT- improving Cr 1 13  -Daily standing weights  -Cardiac diet: <2L fluids, <2g Na+, alcohol cessation    Mass of cecum  Assessment & Plan  Patient with no history of colonoscopy  Colonoscopy performed yesterday for investigation of anemia  Found to have a malignant-appearing, multilobular and ulcerated mass in the proximal ascending colon, covering one third of the circumference  Biopsy was taken  CTAP showed no evidence of metastatic disease      Plan:  -Colorectal surgery following  -Plan for OR on Friday  -Continue heparin drip until 6 AM Friday (8/28/20)  -Diet changed to low residue with cardiac restrictions  -Will transition to clears on Thursday with bowel prep and abx ppx    Gastrointestinal hemorrhage with melena  Assessment & Plan  Patient endorses 1 month history of melena, which is new  He has never had a colonscopy  BP on admission 90s/60s  Hb on admission 9 3, patient symptomatic with light-headedness, SOB and hypotension    Plan:  -Transfused 1 unit pRBC  -BUN down to 14  -CBC in am  -Hold Eliquis  -EGD: mild erythematous mucosa with erosion in the GE junction (43 cm from the incisors)  LA grade A  Mild, localized erosion in the stomach; performed cold biopsy  R/o H pylori  Mild, localized erythematous mucosa in the duodenal bulb; performed cold biopsy  -Colonoscopy: Malignant-appearing, multilobular and ulcerated mass in the proximal ascending colon, covering one third of the circumference; performed cold biopsy  One adenomatous-appearing polyp measuring smaller than 5 mm in the cecum; removed en bloc by cold snare and retrieved specimen 15 mm sessile, adenomatous-appearing polyp in the transverse colon; removed en bloc by hot snare and retrieved specimen; placed 2 clips successfully (clips were MRI compatible)  The examination was otherwise normal on direct and retroflexion views  Microcytic anemia  Assessment & Plan  Found to have Hb of 9 3 on admission  Last known hemoglobin from two years ago was 13  Patient endorses a one month history of consistently black, tarry stools, which is new for him  Heme-occult in ED was positive  Patient has never had a colonoscopy  Patient also hypotensive on arrival (90/60s)  Plan:  -Suspected anemia as cause of SCARLETT and contributing to uncontrolled a fib RVR, Cr today 1 31  -Received 1 unit of PRBC this admission  -Hgb today is 10 3 today  -continue to monitor BP and Hb  -Heparin drip for OR Friday      SCARLETT (acute kidney injury) [facility])  Assessment & Plan  Presented with creatinine of 2 31  Last creatinine 0 92-1      Plan:  -Likely pre-renal 2/2 blood loss (volume depletion) and hypotension   -Transfused 1 unit pRBC  -Trend Cr, today 1 16  -Still elevated above baseline  -Hold nephrotoxic agents    History of gout  Assessment & Plan  Controlled with allopurinol 100 mg QD    Hypotensionresolved as of 8/23/2020  Assessment & Plan  Patient presented from outpatient clinic with hypotension (90s/60s)  Found to have anemia (Hb 9 3) in setting of melena and heme-occult positive test in ED  Last known Hb from two years ago was 13  Plan:  -hypotension 2/2 blood loss from GI bleed (likely GI malignancy)  -1 unit pRBC transfused  -repeat am CBC  -Blood pressure stable (systolics to 567); on rate control Tikosyn  -Holding: lasix, aldactone and entresto given current hypotension and likely pre-renal SCARLETT 2/2 hypotension/volume depletion  Restart when BP and creatinine stable   -holding Eliquis      Disposition: M/S Telemetry     SUBJECTIVE     Patient seen and examined  No acute events overnight  The patient was resting comfortably in bed  Denies vision/hearing changes, fevers, chills, night sweats, dysphagia, sore throat, chest pain, shortness of breath, abdominal pain, nausea, vomiting, constipation, diarrhea, dysuria, and peripheral edema  His colonoscopy yesterday found a mass suspicious for malignancy  It was biopsy  The CT of the abdomen and pelvis similarly showed a mass in the cecum suspicious for malignancy; however, no evidence of metastatic disease  The patient stated that he is handling the news okay  In speaking with him, he stated he that he never wanted to get screening due to the fear of finding something  After this finding, he now states he will have close follow-up  Colorectal surgery plans to take him to the OR on Friday for resection of the mass      OBJECTIVE     Vitals:    08/25/20 0750 08/25/20 0820 08/25/20 1109 08/25/20 1446   BP: 131/89  139/89 135/94   BP Location: Right arm  Right arm Right arm   Pulse: (!) 131 (!) 110 101 79   Resp: 18  18 18   Temp: 98 1 °F (36 7 °C)  98 2 °F (36 8 °C) 98 3 °F (36 8 °C)   TempSrc: Oral  Oral Oral   SpO2: 98% 98% 98%   Weight:       Height:          Temperature:   Temp (24hrs), Av 1 °F (36 7 °C), Min:97 9 °F (36 6 °C), Max:98 3 °F (36 8 °C)    Temperature: 98 3 °F (36 8 °C)  Intake & Output:  I/O       701 -  0700 701 -  0700 701 -  0700    P  O  3780 480 180    I V  (mL/kg)  622 2 (6 4)     Total Intake(mL/kg) 3780 (38 9) 1102 2 (11 4) 180 (1 9)    Urine (mL/kg/hr) 350 (0 2) 1450 (0 6) 250 (0 5)    Total Output 350 1450 250    Net +3430 -347 9 -70               Weights:   IBW: 70 7 kg    Body mass index is 31 58 kg/m²  Weight (last 2 days)     Date/Time   Weight    20 0524   97 (213 85)    20 0230   97 2 (214 29)    20 0500   95 3 (210 1)            Physical Exam  Constitutional:       Appearance: Normal appearance  He is obese  HENT:      Head: Normocephalic and atraumatic  Right Ear: External ear normal       Left Ear: External ear normal       Nose: Nose normal       Mouth/Throat:      Mouth: Mucous membranes are moist    Eyes:      Extraocular Movements: Extraocular movements intact  Pupils: Pupils are equal, round, and reactive to light  Neck:      Musculoskeletal: Normal range of motion  Cardiovascular:      Rate and Rhythm: Tachycardia present  Rhythm irregular  Pulmonary:      Effort: Pulmonary effort is normal       Breath sounds: Normal breath sounds  Abdominal:      General: Bowel sounds are normal  There is no distension  Palpations: Abdomen is soft  Musculoskeletal: Normal range of motion  General: No swelling  Skin:     General: Skin is warm and dry  Capillary Refill: Capillary refill takes less than 2 seconds  Neurological:      General: No focal deficit present  Mental Status: He is alert and oriented to person, place, and time  Psychiatric:         Mood and Affect: Mood normal          Behavior: Behavior normal        LABORATORY DATA     Labs:  I have personally reviewed pertinent reports  Results from last 7 days   Lab Units 08/25/20  0757 08/24/20  1844 08/24/20  0510 08/23/20  0510  08/22/20  0444   WBC Thousand/uL 7 89 8 10 7 16 7 26  --  6 90   HEMOGLOBIN g/dL 10 3* 10 5* 9 9* 9 8*   < > 9 4*   HEMATOCRIT % 35 3* 36 6 35 0* 33 8*   < > 32 7*   PLATELETS Thousands/uL 357 385 371 374  --  363   NEUTROS PCT % 70  --   --  65  --  56   MONOS PCT % 11  --   --  12  --  15*    < > = values in this interval not displayed  Results from last 7 days   Lab Units 08/25/20  0756 08/24/20  0510 08/23/20  0510 08/22/20  0444   POTASSIUM mmol/L 3 9 4 1 4 3 4 0   CHLORIDE mmol/L 109* 107 112* 109*   CO2 mmol/L 23 24 24 25   BUN mg/dL 14 18 29* 42*   CREATININE mg/dL 1 16 1 31* 1 30 1 96*   CALCIUM mg/dL 8 9 8 6 8 7 8 2*   ALK PHOS U/L  --   --  51 53   ALT U/L  --   --  22 23   AST U/L  --   --  8 8     Results from last 7 days   Lab Units 08/22/20  0444 08/21/20  1647   MAGNESIUM mg/dL 2 4 2 4     Results from last 7 days   Lab Units 08/21/20  1647   PHOSPHORUS mg/dL 4 1      Results from last 7 days   Lab Units 08/25/20  0757 08/25/20  0036 08/24/20  1844 08/22/20  0815   INR   --   --  1 17 1 11   PTT seconds 56* 35 102*  --          Results from last 7 days   Lab Units 08/21/20  1647   TROPONIN I ng/mL 0 10*       IMAGING & DIAGNOSTIC TESTING     Radiology Results: I have personally reviewed pertinent reports  Xr Chest 1 View Portable    Result Date: 8/21/2020  Impression: No acute cardiopulmonary disease  Workstation performed: [id number]     Ct Chest Abdomen Pelvis W Contrast    Result Date: 8/25/2020  Impression: 1  Cecal mass likely representing primary neoplasm  2   No definite evidence of metastatic disease in the chest, abdomen, or pelvis  3   Tiny left lower lobe lung nodule is nonspecific and doubtful metastatic disease though a three-month follow-up chest CT is recommended to confirm stability   4   Cholelithiasis with mild gallbladder wall thickening though no pericholecystic inflammatory change  This likely represents adenomyosis as described on the prior ultrasound from 2017  5   Small pericardial effusion  The study was marked in EPIC for significant notification  Workstation performed: [id number]     Other Diagnostic Testing: I have personally reviewed pertinent reports      ACTIVE MEDICATIONS     Current Facility-Administered Medications   Medication Dose Route Frequency    allopurinol (ZYLOPRIM) tablet 100 mg  100 mg Oral Daily    atorvastatin (LIPITOR) tablet 40 mg  40 mg Oral Daily With Dinner    [START ON 8/28/2020] ceFAZolin (ANCEF) IVPB (premix) 2,000 mg 50 mL  2,000 mg Intravenous On Call To OR    [START ON 8/28/2020] dextrose 5 % and sodium chloride 0 45 % with KCl 20 mEq/L infusion  100 mL/hr Intravenous Continuous    digoxin (LANOXIN) tablet 125 mcg  125 mcg Oral Daily    diltiazem (CARDIZEM SR) 12 hr capsule 60 mg  60 mg Oral Q12H DARREN    diltiazem (CARDIZEM) injection 10 mg  10 mg Intravenous Q8H PRN    heparin (porcine) 25,000 units in 250 mL infusion (premix)  3-20 Units/kg/hr (Order-Specific) Intravenous Titrated    heparin (porcine) injection 2,000 Units  2,000 Units Intravenous Q1H PRN    heparin (porcine) injection 4,000 Units  4,000 Units Intravenous Q1H PRN    iohexol (OMNIPAQUE) 240 MG/ML solution 50 mL  50 mL Oral 90 min pre-procedure    iron sucrose (VENOFER) 200 mg in sodium chloride 0 9 % 100 mL IVPB  200 mg Intravenous Daily    melatonin tablet 3 mg  3 mg Oral HS PRN    metoprolol succinate (TOPROL-XL) 24 hr tablet 100 mg  100 mg Oral BID    [START ON 8/28/2020] metroNIDAZOLE (FLAGYL) IVPB (premix) 500 mg 100 mL  500 mg Intravenous On Call To OR    [START ON 8/27/2020] metroNIDAZOLE (FLAGYL) tablet 500 mg  500 mg Oral Q12H [facility]    [START ON 8/28/2020] metroNIDAZOLE (FLAGYL) tablet 500 mg  500 mg Oral Q12H [facility]    [START ON 8/27/2020] neomycin (MYCIFRADIN) tablet 1,000 mg  1,000 mg Oral BID    [START ON 8/28/2020] neomycin (MYCIFRADIN) tablet 1,000 mg  1,000 mg Oral Once    spironolactone (ALDACTONE) tablet 12 5 mg  12 5 mg Oral Daily       VTE Pharmacologic Prophylaxis: Heparin  VTE Mechanical Prophylaxis: sequential compression device    Portions of the record may have been created with voice recognition software  Occasional wrong word or "sound a like" substitutions may have occurred due to the inherent limitations of voice recognition software    Read the chart carefully and recognize, using context, where substitutions have occurred   ==  Lai Daley, [address] [facility]  Internal Medicine Residency PGY-1

## 2020-08-25 NOTE — PLAN OF CARE
Problem: Potential for Falls  Goal: Patient will remain free of falls  Description: INTERVENTIONS:  - Assess patient frequently for physical needs  -  Identify cognitive and physical deficits and behaviors that affect risk of falls    -  [place] fall precautions as indicated by assessment   - Educate patient/family on patient safety including physical limitations  - Instruct patient to call for assistance with activity based on assessment  - Modify environment to reduce risk of injury  - Consider OT/PT consult to assist with strengthening/mobility  Outcome: Progressing     Problem: CARDIOVASCULAR - ADULT  Goal: Maintains optimal cardiac output and hemodynamic stability  Description: INTERVENTIONS:  - Monitor I/O, vital signs and rhythm  - Monitor for S/S and trends of decreased cardiac output  - Administer and titrate ordered vasoactive medications to optimize hemodynamic stability  - Assess quality of pulses, skin color and temperature  - Assess for signs of decreased coronary artery perfusion  - Instruct patient to report change in severity of symptoms  Outcome: Progressing  Goal: Absence of cardiac dysrhythmias or at baseline rhythm  Description: INTERVENTIONS:  - Continuous cardiac monitoring, vital signs, obtain 12 lead EKG if ordered  - Administer antiarrhythmic and heart rate control medications as ordered  - Monitor electrolytes and administer replacement therapy as ordered  Outcome: Progressing     Problem: PAIN - ADULT  Goal: Verbalizes/displays adequate comfort level or baseline comfort level  Description: Interventions:  - Encourage patient to monitor pain and request assistance  - Assess pain using appropriate pain scale  - Administer analgesics based on type and severity of pain and evaluate response  - Implement non-pharmacological measures as appropriate and evaluate response  - Consider cultural and social influences on pain and pain management  - Notify physician/advanced practitioner if interventions unsuccessful or patient reports new pain  Outcome: Progressing     Problem: INFECTION - ADULT  Goal: Absence or prevention of progression during hospitalization  Description: INTERVENTIONS:  - Assess and monitor for signs and symptoms of infection  - Monitor lab/diagnostic results  - Monitor all insertion sites, i e  indwelling lines, tubes, and drains  - Monitor endotracheal if appropriate and nasal secretions for changes in amount and color  - [place] appropriate cooling/warming therapies per order  - Administer medications as ordered  - Instruct and encourage patient and family to use good hand hygiene technique  - Identify and instruct in appropriate isolation precautions for identified infection/condition  Outcome: Progressing  Goal: Absence of fever/infection during neutropenic period  Description: INTERVENTIONS:  - Monitor WBC    Outcome: Progressing     Problem: SAFETY ADULT  Goal: Patient will remain free of falls  Description: INTERVENTIONS:  - Assess patient frequently for physical needs  -  Identify cognitive and physical deficits and behaviors that affect risk of falls    -  [place] fall precautions as indicated by assessment   - Educate patient/family on patient safety including physical limitations  - Instruct patient to call for assistance with activity based on assessment  - Modify environment to reduce risk of injury  - Consider OT/PT consult to assist with strengthening/mobility  Outcome: Progressing  Goal: Maintain or return to baseline ADL function  Description: INTERVENTIONS:  -  Assess patient's ability to carry out ADLs; assess patient's baseline for ADL function and identify physical deficits which impact ability to perform ADLs (bathing, care of mouth/teeth, toileting, grooming, dressing, etc )  - Assess/evaluate cause of self-care deficits   - Assess range of motion  - Assess patient's mobility; develop plan if impaired  - Assess patient's need for assistive devices and provide as appropriate  - Encourage maximum independence but intervene and supervise when necessary  - Involve family in performance of ADLs  - Assess for home care needs following discharge   - Consider OT consult to assist with ADL evaluation and planning for discharge  - Provide patient education as appropriate  Outcome: Progressing  Goal: Maintain or return mobility status to optimal level  Description: INTERVENTIONS:  - Assess patient's baseline mobility status (ambulation, transfers, stairs, etc )    - Identify cognitive and physical deficits and behaviors that affect mobility  - Identify mobility aids required to assist with transfers and/or ambulation (gait belt, sit-to-stand, lift, walker, cane, etc )  - [place] fall precautions as indicated by assessment  - Record patient progress and toleration of activity level on Mobility SBAR; progress patient to next Phase/Stage  - Instruct patient to call for assistance with activity based on assessment  - Consider rehabilitation consult to assist with strengthening/weightbearing, etc   Outcome: Progressing     Problem: DISCHARGE PLANNING  Goal: Discharge to home or other facility with appropriate resources  Description: INTERVENTIONS:  - Identify barriers to discharge w/patient and caregiver  - Arrange for needed discharge resources and transportation as appropriate  - Identify discharge learning needs (meds, wound care, etc )  - Arrange for interpretive services to assist at discharge as needed  - Refer to Case Management Department for coordinating discharge planning if the patient needs post-hospital services based on physician/advanced practitioner order or complex needs related to functional status, cognitive ability, or social support system  Outcome: Progressing     Problem: Knowledge Deficit  Goal: Patient/family/caregiver demonstrates understanding of disease process, treatment plan, medications, and discharge instructions  Description: Complete learning assessment and assess knowledge base    Interventions:  - Provide teaching at level of understanding  - Provide teaching via preferred learning methods  Outcome: Progressing

## 2020-08-25 NOTE — ASSESSMENT & PLAN NOTE
Patient with no history of colonoscopy  Colonoscopy performed yesterday for investigation of anemia  Found to have a malignant-appearing, multilobular and ulcerated mass in the proximal ascending colon, covering one third of the circumference  Biopsy was taken  CTAP showed no evidence of metastatic disease      Plan:  -Colorectal surgery following  -Plan for OR today  -Continue heparin SQ until 12 AM Friday (8/28/20)  -Now NPO for surgery

## 2020-08-26 LAB
ANION GAP SERPL CALCULATED.3IONS-SCNC: 9 MMOL/L (ref 4–13)
APTT PPP: 71 SECONDS (ref 23–37)
BASOPHILS # BLD AUTO: 0.1 THOUSANDS/ΜL (ref 0–0.1)
BASOPHILS NFR BLD AUTO: 1 % (ref 0–1)
BUN SERPL-MCNC: 15 MG/DL (ref 5–25)
CALCIUM SERPL-MCNC: 8.2 MG/DL (ref 8.3–10.1)
CHLORIDE SERPL-SCNC: 109 MMOL/L (ref 100–108)
CO2 SERPL-SCNC: 22 MMOL/L (ref 21–32)
CREAT SERPL-MCNC: 1.14 MG/DL (ref 0.6–1.3)
EOSINOPHIL # BLD AUTO: 0.08 THOUSAND/ΜL (ref 0–0.61)
EOSINOPHIL NFR BLD AUTO: 1 % (ref 0–6)
ERYTHROCYTE [DISTWIDTH] IN BLOOD BY AUTOMATED COUNT: 17.5 % (ref 11.6–15.1)
GFR SERPL CREATININE-BSD FRML MDRD: 70 ML/MIN/1.73SQ M
GLUCOSE SERPL-MCNC: 93 MG/DL (ref 65–140)
HCT VFR BLD AUTO: 31.6 % (ref 36.5–49.3)
HGB BLD-MCNC: 9.3 G/DL (ref 12–17)
IMM GRANULOCYTES # BLD AUTO: 0.02 THOUSAND/UL (ref 0–0.2)
IMM GRANULOCYTES NFR BLD AUTO: 0 % (ref 0–2)
LYMPHOCYTES # BLD AUTO: 1.68 THOUSANDS/ΜL (ref 0.6–4.47)
LYMPHOCYTES NFR BLD AUTO: 22 % (ref 14–44)
MAGNESIUM SERPL-MCNC: 2.1 MG/DL (ref 1.6–2.6)
MCH RBC QN AUTO: 23.6 PG (ref 26.8–34.3)
MCHC RBC AUTO-ENTMCNC: 29.4 G/DL (ref 31.4–37.4)
MCV RBC AUTO: 80 FL (ref 82–98)
MONOCYTES # BLD AUTO: 0.99 THOUSAND/ΜL (ref 0.17–1.22)
MONOCYTES NFR BLD AUTO: 13 % (ref 4–12)
NEUTROPHILS # BLD AUTO: 4.88 THOUSANDS/ΜL (ref 1.85–7.62)
NEUTS SEG NFR BLD AUTO: 63 % (ref 43–75)
NRBC BLD AUTO-RTO: 0 /100 WBCS
PHOSPHATE SERPL-MCNC: 4.7 MG/DL (ref 2.3–4.1)
PLATELET # BLD AUTO: 325 THOUSANDS/UL (ref 149–390)
PMV BLD AUTO: 11.2 FL (ref 8.9–12.7)
POTASSIUM SERPL-SCNC: 3.7 MMOL/L (ref 3.5–5.3)
RBC # BLD AUTO: 3.94 MILLION/UL (ref 3.88–5.62)
SODIUM SERPL-SCNC: 140 MMOL/L (ref 136–145)
WBC # BLD AUTO: 7.75 THOUSAND/UL (ref 4.31–10.16)

## 2020-08-26 PROCEDURE — 99233 SBSQ HOSP IP/OBS HIGH 50: CPT | Performed by: COLON & RECTAL SURGERY

## 2020-08-26 PROCEDURE — 99232 SBSQ HOSP IP/OBS MODERATE 35: CPT | Performed by: INTERNAL MEDICINE

## 2020-08-26 PROCEDURE — 85025 COMPLETE CBC W/AUTO DIFF WBC: CPT | Performed by: STUDENT IN AN ORGANIZED HEALTH CARE EDUCATION/TRAINING PROGRAM

## 2020-08-26 PROCEDURE — 83735 ASSAY OF MAGNESIUM: CPT | Performed by: STUDENT IN AN ORGANIZED HEALTH CARE EDUCATION/TRAINING PROGRAM

## 2020-08-26 PROCEDURE — 84100 ASSAY OF PHOSPHORUS: CPT | Performed by: STUDENT IN AN ORGANIZED HEALTH CARE EDUCATION/TRAINING PROGRAM

## 2020-08-26 PROCEDURE — 80048 BASIC METABOLIC PNL TOTAL CA: CPT | Performed by: STUDENT IN AN ORGANIZED HEALTH CARE EDUCATION/TRAINING PROGRAM

## 2020-08-26 PROCEDURE — 85730 THROMBOPLASTIN TIME PARTIAL: CPT | Performed by: HOSPITALIST

## 2020-08-26 RX ORDER — POTASSIUM CHLORIDE 20 MEQ/1
40 TABLET, EXTENDED RELEASE ORAL ONCE
Status: COMPLETED | OUTPATIENT
Start: 2020-08-26 | End: 2020-08-26

## 2020-08-26 RX ORDER — HEPARIN SODIUM 5000 [USP'U]/ML
5000 INJECTION, SOLUTION INTRAVENOUS; SUBCUTANEOUS EVERY 8 HOURS SCHEDULED
Status: DISPENSED | OUTPATIENT
Start: 2020-08-26 | End: 2020-08-28

## 2020-08-26 RX ORDER — ATORVASTATIN CALCIUM 40 MG/1
40 TABLET, FILM COATED ORAL
Status: DISCONTINUED | OUTPATIENT
Start: 2020-08-31 | End: 2020-09-01 | Stop reason: HOSPADM

## 2020-08-26 RX ORDER — MAGNESIUM SULFATE 1 G/100ML
1 INJECTION INTRAVENOUS ONCE
Status: COMPLETED | OUTPATIENT
Start: 2020-08-26 | End: 2020-08-27

## 2020-08-26 RX ADMIN — METOPROLOL SUCCINATE 100 MG: 100 TABLET, EXTENDED RELEASE ORAL at 08:44

## 2020-08-26 RX ADMIN — METOPROLOL SUCCINATE 100 MG: 100 TABLET, EXTENDED RELEASE ORAL at 21:31

## 2020-08-26 RX ADMIN — HEPARIN SODIUM 5000 UNITS: 5000 INJECTION INTRAVENOUS; SUBCUTANEOUS at 09:53

## 2020-08-26 RX ADMIN — DILTIAZEM HYDROCHLORIDE 60 MG: 60 CAPSULE, EXTENDED RELEASE ORAL at 08:44

## 2020-08-26 RX ADMIN — HEPARIN SODIUM 5000 UNITS: 5000 INJECTION INTRAVENOUS; SUBCUTANEOUS at 21:30

## 2020-08-26 RX ADMIN — SPIRONOLACTONE 12.5 MG: 25 TABLET ORAL at 08:44

## 2020-08-26 RX ADMIN — DILTIAZEM HYDROCHLORIDE 60 MG: 60 CAPSULE, EXTENDED RELEASE ORAL at 21:31

## 2020-08-26 RX ADMIN — DIGOXIN 125 MCG: 125 TABLET ORAL at 17:28

## 2020-08-26 RX ADMIN — MAGNESIUM SULFATE HEPTAHYDRATE 1 G: 1 INJECTION, SOLUTION INTRAVENOUS at 09:56

## 2020-08-26 RX ADMIN — IRON SUCROSE 200 MG: 20 INJECTION, SOLUTION INTRAVENOUS at 08:52

## 2020-08-26 RX ADMIN — POTASSIUM CHLORIDE 40 MEQ: 1500 TABLET, EXTENDED RELEASE ORAL at 08:44

## 2020-08-26 RX ADMIN — HEPARIN SODIUM AND DEXTROSE 17.1 UNITS/KG/HR: 10000; 5 INJECTION INTRAVENOUS at 06:46

## 2020-08-26 RX ADMIN — FUROSEMIDE 40 MG: 40 TABLET ORAL at 08:44

## 2020-08-26 RX ADMIN — ALLOPURINOL 100 MG: 100 TABLET ORAL at 08:44

## 2020-08-26 NOTE — PROGRESS NOTES
INTERNAL MEDICINE RESIDENCY PROGRESS NOTE     Name: Gerard Lr   Age & Sex: 61 y o  male   MRN: [id number]  Unit/Bed#: -01   Encounter: [id number]  Team: SOD Team A    PATIENT INFORMATION     Name: Gerard Lr   Age & Sex: 61 y o  male   MRN: [id number]  Hospital Stay Days: 5    ASSESSMENT/PLAN     Principal Problem:    Atrial fibrillation with rapid ventricular response ([address] )  Active Problems:    Systolic CHF ([address] )    Mass of cecum    Gastrointestinal hemorrhage with melena    Microcytic anemia    SCARLETT (acute kidney injury) ([facility] [address] )    History of gout    Coronary artery disease    GI bleeding      * Atrial fibrillation with rapid ventricular response ([facility] [address] )  Assessment & Plan  Known history of AFib, being followed by cardiology outpatient  Patient presented to ED on 8/22 in AFib with RVR (rate 122), despite medication compliance  Patient also found to be hypotensive  Home medications for rate control include digoxin 0 125 mg and metoprolol succinate 100 mg b i d  Follows with Dr Nicholas Bajwa out patient  Had been scheduled for a stress test today outpatient, but came to the ED instead due to his hypotension (90/60s)  No valvular disease per past cardiology work-up  Plan:  -Increased rate likely exacerbated by current blood-loss anemia 2/2 GI bleed (suspect GI malignancy as source)  -Cardizem started in ED to control rate, but then d/c prior to admission because of decreasing BP again  -Cardiology following, appreciate their recommendations  -QTc of 459    -CHADS-VASC score 2, HAS BLED 1  -Telemetry discontinued  -Eliquis held, heparin discontinued, heparin SQ started 8/26  -Per cardiology, continue Toprol  mg BID and digoxin 125 mcg daily  -Cardizem 60 mg Q12H started 8/25  -Consider checking digoxin level         Systolic CHF (HCC)  Assessment & Plan  Pt with hx of systolic CHF with EF 42% (per Echo 1 year ago)  Compliant with home medication lasix 60 mg, aldoctone 25 mg, entresto 24-26 mg  States he has lost 8 lbs recently, based off of his home scale and the ED scale  However the differences in two difference scale measurements is not necessarily reliable  Patient admits to one month worsening SOB, now with dyspnea with ADL  Baseline sleeps with 3 pillows under his head, and this is unchanged in the last month  Patient denies episodes of lower extremity edema     -2018 stress test showed partially reversible myocardial perfusion defect of basal to mid inferolateral wall   -2018 cardiac cath (s/p NSTEMI type II) showed single vessel CAD with 80% stenosis of the proximal ramus artery  No stent was placed    -2018: Small persistent pericardial effusion present  -2018 TTE: LVEF 50%    Wt Readings from Last 3 Encounters:   08/21/20 99 8 kg (220 lb)   07/27/20 101 kg (222 lb)   03/19/19 102 kg (224 lb)     Plan:  -CXR negative for acute cardiopulmonary disease  -Aldactone 12 5 mg  -Lasix 40 mg restarted 8/25  -Cardiology following  Their recommendations appreciated  Continuing with a fib rate control with digoxin and metoprolol (restarted 8/22), cardizem (restarted 8/25)  -JOSE LUIS planned while in OR for cecal mass on Friday  -Patient appears euvolemic  -Hypotension likely 2/2 blood loss anemia- resolved, /39  -SCARLETT- improving Cr 1 14  -Daily standing weights  -Cardiac diet: <2L fluids, <2g Na+, alcohol cessation    Mass of cecum  Assessment & Plan  Patient with no history of colonoscopy  Colonoscopy performed yesterday for investigation of anemia  Found to have a malignant-appearing, multilobular and ulcerated mass in the proximal ascending colon, covering one third of the circumference  Biopsy was taken  CTAP showed no evidence of metastatic disease      Plan:  -Colorectal surgery following  -Plan for OR on Friday  -Continue heparin drip until 6 AM Friday (8/28/20)  -Diet changed to low residue with cardiac restrictions  -Will transition to clears on Thursday with bowel prep and abx ppx    Gastrointestinal hemorrhage with melena  Assessment & Plan  Patient endorses 1 month history of melena, which is new  He has never had a colonscopy  BP on admission 90s/60s  Hb on admission 9 3, patient symptomatic with light-headedness, SOB and hypotension    Plan:  -Transfused 1 unit pRBC  -BUN down to 14  -CBC in am  -Hold Eliquis  -EGD: mild erythematous mucosa with erosion in the GE junction (43 cm from the incisors)  LA grade A  Mild, localized erosion in the stomach; performed cold biopsy  R/o H pylori  Mild, localized erythematous mucosa in the duodenal bulb; performed cold biopsy  -Colonoscopy: Malignant-appearing, multilobular and ulcerated mass in the proximal ascending colon, covering one third of the circumference; performed cold biopsy  One adenomatous-appearing polyp measuring smaller than 5 mm in the cecum; removed en bloc by cold snare and retrieved specimen 15 mm sessile, adenomatous-appearing polyp in the transverse colon; removed en bloc by hot snare and retrieved specimen; placed 2 clips successfully (clips were MRI compatible)  The examination was otherwise normal on direct and retroflexion views  Microcytic anemia  Assessment & Plan  Found to have Hb of 9 3 on admission  Last known hemoglobin from two years ago was 13  Patient endorses a one month history of consistently black, tarry stools, which is new for him  Heme-occult in ED was positive  Patient has never had a colonoscopy  Patient also hypotensive on arrival (90/60s)  Plan:  -Suspected anemia as cause of SCARLETT and contributing to uncontrolled a fib RVR, Cr today 1 14  -Received 1 unit of PRBC this admission  -Hgb today is 9 3, down from 10 3  -continue to monitor BP and Hb  -Heparin drip discontinued, SQ started on 8/26      SCARLETT (acute kidney injury) [facility])  Assessment & Plan  Presented with creatinine of 2 31  Last creatinine 0 92-1      Plan:  -Likely pre-renal 2/2 blood loss (volume depletion) and hypotension   -Transfused 1 unit pRBC  -Trend Cr, today 1 14  -Still elevated above baseline  -Hold nephrotoxic agents    History of gout  Assessment & Plan  Controlled with allopurinol 100 mg QD    Hypotensionresolved as of 2020  Assessment & Plan  Patient presented from outpatient clinic with hypotension (90s/60s)  Found to have anemia (Hb 9 3) in setting of melena and heme-occult positive test in ED  Last known Hb from two years ago was 13  Plan:  -hypotension 2/2 blood loss from GI bleed (likely GI malignancy)  -1 unit pRBC transfused  -repeat am CBC  -Blood pressure stable (systolics to 434); on rate control Tikosyn  -Holding: lasix, aldactone and entresto given current hypotension and likely pre-renal SCARLETT 2/2 hypotension/volume depletion  Restart when BP and creatinine stable   -holding Eliquis      Disposition: M/S     SUBJECTIVE     Patient seen and examined  No acute events overnight  Patient was resting comfortably in bed  Denies hearing/vision changes, fevers, chills, night sweats, dysphagia, sore throat, chest pain, shortness of breath, abdominal pain, nausea, vomiting, constipation, diarrhea, dysuria, peripheral edema  The patient reported some possible hematochezia; however, the patient did have possible/90  The patient's hemoglobin dropped 1 point to 9 3 this morning  As result, we transitioned him from a heparin drip to subcutaneous heparin  Tomorrow, the patient will go to a bowel prep and antibiotic prophylaxis for surgery on Friday for his cecal mass      OBJECTIVE     Vitals:    20 2347 20 0439 20 0710 20 1020   BP: 122/80  120/84 136/98   BP Location:       Pulse: 64  69    Resp: 17  18 20   Temp: 98 °F (36 7 °C)  97 6 °F (36 4 °C) 97 8 °F (36 6 °C)   TempSrc:   Oral Oral   SpO2:   99%    Weight:  97 3 kg (214 lb 8 1 oz)     Height:          Temperature:   Temp (24hrs), Av °F (36 7 °C), Min:97 6 °F (36 4 °C), Max:98 5 °F (36 9 °C)    Temperature: 97 8 °F (36 6 °C)  Intake & Output:  I/O       08/24 0701 - 08/25 0700 08/25 0701 - 08/26 0700 08/26 0701 - 08/27 0700    P  O  480 360     I V  (mL/kg) 622 2 (6 4)      Total Intake(mL/kg) 1102 2 (11 4) 360 (3 7)     Urine (mL/kg/hr) 1450 (0 6) 250 (0 1)     Total Output 1450 250     Net -347 9 +110                Weights:   IBW: 70 7 kg    Body mass index is 31 68 kg/m²  Weight (last 2 days)     Date/Time   Weight    08/26/20 0439   97 3 (214 51)    08/25/20 0524   97 (213 85)    08/24/20 0230   97 2 (214 29)            Physical Exam  Vitals signs and nursing note reviewed  Constitutional:       General: He is not in acute distress  Appearance: Normal appearance  He is obese  HENT:      Head: Normocephalic and atraumatic  Right Ear: External ear normal       Left Ear: External ear normal       Nose: Nose normal       Mouth/Throat:      Mouth: Mucous membranes are moist       Pharynx: No oropharyngeal exudate  Eyes:      Extraocular Movements: Extraocular movements intact  Pupils: Pupils are equal, round, and reactive to light  Neck:      Musculoskeletal: Normal range of motion  Cardiovascular:      Rate and Rhythm: Normal rate  Rhythm irregular  Pulmonary:      Effort: Pulmonary effort is normal       Comments: Decreased breath sounds in bases bilaterally  Abdominal:      General: Bowel sounds are normal  There is no distension  Palpations: Abdomen is soft  Tenderness: There is no abdominal tenderness  Musculoskeletal: Normal range of motion  Skin:     General: Skin is warm and dry  Capillary Refill: Capillary refill takes less than 2 seconds  Neurological:      General: No focal deficit present  Mental Status: He is alert and oriented to person, place, and time  Psychiatric:         Mood and Affect: Mood normal          Behavior: Behavior normal        LABORATORY DATA     Labs: I have personally reviewed pertinent reports    Results from last 7 days   Lab Units 08/26/20  0082 08/25/20  0757 08/24/20  1844  08/23/20  0510   WBC Thousand/uL 7 75 7 89 8 10   < > 7 26   HEMOGLOBIN g/dL 9 3* 10 3* 10 5*   < > 9 8*   HEMATOCRIT % 31 6* 35 3* 36 6   < > 33 8*   PLATELETS Thousands/uL 325 357 385   < > 374   NEUTROS PCT % 63 70  --   --  65   MONOS PCT % 13* 11  --   --  12    < > = values in this interval not displayed  Results from last 7 days   Lab Units 08/26/20  0435 08/25/20  0756 08/24/20  0510 08/23/20  0510 08/22/20  0444   POTASSIUM mmol/L 3 7 3 9 4 1 4 3 4 0   CHLORIDE mmol/L 109* 109* 107 112* 109*   CO2 mmol/L 22 23 24 24 25   BUN mg/dL 15 14 18 29* 42*   CREATININE mg/dL 1 14 1 16 1 31* 1 30 1 96*   CALCIUM mg/dL 8 2* 8 9 8 6 8 7 8 2*   ALK PHOS U/L  --   --   --  51 53   ALT U/L  --   --   --  22 23   AST U/L  --   --   --  8 8     Results from last 7 days   Lab Units 08/26/20  0435 08/22/20  0444 08/21/20  1647   MAGNESIUM mg/dL 2 1 2 4 2 4     Results from last 7 days   Lab Units 08/26/20  0435 08/21/20  1647   PHOSPHORUS mg/dL 4 7* 4 1      Results from last 7 days   Lab Units 08/26/20  0435 08/25/20  2305 08/25/20  1444  08/24/20  1844 08/22/20  0815   INR   --   --   --   --  1 17 1 11   PTT seconds 71* 92* 58*   < > 102*  --     < > = values in this interval not displayed  Results from last 7 days   Lab Units 08/21/20  1647   TROPONIN I ng/mL 0 10*       IMAGING & DIAGNOSTIC TESTING     Radiology Results: I have personally reviewed pertinent reports  Xr Chest 1 View Portable    Result Date: 8/21/2020  Impression: No acute cardiopulmonary disease  Workstation performed: [id number]     Ct Chest Abdomen Pelvis W Contrast    Result Date: 8/25/2020  Impression: 1  Cecal mass likely representing primary neoplasm  2   No definite evidence of metastatic disease in the chest, abdomen, or pelvis  3   Tiny left lower lobe lung nodule is nonspecific and doubtful metastatic disease though a three-month follow-up chest CT is recommended to confirm stability   4  Cholelithiasis with mild gallbladder wall thickening though no pericholecystic inflammatory change  This likely represents adenomyosis as described on the prior ultrasound from 2017  5   Small pericardial effusion  The study was marked in EPIC for significant notification  Workstation performed: [id number]     Other Diagnostic Testing: I have personally reviewed pertinent reports      ACTIVE MEDICATIONS     Current Facility-Administered Medications   Medication Dose Route Frequency    allopurinol (ZYLOPRIM) tablet 100 mg  100 mg Oral Daily    [START ON 8/31/2020] atorvastatin (LIPITOR) tablet 40 mg  40 mg Oral Daily With Dinner    [START ON 8/28/2020] ceFAZolin (ANCEF) IVPB (premix) 2,000 mg 50 mL  2,000 mg Intravenous On Call To OR    [START ON 8/28/2020] dextrose 5 % and sodium chloride 0 45 % with KCl 20 mEq/L infusion  100 mL/hr Intravenous Continuous    digoxin (LANOXIN) tablet 125 mcg  125 mcg Oral Daily    diltiazem (CARDIZEM SR) 12 hr capsule 60 mg  60 mg Oral Q12H DARREN    diltiazem (CARDIZEM) injection 10 mg  10 mg Intravenous Q8H PRN    furosemide (LASIX) tablet 40 mg  40 mg Oral Daily    heparin (porcine) subcutaneous injection 5,000 Units  5,000 Units Subcutaneous Q8H [address]    iohexol (OMNIPAQUE) 240 MG/ML solution 50 mL  50 mL Oral 90 min pre-procedure    iron sucrose (VENOFER) 200 mg in sodium chloride 0 9 % 100 mL IVPB  200 mg Intravenous Daily    melatonin tablet 3 mg  3 mg Oral HS PRN    metoprolol succinate (TOPROL-XL) 24 hr tablet 100 mg  100 mg Oral BID    [START ON 8/28/2020] metroNIDAZOLE (FLAGYL) IVPB (premix) 500 mg 100 mL  500 mg Intravenous On Call To OR    [START ON 8/27/2020] metroNIDAZOLE (FLAGYL) tablet 500 mg  500 mg Oral Q12H [address]    [START ON 8/28/2020] metroNIDAZOLE (FLAGYL) tablet 500 mg  500 mg Oral Q12H [address]    [START ON 8/27/2020] neomycin (MYCIFRADIN) tablet 1,000 mg  1,000 mg Oral BID    [START ON 8/28/2020] neomycin (MYCIFRADIN) tablet 1,000 mg  1,000 mg Oral Once  spironolactone (ALDACTONE) tablet 12 5 mg  12 5 mg Oral Daily       VTE Pharmacologic Prophylaxis: Heparin  VTE Mechanical Prophylaxis: sequential compression device    Portions of the record may have been created with voice recognition software  Occasional wrong word or "sound a like" substitutions may have occurred due to the inherent limitations of voice recognition software    Read the chart carefully and recognize, using context, where substitutions have occurred   ==  Ruth Sheppard, [facility]  Internal Medicine Residency PGY-1

## 2020-08-26 NOTE — PROGRESS NOTES
Progress Note - General Surgery   John Walden 61 y o  male MRN: [id number]  Unit/Bed#: -01 Encounter: [id number]    Assessment:  25-year-old male with ascending colonic mass found on colonoscopy this admission, no evidence of metastatic disease in chest abdomen or pelvis  CEA 1 7 this admission  Currently awaiting operation on 08/28  Plan:  -will proceed with surgical intervention on Friday 8/28  -patient currently on heparin drip  -bowel prep 8/27  -additional care as per primary team    Subjective/Objective   Chief Complaint:  Note complaints this time    Subjective:  Patient rested comfortably overnight, no acute events transpired  No additional complaints this morning    Objective:     Blood pressure 122/80, pulse 64, temperature 98 °F (36 7 °C), resp  rate 17, height 5' 9" (1 753 m), weight 97 3 kg (214 lb 8 1 oz), SpO2 98 %  ,Body mass index is 31 68 kg/m²        Intake/Output Summary (Last 24 hours) at 8/26/2020 0617  Last data filed at 8/25/2020 1200  Gross per 24 hour   Intake 360 ml   Output 250 ml   Net 110 ml       Invasive Devices     Peripheral Intravenous Line            Peripheral IV 08/24/20 Left Antecubital 1 day    Peripheral IV 08/25/20 Left Hand less than 1 day                Physical Exam:   General:  Adult male, no acute distress  HEENT:  Normocephalic, atraumatic  Cardiovascular, regular rate, normal radial pulses  Respiratory:  Normal effort, lungs clear  Abdomen:  Soft, nontender, nondistended  Extremities:  Symmetric atraumatic  Neuro:  AAO x3, no focal deficits  Skin:  Warm, dry, intact    Lab, Imaging and other studies:  CBC:   Lab Results   Component Value Date    WBC 7 75 08/26/2020    HGB 9 3 (L) 08/26/2020    HCT 31 6 (L) 08/26/2020    MCV 80 (L) 08/26/2020     08/26/2020    MCH 23 6 (L) 08/26/2020    MCHC 29 4 (L) 08/26/2020    RDW 17 5 (H) 08/26/2020    MPV 11 2 08/26/2020    NRBC 0 08/26/2020   , CMP:   Lab Results   Component Value Date    SODIUM 140 08/26/2020    K 3 7 08/26/2020     (H) 08/26/2020    CO2 22 08/26/2020    BUN 15 08/26/2020    CREATININE 1 14 08/26/2020    CALCIUM 8 2 (L) 08/26/2020    EGFR 70 08/26/2020   , Coagulation: No results found for: PT, INR, APTT  VTE Pharmacologic Prophylaxis: Heparin  VTE Mechanical Prophylaxis: sequential compression device

## 2020-08-26 NOTE — PLAN OF CARE
Problem: Potential for Falls  Goal: Patient will remain free of falls  Description: INTERVENTIONS:  - Assess patient frequently for physical needs  -  Identify cognitive and physical deficits and behaviors that affect risk of falls    -  [place] fall precautions as indicated by assessment   - Educate patient/family on patient safety including physical limitations  - Instruct patient to call for assistance with activity based on assessment  - Modify environment to reduce risk of injury  - Consider OT/PT consult to assist with strengthening/mobility  Outcome: Progressing     Problem: CARDIOVASCULAR - ADULT  Goal: Maintains optimal cardiac output and hemodynamic stability  Description: INTERVENTIONS:  - Monitor I/O, vital signs and rhythm  - Monitor for S/S and trends of decreased cardiac output  - Administer and titrate ordered vasoactive medications to optimize hemodynamic stability  - Assess quality of pulses, skin color and temperature  - Assess for signs of decreased coronary artery perfusion  - Instruct patient to report change in severity of symptoms  Outcome: Progressing  Goal: Absence of cardiac dysrhythmias or at baseline rhythm  Description: INTERVENTIONS:  - Continuous cardiac monitoring, vital signs, obtain 12 lead EKG if ordered  - Administer antiarrhythmic and heart rate control medications as ordered  - Monitor electrolytes and administer replacement therapy as ordered  Outcome: Progressing     Problem: PAIN - ADULT  Goal: Verbalizes/displays adequate comfort level or baseline comfort level  Description: Interventions:  - Encourage patient to monitor pain and request assistance  - Assess pain using appropriate pain scale  - Administer analgesics based on type and severity of pain and evaluate response  - Implement non-pharmacological measures as appropriate and evaluate response  - Consider cultural and social influences on pain and pain management  - Notify physician/advanced practitioner if interventions unsuccessful or patient reports new pain  Outcome: Progressing     Problem: INFECTION - ADULT  Goal: Absence or prevention of progression during hospitalization  Description: INTERVENTIONS:  - Assess and monitor for signs and symptoms of infection  - Monitor lab/diagnostic results  - Monitor all insertion sites, i e  indwelling lines, tubes, and drains  - Monitor endotracheal if appropriate and nasal secretions for changes in amount and color  - [place] appropriate cooling/warming therapies per order  - Administer medications as ordered  - Instruct and encourage patient and family to use good hand hygiene technique  - Identify and instruct in appropriate isolation precautions for identified infection/condition  Outcome: Progressing  Goal: Absence of fever/infection during neutropenic period  Description: INTERVENTIONS:  - Monitor WBC    Outcome: Progressing     Problem: SAFETY ADULT  Goal: Patient will remain free of falls  Description: INTERVENTIONS:  - Assess patient frequently for physical needs  -  Identify cognitive and physical deficits and behaviors that affect risk of falls    -  [place] fall precautions as indicated by assessment   - Educate patient/family on patient safety including physical limitations  - Instruct patient to call for assistance with activity based on assessment  - Modify environment to reduce risk of injury  - Consider OT/PT consult to assist with strengthening/mobility  Outcome: Progressing  Goal: Maintain or return to baseline ADL function  Description: INTERVENTIONS:  -  Assess patient's ability to carry out ADLs; assess patient's baseline for ADL function and identify physical deficits which impact ability to perform ADLs (bathing, care of mouth/teeth, toileting, grooming, dressing, etc )  - Assess/evaluate cause of self-care deficits   - Assess range of motion  - Assess patient's mobility; develop plan if impaired  - Assess patient's need for assistive devices and provide as appropriate  - Encourage maximum independence but intervene and supervise when necessary  - Involve family in performance of ADLs  - Assess for home care needs following discharge   - Consider OT consult to assist with ADL evaluation and planning for discharge  - Provide patient education as appropriate  Outcome: Progressing  Goal: Maintain or return mobility status to optimal level  Description: INTERVENTIONS:  - Assess patient's baseline mobility status (ambulation, transfers, stairs, etc )    - Identify cognitive and physical deficits and behaviors that affect mobility  - Identify mobility aids required to assist with transfers and/or ambulation (gait belt, sit-to-stand, lift, walker, cane, etc )  - [place] fall precautions as indicated by assessment  - Record patient progress and toleration of activity level on Mobility SBAR; progress patient to next Phase/Stage  - Instruct patient to call for assistance with activity based on assessment  - Consider rehabilitation consult to assist with strengthening/weightbearing, etc   Outcome: Progressing     Problem: DISCHARGE PLANNING  Goal: Discharge to home or other facility with appropriate resources  Description: INTERVENTIONS:  - Identify barriers to discharge w/patient and caregiver  - Arrange for needed discharge resources and transportation as appropriate  - Identify discharge learning needs (meds, wound care, etc )  - Arrange for interpretive services to assist at discharge as needed  - Refer to Case Management Department for coordinating discharge planning if the patient needs post-hospital services based on physician/advanced practitioner order or complex needs related to functional status, cognitive ability, or social support system  Outcome: Progressing     Problem: Knowledge Deficit  Goal: Patient/family/caregiver demonstrates understanding of disease process, treatment plan, medications, and discharge instructions  Description: Complete learning assessment and assess knowledge base    Interventions:  - Provide teaching at level of understanding  - Provide teaching via preferred learning methods  Outcome: Progressing

## 2020-08-26 NOTE — PROGRESS NOTES
[facility]  Progress Note - Edinson Omalley 61 y o  male MRN: [id number]    Unit/Bed#: -01 Encounter: [id number]    Assessment/Plan:  Edinson Omalley is a 61 y o  male with hypertension, CAD, use of Eliquis who presented with anemia, loose stools, melena now status post endoscopy and colonoscopy which was notable for a ascending colonic mass with biopsies confirming invasive adenocarcinoma  CT chest abdomen and pelvis without evidence of metastatic disease  CEA within normal limits  He is tentatively scheduled for a colonic resection with colorectal surgery this Friday  Available labs and imaging reviewed  The pathology was discussed with the patient and he was informed about the diagnosis of invasive adenocarcinoma  Support was offered to the patient  Appreciate Colorectal surgery consultation with plan for tentative surgery this Friday  Ongoing Eliquis management as per Cardiology, primary team, Colorectal surgery      Subjective:   Patient seen this morning  Overall has no complaints  No abdominal pain  He is eating well  He is eager to undergo surgery for his lesion  Objective:     Vitals: Blood pressure 132/80, pulse 83, temperature 98 5 °F (36 9 °C), resp  rate 18, height 5' 9" (1 753 m), weight 97 kg (213 lb 13 5 oz), SpO2 98 %  ,Body mass index is 31 58 kg/m²  Intake/Output Summary (Last 24 hours) at 8/25/2020 2031  Last data filed at 8/25/2020 1200  Gross per 24 hour   Intake 706 48 ml   Output 1200 ml   Net -493 52 ml       Review of Systems: as per HPI  10 point ROS reviewed and negative, except as above    PHYSICAL EXAMINATION:    General Appearance:   Alert, cooperative, no distress   HEENT:  Normocephalic, atraumatic, anicteric  Neck supple, symmetrical, trachea midline  Lungs:   Equal chest rise and unlabored breathing, normal effort, no coughing  Cardiovascular:   No visualized JVD  Abdomen:   No abdominal distension     Skin:   No jaundice, rashes, or lesions  Musculoskeletal:   Normal range of motion visualized  Psych:  Normal affect and normal insight  Neuro:  Alert and appropriate  Invasive Devices     Peripheral Intravenous Line            Peripheral IV 08/24/20 Left Antecubital 1 day    Peripheral IV 08/25/20 Left Hand less than 1 day                        CBC:   Lab Results   Component Value Date    WBC 7 89 08/25/2020    HGB 10 3 (L) 08/25/2020    HCT 35 3 (L) 08/25/2020    MCV 80 (L) 08/25/2020     08/25/2020    MCH 23 4 (L) 08/25/2020    MCHC 29 2 (L) 08/25/2020    RDW 17 0 (H) 08/25/2020    MPV 10 6 08/25/2020    NRBC 0 08/25/2020   ,   CMP:   Lab Results   Component Value Date    K 3 9 08/25/2020     (H) 08/25/2020    CO2 23 08/25/2020    BUN 14 08/25/2020    CREATININE 1 16 08/25/2020    CALCIUM 8 9 08/25/2020    EGFR 68 08/25/2020   ,   Lipase: No results found for: LIPASE,  PT/INR: No results found for: PT, INR,   Troponin: No results found for: TROPONINI,   Magnesium: No components found for: MAG,   Phosphorous: No results found for: PHOS  Imaging Studies: I have personally reviewed pertinent reports

## 2020-08-26 NOTE — PROGRESS NOTES
Advanced Heart Failure Team Progress Note - Edinson Omalley 61 y o  male MRN: [id number]    Unit/Bed#: -01 Encounter: [id number]          Subjective:   Patient seen and examined  No significant events overnight  Hemoglobin remained stable  Atrial fibrillation is better controlled  Objective:     Vitals: Blood pressure 120/84, pulse 69, temperature 97 6 °F (36 4 °C), temperature source Oral, resp  rate 18, height 5' 9" (1 753 m), weight 97 3 kg (214 lb 8 1 oz), SpO2 99 %  , Body mass index is 31 68 kg/m² ,   Orthostatic Blood Pressures      Most Recent Value   Blood Pressure  120/84 filed at 08/26/2020 0710   Patient Position - Orthostatic VS  Lying filed at 08/25/2020 1446            Intake/Output Summary (Last 24 hours) at 8/26/2020 1006  Last data filed at 8/25/2020 1200  Gross per 24 hour   Intake 180 ml   Output    Net 180 ml         Physical Exam:    GEN: Edinson Omalley appears well, alert and oriented x 3, pleasant and cooperative   HEENT:  Normocephalic, atraumatic, anicteric, moist mucous membranes  NECK: No JVD or carotid bruits   HEART:  Irregular rhythm, normal rate, normal S1 and S2, no murmurs, clicks, gallops or rubs   LUNGS: Clear to auscultation bilaterally; no wheezes, rales, or rhonchi; respiration nonlabored   ABDOMEN:  Normoactive bowel sounds, soft, no tenderness, no distention  EXTREMITIES: peripheral pulses palpable; no edema  NEURO: no gross focal findings; cranial nerves grossly intact   SKIN:  Dry, intact, warm to touch      Current Facility-Administered Medications:     allopurinol (ZYLOPRIM) tablet 100 mg, 100 mg, Oral, Daily, Oniel Silva MD, 100 mg at 08/26/20 0844    atorvastatin (LIPITOR) tablet 40 mg, 40 mg, Oral, Daily With Francesca Uribe MD, 40 mg at 08/25/20 1706    [START ON 8/28/2020] ceFAZolin (ANCEF) IVPB (premix) 2,000 mg 50 mL, 2,000 mg, Intravenous, On Call To OR, Minnie Humphrey MD    [START ON 8/28/2020] dextrose 5 % and sodium chloride 0 45 % with KCl 20 mEq/L infusion, 100 mL/hr, Intravenous, Continuous, Maddi Moore MD    digoxin (LANOXIN) tablet 125 mcg, 125 mcg, Oral, Daily, Saranya Montalvo MD, 125 mcg at 08/25/20 1707    diltiazem (CARDIZEM SR) 12 hr capsule 60 mg, 60 mg, Oral, Q12H [address], Frank Ram MD, 60 mg at 08/26/20 0844    diltiazem (CARDIZEM) injection 10 mg, 10 mg, Intravenous, Q8H PRN, Saranya Montalvo MD, 10 mg at 08/22/20 1824    furosemide (LASIX) tablet 40 mg, 40 mg, Oral, Daily, Frank Ram MD, 40 mg at 08/26/20 0844    heparin (porcine) subcutaneous injection 5,000 Units, 5,000 Units, Subcutaneous, Q8H [address], Scooby Allen DO, 5,000 Units at 08/26/20 0953    iohexol (OMNIPAQUE) 240 MG/ML solution 50 mL, 50 mL, Oral, 90 min pre-procedure, Gerald Stoner MD    iron sucrose (VENOFER) 200 mg in sodium chloride 0 9 % 100 mL IVPB, 200 mg, Intravenous, Daily, Saranya Montalvo MD, Last Rate: 100 mL/hr at 08/23/20 0814, 200 mg at 08/26/20 0852    magnesium sulfate IVPB (premix) SOLN 1 g, 1 g, Intravenous, Once, Scooby Allen DO, 1 g at 08/26/20 0956    melatonin tablet 3 mg, 3 mg, Oral, HS PRN, Saranya Montalvo MD, 3 mg at 08/22/20 0100    metoprolol succinate (TOPROL-XL) 24 hr tablet 100 mg, 100 mg, Oral, BID, Saranya Montalvo MD, 100 mg at 08/26/20 0844    [START ON 8/28/2020] metroNIDAZOLE (FLAGYL) IVPB (premix) 500 mg 100 mL, 500 mg, Intravenous, On Call To OR, MD Pascale Bojorquez  [START ON 8/27/2020] metroNIDAZOLE (FLAGYL) tablet 500 mg, 500 mg, Oral, Q12H [address], MD Pascale Bojorquez  [START ON 8/28/2020] metroNIDAZOLE (FLAGYL) tablet 500 mg, 500 mg, Oral, Q12H [address], Sol Ramirez MD    [START ON 8/27/2020] neomycin (MYCIFRADIN) tablet 1,000 mg, 1,000 mg, Oral, BID, MD Pascale Bojorquez  [START ON 8/28/2020] neomycin (MYCIFRADIN) tablet 1,000 mg, 1,000 mg, Oral, Once, Sol Ramirez MD    spironolactone (ALDACTONE) tablet 12 5 mg, 12 5 mg, Oral, Daily, Simone So MD, 12 5 mg at 08/26/20 0844    Labs & Results:    Lab Results   Component Value Date    TROPONINI 0 10 (H) 08/21/2020    TROPONINI 0 15 (H) 07/27/2018    TROPONINI 0 14 (H) 07/27/2018       Lab Results   Component Value Date    GLUCOSE 107 12/11/2017    CALCIUM 8 2 (L) 08/26/2020    K 3 7 08/26/2020    CO2 22 08/26/2020     (H) 08/26/2020    BUN 15 08/26/2020    CREATININE 1 14 08/26/2020       Lab Results   Component Value Date    WBC 7 75 08/26/2020    HGB 9 3 (L) 08/26/2020    HCT 31 6 (L) 08/26/2020    MCV 80 (L) 08/26/2020     08/26/2020     Results from last 7 days   Lab Units 08/24/20  1844   INR  1 17       No results found for: CHOL  Lab Results   Component Value Date    HDL 41 08/22/2020    HDL 31 (L) 12/12/2017     Lab Results   Component Value Date    LDLCALC 40 08/22/2020    LDLCALC 73 12/12/2017     Lab Results   Component Value Date    TRIG 74 08/22/2020    TRIG 82 12/12/2017       Lab Results   Component Value Date    ALT 22 08/23/2020    AST 8 08/23/2020       Telemetry:   Personally reviewed by Brittany Wilson MD:  Rate controlled atrial fibrillation with heart rate ranging 70 to 80s and no ventricular ectopy    Imaging:  No new cardiac images to be reviewed    VTE Prophylaxis: Heparin drip      Assessment:  Principal Problem:    Atrial fibrillation with rapid ventricular response (HCC)  Active Problems:    Systolic CHF (HCC)    Microcytic anemia    SCARLETT (acute kidney injury) ([facility] [address] )    Gastrointestinal hemorrhage with melena    Coronary artery disease    GI bleeding    Mass of cecum    History of gout      Neurohormonal Blockade:  --Beta-Blocker:  Metoprolol succinate 100 mg b i d   --ACEi, ARB or ARNi:  None   --Aldosterone Receptor Blocker:  Spironolactone 12 5 mg q d   --Diuretic:  Furosemide 40 mg q d   PO     Sudden Cardiac Death Risk Reduction:  --ICD:  Completed LifeVest with recovered     Electrical Resynchronization:  --Candidacy for BiV device: N/A     Advanced Therapies: Will continue to monitor  --Inotrope:  None  --LVAD/Transplant Candidacy: N/A    ===============================================================    1  Acute blood loss anemian  -iron panel consistent with iron deficiency anemia with nearly undetectable ferritin  -s/p 1 unit of PRBC, maintain on iron sucrose ordered for 5 doses  -EGD showed mild erosions in the GE junction, stomach, and erythema in the duodenum  -colonoscopy: malignant-appearing multilobular and ulcerated mass in the proximal ascending segment and 2 polyps which were all biopsied  -pending surgical resection on Friday with colorectal team     2  Rapid atrial fibrillation  -known history, likely exacerbated by acute blood loss  -[id number] 2, HAS-BLED 1  -metoprolol succinate, digoxin, on heparin drip  -rate controlled     3  Nonischemic cardiomyopathy with recovered EF  -resume tachy mediated  -8/21 TTE:  LVEF 50% with no RWMA, LVIDd 4 cm, mild RVE with mild reduction in SF, mild TR with trace pericardial effusion  -metoprolol succinate, low-dose spironolactone, furosemide oral     4  Coronary artery disease  -02/2018 Rx NMST:  Partially reversible inferolateral and fixed apical lateral defect  -03/2018 LHC:  80% prox small RI, 60% ostial PDA otherwise no significant residual disease in the remaining vessels  -8/22 lipids:  Total 96, TG 74, HDL 41, calc LDL 40 (73)  -metoprolol succinate, high-intensity atorvastatin  -aspirin held due to bleeding      Plan:  1  Plan for surgical intervention on Friday  Patient's atrial fibrillation rates are better controlled after addition of diltiazem  However postoperatively, there is a risk for resumption of rapid ventricular response  In anticipation, plan for intraoperative JOSE LUIS for left atrial appendage visualization with cardiac anesthesia if there is no available cardiologist    2   Heparin drip was resume as per Colorectal surgery and to be held preoperatively    3  Okay to hold atorvastatin and oral furosemide on the day of surgery  Continue metoprolol perioperatively       Case discussed and reviewed with Dr Flori Barnes who agrees with my assessment and plan  Thank you for involving us in the care of your patient  [facility]/Care Everywhere records reviewed:  Yes    ** Please Note: Fluency DirectDictation voice to text software may have been used in the creation of this document   **

## 2020-08-27 ENCOUNTER — ANESTHESIA EVENT (INPATIENT)
Dept: PERIOP | Facility: HOSPITAL | Age: 60
DRG: 330 | End: 2020-08-27
Payer: COMMERCIAL

## 2020-08-27 LAB
ABO GROUP BLD: NORMAL
ANION GAP SERPL CALCULATED.3IONS-SCNC: 3 MMOL/L (ref 4–13)
BASOPHILS # BLD AUTO: 0.08 THOUSANDS/ΜL (ref 0–0.1)
BASOPHILS NFR BLD AUTO: 1 % (ref 0–1)
BLD GP AB SCN SERPL QL: NEGATIVE
BUN SERPL-MCNC: 16 MG/DL (ref 5–25)
CALCIUM SERPL-MCNC: 8.3 MG/DL (ref 8.3–10.1)
CHLORIDE SERPL-SCNC: 111 MMOL/L (ref 100–108)
CO2 SERPL-SCNC: 26 MMOL/L (ref 21–32)
CREAT SERPL-MCNC: 1.25 MG/DL (ref 0.6–1.3)
EOSINOPHIL # BLD AUTO: 0.08 THOUSAND/ΜL (ref 0–0.61)
EOSINOPHIL NFR BLD AUTO: 1 % (ref 0–6)
ERYTHROCYTE [DISTWIDTH] IN BLOOD BY AUTOMATED COUNT: 18.3 % (ref 11.6–15.1)
GFR SERPL CREATININE-BSD FRML MDRD: 62 ML/MIN/1.73SQ M
GLUCOSE SERPL-MCNC: 99 MG/DL (ref 65–140)
HCT VFR BLD AUTO: 31.6 % (ref 36.5–49.3)
HGB BLD-MCNC: 9.2 G/DL (ref 12–17)
IMM GRANULOCYTES # BLD AUTO: 0.03 THOUSAND/UL (ref 0–0.2)
IMM GRANULOCYTES NFR BLD AUTO: 1 % (ref 0–2)
LYMPHOCYTES # BLD AUTO: 1.39 THOUSANDS/ΜL (ref 0.6–4.47)
LYMPHOCYTES NFR BLD AUTO: 22 % (ref 14–44)
MAGNESIUM SERPL-MCNC: 2.2 MG/DL (ref 1.6–2.6)
MCH RBC QN AUTO: 23.8 PG (ref 26.8–34.3)
MCHC RBC AUTO-ENTMCNC: 29.1 G/DL (ref 31.4–37.4)
MCV RBC AUTO: 82 FL (ref 82–98)
MONOCYTES # BLD AUTO: 0.78 THOUSAND/ΜL (ref 0.17–1.22)
MONOCYTES NFR BLD AUTO: 12 % (ref 4–12)
NEUTROPHILS # BLD AUTO: 4.11 THOUSANDS/ΜL (ref 1.85–7.62)
NEUTS SEG NFR BLD AUTO: 63 % (ref 43–75)
NRBC BLD AUTO-RTO: 0 /100 WBCS
PHOSPHATE SERPL-MCNC: 4.1 MG/DL (ref 2.3–4.1)
PLATELET # BLD AUTO: 328 THOUSANDS/UL (ref 149–390)
PMV BLD AUTO: 11.1 FL (ref 8.9–12.7)
POTASSIUM SERPL-SCNC: 4.3 MMOL/L (ref 3.5–5.3)
RBC # BLD AUTO: 3.86 MILLION/UL (ref 3.88–5.62)
RH BLD: POSITIVE
SODIUM SERPL-SCNC: 140 MMOL/L (ref 136–145)
SPECIMEN EXPIRATION DATE: NORMAL
WBC # BLD AUTO: 6.47 THOUSAND/UL (ref 4.31–10.16)

## 2020-08-27 PROCEDURE — 83735 ASSAY OF MAGNESIUM: CPT | Performed by: STUDENT IN AN ORGANIZED HEALTH CARE EDUCATION/TRAINING PROGRAM

## 2020-08-27 PROCEDURE — 86923 COMPATIBILITY TEST ELECTRIC: CPT

## 2020-08-27 PROCEDURE — 99232 SBSQ HOSP IP/OBS MODERATE 35: CPT | Performed by: INTERNAL MEDICINE

## 2020-08-27 PROCEDURE — 80048 BASIC METABOLIC PNL TOTAL CA: CPT | Performed by: STUDENT IN AN ORGANIZED HEALTH CARE EDUCATION/TRAINING PROGRAM

## 2020-08-27 PROCEDURE — 86850 RBC ANTIBODY SCREEN: CPT | Performed by: SURGERY

## 2020-08-27 PROCEDURE — 86901 BLOOD TYPING SEROLOGIC RH(D): CPT | Performed by: SURGERY

## 2020-08-27 PROCEDURE — 84100 ASSAY OF PHOSPHORUS: CPT | Performed by: STUDENT IN AN ORGANIZED HEALTH CARE EDUCATION/TRAINING PROGRAM

## 2020-08-27 PROCEDURE — 85025 COMPLETE CBC W/AUTO DIFF WBC: CPT | Performed by: STUDENT IN AN ORGANIZED HEALTH CARE EDUCATION/TRAINING PROGRAM

## 2020-08-27 PROCEDURE — 86900 BLOOD TYPING SEROLOGIC ABO: CPT | Performed by: SURGERY

## 2020-08-27 RX ORDER — NEOMYCIN SULFATE 500 MG/1
1000 TABLET ORAL ONCE
Status: COMPLETED | OUTPATIENT
Start: 2020-08-27 | End: 2020-08-27

## 2020-08-27 RX ORDER — METRONIDAZOLE 500 MG/1
500 TABLET ORAL ONCE
Status: COMPLETED | OUTPATIENT
Start: 2020-08-27 | End: 2020-08-27

## 2020-08-27 RX ORDER — DEXTROSE, SODIUM CHLORIDE, AND POTASSIUM CHLORIDE 5; .45; .15 G/100ML; G/100ML; G/100ML
100 INJECTION INTRAVENOUS CONTINUOUS
Status: DISCONTINUED | OUTPATIENT
Start: 2020-08-27 | End: 2020-08-28

## 2020-08-27 RX ORDER — POLYETHYLENE GLYCOL 3350 17 G/17G
238 POWDER, FOR SOLUTION ORAL ONCE
Status: COMPLETED | OUTPATIENT
Start: 2020-08-27 | End: 2020-08-27

## 2020-08-27 RX ADMIN — BISACODYL 10 MG: 5 TABLET, COATED ORAL at 15:04

## 2020-08-27 RX ADMIN — METRONIDAZOLE 500 MG: 500 TABLET, FILM COATED ORAL at 22:04

## 2020-08-27 RX ADMIN — DEXTROSE, SODIUM CHLORIDE, AND POTASSIUM CHLORIDE 100 ML/HR: 5; .45; .15 INJECTION INTRAVENOUS at 10:39

## 2020-08-27 RX ADMIN — NEOMYCIN SULFATE 1000 MG: 500 TABLET ORAL at 22:04

## 2020-08-27 RX ADMIN — METOPROLOL SUCCINATE 100 MG: 100 TABLET, EXTENDED RELEASE ORAL at 09:09

## 2020-08-27 RX ADMIN — IRON SUCROSE 200 MG: 20 INJECTION, SOLUTION INTRAVENOUS at 09:11

## 2020-08-27 RX ADMIN — METOPROLOL SUCCINATE 100 MG: 100 TABLET, EXTENDED RELEASE ORAL at 22:16

## 2020-08-27 RX ADMIN — POLYETHYLENE GLYCOL 3350 238 G: 17 POWDER, FOR SOLUTION ORAL at 09:09

## 2020-08-27 RX ADMIN — DEXTROSE, SODIUM CHLORIDE, AND POTASSIUM CHLORIDE 100 ML/HR: 5; .45; .15 INJECTION INTRAVENOUS at 22:04

## 2020-08-27 RX ADMIN — DILTIAZEM HYDROCHLORIDE 60 MG: 60 CAPSULE, EXTENDED RELEASE ORAL at 22:16

## 2020-08-27 RX ADMIN — FUROSEMIDE 40 MG: 40 TABLET ORAL at 09:08

## 2020-08-27 RX ADMIN — DIGOXIN 125 MCG: 125 TABLET ORAL at 17:20

## 2020-08-27 RX ADMIN — HEPARIN SODIUM 5000 UNITS: 5000 INJECTION INTRAVENOUS; SUBCUTANEOUS at 05:17

## 2020-08-27 RX ADMIN — HEPARIN SODIUM 5000 UNITS: 5000 INJECTION INTRAVENOUS; SUBCUTANEOUS at 13:02

## 2020-08-27 RX ADMIN — SPIRONOLACTONE 12.5 MG: 25 TABLET ORAL at 09:08

## 2020-08-27 RX ADMIN — NEOMYCIN SULFATE 1000 MG: 500 TABLET ORAL at 13:00

## 2020-08-27 RX ADMIN — DILTIAZEM HYDROCHLORIDE 60 MG: 60 CAPSULE, EXTENDED RELEASE ORAL at 09:09

## 2020-08-27 RX ADMIN — HEPARIN SODIUM 5000 UNITS: 5000 INJECTION INTRAVENOUS; SUBCUTANEOUS at 22:04

## 2020-08-27 RX ADMIN — ALLOPURINOL 100 MG: 100 TABLET ORAL at 09:09

## 2020-08-27 RX ADMIN — METRONIDAZOLE 500 MG: 500 TABLET, FILM COATED ORAL at 13:00

## 2020-08-27 RX ADMIN — BISACODYL 10 MG: 5 TABLET, COATED ORAL at 10:20

## 2020-08-27 NOTE — PROGRESS NOTES
Advanced Heart Failure Team Progress Note - Danielito Huang 61 y o  male MRN: [id number]    Unit/Bed#: -01 Encounter: [id number]      Subjective:   Patient seen and examined  No significant events overnight  Patient was taken off telemetry  He is currently receiving 1 dose of IV iron sucrose  He is a bit nervous about surgery but cannot wait to get it over with  He is aware of the plan tomorrow to obtain intraoperative JOSE LUIS and is agreeable  Objective:     Vitals: Blood pressure 121/75, pulse 70, temperature 97 9 °F (36 6 °C), resp  rate 18, height 5' 9" (1 753 m), weight 97 1 kg (214 lb 1 1 oz), SpO2 99 %  , Body mass index is 31 61 kg/m² ,   Orthostatic Blood Pressures      Most Recent Value   Blood Pressure  121/75 filed at 08/27/2020 0702   Patient Position - Orthostatic VS  Lying filed at 08/25/2020 1446            Intake/Output Summary (Last 24 hours) at 8/27/2020 9675  Last data filed at 8/27/2020 0515  Gross per 24 hour   Intake 480 ml   Output 575 ml   Net -95 ml         Physical Exam:    GEN: Danieliot Huang appears well, alert and oriented x 3, pleasant and cooperative   HEENT:  Normocephalic, atraumatic, anicteric, moist mucous membranes  NECK: No JVD or carotid bruits   HEART:  Irregular rhythm, normal rate, normal S1 and S2, no murmurs, clicks, gallops or rubs   LUNGS: Clear to auscultation bilaterally; no wheezes, rales, or rhonchi; respiration nonlabored   ABDOMEN:  Normoactive bowel sounds, soft, no tenderness, no distention  EXTREMITIES: peripheral pulses palpable; no edema  NEURO: no gross focal findings; cranial nerves grossly intact   SKIN:  Dry, intact, warm to touch    Current Facility-Administered Medications:     allopurinol (ZYLOPRIM) tablet 100 mg, 100 mg, Oral, Daily, Barbara Roblero MD, 100 mg at 08/26/20 0844    [START ON 8/31/2020] atorvastatin (LIPITOR) tablet 40 mg, 40 mg, Oral, Daily With Mercy Uribe MD    [START ON 8/28/2020] ceFAZolin (ANCEF) IVPB (premix) 2,000 mg 50 mL, 2,000 mg, Intravenous, On Call To OR, Christopher Peres MD  [facility]  [START ON 8/28/2020] dextrose 5 % and sodium chloride 0 45 % with KCl 20 mEq/L infusion, 100 mL/hr, Intravenous, Continuous, Maddi Lopez MD    digoxin (LANOXIN) tablet 125 mcg, 125 mcg, Oral, Daily, Nohemi Taylor MD, 125 mcg at 08/26/20 1728    diltiazem (CARDIZEM SR) 12 hr capsule 60 mg, 60 mg, Oral, Q12H [facility], Ana Dyson MD, 60 mg at 08/26/20 2131    diltiazem (CARDIZEM) injection 10 mg, 10 mg, Intravenous, Q8H PRN, Nohemi Taylor MD, 10 mg at 08/22/20 1824    furosemide (LASIX) tablet 40 mg, 40 mg, Oral, Daily, Ana Dyson MD, 40 mg at 08/26/20 0844    heparin (porcine) subcutaneous injection 5,000 Units, 5,000 Units, Subcutaneous, Q8H [facility], [facility], DO, 5,000 Units at 08/27/20 0517    iohexol (OMNIPAQUE) 240 MG/ML solution 50 mL, 50 mL, Oral, 90 min pre-procedure, Juanita Anderson MD    iron sucrose (VENOFER) 200 mg in sodium chloride 0 9 % 100 mL IVPB, 200 mg, Intravenous, Daily, Nohemi Taylor MD, Last Rate: 100 mL/hr at 08/23/20 0814, 200 mg at 08/26/20 0852    melatonin tablet 3 mg, 3 mg, Oral, HS PRN, Nohemi Taylor MD, 3 mg at 08/22/20 0100    metoprolol succinate (TOPROL-XL) 24 hr tablet 100 mg, 100 mg, Oral, BID, Nohemi Taylor MD, 100 mg at 08/26/20 2131    [START ON 8/28/2020] metroNIDAZOLE (FLAGYL) IVPB (premix) 500 mg 100 mL, 500 mg, Intravenous, On Call To OR, Christopher Peres MD    metroNIDAZOLE (FLAGYL) tablet 500 mg, 500 mg, Oral, Q12H [facility], Christopher Peres MD  [facility]  [START ON 8/28/2020] metroNIDAZOLE (FLAGYL) tablet 500 mg, 500 mg, Oral, Q12H [facility], Christopher Peres MD    neomycin (MYCIFRADIN) tablet 1,000 mg, 1,000 mg, Oral, BID, Christopher Peres MD  [facility]  [START ON 8/28/2020] neomycin (MYCIFRADIN) tablet 1,000 mg, 1,000 mg, Oral, Once, Christopher Peres MD    polyethylene glycol (GLYCOLAX) bowel prep 238 g, 238 g, Oral, Once, Val Hunter MD    spironolactone (ALDACTONE) tablet 12 5 mg, 12 5 mg, Oral, Daily, Simone So MD, 12 5 mg at 08/26/20 0844    Labs & Results:    Lab Results   Component Value Date    TROPONINI 0 10 (H) 08/21/2020    TROPONINI 0 15 (H) 07/27/2018    TROPONINI 0 14 (H) 07/27/2018       Lab Results   Component Value Date    GLUCOSE 107 12/11/2017    CALCIUM 8 3 08/27/2020    K 4 3 08/27/2020    CO2 26 08/27/2020     (H) 08/27/2020    BUN 16 08/27/2020    CREATININE 1 25 08/27/2020       Lab Results   Component Value Date    WBC 6 47 08/27/2020    HGB 9 2 (L) 08/27/2020    HCT 31 6 (L) 08/27/2020    MCV 82 08/27/2020     08/27/2020     Results from last 7 days   Lab Units 08/24/20  1844   INR  1 17       No results found for: CHOL  Lab Results   Component Value Date    HDL 41 08/22/2020    HDL 31 (L) 12/12/2017     Lab Results   Component Value Date    LDLCALC 40 08/22/2020    LDLCALC 73 12/12/2017     Lab Results   Component Value Date    TRIG 74 08/22/2020    TRIG 82 12/12/2017       Lab Results   Component Value Date    ALT 22 08/23/2020    AST 8 08/23/2020       Telemetry:   Personally reviewed by Brittany Wilson MD:  No longer on telemetry    Imaging: No new cardiac images to be reviewed    VTE Prophylaxis: Heparin drip      Assessment:  Principal Problem:    Atrial fibrillation with rapid ventricular response (HCC)  Active Problems:    Systolic CHF (HCC)    Microcytic anemia    SCARLETT (acute kidney injury) ([facility] [address] )    Gastrointestinal hemorrhage with melena    Coronary artery disease    GI bleeding    Mass of cecum    History of gout      Neurohormonal Blockade:  --Beta-Blocker:  Metoprolol succinate 100 mg b i d   --ACEi, ARB or ARNi:  None   --Aldosterone Receptor Blocker:  Spironolactone 12 5 mg q d   --Diuretic:  Furosemide 40 mg q d   PO     Sudden Cardiac Death Risk Reduction:  --ICD:  Completed LifeVest with recovered     Electrical Resynchronization:  --Candidacy for BiV device: N/A     Advanced Therapies: Will continue to monitor  --Inotrope:  None  --LVAD/Transplant Candidacy: N/A    ===============================================================    1  Ascending colon invasive adenocarcinoma  -presenting as acute on chronic blood loss anemia  -iron panel consistent with iron deficiency anemia with nearly undetectable ferritin  -s/p 1 unit of PRBC, maintain on iron sucrose ordered for 5 doses  -EGD showed mild erosions in the GE junction, stomach, and erythema in the duodenum  -colonoscopy: malignant-appearing multilobular and ulcerated mass in the proximal ascending segment and 2 polyps which were all biopsied  -CT A/P: cecal mass with no evidence of  metastasis  -path: invasive adenocarcinoma  -pending surgical resection on Friday with colorectal team     2  Rapid atrial fibrillation  -known history, likely exacerbated by acute blood loss  -[id number] 2, HAS-BLED 1  -metoprolol succinate, digoxin, on heparin drip  -rate controlled     3  Nonischemic cardiomyopathy with recovered EF  -resume tachy mediated  -8/21 TTE:  LVEF 50% with no RWMA, LVIDd 4 cm, mild RVE with mild reduction in SF, mild TR with trace pericardial effusion  -metoprolol succinate, low-dose spironolactone, furosemide oral     4  Coronary artery disease  -02/2018 Rx NMST:  Partially reversible inferolateral and fixed apical lateral defect  -03/2018 LHC:  80% prox small RI, 60% ostial PDA otherwise no significant residual disease in the remaining vessels  -8/22 lipids:  Total 96, TG 74, HDL 41, calc LDL 40 (73)  -metoprolol succinate, high-intensity atorvastatin  -aspirin held due to bleeding      Plan:  1  Last dose of atorvastatin and furosemide today, to be resumed post operatively    2  Intra-operative JOSE LUIS tomorrow  Heparin management as per colorectal surgery  Resume as soon as possible    3  As he is off tele, obtain ECG today and again in the morning preoperatively    4   Advanced Heart Failure to continue following along to help optimize volume status perioperatively       Case discussed and reviewed with Dr Elizabeth Hess who agrees with my assessment and plan  Thank you for involving us in the care of your patient  [facility]/ [facility]Care Everywhere records reviewed:  Yes    ** Please Note: Fluency DirectDictation voice to text software may have been used in the creation of this document   **

## 2020-08-27 NOTE — PROGRESS NOTES
INTERNAL MEDICINE RESIDENCY PROGRESS NOTE     Name: Diana Pardo   Age & Sex: 61 y o  male   MRN: [id number]  Unit/Bed#: -01   Encounter: [id number]  Team: SOD Team A    PATIENT INFORMATION     Name: Diana Pardo   Age & Sex: 61 y o  male   MRN: [id number]  Hospital Stay Days: 6    ASSESSMENT/PLAN     Principal Problem:    Atrial fibrillation with rapid ventricular response ([address] )  Active Problems:    Systolic CHF ([facility] [address] )    Mass of cecum    Gastrointestinal hemorrhage with melena    Microcytic anemia    SCARLETT (acute kidney injury) ([facility] [address] )    History of gout    Coronary artery disease    GI bleeding      * Atrial fibrillation with rapid ventricular response ([facility] [address] )  Assessment & Plan  Known history of AFib, being followed by cardiology outpatient  Patient presented to ED on 8/22 in AFib with RVR (rate 122), despite medication compliance  Patient also found to be hypotensive  Home medications for rate control include digoxin 0 125 mg and metoprolol succinate 100 mg b i d  Follows with Dr Raven Tarango out patient  Had been scheduled for a stress test today outpatient, but came to the ED instead due to his hypotension (90/60s)  No valvular disease per past cardiology work-up  Plan:  -Increased rate likely exacerbated by current blood-loss anemia 2/2 GI bleed (suspect GI malignancy as source)  -Cardizem started in ED to control rate, but then d/c prior to admission because of decreasing BP again  -Cardiology following, appreciate their recommendations  -QTc of 459    -CHADS-VASC score 2, HAS BLED 1  -Telemetry discontinued  -Eliquis held, heparin discontinued, heparin SQ started 8/26  -Heparin discontinued at midnight for surgery tomorrow  -Per cardiology, continue Toprol  mg BID and digoxin 125 mcg daily  -Cardizem 60 mg Q12H started 8/25  -Consider checking digoxin level         Systolic CHF (HCC)  Assessment & Plan  Pt with hx of systolic CHF with EF 71% (per Echo 1 year ago)   Compliant with home medication lasix 60 mg, aldoctone 25 mg, entresto 24-26 mg  States he has lost 8 lbs recently, based off of his home scale and the ED scale  However the differences in two difference scale measurements is not necessarily reliable  Patient admits to one month worsening SOB, now with dyspnea with ADL  Baseline sleeps with 3 pillows under his head, and this is unchanged in the last month  Patient denies episodes of lower extremity edema     -2018 stress test showed partially reversible myocardial perfusion defect of basal to mid inferolateral wall   -2018 cardiac cath (s/p NSTEMI type II) showed single vessel CAD with 80% stenosis of the proximal ramus artery  No stent was placed    -2018: Small persistent pericardial effusion present  -2018 TTE: LVEF 50%    Wt Readings from Last 3 Encounters:   08/21/20 99 8 kg (220 lb)   07/27/20 101 kg (222 lb)   03/19/19 102 kg (224 lb)     Plan:  -CXR negative for acute cardiopulmonary disease  -Aldactone 12 5 mg  -Lasix 40 mg restarted 8/25  -Cardiology following  Their recommendations appreciated  Continuing with a fib rate control with digoxin and metoprolol (restarted 8/22), cardizem (restarted 8/25)  -JOSE LUIS planned while in OR for cecal mass on Friday  -Patient appears euvolemic  -Hypotension likely 2/2 blood loss anemia- resolved, /39  -SCARLETT- increase Cr 1 25  -Daily standing weights  -Cardiac diet: <2L fluids, <2g Na+, alcohol cessation    Mass of cecum  Assessment & Plan  Patient with no history of colonoscopy  Colonoscopy performed yesterday for investigation of anemia  Found to have a malignant-appearing, multilobular and ulcerated mass in the proximal ascending colon, covering one third of the circumference  Biopsy was taken  CTAP showed no evidence of metastatic disease      Plan:  -Colorectal surgery following  -Plan for OR on Friday  -Continue heparin SQ until 12 AM Friday (8/28/20)  -Diet changed to low residue with cardiac restrictions  -Will transition to clears on Thursday with bowel prep and abx ppx    Gastrointestinal hemorrhage with melena  Assessment & Plan  Patient endorses 1 month history of melena, which is new  He has never had a colonscopy  BP on admission 90s/60s  Hb on admission 9 3, patient symptomatic with light-headedness, SOB and hypotension    Plan:  -Transfused 1 unit pRBC  -BUN down to 14  -CBC in am, Hgb 9 2 stable  -Hold Eliquis  -EGD: mild erythematous mucosa with erosion in the GE junction (43 cm from the incisors)  LA grade A  Mild, localized erosion in the stomach; performed cold biopsy  R/o H pylori  Mild, localized erythematous mucosa in the duodenal bulb; performed cold biopsy  -Colonoscopy: Malignant-appearing, multilobular and ulcerated mass in the proximal ascending colon, covering one third of the circumference; performed cold biopsy  One adenomatous-appearing polyp measuring smaller than 5 mm in the cecum; removed en bloc by cold snare and retrieved specimen 15 mm sessile, adenomatous-appearing polyp in the transverse colon; removed en bloc by hot snare and retrieved specimen; placed 2 clips successfully (clips were MRI compatible)  The examination was otherwise normal on direct and retroflexion views  Microcytic anemia  Assessment & Plan  Found to have Hb of 9 3 on admission  Last known hemoglobin from two years ago was 13  Patient endorses a one month history of consistently black, tarry stools, which is new for him  Heme-occult in ED was positive  Patient has never had a colonoscopy  Patient also hypotensive on arrival (90/60s)  Plan:  -Suspected anemia as cause of SCARLETT and contributing to uncontrolled a fib RVR, Cr today 1 14  -Received 1 unit of PRBC this admission  -Hgb today is 9 2, down from 9 3  -continue to monitor BP and Hb  -Heparin drip discontinued, SQ started on 8/26      SCARLETT (acute kidney injury) [facility])  Assessment & Plan  Presented with creatinine of 2 31  Last creatinine 0 92-1      Plan:  -Likely pre-renal 2/2 blood loss (volume depletion) and hypotension   -Transfused 1 unit pRBC  -Trend Cr, today 1 25  -Still elevated above baseline  -Hold nephrotoxic agents    History of gout  Assessment & Plan  Controlled with allopurinol 100 mg QD    Hypotensionresolved as of 2020  Assessment & Plan  Patient presented from outpatient clinic with hypotension (90s/60s)  Found to have anemia (Hb 9 3) in setting of melena and heme-occult positive test in ED  Last known Hb from two years ago was 13  Plan:  -hypotension 2/2 blood loss from GI bleed (likely GI malignancy)  -1 unit pRBC transfused  -repeat am CBC  -Blood pressure stable (systolics to 941); on rate control Tikosyn  -Holding: lasix, aldactone and entresto given current hypotension and likely pre-renal SCARLETT 2/2 hypotension/volume depletion  Restart when BP and creatinine stable   -holding Eliquis      Disposition: M/S     SUBJECTIVE     Patient seen and examined  No acute events overnight  Patient was resting comfortably in bed  The patient has not had a bowel movement yet today, but will start bowel prep and monitor for any hematochezia  Hemoglobin is stable  Denies vision/hearing changes, fevers, chills, dysphagia, sore throat, chest pain, shortness of breath, abdominal pain, nausea, vomiting, constipation, diarrhea, dysuria, and peripheral edema  Patient is planned for OR tomorrow with colorectal surgery for resection of cecal mass  Heparin injections will be stopped at midnight      OBJECTIVE     Vitals:    20 2131 20 2332 20 0600 20 0702   BP: 136/83 118/75  121/75   Pulse: 70      Resp:  18     Temp:  97 7 °F (36 5 °C)  97 9 °F (36 6 °C)   TempSrc:       SpO2:       Weight:   97 1 kg (214 lb 1 1 oz)    Height:          Temperature:   Temp (24hrs), Av 8 °F (36 6 °C), Min:97 7 °F (36 5 °C), Max:97 9 °F (36 6 °C)    Temperature: 97 9 °F (36 6 °C)  Intake & Output:  I/O        0701 -  07 07 -  07 07 - 08/28 0700    P  O  360 480     I V  (mL/kg)       Total Intake(mL/kg) 360 (3 7) 480 (4 9)     Urine (mL/kg/hr) 250 (0 1) 575 (0 2)     Total Output 250 575     Net +110 -95                Weights:   IBW: 70 7 kg    Body mass index is 31 61 kg/m²  Weight (last 2 days)     Date/Time   Weight    08/27/20 0600   97 1 (214 07)    08/26/20 0439   97 3 (214 51)    08/25/20 0524   97 (213 85)            Physical Exam  Vitals signs and nursing note reviewed  Constitutional:       Appearance: Normal appearance  He is obese  HENT:      Head: Normocephalic and atraumatic  Right Ear: External ear normal       Left Ear: External ear normal       Nose: Nose normal       Mouth/Throat:      Mouth: Mucous membranes are moist    Eyes:      Extraocular Movements: Extraocular movements intact  Pupils: Pupils are equal, round, and reactive to light  Neck:      Musculoskeletal: Normal range of motion  Cardiovascular:      Rate and Rhythm: Normal rate  Rhythm irregular  Pulmonary:      Effort: Pulmonary effort is normal       Comments: Decreased breath sounds in B/L bases  Abdominal:      General: Bowel sounds are normal  There is distension  Palpations: Abdomen is soft  Tenderness: There is no abdominal tenderness  Musculoskeletal: Normal range of motion  Skin:     General: Skin is warm and dry  Capillary Refill: Capillary refill takes less than 2 seconds  Neurological:      General: No focal deficit present  Mental Status: He is alert and oriented to person, place, and time  Psychiatric:         Mood and Affect: Mood normal          Behavior: Behavior normal        LABORATORY DATA     Labs: I have personally reviewed pertinent reports    Results from last 7 days   Lab Units 08/27/20  0512 08/26/20  0435 08/25/20  0757   WBC Thousand/uL 6 47 7 75 7 89   HEMOGLOBIN g/dL 9 2* 9 3* 10 3*   HEMATOCRIT % 31 6* 31 6* 35 3*   PLATELETS Thousands/uL 328 325 357   NEUTROS PCT % 63 63 70   MONOS PCT % 12 13* 11      Results from last 7 days   Lab Units 08/27/20  0512 08/26/20  0435 08/25/20  0756  08/23/20  0510 08/22/20  0444   POTASSIUM mmol/L 4 3 3 7 3 9   < > 4 3 4 0   CHLORIDE mmol/L 111* 109* 109*   < > 112* 109*   CO2 mmol/L 26 22 23   < > 24 25   BUN mg/dL 16 15 14   < > 29* 42*   CREATININE mg/dL 1 25 1 14 1 16   < > 1 30 1 96*   CALCIUM mg/dL 8 3 8 2* 8 9   < > 8 7 8 2*   ALK PHOS U/L  --   --   --   --  51 53   ALT U/L  --   --   --   --  22 23   AST U/L  --   --   --   --  8 8    < > = values in this interval not displayed  Results from last 7 days   Lab Units 08/27/20  0512 08/26/20  0435 08/22/20  0444   MAGNESIUM mg/dL 2 2 2 1 2 4     Results from last 7 days   Lab Units 08/27/20  0512 08/26/20  0435 08/21/20  1647   PHOSPHORUS mg/dL 4 1 4 7* 4 1      Results from last 7 days   Lab Units 08/26/20  0435 08/25/20  2305 08/25/20  1444  08/24/20  1844 08/22/20  0815   INR   --   --   --   --  1 17 1 11   PTT seconds 71* 92* 58*   < > 102*  --     < > = values in this interval not displayed  Results from last 7 days   Lab Units 08/21/20  1647   TROPONIN I ng/mL 0 10*       IMAGING & DIAGNOSTIC TESTING     Radiology Results: I have personally reviewed pertinent reports  Xr Chest 1 View Portable    Result Date: 8/21/2020  Impression: No acute cardiopulmonary disease  Workstation performed: [id number]     Ct Chest Abdomen Pelvis W Contrast    Result Date: 8/25/2020  Impression: 1  Cecal mass likely representing primary neoplasm  2   No definite evidence of metastatic disease in the chest, abdomen, or pelvis  3   Tiny left lower lobe lung nodule is nonspecific and doubtful metastatic disease though a three-month follow-up chest CT is recommended to confirm stability  4   Cholelithiasis with mild gallbladder wall thickening though no pericholecystic inflammatory change  This likely represents adenomyosis as described on the prior ultrasound from 2017  5   Small pericardial effusion   The study was marked in EPIC for significant notification  Workstation performed: [id number]     Other Diagnostic Testing: I have personally reviewed pertinent reports  ACTIVE MEDICATIONS     Current Facility-Administered Medications   Medication Dose Route Frequency    allopurinol (ZYLOPRIM) tablet 100 mg  100 mg Oral Daily    [START ON 8/31/2020] atorvastatin (LIPITOR) tablet 40 mg  40 mg Oral Daily With Dinner    bisacodyl (DULCOLAX) EC tablet 10 mg  10 mg Oral Once    [START ON 8/28/2020] ceFAZolin (ANCEF) IVPB (premix) 2,000 mg 50 mL  2,000 mg Intravenous On Call To OR    dextrose 5 % and sodium chloride 0 45 % with KCl 20 mEq/L infusion  100 mL/hr Intravenous Continuous    digoxin (LANOXIN) tablet 125 mcg  125 mcg Oral Daily    diltiazem (CARDIZEM SR) 12 hr capsule 60 mg  60 mg Oral Q12H [facility]    diltiazem (CARDIZEM) injection 10 mg  10 mg Intravenous Q8H PRN    heparin (porcine) subcutaneous injection 5,000 Units  5,000 Units Subcutaneous Q8H [facility]    iohexol (OMNIPAQUE) 240 MG/ML solution 50 mL  50 mL Oral 90 min pre-procedure    melatonin tablet 3 mg  3 mg Oral HS PRN    metoprolol succinate (TOPROL-XL) 24 hr tablet 100 mg  100 mg Oral BID    [START ON 8/28/2020] metroNIDAZOLE (FLAGYL) IVPB (premix) 500 mg 100 mL  500 mg Intravenous On Call To OR    metroNIDAZOLE (FLAGYL) tablet 500 mg  500 mg Oral Once    metroNIDAZOLE (FLAGYL) tablet 500 mg  500 mg Oral Once    neomycin (MYCIFRADIN) tablet 1,000 mg  1,000 mg Oral Once    neomycin (MYCIFRADIN) tablet 1,000 mg  1,000 mg Oral Once    spironolactone (ALDACTONE) tablet 12 5 mg  12 5 mg Oral Daily       VTE Pharmacologic Prophylaxis: Heparin  VTE Mechanical Prophylaxis: sequential compression device    Portions of the record may have been created with voice recognition software  Occasional wrong word or "sound a like" substitutions may have occurred due to the inherent limitations of voice recognition software    Read the chart carefully and recognize, using context, where substitutions have occurred   ==  Katherine Vega, [address] [facility]  Internal Medicine Residency PGY-1

## 2020-08-27 NOTE — PROGRESS NOTES
Progress Note - Colorectal Surgery   Sushila Valadez 61 y o  male MRN: [id number]  Unit/Bed#: -01 Encounter: [id number]    Assessment:  60 y/o M with ascending colon mass found on colonoscopy this admission and no evidence of metastatic disease per  His CT scans of the chest, abdomen, and pelvis  CEA 1 7 this admission  He is planned for colon resection on Friday, 8/28/2020  Plan for concurrent JOSE LUIS with cardiology  Plan:  -Clear liquid diet with bowel prep today  -Bowel Prep to include Miralax, Neomycin, and metronidazole  -pre-op today and confirm case scheduling  -confirm consent for procedure  -Will hold heparin 6 hours prior to onset of operation    Subjective/Objective     Subjective: The patient rested comfortable overnight, and no acute events transpired  He feels well and has been tolerating a low residue diet and having bowel function  No new or additional complaints at this time  Objective:     Blood pressure 118/75, pulse 70, temperature 97 7 °F (36 5 °C), resp  rate 18, height 5' 9" (1 753 m), weight 97 3 kg (214 lb 8 1 oz), SpO2 99 %  ,Body mass index is 31 68 kg/m²        Intake/Output Summary (Last 24 hours) at 8/27/2020 0606  Last data filed at 8/27/2020 0515  Gross per 24 hour   Intake 480 ml   Output 575 ml   Net -95 ml       Invasive Devices     Peripheral Intravenous Line            Peripheral IV 08/24/20 Left Antecubital 2 days    Peripheral IV 08/25/20 Left Hand 1 day                Physical Exam:   General: adult male, NAD  HEENT: normocephalic, atraumatic, MMM  CV: Regular rate, no murmurs  Resp: normal effot, lungs clear to auscultation b/l  Abdomen: soft, non-distended, non-tender, bowel sounds present  Extremities: no clubbing, cyanosis, or edema  Neuro: AAOx3,, no focal deficits  Skin: warm, dry, intact    Lab, Imaging and other studies:  CBC:   Lab Results   Component Value Date    WBC 6 47 08/27/2020    HGB 9 2 (L) 08/27/2020    HCT 31 6 (L) 08/27/2020    MCV 82 08/27/2020  08/27/2020    MCH 23 8 (L) 08/27/2020    MCHC 29 1 (L) 08/27/2020    RDW 18 3 (H) 08/27/2020    MPV 11 1 08/27/2020    NRBC 0 08/27/2020   , CMP:   Lab Results   Component Value Date    SODIUM 140 08/27/2020    K 4 3 08/27/2020     (H) 08/27/2020    CO2 26 08/27/2020    BUN 16 08/27/2020    CREATININE 1 25 08/27/2020    CALCIUM 8 3 08/27/2020    EGFR 62 08/27/2020   , Coagulation: No results found for: PT, INR, APTT  VTE Pharmacologic Prophylaxis: Heparin    VTE Mechanical Prophylaxis: sequential compression device

## 2020-08-27 NOTE — UTILIZATION REVIEW
Continued Stay Review    Date: 08/27/2020                          Current Patient Class: Inpatient  Current Level of Care: Med/Surg    HPI:60 y o  male initially admitted on 08/21/2020  Mass in [place] - Patient with no history of colonoscopy  Colonoscopy performed yesterday for investigation of anemia  Found to have a malignant-appearing, multilobular and ulcerated mass in the proximal ascending colon, covering one third of the circumference  Biopsy was taken  CTAP showed no evidence of metastatic disease  Assessment/Plan:   Will transition to clears on Thursday  Bowel Prep  And Abx ppx  IV heparin DC and changed to sq and stopped 6 hours before surgery  NPO after MN for OR      Pertinent Labs/Diagnostic Results:   Results from last 7 days   Lab Units 08/24/20  0836   SARS-COV-2  Negative     Results from last 7 days   Lab Units 08/27/20  0512 08/26/20  0435 08/25/20  0757 08/24/20  1844 08/24/20  0510   WBC Thousand/uL 6 47 7 75 7 89 8 10 7 16   HEMOGLOBIN g/dL 9 2* 9 3* 10 3* 10 5* 9 9*   HEMATOCRIT % 31 6* 31 6* 35 3* 36 6 35 0*   PLATELETS Thousands/uL 328 325 357 385 371   NEUTROS ABS Thousands/µL 4 11 4 88 5 47  --   --      Results from last 7 days   Lab Units 08/27/20  0512 08/26/20  0435 08/25/20  0756 08/24/20  0510 08/23/20  0510 08/22/20  0444 08/21/20  1647   SODIUM mmol/L 140 140 139 138 141 139 138   POTASSIUM mmol/L 4 3 3 7 3 9 4 1 4 3 4 0 4 3   CHLORIDE mmol/L 111* 109* 109* 107 112* 109* 108   CO2 mmol/L 26 22 23 24 24 25 24   ANION GAP mmol/L 3* 9 7 7 5 5 6   BUN mg/dL 16 15 14 18 29* 42* 37*   CREATININE mg/dL 1 25 1 14 1 16 1 31* 1 30 1 96* 2 31*   EGFR ml/min/1 73sq m 62 70 68 59 59 36 30   CALCIUM mg/dL 8 3 8 2* 8 9 8 6 8 7 8 2* 8 9   MAGNESIUM mg/dL 2 2 2 1  --   --   --  2 4 2 4   PHOSPHORUS mg/dL 4 1 4 7*  --   --   --   --  4 1     Results from last 7 days   Lab Units 08/23/20  0510 08/22/20  0444   AST U/L 8 8   ALT U/L 22 23   ALK PHOS U/L 51 53   TOTAL PROTEIN g/dL 7 6 7 5 ALBUMIN g/dL 3 5 3 6   TOTAL BILIRUBIN mg/dL 0 94 2 73*   BILIRUBIN DIRECT mg/dL 0 28* 0 20     Results from last 7 days   Lab Units 08/23/20  0509   POC GLUCOSE mg/dl 103     Results from last 7 days   Lab Units 08/27/20  0512 08/26/20  0435 08/25/20  0756 08/24/20  0510 08/23/20  0510 08/22/20  0444 08/21/20  1647   GLUCOSE RANDOM mg/dL 99 93 108 95 97 102 100     Results from last 7 days   Lab Units 08/21/20  1647   TROPONIN I ng/mL 0 10*     Results from last 7 days   Lab Units 08/26/20  0435 08/25/20  2305 08/25/20  1444  08/24/20  1844 08/22/20  0815   PROTIME seconds  --   --   --   --  14 9* 14 4   INR   --   --   --   --  1 17 1 11   PTT seconds 71* 92* 58*   < > 102*  --     < > = values in this interval not displayed  Results from last 7 days   Lab Units 08/22/20  0444 08/21/20  1647   TSH 3RD GENERATON uIU/mL 1 050 1  190     Results from last 7 days   Lab Units 08/21/20  1647   DIGOXIN LVL ng/mL 0 6*     Results from last 7 days   Lab Units 08/22/20  0444   NT-PRO BNP pg/mL 393*     Results from last 7 days   Lab Units 08/22/20  0444   FERRITIN ng/mL 9     Results from last 7 days   Lab Units 08/25/20  0757   CEA ng/mL 1 7     Vital Signs: /94   Pulse 70   Temp (!) 97 4 °F (36 3 °C)   Resp 18   Ht 5' 9" (1 753 m)   Wt 97 1 kg (214 lb 1 1 oz)   SpO2 99%   BMI 31 61 kg/m²       Medications:   Scheduled Medications:  allopurinol, 100 mg, Oral, Daily  [START ON 8/31/2020] atorvastatin, 40 mg, Oral, Daily With Dinner  bisacodyl, 10 mg, Oral, Once  [START ON 8/28/2020] cefazolin, 2,000 mg, Intravenous, On Call To OR  digoxin, 125 mcg, Oral, Daily  diltiazem, 60 mg, Oral, Q12H DARREN  heparin (porcine), 5,000 Units, Subcutaneous, Q8H DARREN  iohexol, 50 mL, Oral, 90 min pre-procedure  metoprolol succinate, 100 mg, Oral, BID  [START ON 8/28/2020] metroNIDAZOLE, 500 mg, Intravenous, On Call To OR  metroNIDAZOLE, 500 mg, Oral, Once  neomycin, 1,000 mg, Oral, Once  spironolactone, 12 5 mg, Oral, Daily      Continuous IV Infusions:  dextrose 5 % and sodium chloride 0 45 % with KCl 20 mEq/L, 100 mL/hr, Intravenous, Continuous      PRN Meds:  diltiazem, 10 mg, Intravenous, Q8H PRN  melatonin, 3 mg, Oral, HS PRN        Discharge Plan: TBD    Network Utilization Review Department  [email or web address]  ATTENTION: Please call with any questions or concerns to [phone] and carefully listen to the prompts so that you are directed to the right person  All voicemails are confidential   Juliann Isaacs all requests for admission clinical reviews, approved or denied determinations and any other requests to dedicated fax number below belonging to the campus where the patient is receiving treatment   List of dedicated fax numbers for the Facilities:  [address] DENIALS (Administrative/Medical Necessity) [phone]   [address] (Maternity/NICU/Pediatrics) [phone]   Iggy Ramirez [phone]   Mamadou Block [phone]   Zackary Gilbert [phone]   Fran Kumar [phone]   [address] [place] [address] [address] [place] [address] [phone]   [facility]  [phone]   [address] [facility]  [address] [facility] [address] [facility] [phone]

## 2020-08-27 NOTE — PLAN OF CARE
Problem: Potential for Falls  Goal: Patient will remain free of falls  Description: INTERVENTIONS:  - Assess patient frequently for physical needs  -  Identify cognitive and physical deficits and behaviors that affect risk of falls    -  [place] fall precautions as indicated by assessment   - Educate patient/family on patient safety including physical limitations  - Instruct patient to call for assistance with activity based on assessment  - Modify environment to reduce risk of injury  - Consider OT/PT consult to assist with strengthening/mobility  Outcome: Progressing     Problem: CARDIOVASCULAR - ADULT  Goal: Maintains optimal cardiac output and hemodynamic stability  Description: INTERVENTIONS:  - Monitor I/O, vital signs and rhythm  - Monitor for S/S and trends of decreased cardiac output  - Administer and titrate ordered vasoactive medications to optimize hemodynamic stability  - Assess quality of pulses, skin color and temperature  - Assess for signs of decreased coronary artery perfusion  - Instruct patient to report change in severity of symptoms  Outcome: Progressing  Goal: Absence of cardiac dysrhythmias or at baseline rhythm  Description: INTERVENTIONS:  - Continuous cardiac monitoring, vital signs, obtain 12 lead EKG if ordered  - Administer antiarrhythmic and heart rate control medications as ordered  - Monitor electrolytes and administer replacement therapy as ordered  Outcome: Progressing     Problem: PAIN - ADULT  Goal: Verbalizes/displays adequate comfort level or baseline comfort level  Description: Interventions:  - Encourage patient to monitor pain and request assistance  - Assess pain using appropriate pain scale  - Administer analgesics based on type and severity of pain and evaluate response  - Implement non-pharmacological measures as appropriate and evaluate response  - Consider cultural and social influences on pain and pain management  - Notify physician/advanced practitioner if interventions unsuccessful or patient reports new pain  Outcome: Progressing     Problem: INFECTION - ADULT  Goal: Absence or prevention of progression during hospitalization  Description: INTERVENTIONS:  - Assess and monitor for signs and symptoms of infection  - Monitor lab/diagnostic results  - Monitor all insertion sites, i e  indwelling lines, tubes, and drains  - Monitor endotracheal if appropriate and nasal secretions for changes in amount and color  - [place] appropriate cooling/warming therapies per order  - Administer medications as ordered  - Instruct and encourage patient and family to use good hand hygiene technique  - Identify and instruct in appropriate isolation precautions for identified infection/condition  Outcome: Progressing  Goal: Absence of fever/infection during neutropenic period  Description: INTERVENTIONS:  - Monitor WBC    Outcome: Progressing     Problem: SAFETY ADULT  Goal: Patient will remain free of falls  Description: INTERVENTIONS:  - Assess patient frequently for physical needs  -  Identify cognitive and physical deficits and behaviors that affect risk of falls    -  [place] fall precautions as indicated by assessment   - Educate patient/family on patient safety including physical limitations  - Instruct patient to call for assistance with activity based on assessment  - Modify environment to reduce risk of injury  - Consider OT/PT consult to assist with strengthening/mobility  Outcome: Progressing  Goal: Maintain or return to baseline ADL function  Description: INTERVENTIONS:  -  Assess patient's ability to carry out ADLs; assess patient's baseline for ADL function and identify physical deficits which impact ability to perform ADLs (bathing, care of mouth/teeth, toileting, grooming, dressing, etc )  - Assess/evaluate cause of self-care deficits   - Assess range of motion  - Assess patient's mobility; develop plan if impaired  - Assess patient's need for assistive devices and provide as appropriate  - Encourage maximum independence but intervene and supervise when necessary  - Involve family in performance of ADLs  - Assess for home care needs following discharge   - Consider OT consult to assist with ADL evaluation and planning for discharge  - Provide patient education as appropriate  Outcome: Progressing  Goal: Maintain or return mobility status to optimal level  Description: INTERVENTIONS:  - Assess patient's baseline mobility status (ambulation, transfers, stairs, etc )    - Identify cognitive and physical deficits and behaviors that affect mobility  - Identify mobility aids required to assist with transfers and/or ambulation (gait belt, sit-to-stand, lift, walker, cane, etc )  - [place] fall precautions as indicated by assessment  - Record patient progress and toleration of activity level on Mobility SBAR; progress patient to next Phase/Stage  - Instruct patient to call for assistance with activity based on assessment  - Consider rehabilitation consult to assist with strengthening/weightbearing, etc   Outcome: Progressing     Problem: DISCHARGE PLANNING  Goal: Discharge to home or other facility with appropriate resources  Description: INTERVENTIONS:  - Identify barriers to discharge w/patient and caregiver  - Arrange for needed discharge resources and transportation as appropriate  - Identify discharge learning needs (meds, wound care, etc )  - Arrange for interpretive services to assist at discharge as needed  - Refer to Case Management Department for coordinating discharge planning if the patient needs post-hospital services based on physician/advanced practitioner order or complex needs related to functional status, cognitive ability, or social support system  Outcome: Progressing     Problem: Knowledge Deficit  Goal: Patient/family/caregiver demonstrates understanding of disease process, treatment plan, medications, and discharge instructions  Description: Complete learning assessment and assess knowledge base    Interventions:  - Provide teaching at level of understanding  - Provide teaching via preferred learning methods  Outcome: Progressing     Problem: GASTROINTESTINAL - ADULT  Goal: Minimal or absence of nausea and/or vomiting  Description: INTERVENTIONS:  - Administer IV fluids if ordered to ensure adequate hydration  - Maintain NPO status until nausea and vomiting are resolved  - Nasogastric tube if ordered  - Administer ordered antiemetic medications as needed  - Provide nonpharmacologic comfort measures as appropriate  - Advance diet as tolerated, if ordered  - Consider nutrition services referral to assist patient with adequate nutrition and appropriate food choices  Outcome: Progressing  Goal: Maintains or returns to baseline bowel function  Description: INTERVENTIONS:  - Assess bowel function  - Encourage oral fluids to ensure adequate hydration  - Administer IV fluids if ordered to ensure adequate hydration  - Administer ordered medications as needed  - Encourage mobilization and activity  - Consider nutritional services referral to assist patient with adequate nutrition and appropriate food choices  Outcome: Progressing  Goal: Maintains adequate nutritional intake  Description: INTERVENTIONS:  - Monitor percentage of each meal consumed  - Identify factors contributing to decreased intake, treat as appropriate  - Assist with meals as needed  - Monitor I&O, weight, and lab values if indicated  - Obtain nutrition services referral as needed  Outcome: Progressing

## 2020-08-28 ENCOUNTER — ANESTHESIA (INPATIENT)
Dept: PERIOP | Facility: HOSPITAL | Age: 60
DRG: 330 | End: 2020-08-28
Payer: COMMERCIAL

## 2020-08-28 ENCOUNTER — APPOINTMENT (INPATIENT)
Dept: NON INVASIVE DIAGNOSTICS | Facility: HOSPITAL | Age: 60
DRG: 330 | End: 2020-08-28
Attending: INTERNAL MEDICINE
Payer: COMMERCIAL

## 2020-08-28 LAB
ANION GAP SERPL CALCULATED.3IONS-SCNC: 7 MMOL/L (ref 4–13)
ANION GAP SERPL CALCULATED.3IONS-SCNC: 7 MMOL/L (ref 4–13)
ANISOCYTOSIS BLD QL SMEAR: PRESENT
BASOPHILS # BLD AUTO: 0.07 THOUSANDS/ΜL (ref 0–0.1)
BASOPHILS # BLD MANUAL: 0 THOUSAND/UL (ref 0–0.1)
BASOPHILS NFR BLD AUTO: 1 % (ref 0–1)
BASOPHILS NFR MAR MANUAL: 0 % (ref 0–1)
BUN SERPL-MCNC: 10 MG/DL (ref 5–25)
BUN SERPL-MCNC: 12 MG/DL (ref 5–25)
CALCIUM SERPL-MCNC: 8.2 MG/DL (ref 8.3–10.1)
CALCIUM SERPL-MCNC: 9.1 MG/DL (ref 8.3–10.1)
CHLORIDE SERPL-SCNC: 109 MMOL/L (ref 100–108)
CHLORIDE SERPL-SCNC: 111 MMOL/L (ref 100–108)
CO2 SERPL-SCNC: 19 MMOL/L (ref 21–32)
CO2 SERPL-SCNC: 21 MMOL/L (ref 21–32)
CREAT SERPL-MCNC: 1.09 MG/DL (ref 0.6–1.3)
CREAT SERPL-MCNC: 1.2 MG/DL (ref 0.6–1.3)
EOSINOPHIL # BLD AUTO: 0.13 THOUSAND/ΜL (ref 0–0.61)
EOSINOPHIL # BLD MANUAL: 0 THOUSAND/UL (ref 0–0.4)
EOSINOPHIL NFR BLD AUTO: 2 % (ref 0–6)
EOSINOPHIL NFR BLD MANUAL: 0 % (ref 0–6)
ERYTHROCYTE [DISTWIDTH] IN BLOOD BY AUTOMATED COUNT: 18.7 % (ref 11.6–15.1)
ERYTHROCYTE [DISTWIDTH] IN BLOOD BY AUTOMATED COUNT: 19.1 % (ref 11.6–15.1)
GFR SERPL CREATININE-BSD FRML MDRD: 65 ML/MIN/1.73SQ M
GFR SERPL CREATININE-BSD FRML MDRD: 73 ML/MIN/1.73SQ M
GIANT PLATELETS BLD QL SMEAR: PRESENT
GLUCOSE SERPL-MCNC: 102 MG/DL (ref 65–140)
GLUCOSE SERPL-MCNC: 152 MG/DL (ref 65–140)
HCT VFR BLD AUTO: 32.8 % (ref 36.5–49.3)
HCT VFR BLD AUTO: 35.8 % (ref 36.5–49.3)
HGB BLD-MCNC: 10.3 G/DL (ref 12–17)
HGB BLD-MCNC: 9.5 G/DL (ref 12–17)
IMM GRANULOCYTES # BLD AUTO: 0.03 THOUSAND/UL (ref 0–0.2)
IMM GRANULOCYTES NFR BLD AUTO: 0 % (ref 0–2)
LYMPHOCYTES # BLD AUTO: 0.14 THOUSAND/UL (ref 0.6–4.47)
LYMPHOCYTES # BLD AUTO: 1 % (ref 14–44)
LYMPHOCYTES # BLD AUTO: 1.68 THOUSANDS/ΜL (ref 0.6–4.47)
LYMPHOCYTES NFR BLD AUTO: 25 % (ref 14–44)
MAGNESIUM SERPL-MCNC: 2 MG/DL (ref 1.6–2.6)
MAGNESIUM SERPL-MCNC: 2 MG/DL (ref 1.6–2.6)
MCH RBC QN AUTO: 24.1 PG (ref 26.8–34.3)
MCH RBC QN AUTO: 24.2 PG (ref 26.8–34.3)
MCHC RBC AUTO-ENTMCNC: 28.8 G/DL (ref 31.4–37.4)
MCHC RBC AUTO-ENTMCNC: 29 G/DL (ref 31.4–37.4)
MCV RBC AUTO: 84 FL (ref 82–98)
MCV RBC AUTO: 84 FL (ref 82–98)
MONOCYTES # BLD AUTO: 0.41 THOUSAND/UL (ref 0–1.22)
MONOCYTES # BLD AUTO: 0.83 THOUSAND/ΜL (ref 0.17–1.22)
MONOCYTES NFR BLD AUTO: 12 % (ref 4–12)
MONOCYTES NFR BLD: 3 % (ref 4–12)
NEUTROPHILS # BLD AUTO: 4.12 THOUSANDS/ΜL (ref 1.85–7.62)
NEUTROPHILS # BLD MANUAL: 12.79 THOUSAND/UL (ref 1.85–7.62)
NEUTS BAND NFR BLD MANUAL: 9 % (ref 0–8)
NEUTS SEG NFR BLD AUTO: 60 % (ref 43–75)
NEUTS SEG NFR BLD AUTO: 85 % (ref 43–75)
NRBC BLD AUTO-RTO: 0 /100 WBCS
NRBC BLD AUTO-RTO: 0 /100 WBCS
PHOSPHATE SERPL-MCNC: 3.5 MG/DL (ref 2.3–4.1)
PLATELET # BLD AUTO: 316 THOUSANDS/UL (ref 149–390)
PLATELET # BLD AUTO: 328 THOUSANDS/UL (ref 149–390)
PLATELET BLD QL SMEAR: ADEQUATE
PMV BLD AUTO: 10.4 FL (ref 8.9–12.7)
PMV BLD AUTO: 10.7 FL (ref 8.9–12.7)
POLYCHROMASIA BLD QL SMEAR: PRESENT
POTASSIUM SERPL-SCNC: 3.8 MMOL/L (ref 3.5–5.3)
POTASSIUM SERPL-SCNC: 4.4 MMOL/L (ref 3.5–5.3)
RBC # BLD AUTO: 3.93 MILLION/UL (ref 3.88–5.62)
RBC # BLD AUTO: 4.27 MILLION/UL (ref 3.88–5.62)
RBC MORPH BLD: PRESENT
SODIUM SERPL-SCNC: 137 MMOL/L (ref 136–145)
SODIUM SERPL-SCNC: 137 MMOL/L (ref 136–145)
VARIANT LYMPHS # BLD AUTO: 2 %
WBC # BLD AUTO: 13.61 THOUSAND/UL (ref 4.31–10.16)
WBC # BLD AUTO: 6.86 THOUSAND/UL (ref 4.31–10.16)

## 2020-08-28 PROCEDURE — 84100 ASSAY OF PHOSPHORUS: CPT | Performed by: STUDENT IN AN ORGANIZED HEALTH CARE EDUCATION/TRAINING PROGRAM

## 2020-08-28 PROCEDURE — 83735 ASSAY OF MAGNESIUM: CPT | Performed by: SURGERY

## 2020-08-28 PROCEDURE — 85025 COMPLETE CBC W/AUTO DIFF WBC: CPT | Performed by: SURGERY

## 2020-08-28 PROCEDURE — 88342 IMHCHEM/IMCYTCHM 1ST ANTB: CPT | Performed by: PATHOLOGY

## 2020-08-28 PROCEDURE — 80048 BASIC METABOLIC PNL TOTAL CA: CPT | Performed by: SURGERY

## 2020-08-28 PROCEDURE — 93325 DOPPLER ECHO COLOR FLOW MAPG: CPT | Performed by: INTERNAL MEDICINE

## 2020-08-28 PROCEDURE — 93312 ECHO TRANSESOPHAGEAL: CPT

## 2020-08-28 PROCEDURE — 85007 BL SMEAR W/DIFF WBC COUNT: CPT | Performed by: SURGERY

## 2020-08-28 PROCEDURE — 85027 COMPLETE CBC AUTOMATED: CPT | Performed by: SURGERY

## 2020-08-28 PROCEDURE — 93312 ECHO TRANSESOPHAGEAL: CPT | Performed by: INTERNAL MEDICINE

## 2020-08-28 PROCEDURE — 93320 DOPPLER ECHO COMPLETE: CPT | Performed by: INTERNAL MEDICINE

## 2020-08-28 PROCEDURE — 0DTF0ZZ RESECTION OF RIGHT LARGE INTESTINE, OPEN APPROACH: ICD-10-PCS | Performed by: COLON & RECTAL SURGERY

## 2020-08-28 PROCEDURE — 88309 TISSUE EXAM BY PATHOLOGIST: CPT | Performed by: PATHOLOGY

## 2020-08-28 PROCEDURE — 99232 SBSQ HOSP IP/OBS MODERATE 35: CPT | Performed by: INTERNAL MEDICINE

## 2020-08-28 PROCEDURE — 44204 LAPARO PARTIAL COLECTOMY: CPT | Performed by: COLON & RECTAL SURGERY

## 2020-08-28 RX ORDER — DILTIAZEM HYDROCHLORIDE 60 MG/1
60 CAPSULE, EXTENDED RELEASE ORAL EVERY 12 HOURS SCHEDULED
Status: DISCONTINUED | OUTPATIENT
Start: 2020-08-28 | End: 2020-08-31

## 2020-08-28 RX ORDER — SPIRONOLACTONE 25 MG/1
12.5 TABLET ORAL DAILY
Status: DISCONTINUED | OUTPATIENT
Start: 2020-08-29 | End: 2020-09-01 | Stop reason: HOSPADM

## 2020-08-28 RX ORDER — MAGNESIUM HYDROXIDE 1200 MG/15ML
LIQUID ORAL AS NEEDED
Status: DISCONTINUED | OUTPATIENT
Start: 2020-08-28 | End: 2020-08-28 | Stop reason: HOSPADM

## 2020-08-28 RX ORDER — PROPOFOL 10 MG/ML
INJECTION, EMULSION INTRAVENOUS AS NEEDED
Status: DISCONTINUED | OUTPATIENT
Start: 2020-08-28 | End: 2020-08-28

## 2020-08-28 RX ORDER — HYDROMORPHONE HCL/PF 1 MG/ML
0.2 SYRINGE (ML) INJECTION
Status: DISCONTINUED | OUTPATIENT
Start: 2020-08-28 | End: 2020-08-28 | Stop reason: HOSPADM

## 2020-08-28 RX ORDER — ROCURONIUM BROMIDE 10 MG/ML
INJECTION, SOLUTION INTRAVENOUS AS NEEDED
Status: DISCONTINUED | OUTPATIENT
Start: 2020-08-28 | End: 2020-08-28

## 2020-08-28 RX ORDER — MAGNESIUM SULFATE 1 G/100ML
1 INJECTION INTRAVENOUS ONCE
Status: COMPLETED | OUTPATIENT
Start: 2020-08-28 | End: 2020-08-28

## 2020-08-28 RX ORDER — SODIUM CHLORIDE, SODIUM LACTATE, POTASSIUM CHLORIDE, CALCIUM CHLORIDE 600; 310; 30; 20 MG/100ML; MG/100ML; MG/100ML; MG/100ML
125 INJECTION, SOLUTION INTRAVENOUS CONTINUOUS
Status: DISCONTINUED | OUTPATIENT
Start: 2020-08-28 | End: 2020-08-30

## 2020-08-28 RX ORDER — PROMETHAZINE HYDROCHLORIDE 25 MG/ML
12.5 INJECTION, SOLUTION INTRAMUSCULAR; INTRAVENOUS ONCE AS NEEDED
Status: DISCONTINUED | OUTPATIENT
Start: 2020-08-28 | End: 2020-08-28 | Stop reason: HOSPADM

## 2020-08-28 RX ORDER — KETAMINE HCL IN NACL, ISO-OSM 100MG/10ML
SYRINGE (ML) INJECTION AS NEEDED
Status: DISCONTINUED | OUTPATIENT
Start: 2020-08-28 | End: 2020-08-28

## 2020-08-28 RX ORDER — HEPARIN SODIUM 5000 [USP'U]/ML
5000 INJECTION, SOLUTION INTRAVENOUS; SUBCUTANEOUS EVERY 8 HOURS SCHEDULED
Status: DISCONTINUED | OUTPATIENT
Start: 2020-08-29 | End: 2020-08-31

## 2020-08-28 RX ORDER — HYDROMORPHONE HCL/PF 1 MG/ML
0.5 SYRINGE (ML) INJECTION
Status: DISCONTINUED | OUTPATIENT
Start: 2020-08-28 | End: 2020-08-28 | Stop reason: HOSPADM

## 2020-08-28 RX ORDER — HEPARIN SODIUM 5000 [USP'U]/ML
5000 INJECTION, SOLUTION INTRAVENOUS; SUBCUTANEOUS EVERY 8 HOURS SCHEDULED
Status: DISCONTINUED | OUTPATIENT
Start: 2020-08-29 | End: 2020-08-28

## 2020-08-28 RX ORDER — DEXAMETHASONE SODIUM PHOSPHATE 10 MG/ML
INJECTION, SOLUTION INTRAMUSCULAR; INTRAVENOUS AS NEEDED
Status: DISCONTINUED | OUTPATIENT
Start: 2020-08-28 | End: 2020-08-28

## 2020-08-28 RX ORDER — LABETALOL 20 MG/4 ML (5 MG/ML) INTRAVENOUS SYRINGE
10
Status: DISCONTINUED | OUTPATIENT
Start: 2020-08-28 | End: 2020-08-28 | Stop reason: HOSPADM

## 2020-08-28 RX ORDER — SODIUM CHLORIDE 9 MG/ML
INJECTION, SOLUTION INTRAVENOUS CONTINUOUS PRN
Status: DISCONTINUED | OUTPATIENT
Start: 2020-08-28 | End: 2020-08-28

## 2020-08-28 RX ORDER — GLYCOPYRROLATE 0.2 MG/ML
INJECTION INTRAMUSCULAR; INTRAVENOUS AS NEEDED
Status: DISCONTINUED | OUTPATIENT
Start: 2020-08-28 | End: 2020-08-28

## 2020-08-28 RX ORDER — ESMOLOL HYDROCHLORIDE 10 MG/ML
INJECTION INTRAVENOUS AS NEEDED
Status: DISCONTINUED | OUTPATIENT
Start: 2020-08-28 | End: 2020-08-28

## 2020-08-28 RX ORDER — HYDRALAZINE HYDROCHLORIDE 20 MG/ML
5 INJECTION INTRAMUSCULAR; INTRAVENOUS
Status: DISCONTINUED | OUTPATIENT
Start: 2020-08-28 | End: 2020-08-28 | Stop reason: HOSPADM

## 2020-08-28 RX ORDER — ONDANSETRON 2 MG/ML
INJECTION INTRAMUSCULAR; INTRAVENOUS AS NEEDED
Status: DISCONTINUED | OUTPATIENT
Start: 2020-08-28 | End: 2020-08-28

## 2020-08-28 RX ORDER — ALBUTEROL SULFATE 2.5 MG/3ML
2.5 SOLUTION RESPIRATORY (INHALATION) ONCE AS NEEDED
Status: DISCONTINUED | OUTPATIENT
Start: 2020-08-28 | End: 2020-08-28 | Stop reason: HOSPADM

## 2020-08-28 RX ORDER — SODIUM CHLORIDE, SODIUM LACTATE, POTASSIUM CHLORIDE, CALCIUM CHLORIDE 600; 310; 30; 20 MG/100ML; MG/100ML; MG/100ML; MG/100ML
INJECTION, SOLUTION INTRAVENOUS CONTINUOUS PRN
Status: DISCONTINUED | OUTPATIENT
Start: 2020-08-28 | End: 2020-08-28

## 2020-08-28 RX ORDER — NALOXONE HYDROCHLORIDE 0.4 MG/ML
0.1 INJECTION, SOLUTION INTRAMUSCULAR; INTRAVENOUS; SUBCUTANEOUS
Status: DISCONTINUED | OUTPATIENT
Start: 2020-08-28 | End: 2020-09-01 | Stop reason: HOSPADM

## 2020-08-28 RX ORDER — METOCLOPRAMIDE HYDROCHLORIDE 5 MG/ML
10 INJECTION INTRAMUSCULAR; INTRAVENOUS ONCE AS NEEDED
Status: DISCONTINUED | OUTPATIENT
Start: 2020-08-28 | End: 2020-08-28 | Stop reason: HOSPADM

## 2020-08-28 RX ORDER — FENTANYL CITRATE 50 UG/ML
INJECTION, SOLUTION INTRAMUSCULAR; INTRAVENOUS AS NEEDED
Status: DISCONTINUED | OUTPATIENT
Start: 2020-08-28 | End: 2020-08-28

## 2020-08-28 RX ORDER — FENTANYL CITRATE/PF 50 MCG/ML
25 SYRINGE (ML) INJECTION
Status: DISCONTINUED | OUTPATIENT
Start: 2020-08-28 | End: 2020-08-28 | Stop reason: HOSPADM

## 2020-08-28 RX ORDER — HYDROMORPHONE HCL 110MG/55ML
PATIENT CONTROLLED ANALGESIA SYRINGE INTRAVENOUS AS NEEDED
Status: DISCONTINUED | OUTPATIENT
Start: 2020-08-28 | End: 2020-08-28

## 2020-08-28 RX ORDER — ONDANSETRON 2 MG/ML
4 INJECTION INTRAMUSCULAR; INTRAVENOUS ONCE AS NEEDED
Status: DISCONTINUED | OUTPATIENT
Start: 2020-08-28 | End: 2020-08-28 | Stop reason: HOSPADM

## 2020-08-28 RX ORDER — METOPROLOL SUCCINATE 100 MG/1
100 TABLET, EXTENDED RELEASE ORAL 2 TIMES DAILY
Status: DISCONTINUED | OUTPATIENT
Start: 2020-08-28 | End: 2020-09-01 | Stop reason: HOSPADM

## 2020-08-28 RX ORDER — METOCLOPRAMIDE HYDROCHLORIDE 5 MG/ML
INJECTION INTRAMUSCULAR; INTRAVENOUS AS NEEDED
Status: DISCONTINUED | OUTPATIENT
Start: 2020-08-28 | End: 2020-08-28

## 2020-08-28 RX ORDER — MIDAZOLAM HYDROCHLORIDE 2 MG/2ML
INJECTION, SOLUTION INTRAMUSCULAR; INTRAVENOUS AS NEEDED
Status: DISCONTINUED | OUTPATIENT
Start: 2020-08-28 | End: 2020-08-28

## 2020-08-28 RX ORDER — NEOSTIGMINE METHYLSULFATE 1 MG/ML
INJECTION INTRAVENOUS AS NEEDED
Status: DISCONTINUED | OUTPATIENT
Start: 2020-08-28 | End: 2020-08-28

## 2020-08-28 RX ORDER — POTASSIUM CHLORIDE 14.9 MG/ML
20 INJECTION INTRAVENOUS ONCE
Status: COMPLETED | OUTPATIENT
Start: 2020-08-28 | End: 2020-08-28

## 2020-08-28 RX ORDER — LIDOCAINE HYDROCHLORIDE 10 MG/ML
INJECTION, SOLUTION EPIDURAL; INFILTRATION; INTRACAUDAL; PERINEURAL AS NEEDED
Status: DISCONTINUED | OUTPATIENT
Start: 2020-08-28 | End: 2020-08-28

## 2020-08-28 RX ADMIN — ALLOPURINOL 100 MG: 100 TABLET ORAL at 09:16

## 2020-08-28 RX ADMIN — Medication 25 MCG: at 17:02

## 2020-08-28 RX ADMIN — LIDOCAINE HYDROCHLORIDE 50 MG: 10 INJECTION, SOLUTION EPIDURAL; INFILTRATION; INTRACAUDAL; PERINEURAL at 13:19

## 2020-08-28 RX ADMIN — POTASSIUM CHLORIDE 20 MEQ: 14.9 INJECTION, SOLUTION INTRAVENOUS at 07:34

## 2020-08-28 RX ADMIN — SPIRONOLACTONE 12.5 MG: 25 TABLET ORAL at 09:10

## 2020-08-28 RX ADMIN — ROCURONIUM BROMIDE 50 MG: 10 INJECTION, SOLUTION INTRAVENOUS at 13:20

## 2020-08-28 RX ADMIN — ROCURONIUM BROMIDE 20 MG: 10 INJECTION, SOLUTION INTRAVENOUS at 15:15

## 2020-08-28 RX ADMIN — MAGNESIUM SULFATE HEPTAHYDRATE 1 G: 1 INJECTION, SOLUTION INTRAVENOUS at 09:11

## 2020-08-28 RX ADMIN — PHENYLEPHRINE HYDROCHLORIDE 100 MCG: 10 INJECTION INTRAVENOUS at 14:27

## 2020-08-28 RX ADMIN — DEXAMETHASONE SODIUM PHOSPHATE 10 MG: 10 INJECTION, SOLUTION INTRAMUSCULAR; INTRAVENOUS at 14:19

## 2020-08-28 RX ADMIN — DILTIAZEM HYDROCHLORIDE 60 MG: 60 CAPSULE, EXTENDED RELEASE ORAL at 20:25

## 2020-08-28 RX ADMIN — HYDROMORPHONE HYDROCHLORIDE 1 MG: 2 INJECTION, SOLUTION INTRAMUSCULAR; INTRAVENOUS; SUBCUTANEOUS at 16:32

## 2020-08-28 RX ADMIN — Medication 50 MG: at 13:19

## 2020-08-28 RX ADMIN — DILTIAZEM HYDROCHLORIDE 60 MG: 60 CAPSULE, EXTENDED RELEASE ORAL at 09:16

## 2020-08-28 RX ADMIN — ONDANSETRON 4 MG: 2 INJECTION INTRAMUSCULAR; INTRAVENOUS at 15:47

## 2020-08-28 RX ADMIN — PHENYLEPHRINE HYDROCHLORIDE 100 MCG: 10 INJECTION INTRAVENOUS at 15:47

## 2020-08-28 RX ADMIN — SODIUM CHLORIDE 2 MCG/MIN: 0.9 INJECTION, SOLUTION INTRAVENOUS at 13:35

## 2020-08-28 RX ADMIN — PHENYLEPHRINE HYDROCHLORIDE 200 MCG: 10 INJECTION INTRAVENOUS at 13:39

## 2020-08-28 RX ADMIN — MIDAZOLAM 2 MG: 1 INJECTION INTRAMUSCULAR; INTRAVENOUS at 13:09

## 2020-08-28 RX ADMIN — NEOSTIGMINE METHYLSULFATE 4 MG: 1 INJECTION, SOLUTION INTRAVENOUS at 16:14

## 2020-08-28 RX ADMIN — SODIUM CHLORIDE, SODIUM LACTATE, POTASSIUM CHLORIDE, AND CALCIUM CHLORIDE 125 ML/HR: .6; .31; .03; .02 INJECTION, SOLUTION INTRAVENOUS at 17:56

## 2020-08-28 RX ADMIN — Medication 25 MCG: at 16:54

## 2020-08-28 RX ADMIN — METOPROLOL SUCCINATE 100 MG: 100 TABLET, EXTENDED RELEASE ORAL at 23:26

## 2020-08-28 RX ADMIN — SODIUM CHLORIDE, SODIUM LACTATE, POTASSIUM CHLORIDE, AND CALCIUM CHLORIDE 125 ML/HR: .6; .31; .03; .02 INJECTION, SOLUTION INTRAVENOUS at 23:27

## 2020-08-28 RX ADMIN — SODIUM CHLORIDE: 0.9 INJECTION, SOLUTION INTRAVENOUS at 13:35

## 2020-08-28 RX ADMIN — Medication: at 17:30

## 2020-08-28 RX ADMIN — PROPOFOL 110 MG: 10 INJECTION, EMULSION INTRAVENOUS at 13:19

## 2020-08-28 RX ADMIN — SODIUM CHLORIDE, SODIUM LACTATE, POTASSIUM CHLORIDE, AND CALCIUM CHLORIDE: .6; .31; .03; .02 INJECTION, SOLUTION INTRAVENOUS at 13:00

## 2020-08-28 RX ADMIN — SODIUM CHLORIDE, SODIUM LACTATE, POTASSIUM CHLORIDE, AND CALCIUM CHLORIDE: .6; .31; .03; .02 INJECTION, SOLUTION INTRAVENOUS at 15:11

## 2020-08-28 RX ADMIN — METRONIDAZOLE 500 MG: 500 INJECTION, SOLUTION INTRAVENOUS at 13:45

## 2020-08-28 RX ADMIN — HYDROMORPHONE HYDROCHLORIDE 1 MG: 2 INJECTION, SOLUTION INTRAMUSCULAR; INTRAVENOUS; SUBCUTANEOUS at 15:16

## 2020-08-28 RX ADMIN — METOCLOPRAMIDE 10 MG: 5 INJECTION, SOLUTION INTRAMUSCULAR; INTRAVENOUS at 14:19

## 2020-08-28 RX ADMIN — FENTANYL CITRATE 100 MCG: 50 INJECTION, SOLUTION INTRAMUSCULAR; INTRAVENOUS at 13:19

## 2020-08-28 RX ADMIN — CEFAZOLIN SODIUM 2000 MG: 2 SOLUTION INTRAVENOUS at 13:30

## 2020-08-28 RX ADMIN — ESMOLOL HYDROCHLORIDE 30 MG: 100 INJECTION, SOLUTION INTRAVENOUS at 16:14

## 2020-08-28 RX ADMIN — METOPROLOL SUCCINATE 100 MG: 100 TABLET, EXTENDED RELEASE ORAL at 09:10

## 2020-08-28 RX ADMIN — PHENYLEPHRINE HYDROCHLORIDE 100 MCG: 10 INJECTION INTRAVENOUS at 15:45

## 2020-08-28 RX ADMIN — GLYCOPYRROLATE 0.6 MG: 0.2 INJECTION, SOLUTION INTRAMUSCULAR; INTRAVENOUS at 16:14

## 2020-08-28 RX ADMIN — PHENYLEPHRINE HYDROCHLORIDE 200 MCG: 10 INJECTION INTRAVENOUS at 14:08

## 2020-08-28 RX ADMIN — ROCURONIUM BROMIDE 20 MG: 10 INJECTION, SOLUTION INTRAVENOUS at 14:13

## 2020-08-28 NOTE — PLAN OF CARE
Problem: Potential for Falls  Goal: Patient will remain free of falls  Description: INTERVENTIONS:  - Assess patient frequently for physical needs  -  Identify cognitive and physical deficits and behaviors that affect risk of falls    -  [place] fall precautions as indicated by assessment   - Educate patient/family on patient safety including physical limitations  - Instruct patient to call for assistance with activity based on assessment  - Modify environment to reduce risk of injury  - Consider OT/PT consult to assist with strengthening/mobility  Outcome: Progressing     Problem: CARDIOVASCULAR - ADULT  Goal: Maintains optimal cardiac output and hemodynamic stability  Description: INTERVENTIONS:  - Monitor I/O, vital signs and rhythm  - Monitor for S/S and trends of decreased cardiac output  - Administer and titrate ordered vasoactive medications to optimize hemodynamic stability  - Assess quality of pulses, skin color and temperature  - Assess for signs of decreased coronary artery perfusion  - Instruct patient to report change in severity of symptoms  Outcome: Progressing  Goal: Absence of cardiac dysrhythmias or at baseline rhythm  Description: INTERVENTIONS:  - Continuous cardiac monitoring, vital signs, obtain 12 lead EKG if ordered  - Administer antiarrhythmic and heart rate control medications as ordered  - Monitor electrolytes and administer replacement therapy as ordered  Outcome: Progressing     Problem: PAIN - ADULT  Goal: Verbalizes/displays adequate comfort level or baseline comfort level  Description: Interventions:  - Encourage patient to monitor pain and request assistance  - Assess pain using appropriate pain scale  - Administer analgesics based on type and severity of pain and evaluate response  - Implement non-pharmacological measures as appropriate and evaluate response  - Consider cultural and social influences on pain and pain management  - Notify physician/advanced practitioner if interventions unsuccessful or patient reports new pain  Outcome: Progressing     Problem: INFECTION - ADULT  Goal: Absence or prevention of progression during hospitalization  Description: INTERVENTIONS:  - Assess and monitor for signs and symptoms of infection  - Monitor lab/diagnostic results  - Monitor all insertion sites, i e  indwelling lines, tubes, and drains  - Monitor endotracheal if appropriate and nasal secretions for changes in amount and color  - [place] appropriate cooling/warming therapies per order  - Administer medications as ordered  - Instruct and encourage patient and family to use good hand hygiene technique  - Identify and instruct in appropriate isolation precautions for identified infection/condition  Outcome: Progressing  Goal: Absence of fever/infection during neutropenic period  Description: INTERVENTIONS:  - Monitor WBC    Outcome: Progressing     Problem: SAFETY ADULT  Goal: Patient will remain free of falls  Description: INTERVENTIONS:  - Assess patient frequently for physical needs  -  Identify cognitive and physical deficits and behaviors that affect risk of falls    -  [place] fall precautions as indicated by assessment   - Educate patient/family on patient safety including physical limitations  - Instruct patient to call for assistance with activity based on assessment  - Modify environment to reduce risk of injury  - Consider OT/PT consult to assist with strengthening/mobility  Outcome: Progressing  Goal: Maintain or return to baseline ADL function  Description: INTERVENTIONS:  -  Assess patient's ability to carry out ADLs; assess patient's baseline for ADL function and identify physical deficits which impact ability to perform ADLs (bathing, care of mouth/teeth, toileting, grooming, dressing, etc )  - Assess/evaluate cause of self-care deficits   - Assess range of motion  - Assess patient's mobility; develop plan if impaired  - Assess patient's need for assistive devices and provide as appropriate  - Encourage maximum independence but intervene and supervise when necessary  - Involve family in performance of ADLs  - Assess for home care needs following discharge   - Consider OT consult to assist with ADL evaluation and planning for discharge  - Provide patient education as appropriate  Outcome: Progressing  Goal: Maintain or return mobility status to optimal level  Description: INTERVENTIONS:  - Assess patient's baseline mobility status (ambulation, transfers, stairs, etc )    - Identify cognitive and physical deficits and behaviors that affect mobility  - Identify mobility aids required to assist with transfers and/or ambulation (gait belt, sit-to-stand, lift, walker, cane, etc )  - [place] fall precautions as indicated by assessment  - Record patient progress and toleration of activity level on Mobility SBAR; progress patient to next Phase/Stage  - Instruct patient to call for assistance with activity based on assessment  - Consider rehabilitation consult to assist with strengthening/weightbearing, etc   Outcome: Progressing     Problem: DISCHARGE PLANNING  Goal: Discharge to home or other facility with appropriate resources  Description: INTERVENTIONS:  - Identify barriers to discharge w/patient and caregiver  - Arrange for needed discharge resources and transportation as appropriate  - Identify discharge learning needs (meds, wound care, etc )  - Arrange for interpretive services to assist at discharge as needed  - Refer to Case Management Department for coordinating discharge planning if the patient needs post-hospital services based on physician/advanced practitioner order or complex needs related to functional status, cognitive ability, or social support system  Outcome: Progressing     Problem: Knowledge Deficit  Goal: Patient/family/caregiver demonstrates understanding of disease process, treatment plan, medications, and discharge instructions  Description: Complete learning assessment and assess knowledge base    Interventions:  - Provide teaching at level of understanding  - Provide teaching via preferred learning methods  Outcome: Progressing     Problem: GASTROINTESTINAL - ADULT  Goal: Minimal or absence of nausea and/or vomiting  Description: INTERVENTIONS:  - Administer IV fluids if ordered to ensure adequate hydration  - Maintain NPO status until nausea and vomiting are resolved  - Nasogastric tube if ordered  - Administer ordered antiemetic medications as needed  - Provide nonpharmacologic comfort measures as appropriate  - Advance diet as tolerated, if ordered  - Consider nutrition services referral to assist patient with adequate nutrition and appropriate food choices  Outcome: Progressing  Goal: Maintains or returns to baseline bowel function  Description: INTERVENTIONS:  - Assess bowel function  - Encourage oral fluids to ensure adequate hydration  - Administer IV fluids if ordered to ensure adequate hydration  - Administer ordered medications as needed  - Encourage mobilization and activity  - Consider nutritional services referral to assist patient with adequate nutrition and appropriate food choices  Outcome: Progressing  Goal: Maintains adequate nutritional intake  Description: INTERVENTIONS:  - Monitor percentage of each meal consumed  - Identify factors contributing to decreased intake, treat as appropriate  - Assist with meals as needed  - Monitor I&O, weight, and lab values if indicated  - Obtain nutrition services referral as needed  Outcome: Progressing

## 2020-08-28 NOTE — ANESTHESIA PROCEDURE NOTES
Arterial Line Insertion  Performed by: She Nuñez CRNA  Authorized by: Ирина Christopher MD   Consent: Written consent obtained  Risks and benefits: risks, benefits and alternatives were discussed  Consent given by: patient  Patient identity confirmed: arm band  Preparation: Patient was prepped and draped in the usual sterile fashion    Indications: hemodynamic monitoring  Orientation:  Left  Location: radial artery  Procedure Details:  Needle gauge: 20  Number of attempts: 1    Post-procedure:  Post-procedure: dressing applied  Waveform: good waveform and waveform confirmed  Post-procedure CNS: normal and unchanged  Patient tolerance: Patient tolerated the procedure well with no immediate complications

## 2020-08-28 NOTE — PROGRESS NOTES
Unless there is a strong indication for cardioversion, I recommend against conversion tomorrow, followed by full anticoagulation

## 2020-08-28 NOTE — PROGRESS NOTES
Progress Note - Colorectal Surgery   Brie Flores 61 y o  male MRN: [id number]  Unit/Bed#: -01 Encounter: [id number]    Assessment:  70-year-old male with ascending colon mass found on colonoscopy this admission and no evidence of metastatic disease per his CT scans of the chest, abdomen, and pelvis  CEA 1 7 this admission, and planned for colon resection today with concurrent JOSE LUIS by Cardiology  Underwent successful bowel prep yesterday, and is currently NPO for surgery  VSS  Passing clear bowel movements  Cr 1 25>>1 2, improving     Plan:  -maintain NPO on IV fluids  -preoperative electrolyte replacement  -heparin held preoperatively  -proceed with laparoscopically assisted right hemicolectomy today  -concurrent JOSE LUIS by Cardiology  -perioperative antibiotics ordered  -type and screen completed      Subjective/Objective     Subjective: The patient rested comfortably overnight, in no acute events occurred  He completed his bowel prep early in the afternoon yesterday and has been passing multiple liquid bowel movements, that eventually began to run clear  He denies nausea, vomiting, and abdominal pain  He has been ambulating out of bed without difficulty and making ample amounts of urine  Objective:     Blood pressure 143/91, pulse 102, temperature 98 1 °F (36 7 °C), resp  rate 16, height 5' 9" (1 753 m), weight 97 1 kg (214 lb 1 1 oz), SpO2 98 %  ,Body mass index is 31 61 kg/m²        Intake/Output Summary (Last 24 hours) at 8/28/2020 0553  Last data filed at 8/27/2020 2203  Gross per 24 hour   Intake 3039 67 ml   Output    Net 3039 67 ml       Invasive Devices     Peripheral Intravenous Line            Peripheral IV 08/25/20 Left Hand 2 days                Physical Exam:  General:  Adult male, well-developed, no acute distress  HEENT:  Normocephalic, atraumatic  Cardiovascular:  Regular rate, no murmurs  Respiratory:  Normal effort, lungs clear bilaterally to auscultation  Abdomen:  Soft, nondistended, non-tender  Extremities:  Some Luis, no deformity, no clubbing, cyanosis, or edema  Neuro:  AAO x3, no focal deficits  Skin:  Warm, dry, intact    Lab, Imaging and other studies:  CBC:   Lab Results   Component Value Date    WBC 6 86 08/28/2020    HGB 10 3 (L) 08/28/2020    HCT 35 8 (L) 08/28/2020    MCV 84 08/28/2020     08/28/2020    MCH 24 1 (L) 08/28/2020    MCHC 28 8 (L) 08/28/2020    RDW 19 1 (H) 08/28/2020    MPV 10 4 08/28/2020    NRBC 0 08/28/2020   , CMP:   Lab Results   Component Value Date    SODIUM 137 08/28/2020    K 3 8 08/28/2020     (H) 08/28/2020    CO2 21 08/28/2020    BUN 12 08/28/2020    CREATININE 1 20 08/28/2020    CALCIUM 9 1 08/28/2020    EGFR 65 08/28/2020   , Coagulation: No results found for: PT, INR, APTT  VTE Pharmacologic Prophylaxis: Heparin  VTE Mechanical Prophylaxis: sequential compression device

## 2020-08-28 NOTE — PROGRESS NOTES
INTERNAL MEDICINE RESIDENCY PROGRESS NOTE     Name: Anca Quintero   Age & Sex: 61 y o  male   MRN: [id number]  Unit/Bed#: -01   Encounter: [id number]  Team: SOD Team A    PATIENT INFORMATION     Name: Anca Quintero   Age & Sex: 61 y o  male   MRN: [id number]  Hospital Stay Days: 7    ASSESSMENT/PLAN     Principal Problem:    Atrial fibrillation with rapid ventricular response ([facility] [address] )  Active Problems:    Systolic CHF ([facility] [address] )    Mass of cecum    Gastrointestinal hemorrhage with melena    Microcytic anemia    SCARLETT (acute kidney injury) ([facility] [address] )    History of gout    Coronary artery disease    GI bleeding      * Atrial fibrillation with rapid ventricular response ([facility] [address] )  Assessment & Plan  Known history of AFib, being followed by cardiology outpatient  Patient presented to ED on 8/22 in AFib with RVR (rate 122), despite medication compliance  Patient also found to be hypotensive  Home medications for rate control include digoxin 0 125 mg and metoprolol succinate 100 mg b i d  Follows with Dr Aziza Whatley out patient  Had been scheduled for a stress test today outpatient, but came to the ED instead due to his hypotension (90/60s)  No valvular disease per past cardiology work-up  Plan:  -Increased rate likely exacerbated by current blood-loss anemia 2/2 GI bleed (suspect GI malignancy as source)  -Cardizem started in ED to control rate, but then d/c prior to admission because of decreasing BP again  -Cardiology following, appreciate their recommendations  -QTc of 459    -CHADS-VASC score 2, HAS BLED 1  -Telemetry discontinued  -Eliquis held, heparin discontinued, heparin SQ started 8/26  -Heparin discontinued for surgery today  -Per cardiology, continue Toprol  mg BID and digoxin 125 mcg daily  -Cardizem 60 mg Q12H started 8/25  -Consider checking digoxin level         Systolic CHF (HCC)  Assessment & Plan  Pt with hx of systolic CHF with EF 74% (per Echo 1 year ago)   Compliant with home medication lasix 60 mg, aldoctone 25 mg, entresto 24-26 mg  States he has lost 8 lbs recently, based off of his home scale and the ED scale  However the differences in two difference scale measurements is not necessarily reliable  Patient admits to one month worsening SOB, now with dyspnea with ADL  Baseline sleeps with 3 pillows under his head, and this is unchanged in the last month  Patient denies episodes of lower extremity edema     -2018 stress test showed partially reversible myocardial perfusion defect of basal to mid inferolateral wall   -2018 cardiac cath (s/p NSTEMI type II) showed single vessel CAD with 80% stenosis of the proximal ramus artery  No stent was placed    -2018: Small persistent pericardial effusion present  -2018 TTE: LVEF 50%    Wt Readings from Last 3 Encounters:   08/21/20 99 8 kg (220 lb)   07/27/20 101 kg (222 lb)   03/19/19 102 kg (224 lb)     Plan:  -CXR negative for acute cardiopulmonary disease  -Aldactone 12 5 mg  -Lasix 40 mg restarted 8/25- discontinued 8/28 for surgery  -Cardiology following  Their recommendations appreciated  Continuing with a fib rate control with digoxin and metoprolol (restarted 8/22), cardizem (restarted 8/25)  -JOSE LUIS planned while in OR for cecal mass on Friday  -Patient appears euvolemic  -Hypotension likely 2/2 blood loss anemia- resolved, /91  -SCARLETT- decrease Cr 1 20  -Daily standing weights, today 214 lbs  -Cardiac diet: <2L fluids, <2g Na+, alcohol cessation- now NPO for surgery    Mass of cecum  Assessment & Plan  Patient with no history of colonoscopy  Colonoscopy performed yesterday for investigation of anemia  Found to have a malignant-appearing, multilobular and ulcerated mass in the proximal ascending colon, covering one third of the circumference  Biopsy was taken  CTAP showed no evidence of metastatic disease      Plan:  -Colorectal surgery following  -Plan for OR today  -Continue heparin SQ until 12 AM Friday (8/28/20)  -Now NPO for surgery    Gastrointestinal hemorrhage with melena  Assessment & Plan  Patient endorses 1 month history of melena, which is new  He has never had a colonscopy  BP on admission 90s/60s  Hb on admission 9 3, patient symptomatic with light-headedness, SOB and hypotension    Plan:  -Transfused 1 unit pRBC  -CBC in am, Hgb 10 3 stable  -Hold Eliquis, restart post op when able  -EGD: mild erythematous mucosa with erosion in the GE junction (43 cm from the incisors)  LA grade A  Mild, localized erosion in the stomach; performed cold biopsy  R/o H pylori  Mild, localized erythematous mucosa in the duodenal bulb; performed cold biopsy  -Colonoscopy: Malignant-appearing, multilobular and ulcerated mass in the proximal ascending colon, covering one third of the circumference; performed cold biopsy  One adenomatous-appearing polyp measuring smaller than 5 mm in the cecum; removed en bloc by cold snare and retrieved specimen 15 mm sessile, adenomatous-appearing polyp in the transverse colon; removed en bloc by hot snare and retrieved specimen; placed 2 clips successfully (clips were MRI compatible)  The examination was otherwise normal on direct and retroflexion views  -OR today with colorectal surgery for resection    Microcytic anemia  Assessment & Plan  Found to have Hb of 9 3 on admission  Last known hemoglobin from two years ago was 13  Patient endorses a one month history of consistently black, tarry stools, which is new for him  Heme-occult in ED was positive  Patient has never had a colonoscopy  Patient also hypotensive on arrival (90/60s)  Plan:  -Suspected anemia as cause of SCARLETT and contributing to uncontrolled a fib RVR, Cr today 1 20  -Received 1 unit of PRBC this admission  -Hgb today is up to 10 3  -Continue to monitor BP and Hb  -Heparin drip discontinued, SQ started on 8/26  -Heparin now held for OR today      SCARLETT (acute kidney injury) [facility])  Assessment & Plan  Presented with creatinine of 2 31   Last creatinine 0 92-1  Plan:  -Likely pre-renal 2/2 blood loss (volume depletion) and hypotension   -Transfused 1 unit pRBC  -Trend Cr, today 1 20  -Still elevated above baseline  -Hold nephrotoxic agents    History of gout  Assessment & Plan  Controlled with allopurinol 100 mg QD    Hypotensionresolved as of 2020  Assessment & Plan  Patient presented from outpatient clinic with hypotension (90s/60s)  Found to have anemia (Hb 9 3) in setting of melena and heme-occult positive test in ED  Last known Hb from two years ago was 13  Plan:  -hypotension 2/2 blood loss from GI bleed (likely GI malignancy)  -1 unit pRBC transfused  -repeat am CBC  -Blood pressure stable (systolics to 590); on rate control Tikosyn  -Holding: lasix, aldactone and entresto given current hypotension and likely pre-renal SCARLETT 2/2 hypotension/volume depletion  Restart when BP and creatinine stable   -holding Eliquis      Disposition: M/S     SUBJECTIVE     Patient seen and examined  No acute events overnight  Patient resting comfortably in bed  Denies hearing/vision changes, fevers, chills, night sweats, dysphagia, sore throat, chest pain, shortness of breath, abdominal pain, nausea, vomiting, constipation, hematochezia, dysuria, and peripheral edema  The patient followed his bowel prep yesterday is now having clear/yellow bowel movements  His heparin was discontinued yesterday  He is receiving antibiotic prophylaxis and IV fluids  His furosemide and atorvastatin were held in anticipation of surgery today  Colorectal surgery will be taking him to the OR for a right hemicolectomy for a cecal mass and Cardiology will be doing a simultaneous JOSE LUIS      OBJECTIVE     Vitals:    20 2216 20 2246 20 2300 20 0737   BP: 146/95 143/91 143/91 142/93   BP Location:       Pulse: 102      Resp:  16     Temp:  98 1 °F (36 7 °C)     TempSrc:       SpO2:       Weight:       Height:          Temperature:   Temp (24hrs), Av 8 °F (36 6 °C), Min:97 4 °F (36 3 °C), Max:98 1 °F (36 7 °C)    Temperature: 98 1 °F (36 7 °C)  Intake & Output:  I/O       08/26 0701 - 08/27 0700 08/27 0701 - 08/28 0700 08/28 0701 - 08/29 0700    P  O  480 2040     I V  (mL/kg)  999 7 (10 3) 895 (9 2)    Total Intake(mL/kg) 480 (4 9) 3039 7 (31 3) 895 (9 2)    Urine (mL/kg/hr) 575 (0 2)      Total Output 575      Net -95 +3039 7 +895               Weights:   IBW: 70 7 kg    Body mass index is 31 61 kg/m²  Weight (last 2 days)     Date/Time   Weight    08/27/20 0600   97 1 (214 07)    08/26/20 0439   97 3 (214 51)            Physical Exam  Vitals signs and nursing note reviewed  Constitutional:       Appearance: Normal appearance  He is obese  HENT:      Head: Normocephalic and atraumatic  Right Ear: External ear normal       Left Ear: External ear normal       Nose: Nose normal       Mouth/Throat:      Mouth: Mucous membranes are moist    Eyes:      Extraocular Movements: Extraocular movements intact  Pupils: Pupils are equal, round, and reactive to light  Neck:      Musculoskeletal: Normal range of motion  Cardiovascular:      Rate and Rhythm: Normal rate  Rhythm irregular  Pulmonary:      Effort: Pulmonary effort is normal       Comments: Decreased in B/L bases  Abdominal:      General: Bowel sounds are normal       Palpations: Abdomen is soft  Tenderness: There is no abdominal tenderness  Musculoskeletal: Normal range of motion  Skin:     General: Skin is warm and dry  Capillary Refill: Capillary refill takes less than 2 seconds  Neurological:      General: No focal deficit present  Mental Status: He is alert and oriented to person, place, and time  Psychiatric:         Mood and Affect: Mood normal          Behavior: Behavior normal        LABORATORY DATA     Labs: I have personally reviewed pertinent reports    Results from last 7 days   Lab Units 08/28/20  0449 08/27/20  0512 08/26/20  0435   WBC Thousand/uL 6 86 6 47 7  75   HEMOGLOBIN g/dL 10 3* 9 2* 9 3*   HEMATOCRIT % 35 8* 31 6* 31 6*   PLATELETS Thousands/uL 328 328 325   NEUTROS PCT % 60 63 63   MONOS PCT % 12 12 13*      Results from last 7 days   Lab Units 08/28/20  0449 08/27/20  0512 08/26/20  0435  08/23/20  0510 08/22/20  0444   POTASSIUM mmol/L 3 8 4 3 3 7   < > 4 3 4 0   CHLORIDE mmol/L 109* 111* 109*   < > 112* 109*   CO2 mmol/L 21 26 22   < > 24 25   BUN mg/dL 12 16 15   < > 29* 42*   CREATININE mg/dL 1 20 1 25 1 14   < > 1 30 1 96*   CALCIUM mg/dL 9 1 8 3 8 2*   < > 8 7 8 2*   ALK PHOS U/L  --   --   --   --  51 53   ALT U/L  --   --   --   --  22 23   AST U/L  --   --   --   --  8 8    < > = values in this interval not displayed  Results from last 7 days   Lab Units 08/28/20  0449 08/27/20  0512 08/26/20  0435   MAGNESIUM mg/dL 2 0 2 2 2 1     Results from last 7 days   Lab Units 08/28/20  0449 08/27/20  0512 08/26/20  0435   PHOSPHORUS mg/dL 3 5 4 1 4 7*      Results from last 7 days   Lab Units 08/26/20  0435 08/25/20  2305 08/25/20  1444  08/24/20  1844 08/22/20  0815   INR   --   --   --   --  1 17 1 11   PTT seconds 71* 92* 58*   < > 102*  --     < > = values in this interval not displayed  Results from last 7 days   Lab Units 08/21/20  1647   TROPONIN I ng/mL 0 10*       IMAGING & DIAGNOSTIC TESTING     Radiology Results: I have personally reviewed pertinent reports  Xr Chest 1 View Portable    Result Date: 8/21/2020  Impression: No acute cardiopulmonary disease  Workstation performed: [id number]     Ct Chest Abdomen Pelvis W Contrast    Result Date: 8/25/2020  Impression: 1  Cecal mass likely representing primary neoplasm  2   No definite evidence of metastatic disease in the chest, abdomen, or pelvis  3   Tiny left lower lobe lung nodule is nonspecific and doubtful metastatic disease though a three-month follow-up chest CT is recommended to confirm stability   4   Cholelithiasis with mild gallbladder wall thickening though no pericholecystic inflammatory change  This likely represents adenomyosis as described on the prior ultrasound from 2017  5   Small pericardial effusion  The study was marked in EPIC for significant notification  Workstation performed: [id number]     Other Diagnostic Testing: I have personally reviewed pertinent reports  ACTIVE MEDICATIONS     Current Facility-Administered Medications   Medication Dose Route Frequency    allopurinol (ZYLOPRIM) tablet 100 mg  100 mg Oral Daily    [START ON 8/31/2020] atorvastatin (LIPITOR) tablet 40 mg  40 mg Oral Daily With Dinner    ceFAZolin (ANCEF) IVPB (premix) 2,000 mg 50 mL  2,000 mg Intravenous On Call To OR    dextrose 5 % and sodium chloride 0 45 % with KCl 20 mEq/L infusion  100 mL/hr Intravenous Continuous    digoxin (LANOXIN) tablet 125 mcg  125 mcg Oral Daily    diltiazem (CARDIZEM SR) 12 hr capsule 60 mg  60 mg Oral Q12H [address]    diltiazem (CARDIZEM) injection 10 mg  10 mg Intravenous Q8H PRN    iohexol (OMNIPAQUE) 240 MG/ML solution 50 mL  50 mL Oral 90 min pre-procedure    magnesium sulfate IVPB (premix) SOLN 1 g  1 g Intravenous Once    melatonin tablet 3 mg  3 mg Oral HS PRN    metoprolol succinate (TOPROL-XL) 24 hr tablet 100 mg  100 mg Oral BID    metroNIDAZOLE (FLAGYL) IVPB (premix) 500 mg 100 mL  500 mg Intravenous On Call To OR    potassium chloride 20 mEq IVPB (premix)  20 mEq Intravenous Once    spironolactone (ALDACTONE) tablet 12 5 mg  12 5 mg Oral Daily       VTE Pharmacologic Prophylaxis: Reason for no pharmacologic prophylaxis Surgery today  VTE Mechanical Prophylaxis: sequential compression device    Portions of the record may have been created with voice recognition software  Occasional wrong word or "sound a like" substitutions may have occurred due to the inherent limitations of voice recognition software    Read the chart carefully and recognize, using context, where substitutions have occurred   ==  Concepcion Todd, [address] [place] [address] [facility]  Internal Medicine Residency PGY-1

## 2020-08-28 NOTE — OP NOTE
OPERATIVE REPORT  PATIENT NAME: Richard Nation    :  1960  MRN: [id number]  Pt Location: BE OR ROOM 07    SURGERY DATE: 2020    Surgeon(s) and Role:  Panel 1:     * Kenny Sherman MD - Primary     * Tigist Bermudez MD - Assisting  Panel 2:     * Yudith Martins MD - Primary     * Martina Garcia MD - Fellow    Preop Diagnosis:  Ascending colon cancer  Post-Op Diagnosis Codes:  Ascending colon cancer  Procedure(s) (LRB):  RIGHT HEMICOLECTOMY WITH ILIOCOLIC ANASTAMOSIS (N/A)  TRANSESOPHAGEAL ECHOCARDIOGRAM (JOSE LUIS) (N/A)    Specimen(s):  ID Type Source Tests Collected by Time Destination   1 : RIGHT COLON Tissue Colon TISSUE EXAM Kenny Sherman MD 2020 [id number]        Estimated Blood Loss:   100 mL    Drains:  NG/OG/Enteral Tube Orogastric 18 Fr Center mouth (Active)   Number of days: 0       Urethral Catheter Non-latex 16 Fr  (Active)   Number of days: 0       Anesthesia Type:   General    Operative Indications:  Ascending colon cancer  Operative Findings:  Ascending colon cancer  Complications:   None    Procedure and Technique: The results of the transthoracic echocardiogram were noted  The patient was placed in a supine position with his right arm out on arm board and his left arm tucked at his side  He was taped into position at the torso using of bandolier type taping  The legs were adhered with the belt  The abdomen was shaved using an electric razor  It was prepped using ChloraPrep and allowed to dry completely  The area was draped in a sterile manner  A time-out was done  An 11 mm trocar was placed in the infraumbilical region without any difficulty, using Visiport technique  Additional trocars were placed in the left upper abdomen and lower midline  These were 5 mm trocars  At the end of the operation, the left upper abdomen trocar was converted to an 11 mm, non cutting trocar  A right lower quadrant 5 mm trocar was also placed    Laparoscopic dissection was initiated  We initiated the procedure with lifting the inferior surface of the terminal ileal mesentery  The a vascular plane was identified and the bowel was rolled medially  The cecum was taken down at the white line of Toldt  We then did a medial approach, surrounding the ileocolic vessel at the terminal ileum  The avascular plane, posterior to the vessel, was identified and the colon mesentery was  from the retroperitoneal attachments  This dissection was carried up to the middle colic artery in the avascular plane, exposing the duodenum and clearing it from the colon mesentery of the ascending colon, hepatic flexure, and right side of the transverse colon  At the middle colic artery, we stopped that dissection  We then lifted the omentum away from the colon superiorly and identified the avascular plane between the mesentery and the transverse colon  This plane was opened and dissected to expose the superior surface of the transverse colon mesentery on the right side  The plane identified was used to separate the omentum from the colon and the peritoneum was then incised and the omentum lifted anteriorly, extending from the area of the middle colic artery, laterally to the right and around the hepatic flexure  This completed the mobilization of the colon, completely  We dissected onto the left side of the transverse colon between the omentum and the colon mesentery to allow for more laxity  The branches to the right of the middle colic artery takeoff were then divided using EnSeal without any bleeding  A triple fire of the EnSeal was used  We then created an opening in the midline to deliver the bowel  This incision was created just long enough to allow for passage of the bowel through the wound  We had plenty of laxity to divide branches to the right side of the middle colic artery  This was done using clamp and tie technique    The ileal mesentery was then sequentially divided using EnSeal technique without any difficulty or bleeding  We then performed a double stapled anastomosis using a longitudinal run on the anti mesenteric surface of the bowel 1st, followed by transection with 2 fires of 75 mm stapler  The staple line was inspected anteriorly, posteriorly, and at the transection lines  No defects or bleeding was not identified  The bowel was well-vascularized all the way to each staple line  The lumen of the anastomosis was quite long at approximately 3-4 cm in length  The bowel appeared to be very supple and well-vascularized at all points  The bowel was returned to the abdominal cavity after cleansing and drying  The omentum was brought over the bowel and beneath the wound as much as possible, being a short omentum  The GelPort lid was replaced and air insufflation was reinstituted  We inspected all the dissection surfaces and found no surgical bleeding in the abdomen  The anastomosis was reinspected and was again found to be well-vascularized, without any bleeding in the anastomotic region or in the mesentery  The GelPort was removed and the wound was closed  A running 1 PDS suture was placed from either into the fascia in the midline wound  The trocars were removed and were non cutting trocars  The wounds were irrigated and dried  The skin was closed using running or interrupted 4 0 Monocryl suture placed in the subcuticular layer  The wounds were cleansed and dried  Exparel was applied to the wounds  Sponge needle and instrument counts were correct  Wand technique revealed no retained gauze      I was present for the entire procedure    Patient Disposition:  PACU     SIGNATURE: Yee Perez MD  DATE: August 28, 2020  TIME: 4:01 PM

## 2020-08-28 NOTE — CASE MANAGEMENT
Patient for the OR today  Patient for JOSE LUIS with cards also for today  Awaiting recommendations for dc planning  Cm will continue to follow  [phone]

## 2020-08-28 NOTE — ANESTHESIA PREPROCEDURE EVALUATION
Procedure:  RESECTION COLON RIGHT LAPAROSCOPIC HAND-ASSISTED (N/A Abdomen)  TRANSESOPHAGEAL ECHOCARDIOGRAM (JOSE LUIS) (N/A Esophagus)    Relevant Problems   ANESTHESIA (within normal limits)      CARDIO   (+) Atrial fibrillation with rapid ventricular response (HCC)   (+) Coronary artery disease   (+) PAF (paroxysmal atrial fibrillation) (HCC)   (+) Systolic CHF (HCC)      GI/HEPATIC   (+) GI bleeding   (+) Gastrointestinal hemorrhage with melena      /RENAL   (+) SCARLETT (acute kidney injury) (HCC)      HEMATOLOGY   (+) Microcytic anemia      NEURO/PSYCH   (+) History of gout      Other   (+) Mass of cecum        Physical Exam    Airway    Mallampati score: II  TM Distance: >3 FB  Neck ROM: full     Dental       Cardiovascular  Rhythm: irregular, Rate: abnormal,     Pulmonary  Breath sounds clear to auscultation,     Other Findings        Anesthesia Plan  ASA Score- 3     Anesthesia Type- general with ASA Monitors  Additional Monitors: arterial line  Airway Plan: ETT  Plan Factors-Exercise tolerance (METS): <4 METS  Chart reviewed  EKG reviewed  Imaging results reviewed  Existing labs reviewed  Patient summary reviewed  Patient is not a current smoker  Induction- intravenous  Postoperative Plan- Plan for postoperative opioid use  Planned trial extubation    Informed Consent- Anesthetic plan and risks discussed with patient  I personally reviewed this patient with the CRNA  Discussed and agreed on the Anesthesia Plan with the CRNA  Romario Collins

## 2020-08-29 LAB
ANION GAP SERPL CALCULATED.3IONS-SCNC: 11 MMOL/L (ref 4–13)
BASOPHILS # BLD AUTO: 0.01 THOUSANDS/ΜL (ref 0–0.1)
BASOPHILS NFR BLD AUTO: 0 % (ref 0–1)
BUN SERPL-MCNC: 16 MG/DL (ref 5–25)
CALCIUM SERPL-MCNC: 8.7 MG/DL (ref 8.3–10.1)
CHLORIDE SERPL-SCNC: 109 MMOL/L (ref 100–108)
CO2 SERPL-SCNC: 17 MMOL/L (ref 21–32)
CREAT SERPL-MCNC: 1.3 MG/DL (ref 0.6–1.3)
EOSINOPHIL # BLD AUTO: 0 THOUSAND/ΜL (ref 0–0.61)
EOSINOPHIL NFR BLD AUTO: 0 % (ref 0–6)
ERYTHROCYTE [DISTWIDTH] IN BLOOD BY AUTOMATED COUNT: 19.1 % (ref 11.6–15.1)
GFR SERPL CREATININE-BSD FRML MDRD: 59 ML/MIN/1.73SQ M
GLUCOSE SERPL-MCNC: 130 MG/DL (ref 65–140)
HCT VFR BLD AUTO: 32.7 % (ref 36.5–49.3)
HGB BLD-MCNC: 9.7 G/DL (ref 12–17)
IMM GRANULOCYTES # BLD AUTO: 0.05 THOUSAND/UL (ref 0–0.2)
IMM GRANULOCYTES NFR BLD AUTO: 1 % (ref 0–2)
LYMPHOCYTES # BLD AUTO: 0.35 THOUSANDS/ΜL (ref 0.6–4.47)
LYMPHOCYTES NFR BLD AUTO: 3 % (ref 14–44)
MAGNESIUM SERPL-MCNC: 2.1 MG/DL (ref 1.6–2.6)
MCH RBC QN AUTO: 24.4 PG (ref 26.8–34.3)
MCHC RBC AUTO-ENTMCNC: 29.7 G/DL (ref 31.4–37.4)
MCV RBC AUTO: 82 FL (ref 82–98)
MONOCYTES # BLD AUTO: 0.74 THOUSAND/ΜL (ref 0.17–1.22)
MONOCYTES NFR BLD AUTO: 7 % (ref 4–12)
NEUTROPHILS # BLD AUTO: 9.61 THOUSANDS/ΜL (ref 1.85–7.62)
NEUTS SEG NFR BLD AUTO: 89 % (ref 43–75)
NRBC BLD AUTO-RTO: 0 /100 WBCS
PHOSPHATE SERPL-MCNC: 4.1 MG/DL (ref 2.3–4.1)
PLATELET # BLD AUTO: 322 THOUSANDS/UL (ref 149–390)
PMV BLD AUTO: 11.2 FL (ref 8.9–12.7)
POTASSIUM SERPL-SCNC: 5.1 MMOL/L (ref 3.5–5.3)
RBC # BLD AUTO: 3.98 MILLION/UL (ref 3.88–5.62)
SODIUM SERPL-SCNC: 137 MMOL/L (ref 136–145)
WBC # BLD AUTO: 10.76 THOUSAND/UL (ref 4.31–10.16)

## 2020-08-29 PROCEDURE — 83540 ASSAY OF IRON: CPT | Performed by: SURGERY

## 2020-08-29 PROCEDURE — 84100 ASSAY OF PHOSPHORUS: CPT | Performed by: SURGERY

## 2020-08-29 PROCEDURE — 85025 COMPLETE CBC W/AUTO DIFF WBC: CPT | Performed by: SURGERY

## 2020-08-29 PROCEDURE — 99232 SBSQ HOSP IP/OBS MODERATE 35: CPT | Performed by: INTERNAL MEDICINE

## 2020-08-29 PROCEDURE — 83735 ASSAY OF MAGNESIUM: CPT | Performed by: SURGERY

## 2020-08-29 PROCEDURE — 82728 ASSAY OF FERRITIN: CPT | Performed by: SURGERY

## 2020-08-29 PROCEDURE — 83550 IRON BINDING TEST: CPT | Performed by: SURGERY

## 2020-08-29 PROCEDURE — 80048 BASIC METABOLIC PNL TOTAL CA: CPT | Performed by: SURGERY

## 2020-08-29 RX ADMIN — HEPARIN SODIUM 5000 UNITS: 5000 INJECTION INTRAVENOUS; SUBCUTANEOUS at 05:23

## 2020-08-29 RX ADMIN — ALLOPURINOL 100 MG: 100 TABLET ORAL at 08:21

## 2020-08-29 RX ADMIN — METOPROLOL SUCCINATE 100 MG: 100 TABLET, EXTENDED RELEASE ORAL at 08:21

## 2020-08-29 RX ADMIN — SODIUM CHLORIDE, SODIUM LACTATE, POTASSIUM CHLORIDE, AND CALCIUM CHLORIDE 125 ML/HR: .6; .31; .03; .02 INJECTION, SOLUTION INTRAVENOUS at 06:54

## 2020-08-29 RX ADMIN — SPIRONOLACTONE 12.5 MG: 25 TABLET ORAL at 13:18

## 2020-08-29 RX ADMIN — SODIUM CHLORIDE 500 ML: 0.9 INJECTION, SOLUTION INTRAVENOUS at 08:22

## 2020-08-29 RX ADMIN — HEPARIN SODIUM 5000 UNITS: 5000 INJECTION INTRAVENOUS; SUBCUTANEOUS at 22:03

## 2020-08-29 RX ADMIN — HEPARIN SODIUM 5000 UNITS: 5000 INJECTION INTRAVENOUS; SUBCUTANEOUS at 13:18

## 2020-08-29 RX ADMIN — DILTIAZEM HYDROCHLORIDE 60 MG: 60 CAPSULE, EXTENDED RELEASE ORAL at 22:03

## 2020-08-29 RX ADMIN — METOPROLOL SUCCINATE 100 MG: 100 TABLET, EXTENDED RELEASE ORAL at 22:03

## 2020-08-29 RX ADMIN — SODIUM CHLORIDE 500 ML: 0.9 INJECTION, SOLUTION INTRAVENOUS at 16:27

## 2020-08-29 RX ADMIN — DIGOXIN 125 MCG: 125 TABLET ORAL at 17:40

## 2020-08-29 RX ADMIN — DILTIAZEM HYDROCHLORIDE 60 MG: 60 CAPSULE, EXTENDED RELEASE ORAL at 08:21

## 2020-08-29 RX ADMIN — SODIUM CHLORIDE, SODIUM LACTATE, POTASSIUM CHLORIDE, AND CALCIUM CHLORIDE 125 ML/HR: .6; .31; .03; .02 INJECTION, SOLUTION INTRAVENOUS at 15:52

## 2020-08-29 NOTE — PROGRESS NOTES
General Cardiology Progress Note   Janice Judd 61 y o  male MRN: [id number]  Unit/Bed#: [facility] 922-01 Encounter: [id number]      Assessment:  Principal Problem:    Atrial fibrillation with rapid ventricular response (HCC)  Active Problems:    Systolic CHF (HCC)    Microcytic anemia    SCARLETT (acute kidney injury) ([facility] [address] )    Gastrointestinal hemorrhage with melena    Coronary artery disease    GI bleeding    Mass of cecum    History of gout      Impression:     Recovered NICMP  o LVEF 50% by JOSE LUIS yesterday   Atrial fibrillation with RVR  o Heart rates improved, mostly low 100s  o He is recovering from ex lap/adenocarcinoma resection yesterday, would not want to overly suppresses heart rates  o Eliquis on hold   ABLA  o Hemoglobin is 9 7 today   Colon adenocarcinoma    Plan:     While he had a JOSE LUIS yesterday, and left atrial appendage clot was ruled out, anticoagulation was not started, likely due to his postoperative state, and thus M unable to perform electrical cardioversion until he is been on anticoagulation for 3 weeks from now   Please start Eliquis 5 mg b i d  When stable from a postoperative standpoint   Will not up titrate his AV mami blockers too aggressively at this time   Heart rates in the low 100s are acceptable for now  Subjective:   Patient seen and examined  He offers me no major complaints other than his postoperative abdominal pain  He does not have palpitations  AFib rates are improved, mostly low 100s  Blood pressure stable  Off anticoagulation    Review of Systems   Constitution: Negative for diaphoresis, fever, malaise/fatigue and night sweats  Cardiovascular: Negative for chest pain, dyspnea on exertion, leg swelling, orthopnea, palpitations and syncope  Respiratory: Negative for cough, shortness of breath, sputum production and wheezing  Skin: Negative for itching and rash  Gastrointestinal: Positive for abdominal pain  Negative for change in bowel habit and diarrhea  Neurological: Negative for dizziness, focal weakness, light-headedness and weakness  Objective   Vitals: Blood pressure 138/84, pulse (!) 107, temperature 97 7 °F (36 5 °C), resp  rate 16, height 5' 9" (1 753 m), weight 97 4 kg (214 lb 11 7 oz), SpO2 95 %  , Body mass index is 31 71 kg/m² , I/O last 3 completed shifts: In: 6044 7 [I V :5844 7; IV Piggyback:200]  Out: 960 [Urine:860; Blood:100]  I/O this shift:  In: 180 [P O :180]  Out: -   Wt Readings from Last 3 Encounters:   08/29/20 97 4 kg (214 lb 11 7 oz)   07/27/20 101 kg (222 lb)   03/19/19 102 kg (224 lb)       Intake/Output Summary (Last 24 hours) at 8/29/2020 1336  Last data filed at 8/29/2020 1045  Gross per 24 hour   Intake 3807 03 ml   Output 960 ml   Net 2847 03 ml     I/O last 3 completed shifts: In: 6044 7 [I V :5844 7; IV Piggyback:200]  Out: 12 [Urine:860; Blood:100]    He is not on telemetry    Physical Exam  Constitutional:       General: He is not in acute distress  Appearance: He is not diaphoretic  HENT:      Head: Normocephalic and atraumatic  Neck:      Musculoskeletal: Normal range of motion and neck supple  Vascular: No JVD  Cardiovascular:      Heart sounds: Normal heart sounds  No murmur  Comments: Irregular, tachycardic  Pulmonary:      Effort: Pulmonary effort is normal  No respiratory distress  Breath sounds: Normal breath sounds  No wheezing or rales  Abdominal:      General: Bowel sounds are normal  There is no distension  Palpations: Abdomen is soft  Tenderness: There is abdominal tenderness  Skin:     General: Skin is warm and dry  Neurological:      Mental Status: He is alert and oriented to person, place, and time           Meds/Allergies   No Known Allergies    Current Facility-Administered Medications:     allopurinol (ZYLOPRIM) tablet 100 mg, 100 mg, Oral, Daily, Cade Quest, DO, 100 mg at 08/29/20 0821    [START ON 8/31/2020] atorvastatin (LIPITOR) tablet 40 mg, 40 mg, Oral, Daily With Makayla Suarez DO    digoxin [facility]) tablet 125 mcg, 125 mcg, Oral, Daily, [place] Franc, DO, 125 mcg at 08/27/20 1720    diltiazem (CARDIZEM SR) 12 hr capsule 60 mg, 60 mg, Oral, Q12H [facility], [place] Franc, DO, 60 mg at 08/29/20 0821    diltiazem (CARDIZEM) injection 10 mg, 10 mg, Intravenous, Q8H PRN, [place] Franc, DO, 10 mg at 08/22/20 1824    heparin (porcine) subcutaneous injection 5,000 Units, 5,000 Units, Subcutaneous, Q8H [facility], [place] Franc, DO, 5,000 Units at 08/29/20 1318    HYDROmorphone (DILAUDID) 1 mg/mL 50 mL PCA, , Intravenous, Continuous, Don Ziehm, DO    lactated ringers infusion, 125 mL/hr, Intravenous, Continuous, Don Ziehm, DO    lactated ringers infusion, 125 mL/hr, Intravenous, Continuous, [place] Franc, DO, Last Rate: 125 mL/hr at 08/29/20 0654, 125 mL/hr at 08/29/20 0654    melatonin tablet 3 mg, 3 mg, Oral, HS PRN, [place] Franc, DO, 3 mg at 08/22/20 0100    metoprolol succinate (TOPROL-XL) 24 hr tablet 100 mg, 100 mg, Oral, BID, [place] Franc, DO, 100 mg at 08/29/20 9711    naloxone [facility]) injection 0 1 mg, 0 1 mg, Intravenous, Q3 min PRN, [place] Franc, DO    spironolactone (ALDACTONE) tablet 12 5 mg, 12 5 mg, Oral, Daily, [place] Franc, DO, 12 5 mg at 08/29/20 1318    Laboratory Results:        CBC with diff:   Results from last 7 days   Lab Units 08/29/20  0541 08/28/20  1709 08/28/20  0449 08/27/20  0512 08/26/20  0435 08/25/20  0757 08/24/20  1844  08/23/20  0510   WBC Thousand/uL 10 76* 13 61* 6 86 6 47 7 75 7 89 8 10   < > 7 26   HEMOGLOBIN g/dL 9 7* 9 5* 10 3* 9 2* 9 3* 10 3* 10 5*   < > 9 8*   HEMATOCRIT % 32 7* 32 8* 35 8* 31 6* 31 6* 35 3* 36 6   < > 33 8*   MCV fL 82 84 84 82 80* 80* 80*   < > 80*   PLATELETS Thousands/uL 322 316 328 328 325 357 385   < > 374   MCH pg 24 4* 24 2* 24 1* 23 8* 23 6* 23 4* 23 1*   < > 23 3*   MCHC g/dL 29 7* 29 0* 28 8* 29 1* 29 4* 29 2* 28 7*   < > 29 0*   RDW % 19 1* 18 7* 19 1* 18 3* 17 5* 17 0* 16 8*   < > 16 5*   MPV fL 11 2 10 7 10 4 11 1 11 2 10 6 10 5   < > 10 3   NRBC AUTO /100 WBCs 0 0 0 0 0 0  --   --  0    < > = values in this interval not displayed  CMP:  Results from last 7 days   Lab Units 08/29/20  0541 08/28/20 1709 08/28/20 0449 08/27/20  0512 08/26/20  0435 08/25/20  0756 08/24/20  0510 08/23/20  0510   POTASSIUM mmol/L 5 1 4 4 3 8 4 3 3 7 3 9 4 1 4 3   CHLORIDE mmol/L 109* 111* 109* 111* 109* 109* 107 112*   CO2 mmol/L 17* 19* 21 26 22 23 24 24   BUN mg/dL 16 10 12 16 15 14 18 29*   CREATININE mg/dL 1 30 1 09 1 20 1 25 1 14 1 16 1 31* 1 30   CALCIUM mg/dL 8 7 8 2* 9 1 8 3 8 2* 8 9 8 6 8 7   AST U/L  --   --   --   --   --   --   --  8   ALT U/L  --   --   --   --   --   --   --  22   ALK PHOS U/L  --   --   --   --   --   --   --  51   EGFR ml/min/1 73sq m 59 73 65 62 70 68 59 59       BMP:  Results from last 7 days   Lab Units 08/29/20 0541 08/28/20 1709 08/28/20 0449 08/27/20  0512 08/26/20  0435 08/25/20  0756 08/24/20  0510   POTASSIUM mmol/L 5 1 4 4 3 8 4 3 3 7 3 9 4 1   CHLORIDE mmol/L 109* 111* 109* 111* 109* 109* 107   CO2 mmol/L 17* 19* 21 26 22 23 24   BUN mg/dL 16 10 12 16 15 14 18   CREATININE mg/dL 1 30 1 09 1 20 1 25 1 14 1 16 1 31*   CALCIUM mg/dL 8 7 8 2* 9 1 8 3 8 2* 8 9 8 6       NT-proBNP: No results for input(s): NTBNP in the last 72 hours       Magnesium:   Results from last 7 days   Lab Units 08/29/20 0541 08/28/20 1709 08/28/20 0449 08/27/20  0512 08/26/20  0435   MAGNESIUM mg/dL 2 1 2 0 2 0 2 2 2 1       Coags:   Results from last 7 days   Lab Units 08/26/20  0435 08/25/20  2305 08/25/20  1444 08/25/20  0757 08/25/20  0036 08/24/20  1844   PTT seconds 71* 92* 58* 56* 35 102*   INR   --   --   --   --   --  1 17       TSH:        Hemoglobin A1C )      Lipid Profile:   No results found for: CHOL  Lab Results   Component Value Date    HDL 41 08/22/2020    HDL 31 (L) 12/12/2017     Lab Results   Component Value Date    LDLCALC 40 08/22/2020    [address] 12/12/2017 No results found for: LDLDIRECT  Lab Results   Component Value Date    TRIG 74 2020    TRIG 82 2017       Cardiac testing:   EKG personally reviewed by Ja Nash MD      Results for orders placed during the hospital encounter of 10/17/18   Echo complete with contrast if indicated    Narrative [address] [place] [address]  [phone]    Transthoracic Echocardiogram  2D, M-mode, Doppler, and Color Doppler    Study date:  17-Oct-2018    Patient: Aniket Burns  MR number: [id number]  Account number: [de-identified]  : 1960  Age: 62 years  Gender: Male  Status: Outpatient  Location: [address] [place] [address] [facility] [place]  Height: 69 in  Weight: 216 lb  BP: 140/ 82 mmHg    Indications: Heart Failure    Diagnoses: I50 9 - Heart failure, unspecified    Sonographer:  SANKET Ko  Primary Physician:  Steph Sorenson DO  Referring Physician:  Kerby Cushing, MD  Group:  [facility]  Interpreting Physician:  Natali Guerra MD    SUMMARY    LEFT VENTRICLE:  Systolic function was at the lower limits of normal  Ejection fraction was estimated to be 50 %  There were no regional wall motion abnormalities  Wall thickness was mildly to moderately increased  Doppler parameters were consistent with abnormal left ventricular relaxation (grade 1 diastolic dysfunction)  RIGHT VENTRICLE:  The size was normal   Systolic function was normal     PERICARDIUM:  A trace pericardial effusion was identified  There was no evidence of hemodynamic compromise  COMPARISONS:  There has been no significant interval change  Comparison was made with the previous study of 29-Mar-2018  HISTORY: PRIOR HISTORY: MI, CAD, HTN, AFIB, SVT    PROCEDURE: The study was performed in the [address] [facility] [place]  This was a routine study  The transthoracic approach was used   The study included complete 2D imaging, M-mode, complete spectral Doppler, and color Doppler  The  heart rate was 61 bpm, at the start of the study  Images were obtained from the parasternal, apical, subcostal, and suprasternal notch acoustic windows  Echocardiographic views were limited due to lung interference  Image quality was  adequate  LEFT VENTRICLE: Size was normal  Systolic function was at the lower limits of normal  Ejection fraction was estimated to be 50 %  There were no regional wall motion abnormalities  Wall thickness was mildly to moderately increased  DOPPLER:  Doppler parameters were consistent with abnormal left ventricular relaxation (grade 1 diastolic dysfunction)  RIGHT VENTRICLE: The size was normal  Systolic function was normal  Wall thickness was normal     LEFT ATRIUM: Size was at the upper limits of normal     RIGHT ATRIUM: Size was normal     MITRAL VALVE: Valve structure was normal  There was normal leaflet separation  DOPPLER: The transmitral velocity was within the normal range  There was no evidence for stenosis  There was no significant regurgitation  AORTIC VALVE: The valve was trileaflet  Leaflets exhibited normal thickness and normal cuspal separation  DOPPLER: Transaortic velocity was within the normal range  There was no evidence for stenosis  There was no significant  regurgitation  TRICUSPID VALVE: The valve structure was normal  There was normal leaflet separation  DOPPLER: The transtricuspid velocity was within the normal range  There was no evidence for stenosis  There was no significant regurgitation  PULMONIC VALVE: Leaflets exhibited normal thickness, no calcification, and normal cuspal separation  DOPPLER: The transpulmonic velocity was within the normal range  There was no significant regurgitation  PERICARDIUM: A trace pericardial effusion was identified  There was no evidence of hemodynamic compromise  AORTA: The root exhibited normal size  SYSTEMIC VEINS: IVC: The inferior vena cava was normal in size   Respirophasic changes were normal     SYSTEM MEASUREMENT TABLES    2D  %FS: 26 08 %  Ao Diam: 4 01 cm  EDV(Teich): 105 38 ml  EF Biplane: 52 16 %  EF(Cube): 59 62 %  EF(Teich): 51 15 %  ESV(Cube): 43 52 ml  ESV(Teich): 51 48 ml  IVSd: 1 45 cm  LA Area: 20 83 cm2  LA Diam: 3 55 cm  LVEDV MOD A2C: 129 95 ml  LVEDV MOD A4C: 153 5 ml  LVEDV MOD BP: 146 59 ml  LVEF MOD A2C: 47 85 %  LVEF MOD A4C: 59 43 %  LVESV MOD A2C: 67 77 ml  LVESV MOD A4C: 62 27 ml  LVESV MOD BP: 70 12 ml  LVIDd: 4 76 cm  LVIDs: 3 52 cm  LVLd A2C: 8 67 cm  LVLd A4C: 8 72 cm  LVLs A2C: 7 68 cm  LVLs A4C: 7 6 cm  LVPWd: 1 36 cm  RA Area: 17 39 cm2  RV Diam : 3 55 cm  SV MOD A2C: 62 18 ml  SV MOD A4C: 91 23 ml  SV(Cube): 64 25 ml  SV(Teich): 53 9 ml    MM  TAPSE: 1 49 cm    PW  E': 0 04 m/s  E/E': 23 04  MV A Brennan: 0 66 m/s  MV Dec [place]: 3 43 m/s2  MV DecT: 248 53 ms  MV E Brennan: 0 85 m/s  MV E/A Ratio: 1 3    [facility]    Prepared and electronically signed by    Harrison Melara MD  Signed 18-Oct-2018 11:44:59       Results for orders placed during the hospital encounter of 20   JOSE LUIS    N[facility]ative [facility]  [address]  [phone]    Transesophageal Echocardiogram  2D, Doppler, and Color Doppler    Study date:  28-Aug-2020    Patient: Kyle Maciel  MR number: [id number]  Account number: [de-identified]  : 1960  Age: 61 years  Gender: Male  Status: Inpatient  Location: Operating room  Height: 70 in  Weight: 150 lb  BP:    Indications: TIA    Diagnoses: G45 9 - Transient cerebral ischemic attack, unspecified    Sonographer:  SANKET Cooper  Referring Physician:  Sarai Brown MD  Group:  [facility]  Cardiology Fellow:  Amirah Land MD  Interpreting Physician:  Sarai Brown MD    SUMMARY    LEFT VENTRICLE:  Systolic function was at the lower limits of normal  Ejection fraction was estimated to be 50 %    There were no regional wall motion abnormalities  Wall thickness was normal     RIGHT VENTRICLE:  The size was normal   Systolic function was normal   Wall thickness was normal     LEFT ATRIAL APPENDAGE:  No thrombus was identified  There was mild spontaneous echo contrast ("smoke") in the appendage  MITRAL VALVE:  There was trace regurgitation  HISTORY: PRIOR HISTORY: HTN, Afib, REGULO    PROCEDURE: The procedure was performed in the operating room  This was a routine study  The risks and alternatives of the procedure were explained to the patient and informed consent was obtained  The transesophageal approach was used  The  study included complete 2D imaging, complete spectral Doppler, and color Doppler  The heart rate was 102 bpm, at the start of the study  An adult omniplane probe was inserted by the cardiology fellow under direct supervision of the  attending cardiologist  Intubated with ease  One intubation attempt(s)  There was no blood detected on the probe  Image quality was adequate  MEDICATIONS: Anesthesia  LEFT VENTRICLE: Size was normal  Systolic function was at the lower limits of normal  Ejection fraction was estimated to be 50 %  There were no regional wall motion abnormalities  Wall thickness was normal     RIGHT VENTRICLE: The size was normal  Systolic function was normal  Wall thickness was normal     LEFT ATRIUM: Size was normal  No thrombus was identified  APPENDAGE: No thrombus was identified  There was mild spontaneous echo contrast ("smoke") in the appendage  ATRIAL SEPTUM: No defect or patent foramen ovale was identified  RIGHT ATRIUM: Size was normal  No thrombus was identified  MITRAL VALVE: Valve structure was normal  There was normal leaflet separation  There was no echocardiographic evidence of vegetation  DOPPLER: There was trace regurgitation  AORTIC VALVE: The valve was trileaflet  Leaflets exhibited normal thickness and normal cuspal separation   There was no echocardiographic evidence of vegetation  DOPPLER: There was no regurgitation  TRICUSPID VALVE: The valve structure was normal  There was normal leaflet separation  There was no echocardiographic evidence of vegetation  DOPPLER: There was no regurgitation  PULMONIC VALVE: Leaflets exhibited normal thickness, no calcification, and normal cuspal separation  There was no echocardiographic evidence of vegetation  DOPPLER: There was trace regurgitation  PERICARDIUM: There was no pericardial effusion  The pericardium was normal in appearance  AORTA: The root exhibited normal size  There was no atheroma  There was no evidence for dissection  There was no evidence for aneurysm  [address] Accredited Echocardiography Laboratory    Prepared and electronically signed by    Tanner Renee MD  Signed 28-Aug-2020 14:25:55       Results for orders placed during the hospital encounter of 18   Cardiac catheterization    Narrative [address]  [phone]    [facility]    Invasive Cardiovascular Lab Complete Report    Patient: Aniket Burns  MR number: [id number]  Account number: [de-identified]  Study date: 2018  Gender: Male  : 1960  Height: 68 9 in  Weight: 204 6 lb  BSA: 2 09 m squared    Allergies: NO KNOWN ALLERGIES    Diagnostic Cardiologist:  Yesica Chen MD  Primary Physician:  Skylar Markham MD    SUMMARY    CORONARY CIRCULATION:  Left main: Normal   LAD: The vessel was normal sized  There was moderate plaque  There were no obstructive lesions  The diagonal branches were also free of obstructive diseease  Circumflex: Normal  The major OM branch was also normal   Ramus intermedius: The vessel was medium sized  There was an 80% proximal stenosis  RCA: The vessel was large sized and dominant, giving rise to the PDA and a posterolateral branch   There was a 60% ostial stenosis of the PDA     HEMODYNAMICS:  Hemodynamic assessment demonstrated normal LVEDP (5 mmHg)  REPORT ELEMENT SELECTION:  Right radial arterial access was employed  Summary:  1  Single vessel CAD, with an 80% proximal stenosis of the ramus artery  2  Normal LVEDP  Although this corresponds to the region identified on SPECT imaging, the patient is without ischemic symptoms, and the lesion clearly does not account for his recent episode of severe LV dysfunction  Plan: continued medical management  Follow-up with Dr Marisabel Omer    INDICATIONS:  --  Coronary artery disease: abnormal stress test   --  Congestive heart failure with cardiomyopathy  PROCEDURES PERFORMED    --  Left heart catheterization with ventriculography  --  Left coronary angiography  --  Right coronary angiography  --  Outpatient  --  Mod Sedation Same Physician Initial 15min  --  Mod Sedation Same Physician Add 15min  --  Coronary Catheterization (w/ LHC)  PROCEDURE: The risks and alternatives of the procedures and conscious sedation were explained to the patient and informed consent was obtained  The patient was brought to the cath lab and placed on the table  The planned puncture sites  were prepped and draped in the usual sterile fashion  --  Right radial artery access  After performing an Waldo's test to verify adequate ulnar artery supply to the hand, the radial site was prepped  The puncture site was infiltrated with local anesthetic  The vessel was accessed using the  modified Seldinger technique, a wire was advanced into the vessel, and a sheath was advanced over the wire into the vessel  --  Left heart catheterization with ventriculography  A catheter was advanced over a guidewire into the ascending aorta  After recording ascending aortic pressure, the catheter was advanced across the aortic valve and left ventricular  pressure was recorded  Ventriculography was performed   The catheter was pulled back across the aortic valve and into the ascending aorta and pullback pressures were obtained  --  Left coronary artery angiography  A catheter was advanced over a guidewire into the aorta and positioned in the left coronary artery ostium under fluoroscopic guidance  Angiography was performed  --  Right coronary artery angiography  A catheter was advanced over a guidewire into the aorta and positioned in the right coronary artery ostium under fluoroscopic guidance  Angiography was performed  --  Outpatient  --  Mod Sedation Same Physician Initial 15min  --  Mod Sedation Same Physician Add 15min  --  Coronary Catheterization (w/ LHC)  PROCEDURE COMPLETION: The patient tolerated the procedure well and was discharged from the cath lab  TIMING: Test started at 12:55  Test concluded at 13:19  HEMOSTASIS: The sheath was removed  The site was compressed with a Hemoband  device  Hemostasis was obtained  MEDICATIONS GIVEN: Verapamil (Isoptin, Calan, Covera), 1 25 mg, IA, at 13:02  Fentanyl (1OOmcg/2 ml), 50 mcg, IV, at 12:54  Versed (2mg/2ml), 2 mg, IV, at 12:54  Versed (2mg/2ml), 2 mg, IV, at 13:00  Fentanyl (1OOmcg/2 ml), 50 mcg, IV, at 13:00  1% Lidocaine, 0 1 ml, subcutaneously, at 13:01  Nitroglycerin (200mcg/ml), 200 mcg, at 13:02  Heparin 1000 units/ml, 4,000 units, IV, at 13:03  Versed (2mg/2ml), 1 mg, IV, at 13:08  Fentanyl  (1OOmcg/2 ml), 25 mcg, IV, at 13:08  CONTRAST GIVEN: 85 ml Omnipaque (350mg I /ml)  RADIATION EXPOSURE: Fluoroscopy time: 2 28 min  HEMODYNAMICS: Hemodynamic assessment demonstrated normal LVEDP (5 mmHg)  CORONARY VESSELS:   --  Left main: Normal   --  LAD: The vessel was normal sized  There was moderate plaque  There were no obstructive lesions  The diagonal branches were also free of obstructive diseease  --  Circumflex: Normal  The major OM branch was also normal   --  Ramus intermedius: The vessel was medium sized  There was an 80% proximal stenosis    --  RCA: The vessel was large sized and dominant, giving rise to the PDA and a posterolateral branch  There was a 60% ostial stenosis of the PDA  NOTE: Right radial arterial access was employed  Prepared and signed by    Kyler Daly MD  Signed 2018 13:43:53    CC: Dr Abelardo Mae    Study diagram    Hemodynamic tables    Pressures:  Baseline  Pressures:  - HR: 73  Pressures:  - Rhythm:  Pressures:  -- Aortic Pressure (S/D/M): 98/63/78  Pressures:  -- Left Ventricle (s/edp): 104/13/--    Outputs:  Baseline  Outputs:  -- CALCULATIONS[age]  Outputs:  -- CALCULATIONS: Body Surface Area: 2 09  Outputs:  -- CALCULATIONS: Height in cm: 175 00  Outputs:  -- CALCULATIONS: Sex: Male  Outputs:  -- CALCULATIONS: Weight in k 00       No results found for this or any previous visit  Results for orders placed during the hospital encounter of 18   NM myocardial perfusion spect (stress and/or rest)    [address]  [phone]    Stress Gated SPECT Myocardial Perfusion Imaging after Regadenoson    Patient: Jaime Townsend  MR number: [id number]  Account number: [de-identified]  : 1960  Age: 62 years  Gender: Male  Status: Outpatient  Location: [address] [place] [address] [facility] [place]  Height: 69 in  Weight: 189 lb  BP: 120/ 90 mmHg    Allergies: NO KNOWN ALLERGIES    Diagnosis: T54 43 - Chronic systolic (congestive) heart failure    Referring Physician:  Rosa Fuentes MD  Primary Physician:  Shira Roberson MD  Technician:  SUN Faye  RN:  Radha Willingham RN  Group:  [facility]  Cardiology Fellow:  Dr Carina Nobles  Report Prepared by[de-identified]  Radha Willingham RN  Interpreting Physician:  Juanpablo Sun DO    INDICATIONS: Assessment of cardiomyopathy  HISTORY: Cardiomyopathy, Lifevest  The patient is a 62year old  male  Chest pain status: chest pain  Other symptoms: edema  Coronary artery disease risk factors: none  Medications: a beta blocker and a lipid lowering agent  PHYSICAL EXAM: Baseline physical exam screening: no wheezes audible  REST ECG: Normal sinus rhythm  PROCEDURE: The study was performed at the [address] [facility]  A regadenoson infusion pharmacologic stress test was performed  Gated SPECT myocardial perfusion imaging was performed during stress  Systolic blood pressure was  120 mmHg, at the start of the study  Diastolic blood pressure was 90 mmHg, at the start of the study  The heart rate was 65 bpm, at the start of the study  IV double checked  Regadenoson protocol:  HR bpm SBP mmHg DBP mmHg Symptoms  Baseline 65 120 90 none  Immediate 81 122 92 mild dyspnea  1 min 73 120 90 none  No medications or fluids given  STRESS SUMMARY: Duration of pharmacologic stress was 3 min and 0 sec  Maximal heart rate during stress was 91 bpm  The heart rate response to stress was normal  Normal blood pressure response to regadenosan  The rate-pressure product for  the peak heart rate and blood pressure was 75221  There was no chest pain during stress  The stress test was terminated due to protocol completion  Pre oxygen saturation: 100 %  Peak oxygen saturation: 99 %  The stress ECG was negative for  ischemia and normal  There were no stress arrhythmias or conduction abnormalities  ISOTOPE ADMINISTRATION:  Resting isotope administration Stress isotope administration  Agent Tetrofosmin Tetrofosmin  Dose 10 12 mCi 30 9 mCi  Date 02/19/2018 02/19/2018  Injection-image interval 47 min 52 min    The radiopharmaceutical was injected at the peak effect of pharmacologic stress  MYOCARDIAL PERFUSION IMAGING:  The image quality was good  Rotating projection images reveal mild patient motion  Left ventricular size was normal  The TID ratio was 1 17      PERFUSION DEFECTS:  -  There was a moderate-sized, moderately severe, partially reversible myocardial perfusion defect of the basal to mid inferolateral wall     GATED SPECT:  The calculated left ventricular ejection fraction was 47 %  Left ventricular ejection fraction was mildly decreased by visual estimate  SUMMARY:  -  Stress results: There was no chest pain during stress  -  ECG conclusions: The stress ECG was negative for ischemia and normal   -  Perfusion imaging: There was a moderate-sized, moderately severe, partially reversible myocardial perfusion defect of the basal to mid inferolateral wall  -  Gated SPECT: The calculated left ventricular ejection fraction was 47 %  Left ventricular ejection fraction was mildly decreased by visual estimate  IMPRESSIONS: Abnormal study after vasodilation and low level exercise without reproduction of symptoms  Partially reversible defect of the inferolateral wall  Fixed defect Apical lateral  Left ventricular systolic function was reduced,  with regional wall motion abnormalities  Prepared and signed by    Rosana Alcala DO  Signed 02/19/2018 14:38:42       No results found for this or any previous visit  Kirsten Crum MD    Portions of the record may have been created with voice recognition software  Occasional wrong word or "sound a like" substitutions may have occurred due to the inherent limitations of voice recognition software  Read the chart carefully and recognize, using context, where substitutions have occurred

## 2020-08-29 NOTE — PROGRESS NOTES
Progress Note - Colorectal Surgery   Danielito Huang 61 y o  male MRN: [id number]  [facility] Encounter: [id number]    Assessment:  80-year-old male with ascending colon mass found on colonoscopy this admission and no evidence of metastatic disease per his CT scans of the chest, abdomen, and pelvis  CEA 1 7 this admission    Status post right hemicolectomy, laparoscopic hand assisted with ileocolic anastomosis and intraoperative JOSE LUIS 8/28      Plan:  -clear liquid diet, continue to await bowel function  -continue IV fluids  -continued hold anticoagulation as of Monday  -up out of bed, ambulate  -consider Johnson removal tomorrow  -postoperative labs without acute changes        Subjective/Objective     Subjective:  Patient seen examined at bedside, in no acute distress  Patient states his pain is well controlled  He denies any nausea, vomiting, fever, chills  States he did not feel like eating his clear liquid dinner this evening  Vital signs are stable  Johnson catheter in place draining clear yellow urine  Objective:     Blood pressure 116/74, pulse 87, temperature 97 6 °F (36 4 °C), resp  rate 16, height 5' 9" (1 753 m), weight 97 1 kg (214 lb 1 1 oz), SpO2 95 %  ,Body mass index is 31 61 kg/m²        Intake/Output Summary (Last 24 hours) at 8/28/2020 2244  Last data filed at 8/28/2020 2201  Gross per 24 hour   Intake 3654 67 ml   Output 660 ml   Net 2994 67 ml       Invasive Devices     Peripheral Intravenous Line            Peripheral IV 08/28/20 Left;Proximal;Ventral (anterior) Forearm less than 1 day    Peripheral IV 08/28/20 Right Hand less than 1 day    Peripheral IV 08/28/20 Right Hand less than 1 day          Drain            Urethral Catheter Non-latex 16 Fr  less than 1 day                Physical Exam:  General:  Adult male, well-developed, no acute distress  HEENT:  Normocephalic, atraumatic  Cardiovascular:  Regular rate, no murmurs  Respiratory:  Normal effort, lungs clear bilaterally to auscultation  Abdomen:  Soft, nondistended, appropriately tender near incisions, incisions clean dry intact, no guarding rebound or rigidity    Extremities:  Some Luis, no deformity, no clubbing, cyanosis, or edema  Neuro:  AAO x3, no focal deficits  Skin:  Warm, dry, intact    Lab, Imaging and other studies:  CBC:   Lab Results   Component Value Date    WBC 13 61 (H) 08/28/2020    HGB 9 5 (L) 08/28/2020    HCT 32 8 (L) 08/28/2020    MCV 84 08/28/2020     08/28/2020    MCH 24 2 (L) 08/28/2020    MCHC 29 0 (L) 08/28/2020    RDW 18 7 (H) 08/28/2020    MPV 10 7 08/28/2020    NRBC 0 08/28/2020   , CMP:   Lab Results   Component Value Date    SODIUM 137 08/28/2020    K 4 4 08/28/2020     (H) 08/28/2020    CO2 19 (L) 08/28/2020    BUN 10 08/28/2020    CREATININE 1 09 08/28/2020    CALCIUM 8 2 (L) 08/28/2020    EGFR 73 08/28/2020   , Coagulation: No results found for: PT, INR, APTT  VTE Pharmacologic Prophylaxis: Heparin  VTE Mechanical Prophylaxis: sequential compression device

## 2020-08-29 NOTE — ANESTHESIA POSTPROCEDURE EVALUATION
Post-Op Assessment Note    CV Status:  Stable  Pain Score: 3    Pain management: adequate     Mental Status:  Alert and awake   Hydration Status:  Euvolemic   PONV Controlled:  Controlled   Airway Patency:  Patent      Post Op Vitals Reviewed: Yes      Staff: Anesthesiologist, CRNA         No complications documented      BP      Temp      Pulse     Resp      SpO2

## 2020-08-29 NOTE — PROGRESS NOTES
Progress Note - Colorectal Surgery   Ifeoma Jaimes 61 y o  male MRN: [id number]  [facility]: [id number]    Assessment:  54-year-old male with ascending colon mass found on colonoscopy this admission and no evidence of metastatic disease per his CT scans of the chest, abdomen, and pelvis  CEA 1 7 this admission    Status post right hemicolectomy, laparoscopic hand assisted with ileocolic anastomosis and intraoperative JOSE LUIS 8/28      Plan:  -clear liquid diet, continue to await bowel function  -continue IV fluids  -continued hold anticoagulation as of Monday  -up out of bed, ambulate  -consider Johnson removal today, urine output borderline low overnight, consider fluid challenge  -postoperative labs without acute changes        Subjective/Objective     Subjective:  Pain well controlled, no nausea vomiting fever chills  Tolerated clears yesterday  No bowel function, patient is not going to bed  Objective:     Blood pressure 159/96, pulse 100, temperature 98 1 °F (36 7 °C), resp  rate 18, height 5' 9" (1 753 m), weight 97 1 kg (214 lb 1 1 oz), SpO2 97 %  ,Body mass index is 31 61 kg/m²  Intake/Output Summary (Last 24 hours) at 8/29/2020 0538  Last data filed at 8/29/2020 0312  Gross per 24 hour   Intake 4348  35 ml   Output 860 ml   Net 3488 35 ml       Invasive Devices     Peripheral Intravenous Line            Peripheral IV 08/28/20 Left;Proximal;Ventral (anterior) Forearm less than 1 day    Peripheral IV 08/28/20 Right Hand less than 1 day    Peripheral IV 08/28/20 Right Hand less than 1 day          Drain            Urethral Catheter Non-latex 16 Fr  less than 1 day                Physical Exam:  General:  Adult male, well-developed, no acute distress  HEENT:  Normocephalic, atraumatic  Cardiovascular:  Regular rate, no murmurs  Respiratory:  Normal effort, lungs clear bilaterally to auscultation  Abdomen:  Soft, nondistended, appropriately tender near incisions, incisions clean dry intact, no guarding rebound or rigidity    Extremities:  no deformity, no clubbing, cyanosis, or edema  Neuro:  AAO x3, no focal deficits  Skin:  Warm, dry, intact    Lab, Imaging and other studies:  CBC:   Lab Results   Component Value Date    WBC 13 61 (H) 08/28/2020    HGB 9 5 (L) 08/28/2020    HCT 32 8 (L) 08/28/2020    MCV 84 08/28/2020     08/28/2020    MCH 24 2 (L) 08/28/2020    MCHC 29 0 (L) 08/28/2020    RDW 18 7 (H) 08/28/2020    MPV 10 7 08/28/2020    NRBC 0 08/28/2020   , CMP:   Lab Results   Component Value Date    SODIUM 137 08/28/2020    K 4 4 08/28/2020     (H) 08/28/2020    CO2 19 (L) 08/28/2020    BUN 10 08/28/2020    CREATININE 1 09 08/28/2020    CALCIUM 8 2 (L) 08/28/2020    EGFR 73 08/28/2020   , Coagulation: No results found for: PT, INR, APTT  VTE Pharmacologic Prophylaxis: Heparin  VTE Mechanical Prophylaxis: sequential compression device

## 2020-08-29 NOTE — PLAN OF CARE
Problem: Potential for Falls  Goal: Patient will remain free of falls  Description: INTERVENTIONS:  - Assess patient frequently for physical needs  -  Identify cognitive and physical deficits and behaviors that affect risk of falls    -  [place] fall precautions as indicated by assessment   - Educate patient/family on patient safety including physical limitations  - Instruct patient to call for assistance with activity based on assessment  - Modify environment to reduce risk of injury  - Consider OT/PT consult to assist with strengthening/mobility  Outcome: Progressing     Problem: CARDIOVASCULAR - ADULT  Goal: Maintains optimal cardiac output and hemodynamic stability  Description: INTERVENTIONS:  - Monitor I/O, vital signs and rhythm  - Monitor for S/S and trends of decreased cardiac output  - Administer and titrate ordered vasoactive medications to optimize hemodynamic stability  - Assess quality of pulses, skin color and temperature  - Assess for signs of decreased coronary artery perfusion  - Instruct patient to report change in severity of symptoms  Outcome: Progressing  Goal: Absence of cardiac dysrhythmias or at baseline rhythm  Description: INTERVENTIONS:  - Continuous cardiac monitoring, vital signs, obtain 12 lead EKG if ordered  - Administer antiarrhythmic and heart rate control medications as ordered  - Monitor electrolytes and administer replacement therapy as ordered  Outcome: Progressing     Problem: PAIN - ADULT  Goal: Verbalizes/displays adequate comfort level or baseline comfort level  Description: Interventions:  - Encourage patient to monitor pain and request assistance  - Assess pain using appropriate pain scale  - Administer analgesics based on type and severity of pain and evaluate response  - Implement non-pharmacological measures as appropriate and evaluate response  - Consider cultural and social influences on pain and pain management  - Notify physician/advanced practitioner if interventions unsuccessful or patient reports new pain  Outcome: Progressing     Problem: INFECTION - ADULT  Goal: Absence or prevention of progression during hospitalization  Description: INTERVENTIONS:  - Assess and monitor for signs and symptoms of infection  - Monitor lab/diagnostic results  - Monitor all insertion sites, i e  indwelling lines, tubes, and drains  - Monitor endotracheal if appropriate and nasal secretions for changes in amount and color  - [place] appropriate cooling/warming therapies per order  - Administer medications as ordered  - Instruct and encourage patient and family to use good hand hygiene technique  - Identify and instruct in appropriate isolation precautions for identified infection/condition  Outcome: Progressing  Goal: Absence of fever/infection during neutropenic period  Description: INTERVENTIONS:  - Monitor WBC    Outcome: Progressing     Problem: SAFETY ADULT  Goal: Patient will remain free of falls  Description: INTERVENTIONS:  - Assess patient frequently for physical needs  -  Identify cognitive and physical deficits and behaviors that affect risk of falls    -  [place] fall precautions as indicated by assessment   - Educate patient/family on patient safety including physical limitations  - Instruct patient to call for assistance with activity based on assessment  - Modify environment to reduce risk of injury  - Consider OT/PT consult to assist with strengthening/mobility  Outcome: Progressing  Goal: Maintain or return to baseline ADL function  Description: INTERVENTIONS:  -  Assess patient's ability to carry out ADLs; assess patient's baseline for ADL function and identify physical deficits which impact ability to perform ADLs (bathing, care of mouth/teeth, toileting, grooming, dressing, etc )  - Assess/evaluate cause of self-care deficits   - Assess range of motion  - Assess patient's mobility; develop plan if impaired  - Assess patient's need for assistive devices and provide as appropriate  - Encourage maximum independence but intervene and supervise when necessary  - Involve family in performance of ADLs  - Assess for home care needs following discharge   - Consider OT consult to assist with ADL evaluation and planning for discharge  - Provide patient education as appropriate  Outcome: Progressing  Goal: Maintain or return mobility status to optimal level  Description: INTERVENTIONS:  - Assess patient's baseline mobility status (ambulation, transfers, stairs, etc )    - Identify cognitive and physical deficits and behaviors that affect mobility  - Identify mobility aids required to assist with transfers and/or ambulation (gait belt, sit-to-stand, lift, walker, cane, etc )  - [place] fall precautions as indicated by assessment  - Record patient progress and toleration of activity level on Mobility SBAR; progress patient to next Phase/Stage  - Instruct patient to call for assistance with activity based on assessment  - Consider rehabilitation consult to assist with strengthening/weightbearing, etc   Outcome: Progressing     Problem: DISCHARGE PLANNING  Goal: Discharge to home or other facility with appropriate resources  Description: INTERVENTIONS:  - Identify barriers to discharge w/patient and caregiver  - Arrange for needed discharge resources and transportation as appropriate  - Identify discharge learning needs (meds, wound care, etc )  - Arrange for interpretive services to assist at discharge as needed  - Refer to Case Management Department for coordinating discharge planning if the patient needs post-hospital services based on physician/advanced practitioner order or complex needs related to functional status, cognitive ability, or social support system  Outcome: Progressing     Problem: Knowledge Deficit  Goal: Patient/family/caregiver demonstrates understanding of disease process, treatment plan, medications, and discharge instructions  Description: Complete learning assessment and assess knowledge base    Interventions:  - Provide teaching at level of understanding  - Provide teaching via preferred learning methods  Outcome: Progressing     Problem: GASTROINTESTINAL - ADULT  Goal: Minimal or absence of nausea and/or vomiting  Description: INTERVENTIONS:  - Administer IV fluids if ordered to ensure adequate hydration  - Maintain NPO status until nausea and vomiting are resolved  - Nasogastric tube if ordered  - Administer ordered antiemetic medications as needed  - Provide nonpharmacologic comfort measures as appropriate  - Advance diet as tolerated, if ordered  - Consider nutrition services referral to assist patient with adequate nutrition and appropriate food choices  Outcome: Progressing  Goal: Maintains or returns to baseline bowel function  Description: INTERVENTIONS:  - Assess bowel function  - Encourage oral fluids to ensure adequate hydration  - Administer IV fluids if ordered to ensure adequate hydration  - Administer ordered medications as needed  - Encourage mobilization and activity  - Consider nutritional services referral to assist patient with adequate nutrition and appropriate food choices  Outcome: Progressing  Goal: Maintains adequate nutritional intake  Description: INTERVENTIONS:  - Monitor percentage of each meal consumed  - Identify factors contributing to decreased intake, treat as appropriate  - Assist with meals as needed  - Monitor I&O, weight, and lab values if indicated  - Obtain nutrition services referral as needed  Outcome: Progressing

## 2020-08-29 NOTE — PLAN OF CARE
Problem: Potential for Falls  Goal: Patient will remain free of falls  Description: INTERVENTIONS:  - Assess patient frequently for physical needs  -  Identify cognitive and physical deficits and behaviors that affect risk of falls    -  [place] fall precautions as indicated by assessment   - Educate patient/family on patient safety including physical limitations  - Instruct patient to call for assistance with activity based on assessment  - Modify environment to reduce risk of injury  - Consider OT/PT consult to assist with strengthening/mobility  Outcome: Progressing     Problem: CARDIOVASCULAR - ADULT  Goal: Maintains optimal cardiac output and hemodynamic stability  Description: INTERVENTIONS:  - Monitor I/O, vital signs and rhythm  - Monitor for S/S and trends of decreased cardiac output  - Administer and titrate ordered vasoactive medications to optimize hemodynamic stability  - Assess quality of pulses, skin color and temperature  - Assess for signs of decreased coronary artery perfusion  - Instruct patient to report change in severity of symptoms  Outcome: Progressing  Goal: Absence of cardiac dysrhythmias or at baseline rhythm  Description: INTERVENTIONS:  - Continuous cardiac monitoring, vital signs, obtain 12 lead EKG if ordered  - Administer antiarrhythmic and heart rate control medications as ordered  - Monitor electrolytes and administer replacement therapy as ordered  Outcome: Progressing     Problem: GASTROINTESTINAL - ADULT  Goal: Minimal or absence of nausea and/or vomiting  Description: INTERVENTIONS:  - Administer IV fluids if ordered to ensure adequate hydration  - Maintain NPO status until nausea and vomiting are resolved  - Nasogastric tube if ordered  - Administer ordered antiemetic medications as needed  - Provide nonpharmacologic comfort measures as appropriate  - Advance diet as tolerated, if ordered  - Consider nutrition services referral to assist patient with adequate nutrition and appropriate food choices  Outcome: Progressing  Goal: Maintains or returns to baseline bowel function  Description: INTERVENTIONS:  - Assess bowel function  - Encourage oral fluids to ensure adequate hydration  - Administer IV fluids if ordered to ensure adequate hydration  - Administer ordered medications as needed  - Encourage mobilization and activity  - Consider nutritional services referral to assist patient with adequate nutrition and appropriate food choices  Outcome: Progressing  Goal: Maintains adequate nutritional intake  Description: INTERVENTIONS:  - Monitor percentage of each meal consumed  - Identify factors contributing to decreased intake, treat as appropriate  - Assist with meals as needed  - Monitor I&O, weight, and lab values if indicated  - Obtain nutrition services referral as needed  Outcome: Progressing     Problem: PAIN - ADULT  Goal: Verbalizes/displays adequate comfort level or baseline comfort level  Description: Interventions:  - Encourage patient to monitor pain and request assistance  - Assess pain using appropriate pain scale  - Administer analgesics based on type and severity of pain and evaluate response  - Implement non-pharmacological measures as appropriate and evaluate response  - Consider cultural and social influences on pain and pain management  - Notify physician/advanced practitioner if interventions unsuccessful or patient reports new pain  Outcome: Progressing     Problem: INFECTION - ADULT  Goal: Absence or prevention of progression during hospitalization  Description: INTERVENTIONS:  - Assess and monitor for signs and symptoms of infection  - Monitor lab/diagnostic results  - Monitor all insertion sites, i e  indwelling lines, tubes, and drains  - Monitor endotracheal if appropriate and nasal secretions for changes in amount and color  - [place] appropriate cooling/warming therapies per order  - Administer medications as ordered  - Instruct and encourage patient and family to use good hand hygiene technique  - Identify and instruct in appropriate isolation precautions for identified infection/condition  Outcome: Progressing  Goal: Absence of fever/infection during neutropenic period  Description: INTERVENTIONS:  - Monitor WBC    Outcome: Progressing     Problem: SAFETY ADULT  Goal: Patient will remain free of falls  Description: INTERVENTIONS:  - Assess patient frequently for physical needs  -  Identify cognitive and physical deficits and behaviors that affect risk of falls    -  [place] fall precautions as indicated by assessment   - Educate patient/family on patient safety including physical limitations  - Instruct patient to call for assistance with activity based on assessment  - Modify environment to reduce risk of injury  - Consider OT/PT consult to assist with strengthening/mobility  Outcome: Progressing  Goal: Maintain or return to baseline ADL function  Description: INTERVENTIONS:  -  Assess patient's ability to carry out ADLs; assess patient's baseline for ADL function and identify physical deficits which impact ability to perform ADLs (bathing, care of mouth/teeth, toileting, grooming, dressing, etc )  - Assess/evaluate cause of self-care deficits   - Assess range of motion  - Assess patient's mobility; develop plan if impaired  - Assess patient's need for assistive devices and provide as appropriate  - Encourage maximum independence but intervene and supervise when necessary  - Involve family in performance of ADLs  - Assess for home care needs following discharge   - Consider OT consult to assist with ADL evaluation and planning for discharge  - Provide patient education as appropriate  Outcome: Progressing  Goal: Maintain or return mobility status to optimal level  Description: INTERVENTIONS:  - Assess patient's baseline mobility status (ambulation, transfers, stairs, etc )    - Identify cognitive and physical deficits and behaviors that affect mobility  - Identify mobility aids required to assist with transfers and/or ambulation (gait belt, sit-to-stand, lift, walker, cane, etc )  - [place] fall precautions as indicated by assessment  - Record patient progress and toleration of activity level on Mobility SBAR; progress patient to next Phase/Stage  - Instruct patient to call for assistance with activity based on assessment  - Consider rehabilitation consult to assist with strengthening/weightbearing, etc   Outcome: Progressing     Problem: DISCHARGE PLANNING  Goal: Discharge to home or other facility with appropriate resources  Description: INTERVENTIONS:  - Identify barriers to discharge w/patient and caregiver  - Arrange for needed discharge resources and transportation as appropriate  - Identify discharge learning needs (meds, wound care, etc )  - Arrange for interpretive services to assist at discharge as needed  - Refer to Case Management Department for coordinating discharge planning if the patient needs post-hospital services based on physician/advanced practitioner order or complex needs related to functional status, cognitive ability, or social support system  Outcome: Progressing     Problem: Knowledge Deficit  Goal: Patient/family/caregiver demonstrates understanding of disease process, treatment plan, medications, and discharge instructions  Description: Complete learning assessment and assess knowledge base    Interventions:  - Provide teaching at level of understanding  - Provide teaching via preferred learning methods  Outcome: Progressing

## 2020-08-30 LAB
ABO GROUP BLD BPU: NORMAL
ABO GROUP BLD BPU: NORMAL
ANION GAP SERPL CALCULATED.3IONS-SCNC: 6 MMOL/L (ref 4–13)
BPU ID: NORMAL
BPU ID: NORMAL
BUN SERPL-MCNC: 18 MG/DL (ref 5–25)
CALCIUM SERPL-MCNC: 8.4 MG/DL (ref 8.3–10.1)
CHLORIDE SERPL-SCNC: 107 MMOL/L (ref 100–108)
CO2 SERPL-SCNC: 23 MMOL/L (ref 21–32)
CREAT SERPL-MCNC: 1.1 MG/DL (ref 0.6–1.3)
CROSSMATCH: NORMAL
CROSSMATCH: NORMAL
ERYTHROCYTE [DISTWIDTH] IN BLOOD BY AUTOMATED COUNT: 19.9 % (ref 11.6–15.1)
FERRITIN SERPL-MCNC: 391 NG/ML (ref 8–388)
GFR SERPL CREATININE-BSD FRML MDRD: 73 ML/MIN/1.73SQ M
GLUCOSE SERPL-MCNC: 90 MG/DL (ref 65–140)
HCT VFR BLD AUTO: 28.3 % (ref 36.5–49.3)
HGB BLD-MCNC: 8.1 G/DL (ref 12–17)
IRON SATN MFR SERPL: 7 %
IRON SERPL-MCNC: 20 UG/DL (ref 65–175)
MCH RBC QN AUTO: 24.3 PG (ref 26.8–34.3)
MCHC RBC AUTO-ENTMCNC: 28.6 G/DL (ref 31.4–37.4)
MCV RBC AUTO: 85 FL (ref 82–98)
PLATELET # BLD AUTO: 253 THOUSANDS/UL (ref 149–390)
PMV BLD AUTO: 11.6 FL (ref 8.9–12.7)
POTASSIUM SERPL-SCNC: 4.2 MMOL/L (ref 3.5–5.3)
RBC # BLD AUTO: 3.33 MILLION/UL (ref 3.88–5.62)
SODIUM SERPL-SCNC: 136 MMOL/L (ref 136–145)
TIBC SERPL-MCNC: 304 UG/DL (ref 250–450)
UNIT DISPENSE STATUS: NORMAL
UNIT DISPENSE STATUS: NORMAL
UNIT PRODUCT CODE: NORMAL
UNIT PRODUCT CODE: NORMAL
UNIT RH: NORMAL
UNIT RH: NORMAL
WBC # BLD AUTO: 8.19 THOUSAND/UL (ref 4.31–10.16)

## 2020-08-30 PROCEDURE — 99232 SBSQ HOSP IP/OBS MODERATE 35: CPT | Performed by: INTERNAL MEDICINE

## 2020-08-30 PROCEDURE — 80048 BASIC METABOLIC PNL TOTAL CA: CPT | Performed by: SURGERY

## 2020-08-30 PROCEDURE — 85027 COMPLETE CBC AUTOMATED: CPT | Performed by: SURGERY

## 2020-08-30 RX ORDER — DEXTROSE, SODIUM CHLORIDE, AND POTASSIUM CHLORIDE 5; .45; .15 G/100ML; G/100ML; G/100ML
75 INJECTION INTRAVENOUS CONTINUOUS
Status: DISCONTINUED | OUTPATIENT
Start: 2020-08-30 | End: 2020-08-30

## 2020-08-30 RX ORDER — DEXTROSE AND SODIUM CHLORIDE 5; .45 G/100ML; G/100ML
75 INJECTION, SOLUTION INTRAVENOUS CONTINUOUS
Status: DISCONTINUED | OUTPATIENT
Start: 2020-08-30 | End: 2020-08-31

## 2020-08-30 RX ADMIN — HEPARIN SODIUM 5000 UNITS: 5000 INJECTION INTRAVENOUS; SUBCUTANEOUS at 21:27

## 2020-08-30 RX ADMIN — DILTIAZEM HYDROCHLORIDE 60 MG: 60 CAPSULE, EXTENDED RELEASE ORAL at 21:27

## 2020-08-30 RX ADMIN — SPIRONOLACTONE 12.5 MG: 25 TABLET ORAL at 08:30

## 2020-08-30 RX ADMIN — HEPARIN SODIUM 5000 UNITS: 5000 INJECTION INTRAVENOUS; SUBCUTANEOUS at 13:12

## 2020-08-30 RX ADMIN — SODIUM CHLORIDE, SODIUM LACTATE, POTASSIUM CHLORIDE, AND CALCIUM CHLORIDE 125 ML/HR: .6; .31; .03; .02 INJECTION, SOLUTION INTRAVENOUS at 08:30

## 2020-08-30 RX ADMIN — METOPROLOL SUCCINATE 100 MG: 100 TABLET, EXTENDED RELEASE ORAL at 21:27

## 2020-08-30 RX ADMIN — ALLOPURINOL 100 MG: 100 TABLET ORAL at 08:30

## 2020-08-30 RX ADMIN — DIGOXIN 125 MCG: 125 TABLET ORAL at 17:18

## 2020-08-30 RX ADMIN — HEPARIN SODIUM 5000 UNITS: 5000 INJECTION INTRAVENOUS; SUBCUTANEOUS at 05:47

## 2020-08-30 RX ADMIN — DILTIAZEM HYDROCHLORIDE 60 MG: 60 CAPSULE, EXTENDED RELEASE ORAL at 08:30

## 2020-08-30 RX ADMIN — DEXTROSE AND SODIUM CHLORIDE 75 ML/HR: 5; .45 INJECTION, SOLUTION INTRAVENOUS at 12:24

## 2020-08-30 RX ADMIN — SODIUM CHLORIDE, SODIUM LACTATE, POTASSIUM CHLORIDE, AND CALCIUM CHLORIDE 125 ML/HR: .6; .31; .03; .02 INJECTION, SOLUTION INTRAVENOUS at 01:59

## 2020-08-30 RX ADMIN — Medication: at 11:01

## 2020-08-30 RX ADMIN — METOPROLOL SUCCINATE 100 MG: 100 TABLET, EXTENDED RELEASE ORAL at 08:30

## 2020-08-30 RX ADMIN — DEXTROSE AND SODIUM CHLORIDE 75 ML/HR: 5; .45 INJECTION, SOLUTION INTRAVENOUS at 23:56

## 2020-08-30 NOTE — PLAN OF CARE
Problem: Potential for Falls  Goal: Patient will remain free of falls  Description: INTERVENTIONS:  - Assess patient frequently for physical needs  -  Identify cognitive and physical deficits and behaviors that affect risk of falls    -  [place] fall precautions as indicated by assessment   - Educate patient/family on patient safety including physical limitations  - Instruct patient to call for assistance with activity based on assessment  - Modify environment to reduce risk of injury  - Consider OT/PT consult to assist with strengthening/mobility  Outcome: Progressing     Problem: CARDIOVASCULAR - ADULT  Goal: Maintains optimal cardiac output and hemodynamic stability  Description: INTERVENTIONS:  - Monitor I/O, vital signs and rhythm  - Monitor for S/S and trends of decreased cardiac output  - Administer and titrate ordered vasoactive medications to optimize hemodynamic stability  - Assess quality of pulses, skin color and temperature  - Assess for signs of decreased coronary artery perfusion  - Instruct patient to report change in severity of symptoms  Outcome: Progressing  Goal: Absence of cardiac dysrhythmias or at baseline rhythm  Description: INTERVENTIONS:  - Continuous cardiac monitoring, vital signs, obtain 12 lead EKG if ordered  - Administer antiarrhythmic and heart rate control medications as ordered  - Monitor electrolytes and administer replacement therapy as ordered  Outcome: Progressing     Problem: GASTROINTESTINAL - ADULT  Goal: Minimal or absence of nausea and/or vomiting  Description: INTERVENTIONS:  - Administer IV fluids if ordered to ensure adequate hydration  - Maintain NPO status until nausea and vomiting are resolved  - Nasogastric tube if ordered  - Administer ordered antiemetic medications as needed  - Provide nonpharmacologic comfort measures as appropriate  - Advance diet as tolerated, if ordered  - Consider nutrition services referral to assist patient with adequate nutrition and appropriate food choices  Outcome: Progressing  Goal: Maintains or returns to baseline bowel function  Description: INTERVENTIONS:  - Assess bowel function  - Encourage oral fluids to ensure adequate hydration  - Administer IV fluids if ordered to ensure adequate hydration  - Administer ordered medications as needed  - Encourage mobilization and activity  - Consider nutritional services referral to assist patient with adequate nutrition and appropriate food choices  Outcome: Progressing  Goal: Maintains adequate nutritional intake  Description: INTERVENTIONS:  - Monitor percentage of each meal consumed  - Identify factors contributing to decreased intake, treat as appropriate  - Assist with meals as needed  - Monitor I&O, weight, and lab values if indicated  - Obtain nutrition services referral as needed  Outcome: Progressing     Problem: PAIN - ADULT  Goal: Verbalizes/displays adequate comfort level or baseline comfort level  Description: Interventions:  - Encourage patient to monitor pain and request assistance  - Assess pain using appropriate pain scale  - Administer analgesics based on type and severity of pain and evaluate response  - Implement non-pharmacological measures as appropriate and evaluate response  - Consider cultural and social influences on pain and pain management  - Notify physician/advanced practitioner if interventions unsuccessful or patient reports new pain  Outcome: Progressing     Problem: INFECTION - ADULT  Goal: Absence or prevention of progression during hospitalization  Description: INTERVENTIONS:  - Assess and monitor for signs and symptoms of infection  - Monitor lab/diagnostic results  - Monitor all insertion sites, i e  indwelling lines, tubes, and drains  - Monitor endotracheal if appropriate and nasal secretions for changes in amount and color  - [place] appropriate cooling/warming therapies per order  - Administer medications as ordered  - Instruct and encourage patient and family to use good hand hygiene technique  - Identify and instruct in appropriate isolation precautions for identified infection/condition  Outcome: Progressing  Goal: Absence of fever/infection during neutropenic period  Description: INTERVENTIONS:  - Monitor WBC    Outcome: Progressing     Problem: SAFETY ADULT  Goal: Patient will remain free of falls  Description: INTERVENTIONS:  - Assess patient frequently for physical needs  -  Identify cognitive and physical deficits and behaviors that affect risk of falls    -  [place] fall precautions as indicated by assessment   - Educate patient/family on patient safety including physical limitations  - Instruct patient to call for assistance with activity based on assessment  - Modify environment to reduce risk of injury  - Consider OT/PT consult to assist with strengthening/mobility  Outcome: Progressing  Goal: Maintain or return to baseline ADL function  Description: INTERVENTIONS:  -  Assess patient's ability to carry out ADLs; assess patient's baseline for ADL function and identify physical deficits which impact ability to perform ADLs (bathing, care of mouth/teeth, toileting, grooming, dressing, etc )  - Assess/evaluate cause of self-care deficits   - Assess range of motion  - Assess patient's mobility; develop plan if impaired  - Assess patient's need for assistive devices and provide as appropriate  - Encourage maximum independence but intervene and supervise when necessary  - Involve family in performance of ADLs  - Assess for home care needs following discharge   - Consider OT consult to assist with ADL evaluation and planning for discharge  - Provide patient education as appropriate  Outcome: Progressing  Goal: Maintain or return mobility status to optimal level  Description: INTERVENTIONS:  - Assess patient's baseline mobility status (ambulation, transfers, stairs, etc )    - Identify cognitive and physical deficits and behaviors that affect mobility  - Identify mobility aids required to assist with transfers and/or ambulation (gait belt, sit-to-stand, lift, walker, cane, etc )  - [place] fall precautions as indicated by assessment  - Record patient progress and toleration of activity level on Mobility SBAR; progress patient to next Phase/Stage  - Instruct patient to call for assistance with activity based on assessment  - Consider rehabilitation consult to assist with strengthening/weightbearing, etc   Outcome: Progressing     Problem: DISCHARGE PLANNING  Goal: Discharge to home or other facility with appropriate resources  Description: INTERVENTIONS:  - Identify barriers to discharge w/patient and caregiver  - Arrange for needed discharge resources and transportation as appropriate  - Identify discharge learning needs (meds, wound care, etc )  - Arrange for interpretive services to assist at discharge as needed  - Refer to Case Management Department for coordinating discharge planning if the patient needs post-hospital services based on physician/advanced practitioner order or complex needs related to functional status, cognitive ability, or social support system  Outcome: Progressing     Problem: Knowledge Deficit  Goal: Patient/family/caregiver demonstrates understanding of disease process, treatment plan, medications, and discharge instructions  Description: Complete learning assessment and assess knowledge base    Interventions:  - Provide teaching at level of understanding  - Provide teaching via preferred learning methods  Outcome: Progressing

## 2020-08-30 NOTE — PROGRESS NOTES
General Cardiology Progress Note   Juan Carlos Sifuentes 61 y o  male MRN: [id number]  Unit/Bed#: [facility] 922-01 Encounter: [id number]      Assessment:  Principal Problem:    Atrial fibrillation with rapid ventricular response (HCC)  Active Problems:    Systolic CHF (HCC)    Microcytic anemia    SCARLETT (acute kidney injury) ([address] )    Gastrointestinal hemorrhage with melena    Coronary artery disease    GI bleeding    Mass of cecum    History of gout      Impression:     Recovered NICMP-due to tachy mediated cardiomyopathy  o LVEF 50% by JOSE LUIS Friday   Atrial fibrillation with RVR  o Persistent with recent anemia/adenocarcinoma diagnosis  o On metoprolol, digoxin, calcium channel blocker  o Eliquis on hold   ABLA  o Hemoglobin stable today at 10 2   Urinary retention   Colon adenocarcinoma  o Status post resection    Plan:     Eliquis 5 mg p o  B i d  When stable from a postoperative standpoint   Heart rates are well controlled   Heart rates have been acceptable   Previously followed with Dr Ab mcfarland, will be transferring to my office    Subjective:   Patient seen and examined  Difficulty with urination overnight, some urinary retention, straight cathed x1 this morning  Remains in persistent atrial fibrillation, asymptomatic  Heart rates well controlled    Review of Systems   Constitution: Negative for diaphoresis, fever, malaise/fatigue and night sweats  Cardiovascular: Negative for chest pain, dyspnea on exertion, leg swelling, orthopnea, palpitations and syncope  Respiratory: Negative for cough, shortness of breath, sputum production and wheezing  Skin: Negative for itching and rash  Gastrointestinal: Positive for abdominal pain  Negative for change in bowel habit and diarrhea  Genitourinary: Positive for incomplete emptying  Neurological: Negative for dizziness, focal weakness, light-headedness and weakness  Objective   Vitals: Blood pressure 133/76, pulse 87, temperature 98 5 °F (36 9 °C), resp  rate 18, height 5' 9" (1 753 m), weight 101 kg (223 lb 8 7 oz), SpO2 96 %  , Body mass index is 33 01 kg/m² , I/O last 3 completed shifts: In: 6824 4 [P O :1340; I V :4484 4; IV Piggyback:1000]  Out: 2000 [Urine:2000]  I/O this shift:  In: 1056 5 [P O :240; I V :816 5]  Out: -   Wt Readings from Last 3 Encounters:   08/30/20 101 kg (223 lb 8 7 oz)   07/27/20 101 kg (222 lb)   03/19/19 102 kg (224 lb)       Intake/Output Summary (Last 24 hours) at 8/30/2020 1426  Last data filed at 8/30/2020 1334  Gross per 24 hour   Intake 5373 87 ml   Output 1400 ml   Net 3973 87 ml     I/O last 3 completed shifts: In: 6824 4 [P O :1340; I V :4484 4; IV Piggyback:1000]  Out: 2000 [Urine:2000]        Physical Exam  Constitutional:       General: He is not in acute distress  Appearance: He is not diaphoretic  HENT:      Head: Normocephalic and atraumatic  Neck:      Musculoskeletal: Normal range of motion and neck supple  Vascular: No JVD  Cardiovascular:      Rate and Rhythm: Normal rate  Heart sounds: Normal heart sounds  No murmur  Comments: Irregular  Pulmonary:      Effort: Pulmonary effort is normal  No respiratory distress  Breath sounds: Normal breath sounds  No wheezing or rales  Abdominal:      General: Bowel sounds are normal  There is no distension  Palpations: Abdomen is soft  Tenderness: There is abdominal tenderness  Skin:     General: Skin is warm and dry  Neurological:      Mental Status: He is alert and oriented to person, place, and time           Meds/Allergies   No Known Allergies    Current Facility-Administered Medications:     allopurinol (ZYLOPRIM) tablet 100 mg, 100 mg, Oral, Daily, Bello Dos Santos DO, 100 mg at 08/30/20 0830    [START ON 8/31/2020] atorvastatin (LIPITOR) tablet 40 mg, 40 mg, Oral, Daily With Dinner, Don Castro DO    dextrose 5 % and sodium chloride 0 45 % infusion, 75 mL/hr, Intravenous, Continuous, Dasha Lora MD, Last Rate: 75 mL/hr at 08/30/20 1224, 75 mL/hr at 08/30/20 1224    digoxin (LANOXIN) tablet 125 mcg, 125 mcg, Oral, Daily, Sheeba Bodily, DO, 125 mcg at 08/29/20 1740    diltiazem (CARDIZEM SR) 12 hr capsule 60 mg, 60 mg, Oral, Q12H [facility], Don Ziehm, DO, 60 mg at 08/30/20 0830    diltiazem (CARDIZEM) injection 10 mg, 10 mg, Intravenous, Q8H PRN, Sheeba Bodily, DO, 10 mg at 08/22/20 1824    heparin (porcine) subcutaneous injection 5,000 Units, 5,000 Units, Subcutaneous, Q8H [facility], Sheeba Bodily, DO, 5,000 Units at 08/30/20 1312    HYDROmorphone (DILAUDID) 1 mg/mL 50 mL PCA, , Intravenous, Continuous, Don Florentinoehm, DO    melatonin tablet 3 mg, 3 mg, Oral, HS PRN, Sheeba Bodily, DO, 3 mg at 08/22/20 0100    metoprolol succinate (TOPROL-XL) 24 hr tablet 100 mg, 100 mg, Oral, BID, Don Goodehm, DO, 100 mg at 08/30/20 0830    naloxone [facility]) injection 0 1 mg, 0 1 mg, Intravenous, Q3 min PRN, Sheeba Bodily, DO    spironolactone (ALDACTONE) tablet 12 5 mg, 12 5 mg, Oral, Daily, Sheeba Bodily, DO, 12 5 mg at 08/30/20 0830    Laboratory Results:        CBC with diff:   Results from last 7 days   Lab Units 08/30/20  0529 08/29/20  0541 08/28/20  1709 08/28/20  0449 08/27/20  0512 08/26/20  0435 08/25/20  0757   WBC Thousand/uL 8 19 10 76* 13 61* 6 86 6 47 7 75 7 89   HEMOGLOBIN g/dL 8 1* 9 7* 9 5* 10 3* 9 2* 9 3* 10 3*   HEMATOCRIT % 28 3* 32 7* 32 8* 35 8* 31 6* 31 6* 35 3*   MCV fL 85 82 84 84 82 80* 80*   PLATELETS Thousands/uL 253 322 316 328 328 325 357   MCH pg 24 3* 24 4* 24 2* 24 1* 23 8* 23 6* 23 4*   MCHC g/dL 28 6* 29 7* 29 0* 28 8* 29 1* 29 4* 29 2*   RDW % 19 9* 19 1* 18 7* 19 1* 18 3* 17 5* 17 0*   MPV fL 11 6 11 2 10 7 10 4 11 1 11 2 10 6   NRBC AUTO /100 WBCs  --  0 0 0 0 0 0       CMP:  Results from last 7 days   Lab Units 08/30/20  0528 08/29/20  0541 08/28/20  1709 08/28/20  0449 08/27/20  0512 08/26/20  0435 08/25/20  0756   POTASSIUM mmol/L 4 2 5 1 4 4 3 8 4 3 3 7 3 9   CHLORIDE mmol/L 107 109* 111* 109* 111* 109* 109* CO2 mmol/L 23 17* 19* 21 26 22 23   BUN mg/dL 18 16 10 12 16 15 14   CREATININE mg/dL 1 10 1 30 1 09 1 20 1 25 1 14 1 16   CALCIUM mg/dL 8 4 8 7 8 2* 9 1 8 3 8 2* 8 9   EGFR ml/min/1 73sq m 73 59 73 65 62 70 68       BMP:  Results from last 7 days   Lab Units 20  0528 20  0541 20  1709 20  0449 20  0512 20  0435 20  0756   POTASSIUM mmol/L 4 2 5 1 4 4 3 8 4 3 3 7 3 9   CHLORIDE mmol/L 107 109* 111* 109* 111* 109* 109*   CO2 mmol/L 23 17* 19* 21 26 22 23   BUN mg/dL 18 16 10 12 16 15 14   CREATININE mg/dL 1 10 1 30 1 09 1 20 1 25 1 14 1 16   CALCIUM mg/dL 8 4 8 7 8 2* 9 1 8 3 8 2* 8 9       NT-proBNP: No results for input(s): NTBNP in the last 72 hours       Magnesium:   Results from last 7 days   Lab Units 20  0541 20  1709 20  0449 20  0512 20  0435   MAGNESIUM mg/dL 2 1 2 0 2 0 2 2 2 1       Coags:   Results from last 7 days   Lab Units 20  0435 20  2305 20  1444 20  0757 20  0036 20  1844   PTT seconds 71* 92* 58* 56* 35 102*   INR   --   --   --   --   --  1 17       TSH:        Hemoglobin A1C )      Lipid Profile:   No results found for: CHOL  Lab Results   Component Value Date    HDL 41 2020    HDL 31 (L) 2017     Lab Results   Component Value Date    LDLCALC 40 2020    LDLCALC 73 2017     No results found for: LDLDIRECT  Lab Results   Component Value Date    TRIG 74 2020    TRIG 82 2017       Cardiac testing:   EKG personally reviewed by Cydney Parker MD      Results for orders placed during the hospital encounter of 10/17/18   Echo complete with contrast if indicated    Narrative [address]  [address]  [phone]    Transthoracic Echocardiogram  2D, M-mode, Doppler, and Color Doppler    Study date:  17-Oct-2018    Patient: Kevyn Valencia  MR number: [id number]  Account number: [de-identified]  : 1960  Age: 62 years  Gender: Male  Status: Outpatient  Location: [address] [place] [address] [facility] [place]  Height: 69 in  Weight: 216 lb  BP: 140/ 82 mmHg    Indications: Heart Failure    Diagnoses: I50 9 - Heart failure, unspecified    Sonographer:  SANKET Ryan  Primary Physician:  Trinity Karimi DO  Referring Physician:  Geni Montana MD  Group:  [facility]  Interpreting Physician:  Jazz Serna MD    SUMMARY    LEFT VENTRICLE:  Systolic function was at the lower limits of normal  Ejection fraction was estimated to be 50 %  There were no regional wall motion abnormalities  Wall thickness was mildly to moderately increased  Doppler parameters were consistent with abnormal left ventricular relaxation (grade 1 diastolic dysfunction)  RIGHT VENTRICLE:  The size was normal   Systolic function was normal     PERICARDIUM:  A trace pericardial effusion was identified  There was no evidence of hemodynamic compromise  COMPARISONS:  There has been no significant interval change  Comparison was made with the previous study of 29-Mar-2018  HISTORY: PRIOR HISTORY: MI, CAD, HTN, AFIB, SVT    PROCEDURE: The study was performed in the [address]  This was a routine study  The transthoracic approach was used  The study included complete 2D imaging, M-mode, complete spectral Doppler, and color Doppler  The  heart rate was 61 bpm, at the start of the study  Images were obtained from the parasternal, apical, subcostal, and suprasternal notch acoustic windows  Echocardiographic views were limited due to lung interference  Image quality was  adequate  LEFT VENTRICLE: Size was normal  Systolic function was at the lower limits of normal  Ejection fraction was estimated to be 50 %  There were no regional wall motion abnormalities  Wall thickness was mildly to moderately increased   DOPPLER:  Doppler parameters were consistent with abnormal left ventricular relaxation (grade 1 diastolic dysfunction)  RIGHT VENTRICLE: The size was normal  Systolic function was normal  Wall thickness was normal     LEFT ATRIUM: Size was at the upper limits of normal     RIGHT ATRIUM: Size was normal     MITRAL VALVE: Valve structure was normal  There was normal leaflet separation  DOPPLER: The transmitral velocity was within the normal range  There was no evidence for stenosis  There was no significant regurgitation  AORTIC VALVE: The valve was trileaflet  Leaflets exhibited normal thickness and normal cuspal separation  DOPPLER: Transaortic velocity was within the normal range  There was no evidence for stenosis  There was no significant  regurgitation  TRICUSPID VALVE: The valve structure was normal  There was normal leaflet separation  DOPPLER: The transtricuspid velocity was within the normal range  There was no evidence for stenosis  There was no significant regurgitation  PULMONIC VALVE: Leaflets exhibited normal thickness, no calcification, and normal cuspal separation  DOPPLER: The transpulmonic velocity was within the normal range  There was no significant regurgitation  PERICARDIUM: A trace pericardial effusion was identified  There was no evidence of hemodynamic compromise  AORTA: The root exhibited normal size  SYSTEMIC VEINS: IVC: The inferior vena cava was normal in size   Respirophasic changes were normal     SYSTEM MEASUREMENT TABLES    2D  %FS: 26 08 %  Ao Diam: 4 01 cm  EDV(Teich): 105 38 ml  EF Biplane: 52 16 %  EF(Cube): 59 62 %  EF(Teich): 51 15 %  ESV(Cube): 43 52 ml  ESV(Teich): 51 48 ml  IVSd: 1 45 cm  LA Area: 20 83 cm2  LA Diam: 3 55 cm  LVEDV MOD A2C: 129 95 ml  LVEDV MOD A4C: 153 5 ml  LVEDV MOD BP: 146 59 ml  LVEF MOD A2C: 47 85 %  LVEF MOD A4C: 59 43 %  LVESV MOD A2C: 67 77 ml  LVESV MOD A4C: 62 27 ml  LVESV MOD BP: 70 12 ml  LVIDd: 4 76 cm  LVIDs: 3 52 cm  LVLd A2C: 8 67 cm  LVLd A4C: 8 72 cm  LVLs A2C: 7 68 cm  LVLs A4C: 7 6 cm  LVPWd: 1 36 cm  RA Area: 17 39 cm2  RV Diam : 3 55 cm  SV MOD A2C: 62 18 ml  SV MOD A4C: 91 23 ml  SV(Cube): 64 25 ml  SV(Teich): 53 9 ml    MM  TAPSE: 1 49 cm    PW  E': 0 04 m/s  E/E': 23 04  MV A Brennan: 0 66 m/s  MV Dec [place]: 3 43 m/s2  MV DecT: 248 53 ms  MV E Brennan: 0 85 m/s  MV E/A Ratio: 1 3    [facility]    Prepared and electronically signed by    Ruddy Akers MD  Signed 18-Oct-2018 11:44:59       Results for orders placed during the hospital encounter of 20   JOSE LUIS    Narrative [address]  [facility], 210 [facility]  [phone]    Transesophageal Echocardiogram  2D, Doppler, and Color Doppler    Study date:  28-Aug-2020    Patient: Sharla Lackey  MR number: [id number]  Account number: [de-identified]  : 1960  Age: 61 years  Gender: Male  Status: Inpatient  Location: Operating room  Height: 70 in  Weight: 150 lb  BP:    Indications: TIA    Diagnoses: G45 9 - Transient cerebral ischemic attack, unspecified    Sonographer:  SANKET Carranza  Referring Physician:  Fidencio Ohara MD  Group:  [facility]  Cardiology Fellow:  Ramirez Doshi MD  Interpreting Physician:  Fidencio Ohara MD    SUMMARY    LEFT VENTRICLE:  Systolic function was at the lower limits of normal  Ejection fraction was estimated to be 50 %  There were no regional wall motion abnormalities  Wall thickness was normal     RIGHT VENTRICLE:  The size was normal   Systolic function was normal   Wall thickness was normal     LEFT ATRIAL APPENDAGE:  No thrombus was identified  There was mild spontaneous echo contrast ("smoke") in the appendage  MITRAL VALVE:  There was trace regurgitation  HISTORY: PRIOR HISTORY: HTN, Afib, REGULO    PROCEDURE: The procedure was performed in the operating room  This was a routine study  The risks and alternatives of the procedure were explained to the patient and informed consent was obtained   The transesophageal approach was used  The  study included complete 2D imaging, complete spectral Doppler, and color Doppler  The heart rate was 102 bpm, at the start of the study  An adult omniplane probe was inserted by the cardiology fellow under direct supervision of the  attending cardiologist  Intubated with ease  One intubation attempt(s)  There was no blood detected on the probe  Image quality was adequate  MEDICATIONS: Anesthesia  LEFT VENTRICLE: Size was normal  Systolic function was at the lower limits of normal  Ejection fraction was estimated to be 50 %  There were no regional wall motion abnormalities  Wall thickness was normal     RIGHT VENTRICLE: The size was normal  Systolic function was normal  Wall thickness was normal     LEFT ATRIUM: Size was normal  No thrombus was identified  APPENDAGE: No thrombus was identified  There was mild spontaneous echo contrast ("smoke") in the appendage  ATRIAL SEPTUM: No defect or patent foramen ovale was identified  RIGHT ATRIUM: Size was normal  No thrombus was identified  MITRAL VALVE: Valve structure was normal  There was normal leaflet separation  There was no echocardiographic evidence of vegetation  DOPPLER: There was trace regurgitation  AORTIC VALVE: The valve was trileaflet  Leaflets exhibited normal thickness and normal cuspal separation  There was no echocardiographic evidence of vegetation  DOPPLER: There was no regurgitation  TRICUSPID VALVE: The valve structure was normal  There was normal leaflet separation  There was no echocardiographic evidence of vegetation  DOPPLER: There was no regurgitation  PULMONIC VALVE: Leaflets exhibited normal thickness, no calcification, and normal cuspal separation  There was no echocardiographic evidence of vegetation  DOPPLER: There was trace regurgitation  PERICARDIUM: There was no pericardial effusion  The pericardium was normal in appearance  AORTA: The root exhibited normal size   There was no atheroma  There was no evidence for dissection  There was no evidence for aneurysm  [address] Accredited Echocardiography Laboratory    Prepared and electronically signed by    Hamilton Schmid MD  Signed 28-Aug-2020 14:25:55       Results for orders placed during the hospital encounter of 18   Cardiac catheterization    Narrative [address]  [address]  [phone]    [facility]    Invasive Cardiovascular Lab Complete Report    Patient: Pretty Chaudhary  MR number: [id number]  Account number: [de-identified]  Study date: 2018  Gender: Male  : 1960  Height: 68 9 in  Weight: 204 6 lb  BSA: 2 09 m squared    Allergies: NO KNOWN ALLERGIES    Diagnostic Cardiologist:  Cordelia Marin MD  Primary Physician:  Jeannette Rizo MD    SUMMARY    CORONARY CIRCULATION:  Left main: Normal   LAD: The vessel was normal sized  There was moderate plaque  There were no obstructive lesions  The diagonal branches were also free of obstructive diseease  Circumflex: Normal  The major OM branch was also normal   Ramus intermedius: The vessel was medium sized  There was an 80% proximal stenosis  RCA: The vessel was large sized and dominant, giving rise to the PDA and a posterolateral branch  There was a 60% ostial stenosis of the PDA  HEMODYNAMICS:  Hemodynamic assessment demonstrated normal LVEDP (5 mmHg)  REPORT ELEMENT SELECTION:  Right radial arterial access was employed  Summary:  1  Single vessel CAD, with an 80% proximal stenosis of the ramus artery  2  Normal LVEDP  Although this corresponds to the region identified on SPECT imaging, the patient is without ischemic symptoms, and the lesion clearly does not account for his recent episode of severe LV dysfunction  Plan: continued medical management   Follow-up with Dr Zachery Vilchis    INDICATIONS:  --  Coronary artery disease: abnormal stress test   --  Congestive heart failure with cardiomyopathy  PROCEDURES PERFORMED    --  Left heart catheterization with ventriculography  --  Left coronary angiography  --  Right coronary angiography  --  Outpatient  --  Mod Sedation Same Physician Initial 15min  --  Mod Sedation Same Physician Add 15min  --  Coronary Catheterization (w/ [facility])  PROCEDURE: The risks and alternatives of the procedures and conscious sedation were explained to the patient and informed consent was obtained  The patient was brought to the cath lab and placed on the table  The planned puncture sites  were prepped and draped in the usual sterile fashion  --  Right radial artery access  After performing an Waldo's test to verify adequate ulnar artery supply to the hand, the radial site was prepped  The puncture site was infiltrated with local anesthetic  The vessel was accessed using the  modified Seldinger technique, a wire was advanced into the vessel, and a sheath was advanced over the wire into the vessel  --  Left heart catheterization with ventriculography  A catheter was advanced over a guidewire into the ascending aorta  After recording ascending aortic pressure, the catheter was advanced across the aortic valve and left ventricular  pressure was recorded  Ventriculography was performed  The catheter was pulled back across the aortic valve and into the ascending aorta and pullback pressures were obtained  --  Left coronary artery angiography  A catheter was advanced over a guidewire into the aorta and positioned in the left coronary artery ostium under fluoroscopic guidance  Angiography was performed  --  Right coronary artery angiography  A catheter was advanced over a guidewire into the aorta and positioned in the right coronary artery ostium under fluoroscopic guidance  Angiography was performed  --  Outpatient  --  Mod Sedation Same Physician Initial 15min  --  Mod Sedation Same Physician Add 15min      --  Coronary Catheterization (w/ [facility])     PROCEDURE COMPLETION: The patient tolerated the procedure well and was discharged from the cath lab  TIMING: Test started at 12:55  Test concluded at 13:19  HEMOSTASIS: The sheath was removed  The site was compressed with a Hemoband  device  Hemostasis was obtained  MEDICATIONS GIVEN: Verapamil (Isoptin, Calan, Covera), 1 25 mg, IA, at 13:02  Fentanyl (1OOmcg/2 ml), 50 mcg, IV, at 12:54  Versed (2mg/2ml), 2 mg, IV, at 12:54  Versed (2mg/2ml), 2 mg, IV, at 13:00  Fentanyl (1OOmcg/2 ml), 50 mcg, IV, at 13:00  1% Lidocaine, 0 1 ml, subcutaneously, at 13:01  Nitroglycerin (200mcg/ml), 200 mcg, at 13:02  Heparin 1000 units/ml, 4,000 units, IV, at 13:03  Versed (2mg/2ml), 1 mg, IV, at 13:08  Fentanyl  (1OOmcg/2 ml), 25 mcg, IV, at 13:08  CONTRAST GIVEN: 85 ml Omnipaque (350mg I /ml)  RADIATION EXPOSURE: Fluoroscopy time: 2 28 min  HEMODYNAMICS: Hemodynamic assessment demonstrated normal LVEDP (5 mmHg)  CORONARY VESSELS:   --  Left main: Normal   --  LAD: The vessel was normal sized  There was moderate plaque  There were no obstructive lesions  The diagonal branches were also free of obstructive diseease  --  Circumflex: Normal  The major OM branch was also normal   --  Ramus intermedius: The vessel was medium sized  There was an 80% proximal stenosis  --  RCA: The vessel was large sized and dominant, giving rise to the PDA and a posterolateral branch  There was a 60% ostial stenosis of the PDA  NOTE: Right radial arterial access was employed      Prepared and signed by    Nina Banuelos MD  Signed 03/02/2018 13:43:53    CC: Dr Elliott Geiger    Study diagram    Hemodynamic tables    Pressures:  Baseline  Pressures:  - HR: 73  Pressures:  - Rhythm:  Pressures:  -- Aortic Pressure (S/D/M): 98/63/78  Pressures:  -- Left Ventricle (s/edp): 104/13/--    Outputs:  Baseline  Outputs:  -- CALCULATIONS: [age]  Outputs:  -- CALCULATIONS: Body Surface Area: 2 09  Outputs:  -- CALCULATIONS: Height in cm: 175 00  Outputs:  -- CALCULATIONS: Sex: Male  Outputs:  -- CALCULATIONS: Weight in k 00       No results found for this or any previous visit  Results for orders placed during the hospital encounter of 18   NM myocardial perfusion spect (stress and/or rest)    [address]  [facility], 210 [facility]  [phone]    Stress Gated SPECT Myocardial Perfusion Imaging after Regadenoson    Patient: Gilmer Herrera  MR number: [id number]  Account number: [de-identified]  : 1960  Age: 62 years  Gender: Male  Status: Outpatient  Location: [address] [place] [address] Vascular [place]  Height: 69 in  Weight: 189 lb  BP: 120/ 90 mmHg    Allergies: NO KNOWN ALLERGIES    Diagnosis: Q92 51 - Chronic systolic (congestive) heart failure    Referring Physician:  Georges Hinojosa MD  Primary Physician:  Elfego Alonso MD  Technician:  SUN Valencia  RN:  Fede Casanova RN  Group:  [facility]  Cardiology Fellow:  Dr Meryl العراقي  Report Prepared by[de-identified]  Fede Casanova RN  Interpreting Physician:  Wade Mejia DO    INDICATIONS: Assessment of cardiomyopathy  HISTORY: Cardiomyopathy, Lifevest  The patient is a 62year old  male  Chest pain status: chest pain  Other symptoms: edema  Coronary artery disease risk factors: none  Medications: a beta blocker and a lipid lowering agent  PHYSICAL EXAM: Baseline physical exam screening: no wheezes audible  REST ECG: Normal sinus rhythm  PROCEDURE: The study was performed at the [address]  A regadenoson infusion pharmacologic stress test was performed  Gated SPECT myocardial perfusion imaging was performed during stress  Systolic blood pressure was  120 mmHg, at the start of the study  Diastolic blood pressure was 90 mmHg, at the start of the study  The heart rate was 65 bpm, at the start of the study  IV double checked    Regadenoson protocol:  HR bpm SBP mmHg DBP mmHg Symptoms  Baseline 65 120 90 none  Immediate 81 122 92 mild dyspnea  1 min 73 120 90 none  No medications or fluids given  STRESS SUMMARY: Duration of pharmacologic stress was 3 min and 0 sec  Maximal heart rate during stress was 91 bpm  The heart rate response to stress was normal  Normal blood pressure response to regadenosan  The rate-pressure product for  the peak heart rate and blood pressure was 00630  There was no chest pain during stress  The stress test was terminated due to protocol completion  Pre oxygen saturation: 100 %  Peak oxygen saturation: 99 %  The stress ECG was negative for  ischemia and normal  There were no stress arrhythmias or conduction abnormalities  ISOTOPE ADMINISTRATION:  Resting isotope administration Stress isotope administration  Agent Tetrofosmin Tetrofosmin  Dose 10 12 mCi 30 9 mCi  Date 02/19/2018 02/19/2018  Injection-image interval 47 min 52 min    The radiopharmaceutical was injected at the peak effect of pharmacologic stress  MYOCARDIAL PERFUSION IMAGING:  The image quality was good  Rotating projection images reveal mild patient motion  Left ventricular size was normal  The TID ratio was 1 17  PERFUSION DEFECTS:  -  There was a moderate-sized, moderately severe, partially reversible myocardial perfusion defect of the basal to mid inferolateral wall  GATED SPECT:  The calculated left ventricular ejection fraction was 47 %  Left ventricular ejection fraction was mildly decreased by visual estimate  SUMMARY:  -  Stress results: There was no chest pain during stress  -  ECG conclusions: The stress ECG was negative for ischemia and normal   -  Perfusion imaging: There was a moderate-sized, moderately severe, partially reversible myocardial perfusion defect of the basal to mid inferolateral wall  -  Gated SPECT: The calculated left ventricular ejection fraction was 47 %   Left ventricular ejection fraction was mildly decreased by visual estimate  IMPRESSIONS: Abnormal study after vasodilation and low level exercise without reproduction of symptoms  Partially reversible defect of the inferolateral wall  Fixed defect Apical lateral  Left ventricular systolic function was reduced,  with regional wall motion abnormalities  Prepared and signed by    Christal Mcgee DO  Signed 02/19/2018 14:38:42       No results found for this or any previous visit  Bernardo Shah MD    Portions of the record may have been created with voice recognition software  Occasional wrong word or "sound a like" substitutions may have occurred due to the inherent limitations of voice recognition software  Read the chart carefully and recognize, using context, where substitutions have occurred

## 2020-08-30 NOTE — NURSING NOTE
Bladder scanned patient at 1100 for 362  Not enough to straight cath per protocol so re-scanned at 1300 for 360  Patient has no urge to urinate  Jerson lovett/ supriya nagy  Notified  Per his instructions will re-scan in four hours to see urine volume

## 2020-08-30 NOTE — PROGRESS NOTES
Progress Note - Colorectal Surgery   Sushila Valadez 61 y o  male MRN: [id number]  Unit/Bed#: [facility] 922-01 Encounter: [id number]    Assessment:  61 M w/ ascending colon cancer s/p right hemicolectomy with ileocolic anastomosis 5/98    VSS, Afebrile   cc (straight cath x1)    Plan:  Clears- advance as tolerated  IVF  Pain control  Urinary Retention protocol  Cards: Anticoagulation for 3 weeks, then electrical cardioversion  Start eliquis BID when stable postop    Subjective/Objective     Subjective:   Difficulties after tavarez removed  To it slow with clears yesterday  No N/V  No flatus or BM  Objective:    Blood pressure 113/73, pulse 72, temperature 97 9 °F (36 6 °C), temperature source Oral, resp  rate 16, height 5' 9" (1 753 m), weight 97 4 kg (214 lb 11 7 oz), SpO2 97 %  ,Body mass index is 31 71 kg/m²  Intake/Output Summary (Last 24 hours) at 8/30/2020 0544  Last data filed at 8/30/2020 0155  Gross per 24 hour   Intake 5649 57 ml   Output 775 ml   Net 4874 57 ml       Invasive Devices     Peripheral Intravenous Line            Peripheral IV 08/28/20 Left;Proximal;Ventral (anterior) Forearm 1 day    Peripheral IV 08/28/20 Right Hand 1 day                Physical Exam:   Gen:  NAD  HEENT: normocephalic, atraumatic  Neck supple  CV: well perfused, pulses palpable  Lungs: Normal respiratory effort  Abd: soft,nd, mildly tender incisions cdi  Skin: warm/ dry  Extremities: no peripheral edema  Neuro:  AxO x3      Results from last 7 days   Lab Units 08/29/20  0541 08/28/20  1709 08/28/20  0449   WBC Thousand/uL 10 76* 13 61* 6 86   HEMOGLOBIN g/dL 9 7* 9 5* 10 3*   HEMATOCRIT % 32 7* 32 8* 35 8*   PLATELETS Thousands/uL 322 316 328     Results from last 7 days   Lab Units 08/29/20  0541 08/28/20  1709 08/28/20  0449   POTASSIUM mmol/L 5 1 4 4 3 8   CHLORIDE mmol/L 109* 111* 109*   CO2 mmol/L 17* 19* 21   BUN mg/dL 16 10 12   CREATININE mg/dL 1 30 1 09 1 20   CALCIUM mg/dL 8 7 8 2* 9 1     Results from last 7 days   Lab Units 08/26/20  0435 08/25/20  2305 08/25/20  1444  08/24/20  1844   INR   --   --   --   --  1 17   PTT seconds 71* 92* 58*   < > 102*    < > = values in this interval not displayed

## 2020-08-31 LAB
ANION GAP SERPL CALCULATED.3IONS-SCNC: 5 MMOL/L (ref 4–13)
BUN SERPL-MCNC: 12 MG/DL (ref 5–25)
CALCIUM SERPL-MCNC: 8.7 MG/DL (ref 8.3–10.1)
CHLORIDE SERPL-SCNC: 105 MMOL/L (ref 100–108)
CO2 SERPL-SCNC: 25 MMOL/L (ref 21–32)
CREAT SERPL-MCNC: 0.94 MG/DL (ref 0.6–1.3)
ERYTHROCYTE [DISTWIDTH] IN BLOOD BY AUTOMATED COUNT: 20.8 % (ref 11.6–15.1)
GFR SERPL CREATININE-BSD FRML MDRD: 88 ML/MIN/1.73SQ M
GLUCOSE SERPL-MCNC: 97 MG/DL (ref 65–140)
HCT VFR BLD AUTO: 29.7 % (ref 36.5–49.3)
HGB BLD-MCNC: 8.7 G/DL (ref 12–17)
MCH RBC QN AUTO: 24.6 PG (ref 26.8–34.3)
MCHC RBC AUTO-ENTMCNC: 29.3 G/DL (ref 31.4–37.4)
MCV RBC AUTO: 84 FL (ref 82–98)
PLATELET # BLD AUTO: 269 THOUSANDS/UL (ref 149–390)
PMV BLD AUTO: 11.9 FL (ref 8.9–12.7)
POTASSIUM SERPL-SCNC: 3.7 MMOL/L (ref 3.5–5.3)
RBC # BLD AUTO: 3.53 MILLION/UL (ref 3.88–5.62)
SODIUM SERPL-SCNC: 135 MMOL/L (ref 136–145)
WBC # BLD AUTO: 8.31 THOUSAND/UL (ref 4.31–10.16)

## 2020-08-31 PROCEDURE — 85027 COMPLETE CBC AUTOMATED: CPT | Performed by: SURGERY

## 2020-08-31 PROCEDURE — 80048 BASIC METABOLIC PNL TOTAL CA: CPT | Performed by: SURGERY

## 2020-08-31 PROCEDURE — 99231 SBSQ HOSP IP/OBS SF/LOW 25: CPT | Performed by: INTERNAL MEDICINE

## 2020-08-31 PROCEDURE — 0T9B70Z DRAINAGE OF BLADDER WITH DRAINAGE DEVICE, VIA NATURAL OR ARTIFICIAL OPENING: ICD-10-PCS | Performed by: COLON & RECTAL SURGERY

## 2020-08-31 RX ORDER — FUROSEMIDE 40 MG/1
40 TABLET ORAL DAILY
Status: DISCONTINUED | OUTPATIENT
Start: 2020-08-31 | End: 2020-09-01 | Stop reason: HOSPADM

## 2020-08-31 RX ORDER — OXYCODONE HYDROCHLORIDE 5 MG/1
5 TABLET ORAL EVERY 4 HOURS PRN
Status: DISCONTINUED | OUTPATIENT
Start: 2020-08-31 | End: 2020-09-01 | Stop reason: HOSPADM

## 2020-08-31 RX ORDER — ACETAMINOPHEN 325 MG/1
650 TABLET ORAL EVERY 6 HOURS PRN
Status: DISCONTINUED | OUTPATIENT
Start: 2020-08-31 | End: 2020-09-01 | Stop reason: HOSPADM

## 2020-08-31 RX ORDER — TAMSULOSIN HYDROCHLORIDE 0.4 MG/1
0.4 CAPSULE ORAL ONCE
Status: COMPLETED | OUTPATIENT
Start: 2020-08-31 | End: 2020-08-31

## 2020-08-31 RX ORDER — TAMSULOSIN HYDROCHLORIDE 0.4 MG/1
0.4 CAPSULE ORAL
Status: DISCONTINUED | OUTPATIENT
Start: 2020-08-31 | End: 2020-09-01 | Stop reason: SDUPTHER

## 2020-08-31 RX ORDER — POTASSIUM CHLORIDE 20 MEQ/1
40 TABLET, EXTENDED RELEASE ORAL ONCE
Status: COMPLETED | OUTPATIENT
Start: 2020-08-31 | End: 2020-08-31

## 2020-08-31 RX ORDER — DILTIAZEM HYDROCHLORIDE 60 MG/1
60 CAPSULE, EXTENDED RELEASE ORAL EVERY 12 HOURS SCHEDULED
Status: COMPLETED | OUTPATIENT
Start: 2020-08-31 | End: 2020-08-31

## 2020-08-31 RX ORDER — HYDROMORPHONE HCL/PF 1 MG/ML
0.5 SYRINGE (ML) INJECTION EVERY 4 HOURS PRN
Status: DISCONTINUED | OUTPATIENT
Start: 2020-08-31 | End: 2020-09-01 | Stop reason: HOSPADM

## 2020-08-31 RX ORDER — DILTIAZEM HYDROCHLORIDE 120 MG/1
120 CAPSULE, COATED, EXTENDED RELEASE ORAL DAILY
Status: DISCONTINUED | OUTPATIENT
Start: 2020-09-01 | End: 2020-09-01 | Stop reason: HOSPADM

## 2020-08-31 RX ORDER — OXYCODONE HYDROCHLORIDE 10 MG/1
10 TABLET ORAL EVERY 4 HOURS PRN
Status: DISCONTINUED | OUTPATIENT
Start: 2020-08-31 | End: 2020-09-01 | Stop reason: HOSPADM

## 2020-08-31 RX ADMIN — DILTIAZEM HYDROCHLORIDE 60 MG: 60 CAPSULE, EXTENDED RELEASE ORAL at 20:41

## 2020-08-31 RX ADMIN — ALLOPURINOL 100 MG: 100 TABLET ORAL at 08:46

## 2020-08-31 RX ADMIN — DILTIAZEM HYDROCHLORIDE 60 MG: 60 CAPSULE, EXTENDED RELEASE ORAL at 08:47

## 2020-08-31 RX ADMIN — HEPARIN SODIUM 5000 UNITS: 5000 INJECTION INTRAVENOUS; SUBCUTANEOUS at 05:23

## 2020-08-31 RX ADMIN — APIXABAN 5 MG: 5 TABLET, FILM COATED ORAL at 08:46

## 2020-08-31 RX ADMIN — ATORVASTATIN CALCIUM 40 MG: 40 TABLET, FILM COATED ORAL at 17:25

## 2020-08-31 RX ADMIN — TAMSULOSIN HYDROCHLORIDE 0.4 MG: 0.4 CAPSULE ORAL at 17:25

## 2020-08-31 RX ADMIN — APIXABAN 5 MG: 5 TABLET, FILM COATED ORAL at 17:25

## 2020-08-31 RX ADMIN — SPIRONOLACTONE 12.5 MG: 25 TABLET ORAL at 08:46

## 2020-08-31 RX ADMIN — METOPROLOL SUCCINATE 100 MG: 100 TABLET, EXTENDED RELEASE ORAL at 22:27

## 2020-08-31 RX ADMIN — OXYCODONE HYDROCHLORIDE 10 MG: 10 TABLET ORAL at 08:47

## 2020-08-31 RX ADMIN — OXYCODONE HYDROCHLORIDE 10 MG: 10 TABLET ORAL at 12:28

## 2020-08-31 RX ADMIN — TAMSULOSIN HYDROCHLORIDE 0.4 MG: 0.4 CAPSULE ORAL at 08:46

## 2020-08-31 RX ADMIN — METOPROLOL SUCCINATE 100 MG: 100 TABLET, EXTENDED RELEASE ORAL at 08:46

## 2020-08-31 RX ADMIN — OXYCODONE HYDROCHLORIDE 10 MG: 10 TABLET ORAL at 17:26

## 2020-08-31 RX ADMIN — POTASSIUM CHLORIDE 40 MEQ: 1500 TABLET, EXTENDED RELEASE ORAL at 10:15

## 2020-08-31 RX ADMIN — FUROSEMIDE 40 MG: 40 TABLET ORAL at 12:28

## 2020-08-31 NOTE — UTILIZATION REVIEW
Continued Stay Review    Date: 8/31/20                          POD # 3    Current Patient Class: IP Current Level of Care: MS    HPI:60 y o  male initially admitted on 8/21 for A fib with RVR with hypotension, anemia w/o overt GI Bleeding  Had never had colonoscopy  Was found to have ascending colon cancer s/p right hemicolectomy with ileocolic anastomosis 4/99      Assessment/Plan:   PCA analgesia d/c this morning and pt is using oral analgesia with good results although some pain on palpation  No N/V  H/H stable  Still with some low grade fevers  Tolerated toast and fluids yesterday and ambulated  Will advance diet today  Keep IV fluids  Voiding trial in process        Pertinent Labs/Diagnostic Results:       Results from last 7 days   Lab Units 08/31/20  0518 08/30/20  0529 08/29/20  0541 08/28/20  1709 08/28/20  0449 08/27/20  0512   WBC Thousand/uL 8 31 8 19 10 76* 13 61* 6 86 6 47   HEMOGLOBIN g/dL 8 7* 8 1* 9 7* 9 5* 10 3* 9 2*   HEMATOCRIT % 29 7* 28 3* 32 7* 32 8* 35 8* 31 6*   PLATELETS Thousands/uL 269 253 322 316 328 328   NEUTROS ABS Thousands/µL  --   --  9 61*  --  4 12 4 11   BANDS PCT %  --   --   --  9*  --   --          Results from last 7 days   Lab Units 08/31/20  0518 08/30/20  0528 08/29/20  0541 08/28/20  1709 08/28/20  0449 08/27/20  0512 08/26/20  0435   SODIUM mmol/L 135* 136 137 137 137 140 140   POTASSIUM mmol/L 3 7 4 2 5 1 4 4 3 8 4 3 3 7   CHLORIDE mmol/L 105 107 109* 111* 109* 111* 109*   CO2 mmol/L 25 23 17* 19* 21 26 22   ANION GAP mmol/L 5 6 11 7 7 3* 9   BUN mg/dL 12 18 16 10 12 16 15   CREATININE mg/dL 0 94 1 10 1 30 1 09 1 20 1 25 1 14   EGFR ml/min/1 73sq m 88 73 59 73 65 62 70   CALCIUM mg/dL 8 7 8 4 8 7 8 2* 9 1 8 3 8 2*   MAGNESIUM mg/dL  --   --  2 1 2 0 2 0 2 2 2 1   PHOSPHORUS mg/dL  --   --  4 1  --  3 5 4 1 4 7*     Results from last 7 days   Lab Units 08/31/20  0518 08/30/20  6584 08/29/20  0541 08/28/20  1709 08/28/20  0449 08/27/20  0296 08/26/20  0435 08/25/20  0756   GLUCOSE RANDOM mg/dL 97 90 130 152* 102 99 93 108     Results from last 7 days   Lab Units 08/26/20  0435 08/25/20  2305 08/25/20  1444 08/24/20  1844   PROTIME seconds  --   --   --  14 9*   INR   --   --   --  1 17   PTT seconds 71* 92* 58* 102*     Results from last 7 days   Lab Units 08/29/20  2337   FERRITIN ng/mL 391*     Results from last 7 days   Lab Units 08/25/20  0757   CEA ng/mL 1 7     Vital Signs:   08/31/20 07:10:31   98 2 °F (36 8 °C)   82   18   113/83   93   96 %          08/31/20 02:59:56   98 2 °F (36 8 °C)   85   20   146/94   111   96 %          08/30/20 22:42:47   98 4 °F (36 9 °C)   91   16   122/76   91   98 %          08/30/20 21:25:48      77      124/76   92   94 %          08/30/20 19:04:35   100 °F (37 8 °C)   96   18   121/75   90   97 %          08/30/20 14:45:13   98 5 °F (36 9 °C)   78   18   126/73   91   95 %          08/30/20 11:08:21   98 5 °F (36 9 °C)   87   18   133/76   95   96 %          08/30/20 07:26:30   98 1 °F (36 7 °C)   80   16   123/76      96 %          08/30/20 03:04:03   97 9 °F (36 6 °C)   72   16   113/73   86   97 %   None (Room air)   Lying    08/29/20 22:51:56   97 9 °F (36 6 °C)   90   16   132/81   98   96 %          08/29/20 22:01:55      87      128/87   101   95 %          08/29/20 19:00:20   98 3 °F (36 8 °C)   79   17   136/88   104   96 %          08/29/20 1836                     None (Room air)       08/29/20 17:38:58      74      136/89   105   98 %          08/29/20 14:56:43   98 °F (36 7 °C)   100   18   135/87   103   97 %          08/29/20 14:55:39   98 °F (36 7 °C)   96   18   135/87   103   97 %          08/29/20 13:17:08      107Abnormal        138/84   102   95 %          08/29/20 11:06:14   97 7 °F (36 5 °C)   100   16   129/78   95   96 %          08/29/20 0841                     None (Room air)       08/29/20 07:05:47   97 6 °F (36 4 °C)   108Abnormal     18 154/101Abnormal     119   94 %          08/29/20 0656                     None (Room air)         Medications:   Scheduled Medications:  allopurinol, 100 mg, Oral, Daily  apixaban, 5 mg, Oral, BID  atorvastatin, 40 mg, Oral, Daily With Dinner  [START ON 9/1/2020] diltiazem, 120 mg, Oral, Daily  diltiazem, 60 mg, Oral, Q12H DARREN  furosemide, 40 mg, Oral, Daily  metoprolol succinate, 100 mg, Oral, BID  spironolactone, 12 5 mg, Oral, Daily  tamsulosin, 0 4 mg, Oral, Daily With Dinner      Continuous IV Infusions:     PRN Meds:  acetaminophen, 650 mg, Oral, Q6H PRN  diltiazem, 10 mg, Intravenous, Q8H PRN  HYDROmorphone, 0 5 mg, Intravenous, Q4H PRN  melatonin, 3 mg, Oral, HS PRN  naloxone, 0 1 mg, Intravenous, Q3 min PRN  oxyCODONE, 10 mg, Oral, Q4H PRNx x 2 8/31  oxyCODONE, 5 mg, Oral, Q4H PRN    Discharge Plan: D     Network Utilization Review Department  [email or web address]  ATTENTION: Please call with any questions or concerns to [phone] and carefully listen to the prompts so that you are directed to the right person  All voicemails are confidential   Imani Gorman all requests for admission clinical reviews, approved or denied determinations and any other requests to dedicated fax number below belonging to the campus where the patient is receiving treatment   List of dedicated fax numbers for the Facilities:  FACILITY NAME UR FAX NUMBER   ADMISSION DENIALS (Administrative/Medical Necessity) [phone]   [address]  (Maternity/NICU/Pediatrics) [phone]   Matilde Soria [phone]   Vikki Bowen [phone]   Ollie Martell [phone]   [facility] [address] [facility] Michelle [address]ação  [phone]  And [facility] [address] [facility] [phone]

## 2020-08-31 NOTE — PROGRESS NOTES
Advanced Heart Failure Team Progress Note - Shazia Aiken 61 y o  male MRN: [id number]    Unit/Bed#: [facility] 922-01 Encounter: [id number]      Subjective:   Patient seen and examined  No significant events overnight  Remains in rate controlled atrial fibrillation post operatively  Objective:     Vitals: Blood pressure 113/83, pulse 82, temperature 98 2 °F (36 8 °C), resp  rate 18, height 5' 9" (1 753 m), weight 102 kg (225 lb 12 oz), SpO2 96 %  , Body mass index is 33 34 kg/m² ,   Orthostatic Blood Pressures      Most Recent Value   Blood Pressure  113/83 filed at 08/31/2020 0710   Patient Position - Orthostatic VS  Lying filed at 08/30/2020 0304            Intake/Output Summary (Last 24 hours) at 8/31/2020 1132  Last data filed at 8/31/2020 1038  Gross per 24 hour   Intake 2079 77 ml   Output 1985 ml   Net 94 77 ml         Physical Exam:    GEN: Bernardino Nunez appears well, alert and oriented x 3, pleasant and cooperative   HEENT:  Normocephalic, atraumatic, anicteric, moist mucous membranes  NECK: No JVD or carotid bruits   HEART:  Irregular rhythm, normal rate, normal S1 and S2, no murmurs, clicks, gallops or rubs   LUNGS: Clear to auscultation bilaterally; no wheezes, rales, or rhonchi; respiration nonlabored   ABDOMEN:  Normoactive bowel sounds, soft, no tenderness, no distention  EXTREMITIES: peripheral pulses palpable; no edema  NEURO: no gross focal findings; cranial nerves grossly intact   SKIN:  Dry, intact, warm to touch      Current Facility-Administered Medications:     acetaminophen (TYLENOL) tablet 650 mg, 650 mg, Oral, Q6H PRN, Areta Nail, DO    allopurinol (ZYLOPRIM) tablet 100 mg, 100 mg, Oral, Daily, Don Castro, DO, 100 mg at 08/31/20 0846    apixaban (ELIQUIS) tablet 5 mg, 5 mg, Oral, BID, Renzo Francis, DO, 5 mg at 08/31/20 0846    atorvastatin (LIPITOR) tablet 40 mg, 40 mg, Oral, Daily With Dinner, Eitan Marcosm, DO    digoxin (LANOXIN) tablet 125 mcg, 125 mcg, Oral, Daily, Mehreen Sanders DO, 125 mcg at 08/30/20 1718    diltiazem (CARDIZEM SR) 12 hr capsule 60 mg, 60 mg, Oral, Q12H [address], Don Castro DO, 60 mg at 08/31/20 0847    diltiazem (CARDIZEM) injection 10 mg, 10 mg, Intravenous, Q8H PRN, Mehreen Sanders DO, 10 mg at 08/22/20 1824    HYDROmorphone (DILAUDID) injection 0 5 mg, 0 5 mg, Intravenous, Q4H PRN, Cody Paredes DO    melatonin tablet 3 mg, 3 mg, Oral, HS PRN, Mehreen Sanders DO, 3 mg at 08/22/20 0100    metoprolol succinate (TOPROL-XL) 24 hr tablet 100 mg, 100 mg, Oral, BID, Mehreen Sanders DO, 100 mg at 08/31/20 0846    naloxone (NARCAN) injection 0 1 mg, 0 1 mg, Intravenous, Q3 min PRN, Mehreen Sanders DO    oxyCODONE (ROXICODONE) immediate release tablet 10 mg, 10 mg, Oral, Q4H PRN, Sandra Stiles [place], , 10 mg at 08/31/20 0847    oxyCODONE (ROXICODONE) IR tablet 5 mg, 5 mg, Oral, Q4H PRN, Cody Paredes DO    spironolactone (ALDACTONE) tablet 12 5 mg, 12 5 mg, Oral, Daily, Don Castro DO, 12 5 mg at 08/31/20 0846    tamsulosin (FLOMAX) capsule 0 4 mg, 0 4 mg, Oral, Daily With Dinner, Elvis Pierre MD    Labs & Results:    Lab Results   Component Value Date    TROPONINI 0 10 (H) 08/21/2020    TROPONINI 0 15 (H) 07/27/2018    TROPONINI 0 14 (H) 07/27/2018       Lab Results   Component Value Date    GLUCOSE 107 12/11/2017    CALCIUM 8 7 08/31/2020    K 3 7 08/31/2020    CO2 25 08/31/2020     08/31/2020    BUN 12 08/31/2020    CREATININE 0 94 08/31/2020       Lab Results   Component Value Date    WBC 8 31 08/31/2020    HGB 8 7 (L) 08/31/2020    HCT 29 7 (L) 08/31/2020    MCV 84 08/31/2020     08/31/2020     Results from last 7 days   Lab Units 08/24/20  1844   INR  1 17       No results found for: CHOL  Lab Results   Component Value Date    HDL 41 08/22/2020    HDL 31 (L) 12/12/2017     Lab Results   Component Value Date    LDLCALC 40 08/22/2020    [facility] 73 12/12/2017     Lab Results   Component Value Date    TRIG 74 08/22/2020    TRIG 82 12/12/2017       Lab Results   Component Value Date    ALT 22 08/23/2020    AST 8 08/23/2020       Telemetry:   Personally reviewed by Princess Quick MD: Rate controlled atrial fibrillation with HR 70-90s with rare PVCs    Imaging: Intraoperative JOSE LUIS reviewed    VTE Prophylaxis: Apixaban      Assessment:  Principal Problem:    Atrial fibrillation with rapid ventricular response (HCC)  Active Problems:    Systolic CHF (HCC)    Microcytic anemia    SCARLETT (acute kidney injury) ([address] )    Gastrointestinal hemorrhage with melena    Coronary artery disease    GI bleeding    Mass of cecum    History of gout    Neurohormonal Blockade:  --Beta-Blocker:  Metoprolol succinate 100 mg b i d   --ACEi, ARB or ARNi:  None   --Aldosterone Receptor Blocker:  Spironolactone 12 5 mg q d   --Diuretic:  Furosemide 40 mg q d  held     Sudden Cardiac Death Risk Reduction:  --ICD:  Completed LifeVest with recovered     Electrical Resynchronization:  --Candidacy for BiV device: N/A     Advanced Therapies: Will continue to monitor  --Inotrope:  None  --LVAD/Transplant Candidacy: N/A       ===============================================================    1  Ascending colon invasive adenocarcinoma  -presenting as acute on chronic blood loss anemia  -iron panel consistent with iron deficiency anemia with nearly undetectable ferritin  -s/p 1 unit of PRBC, maintain on iron sucrose ordered for 5 doses  -EGD showed mild erosions in the GE junction, stomach, and erythema in the duodenum  -colonoscopy: malignant-appearing multilobular and ulcerated mass in the proximal ascending segment and 2 polyps which were all biopsied  -CT A/P: cecal mass with no evidence of  metastasis  -path: invasive adenocarcinoma  -POD #3 from right hemicolectomy with ileocolic anastomosis    2   Persistent atrial fibrillation  -known history, likely exacerbated by acute blood loss  -[id number] 2, HAS-BLED 1  -8/28 intraop JOSE LUIS:  LVEF 54%, normal RV; smoke but no trombus in DAVINA  -metoprolol succinate, diltiazem CD, digoxin, apixaban  -rate controlled     3  Nonischemic cardiomyopathy with recovered EF  -resume tachy mediated  -8/21 TTE:  LVEF 50% with no RWMA, LVIDd 4 cm, mild RVE with mild reduction in SF, mild TR with trace pericardial effusion  -metoprolol succinate, low-dose spironolactone  -weight up to 102 kg from 97     4  Coronary artery disease  -02/2018 Rx NMST:  Partially reversible inferolateral and fixed apical lateral defect  -03/2018 LHC:  80% prox small RI, 60% ostial PDA otherwise no significant residual disease in the remaining vessels  -8/22 lipids:  Total 96, TG 74, HDL 41, calc LDL 40 (73)  -metoprolol succinate, high-intensity atorvastatin  -aspirin held due to bleeding    Plan:  1  Restarted oral furosemide as patient's weight is uptrending  2  Discontinued digoxin and transitioned to extended release diltiazem 120 mg q d  starting tomorrow    3  Resume aspirin when deemed safe from surgical standpoint    4  Outpatient follow up arranged with KOFFI Arnold on 9/10 to arrange ambulatory cardioversion  3-4 weeks from now  [facility]    5  Advanced Heart Failure to continue following along    Case discussed and reviewed with Dr Gerber Liu who agrees with my assessment and plan  Thank you for involving us in the care of your patient  [facility]/ [facility] Everywhere records reviewed: yes    ** Please Note: Fluency DirectDictation voice to text software may have been used in the creation of this document   **

## 2020-08-31 NOTE — NURSING NOTE
Patient was bladder scanned at 1030 and not enough to cath  Rechecked about two hours later and scanned for 340, not enough to cath  Amarilys Bye w/ somar surgery notified and aware  Will scan patient again at 1600

## 2020-08-31 NOTE — PROGRESS NOTES
Progress Note - Colorectal Surgery   Edinson Omalley 61 y o  male MRN: [id number]  Unit/Bed#: [facility] 922-01 Encounter: [id number]    Assessment:  61 M w/ ascending colon cancer s/p right hemicolectomy with ileocolic anastomosis 9/18     Tmax 100  OVSS  Uo-1 315 L (straight cathed twice; however, voided 400 cc spontaneously this am)  Downtrending hgb 8 1 from 9 7 yesterday; labs pending this morning  Plan:  Clears/tst/cx- consider advancing later today  MIVF  Pain control prn  Cardiology: recommend antiocoagulation for 3 weeks, then electrical cardioversion  Can start Eliquis BID when stable  DVT ppx with [facility]      Subjective/Objective   Chief Complaint: no complaints    Subjective: No acute overnight events  Patient reports some abd pain on palpation, but doing much better  No nausea or vomiting, tolerated toast and cx yesterday with no issues  Got up and ambulated multiple times yesterday  No subjective fevers/chills  Objective: Physical Exam  Vitals signs and nursing note reviewed  Constitutional:       General: He is not in acute distress  Appearance: He is well-developed  HENT:      Head: Normocephalic and atraumatic  Eyes:      General: No scleral icterus  Cardiovascular:      Rate and Rhythm: Normal rate and regular rhythm  Pulmonary:      Effort: Pulmonary effort is normal       Breath sounds: Normal breath sounds  Abdominal:      General: There is distension  Palpations: Abdomen is soft  Tenderness: There is abdominal tenderness (diffuse)  There is no guarding or rebound  Musculoskeletal:      Right lower leg: No edema  Left lower leg: No edema  Skin:     General: Skin is warm  Capillary Refill: Capillary refill takes less than 2 seconds  Neurological:      Mental Status: He is alert and oriented to person, place, and time  Psychiatric:         Mood and Affect: Mood normal          Thought Content:  Thought content normal          Blood pressure 146/94, pulse 85, temperature 98 2 °F (36 8 °C), resp  rate 20, height 5' 9" (1 753 m), weight 102 kg (225 lb 12 oz), SpO2 96 %  ,Body mass index is 33 34 kg/m²  Intake/Output Summary (Last 24 hours) at 8/31/2020 0832  Last data filed at 8/31/2020 0554  Gross per 24 hour   Intake 2492 38 ml   Output 1315 ml   Net 1177 38 ml       Invasive Devices     Peripheral Intravenous Line            Peripheral IV 08/28/20 Left;Proximal;Ventral (anterior) Forearm 2 days    Peripheral IV 08/28/20 Right Hand 2 days              Lab, Imaging and other studies:I have personally reviewed pertinent lab results      VTE Pharmacologic Prophylaxis: Heparin  VTE Mechanical Prophylaxis: sequential compression device

## 2020-08-31 NOTE — RESTORATIVE TECHNICIAN NOTE
Restorative Specialist Mobility Note       Activity: Ambulate in mills, Ambulate in room, Bathroom privileges, Chair, Dangle, Stand at bedside(Educated/encouraged pt to ambulate with assistance 3-4 x's/day  Bed alarm on   Pt callbell, phone/tray within reach )     Assistive Device: Front wheel walker       [employer] BS, Restorative Technician, [employer]

## 2020-08-31 NOTE — PLAN OF CARE
Problem: Potential for Falls  Goal: Patient will remain free of falls  Description: INTERVENTIONS:  - Assess patient frequently for physical needs  -  Identify cognitive and physical deficits and behaviors that affect risk of falls    -  [place] fall precautions as indicated by assessment   - Educate patient/family on patient safety including physical limitations  - Instruct patient to call for assistance with activity based on assessment  - Modify environment to reduce risk of injury  - Consider OT/PT consult to assist with strengthening/mobility  Outcome: Progressing     Problem: CARDIOVASCULAR - ADULT  Goal: Maintains optimal cardiac output and hemodynamic stability  Description: INTERVENTIONS:  - Monitor I/O, vital signs and rhythm  - Monitor for S/S and trends of decreased cardiac output  - Administer and titrate ordered vasoactive medications to optimize hemodynamic stability  - Assess quality of pulses, skin color and temperature  - Assess for signs of decreased coronary artery perfusion  - Instruct patient to report change in severity of symptoms  Outcome: Progressing  Goal: Absence of cardiac dysrhythmias or at baseline rhythm  Description: INTERVENTIONS:  - Continuous cardiac monitoring, vital signs, obtain 12 lead EKG if ordered  - Administer antiarrhythmic and heart rate control medications as ordered  - Monitor electrolytes and administer replacement therapy as ordered  Outcome: Progressing     Problem: GASTROINTESTINAL - ADULT  Goal: Minimal or absence of nausea and/or vomiting  Description: INTERVENTIONS:  - Administer IV fluids if ordered to ensure adequate hydration  - Maintain NPO status until nausea and vomiting are resolved  - Nasogastric tube if ordered  - Administer ordered antiemetic medications as needed  - Provide nonpharmacologic comfort measures as appropriate  - Advance diet as tolerated, if ordered  - Consider nutrition services referral to assist patient with adequate nutrition and appropriate food choices  Outcome: Progressing  Goal: Maintains or returns to baseline bowel function  Description: INTERVENTIONS:  - Assess bowel function  - Encourage oral fluids to ensure adequate hydration  - Administer IV fluids if ordered to ensure adequate hydration  - Administer ordered medications as needed  - Encourage mobilization and activity  - Consider nutritional services referral to assist patient with adequate nutrition and appropriate food choices  Outcome: Progressing  Goal: Maintains adequate nutritional intake  Description: INTERVENTIONS:  - Monitor percentage of each meal consumed  - Identify factors contributing to decreased intake, treat as appropriate  - Assist with meals as needed  - Monitor I&O, weight, and lab values if indicated  - Obtain nutrition services referral as needed  Outcome: Progressing     Problem: PAIN - ADULT  Goal: Verbalizes/displays adequate comfort level or baseline comfort level  Description: Interventions:  - Encourage patient to monitor pain and request assistance  - Assess pain using appropriate pain scale  - Administer analgesics based on type and severity of pain and evaluate response  - Implement non-pharmacological measures as appropriate and evaluate response  - Consider cultural and social influences on pain and pain management  - Notify physician/advanced practitioner if interventions unsuccessful or patient reports new pain  Outcome: Progressing     Problem: INFECTION - ADULT  Goal: Absence or prevention of progression during hospitalization  Description: INTERVENTIONS:  - Assess and monitor for signs and symptoms of infection  - Monitor lab/diagnostic results  - Monitor all insertion sites, i e  indwelling lines, tubes, and drains  - Monitor endotracheal if appropriate and nasal secretions for changes in amount and color  - [place] appropriate cooling/warming therapies per order  - Administer medications as ordered  - Instruct and encourage patient and family to use good hand hygiene technique  - Identify and instruct in appropriate isolation precautions for identified infection/condition  Outcome: Progressing  Goal: Absence of fever/infection during neutropenic period  Description: INTERVENTIONS:  - Monitor WBC    Outcome: Progressing     Problem: SAFETY ADULT  Goal: Patient will remain free of falls  Description: INTERVENTIONS:  - Assess patient frequently for physical needs  -  Identify cognitive and physical deficits and behaviors that affect risk of falls    -  [place] fall precautions as indicated by assessment   - Educate patient/family on patient safety including physical limitations  - Instruct patient to call for assistance with activity based on assessment  - Modify environment to reduce risk of injury  - Consider OT/PT consult to assist with strengthening/mobility  Outcome: Progressing  Goal: Maintain or return to baseline ADL function  Description: INTERVENTIONS:  -  Assess patient's ability to carry out ADLs; assess patient's baseline for ADL function and identify physical deficits which impact ability to perform ADLs (bathing, care of mouth/teeth, toileting, grooming, dressing, etc )  - Assess/evaluate cause of self-care deficits   - Assess range of motion  - Assess patient's mobility; develop plan if impaired  - Assess patient's need for assistive devices and provide as appropriate  - Encourage maximum independence but intervene and supervise when necessary  - Involve family in performance of ADLs  - Assess for home care needs following discharge   - Consider OT consult to assist with ADL evaluation and planning for discharge  - Provide patient education as appropriate  Outcome: Progressing  Goal: Maintain or return mobility status to optimal level  Description: INTERVENTIONS:  - Assess patient's baseline mobility status (ambulation, transfers, stairs, etc )    - Identify cognitive and physical deficits and behaviors that affect mobility  - Identify mobility aids required to assist with transfers and/or ambulation (gait belt, sit-to-stand, lift, walker, cane, etc )  - [place] fall precautions as indicated by assessment  - Record patient progress and toleration of activity level on Mobility SBAR; progress patient to next Phase/Stage  - Instruct patient to call for assistance with activity based on assessment  - Consider rehabilitation consult to assist with strengthening/weightbearing, etc   Outcome: Progressing     Problem: DISCHARGE PLANNING  Goal: Discharge to home or other facility with appropriate resources  Description: INTERVENTIONS:  - Identify barriers to discharge w/patient and caregiver  - Arrange for needed discharge resources and transportation as appropriate  - Identify discharge learning needs (meds, wound care, etc )  - Arrange for interpretive services to assist at discharge as needed  - Refer to Case Management Department for coordinating discharge planning if the patient needs post-hospital services based on physician/advanced practitioner order or complex needs related to functional status, cognitive ability, or social support system  Outcome: Progressing     Problem: Knowledge Deficit  Goal: Patient/family/caregiver demonstrates understanding of disease process, treatment plan, medications, and discharge instructions  Description: Complete learning assessment and assess knowledge base  Interventions:  - Provide teaching at level of understanding  - Provide teaching via preferred learning methods  Outcome: Progressing     Problem: Nutrition/Hydration-ADULT  Goal: Nutrient/Hydration intake appropriate for improving, restoring or maintaining nutritional needs  Description: Monitor and assess patient's nutrition/hydration status for malnutrition  Collaborate with interdisciplinary team and initiate plan and interventions as ordered  Monitor patient's weight and dietary intake as ordered or per policy   Utilize nutrition screening tool and intervene as necessary  Determine patient's food preferences and provide high-protein, high-caloric foods as appropriate       INTERVENTIONS:  - Monitor oral intake, urinary output, labs, and treatment plans  - Assess nutrition and hydration status and recommend course of action  - Evaluate amount of meals eaten  - Assist patient with eating if necessary   - Allow adequate time for meals  - Recommend/ encourage appropriate diets, oral nutritional supplements, and vitamin/mineral supplements  - Order, calculate, and assess calorie counts as needed  - Recommend, monitor, and adjust tube feedings and TPN/PPN based on assessed needs  - Assess need for intravenous fluids  - Provide specific nutrition/hydration education as appropriate  - Include patient/family/caregiver in decisions related to nutrition  Outcome: Progressing

## 2020-09-01 ENCOUNTER — TELEPHONE (OUTPATIENT)
Dept: UROLOGY | Facility: MEDICAL CENTER | Age: 60
End: 2020-09-01

## 2020-09-01 VITALS
HEART RATE: 93 BPM | SYSTOLIC BLOOD PRESSURE: 127 MMHG | RESPIRATION RATE: 18 BRPM | DIASTOLIC BLOOD PRESSURE: 83 MMHG | HEIGHT: 69 IN | OXYGEN SATURATION: 95 % | WEIGHT: 225.75 LBS | BODY MASS INDEX: 33.44 KG/M2 | TEMPERATURE: 98.2 F

## 2020-09-01 LAB
ANION GAP SERPL CALCULATED.3IONS-SCNC: 7 MMOL/L (ref 4–13)
BASOPHILS # BLD AUTO: 0.04 THOUSANDS/ΜL (ref 0–0.1)
BASOPHILS NFR BLD AUTO: 1 % (ref 0–1)
BUN SERPL-MCNC: 13 MG/DL (ref 5–25)
CALCIUM SERPL-MCNC: 8.4 MG/DL (ref 8.3–10.1)
CHLORIDE SERPL-SCNC: 108 MMOL/L (ref 100–108)
CO2 SERPL-SCNC: 24 MMOL/L (ref 21–32)
CREAT SERPL-MCNC: 1.06 MG/DL (ref 0.6–1.3)
EOSINOPHIL # BLD AUTO: 0.13 THOUSAND/ΜL (ref 0–0.61)
EOSINOPHIL NFR BLD AUTO: 2 % (ref 0–6)
ERYTHROCYTE [DISTWIDTH] IN BLOOD BY AUTOMATED COUNT: 20.2 % (ref 11.6–15.1)
GFR SERPL CREATININE-BSD FRML MDRD: 76 ML/MIN/1.73SQ M
GLUCOSE SERPL-MCNC: 105 MG/DL (ref 65–140)
HCT VFR BLD AUTO: 30.5 % (ref 36.5–49.3)
HGB BLD-MCNC: 8.8 G/DL (ref 12–17)
IMM GRANULOCYTES # BLD AUTO: 0.04 THOUSAND/UL (ref 0–0.2)
IMM GRANULOCYTES NFR BLD AUTO: 1 % (ref 0–2)
LYMPHOCYTES # BLD AUTO: 0.95 THOUSANDS/ΜL (ref 0.6–4.47)
LYMPHOCYTES NFR BLD AUTO: 14 % (ref 14–44)
MCH RBC QN AUTO: 24.2 PG (ref 26.8–34.3)
MCHC RBC AUTO-ENTMCNC: 28.9 G/DL (ref 31.4–37.4)
MCV RBC AUTO: 84 FL (ref 82–98)
MONOCYTES # BLD AUTO: 0.73 THOUSAND/ΜL (ref 0.17–1.22)
MONOCYTES NFR BLD AUTO: 11 % (ref 4–12)
NEUTROPHILS # BLD AUTO: 4.78 THOUSANDS/ΜL (ref 1.85–7.62)
NEUTS SEG NFR BLD AUTO: 71 % (ref 43–75)
NRBC BLD AUTO-RTO: 0 /100 WBCS
PLATELET # BLD AUTO: 253 THOUSANDS/UL (ref 149–390)
PMV BLD AUTO: 11.1 FL (ref 8.9–12.7)
POTASSIUM SERPL-SCNC: 3.8 MMOL/L (ref 3.5–5.3)
RBC # BLD AUTO: 3.63 MILLION/UL (ref 3.88–5.62)
SODIUM SERPL-SCNC: 139 MMOL/L (ref 136–145)
WBC # BLD AUTO: 6.67 THOUSAND/UL (ref 4.31–10.16)

## 2020-09-01 PROCEDURE — 99255 IP/OBS CONSLTJ NEW/EST HI 80: CPT | Performed by: PHYSICIAN ASSISTANT

## 2020-09-01 PROCEDURE — 99232 SBSQ HOSP IP/OBS MODERATE 35: CPT | Performed by: INTERNAL MEDICINE

## 2020-09-01 PROCEDURE — 85025 COMPLETE CBC W/AUTO DIFF WBC: CPT | Performed by: SURGERY

## 2020-09-01 PROCEDURE — 80048 BASIC METABOLIC PNL TOTAL CA: CPT | Performed by: SURGERY

## 2020-09-01 RX ORDER — OXYCODONE HYDROCHLORIDE 5 MG/1
5 TABLET ORAL EVERY 4 HOURS PRN
Qty: 28 TABLET | Refills: 0 | Status: SHIPPED | OUTPATIENT
Start: 2020-09-01 | End: 2020-09-06

## 2020-09-01 RX ORDER — TAMSULOSIN HYDROCHLORIDE 0.4 MG/1
0.4 CAPSULE ORAL
Qty: 7 CAPSULE | Refills: 0 | Status: SHIPPED | OUTPATIENT
Start: 2020-09-01 | End: 2020-09-10 | Stop reason: CLARIF

## 2020-09-01 RX ORDER — ACETAMINOPHEN 325 MG/1
650 TABLET ORAL EVERY 6 HOURS PRN
Qty: 30 TABLET | Refills: 0 | Status: SHIPPED | OUTPATIENT
Start: 2020-09-01 | End: 2020-09-10 | Stop reason: CLARIF

## 2020-09-01 RX ORDER — TAMSULOSIN HYDROCHLORIDE 0.4 MG/1
0.4 CAPSULE ORAL
Status: DISCONTINUED | OUTPATIENT
Start: 2020-09-01 | End: 2020-09-01 | Stop reason: HOSPADM

## 2020-09-01 RX ADMIN — FUROSEMIDE 40 MG: 40 TABLET ORAL at 09:00

## 2020-09-01 RX ADMIN — DILTIAZEM HYDROCHLORIDE 120 MG: 120 CAPSULE, COATED, EXTENDED RELEASE ORAL at 09:00

## 2020-09-01 RX ADMIN — OXYCODONE HYDROCHLORIDE 10 MG: 10 TABLET ORAL at 06:02

## 2020-09-01 RX ADMIN — SPIRONOLACTONE 12.5 MG: 25 TABLET ORAL at 09:00

## 2020-09-01 RX ADMIN — METOPROLOL SUCCINATE 100 MG: 100 TABLET, EXTENDED RELEASE ORAL at 08:59

## 2020-09-01 RX ADMIN — ALLOPURINOL 100 MG: 100 TABLET ORAL at 09:00

## 2020-09-01 RX ADMIN — APIXABAN 5 MG: 5 TABLET, FILM COATED ORAL at 09:00

## 2020-09-01 NOTE — DISCHARGE INSTRUCTIONS
Please follow-up with Dr Karly Smith from [facility] in the office after discharge to discuss surgical options for your colon mass  Routine Johnson care, leg bag teaching, to follow with Urology for Johnson removal    ============================================================================    Monitor weight daily  Dry weight closer to 209-210 lb  If weight increases by 2 lb in span of 24 hours, take extra dose of furosemide and monitor the next day  If weight increases by 5 lb over 3 days, takes extra doses of furosemide for a few days and continue monitoring      Outpatient follow-up arranged with Cardiology office [facility] and eventually Dr Sharon Vickers in October    Medication changes  -decrease dose of oral furosemide/Lasix from 60-40 mg daily at home  -discontinue digoxin  -resume Entresto starting day of discharge at home

## 2020-09-01 NOTE — TELEPHONE ENCOUNTER
Patient seen by Rebecca Ellis in \[facility]\"" who recommended Friday morning catheter removal due to placement after hospital placement  Spoke to [place] who denied complaints at this time  Scheduled for t[place]arez removal on 9/04/2020 at 9 am as per Patient preference  Repeats back understanding and knows to call office with questions or concerns in the interim

## 2020-09-01 NOTE — PLAN OF CARE
Problem: Potential for Falls  Goal: Patient will remain free of falls  Description: INTERVENTIONS:  - Assess patient frequently for physical needs  -  Identify cognitive and physical deficits and behaviors that affect risk of falls    -  [place] fall precautions as indicated by assessment   - Educate patient/family on patient safety including physical limitations  - Instruct patient to call for assistance with activity based on assessment  - Modify environment to reduce risk of injury  - Consider OT/PT consult to assist with strengthening/mobility  Outcome: Progressing     Problem: CARDIOVASCULAR - ADULT  Goal: Maintains optimal cardiac output and hemodynamic stability  Description: INTERVENTIONS:  - Monitor I/O, vital signs and rhythm  - Monitor for S/S and trends of decreased cardiac output  - Administer and titrate ordered vasoactive medications to optimize hemodynamic stability  - Assess quality of pulses, skin color and temperature  - Assess for signs of decreased coronary artery perfusion  - Instruct patient to report change in severity of symptoms  Outcome: Progressing  Goal: Absence of cardiac dysrhythmias or at baseline rhythm  Description: INTERVENTIONS:  - Continuous cardiac monitoring, vital signs, obtain 12 lead EKG if ordered  - Administer antiarrhythmic and heart rate control medications as ordered  - Monitor electrolytes and administer replacement therapy as ordered  Outcome: Progressing     Problem: GASTROINTESTINAL - ADULT  Goal: Minimal or absence of nausea and/or vomiting  Description: INTERVENTIONS:  - Administer IV fluids if ordered to ensure adequate hydration  - Maintain NPO status until nausea and vomiting are resolved  - Nasogastric tube if ordered  - Administer ordered antiemetic medications as needed  - Provide nonpharmacologic comfort measures as appropriate  - Advance diet as tolerated, if ordered  - Consider nutrition services referral to assist patient with adequate nutrition and appropriate food choices  Outcome: Progressing  Goal: Maintains or returns to baseline bowel function  Description: INTERVENTIONS:  - Assess bowel function  - Encourage oral fluids to ensure adequate hydration  - Administer IV fluids if ordered to ensure adequate hydration  - Administer ordered medications as needed  - Encourage mobilization and activity  - Consider nutritional services referral to assist patient with adequate nutrition and appropriate food choices  Outcome: Progressing  Goal: Maintains adequate nutritional intake  Description: INTERVENTIONS:  - Monitor percentage of each meal consumed  - Identify factors contributing to decreased intake, treat as appropriate  - Assist with meals as needed  - Monitor I&O, weight, and lab values if indicated  - Obtain nutrition services referral as needed  Outcome: Progressing     Problem: PAIN - ADULT  Goal: Verbalizes/displays adequate comfort level or baseline comfort level  Description: Interventions:  - Encourage patient to monitor pain and request assistance  - Assess pain using appropriate pain scale  - Administer analgesics based on type and severity of pain and evaluate response  - Implement non-pharmacological measures as appropriate and evaluate response  - Consider cultural and social influences on pain and pain management  - Notify physician/advanced practitioner if interventions unsuccessful or patient reports new pain  Outcome: Progressing     Problem: INFECTION - ADULT  Goal: Absence or prevention of progression during hospitalization  Description: INTERVENTIONS:  - Assess and monitor for signs and symptoms of infection  - Monitor lab/diagnostic results  - Monitor all insertion sites, i e  indwelling lines, tubes, and drains  - Monitor endotracheal if appropriate and nasal secretions for changes in amount and color  - [place] appropriate cooling/warming therapies per order  - Administer medications as ordered  - Instruct and encourage patient and family to use good hand hygiene technique  - Identify and instruct in appropriate isolation precautions for identified infection/condition  Outcome: Progressing  Goal: Absence of fever/infection during neutropenic period  Description: INTERVENTIONS:  - Monitor WBC    Outcome: Progressing     Problem: SAFETY ADULT  Goal: Patient will remain free of falls  Description: INTERVENTIONS:  - Assess patient frequently for physical needs  -  Identify cognitive and physical deficits and behaviors that affect risk of falls    -  [place] fall precautions as indicated by assessment   - Educate patient/family on patient safety including physical limitations  - Instruct patient to call for assistance with activity based on assessment  - Modify environment to reduce risk of injury  - Consider OT/PT consult to assist with strengthening/mobility  Outcome: Progressing  Goal: Maintain or return to baseline ADL function  Description: INTERVENTIONS:  -  Assess patient's ability to carry out ADLs; assess patient's baseline for ADL function and identify physical deficits which impact ability to perform ADLs (bathing, care of mouth/teeth, toileting, grooming, dressing, etc )  - Assess/evaluate cause of self-care deficits   - Assess range of motion  - Assess patient's mobility; develop plan if impaired  - Assess patient's need for assistive devices and provide as appropriate  - Encourage maximum independence but intervene and supervise when necessary  - Involve family in performance of ADLs  - Assess for home care needs following discharge   - Consider OT consult to assist with ADL evaluation and planning for discharge  - Provide patient education as appropriate  Outcome: Progressing  Goal: Maintain or return mobility status to optimal level  Description: INTERVENTIONS:  - Assess patient's baseline mobility status (ambulation, transfers, stairs, etc )    - Identify cognitive and physical deficits and behaviors that affect mobility  - Identify mobility aids required to assist with transfers and/or ambulation (gait belt, sit-to-stand, lift, walker, cane, etc )  - [place] fall precautions as indicated by assessment  - Record patient progress and toleration of activity level on Mobility SBAR; progress patient to next Phase/Stage  - Instruct patient to call for assistance with activity based on assessment  - Consider rehabilitation consult to assist with strengthening/weightbearing, etc   Outcome: Progressing     Problem: DISCHARGE PLANNING  Goal: Discharge to home or other facility with appropriate resources  Description: INTERVENTIONS:  - Identify barriers to discharge w/patient and caregiver  - Arrange for needed discharge resources and transportation as appropriate  - Identify discharge learning needs (meds, wound care, etc )  - Arrange for interpretive services to assist at discharge as needed  - Refer to Case Management Department for coordinating discharge planning if the patient needs post-hospital services based on physician/advanced practitioner order or complex needs related to functional status, cognitive ability, or social support system  Outcome: Progressing     Problem: Knowledge Deficit  Goal: Patient/family/caregiver demonstrates understanding of disease process, treatment plan, medications, and discharge instructions  Description: Complete learning assessment and assess knowledge base  Interventions:  - Provide teaching at level of understanding  - Provide teaching via preferred learning methods  Outcome: Progressing     Problem: Nutrition/Hydration-ADULT  Goal: Nutrient/Hydration intake appropriate for improving, restoring or maintaining nutritional needs  Description: Monitor and assess patient's nutrition/hydration status for malnutrition  Collaborate with interdisciplinary team and initiate plan and interventions as ordered  Monitor patient's weight and dietary intake as ordered or per policy   Utilize nutrition screening tool and intervene as necessary  Determine patient's food preferences and provide high-protein, high-caloric foods as appropriate       INTERVENTIONS:  - Monitor oral intake, urinary output, labs, and treatment plans  - Assess nutrition and hydration status and recommend course of action  - Evaluate amount of meals eaten  - Assist patient with eating if necessary   - Allow adequate time for meals  - Recommend/ encourage appropriate diets, oral nutritional supplements, and vitamin/mineral supplements  - Order, calculate, and assess calorie counts as needed  - Recommend, monitor, and adjust tube feedings and TPN/PPN based on assessed needs  - Assess need for intravenous fluids  - Provide specific nutrition/hydration education as appropriate  - Include patient/family/caregiver in decisions related to nutrition  Outcome: Progressing

## 2020-09-01 NOTE — TELEPHONE ENCOUNTER
Message   Received:  Today   Message Contents   Melita Jones, [address]  [facility]               Please arrange for Friday morning catheter removal for this patient at your office

## 2020-09-01 NOTE — NURSING NOTE
Johnson bag changed to leg bag  Education completed regarding leg bag and how to care and empty  Patient demonstrated how to correctly use leg bag  No questions or concerns  Extra bag given

## 2020-09-01 NOTE — PLAN OF CARE
Problem: Potential for Falls  Goal: Patient will remain free of falls  Description: INTERVENTIONS:  - Assess patient frequently for physical needs  -  Identify cognitive and physical deficits and behaviors that affect risk of falls    -  [place] fall precautions as indicated by assessment   - Educate patient/family on patient safety including physical limitations  - Instruct patient to call for assistance with activity based on assessment  - Modify environment to reduce risk of injury  - Consider OT/PT consult to assist with strengthening/mobility  Outcome: Progressing     Problem: CARDIOVASCULAR - ADULT  Goal: Maintains optimal cardiac output and hemodynamic stability  Description: INTERVENTIONS:  - Monitor I/O, vital signs and rhythm  - Monitor for S/S and trends of decreased cardiac output  - Administer and titrate ordered vasoactive medications to optimize hemodynamic stability  - Assess quality of pulses, skin color and temperature  - Assess for signs of decreased coronary artery perfusion  - Instruct patient to report change in severity of symptoms  Outcome: Progressing  Goal: Absence of cardiac dysrhythmias or at baseline rhythm  Description: INTERVENTIONS:  - Continuous cardiac monitoring, vital signs, obtain 12 lead EKG if ordered  - Administer antiarrhythmic and heart rate control medications as ordered  - Monitor electrolytes and administer replacement therapy as ordered  Outcome: Progressing     Problem: PAIN - ADULT  Goal: Verbalizes/displays adequate comfort level or baseline comfort level  Description: Interventions:  - Encourage patient to monitor pain and request assistance  - Assess pain using appropriate pain scale  - Administer analgesics based on type and severity of pain and evaluate response  - Implement non-pharmacological measures as appropriate and evaluate response  - Consider cultural and social influences on pain and pain management  - Notify physician/advanced practitioner if interventions unsuccessful or patient reports new pain  Outcome: Progressing     Problem: INFECTION - ADULT  Goal: Absence or prevention of progression during hospitalization  Description: INTERVENTIONS:  - Assess and monitor for signs and symptoms of infection  - Monitor lab/diagnostic results  - Monitor all insertion sites, i e  indwelling lines, tubes, and drains  - Monitor endotracheal if appropriate and nasal secretions for changes in amount and color  - [place] appropriate cooling/warming therapies per order  - Administer medications as ordered  - Instruct and encourage patient and family to use good hand hygiene technique  - Identify and instruct in appropriate isolation precautions for identified infection/condition  Outcome: Progressing  Goal: Absence of fever/infection during neutropenic period  Description: INTERVENTIONS:  - Monitor WBC    Outcome: Progressing     Problem: SAFETY ADULT  Goal: Patient will remain free of falls  Description: INTERVENTIONS:  - Assess patient frequently for physical needs  -  Identify cognitive and physical deficits and behaviors that affect risk of falls    -  [place] fall precautions as indicated by assessment   - Educate patient/family on patient safety including physical limitations  - Instruct patient to call for assistance with activity based on assessment  - Modify environment to reduce risk of injury  - Consider OT/PT consult to assist with strengthening/mobility  Outcome: Progressing  Goal: Maintain or return to baseline ADL function  Description: INTERVENTIONS:  -  Assess patient's ability to carry out ADLs; assess patient's baseline for ADL function and identify physical deficits which impact ability to perform ADLs (bathing, care of mouth/teeth, toileting, grooming, dressing, etc )  - Assess/evaluate cause of self-care deficits   - Assess range of motion  - Assess patient's mobility; develop plan if impaired  - Assess patient's need for assistive devices and provide as appropriate  - Encourage maximum independence but intervene and supervise when necessary  - Involve family in performance of ADLs  - Assess for home care needs following discharge   - Consider OT consult to assist with ADL evaluation and planning for discharge  - Provide patient education as appropriate  Outcome: Progressing  Goal: Maintain or return mobility status to optimal level  Description: INTERVENTIONS:  - Assess patient's baseline mobility status (ambulation, transfers, stairs, etc )    - Identify cognitive and physical deficits and behaviors that affect mobility  - Identify mobility aids required to assist with transfers and/or ambulation (gait belt, sit-to-stand, lift, walker, cane, etc )  - [place] fall precautions as indicated by assessment  - Record patient progress and toleration of activity level on Mobility SBAR; progress patient to next Phase/Stage  - Instruct patient to call for assistance with activity based on assessment  - Consider rehabilitation consult to assist with strengthening/weightbearing, etc   Outcome: Progressing     Problem: DISCHARGE PLANNING  Goal: Discharge to home or other facility with appropriate resources  Description: INTERVENTIONS:  - Identify barriers to discharge w/patient and caregiver  - Arrange for needed discharge resources and transportation as appropriate  - Identify discharge learning needs (meds, wound care, etc )  - Arrange for interpretive services to assist at discharge as needed  - Refer to Case Management Department for coordinating discharge planning if the patient needs post-hospital services based on physician/advanced practitioner order or complex needs related to functional status, cognitive ability, or social support system  Outcome: Progressing     Problem: Knowledge Deficit  Goal: Patient/family/caregiver demonstrates understanding of disease process, treatment plan, medications, and discharge instructions  Description: Complete learning assessment and assess knowledge base  Interventions:  - Provide teaching at level of understanding  - Provide teaching via preferred learning methods  Outcome: Progressing     Problem: GASTROINTESTINAL - ADULT  Goal: Minimal or absence of nausea and/or vomiting  Description: INTERVENTIONS:  - Administer IV fluids if ordered to ensure adequate hydration  - Maintain NPO status until nausea and vomiting are resolved  - Nasogastric tube if ordered  - Administer ordered antiemetic medications as needed  - Provide nonpharmacologic comfort measures as appropriate  - Advance diet as tolerated, if ordered  - Consider nutrition services referral to assist patient with adequate nutrition and appropriate food choices  Outcome: Progressing  Goal: Maintains or returns to baseline bowel function  Description: INTERVENTIONS:  - Assess bowel function  - Encourage oral fluids to ensure adequate hydration  - Administer IV fluids if ordered to ensure adequate hydration  - Administer ordered medications as needed  - Encourage mobilization and activity  - Consider nutritional services referral to assist patient with adequate nutrition and appropriate food choices  Outcome: Progressing  Goal: Maintains adequate nutritional intake  Description: INTERVENTIONS:  - Monitor percentage of each meal consumed  - Identify factors contributing to decreased intake, treat as appropriate  - Assist with meals as needed  - Monitor I&O, weight, and lab values if indicated  - Obtain nutrition services referral as needed  Outcome: Progressing     Problem: Nutrition/Hydration-ADULT  Goal: Nutrient/Hydration intake appropriate for improving, restoring or maintaining nutritional needs  Description: Monitor and assess patient's nutrition/hydration status for malnutrition  Collaborate with interdisciplinary team and initiate plan and interventions as ordered  Monitor patient's weight and dietary intake as ordered or per policy   Utilize nutrition screening tool and intervene as necessary  Determine patient's food preferences and provide high-protein, high-caloric foods as appropriate       INTERVENTIONS:  - Monitor oral intake, urinary output, labs, and treatment plans  - Assess nutrition and hydration status and recommend course of action  - Evaluate amount of meals eaten  - Assist patient with eating if necessary   - Allow adequate time for meals  - Recommend/ encourage appropriate diets, oral nutritional supplements, and vitamin/mineral supplements  - Order, calculate, and assess calorie counts as needed  - Recommend, monitor, and adjust tube feedings and TPN/PPN based on assessed needs  - Assess need for intravenous fluids  - Provide specific nutrition/hydration education as appropriate  - Include patient/family/caregiver in decisions related to nutrition  Outcome: Progressing

## 2020-09-01 NOTE — PROGRESS NOTES
Advanced Heart Failure Team Progress Note - Chey Stafford 61 y o  male MRN: [id number]    Unit/Bed#: [facility] 922-01 Encounter: [id number]      Subjective:   Patient seen and examined  No significant events overnight  Patient overall feels better as his heart rate has slowed down significantly  He denies any orthopnea, PND or lower extremity edema  His abdomen is sore but is able to tolerate a full diet  Objective:     Vitals: Blood pressure 127/83, pulse 93, temperature 98 2 °F (36 8 °C), resp  rate 18, height 5' 9" (1 753 m), weight 102 kg (225 lb 12 oz), SpO2 95 %  , Body mass index is 33 34 kg/m² ,   Orthostatic Blood Pressures      Most Recent Value   Blood Pressure  127/83 filed at 09/01/2020 0708   Patient Position - Orthostatic VS  Sitting filed at 08/31/2020 2253            Intake/Output Summary (Last 24 hours) at 9/1/2020 0956  Last data filed at 9/1/2020 3967  Gross per 24 hour   Intake 781 25 ml   Output 1850 ml   Net -1068 75 ml         Physical Exam:    [place] Left well, alert and oriented x 3, pleasant and cooperative   HEENT:  Normocephalic, atraumatic, anicteric, moist mucous membranes  NECK: No JVD or carotid bruits   HEART:  Irregular rhythm, normal rate, normal S1 and S2, no murmurs, clicks, gallops or rubs   LUNGS: Clear to auscultation bilaterally; no wheezes, rales, or rhonchi; respiration nonlabored   ABDOMEN:  Normoactive bowel sounds, soft, no tenderness, no distention; surgical site intact  EXTREMITIES: peripheral pulses palpable; no edema  NEURO: no gross focal findings; cranial nerves grossly intact   SKIN:  Dry, intact, warm to touch      Current Facility-Administered Medications:     acetaminophen (TYLENOL) tablet 650 mg, 650 mg, Oral, Q6H PRN, Lisa Ivy, DO    allopurinol (ZYLOPRIM) tablet 100 mg, 100 mg, Oral, Daily, Don Castro, DO, 100 mg at 09/01/20 0900    apixaban (ELIQUIS) tablet 5 mg, 5 mg, Oral, BID, Alfonzo Francis, DO, 5 mg at 09/01/20 0900    atorvastatin (LIPITOR) tablet 40 mg, 40 mg, Oral, Daily With Hettie Angelucci, DO, 40 mg at 08/31/20 1725    diltiazem (CARDIZEM CD) 24 hr capsule 120 mg, 120 mg, Oral, Daily, Rashi Mota MD, 120 mg at 09/01/20 0900    diltiazem (CARDIZEM) injection 10 mg, 10 mg, Intravenous, Q8H PRN, Yonny Lava, DO, 10 mg at 08/22/20 1824    furosemide (LASIX) tablet 40 mg, 40 mg, Oral, Daily, Rashi Mota MD, 40 mg at 09/01/20 0900    HYDROmorphone (DILAUDID) injection 0 5 mg, 0 5 mg, Intravenous, Q4H PRN, Geroldine January, DO    melatonin tablet 3 mg, 3 mg, Oral, HS PRN, Tunnelton Lava, DO, 3 mg at 08/22/20 0100    metoprolol succinate (TOPROL-XL) 24 hr tablet 100 mg, 100 mg, Oral, BID, Tunnelton Lava, DO, 100 mg at 09/01/20 0859    naloxone (NARCAN) injection 0 1 mg, 0 1 mg, Intravenous, Q3 min PRN, Tunnelton Lava, DO    oxyCODONE (ROXICODONE) immediate release tablet 10 mg, 10 mg, Oral, Q4H PRN, Chan[facility], DO, 10 mg at 09/01/20 0602    oxyCODONE (ROXICODONE) IR tablet 5 mg, 5 mg, Oral, Q4H PRN, Geroldine January, DO    spironolactone (ALDACTONE) tablet 12 5 mg, 12 5 mg, Oral, Daily, Don Florentinoehm, DO, 12 5 mg at 09/01/20 0900    tamsulosin (FLOMAX) capsule 0 4 mg, 0 4 mg, Oral, Daily With Dinner, Cristofer Abraham PA-C    Labs & Results:    Lab Results   Component Value Date    TROPONINI 0 10 (H) 08/21/2020    TROPONINI 0 15 (H) 07/27/2018    TROPONINI 0 14 (H) 07/27/2018       Lab Results   Component Value Date    GLUCOSE 107 12/11/2017    CALCIUM 8 4 09/01/2020    K 3 8 09/01/2020    CO2 24 09/01/2020     09/01/2020    BUN 13 09/01/2020    CREATININE 1 06 09/01/2020       Lab Results   Component Value Date    WBC 6 67 09/01/2020    HGB 8 8 (L) 09/01/2020    HCT 30 5 (L) 09/01/2020    MCV 84 09/01/2020     09/01/2020           No results found for: CHOL  Lab Results   Component Value Date    HDL 41 08/22/2020    HDL 31 (L) 12/12/2017     Lab Results   Component Value Date    LDLCALC 40 08/22/2020    [facility] 73 12/12/2017     Lab Results   Component Value Date    TRIG 74 08/22/2020    TRIG 82 12/12/2017       Lab Results   Component Value Date    ALT 22 08/23/2020    AST 8 08/23/2020       Telemetry:   Personally reviewed by Niru Cabrera MD:  No longer on telemetry    Imaging:  No new cardiac images to be reviewed    VTE Prophylaxis:  Full-dose apixaban      Assessment:  Principal Problem:    Atrial fibrillation with rapid ventricular response (HCC)  Active Problems:    Systolic CHF (HCC)    Microcytic anemia    SCARLETT (acute kidney injury) ([address] )    Gastrointestinal hemorrhage with melena    Coronary artery disease    GI bleeding    Mass of cecum    History of gout      Neurohormonal Blockade:  --Beta-Blocker:  Metoprolol succinate 100 mg b i d   --ACEi, ARB or ARNi:  None   --Aldosterone Receptor Blocker:  Spironolactone 12 5 mg q d   --Diuretic:  Furosemide 40 mg q d      Sudden Cardiac Death Risk Reduction:  --ICD:  Completed LifeVest with recovered     Electrical Resynchronization:  --Candidacy for BiV device: N/A     Advanced Therapies: Will continue to monitor     --Inotrope:  None  --LVAD/Transplant Candidacy: N/A    ===============================================================    1  Ascending colon invasive adenocarcinoma  -presenting as acute on chronic blood loss anemia  -iron panel consistent with iron deficiency anemia with nearly undetectable ferritin  -s/p 1 unit of PRBC, maintain on iron sucrose ordered for 5 doses  -EGD showed mild erosions in the GE junction, stomach, and erythema in the duodenum  -colonoscopy: malignant-appearing multilobular and ulcerated mass in the proximal ascending segment and 2 polyps which were all biopsied  -CT A/P: cecal mass with no evidence of  metastasis  -path: invasive adenocarcinoma  -POD #4 from right hemicolectomy with ileocolic anastomosis     2  Persistent atrial fibrillation  -known history, likely exacerbated by acute blood loss  -[id number] 2, HAS-BLED 1  -8/28 intraop JOSE LUIS:  LVEF 54%, normal RV; smoke but no trombus in DAVINA  -metoprolol succinate, diltiazem CD, digoxin, apixaban  -rate controlled     3  Nonischemic cardiomyopathy with recovered EF  -resume tachy mediated  -8/21 TTE:  LVEF 50% with no RWMA, LVIDd 4 cm, mild RVE with mild reduction in SF, mild TR with trace pericardial effusion  -metoprolol succinate, low-dose spironolactone  -weight remains elevated in stable at 102 kg  -appropriate response to oral furosemide with net -900 cc output in last 24 hours     4  Coronary artery disease  -02/2018 Rx NMST:  Partially reversible inferolateral and fixed apical lateral defect  -03/2018 LHC:  80% prox small RI, 60% ostial PDA otherwise no significant residual disease in the remaining vessels  -8/22 lipids:  Total 96, TG 74, HDL 41, calc LDL 40 (73)  -metoprolol succinate, high-intensity atorvastatin  -aspirin held due to bleeding      Plan:  1  Patient appropriate for discharge from a heart failure standpoint  Continue current regiment of metoprolol succinate and diltiazem extended release upon discharge for rate control strategy  2  Continue anticoagulation with apixaban  Patient educated regarding strict adherence to apixaban with plan for electrical cardioversion in the ambulatory setting 3-4 weeks from now  He expressed understanding    3  He is to discontinue digoxin and resume sacubitril-valsartan upon discharge  4  Patient was educated regarding with monitoring and supplemental dosing based on fluctuation  Dry weight of 209-210 lb while inpatient  5  Follow-up arranged in the [facility] [place] with KOFFI Burgos and eventually Dr Daphne Naranjo in October  Resumption of low-dose aspirin deferred to outpatient physician once deemed safe from a surgical standpoint      Case discussed and reviewed with Dr Regina Sears who agrees with my assessment and plan      Thank you for involving us in the care of your patient  Epic/ Allscripts/Care Everywhere records reviewed:  Yes    ** Please Note: Fluency DirectDictation voice to text software may have been used in the creation of this document   **

## 2020-09-01 NOTE — PROGRESS NOTES
Progress Note - Colorectal   Lars Alu 61 y o  male MRN: [id number]  Unit/Bed#: [facility] 922-01 Encounter: [id number]      Objective:  Patient doing well  States he has exhausted because he has been having bowel movements all night long  Patient had urinary retention and now has a Johnson catheter placed  His urine is light manuel in color  Patient was given Flomax 0 4 mg last evening  Patient denies nausea, no emesis  He is tolerating a low residue diet  He is out of bed and ambulating  He is back on his Eliquis  Claudia Gonsales is  still being held  1750 Johnson  2 BM's    Blood pressure 112/74, pulse 81, temperature 98 2 °F (36 8 °C), temperature source Oral, resp  rate 18, height 5' 9" (1 753 m), weight 102 kg (225 lb 12 oz), SpO2 96 %  ,Body mass index is 33 34 kg/m²  Intake/Output Summary (Last 24 hours) at 9/1/2020 0525  Last data filed at 9/1/2020 0300  Gross per 24 hour   Intake 1348  45 ml   Output 2420 ml   Net -1071 55 ml       Invasive Devices     Peripheral Intravenous Line            Peripheral IV 08/28/20 Left;Proximal;Ventral (anterior) Forearm 3 days    Peripheral IV 08/28/20 Right Hand 3 days          Drain            Urethral Catheter Non-latex 18 Fr  less than 1 day                Physical Exam:   Abdomen:  Soft, small midline abdominal incision is clean and dry, trocar sites are clean and dry, he does not appear distended, he does have incisional tenderness  Johnson:  Light manuel urine  Extremities: There is no calf tenderness, venadynes  are on and functioning    Lab, Imaging and other studies:    VTE Pharmacologic Prophylaxis:  Eliquis  VTE Mechanical Prophylaxis: sequential compression device    Assessment:  POD # 4 laparoscopic hand assisted right hemicolectomy, intraoperative JOSE LUIS    Plan:  1  Change Flomax to 0 4 mg daily  2  Urology consult pending for urinary retention  3  Patient to continue out of bed ambulating the halls 4-5 times daily  4   Patient to continue with incentive spirometry  5  Continue [facility] Cardiology recommendations  6   Home soon

## 2020-09-01 NOTE — CONSULTS
[address] NOTE   Admission Date: 8/21/2020    Patient Identifiers: Michelle Puga (MRN: [id number])  Service Requesting Consultation: Aldo Juan MD  Service Providing Consultation:  Urology, Chaya Smith PA-C  Consults  Date of Service: 9/1/2020    Reason for Consultation:  Postop urinary retention    History of Present Illness:     Michelle Puga is a 61 y o  male with 1 month his history of fatigue  He had melena stools  Colonoscopy revealed a ulcerated mass in the proximal ascending colon  He is now POD#4  laparoscopic hand assisted right hemicolectomy with intraoperative JOSE LUIS  He has failed postoperative urinary retention protocol and Johnson catheter is in place  He denies previous urologic problems  He has never seen a urologist any denies any family history of prostate bladder or kidney problems  He is on tamsulosin 0 4 mg daily  Kidneys bladder and prostate were unremarkable on CT scan      Past Medical, Past Surgical History:     Past Medical History:   Diagnosis Date    A-fib [facility])     Coronary artery disease     Gout     Herpes zoster     last assessed 12/18/17    Hypertension     Shingles    :    Past Surgical History:   Procedure Laterality Date    ELECTRICAL CARDIOVERSION  12/15/2017         HEMICOLOECTOMY W/ ANASTOMOSIS N/A 8/28/2020    Procedure: RIGHT HEMICOLECTOMY WITH ILIOCOLIC ANASTAMOSIS;  Surgeon: Aldo Juan MD;  Location: [facility];  Service: Colorectal   :    Medications, Allergies:     Current Facility-Administered Medications:     acetaminophen (TYLENOL) tablet 650 mg, 650 mg, Oral, Q6H PRN, Merlin Andrade,     allopurinol (ZYLOPRIM) tablet 100 mg, 100 mg, Oral, Daily, Dick Paz, DO, 100 mg at 08/31/20 0846    apixaban (ELIQUIS) tablet 5 mg, 5 mg, Oral, BID, John Francis, DO, 5 mg at 08/31/20 1725    atorvastatin (LIPITOR) tablet 40 mg, 40 mg, Oral, Daily With Shon Avelar Pritim, DO, 40 mg at 08/31/20 1725    diltiazem (CARDIZEM CD) 24 hr capsule 120 mg, 120 mg, Oral, Daily, Queenie Berg MD    diltiazem (CARDIZEM) injection 10 mg, 10 mg, Intravenous, Q8H PRN, Chaitanya Smalls DO, 10 mg at 08/22/20 1824    furosemide (LASIX) tablet 40 mg, 40 mg, Oral, Daily, Queenie Berg MD, 40 mg at 08/31/20 1228    HYDROmorphone (DILAUDID) injection 0 5 mg, 0 5 mg, Intravenous, Q4H PRN, Anna Marie Menendez, DO    melatonin tablet 3 mg, 3 mg, Oral, HS PRN, Chaitanya Smalls DO, 3 mg at 08/22/20 0100    metoprolol succinate (TOPROL-XL) 24 hr tablet 100 mg, 100 mg, Oral, BID, Don Castro, DO, 100 mg at 08/31/20 2227    naloxone [facility]) injection 0 1 mg, 0 1 mg, Intravenous, Q3 min PRN, Chaitanya Smalls DO    oxyCODONE (ROXICODONE) immediate release tablet 10 mg, 10 mg, Oral, Q4H PRN, Rod Francis DO, 10 mg at 09/01/20 0602    oxyCODONE (ROXICODONE) IR tablet 5 mg, 5 mg, Oral, Q4H PRN, Anna Marie Yinise, DO    spironolactone (ALDACTONE) tablet 12 5 mg, 12 5 mg, Oral, Daily, Don Castro DO, 12 5 mg at 08/31/20 0846    tamsulosin (FLOMAX) capsule 0 4 mg, 0 4 mg, Oral, Daily With Dinner, Rose Marie Abraham PA-C    Allergies:  No Known Allergies:    Social and Family History:   Social History:   Social History     Tobacco Use    Smoking status: Former Smoker     Last attempt to quit: 8/11/2017     Years since quitting: 3 0    Smokeless tobacco: Never Used   Substance Use Topics    Alcohol use: Yes     Comment: Socially, former alcohol    Drug use: No        Social History     Tobacco Use   Smoking Status Former Smoker    Last attempt to quit: 8/11/2017    Years since quitting: 3 0   Smokeless Tobacco Never Used       Family History:  Family History   Problem Relation Age of Onset    Coronary artery disease Mother     Alcohol abuse Father     Coronary artery disease Father    :     Review of Systems:     General: Fever, chills, or night sweats: negative  Cardiac: Negative for chest pain  Pulmonary: Negative for shortness of breath  Gastrointestinal: Abdominal pain negative  Nausea, vomiting, or diarrhea negative,  Genitourinary: See HPI above  Patient does not have hematuria  All other systems queried were negative  Physical Exam:   General: Patient is pleasant and in NAD  Awake and alert  /83   Pulse 93   Temp 98 2 °F (36 8 °C)   Resp 18   Ht 5' 9" (1 753 m)   Wt 102 kg (225 lb 12 oz)   SpO2 95%   BMI 33 34 kg/m²   HEENT:  No scleral icterus conjunctiva clear  Constitutional:  pleasant and cooperative  77-year-old male no apparent distress  Cardiac: Peripheral edema: negative  Pulmonary: Non-labored breathing  Abdomen: Soft, non-tender, non-distended  No surgical scars  No masses, tenderness, hernias noted  Genitourinary: Negative CVA tenderness, negative suprapubic tenderness  Extremities:  Moves all extremities without any weakness or deformity  Neurological:CNII-XII intact  No numbness or tingling  Essentially non focal neurologic exam  Psychiatric:mood affect and behavior normal    (Male): Penis circumcised, phallus normal, meatus patent  Testicles descended into scrotum bilaterally without masses nor tenderness  No inguinal hernias bilaterally  MENDOZA: in place draining clear yellow urine  no clots and       Labs:     Lab Results   Component Value Date    HGB 8 8 (L) 09/01/2020    HCT 30 5 (L) 09/01/2020    WBC 6 67 09/01/2020     09/01/2020   ]    Lab Results   Component Value Date    K 3 8 09/01/2020     09/01/2020    CO2 24 09/01/2020    BUN 13 09/01/2020    CREATININE 1 06 09/01/2020    CALCIUM 8 4 09/01/2020    GLUCOSE 107 12/11/2017   ]    Imaging:   I personally reviewed the images and report of the following studies, and reviewed them with the patient:  CT CHEST, ABDOMEN AND PELVIS WITH IV CONTRAST   IMPRESSION:     1  Cecal mass likely representing primary neoplasm       2   No definite evidence of metastatic disease in the chest, abdomen, or pelvis  3   Tiny left lower lobe lung nodule is nonspecific and doubtful metastatic disease though a three-month follow-up chest CT is recommended to confirm stability  4   Cholelithiasis with mild gallbladder wall thickening though no pericholecystic inflammatory change  This likely represents adenomyosis as described on the prior ultrasound from 2017      5   Small pericardial effusion  ASSESSMENT:     1  POD# 4 laparoscopic hand assisted right hemicolectomy  2  Postop urinary retention     PLAN:   -I discussed the options of management with the patient  -he agrees to go home with Johnson catheter  -will keep him on tamsulosin 0 4 mg  -follow-up in the office Friday morning for catheter removal      Thank you for allowing me to participate in this patients care  Please do not hesitate to call with any additional questions    Monica Zuñiga PA-C

## 2020-09-01 NOTE — TELEPHONE ENCOUNTER
Please [address] [facility]- [facility] [place]    What is the reason for the patients appointment? Discharged with Catheter     Do we accept the patient's insurance or is the patient Self-Pay? Provider:  Pierre Robertson P[employer]  Member ID#: [id number]    Has the patient had any previous urologist(s)? No     Have patient records been requested? No     Has the patient had any outside testing done? No     What is the patients location preference for an office visit? [place]    Does the patient have a personal history of cancer?   Colon    Patient can be reached at : [phone]

## 2020-09-01 NOTE — DISCHARGE SUMMARY
Discharge Summary - Colorectal Surgery   Anca Gene 61 y o  male MRN: [id number]  Unit/Bed#: [facility] 922-01 Encounter: [id number]        Admitting Diagnosis:  Anemia, atrial fib with RVR    Admit Date:  August 21st, 2020    Discharge Diagnosis:  Cecal mass    Discharge Date:  September 1st, 2020    HPI:  Patient initially was admitted to the medical service for atrial fibrillation with RVR  He was found to be anemic as well as having melena  Patient was admitted Cardiology was consulted as well as Gastroenterology  We were then brought on board for the findings on colonoscopy  Patient denies any recent unexplained weight loss  States he has a good appetite  Procedures Performed:   August 24th, 2020 EGD - GI  August 24th, 2020 colonoscopy with biopsy - GI  August 28th, 2020 laparoscopic hand assisted right hemicolectomy,Dr Radha Ronquillo; intraoperative JOSE LUIS - [address] Course:  Patient was admitted under Medicine and worked up cardiac wise as well as for his anemia  A large cecal mass was found on colonoscopy revealing invasive adenocarcinoma  Patient continued on a heparin drip until surgery  His Eliquis was stopped   prior to his colonoscopy on August 24th, 2020  Patient was continued on the heparin drip until we could take him to the operating room on August 28th, 2020 for the right hemicolectomy  Postoperatively patient did do well  The JOSE LUIS done at the time of surgery by Cardiology did not reveal any clots within the heart chambers  Patient was able to be started on a clear liquid diet and advanced to a low residue diet which she is tolerating well without nausea or vomiting  Patient was restarted on his Eliquis  Patient did have urinary retention and required as Johnson to be reinserted again  Patient has been seen by Urology and Flomax is recommended daily  They will see him in the office at the end of this week hopefully to removed the Johnson catheter    Patient is to continue on his Flomax  Cardiology has been following the patient postoperatively also and his digoxin was discontinued  He is to continue on his Lasix daily as well as potassium supplement daily  Danni Matas was also restarted on discharge  Patient has been out of bed ambulating  Physical therapy as well as occupational therapy have been following the patient  He has been cleared for home  All discharge instructions were given to the patient  A script for oxycodone 5 mg every 4-6 hours as needed for pain was sent to [facility] pharmacy on [address]  Twenty-eight pills were prescribed with no refills  A script was also sent for Flomax 0 4 mg daily for the next 7 days  Seven pills were prescribed with no refills  If there is any problems with the Johnson catheter he is not hesitate to call [facility]  Patient will follow up with Dr Villavicencio in 4 weeks  Patient's official pathology from the right hemicolectomy is still pending  Pathology from 08/24 biopsies in the GI lab is below  Pathology from the right hemicolectomy still pending    Final Diagnosis    A  Duodenum (biopsy):  - Duodenal mucosa with no diagnostic abnormality  - No Marsh lesion  - Negative for dysplasia      B  Stomach (biopsy):  - Mild chronic inactive antral and oxyntic gastritis  - No H pylori identified (H&E)  - No intestinal metaplasia      C  Cecal polyp (polypectomy):  - Fragments of tubular adenoma  - No high grade dysplasia      D  Ascending colon mass (biopsy):  - Invasive adenocarcinoma      Comment:  - [facility] IHC has been ordered and results will be indicated in an addendum   - Intradepartmental consultation concurs with the diagnosis of adenocarcinoma    - Dr Emelia Olson was notified via [employer] messaging of the diagnosis on 8/25/20 at 12:25 pm       E   Proximal transverse colon polyp (polypectomy):  - Fragments of villous adenoma  - No high grade dysplasia          Complications:  None      Condition at Discharge: good Discharge instructions/Information to patient and family:   See after visit summary for information provided to patient and family  Provisions for Follow-Up Care:  See after visit summary for information related to follow-up care and any pertinent home health orders  Disposition:  Home with [facility] for Johnson care    Planned Readmission: No    Discharge Statement   I spent 30 minutes discharging the patient  This time was spent on the day of discharge  I had direct contact with the patient on the day of discharge  Additional documentation is required if more than 30 minutes were spent on discharge  Discharge Medications:  See after visit summary for reconciled discharge medications provided to patient and family

## 2020-09-04 ENCOUNTER — OFFICE VISIT (OUTPATIENT)
Dept: UROLOGY | Facility: AMBULATORY SURGERY CENTER | Age: 60
End: 2020-09-04
Payer: COMMERCIAL

## 2020-09-04 VITALS
SYSTOLIC BLOOD PRESSURE: 118 MMHG | BODY MASS INDEX: 31.37 KG/M2 | DIASTOLIC BLOOD PRESSURE: 70 MMHG | WEIGHT: 211.8 LBS | HEART RATE: 72 BPM | TEMPERATURE: 96.8 F | HEIGHT: 69 IN

## 2020-09-04 DIAGNOSIS — Z46.6 ENCOUNTER FOR FOLEY CATHETER REMOVAL: Primary | ICD-10-CM

## 2020-09-04 PROCEDURE — 99211 OFF/OP EST MAY X REQ PHY/QHP: CPT

## 2020-09-04 NOTE — PROGRESS NOTES
9/4/2020    Nai Brantley is a 61 y o  male  [id number]    Diagnosis:  Chief Complaint     Urinary Retention          Patient presents for tavarez removal s/p RIGHT HEMICOLECTOMY WITH ILIOCOLIC ANASTAMOSIS (N/A Abdomen)       TRANSESOPHAGEAL ECHOCARDIOGRAM (JOSE LUIS) (N/A Esophagus)  on 08/28/2020 with Dr Andrew Loya MD, Briseyda Park MD     Plan:  · Patient knows to call office/go to ER with fever, chills, nausea, vomiting, or difficulty/inability to urinate  · Will follow up with provider if Patient will need in office follow up or PRN and inform Patient of recommendation  Procedure:    Patient arrives today for tavarez removal status post surgery after failed postop urine retention protocol  Denies problems with tavarez at this time  Tavarez catheter removed after deflation of an intact balloon  Patient tolerated well  Encouraged patient to hydrate well and avoid bladder irritants  Discussed symptoms post tavarez removal and ER precautions at length  All questions answered to Patient's satisfaction and repeats back understanding  He knows he may return if uncomfortable and unable to urinate  Patient agrees to this plan      Vitals:    09/04/20 0859   BP: 118/70   BP Location: Left arm   Patient Position: Sitting   Cuff Size: Adult   Pulse: 72   Temp: (!) 96 8 °F (36 °C)   Weight: 96 1 kg (211 lb 12 8 oz)   Height: 5' 9" (1 753 m)         Janel Hancock, RN, BSN

## 2020-09-10 ENCOUNTER — TELEPHONE (OUTPATIENT)
Dept: SURGICAL ONCOLOGY | Facility: CLINIC | Age: 60
End: 2020-09-10

## 2020-09-10 ENCOUNTER — OFFICE VISIT (OUTPATIENT)
Dept: CARDIOLOGY CLINIC | Facility: CLINIC | Age: 60
End: 2020-09-10
Payer: COMMERCIAL

## 2020-09-10 VITALS
BODY MASS INDEX: 31.01 KG/M2 | DIASTOLIC BLOOD PRESSURE: 68 MMHG | WEIGHT: 209.4 LBS | SYSTOLIC BLOOD PRESSURE: 90 MMHG | HEIGHT: 69 IN | OXYGEN SATURATION: 98 % | HEART RATE: 117 BPM | TEMPERATURE: 97.4 F

## 2020-09-10 DIAGNOSIS — I42.8 OTHER CARDIOMYOPATHY (HCC): ICD-10-CM

## 2020-09-10 DIAGNOSIS — Z90.49 S/P RIGHT HEMICOLECTOMY: ICD-10-CM

## 2020-09-10 DIAGNOSIS — I48.91 ATRIAL FIBRILLATION, UNSPECIFIED TYPE (HCC): Primary | ICD-10-CM

## 2020-09-10 DIAGNOSIS — I25.10 CORONARY ARTERY DISEASE INVOLVING NATIVE HEART WITHOUT ANGINA PECTORIS, UNSPECIFIED VESSEL OR LESION TYPE: ICD-10-CM

## 2020-09-10 PROCEDURE — 99214 OFFICE O/P EST MOD 30 MIN: CPT | Performed by: NURSE PRACTITIONER

## 2020-09-10 PROCEDURE — 93000 ELECTROCARDIOGRAM COMPLETE: CPT | Performed by: NURSE PRACTITIONER

## 2020-09-10 RX ORDER — DIGOXIN 125 MCG
125 TABLET ORAL DAILY
Qty: 30 TABLET | Refills: 3 | Status: SHIPPED | OUTPATIENT
Start: 2020-09-10 | End: 2020-12-01

## 2020-09-10 NOTE — PROGRESS NOTES
Cardiology Follow Up    Ted Hill  1960  [id number]  [facility] [address] [place] [address] [id number] [phone]    Atrial Fibrillation     Interval History:   Mr Ted Hill was admitted to [facility] on 8/21- 9/01/20 with atrial fibrillation with RVR  He presented emergency room with worsening fatigue and recurrence atrial fibrillation  Mar was scheduled for an exercise stress echocardiogram   On presentation to the test he was found to be in recurrent atrial fibrillation with RVR  Echocardiogram showed mildly reduced LV function 50%  RV  Mildly dilated systolic function mildly reduced  Mildly dilated left atrium and right atrium  He was referred to the emergency room for evaluation  On admission he was found have anemia   Hemoglobin 9 3 hematocrit 32  Was found to have iron deficiency anemia with iron level of 20, ferritin 392, iron sat 7, TIBC 304  TSH 1 050  Remained on metoprolol 100 mg b i d  and digoxin  8/28/20 Mr Leobardo Persaud underwent right hemicolectomy with ileiocolic anastomosis  He was not started on CCB to improve rate control  Digoxin was stopped  Experienced urinary retention which a Johnson was placed  He was  Continue on Flomax  Entresto restarted at discharge  He was instructed to follow up with Urology  After discharge  Pathology of hemicolectomy was pending at discharge  Lab studies at discharge sodium 139, potassium 3 8, BUN 13, creatinine 1 06, GFR 76 ,  Hemoglobin 8 8, hematocrit 30 5  Mr Ted Hill presents our office for recent hospitalization follow-up visit  He recently had his Johnson catheter removed by Urology  He admits to dyspnea with exertion and fatigue  He denies chest pain palpitations lightheadedness or dizziness     Weight in the office 209 lb        HPI:  Chronic HFrEF now recovered LVEF 20% improved to 50%, LVIDd improved 5 7cm to 4cm  RV now normal    3/02/18 [facility] CAD, 80% proximal stenosis in ramus artery  No stent placed     8/22/20 TC 96m TG 74, HDL 41, LDL 40  Invasive adenocarcinoma   8/28/20 sp right hemicolectomy     Patient Active Problem List   Diagnosis    Acute heart failure (HCC)    Atrial fibrillation with rapid ventricular response (HCC)    Serum total bilirubin elevated    Elevated blood pressure reading    PAF (paroxysmal atrial fibrillation) (HCC)    Systolic CHF (HCC)    Syncope and collapse    NSVT (nonsustained ventricular tachycardia) (HCC)    Microcytic anemia    [facility]LETT [facility]) ([facility] )    Gastrointestinal hemorrhage with melena    Coronary artery disease    GI bleeding    Mass of cecum    History of gout     Past Medical History:   Diagnosis Date    A-fib ([facility] )     Coronary artery disease     Gout     Herpes zoster     last assessed 12/18/17    Hypertension     Shingles      Social History     Socioeconomic History    Marital status: Single     Spouse name: Not on file    Number of children: Not on file    Years of education: Not on file    Highest education level: Not on file   Occupational History    Not on file   Social Needs    Financial resource strain: Not on file    Food insecurity     Worry: Not on file     Inability: Not on file    Transportation needs     Medical: Not on file     Non-medical: Not on file   Tobacco Use    Smoking status: Former Smoker     Last attempt to quit: 8/11/2017     Years since quitting: 3 0    Smokeless tobacco: Never Used   Substance and Sexual Activity    Alcohol use: Yes     Comment: Socially, former alcohol    Drug use: No    Sexual activity: Yes     Partners: Female   Lifestyle    Physical activity     Days per week: Not on file     Minutes per session: Not on file    Stress: Not on file   Relationships    Social connections     Talks on phone: Not on file     Gets together: Not on file     Attends Cheondoism service: Not on file     Active member of club or organization: Not on file     Attends meetings of clubs or organizations: Not on file     Relationship status: Not on file    Intimate partner violence     Fear of current or ex partner: Not on file     Emotionally abused: Not on file     Physically abused: Not on file     Forced sexual activity: Not on file   Other Topics Concern    Not on file   Social History Narrative    Not on file      Family History   Problem Relation Age of Onset    Coronary artery disease Mother     Alcohol abuse Father     Coronary artery disease Father      Past Surgical History:   Procedure Laterality Date    ELECTRICAL CARDIOVERSION  12/15/2017         HEMICOLOECTOMY W/ ANASTOMOSIS N/A 8/28/2020    Procedure: RIGHT HEMICOLECTOMY WITH ILIOCOLIC ANASTAMOSIS;  Surgeon: Priyanka Avalos MD;  Location: [facility];  Service: Colorectal       Current Outpatient Medications:     acetaminophen (TYLENOL) 325 mg tablet, Take 2 tablets (650 mg total) by mouth every 6 (six) hours as needed for mild pain, Disp: 30 tablet, Rfl: 0    allopurinol (ZYLOPRIM) 100 mg tablet, TAKE 1 TABLET (100 MG TOTAL) BY MOUTH DAILY, Disp: 90 tablet, Rfl: 3    atorvastatin (LIPITOR) 40 mg tablet, Take 1 tablet (40 mg total) by mouth daily with dinner, Disp: 90 tablet, Rfl: 3    ELIQUIS 5 MG, TAKE 1 TABLET (5 MG TOTAL) BY MOUTH 2 (TWO) TIMES A DAY, Disp: 60 tablet, Rfl: 10    ENTRESTO 24-26 MG TABS, TAKE 1 TABLET BY MOUTH TWICE A DAY, Disp: 60 tablet, Rfl: 11    furosemide (LASIX) 40 mg tablet, TAKE 1 5 TABLETS (60 MG TOTAL) BY MOUTH DAILY, Disp: 135 tablet, Rfl: 5    metoprolol succinate (TOPROL-XL) 100 mg 24 hr tablet, TAKE 1 TABLET BY MOUTH TWICE A DAY, Disp: 180 tablet, Rfl: 3    spironolactone (ALDACTONE) 25 mg tablet, TAKE 0 5 TABLETS (12 5 MG TOTAL) BY MOUTH DAILY, Disp: 45 tablet, Rfl: 7    tamsulosin (FLOMAX) 0 4 mg, Take 1 capsule (0 4 mg total) by mouth daily with dinner for 7 days, Disp: 7 capsule, Rfl: 0  No Known Allergies    Labs:  No results displayed because visit has over 200 results  Imaging: Xr Chest 1 View Portable    Result Date: 8/21/2020  Narrative: CHEST INDICATION:  Atrial fibrillation  COMPARISON:  7/26/2018 EXAM PERFORMED/VIEWS:  XR CHEST PORTABLE FINDINGS: Cardiomediastinal silhouette appears stable  The lungs are clear  No pneumothorax or pleural effusion  Osseous structures appear within normal limits for patient age  Impression: No acute cardiopulmonary disease  Workstation performed: [id number]     Ct Chest Abdomen Pelvis W Contrast    Result Date: 8/25/2020  Narrative: CT CHEST, ABDOMEN AND PELVIS WITH IV CONTRAST INDICATION:   colon mass  COMPARISON:  12/11/2026 TECHNIQUE: CT examination of the chest, abdomen and pelvis was performed  Axial, sagittal, and coronal 2D reformatted images were created from the source data and submitted for interpretation  Radiation dose length product (DLP) for this visit:  1477 39 mGy-cm   This examination, like all CT scans performed in the [address] [place] [address], was performed utilizing techniques to minimize radiation dose exposure, including the use of iterative reconstruction and automated exposure control  IV Contrast:  100 mL of iohexol (OMNIPAQUE) Enteric Contrast: Enteric contrast was administered  FINDINGS: CHEST LUNGS:  There is a tiny left lower lobe nodule on series 3, image 80  There is no tracheal or endobronchial lesion  PLEURA:  Unremarkable  HEART/GREAT VESSELS:  There is a small pericardial effusion  MEDIASTINUM AND JESSICA:  Unremarkable  CHEST WALL AND LOWER NECK:   Unremarkable  ABDOMEN LIVER/BILIARY TREE:  Unremarkable  GALLBLADDER:  There is cholelithiasis  There are portions of the gallbladder wall which appear thickened with mucosal  There is no pericholecystic inflammatory change, however  This may be related to adenomyosis which was seen on the prior ultrasound from 2017  SPLEEN:  Unremarkable  PANCREAS:  Unremarkable  ADRENAL GLANDS:  Unremarkable  KIDNEYS/URETERS:  There is an exophytic right renal cyst  A too small to characterize hypodense lesion in the left kidney is also likely a small cyst  No hydronephrosis  STOMACH AND BOWEL:  There is circumferential thickening in the cecum just above the ileocecal valve correlating with the reported mass seen on colonoscopy and is suspicious for neoplasm  APPENDIX:  No findings to suggest appendicitis  ABDOMINOPELVIC CAVITY:  No ascites  No pneumoperitoneum  No lymphadenopathy  VESSELS:  Unremarkable for patient's age  PELVIS REPRODUCTIVE ORGANS:  Unremarkable for patient's age  URINARY BLADDER:  Unremarkable  ABDOMINAL WALL/INGUINAL REGIONS:  Unremarkable  OSSEOUS STRUCTURES:  No acute fracture or destructive osseous lesion  Impression: 1  Cecal mass likely representing primary neoplasm  2   No definite evidence of metastatic disease in the chest, abdomen, or pelvis  3   Tiny left lower lobe lung nodule is nonspecific and doubtful metastatic disease though a three-month follow-up chest CT is recommended to confirm stability  4   Cholelithiasis with mild gallbladder wall thickening though no pericholecystic inflammatory change  This likely represents adenomyosis as described on the prior ultrasound from 2017  5   Small pericardial effusion  The study was marked in EPIC for significant notification  Workstation performed: [id number]     Us Bedside Procedure    Result Date: 8/21/2020  Narrative: 1 2 840 [id number] 2 224 [id number]      Review of Systems:  Review of Systems   Constitutional: Positive for fatigue  Respiratory: Positive for shortness of breath  All other systems reviewed and are negative  Physical Exam:  Physical Exam  Constitutional:       Appearance: Normal appearance  HENT:      Head: Normocephalic  Eyes:      Pupils: Pupils are equal, round, and reactive to light  Cardiovascular:      Rate and Rhythm: Rhythm irregular        Pulses: Normal pulses  Comments: Irregular rate and rhythm  Pulmonary:      Effort: Pulmonary effort is normal       Breath sounds: Normal breath sounds  Abdominal:      General: Bowel sounds are normal       Palpations: Abdomen is soft  Musculoskeletal: Normal range of motion  General: No swelling  Skin:     General: Skin is warm and dry  Capillary Refill: Capillary refill takes less than 2 seconds  Comments: Abdominal incisions in naval area appear clean dry intact approximated without erythema   Neurological:      General: No focal deficit present  Mental Status: He is alert and oriented to person, place, and time  Psychiatric:         Mood and Affect: Mood normal          Behavior: Behavior normal          Discussion/Summary:  1  Atrial Fibrillation continues with  BPM by EKG  Associated with  symptoms of fatigue and dyspnea with minimal exertion  BP borderline 102/60  He most likely will not tolerate adding CCB at this time  He continues on Eliquis 5mg BID for stroke prevention,   Metoprolol succinate 100 mg b i d  QTC prolonged would not add Amiodarone at this time  Start digoxin 125mcg daily and schedule an OP Cardioversion after 9/22/20  This will be 3 weeks of AC  Nurys Veliz is aware not to miss a dose of Eliquis  Consider OP sleep study in near future    2  NICM previous LVEF 20% LVIDd 5 7 cm now recovered  LVEF 50%  RV  Mildly dilated systolic function mildly reduced  -  Continue metoprolol succinate 100 mg b i d , Aldactone 25 mg daily, Entresto 24-26 mg b i d , Lasix 60 mg daily, digoxin 125 mcg daily added to improve rate control for atrial fibrillation with RVR, CBC, BMP,  Mag to be done in the near future  Maintain a 2 gram daily sodium diet and 1500 ml daily fluid restriction  Check daily weights  If you gain 3 pounds in one day, 5 pounds in one week, or experience worsening shortness of breath or increasing lower leg swelling    Please call the heart failure office at [phone]  Please bring a  list of your current medications and daily weights to the office visit  3  3/02/18 [facility] CAD, 80% proximal stenosis in ramus artery  No stent placed-  Continue metoprolol succinate 100 mg b i d , Lipitor 40 mg daily, Not on aspirin on Eliquis 5 mg b i d  for stroke prevention due to atrial fibrillation  4  8/28/20 sp right hemicolectomy with ileiocolic anastomosis   Follow up with Dr Viviane Tijerina

## 2020-09-10 NOTE — PATIENT INSTRUCTIONS
Maintain a 2 gram daily sodium diet and 1500 ml daily fluid restriction  Check daily weights  If you gained 3 pounds in one day, 5 pounds in one week, or experience worsening shortness of breath or increasing lower leg swelling  Please call the heart failure office at [phone]  Please bring a  list of your current medications and daily weights to the office visit    Start digoxin 125mcg daily  CBC, BMP and mag to be done in the near future

## 2020-09-10 NOTE — H&P (VIEW-ONLY)
Cardiology Follow Up    Judi Christianson  1960  [id number]  [facility] [place] [id number] [phone]    Atrial Fibrillation     Interval History:   Mr Judi Christianson was admitted to [facility] on 8/21- 9/01/20 with atrial fibrillation with RVR  He presented emergency room with worsening fatigue and recurrence atrial fibrillation  Mar was scheduled for an exercise stress echocardiogram   On presentation to the test he was found to be in recurrent atrial fibrillation with RVR  Echocardiogram showed mildly reduced LV function 50%  RV  Mildly dilated systolic function mildly reduced  Mildly dilated left atrium and right atrium  He was referred to the emergency room for evaluation  On admission he was found have anemia   Hemoglobin 9 3 hematocrit 32  Was found to have iron deficiency anemia with iron level of 20, ferritin 392, iron sat 7, TIBC 304  TSH 1 050  Remained on metoprolol 100 mg b i d  and digoxin  8/28/20 Mr Sherrell Boyd underwent right hemicolectomy with ileiocolic anastomosis  He was not started on CCB to improve rate control  Digoxin was stopped  Experienced urinary retention which a Johnson was placed  He was  Continue on Flomax  Entresto restarted at discharge  He was instructed to follow up with Urology  After discharge  Pathology of hemicolectomy was pending at discharge  Lab studies at discharge sodium 139, potassium 3 8, BUN 13, creatinine 1 06, GFR 76 ,  Hemoglobin 8 8, hematocrit 30 5  Mr Judi Christianson presents our office for recent hospitalization follow-up visit  He recently had his Johnson catheter removed by Urology  He admits to dyspnea with exertion and fatigue  He denies chest pain palpitations lightheadedness or dizziness     Weight in the office 209 lb        HPI:  Chronic HFrEF now recovered LVEF 20% improved to 50%, LVIDd improved 5 7cm to 4cm  RV now normal    3/02/18 [facility] CAD, 80% proximal stenosis in ramus artery  No stent placed     8/22/20 TC 96m TG 74, HDL 41, LDL 40  Invasive adenocarcinoma   8/28/20 sp right hemicolectomy     Patient Active Problem List   Diagnosis    Acute heart failure (HCC)    Atrial fibrillation with rapid ventricular response (HCC)    Serum total bilirubin elevated    Elevated blood pressure reading    PAF (paroxysmal atrial fibrillation) (HCC)    Systolic CHF (HCC)    Syncope and collapse    NSVT (nonsustained ventricular tachycardia) (HCC)    Microcytic anemia    SCARLETT [facility]acute kidney injury) ([facility] )    Gastrointestinal hemorrhage with melena    Coronary artery disease    GI bleeding    Mass of cecum    History of gout     Past Medical History:   Diagnosis Date    A-fib ([facility] )     Coronary artery disease     Gout     Herpes zoster     last assessed 12/18/17    Hypertension     Shingles      Social History     Socioeconomic History    Marital status: Single     Spouse name: Not on file    Number of children: Not on file    Years of education: Not on file    Highest education level: Not on file   Occupational History    Not on file   Social Needs    Financial resource strain: Not on file    Food insecurity     Worry: Not on file     Inability: Not on file    Transportation needs     Medical: Not on file     Non-medical: Not on file   Tobacco Use    Smoking status: Former Smoker     Last attempt to quit: 8/11/2017     Years since quitting: 3 0    Smokeless tobacco: Never Used   Substance and Sexual Activity    Alcohol use: Yes     Comment: Socially, former alcohol    Drug use: No    Sexual activity: Yes     Partners: Female   Lifestyle    Physical activity     Days per week: Not on file     Minutes per session: Not on file    Stress: Not on file   Relationships    Social connections     Talks on phone: Not on file     Gets together: Not on file     Attends Christianity service: Not on file     Active member of club or organization: Not on file     Attends meetings of clubs or organizations: Not on file     Relationship status: Not on file    Intimate partner violence     Fear of current or ex partner: Not on file     Emotionally abused: Not on file     Physically abused: Not on file     Forced sexual activity: Not on file   Other Topics Concern    Not on file   Social History Narrative    Not on file      Family History   Problem Relation Age of Onset    Coronary artery disease Mother     Alcohol abuse Father     Coronary artery disease Father      Past Surgical History:   Procedure Laterality Date    ELECTRICAL CARDIOVERSION  12/15/2017         HEMICOLOECTOMY W/ ANASTOMOSIS N/A 8/28/2020    Procedure: RIGHT HEMICOLECTOMY WITH ILIOCOLIC ANASTAMOSIS;  Surgeon: Amanda Engel MD;  Location: [facility];  Service: Colorectal       Current Outpatient Medications:     acetaminophen (TYLENOL) 325 mg tablet, Take 2 tablets (650 mg total) by mouth every 6 (six) hours as needed for mild pain, Disp: 30 tablet, Rfl: 0    allopurinol (ZYLOPRIM) 100 mg tablet, TAKE 1 TABLET (100 MG TOTAL) BY MOUTH DAILY, Disp: 90 tablet, Rfl: 3    atorvastatin (LIPITOR) 40 mg tablet, Take 1 tablet (40 mg total) by mouth daily with dinner, Disp: 90 tablet, Rfl: 3    ELIQUIS 5 MG, TAKE 1 TABLET (5 MG TOTAL) BY MOUTH 2 (TWO) TIMES A DAY, Disp: 60 tablet, Rfl: 10    ENTRESTO 24-26 MG TABS, TAKE 1 TABLET BY MOUTH TWICE A DAY, Disp: 60 tablet, Rfl: 11    furosemide (LASIX) 40 mg tablet, TAKE 1 5 TABLETS (60 MG TOTAL) BY MOUTH DAILY, Disp: 135 tablet, Rfl: 5    metoprolol succinate (TOPROL-XL) 100 mg 24 hr tablet, TAKE 1 TABLET BY MOUTH TWICE A DAY, Disp: 180 tablet, Rfl: 3    spironolactone (ALDACTONE) 25 mg tablet, TAKE 0 5 TABLETS (12 5 MG TOTAL) BY MOUTH DAILY, Disp: 45 tablet, Rfl: 7    tamsulosin (FLOMAX) 0 4 mg, Take 1 capsule (0 4 mg total) by mouth daily with dinner for 7 days, Disp: 7 capsule, Rfl: 0  No Known Allergies    Labs:  No results displayed because visit has over 200 results  Imaging: Xr Chest 1 View Portable    Result Date: 8/21/2020  Narrative: CHEST INDICATION:  Atrial fibrillation  COMPARISON:  7/26/2018 EXAM PERFORMED/VIEWS:  XR CHEST PORTABLE FINDINGS: Cardiomediastinal silhouette appears stable  The lungs are clear  No pneumothorax or pleural effusion  Osseous structures appear within normal limits for patient age  Impression: No acute cardiopulmonary disease  Workstation performed: [id number]     Ct Chest Abdomen Pelvis W Contrast    Result Date: 8/25/2020  Narrative: CT CHEST, ABDOMEN AND PELVIS WITH IV CONTRAST INDICATION:   colon mass  COMPARISON:  12/11/2026 TECHNIQUE: CT examination of the chest, abdomen and pelvis was performed  Axial, sagittal, and coronal 2D reformatted images were created from the source data and submitted for interpretation  Radiation dose length product (DLP) for this visit:  1477 39 mGy-cm   This examination, like all CT scans performed in the [facility], was performed utilizing techniques to minimize radiation dose exposure, including the use of iterative reconstruction and automated exposure control  IV Contrast:  100 mL of iohexol (OMNIPAQUE) Enteric Contrast: Enteric contrast was administered  FINDINGS: CHEST LUNGS:  There is a tiny left lower lobe nodule on series 3, image 80  There is no tracheal or endobronchial lesion  PLEURA:  Unremarkable  HEART/GREAT VESSELS:  There is a small pericardial effusion  MEDIASTINUM AND JESSICA:  Unremarkable  CHEST WALL AND LOWER NECK:   Unremarkable  ABDOMEN LIVER/BILIARY TREE:  Unremarkable  GALLBLADDER:  There is cholelithiasis  There are portions of the gallbladder wall which appear thickened with mucosal  There is no pericholecystic inflammatory change, however  This may be related to adenomyosis which was seen on the prior ultrasound from 2017  SPLEEN:  Unremarkable  PANCREAS:  Unremarkable  ADRENAL GLANDS:  Unremarkable  KIDNEYS/URETERS:  There is an exophytic right renal cyst  A too small to characterize hypodense lesion in the left kidney is also likely a small cyst  No hydronephrosis  STOMACH AND BOWEL:  There is circumferential thickening in the cecum just above the ileocecal valve correlating with the reported mass seen on colonoscopy and is suspicious for neoplasm  APPENDIX:  No findings to suggest appendicitis  ABDOMINOPELVIC CAVITY:  No ascites  No pneumoperitoneum  No lymphadenopathy  VESSELS:  Unremarkable for patient's age  PELVIS REPRODUCTIVE ORGANS:  Unremarkable for patient's age  URINARY BLADDER:  Unremarkable  ABDOMINAL WALL/INGUINAL REGIONS:  Unremarkable  OSSEOUS STRUCTURES:  No acute fracture or destructive osseous lesion  Impression: 1  Cecal mass likely representing primary neoplasm  2   No definite evidence of metastatic disease in the chest, abdomen, or pelvis  3   Tiny left lower lobe lung nodule is nonspecific and doubtful metastatic disease though a three-month follow-up chest CT is recommended to confirm stability  4   Cholelithiasis with mild gallbladder wall thickening though no pericholecystic inflammatory change  This likely represents adenomyosis as described on the prior ultrasound from 2017  5   Small pericardial effusion  The study was marked in EPIC for significant notification  Workstation performed: [id number]     Us Bedside Procedure    Result Date: 8/21/2020  Narrative: 1 2 840 [id number] 2 224 [id number]      Review of Systems:  Review of Systems   Constitutional: Positive for fatigue  Respiratory: Positive for shortness of breath  All other systems reviewed and are negative  Physical Exam:  Physical Exam  Constitutional:       Appearance: Normal appearance  HENT:      Head: Normocephalic  Eyes:      Pupils: Pupils are equal, round, and reactive to light  Cardiovascular:      Rate and Rhythm: Rhythm irregular        Pulses: Normal pulses  Comments: Irregular rate and rhythm  Pulmonary:      Effort: Pulmonary effort is normal       Breath sounds: Normal breath sounds  Abdominal:      General: Bowel sounds are normal       Palpations: Abdomen is soft  Musculoskeletal: Normal range of motion  General: No swelling  Skin:     General: Skin is warm and dry  Capillary Refill: Capillary refill takes less than 2 seconds  Comments: Abdominal incisions in naval area appear clean dry intact approximated without erythema   Neurological:      General: No focal deficit present  Mental Status: He is alert and oriented to person, place, and time  Psychiatric:         Mood and Affect: Mood normal          Behavior: Behavior normal          Discussion/Summary:  1  Atrial Fibrillation continues with  BPM by EKG  Associated with  symptoms of fatigue and dyspnea with minimal exertion  BP borderline 102/60  He most likely will not tolerate adding CCB at this time  He continues on Eliquis 5mg BID for stroke prevention,   Metoprolol succinate 100 mg b i d  QTC prolonged would not add Amiodarone at this time  Start digoxin 125mcg daily and schedule an OP Cardioversion after 9/22/20  This will be 3 weeks of AC  Jarod Michelle is aware not to miss a dose of Eliquis  Consider OP sleep study in near future    2  NICM previous LVEF 20% LVIDd 5 7 cm now recovered  LVEF 50%  RV  Mildly dilated systolic function mildly reduced  -  Continue metoprolol succinate 100 mg b i d , Aldactone 25 mg daily, Entresto 24-26 mg b i d , Lasix 60 mg daily, digoxin 125 mcg daily added to improve rate control for atrial fibrillation with RVR, CBC, BMP,  Mag to be done in the near future  Maintain a 2 gram daily sodium diet and 1500 ml daily fluid restriction  Check daily weights  If you gain 3 pounds in one day, 5 pounds in one week, or experience worsening shortness of breath or increasing lower leg swelling    Please call the heart failure office at [phone]  Please bring a  list of your current medications and daily weights to the office visit  3  3/02/18 [facility] CAD, 80% proximal stenosis in ramus artery  No stent placed-  Continue metoprolol succinate 100 mg b i d , Lipitor 40 mg daily, Not on aspirin on Eliquis 5 mg b i d  for stroke prevention due to atrial fibrillation  4  8/28/20 sp right hemicolectomy with ileiocolic anastomosis   Follow up with Dr Lana Bloom

## 2020-09-10 NOTE — TELEPHONE ENCOUNTER
New GI Patient Encounter    New Patient GI Form   Patient Details:  Roxie Hollis  1960  [id number]     Background Information:  [address] starts by opening a telephone encounter and gathering the following information   Who is calling to schedule and relationship?  self   Who is the referring provider? Chip Gabriel   To which specialty is the referral?  Medical Oncology   Reason for Visit? New Diagnosis   Tumor Type? Mass of cecum    Is there a confirmed diagnosis from biopsy/tissue reviewed by Pathology? yes   Date of Tissue Diagnosis  (If done outside of [facility] please obtain report and slides)  (If no tissue diagnosis, please stop and discuss with Navigator prior to scheduling)  2/28   Is patient aware of the diagnosis? yes   Has Imaging been completed? yes   If YES, where was the imaging done? (If outside [place] [id number] obtain records)  [facility]   Have any endoscopies been done (colonoscopy, EGD, EUS)  yes   If YES where were they performed? (If outside of [facility] obtain the records)  [facility]   Has blood work been done?  yes   If YES, where was the blood work done? (If outside of [facility] obtain records)  [facility]   Is there a personal history of cancer? (If YES please list type)  no   Is there a family history of cancer? (If YES please list type)  no   Scheduling Information:   Preferred [facility]   Are there any days the patient cannot be seen? na   Miscellaneous: Surgery was 2 weeks ago  10/3  9/30  10/7   After completing the above information, please route to finance, nurse navigation and clinical trials for review

## 2020-09-11 ENCOUNTER — TRANSCRIBE ORDERS (OUTPATIENT)
Dept: ADMINISTRATIVE | Age: 60
End: 2020-09-11

## 2020-09-11 ENCOUNTER — APPOINTMENT (OUTPATIENT)
Dept: LAB | Age: 60
End: 2020-09-11
Payer: COMMERCIAL

## 2020-09-11 DIAGNOSIS — I48.91 ATRIAL FIBRILLATION, UNSPECIFIED TYPE (HCC): ICD-10-CM

## 2020-09-11 LAB
ANION GAP SERPL CALCULATED.3IONS-SCNC: 9 MMOL/L (ref 4–13)
BASOPHILS # BLD AUTO: 0.08 THOUSANDS/ΜL (ref 0–0.1)
BASOPHILS NFR BLD AUTO: 1 % (ref 0–1)
BUN SERPL-MCNC: 25 MG/DL (ref 5–25)
CALCIUM SERPL-MCNC: 8.9 MG/DL (ref 8.3–10.1)
CHLORIDE SERPL-SCNC: 109 MMOL/L (ref 100–108)
CO2 SERPL-SCNC: 22 MMOL/L (ref 21–32)
CREAT SERPL-MCNC: 1.12 MG/DL (ref 0.6–1.3)
EOSINOPHIL # BLD AUTO: 0.1 THOUSAND/ΜL (ref 0–0.61)
EOSINOPHIL NFR BLD AUTO: 2 % (ref 0–6)
ERYTHROCYTE [DISTWIDTH] IN BLOOD BY AUTOMATED COUNT: 22.2 % (ref 11.6–15.1)
GFR SERPL CREATININE-BSD FRML MDRD: 71 ML/MIN/1.73SQ M
GLUCOSE P FAST SERPL-MCNC: 101 MG/DL (ref 65–99)
HCT VFR BLD AUTO: 36.2 % (ref 36.5–49.3)
HGB BLD-MCNC: 10.6 G/DL (ref 12–17)
IMM GRANULOCYTES # BLD AUTO: 0.02 THOUSAND/UL (ref 0–0.2)
IMM GRANULOCYTES NFR BLD AUTO: 0 % (ref 0–2)
LYMPHOCYTES # BLD AUTO: 1.45 THOUSANDS/ΜL (ref 0.6–4.47)
LYMPHOCYTES NFR BLD AUTO: 23 % (ref 14–44)
MAGNESIUM SERPL-MCNC: 2.2 MG/DL (ref 1.6–2.6)
MCH RBC QN AUTO: 25.1 PG (ref 26.8–34.3)
MCHC RBC AUTO-ENTMCNC: 29.3 G/DL (ref 31.4–37.4)
MCV RBC AUTO: 86 FL (ref 82–98)
MONOCYTES # BLD AUTO: 0.53 THOUSAND/ΜL (ref 0.17–1.22)
MONOCYTES NFR BLD AUTO: 8 % (ref 4–12)
NEUTROPHILS # BLD AUTO: 4.11 THOUSANDS/ΜL (ref 1.85–7.62)
NEUTS SEG NFR BLD AUTO: 66 % (ref 43–75)
NRBC BLD AUTO-RTO: 0 /100 WBCS
PLATELET # BLD AUTO: 443 THOUSANDS/UL (ref 149–390)
PMV BLD AUTO: 11.8 FL (ref 8.9–12.7)
POTASSIUM SERPL-SCNC: 3.9 MMOL/L (ref 3.5–5.3)
RBC # BLD AUTO: 4.23 MILLION/UL (ref 3.88–5.62)
SODIUM SERPL-SCNC: 140 MMOL/L (ref 136–145)
WBC # BLD AUTO: 6.29 THOUSAND/UL (ref 4.31–10.16)

## 2020-09-11 PROCEDURE — 36415 COLL VENOUS BLD VENIPUNCTURE: CPT

## 2020-09-11 PROCEDURE — 80048 BASIC METABOLIC PNL TOTAL CA: CPT

## 2020-09-11 PROCEDURE — 85025 COMPLETE CBC W/AUTO DIFF WBC: CPT

## 2020-09-11 PROCEDURE — 83735 ASSAY OF MAGNESIUM: CPT

## 2020-09-15 ENCOUNTER — PATIENT OUTREACH (OUTPATIENT)
Dept: HEMATOLOGY ONCOLOGY | Facility: CLINIC | Age: 60
End: 2020-09-15

## 2020-09-15 ENCOUNTER — CONSULT (OUTPATIENT)
Dept: HEMATOLOGY ONCOLOGY | Facility: CLINIC | Age: 60
End: 2020-09-15
Payer: COMMERCIAL

## 2020-09-15 VITALS
DIASTOLIC BLOOD PRESSURE: 88 MMHG | SYSTOLIC BLOOD PRESSURE: 132 MMHG | HEART RATE: 107 BPM | HEIGHT: 69 IN | BODY MASS INDEX: 31.4 KG/M2 | TEMPERATURE: 97.9 F | RESPIRATION RATE: 16 BRPM | OXYGEN SATURATION: 98 % | WEIGHT: 212 LBS

## 2020-09-15 DIAGNOSIS — C18.9 MALIGNANT NEOPLASM OF COLON, UNSPECIFIED PART OF COLON (HCC): Primary | ICD-10-CM

## 2020-09-15 DIAGNOSIS — K63.89 MASS OF CECUM: Primary | ICD-10-CM

## 2020-09-15 PROCEDURE — 99205 OFFICE O/P NEW HI 60 MIN: CPT | Performed by: INTERNAL MEDICINE

## 2020-09-15 RX ORDER — PROCHLORPERAZINE MALEATE 10 MG
10 TABLET ORAL EVERY 6 HOURS PRN
Qty: 45 TABLET | Refills: 3 | Status: SHIPPED | OUTPATIENT
Start: 2020-09-15 | End: 2021-11-24

## 2020-09-15 NOTE — PROGRESS NOTES
Met with Chacha Chauhan at his appointment with Dr Tod Gowers today  I introduced myself and explained my role  I gave him a Chemotherapy and You book, an Eating Hints book, a pamphlet on oncology nutrition services, a Patient Legal Handbook, printed information about Oxaliplatin, Fluorouracil and Leucovorin from [employer], Implanted Venous Access Device information from [id number], Information about the Cancer Support Counselors, the contact information for [email or web address] and explained their service  He is going to read over the information I provided to him today  I gave him my contact information and let him know he can call me if he has any questions or concerns

## 2020-09-15 NOTE — PROGRESS NOTES
Oncology Outpatient Consult Note  Nai Brantley 61 y o  male MRN: @ Encounter: [id number]        Date:  9/15/2020        CC:  Stage III colon cancer      HPI:  Nai Brantley is seen for initial consultation 9/15/2020 regarding newly diagnosed stage III colon cancer with 1/18 lymph nodes positive for malignancy  This makes him a stage IIIA  As far symptoms are concerned he states he is recovering well  Really denies any complaints  Denies any nausea denies any vomiting denies any diarrhea  Overall otherwise is at baseline  Does feel better than when he had his malignancy  He was iron deficient at the time and received iron  The rest of his 14 point review of systems today was negative  Test Results:    Imaging: Xr Chest 1 View Portable    Result Date: 8/21/2020  Narrative: CHEST INDICATION:  Atrial fibrillation  COMPARISON:  7/26/2018 EXAM PERFORMED/VIEWS:  XR CHEST PORTABLE FINDINGS: Cardiomediastinal silhouette appears stable  The lungs are clear  No pneumothorax or pleural effusion  Osseous structures appear within normal limits for patient age  Impression: No acute cardiopulmonary disease  Workstation performed: [id number]     Ct Chest Abdomen Pelvis W Contrast    Result Date: 8/25/2020  Narrative: CT CHEST, ABDOMEN AND PELVIS WITH IV CONTRAST INDICATION:   colon mass  COMPARISON:  12/11/2026 TECHNIQUE: CT examination of the chest, abdomen and pelvis was performed  Axial, sagittal, and coronal 2D reformatted images were created from the source data and submitted for interpretation  Radiation dose length product (DLP) for this visit:  1477 39 mGy-cm   This examination, like all CT scans performed in the [facility], was performed utilizing techniques to minimize radiation dose exposure, including the use of iterative reconstruction and automated exposure control  IV Contrast:  100 mL of iohexol (OMNIPAQUE) Enteric Contrast: Enteric contrast was administered   FINDINGS: CHEST LUNGS:  There is a tiny left lower lobe nodule on series 3, image 80  There is no tracheal or endobronchial lesion  PLEURA:  Unremarkable  HEART/GREAT VESSELS:  There is a small pericardial effusion  MEDIASTINUM AND JESSICA:  Unremarkable  CHEST WALL AND LOWER NECK:   Unremarkable  ABDOMEN LIVER/BILIARY TREE:  Unremarkable  GALLBLADDER:  There is cholelithiasis  There are portions of the gallbladder wall which appear thickened with mucosal  There is no pericholecystic inflammatory change, however  This may be related to adenomyosis which was seen on the prior ultrasound from 2017  SPLEEN:  Unremarkable  PANCREAS:  Unremarkable  ADRENAL GLANDS:  Unremarkable  KIDNEYS/URETERS:  There is an exophytic right renal cyst  A too small to characterize hypodense lesion in the left kidney is also likely a small cyst  No hydronephrosis  STOMACH AND BOWEL:  There is circumferential thickening in the cecum just above the ileocecal valve correlating with the reported mass seen on colonoscopy and is suspicious for neoplasm  APPENDIX:  No findings to suggest appendicitis  ABDOMINOPELVIC CAVITY:  No ascites  No pneumoperitoneum  No lymphadenopathy  VESSELS:  Unremarkable for patient's age  PELVIS REPRODUCTIVE ORGANS:  Unremarkable for patient's age  URINARY BLADDER:  Unremarkable  ABDOMINAL WALL/INGUINAL REGIONS:  Unremarkable  OSSEOUS STRUCTURES:  No acute fracture or destructive osseous lesion  Impression: 1  Cecal mass likely representing primary neoplasm  2   No definite evidence of metastatic disease in the chest, abdomen, or pelvis  3   Tiny left lower lobe lung nodule is nonspecific and doubtful metastatic disease though a three-month follow-up chest CT is recommended to confirm stability  4   Cholelithiasis with mild gallbladder wall thickening though no pericholecystic inflammatory change  This likely represents adenomyosis as described on the prior ultrasound from 2017  5   Small pericardial effusion   The study was marked in EPIC for significant notification  Workstation performed: [id number]     Us Bedside Procedure    Result Date: 8/21/2020  Narrative: 1 2 840 [id number] 2 224 [id number]      Labs:   Lab Results   Component Value Date    WBC 6 29 09/11/2020    HGB 10 6 (L) 09/11/2020    HCT 36 2 (L) 09/11/2020    MCV 86 09/11/2020     (H) 09/11/2020     Lab Results   Component Value Date    K 3 9 09/11/2020     (H) 09/11/2020    CO2 22 09/11/2020    BUN 25 09/11/2020    CREATININE 1 12 09/11/2020    GLUCOSE 107 12/11/2017    GLUF 101 (H) 09/11/2020    CALCIUM 8 9 09/11/2020    AST 8 08/23/2020    ALT 22 08/23/2020    ALKPHOS 51 08/23/2020    EGFR 71 09/11/2020         Lab Results   Component Value Date    SPEP  12/12/2017     The serum total protein is within normal limits with hypoalbuminemia  The serum protein electrophoresis shows an increased alpha-1 region  The serum protein electrophoresis shows a decreased beta-2 region  The serum protein electrophoresis shows a faint possible band in the  gamma region  Immunofixation to be performed  Reviewed by: Bethel Suarez MD  ([id number])  **Electronic Signature**       Lab Results   Component Value Date    CEA 1 7 08/25/2020       Lab Results   Component Value Date    IRON 20 (L) 08/29/2020    TIBC 304 08/29/2020    FERRITIN 391 (H) 08/29/2020     ROS: As stated in history of present illness otherwise her 14 point review of systems today was negative          Active Problems:   Patient Active Problem List   Diagnosis    Acute heart failure (HCC)    Atrial fibrillation with rapid ventricular response (HCC)    Serum total bilirubin elevated    Elevated blood pressure reading    [facility]    [facility]stolic [facility]F [facility] [address] [facility]    Syncope and collapse    NSVT (nonsustained ventricular tachycardia) ([facility])    Microcytic anemia    [facility]    Gastrointestinal hemorrhage with melena    Coronary artery disease  GI bleeding    Mass of cecum    History of gout    S/P right hemicolectomy       Past Medical History:   Past Medical History:   Diagnosis Date    A-fib ([address] )     Coronary artery disease     Gout     Herpes zoster     last assessed 12/18/17    Hypertension     Shingles        Surgical History:   Past Surgical History:   Procedure Laterality Date    ELECTRICAL CARDIOVERSION  12/15/2017         HEMICOLOECTOMY W/ ANASTOMOSIS N/A 8/28/2020    Procedure: RIGHT HEMICOLECTOMY WITH ILIOCOLIC ANASTAMOSIS;  Surgeon: Allie Grissom MD;  Location: [facility];  Service: Colorectal       Family History:    Family History   Problem Relation Age of Onset    Coronary artery disease Mother     Alcohol abuse Father     Coronary artery disease Father        Cancer-related family history is not on file      Social History:   Social History     Socioeconomic History    Marital status: Single     Spouse name: Not on file    Number of children: Not on file    Years of education: Not on file    Highest education level: Not on file   Occupational History    Not on file   Social Needs    Financial resource strain: Not on file    Food insecurity     Worry: Not on file     Inability: Not on file    Transportation needs     Medical: Not on file     Non-medical: Not on file   Tobacco Use    Smoking status: Former Smoker     Last attempt to quit: 8/11/2017     Years since quitting: 3 0    Smokeless tobacco: Never Used   Substance and Sexual Activity    Alcohol use: Yes     Comment: Socially, former alcohol    Drug use: No    Sexual activity: Yes     Partners: Female   Lifestyle    Physical activity     Days per week: Not on file     Minutes per session: Not on file    Stress: Not on file   Relationships    Social connections     Talks on phone: Not on file     Gets together: Not on file     Attends Yazidism service: Not on file     Active member of club or organization: Not on file     Attends meetings of clubs or organizations: Not on file     Relationship status: Not on file    Intimate partner violence     Fear of current or ex partner: Not on file     Emotionally abused: Not on file     Physically abused: Not on file     Forced sexual activity: Not on file   Other Topics Concern    Not on file   Social History Narrative    Not on file       Current Medications:   Current Outpatient Medications   Medication Sig Dispense Refill    allopurinol (ZYLOPRIM) 100 mg tablet TAKE 1 TABLET (100 MG TOTAL) BY MOUTH DAILY 90 tablet 3    atorvastatin (LIPITOR) 40 mg tablet Take 1 tablet (40 mg total) by mouth daily with dinner 90 tablet 3    digoxin (LANOXIN) 0 125 mg tablet Take 1 tablet (125 mcg total) by mouth daily 30 tablet 3    ELIQUIS 5 MG TAKE 1 TABLET (5 MG TOTAL) BY MOUTH 2 (TWO) TIMES A DAY 60 tablet 10    ENTRESTO 24-26 MG TABS TAKE 1 TABLET BY MOUTH TWICE A DAY 60 tablet 11    furosemide (LASIX) 40 mg tablet TAKE 1 5 TABLETS (60 MG TOTAL) BY MOUTH DAILY 135 tablet 5    metoprolol succinate (TOPROL-XL) 100 mg 24 hr tablet TAKE 1 TABLET BY MOUTH TWICE A  tablet 3    spironolactone (ALDACTONE) 25 mg tablet TAKE 0 5 TABLETS (12 5 MG TOTAL) BY MOUTH DAILY 45 tablet 7     No current facility-administered medications for this visit  Allergies: No Known Allergies      Physical Exam:    Body surface area is 2 12 meters squared      Wt Readings from Last 3 Encounters:   09/15/20 96 2 kg (212 lb)   09/10/20 95 kg (209 lb 6 4 oz)   09/04/20 96 1 kg (211 lb 12 8 oz)        Temp Readings from Last 3 Encounters:   09/15/20 97 9 °F (36 6 °C) (Oral)   09/10/20 (!) 97 4 °F (36 3 °C)   09/04/20 (!) 96 8 °F (36 °C)        BP Readings from Last 3 Encounters:   09/15/20 132/88   09/10/20 90/68   09/04/20 118/70         Pulse Readings from Last 3 Encounters:   09/15/20 (!) 107   09/10/20 (!) 117   09/04/20 72       Physical Exam     Constitutional   General appearance: No acute distress, well appearing and well nourished  Eyes   Conjunctiva and lids: No swelling, erythema or discharge  Pupils and irises: Equal, round and reactive to light  Ears, Nose, Mouth, and Throat   External inspection of ears and nose: Normal     Nasal mucosa, septum, and turbinates: Normal without edema or erythema  Oropharynx: Normal with no erythema, edema, exudate or lesions  Pulmonary   Respiratory effort: No increased work of breathing or signs of respiratory distress  Auscultation of lungs: Clear to auscultation  Cardiovascular   Palpation of heart: Normal PMI, no thrills  Auscultation of heart: Normal rate and rhythm, normal S1 and S2, without murmurs  Examination of extremities for edema and/or varicosities: Normal     Carotid pulses: Normal     Abdomen   Abdomen: Non-tender, no masses  Liver and spleen: No hepatomegaly or splenomegaly  Lymphatic   Palpation of lymph nodes in neck: No lymphadenopathy  Musculoskeletal   Gait and station: Normal     Digits and nails: Normal without clubbing or cyanosis  Inspection/palpation of joints, bones, and muscles: Normal     Skin   Skin and subcutaneous tissue: Normal without rashes or lesions  Neurologic   Cranial nerves: Cranial nerves 2-12 intact  Sensation: No sensory loss  Psychiatric   Orientation to person, place, and time: Normal     Mood and affect: Normal           Assessment/ Plan:    The patient is a pleasant 70-year-old male who was referred to see us for newly diagnosed stage IIIA colon cancer  This was a T3 N1 malignancy  It measured 4 5 cm  Based on the most recent guidelines he would qualify for 3 months of adjuvant chemotherapy  My recommendation would be modified FOLFOX 6  I went over the risks benefits and alternatives of therapy  I explained the possible side effects to include but not limited to nausea, vomiting, mucositis, fatigue, weakness, low blood counts, risk of infection and even death  The patient agrees to proceed    We will need to have a port placed  We will have this done in get him started on treatment  I will see him back in a month  Since he is under the age of 72 we will hold off on giving him growth factor unless he develops neutropenia  He will sign a consent form today  Goals and Barriers:  Current Goal:  Prolong Survival from colon Cancer  Barriers: None  Patient's Capacity to Self Care:  Patient able to self care  Portions of the record may have been created with voice recognition software   Occasional wrong word or "sound a like" substitutions may have occurred due to the inherent limitations of voice recognition software   Read the chart carefully and recognize, using context, where substitutions have occurred

## 2020-09-15 NOTE — H&P (VIEW-ONLY)
Oncology Outpatient Consult Note  Juan Carlos Sifuentes 61 y o  male MRN: @ Encounter: [id number]        Date:  9/15/2020        CC:  Stage III colon cancer      HPI:  Juan Carlos Sifuentes is seen for initial consultation 9/15/2020 regarding newly diagnosed stage III colon cancer with 1/18 lymph nodes positive for malignancy  This makes him a stage IIIA  As far symptoms are concerned he states he is recovering well  Really denies any complaints  Denies any nausea denies any vomiting denies any diarrhea  Overall otherwise is at baseline  Does feel better than when he had his malignancy  He was iron deficient at the time and received iron  The rest of his 14 point review of systems today was negative  Test Results:    Imaging: Xr Chest 1 View Portable    Result Date: 8/21/2020  Narrative: CHEST INDICATION:  Atrial fibrillation  COMPARISON:  7/26/2018 EXAM PERFORMED/VIEWS:  XR CHEST PORTABLE FINDINGS: Cardiomediastinal silhouette appears stable  The lungs are clear  No pneumothorax or pleural effusion  Osseous structures appear within normal limits for patient age  Impression: No acute cardiopulmonary disease  Workstation performed: [id number]     Ct Chest Abdomen Pelvis W Contrast    Result Date: 8/25/2020  Narrative: CT CHEST, ABDOMEN AND PELVIS WITH IV CONTRAST INDICATION:   colon mass  COMPARISON:  12/11/2026 TECHNIQUE: CT examination of the chest, abdomen and pelvis was performed  Axial, sagittal, and coronal 2D reformatted images were created from the source data and submitted for interpretation  Radiation dose length product (DLP) for this visit:  1477 39 mGy-cm   This examination, like all CT scans performed in the [place], was performed utilizing techniques to minimize radiation dose exposure, including the use of iterative reconstruction and automated exposure control  IV Contrast:  100 mL of iohexol (OMNIPAQUE) Enteric Contrast: Enteric contrast was administered   FINDINGS: CHEST LUNGS:  There is a tiny left lower lobe nodule on series 3, image 80  There is no tracheal or endobronchial lesion  PLEURA:  Unremarkable  HEART/GREAT VESSELS:  There is a small pericardial effusion  MEDIASTINUM AND JESSICA:  Unremarkable  CHEST WALL AND LOWER NECK:   Unremarkable  ABDOMEN LIVER/BILIARY TREE:  Unremarkable  GALLBLADDER:  There is cholelithiasis  There are portions of the gallbladder wall which appear thickened with mucosal  There is no pericholecystic inflammatory change, however  This may be related to adenomyosis which was seen on the prior ultrasound from 2017  SPLEEN:  Unremarkable  PANCREAS:  Unremarkable  ADRENAL GLANDS:  Unremarkable  KIDNEYS/URETERS:  There is an exophytic right renal cyst  A too small to characterize hypodense lesion in the left kidney is also likely a small cyst  No hydronephrosis  STOMACH AND BOWEL:  There is circumferential thickening in the cecum just above the ileocecal valve correlating with the reported mass seen on colonoscopy and is suspicious for neoplasm  APPENDIX:  No findings to suggest appendicitis  ABDOMINOPELVIC CAVITY:  No ascites  No pneumoperitoneum  No lymphadenopathy  VESSELS:  Unremarkable for patient's age  PELVIS REPRODUCTIVE ORGANS:  Unremarkable for patient's age  URINARY BLADDER:  Unremarkable  ABDOMINAL WALL/INGUINAL REGIONS:  Unremarkable  OSSEOUS STRUCTURES:  No acute fracture or destructive osseous lesion  Impression: 1  Cecal mass likely representing primary neoplasm  2   No definite evidence of metastatic disease in the chest, abdomen, or pelvis  3   Tiny left lower lobe lung nodule is nonspecific and doubtful metastatic disease though a three-month follow-up chest CT is recommended to confirm stability  4   Cholelithiasis with mild gallbladder wall thickening though no pericholecystic inflammatory change  This likely represents adenomyosis as described on the prior ultrasound from 2017  5   Small pericardial effusion   The study was marked in EPIC for significant notification  Workstation performed: [id number]     Us Bedside Procedure    Result Date: 8/21/2020  Narrative: 1 2 [id number] 2 224 [id number]      Labs:   Lab Results   Component Value Date    WBC 6 29 09/11/2020    HGB 10 6 (L) 09/11/2020    HCT 36 2 (L) 09/11/2020    MCV 86 09/11/2020     (H) 09/11/2020     Lab Results   Component Value Date    K 3 9 09/11/2020     (H) 09/11/2020    CO2 22 09/11/2020    BUN 25 09/11/2020    CREATININE 1 12 09/11/2020    GLUCOSE 107 12/11/2017    GLUF 101 (H) 09/11/2020    CALCIUM 8 9 09/11/2020    AST 8 08/23/2020    ALT 22 08/23/2020    ALKPHOS 51 08/23/2020    EGFR 71 09/11/2020         Lab Results   Component Value Date    SPEP  12/12/2017     The serum total protein is within normal limits with hypoalbuminemia  The serum protein electrophoresis shows an increased alpha-1 region  The serum protein electrophoresis shows a decreased beta-2 region  The serum protein electrophoresis shows a faint possible band in the  gamma region  Immunofixation to be performed  Reviewed by: Cait Coto MD  ([id number])  **Electronic Signature**       Lab Results   Component Value Date    CEA 1 7 08/25/2020       Lab Results   Component Value Date    IRON 20 (L) 08/29/2020    TIBC 304 08/29/2020    FERRITIN 391 (H) 08/29/2020     ROS: As stated in history of present illness otherwise her 14 point review of systems today was negative          Active Problems:   Patient Active Problem List   Diagnosis    Acute heart failure (HCC)    Atrial fibrillation with rapid ventricular response (HCC)    Serum total bilirubin elevated    Elevated blood pressure reading    [facility]    Systolic [facility]F ([facility]    Syncope and collapse    NSVT [facility]nonsustained ventricular tachycardia) [facility])    Microcytic anemia    [facility]    Gastrointestinal hemorrhage with melena    Coronary artery disease  GI bleeding    Mass of cecum    History of gout    S/P right hemicolectomy       Past Medical History:   Past Medical History:   Diagnosis Date    A-fib ([address] )     Coronary artery disease     Gout     Herpes zoster     last assessed 12/18/17    Hypertension     Shingles        Surgical History:   Past Surgical History:   Procedure Laterality Date    ELECTRICAL CARDIOVERSION  12/15/2017         HEMICOLOECTOMY W/ ANASTOMOSIS N/A 8/28/2020    Procedure: RIGHT HEMICOLECTOMY WITH ILIOCOLIC ANASTAMOSIS;  Surgeon: Reniier Mcgee MD;  Location: [facility];  Service: Colorectal       Family History:    Family History   Problem Relation Age of Onset    Coronary artery disease Mother     Alcohol abuse Father     Coronary artery disease Father        Cancer-related family history is not on file      Social History:   Social History     Socioeconomic History    Marital status: Single     Spouse name: Not on file    Number of children: Not on file    Years of education: Not on file    Highest education level: Not on file   Occupational History    Not on file   Social Needs    Financial resource strain: Not on file    Food insecurity     Worry: Not on file     Inability: Not on file    Transportation needs     Medical: Not on file     Non-medical: Not on file   Tobacco Use    Smoking status: Former Smoker     Last attempt to quit: 8/11/2017     Years since quitting: 3 0    Smokeless tobacco: Never Used   Substance and Sexual Activity    Alcohol use: Yes     Comment: Socially, former alcohol    Drug use: No    Sexual activity: Yes     Partners: Female   Lifestyle    Physical activity     Days per week: Not on file     Minutes per session: Not on file    Stress: Not on file   Relationships    Social connections     Talks on phone: Not on file     Gets together: Not on file     Attends [employer] service: Not on file     Active member of club or organization: Not on file     Attends meetings of clubs or organizations: Not on file     Relationship status: Not on file    Intimate partner violence     Fear of current or ex partner: Not on file     Emotionally abused: Not on file     Physically abused: Not on file     Forced sexual activity: Not on file   Other Topics Concern    Not on file   Social History Narrative    Not on file       Current Medications:   Current Outpatient Medications   Medication Sig Dispense Refill    allopurinol (ZYLOPRIM) 100 mg tablet TAKE 1 TABLET (100 MG TOTAL) BY MOUTH DAILY 90 tablet 3    atorvastatin (LIPITOR) 40 mg tablet Take 1 tablet (40 mg total) by mouth daily with dinner 90 tablet 3    digoxin (LANOXIN) 0 125 mg tablet Take 1 tablet (125 mcg total) by mouth daily 30 tablet 3    ELIQUIS 5 MG TAKE 1 TABLET (5 MG TOTAL) BY MOUTH 2 (TWO) TIMES A DAY 60 tablet 10    ENTRESTO 24-26 MG TABS TAKE 1 TABLET BY MOUTH TWICE A DAY 60 tablet 11    furosemide (LASIX) 40 mg tablet TAKE 1 5 TABLETS (60 MG TOTAL) BY MOUTH DAILY 135 tablet 5    metoprolol succinate (TOPROL-XL) 100 mg 24 hr tablet TAKE 1 TABLET BY MOUTH TWICE A  tablet 3    spironolactone (ALDACTONE) 25 mg tablet TAKE 0 5 TABLETS (12 5 MG TOTAL) BY MOUTH DAILY 45 tablet 7     No current facility-administered medications for this visit  Allergies: No Known Allergies      Physical Exam:    Body surface area is 2 12 meters squared      Wt Readings from Last 3 Encounters:   09/15/20 96 2 kg (212 lb)   09/10/20 95 kg (209 lb 6 4 oz)   09/04/20 96 1 kg (211 lb 12 8 oz)        Temp Readings from Last 3 Encounters:   09/15/20 97 9 °F (36 6 °C) (Oral)   09/10/20 (!) 97 4 °F (36 3 °C)   09/04/20 (!) 96 8 °F (36 °C)        BP Readings from Last 3 Encounters:   09/15/20 132/88   09/10/20 90/68   09/04/20 118/70         Pulse Readings from Last 3 Encounters:   09/15/20 (!) 107   09/10/20 (!) 117   09/04/20 72       Physical Exam     Constitutional   General appearance: No acute distress, well appearing and well nourished  Eyes   Conjunctiva and lids: No swelling, erythema or discharge  Pupils and irises: Equal, round and reactive to light  Ears, Nose, Mouth, and Throat   External inspection of ears and nose: Normal     Nasal mucosa, septum, and turbinates: Normal without edema or erythema  Oropharynx: Normal with no erythema, edema, exudate or lesions  Pulmonary   Respiratory effort: No increased work of breathing or signs of respiratory distress  Auscultation of lungs: Clear to auscultation  Cardiovascular   Palpation of heart: Normal PMI, no thrills  Auscultation of heart: Normal rate and rhythm, normal S1 and S2, without murmurs  Examination of extremities for edema and/or varicosities: Normal     Carotid pulses: Normal     Abdomen   Abdomen: Non-tender, no masses  Liver and spleen: No hepatomegaly or splenomegaly  Lymphatic   Palpation of lymph nodes in neck: No lymphadenopathy  Musculoskeletal   Gait and station: Normal     Digits and nails: Normal without clubbing or cyanosis  Inspection/palpation of joints, bones, and muscles: Normal     Skin   Skin and subcutaneous tissue: Normal without rashes or lesions  Neurologic   Cranial nerves: Cranial nerves 2-12 intact  Sensation: No sensory loss  Psychiatric   Orientation to person, place, and time: Normal     Mood and affect: Normal           Assessment/ Plan:    The patient is a pleasant 61-year-old male who was referred to see us for newly diagnosed stage IIIA colon cancer  This was a T3 N1 malignancy  It measured 4 5 cm  Based on the most recent guidelines he would qualify for 3 months of adjuvant chemotherapy  My recommendation would be modified FOLFOX 6  I went over the risks benefits and alternatives of therapy  I explained the possible side effects to include but not limited to nausea, vomiting, mucositis, fatigue, weakness, low blood counts, risk of infection and even death  The patient agrees to proceed    We will need to have a port placed  We will have this done in get him started on treatment  I will see him back in a month  Since he is under the age of 72 we will hold off on giving him growth factor unless he develops neutropenia  He will sign a consent form today  Goals and Barriers:  Current Goal:  Prolong Survival from colon Cancer  Barriers: None  Patient's Capacity to Self Care:  Patient able to self care  Portions of the record may have been created with voice recognition software   Occasional wrong word or "sound a like" substitutions may have occurred due to the inherent limitations of voice recognition software   Read the chart carefully and recognize, using context, where substitutions have occurred

## 2020-09-16 ENCOUNTER — TELEPHONE (OUTPATIENT)
Dept: RADIOLOGY | Facility: HOSPITAL | Age: 60
End: 2020-09-16

## 2020-09-18 ENCOUNTER — TELEPHONE (OUTPATIENT)
Dept: INPATIENT UNIT | Facility: HOSPITAL | Age: 60
End: 2020-09-18

## 2020-09-21 ENCOUNTER — HOSPITAL ENCOUNTER (OUTPATIENT)
Dept: RADIOLOGY | Facility: HOSPITAL | Age: 60
Discharge: HOME | End: 2020-09-21
Attending: RADIOLOGY | Admitting: RADIOLOGY
Payer: COMMERCIAL

## 2020-09-21 VITALS
WEIGHT: 211 LBS | SYSTOLIC BLOOD PRESSURE: 130 MMHG | OXYGEN SATURATION: 96 % | DIASTOLIC BLOOD PRESSURE: 78 MMHG | HEART RATE: 85 BPM | BODY MASS INDEX: 31.25 KG/M2 | TEMPERATURE: 98.1 F | HEIGHT: 69 IN | RESPIRATION RATE: 18 BRPM

## 2020-09-21 DIAGNOSIS — K63.89 MASS OF CECUM: ICD-10-CM

## 2020-09-21 PROCEDURE — 99152 MOD SED SAME PHYS/QHP 5/>YRS: CPT

## 2020-09-21 PROCEDURE — 76937 US GUIDE VASCULAR ACCESS: CPT | Performed by: RADIOLOGY

## 2020-09-21 PROCEDURE — 36561 INSERT TUNNELED CV CATH: CPT

## 2020-09-21 PROCEDURE — 99153 MOD SED SAME PHYS/QHP EA: CPT

## 2020-09-21 PROCEDURE — 99152 MOD SED SAME PHYS/QHP 5/>YRS: CPT | Performed by: RADIOLOGY

## 2020-09-21 PROCEDURE — 77001 FLUOROGUIDE FOR VEIN DEVICE: CPT

## 2020-09-21 PROCEDURE — C1788 PORT, INDWELLING, IMP: HCPCS

## 2020-09-21 PROCEDURE — 76937 US GUIDE VASCULAR ACCESS: CPT

## 2020-09-21 PROCEDURE — 36561 INSERT TUNNELED CV CATH: CPT | Performed by: RADIOLOGY

## 2020-09-21 PROCEDURE — 77001 FLUOROGUIDE FOR VEIN DEVICE: CPT | Performed by: RADIOLOGY

## 2020-09-21 RX ORDER — FENTANYL CITRATE 50 UG/ML
INJECTION, SOLUTION INTRAMUSCULAR; INTRAVENOUS CODE/TRAUMA/SEDATION MEDICATION
Status: COMPLETED | OUTPATIENT
Start: 2020-09-21 | End: 2020-09-21

## 2020-09-21 RX ORDER — SODIUM CHLORIDE 9 MG/ML
20 INJECTION, SOLUTION INTRAVENOUS ONCE AS NEEDED
Status: CANCELLED | OUTPATIENT
Start: 2020-09-29

## 2020-09-21 RX ORDER — CEFAZOLIN SODIUM 1 G/50ML
SOLUTION INTRAVENOUS
Status: COMPLETED | OUTPATIENT
Start: 2020-09-21 | End: 2020-09-21

## 2020-09-21 RX ORDER — CEFAZOLIN SODIUM 1 G/50ML
1000 SOLUTION INTRAVENOUS ONCE
Status: DISCONTINUED | OUTPATIENT
Start: 2020-09-21 | End: 2020-09-21 | Stop reason: HOSPADM

## 2020-09-21 RX ORDER — MIDAZOLAM HYDROCHLORIDE 2 MG/2ML
INJECTION, SOLUTION INTRAMUSCULAR; INTRAVENOUS CODE/TRAUMA/SEDATION MEDICATION
Status: COMPLETED | OUTPATIENT
Start: 2020-09-21 | End: 2020-09-21

## 2020-09-21 RX ORDER — DEXTROSE MONOHYDRATE 50 MG/ML
20 INJECTION, SOLUTION INTRAVENOUS ONCE
Status: CANCELLED | OUTPATIENT
Start: 2020-09-29

## 2020-09-21 RX ORDER — FLUOROURACIL 50 MG/ML
400 INJECTION, SOLUTION INTRAVENOUS ONCE
Status: CANCELLED | OUTPATIENT
Start: 2020-09-29

## 2020-09-21 RX ADMIN — MIDAZOLAM 1 MG: 1 INJECTION INTRAMUSCULAR; INTRAVENOUS at 09:56

## 2020-09-21 RX ADMIN — CEFAZOLIN SODIUM 2000 MG: 1 SOLUTION INTRAVENOUS at 09:40

## 2020-09-21 RX ADMIN — FENTANYL CITRATE 50 MCG: 50 INJECTION, SOLUTION INTRAMUSCULAR; INTRAVENOUS at 10:15

## 2020-09-21 RX ADMIN — FENTANYL CITRATE 50 MCG: 50 INJECTION, SOLUTION INTRAMUSCULAR; INTRAVENOUS at 10:08

## 2020-09-21 RX ADMIN — FENTANYL CITRATE 50 MCG: 50 INJECTION, SOLUTION INTRAMUSCULAR; INTRAVENOUS at 09:56

## 2020-09-21 RX ADMIN — MIDAZOLAM 1 MG: 1 INJECTION INTRAMUSCULAR; INTRAVENOUS at 10:08

## 2020-09-21 RX ADMIN — MIDAZOLAM 1 MG: 1 INJECTION INTRAMUSCULAR; INTRAVENOUS at 10:12

## 2020-09-21 RX ADMIN — MIDAZOLAM 0.5 MG: 1 INJECTION INTRAMUSCULAR; INTRAVENOUS at 10:22

## 2020-09-21 NOTE — DISCHARGE INSTRUCTIONS
Implanted Venous Access Port     WHAT YOU NEED TO KNOW:   An implanted venous access port is a device used to give treatments and take blood  It may also be called a central venous access device (CVAD)  The port is a small container that is placed under your skin, usually in your upper chest  The port is attached to a catheter that enters a large vein  DISCHARGE INSTRUCTIONS:   Resume your normal diet  Small sips of flat soda will help with mild nausea  Prevent an infection:   · Wash your hands often  Use soap and water  Clean your hands before and after you care for your port  Remind everyone who cares for your port to wash their hands  · Check your skin for infection every day  Look for redness, swelling, or fluid oozing from the port site  Care for your port:   1  You may shower beginning 48 hours after procedure  2  Change dressing if it becomes wet  3  Remove dressing after 24 hours  Leave glue in place  4  It is normal for some bruising to occur  5  Use Tylenol for pain  6  Limit use of arm on the side that your port was placed  Lift nothing heavier than 5 pounds for 1 week, and then gradually increase activity as tolerated  7  DO NOT apply ointment, lotion or cream to port site until incision is healed  Allow glue to fall off  DO NOT attempt to peel glue from skin even it it begins to flake  8, After the port incision is healed you may swim, bathe  Notify the Interventional Radiologist if you have any of the followin  Fever above 101 F    2  Increased redness or swelling after 1st day  3  Increased pain after 1st day  4  Any sign of infection (drainage from port site, skin separation, hot to touch)  5  Persistent nausea or vomiting  Contact Interventional Radiology at [phone] [facility] PATIENTS: Contact Interventional Radiology at [phone]) ([address]: Contact Interventional Radiology at [phone])

## 2020-09-21 NOTE — BRIEF OP NOTE (RAD/CATH)
INTERVENTIONAL RADIOLOGY PROCEDURE NOTE    Date: 9/21/2020    Procedure: IR PORT PLACEMENT    Preoperative diagnosis:   1  Mass of cecum         Postoperative diagnosis: Same  Surgeon: Hamilton Farr MD     Assistant: None  No qualified resident was available  Blood loss: Minimal    Specimens: None     Findings: Right chest port placement    Complications: None immediate      Anesthesia: conscious sedation

## 2020-09-21 NOTE — INTERVAL H&P NOTE
Update: (This section must be completed if the H&P was completed greater than 24 hrs to procedure or admission)    H&P reviewed  After examining the patient, I find no changed to the H&P since it had been written  Patient re-evaluated   Accept as history and physical     Herminio Moore MD/September 21, 2020/10:38 AM

## 2020-09-22 ENCOUNTER — TELEPHONE (OUTPATIENT)
Dept: NON INVASIVE DIAGNOSTICS | Facility: HOSPITAL | Age: 60
End: 2020-09-22

## 2020-09-22 ENCOUNTER — TELEPHONE (OUTPATIENT)
Dept: CARDIOLOGY CLINIC | Facility: CLINIC | Age: 60
End: 2020-09-22

## 2020-09-22 DIAGNOSIS — K63.89 MASS OF CECUM: Primary | ICD-10-CM

## 2020-09-22 NOTE — TELEPHONE ENCOUNTER
He does not need auth for his Cardioversion 48123 on 9/24/20 at [facility] at [place]  Ref# [id number]

## 2020-09-22 NOTE — TELEPHONE ENCOUNTER
PT is scheduled on 09/24/20 for a CV with Dr Stephanie Bill at [facility]  PT aware of instructions  Can I have preauth please

## 2020-09-24 ENCOUNTER — ANESTHESIA (OUTPATIENT)
Dept: NON INVASIVE DIAGNOSTICS | Facility: HOSPITAL | Age: 60
End: 2020-09-24
Payer: COMMERCIAL

## 2020-09-24 ENCOUNTER — ANESTHESIA EVENT (OUTPATIENT)
Dept: NON INVASIVE DIAGNOSTICS | Facility: HOSPITAL | Age: 60
End: 2020-09-24
Payer: COMMERCIAL

## 2020-09-24 ENCOUNTER — HOSPITAL ENCOUNTER (OUTPATIENT)
Dept: NON INVASIVE DIAGNOSTICS | Facility: HOSPITAL | Age: 60
Discharge: HOME | End: 2020-09-24
Attending: INTERNAL MEDICINE | Admitting: INTERNAL MEDICINE
Payer: COMMERCIAL

## 2020-09-24 VITALS — HEART RATE: 70 BPM

## 2020-09-24 VITALS
TEMPERATURE: 97.9 F | SYSTOLIC BLOOD PRESSURE: 112 MMHG | DIASTOLIC BLOOD PRESSURE: 71 MMHG | OXYGEN SATURATION: 97 % | HEART RATE: 72 BPM | RESPIRATION RATE: 18 BRPM

## 2020-09-24 DIAGNOSIS — I48.91 ATRIAL FIBRILLATION, UNSPECIFIED TYPE (HCC): ICD-10-CM

## 2020-09-24 LAB
ANION GAP SERPL CALCULATED.3IONS-SCNC: 6 MMOL/L (ref 4–13)
ATRIAL RATE: 144 BPM
BUN SERPL-MCNC: 37 MG/DL (ref 5–25)
CALCIUM SERPL-MCNC: 8.9 MG/DL (ref 8.3–10.1)
CHLORIDE SERPL-SCNC: 113 MMOL/L (ref 100–108)
CO2 SERPL-SCNC: 18 MMOL/L (ref 21–32)
CREAT SERPL-MCNC: 1.63 MG/DL (ref 0.6–1.3)
GFR SERPL CREATININE-BSD FRML MDRD: 45 ML/MIN/1.73SQ M
GLUCOSE P FAST SERPL-MCNC: 109 MG/DL (ref 65–99)
GLUCOSE SERPL-MCNC: 109 MG/DL (ref 65–140)
MAGNESIUM SERPL-MCNC: 2.3 MG/DL (ref 1.6–2.6)
POTASSIUM SERPL-SCNC: 4.5 MMOL/L (ref 3.5–5.3)
QRS AXIS: 40 DEGREES
QRSD INTERVAL: 82 MS
QT INTERVAL: 330 MS
QTC INTERVAL: 454 MS
SODIUM SERPL-SCNC: 137 MMOL/L (ref 136–145)
T WAVE AXIS: 96 DEGREES
VENTRICULAR RATE: 114 BPM

## 2020-09-24 PROCEDURE — 80048 BASIC METABOLIC PNL TOTAL CA: CPT | Performed by: NURSE PRACTITIONER

## 2020-09-24 PROCEDURE — 92960 CARDIOVERSION ELECTRIC EXT: CPT | Performed by: INTERNAL MEDICINE

## 2020-09-24 PROCEDURE — 93005 ELECTROCARDIOGRAM TRACING: CPT

## 2020-09-24 PROCEDURE — 83735 ASSAY OF MAGNESIUM: CPT | Performed by: NURSE PRACTITIONER

## 2020-09-24 PROCEDURE — 92960 CARDIOVERSION ELECTRIC EXT: CPT

## 2020-09-24 PROCEDURE — 93010 ELECTROCARDIOGRAM REPORT: CPT | Performed by: INTERNAL MEDICINE

## 2020-09-24 RX ORDER — SODIUM CHLORIDE 9 MG/ML
50 INJECTION, SOLUTION INTRAVENOUS CONTINUOUS
Status: DISCONTINUED | OUTPATIENT
Start: 2020-09-24 | End: 2020-09-24 | Stop reason: HOSPADM

## 2020-09-24 RX ORDER — FENTANYL CITRATE 50 UG/ML
INJECTION, SOLUTION INTRAMUSCULAR; INTRAVENOUS AS NEEDED
Status: DISCONTINUED | OUTPATIENT
Start: 2020-09-24 | End: 2020-09-24

## 2020-09-24 RX ORDER — PROPOFOL 10 MG/ML
INJECTION, EMULSION INTRAVENOUS AS NEEDED
Status: DISCONTINUED | OUTPATIENT
Start: 2020-09-24 | End: 2020-09-24

## 2020-09-24 RX ORDER — METOPROLOL TARTRATE 5 MG/5ML
INJECTION INTRAVENOUS AS NEEDED
Status: DISCONTINUED | OUTPATIENT
Start: 2020-09-24 | End: 2020-09-24

## 2020-09-24 RX ADMIN — FENTANYL CITRATE 50 MCG: 50 INJECTION, SOLUTION INTRAMUSCULAR; INTRAVENOUS at 10:48

## 2020-09-24 RX ADMIN — METOROPROLOL TARTRATE 5 MG: 5 INJECTION, SOLUTION INTRAVENOUS at 10:54

## 2020-09-24 RX ADMIN — SODIUM CHLORIDE 50 ML/HR: 0.9 INJECTION, SOLUTION INTRAVENOUS at 09:01

## 2020-09-24 RX ADMIN — PROPOFOL 30 MG: 10 INJECTION, EMULSION INTRAVENOUS at 10:49

## 2020-09-24 RX ADMIN — PROPOFOL 20 MG: 10 INJECTION, EMULSION INTRAVENOUS at 10:50

## 2020-09-24 RX ADMIN — PROPOFOL 50 MG: 10 INJECTION, EMULSION INTRAVENOUS at 10:48

## 2020-09-24 NOTE — ANESTHESIA POSTPROCEDURE EVALUATION
Post-Op Assessment Note    CV Status:  Stable  Pain Score: 0    Pain management: adequate     Mental Status:  Alert and awake   Hydration Status:  Euvolemic   PONV Controlled:  Controlled   Airway Patency:  Patent      Post Op Vitals Reviewed: Yes      Staff: CRNA         No complications documented      BP   90/58   Temp      Pulse 87   Resp 16   SpO2 95

## 2020-09-24 NOTE — ANESTHESIA PREPROCEDURE EVALUATION
Procedure:  CARDIOVERSION (NON INVASIVE ONLY)    Relevant Problems   CARDIO   (+) Atrial fibrillation with rapid ventricular response (HCC)   (+) Coronary artery disease   (+) PAF (paroxysmal atrial fibrillation) (HCC)   (+) Systolic CHF (HCC)      GI/HEPATIC   (+) GI bleeding      /RENAL   (+) SCARLETT (acute kidney injury) (HCC)      HEMATOLOGY   (+) Microcytic anemia      NEURO/PSYCH   (+) History of gout   61 M PMH Afib, CHF (EF recovered to 50%), HTN, GIB, CAD, NSVT, and stage 3A colon cancer  Preops for cardioversion  JOSE LUIS 8/2020:  LEFT VENTRICLE: Size was normal  Systolic function was at the lower limits of normal  Ejection fraction was estimated to be 50 %  There were no regional wall motion abnormalities  Wall thickness was normal      RIGHT VENTRICLE: The size was normal  Systolic function was normal  Wall thickness was normal      LEFT ATRIUM: Size was normal  No thrombus was identified  APPENDAGE: No thrombus was identified  There was mild spontaneous echo contrast ("smoke") in the appendage      ATRIAL SEPTUM: No defect or patent foramen ovale was identified      RIGHT ATRIUM: Size was normal  No thrombus was identified      MITRAL VALVE: Valve structure was normal  There was normal leaflet separation  There was no echocardiographic evidence of vegetation  DOPPLER: There was trace regurgitation      AORTIC VALVE: The valve was trileaflet  Leaflets exhibited normal thickness and normal cuspal separation  There was no echocardiographic evidence of vegetation  DOPPLER: There was no regurgitation      TRICUSPID VALVE: The valve structure was normal  There was normal leaflet separation  There was no echocardiographic evidence of vegetation  DOPPLER: There was no regurgitation      PULMONIC VALVE: Leaflets exhibited normal thickness, no calcification, and normal cuspal separation  There was no echocardiographic evidence of vegetation   DOPPLER: There was trace regurgitation      PERICARDIUM: There was no pericardial effusion  The pericardium was normal in appearance  3/02/18 [facility] CAD, 80% proximal stenosis in ramus artery  No stent placed  Physical Exam    Airway    Mallampati score: II  TM Distance: >3 FB  Neck ROM: full     Dental       Cardiovascular  Rhythm: irregular,     Pulmonary  Rhonchi,     Other Findings        Anesthesia Plan  ASA Score- 3     Anesthesia Type- IV sedation with anesthesia with ASA Monitors  Additional Monitors:   Airway Plan:           Plan Factors-Exercise tolerance (METS): >4 METS  Chart reviewed  EKG reviewed  Existing labs reviewed  Patient summary reviewed  Patient is not a current smoker  Patient did not smoke on day of surgery  Obstructive sleep apnea risk education given perioperatively  Induction- intravenous  Postoperative Plan-     Informed Consent- Anesthetic plan and risks discussed with patient  I personally reviewed this patient with the CRNA  Discussed and agreed on the Anesthesia Plan with the MARISOL Medeiros

## 2020-09-24 NOTE — INTERVAL H&P NOTE
Update:     H&P reviewed  After examining the patient, I find no changed to the H&P since it had been written  Risk and benefits explained ot patient  Patient endorsing 100% compliance with Anticoagulation, including taking Eliquis dose this morning  All questions answered  Patient wishes to proceed with cardioversion as planned  BP 91/66 (BP Location: Right arm)   Pulse 67   Temp 97 9 °F (36 6 °C) (Oral)   Resp 17   SpO2 97%     Patient re-evaluated   Accept as history and physical     Sandeep Mcwilliams MD/September 24, 2020/10:16 AM

## 2020-09-24 NOTE — DISCHARGE INSTRUCTIONS
Cardioversion   WHAT YOU SHOULD KNOW:   Cardioversion is a procedure to correct arrhythmias, which is when your heart beats too fast or irregularly  Arrhythmias may prevent your body from getting the blood and oxygen it needs  Cardioversion delivers a shock of electricity to your heart to help it return to its normal rhythm  INSTRUCTIONS:   Medicines:   · Anticoagulants    are a type of blood thinner medicine that helps prevent clots  Clots can cause strokes, heart attacks, and death  These medicines may cause you to bleed or bruise more easily  ¨ Watch for bleeding from your gums or nose  Watch for blood in your urine and bowel movements  Use a soft washcloth and a soft toothbrush  If you shave, use an electric razor  Avoid activities that can cause bruising or bleeding  ¨ Tell your healthcare provider about all medicines you take because many medicines cannot be used with anticoagulants  Do not start or stop any medicines unless your healthcare provider tells you to  Tell your dentist and other healthcare providers that you take anticoagulants  Wear a bracelet or necklace that says you take this medicine  ¨ You will need regular blood tests so your healthcare provider can decide how much medicine you need  Take anticoagulants exactly as directed  Tell your healthcare provider right away if you forget to take the medicine, or if you take too much  ¨ If you take warfarin, some foods can change how your blood clots  Do not make major changes to your diet while you take warfarin  Warfarin works best when you eat about the same amount of vitamin K every day  Vitamin K is found in green leafy vegetables, broccoli, grapes, and other foods  Ask for more information about what to eat when you take warfarin  · Heart medicine: This medicine is given to strengthen or regulate your heartbeat  · Take your medicine as directed    Call your healthcare provider if you think your medicine is not helping or if you have side effects  Tell him if you are allergic to any medicine  Keep a list of the medicines, vitamins, and herbs you take  Include the amounts, and when and why you take them  Bring the list or the pill bottles to follow-up visits  Carry your medicine list with you in case of an emergency  Follow up with your cardiologist as directed:  Write down your questions so you remember to ask them during your visits  Contact your cardiologist if:   · You have a fever  · You have new or worsening weakness or tiredness  · You have questions or concerns about your condition or care  Return to the emergency department if:   · You feel like your heart is fluttering or jumping in your chest     · You feel lightheaded or you have fainted  · You have chest pain when you take a deep breath or cough  You may cough up blood  · You have discomfort in your chest that feels like squeezing, pressure, fullness, or pain  · You have pain or discomfort in your back, neck, jaw, stomach, or arm  · You have weakness or numbness in part of your body  · You have sudden trouble breathing  · You become confused or have difficulty speaking  · You have dizziness, a severe headache, or vision loss  © 2014 [address] [employer] is for End User's use only and may not be sold, redistributed or otherwise used for commercial purposes  All illustrations and images included in CareNotes® are the copyrighted property of [employer]  or [employer]ui [employer]umacher  The above information is an  only  It is not intended as medical advice for individual conditions or treatments  Talk to your doctor, nurse or pharmacist before following any medical regimen to see if it is safe and effective for you  Moderate Sedation   WHAT YOU NEED TO KNOW:   Moderate sedation, or conscious sedation, is medicine used during procedures to help you feel relaxed and calm   You will be awake and able to follow directions without anxiety or pain  You will remember little to none of the procedure  You may feel tired, weak, or unsteady on your feet after you get sedation  You may also have trouble concentrating or short-term memory loss  These symptoms should go away in 24 hours or less  DISCHARGE INSTRUCTIONS:   Call 911 or have someone else call for any of the following:   · You have sudden trouble breathing  · You cannot be woken  Seek care immediately if:   · You have a severe headache or dizziness  · Your heart is beating faster than usual   Contact your healthcare provider if:   · You have a fever  · You have nausea or are vomiting for more than 8 hours after the procedure  · Your skin is itchy, swollen, or you have a rash  · You have questions or concerns about your condition or care  Self-care:   · Have someone stay with you for 24 hours  This person can drive you to errands and help you do things around the house  This person can also watch for problems  · Rest and do quiet activities for 24 hours  Do not exercise, ride a bike, or play sports  Stand up slowly to prevent dizziness and falls  Take short walks around the house with another person  Slowly return to your usual activities the next day  · Do not drive or use dangerous machines or tools for 24 hours  You may injure yourself or others  Examples include a lawnmower, saw, or drill  Do not return to work for 24 hours if you use dangerous machines or tools for work  · Do not make important decisions for 24 hours  For example, do not sign important papers or invest money  · Drink liquids as directed  Liquids help flush the sedation medicine out of your body  Ask how much liquid to drink each day and which liquids are best for you  · Eat small, frequent meals to prevent nausea and vomiting  Start with clear liquids such as juice or broth  If you do not vomit after clear liquids, you can eat your usual foods  · Do not drink alcohol or take medicines that make you drowsy  This includes medicines that help you sleep and anxiety medicines  Ask your healthcare provider if it is safe for you to take pain medicine  Follow up with your healthcare provider as directed:  Write down your questions so you remember to ask them during your visits  © 2017 [address] [employer] Information is for End User's use only and may not be sold, redistributed or otherwise used for commercial purposes  All illustrations and images included in CareNotes® are the copyrighted property of [email or web address]  or [employer]ui Schumacher  The above information is an  only  It is not intended as medical advice for individual conditions or treatments  Talk to your doctor, nurse or pharmacist before following any medical regimen to see if it is safe and effective for you

## 2020-09-25 ENCOUNTER — PATIENT OUTREACH (OUTPATIENT)
Dept: HEMATOLOGY ONCOLOGY | Facility: CLINIC | Age: 60
End: 2020-09-25

## 2020-09-25 ENCOUNTER — TELEPHONE (OUTPATIENT)
Dept: NON INVASIVE DIAGNOSTICS | Facility: HOSPITAL | Age: 60
End: 2020-09-25

## 2020-09-25 ENCOUNTER — APPOINTMENT (OUTPATIENT)
Dept: LAB | Age: 60
End: 2020-09-25
Payer: COMMERCIAL

## 2020-09-25 DIAGNOSIS — K63.89 MASS OF CECUM: ICD-10-CM

## 2020-09-25 LAB
ALBUMIN SERPL BCP-MCNC: 4.2 G/DL (ref 3.5–5)
ALP SERPL-CCNC: 79 U/L (ref 46–116)
ALT SERPL W P-5'-P-CCNC: 25 U/L (ref 12–78)
ANION GAP SERPL CALCULATED.3IONS-SCNC: 6 MMOL/L (ref 4–13)
AST SERPL W P-5'-P-CCNC: 10 U/L (ref 5–45)
ATRIAL RATE: 72 BPM
BASOPHILS # BLD AUTO: 0.06 THOUSANDS/ΜL (ref 0–0.1)
BASOPHILS NFR BLD AUTO: 1 % (ref 0–1)
BILIRUB SERPL-MCNC: 1.06 MG/DL (ref 0.2–1)
BUN SERPL-MCNC: 33 MG/DL (ref 5–25)
CALCIUM SERPL-MCNC: 9.6 MG/DL (ref 8.3–10.1)
CHLORIDE SERPL-SCNC: 109 MMOL/L (ref 100–108)
CO2 SERPL-SCNC: 24 MMOL/L (ref 21–32)
CREAT SERPL-MCNC: 1.58 MG/DL (ref 0.6–1.3)
EOSINOPHIL # BLD AUTO: 0.1 THOUSAND/ΜL (ref 0–0.61)
EOSINOPHIL NFR BLD AUTO: 2 % (ref 0–6)
ERYTHROCYTE [DISTWIDTH] IN BLOOD BY AUTOMATED COUNT: 22 % (ref 11.6–15.1)
GFR SERPL CREATININE-BSD FRML MDRD: 47 ML/MIN/1.73SQ M
GLUCOSE SERPL-MCNC: 68 MG/DL (ref 65–140)
HCT VFR BLD AUTO: 40.9 % (ref 36.5–49.3)
HGB BLD-MCNC: 12.3 G/DL (ref 12–17)
IMM GRANULOCYTES # BLD AUTO: 0.02 THOUSAND/UL (ref 0–0.2)
IMM GRANULOCYTES NFR BLD AUTO: 0 % (ref 0–2)
LYMPHOCYTES # BLD AUTO: 1.49 THOUSANDS/ΜL (ref 0.6–4.47)
LYMPHOCYTES NFR BLD AUTO: 22 % (ref 14–44)
MCH RBC QN AUTO: 25.7 PG (ref 26.8–34.3)
MCHC RBC AUTO-ENTMCNC: 30.1 G/DL (ref 31.4–37.4)
MCV RBC AUTO: 86 FL (ref 82–98)
MONOCYTES # BLD AUTO: 0.63 THOUSAND/ΜL (ref 0.17–1.22)
MONOCYTES NFR BLD AUTO: 9 % (ref 4–12)
NEUTROPHILS # BLD AUTO: 4.52 THOUSANDS/ΜL (ref 1.85–7.62)
NEUTS SEG NFR BLD AUTO: 66 % (ref 43–75)
NRBC BLD AUTO-RTO: 0 /100 WBCS
P AXIS: 43 DEGREES
PLATELET # BLD AUTO: 256 THOUSANDS/UL (ref 149–390)
PMV BLD AUTO: 12.4 FL (ref 8.9–12.7)
POTASSIUM SERPL-SCNC: 4.6 MMOL/L (ref 3.5–5.3)
PR INTERVAL: 162 MS
PROT SERPL-MCNC: 8.2 G/DL (ref 6.4–8.2)
QRS AXIS: 43 DEGREES
QRSD INTERVAL: 88 MS
QT INTERVAL: 388 MS
QTC INTERVAL: 424 MS
RBC # BLD AUTO: 4.78 MILLION/UL (ref 3.88–5.62)
SODIUM SERPL-SCNC: 139 MMOL/L (ref 136–145)
T WAVE AXIS: 46 DEGREES
VENTRICULAR RATE: 72 BPM
WBC # BLD AUTO: 6.82 THOUSAND/UL (ref 4.31–10.16)

## 2020-09-25 PROCEDURE — 36415 COLL VENOUS BLD VENIPUNCTURE: CPT

## 2020-09-25 PROCEDURE — 93010 ELECTROCARDIOGRAM REPORT: CPT | Performed by: INTERNAL MEDICINE

## 2020-09-25 PROCEDURE — 80053 COMPREHEN METABOLIC PANEL: CPT

## 2020-09-25 PROCEDURE — 85025 COMPLETE CBC W/AUTO DIFF WBC: CPT

## 2020-09-25 NOTE — PROGRESS NOTES
Phone outreach to Raymond Villavicencio today  He will be starting chemotherapy 9/29/20  I reminded him to get his blood tests done 2-3 days before he starts each chemotherapy treatment  He said he is going today to have them completed  He said he picked up the Compazine prescription from the pharmacy  He said he has the contact information for the [facility] and will call them if he has side effects or concerns  He said he has my contact information and I let him know he can call if he has any questions or concerns

## 2020-09-29 ENCOUNTER — HOSPITAL ENCOUNTER (OUTPATIENT)
Dept: INFUSION CENTER | Facility: HOSPITAL | Age: 60
Discharge: HOME | End: 2020-09-29
Attending: INTERNAL MEDICINE
Payer: COMMERCIAL

## 2020-09-29 VITALS
HEART RATE: 74 BPM | HEIGHT: 69 IN | BODY MASS INDEX: 31.02 KG/M2 | RESPIRATION RATE: 20 BRPM | DIASTOLIC BLOOD PRESSURE: 79 MMHG | TEMPERATURE: 97.3 F | WEIGHT: 209.44 LBS | OXYGEN SATURATION: 100 % | SYSTOLIC BLOOD PRESSURE: 127 MMHG

## 2020-09-29 DIAGNOSIS — K63.89 MASS OF CECUM: Primary | ICD-10-CM

## 2020-09-29 PROCEDURE — 96368 THER/DIAG CONCURRENT INF: CPT

## 2020-09-29 PROCEDURE — G0498 CHEMO EXTEND IV INFUS W/PUMP: HCPCS

## 2020-09-29 PROCEDURE — 96367 TX/PROPH/DG ADDL SEQ IV INF: CPT

## 2020-09-29 PROCEDURE — 96411 CHEMO IV PUSH ADDL DRUG: CPT

## 2020-09-29 PROCEDURE — 96413 CHEMO IV INFUSION 1 HR: CPT

## 2020-09-29 PROCEDURE — 96415 CHEMO IV INFUSION ADDL HR: CPT

## 2020-09-29 RX ORDER — DEXTROSE MONOHYDRATE 50 MG/ML
20 INJECTION, SOLUTION INTRAVENOUS ONCE
Status: COMPLETED | OUTPATIENT
Start: 2020-09-29 | End: 2020-09-29

## 2020-09-29 RX ORDER — FLUOROURACIL 50 MG/ML
400 INJECTION, SOLUTION INTRAVENOUS ONCE
Status: COMPLETED | OUTPATIENT
Start: 2020-09-29 | End: 2020-09-29

## 2020-09-29 RX ORDER — SODIUM CHLORIDE 9 MG/ML
20 INJECTION, SOLUTION INTRAVENOUS ONCE AS NEEDED
Status: DISCONTINUED | OUTPATIENT
Start: 2020-09-29 | End: 2020-10-02 | Stop reason: HOSPADM

## 2020-09-29 RX ADMIN — LEUCOVORIN CALCIUM 848 MG: 200 INJECTION, POWDER, LYOPHILIZED, FOR SUSPENSION INTRAMUSCULAR; INTRAVENOUS at 13:23

## 2020-09-29 RX ADMIN — IRON SUCROSE 200 MG: 20 INJECTION, SOLUTION INTRAVENOUS at 11:00

## 2020-09-29 RX ADMIN — FLUOROURACIL 850 MG: 50 INJECTION, SOLUTION INTRAVENOUS at 15:15

## 2020-09-29 RX ADMIN — SODIUM CHLORIDE 20 ML/HR: 0.9 INJECTION, SOLUTION INTRAVENOUS at 10:55

## 2020-09-29 RX ADMIN — OXALIPLATIN 180.2 MG: 5 INJECTION, SOLUTION INTRAVENOUS at 13:23

## 2020-09-29 RX ADMIN — DEXTROSE 20 ML/HR: 5 SOLUTION INTRAVENOUS at 13:17

## 2020-09-29 RX ADMIN — ONDANSETRON: 2 SOLUTION INTRAMUSCULAR; INTRAVENOUS at 12:28

## 2020-09-30 PROBLEM — C18.2 CANCER OF ASCENDING COLON (HCC): Status: ACTIVE | Noted: 2020-09-30

## 2020-10-01 ENCOUNTER — HOSPITAL ENCOUNTER (OUTPATIENT)
Dept: INFUSION CENTER | Facility: HOSPITAL | Age: 60
Discharge: HOME | End: 2020-10-01
Attending: INTERNAL MEDICINE

## 2020-10-01 VITALS — TEMPERATURE: 97.1 F

## 2020-10-01 DIAGNOSIS — K63.89 MASS OF CECUM: Primary | ICD-10-CM

## 2020-10-05 DIAGNOSIS — K63.89 MASS OF CECUM: Primary | ICD-10-CM

## 2020-10-05 RX ORDER — SODIUM CHLORIDE 9 MG/ML
20 INJECTION, SOLUTION INTRAVENOUS ONCE AS NEEDED
Status: CANCELLED | OUTPATIENT
Start: 2020-10-13

## 2020-10-05 RX ORDER — DEXTROSE MONOHYDRATE 50 MG/ML
20 INJECTION, SOLUTION INTRAVENOUS ONCE
Status: CANCELLED | OUTPATIENT
Start: 2020-10-13

## 2020-10-05 RX ORDER — FLUOROURACIL 50 MG/ML
400 INJECTION, SOLUTION INTRAVENOUS ONCE
Status: CANCELLED | OUTPATIENT
Start: 2020-10-13

## 2020-10-06 ENCOUNTER — TELEPHONE (OUTPATIENT)
Dept: HEMATOLOGY ONCOLOGY | Facility: CLINIC | Age: 60
End: 2020-10-06

## 2020-10-06 ENCOUNTER — TELEPHONE (OUTPATIENT)
Dept: UROLOGY | Facility: AMBULATORY SURGERY CENTER | Age: 60
End: 2020-10-06

## 2020-10-07 ENCOUNTER — OFFICE VISIT (OUTPATIENT)
Dept: CARDIOLOGY CLINIC | Facility: CLINIC | Age: 60
End: 2020-10-07
Payer: COMMERCIAL

## 2020-10-07 VITALS
SYSTOLIC BLOOD PRESSURE: 112 MMHG | TEMPERATURE: 97.5 F | DIASTOLIC BLOOD PRESSURE: 80 MMHG | WEIGHT: 214.1 LBS | HEART RATE: 115 BPM | HEIGHT: 69 IN | BODY MASS INDEX: 31.71 KG/M2

## 2020-10-07 DIAGNOSIS — I25.10 CORONARY ARTERY DISEASE INVOLVING NATIVE HEART WITHOUT ANGINA PECTORIS, UNSPECIFIED VESSEL OR LESION TYPE: Primary | ICD-10-CM

## 2020-10-07 DIAGNOSIS — I50.22 CHRONIC SYSTOLIC CONGESTIVE HEART FAILURE (HCC): ICD-10-CM

## 2020-10-07 DIAGNOSIS — I50.22 CHRONIC SYSTOLIC HEART FAILURE (HCC): ICD-10-CM

## 2020-10-07 DIAGNOSIS — I47.2 NSVT (NONSUSTAINED VENTRICULAR TACHYCARDIA) (HCC): ICD-10-CM

## 2020-10-07 DIAGNOSIS — I48.0 PAF (PAROXYSMAL ATRIAL FIBRILLATION) (HCC): ICD-10-CM

## 2020-10-07 PROBLEM — I48.91 ATRIAL FIBRILLATION WITH RAPID VENTRICULAR RESPONSE (HCC): Status: RESOLVED | Noted: 2017-12-11 | Resolved: 2020-10-07

## 2020-10-07 PROBLEM — I50.9 ACUTE HEART FAILURE (HCC): Status: RESOLVED | Noted: 2017-12-11 | Resolved: 2020-10-07

## 2020-10-07 PROCEDURE — 93000 ELECTROCARDIOGRAM COMPLETE: CPT | Performed by: INTERNAL MEDICINE

## 2020-10-07 PROCEDURE — 99215 OFFICE O/P EST HI 40 MIN: CPT | Performed by: INTERNAL MEDICINE

## 2020-10-07 RX ORDER — DILTIAZEM HYDROCHLORIDE 120 MG/1
120 CAPSULE, COATED, EXTENDED RELEASE ORAL DAILY
Qty: 30 CAPSULE | Refills: 6 | Status: SHIPPED | OUTPATIENT
Start: 2020-10-07 | End: 2021-04-01

## 2020-10-07 RX ORDER — SPIRONOLACTONE 25 MG/1
12.5 TABLET ORAL DAILY
Qty: 45 TABLET | Refills: 3 | Status: SHIPPED | OUTPATIENT
Start: 2020-10-07 | End: 2021-10-04

## 2020-10-08 ENCOUNTER — OFFICE VISIT (OUTPATIENT)
Dept: HEMATOLOGY ONCOLOGY | Facility: CLINIC | Age: 60
End: 2020-10-08
Payer: COMMERCIAL

## 2020-10-08 ENCOUNTER — PATIENT OUTREACH (OUTPATIENT)
Dept: HEMATOLOGY ONCOLOGY | Facility: CLINIC | Age: 60
End: 2020-10-08

## 2020-10-08 ENCOUNTER — OFFICE VISIT (OUTPATIENT)
Dept: UROLOGY | Facility: AMBULATORY SURGERY CENTER | Age: 60
End: 2020-10-08
Payer: COMMERCIAL

## 2020-10-08 VITALS
WEIGHT: 213 LBS | RESPIRATION RATE: 16 BRPM | BODY MASS INDEX: 31.55 KG/M2 | HEART RATE: 78 BPM | OXYGEN SATURATION: 98 % | HEIGHT: 69 IN | DIASTOLIC BLOOD PRESSURE: 78 MMHG | SYSTOLIC BLOOD PRESSURE: 102 MMHG | TEMPERATURE: 97.3 F

## 2020-10-08 VITALS
BODY MASS INDEX: 31.7 KG/M2 | SYSTOLIC BLOOD PRESSURE: 112 MMHG | DIASTOLIC BLOOD PRESSURE: 80 MMHG | WEIGHT: 214 LBS | HEART RATE: 82 BPM | HEIGHT: 69 IN | TEMPERATURE: 96.8 F

## 2020-10-08 DIAGNOSIS — C18.2 CANCER OF ASCENDING COLON (HCC): Primary | ICD-10-CM

## 2020-10-08 DIAGNOSIS — R33.9 RETENTION OF URINE: Primary | ICD-10-CM

## 2020-10-08 DIAGNOSIS — Z12.5 SCREENING FOR PROSTATE CANCER: ICD-10-CM

## 2020-10-08 PROCEDURE — 99214 OFFICE O/P EST MOD 30 MIN: CPT | Performed by: NURSE PRACTITIONER

## 2020-10-08 PROCEDURE — 51798 US URINE CAPACITY MEASURE: CPT | Performed by: NURSE PRACTITIONER

## 2020-10-09 ENCOUNTER — LAB (OUTPATIENT)
Dept: LAB | Age: 60
End: 2020-10-09
Payer: COMMERCIAL

## 2020-10-09 DIAGNOSIS — Z12.5 SCREENING FOR PROSTATE CANCER: ICD-10-CM

## 2020-10-09 DIAGNOSIS — K63.89 MASS OF CECUM: ICD-10-CM

## 2020-10-09 LAB
ALBUMIN SERPL BCP-MCNC: 3.7 G/DL (ref 3.5–5)
ALP SERPL-CCNC: 70 U/L (ref 46–116)
ALT SERPL W P-5'-P-CCNC: 37 U/L (ref 12–78)
ANION GAP SERPL CALCULATED.3IONS-SCNC: 6 MMOL/L (ref 4–13)
AST SERPL W P-5'-P-CCNC: 26 U/L (ref 5–45)
BASOPHILS # BLD AUTO: 0.02 THOUSANDS/ΜL (ref 0–0.1)
BASOPHILS NFR BLD AUTO: 1 % (ref 0–1)
BILIRUB SERPL-MCNC: 0.72 MG/DL (ref 0.2–1)
BUN SERPL-MCNC: 16 MG/DL (ref 5–25)
CALCIUM SERPL-MCNC: 9.2 MG/DL (ref 8.3–10.1)
CHLORIDE SERPL-SCNC: 111 MMOL/L (ref 100–108)
CO2 SERPL-SCNC: 25 MMOL/L (ref 21–32)
CREAT SERPL-MCNC: 1.13 MG/DL (ref 0.6–1.3)
DIGOXIN SERPL-MCNC: 0.8 NG/ML (ref 0.8–2)
EOSINOPHIL # BLD AUTO: 0.1 THOUSAND/ΜL (ref 0–0.61)
EOSINOPHIL NFR BLD AUTO: 2 % (ref 0–6)
ERYTHROCYTE [DISTWIDTH] IN BLOOD BY AUTOMATED COUNT: 21.7 % (ref 11.6–15.1)
GFR SERPL CREATININE-BSD FRML MDRD: 70 ML/MIN/1.73SQ M
GLUCOSE P FAST SERPL-MCNC: 97 MG/DL (ref 65–99)
HCT VFR BLD AUTO: 38.7 % (ref 36.5–49.3)
HGB BLD-MCNC: 12 G/DL (ref 12–17)
IMM GRANULOCYTES # BLD AUTO: 0 THOUSAND/UL (ref 0–0.2)
IMM GRANULOCYTES NFR BLD AUTO: 0 % (ref 0–2)
LYMPHOCYTES # BLD AUTO: 1.32 THOUSANDS/ΜL (ref 0.6–4.47)
LYMPHOCYTES NFR BLD AUTO: 32 % (ref 14–44)
MCH RBC QN AUTO: 27 PG (ref 26.8–34.3)
MCHC RBC AUTO-ENTMCNC: 31 G/DL (ref 31.4–37.4)
MCV RBC AUTO: 87 FL (ref 82–98)
MONOCYTES # BLD AUTO: 0.42 THOUSAND/ΜL (ref 0.17–1.22)
MONOCYTES NFR BLD AUTO: 10 % (ref 4–12)
NEUTROPHILS # BLD AUTO: 2.33 THOUSANDS/ΜL (ref 1.85–7.62)
NEUTS SEG NFR BLD AUTO: 55 % (ref 43–75)
NRBC BLD AUTO-RTO: 0 /100 WBCS
PLATELET # BLD AUTO: 232 THOUSANDS/UL (ref 149–390)
PMV BLD AUTO: 11.9 FL (ref 8.9–12.7)
POTASSIUM SERPL-SCNC: 4.6 MMOL/L (ref 3.5–5.3)
PROT SERPL-MCNC: 7.6 G/DL (ref 6.4–8.2)
PSA SERPL-MCNC: 0.9 NG/ML (ref 0–4)
RBC # BLD AUTO: 4.44 MILLION/UL (ref 3.88–5.62)
SODIUM SERPL-SCNC: 142 MMOL/L (ref 136–145)
WBC # BLD AUTO: 4.19 THOUSAND/UL (ref 4.31–10.16)

## 2020-10-09 PROCEDURE — 85025 COMPLETE CBC W/AUTO DIFF WBC: CPT

## 2020-10-09 PROCEDURE — 80053 COMPREHEN METABOLIC PANEL: CPT

## 2020-10-09 PROCEDURE — G0103 PSA SCREENING: HCPCS

## 2020-10-09 PROCEDURE — 36415 COLL VENOUS BLD VENIPUNCTURE: CPT

## 2020-10-09 PROCEDURE — 80162 ASSAY OF DIGOXIN TOTAL: CPT | Performed by: INTERNAL MEDICINE

## 2020-10-12 ENCOUNTER — TELEPHONE (OUTPATIENT)
Dept: HEMATOLOGY ONCOLOGY | Facility: CLINIC | Age: 60
End: 2020-10-12

## 2020-10-12 ENCOUNTER — TRANSCRIBE ORDERS (OUTPATIENT)
Dept: ADMINISTRATIVE | Age: 60
End: 2020-10-12

## 2020-10-12 ENCOUNTER — OFFICE VISIT (OUTPATIENT)
Dept: LAB | Age: 60
End: 2020-10-12
Payer: COMMERCIAL

## 2020-10-12 ENCOUNTER — LAB (OUTPATIENT)
Dept: LAB | Age: 60
End: 2020-10-12
Payer: COMMERCIAL

## 2020-10-12 DIAGNOSIS — I50.22 CHRONIC SYSTOLIC CONGESTIVE HEART FAILURE (HCC): ICD-10-CM

## 2020-10-12 DIAGNOSIS — I50.22 CHRONIC SYSTOLIC CONGESTIVE HEART FAILURE (HCC): Primary | ICD-10-CM

## 2020-10-12 DIAGNOSIS — K63.89 MASS OF CECUM: ICD-10-CM

## 2020-10-12 DIAGNOSIS — I48.91 ATRIAL FIBRILLATION, UNSPECIFIED TYPE (HCC): ICD-10-CM

## 2020-10-12 LAB
ATRIAL RATE: 115 BPM
DIGOXIN SERPL-MCNC: 0.4 NG/ML (ref 0.8–2)
QRS AXIS: 57 DEGREES
QRSD INTERVAL: 96 MS
QT INTERVAL: 324 MS
QTC INTERVAL: 413 MS
T WAVE AXIS: 24 DEGREES
VENTRICULAR RATE: 98 BPM

## 2020-10-12 PROCEDURE — 80162 ASSAY OF DIGOXIN TOTAL: CPT

## 2020-10-12 PROCEDURE — 93010 ELECTROCARDIOGRAM REPORT: CPT | Performed by: INTERNAL MEDICINE

## 2020-10-12 PROCEDURE — 93005 ELECTROCARDIOGRAM TRACING: CPT

## 2020-10-12 PROCEDURE — 36415 COLL VENOUS BLD VENIPUNCTURE: CPT

## 2020-10-13 ENCOUNTER — HOSPITAL ENCOUNTER (OUTPATIENT)
Dept: INFUSION CENTER | Facility: HOSPITAL | Age: 60
Discharge: HOME | End: 2020-10-13
Attending: INTERNAL MEDICINE
Payer: COMMERCIAL

## 2020-10-13 VITALS
SYSTOLIC BLOOD PRESSURE: 118 MMHG | HEIGHT: 69 IN | DIASTOLIC BLOOD PRESSURE: 72 MMHG | BODY MASS INDEX: 31.8 KG/M2 | HEART RATE: 76 BPM | RESPIRATION RATE: 18 BRPM | TEMPERATURE: 97.1 F | WEIGHT: 214.73 LBS

## 2020-10-13 DIAGNOSIS — K63.89 MASS OF CECUM: Primary | ICD-10-CM

## 2020-10-13 PROCEDURE — 96367 TX/PROPH/DG ADDL SEQ IV INF: CPT

## 2020-10-13 PROCEDURE — 96368 THER/DIAG CONCURRENT INF: CPT

## 2020-10-13 PROCEDURE — 96415 CHEMO IV INFUSION ADDL HR: CPT

## 2020-10-13 PROCEDURE — 96411 CHEMO IV PUSH ADDL DRUG: CPT

## 2020-10-13 PROCEDURE — 96413 CHEMO IV INFUSION 1 HR: CPT

## 2020-10-13 PROCEDURE — G0498 CHEMO EXTEND IV INFUS W/PUMP: HCPCS

## 2020-10-13 RX ORDER — FLUOROURACIL 50 MG/ML
400 INJECTION, SOLUTION INTRAVENOUS ONCE
Status: COMPLETED | OUTPATIENT
Start: 2020-10-13 | End: 2020-10-13

## 2020-10-13 RX ORDER — DEXTROSE MONOHYDRATE 50 MG/ML
20 INJECTION, SOLUTION INTRAVENOUS ONCE
Status: COMPLETED | OUTPATIENT
Start: 2020-10-13 | End: 2020-10-13

## 2020-10-13 RX ORDER — SODIUM CHLORIDE 9 MG/ML
20 INJECTION, SOLUTION INTRAVENOUS ONCE AS NEEDED
Status: DISCONTINUED | OUTPATIENT
Start: 2020-10-13 | End: 2020-10-16 | Stop reason: HOSPADM

## 2020-10-13 RX ADMIN — OXALIPLATIN 180.2 MG: 5 INJECTION, SOLUTION INTRAVENOUS at 11:04

## 2020-10-13 RX ADMIN — IRON SUCROSE 200 MG: 20 INJECTION, SOLUTION INTRAVENOUS at 09:20

## 2020-10-13 RX ADMIN — ONDANSETRON: 2 SOLUTION INTRAMUSCULAR; INTRAVENOUS at 10:24

## 2020-10-13 RX ADMIN — DEXTROSE 20 ML/HR: 5 SOLUTION INTRAVENOUS at 11:02

## 2020-10-13 RX ADMIN — LEUCOVORIN CALCIUM 848 MG: 200 INJECTION, POWDER, LYOPHILIZED, FOR SUSPENSION INTRAMUSCULAR; INTRAVENOUS at 11:04

## 2020-10-13 RX ADMIN — FLUOROURACIL 850 MG: 50 INJECTION, SOLUTION INTRAVENOUS at 13:20

## 2020-10-15 ENCOUNTER — HOSPITAL ENCOUNTER (OUTPATIENT)
Dept: INFUSION CENTER | Facility: HOSPITAL | Age: 60
Discharge: HOME | End: 2020-10-15
Attending: INTERNAL MEDICINE

## 2020-10-15 VITALS — TEMPERATURE: 97 F

## 2020-10-15 DIAGNOSIS — K63.89 MASS OF CECUM: Primary | ICD-10-CM

## 2020-10-20 ENCOUNTER — TELEPHONE (OUTPATIENT)
Dept: CARDIOLOGY CLINIC | Facility: CLINIC | Age: 60
End: 2020-10-20

## 2020-10-20 ENCOUNTER — OFFICE VISIT (OUTPATIENT)
Dept: CARDIOLOGY CLINIC | Facility: CLINIC | Age: 60
End: 2020-10-20
Payer: COMMERCIAL

## 2020-10-20 VITALS
DIASTOLIC BLOOD PRESSURE: 80 MMHG | BODY MASS INDEX: 31.84 KG/M2 | WEIGHT: 215 LBS | SYSTOLIC BLOOD PRESSURE: 128 MMHG | HEART RATE: 83 BPM | HEIGHT: 69 IN

## 2020-10-20 DIAGNOSIS — I48.0 PAF (PAROXYSMAL ATRIAL FIBRILLATION) (HCC): Primary | ICD-10-CM

## 2020-10-20 DIAGNOSIS — K63.89 MASS OF CECUM: Primary | ICD-10-CM

## 2020-10-20 PROCEDURE — 93000 ELECTROCARDIOGRAM COMPLETE: CPT | Performed by: INTERNAL MEDICINE

## 2020-10-20 PROCEDURE — 99205 OFFICE O/P NEW HI 60 MIN: CPT | Performed by: INTERNAL MEDICINE

## 2020-10-20 RX ORDER — FLUOROURACIL 50 MG/ML
400 INJECTION, SOLUTION INTRAVENOUS ONCE
Status: CANCELLED | OUTPATIENT
Start: 2020-10-27

## 2020-10-20 RX ORDER — DEXTROSE MONOHYDRATE 50 MG/ML
20 INJECTION, SOLUTION INTRAVENOUS ONCE
Status: CANCELLED | OUTPATIENT
Start: 2020-10-27

## 2020-10-20 RX ORDER — SODIUM CHLORIDE 9 MG/ML
20 INJECTION, SOLUTION INTRAVENOUS ONCE AS NEEDED
Status: CANCELLED | OUTPATIENT
Start: 2020-10-27

## 2020-10-21 ENCOUNTER — CLINICAL SUPPORT (OUTPATIENT)
Dept: CARDIOLOGY CLINIC | Facility: CLINIC | Age: 60
End: 2020-10-21
Payer: COMMERCIAL

## 2020-10-21 DIAGNOSIS — I48.0 PAROXYSMAL ATRIAL FIBRILLATION (HCC): ICD-10-CM

## 2020-10-21 PROCEDURE — 0296T PR EXT ECG > 48HR TO 21 DAY RCRD W/CONECT INTL RCRD: CPT | Performed by: INTERNAL MEDICINE

## 2020-10-23 ENCOUNTER — LAB (OUTPATIENT)
Dept: LAB | Age: 60
End: 2020-10-23
Payer: COMMERCIAL

## 2020-10-23 LAB
ALBUMIN SERPL BCP-MCNC: 3.7 G/DL (ref 3.5–5)
ALP SERPL-CCNC: 75 U/L (ref 46–116)
ALT SERPL W P-5'-P-CCNC: 27 U/L (ref 12–78)
ANION GAP SERPL CALCULATED.3IONS-SCNC: 3 MMOL/L (ref 4–13)
AST SERPL W P-5'-P-CCNC: 12 U/L (ref 5–45)
BASOPHILS # BLD AUTO: 0.02 THOUSANDS/ΜL (ref 0–0.1)
BASOPHILS NFR BLD AUTO: 1 % (ref 0–1)
BILIRUB SERPL-MCNC: 0.82 MG/DL (ref 0.2–1)
BUN SERPL-MCNC: 18 MG/DL (ref 5–25)
CALCIUM SERPL-MCNC: 8.8 MG/DL (ref 8.3–10.1)
CHLORIDE SERPL-SCNC: 107 MMOL/L (ref 100–108)
CO2 SERPL-SCNC: 28 MMOL/L (ref 21–32)
CREAT SERPL-MCNC: 1.05 MG/DL (ref 0.6–1.3)
EOSINOPHIL # BLD AUTO: 0.06 THOUSAND/ΜL (ref 0–0.61)
EOSINOPHIL NFR BLD AUTO: 1 % (ref 0–6)
ERYTHROCYTE [DISTWIDTH] IN BLOOD BY AUTOMATED COUNT: 22.5 % (ref 11.6–15.1)
GFR SERPL CREATININE-BSD FRML MDRD: 77 ML/MIN/1.73SQ M
GLUCOSE P FAST SERPL-MCNC: 106 MG/DL (ref 65–99)
HCT VFR BLD AUTO: 39.1 % (ref 36.5–49.3)
HGB BLD-MCNC: 12.3 G/DL (ref 12–17)
IMM GRANULOCYTES # BLD AUTO: 0 THOUSAND/UL (ref 0–0.2)
IMM GRANULOCYTES NFR BLD AUTO: 0 % (ref 0–2)
LYMPHOCYTES # BLD AUTO: 1.36 THOUSANDS/ΜL (ref 0.6–4.47)
LYMPHOCYTES NFR BLD AUTO: 33 % (ref 14–44)
MCH RBC QN AUTO: 27.8 PG (ref 26.8–34.3)
MCHC RBC AUTO-ENTMCNC: 31.5 G/DL (ref 31.4–37.4)
MCV RBC AUTO: 88 FL (ref 82–98)
MONOCYTES # BLD AUTO: 0.56 THOUSAND/ΜL (ref 0.17–1.22)
MONOCYTES NFR BLD AUTO: 14 % (ref 4–12)
NEUTROPHILS # BLD AUTO: 2.16 THOUSANDS/ΜL (ref 1.85–7.62)
NEUTS SEG NFR BLD AUTO: 51 % (ref 43–75)
NRBC BLD AUTO-RTO: 0 /100 WBCS
PLATELET # BLD AUTO: 259 THOUSANDS/UL (ref 149–390)
PMV BLD AUTO: 10.9 FL (ref 8.9–12.7)
POTASSIUM SERPL-SCNC: 3.9 MMOL/L (ref 3.5–5.3)
PROT SERPL-MCNC: 7.4 G/DL (ref 6.4–8.2)
RBC # BLD AUTO: 4.43 MILLION/UL (ref 3.88–5.62)
SODIUM SERPL-SCNC: 138 MMOL/L (ref 136–145)
WBC # BLD AUTO: 4.16 THOUSAND/UL (ref 4.31–10.16)

## 2020-10-23 PROCEDURE — 36415 COLL VENOUS BLD VENIPUNCTURE: CPT

## 2020-10-23 PROCEDURE — 80053 COMPREHEN METABOLIC PANEL: CPT

## 2020-10-23 PROCEDURE — 85025 COMPLETE CBC W/AUTO DIFF WBC: CPT

## 2020-10-27 ENCOUNTER — HOSPITAL ENCOUNTER (OUTPATIENT)
Dept: INFUSION CENTER | Facility: HOSPITAL | Age: 60
Discharge: HOME | End: 2020-10-27
Attending: INTERNAL MEDICINE
Payer: COMMERCIAL

## 2020-10-27 VITALS
BODY MASS INDEX: 32.23 KG/M2 | WEIGHT: 217.59 LBS | RESPIRATION RATE: 18 BRPM | HEIGHT: 69 IN | DIASTOLIC BLOOD PRESSURE: 85 MMHG | HEART RATE: 92 BPM | TEMPERATURE: 97.9 F | SYSTOLIC BLOOD PRESSURE: 150 MMHG

## 2020-10-27 DIAGNOSIS — K63.89 MASS OF CECUM: Primary | ICD-10-CM

## 2020-10-27 PROCEDURE — 96368 THER/DIAG CONCURRENT INF: CPT

## 2020-10-27 PROCEDURE — 96411 CHEMO IV PUSH ADDL DRUG: CPT

## 2020-10-27 PROCEDURE — 96413 CHEMO IV INFUSION 1 HR: CPT

## 2020-10-27 PROCEDURE — 96367 TX/PROPH/DG ADDL SEQ IV INF: CPT

## 2020-10-27 PROCEDURE — 96415 CHEMO IV INFUSION ADDL HR: CPT

## 2020-10-27 PROCEDURE — G0498 CHEMO EXTEND IV INFUS W/PUMP: HCPCS

## 2020-10-27 RX ORDER — FLUOROURACIL 50 MG/ML
400 INJECTION, SOLUTION INTRAVENOUS ONCE
Status: COMPLETED | OUTPATIENT
Start: 2020-10-27 | End: 2020-10-27

## 2020-10-27 RX ORDER — SODIUM CHLORIDE 9 MG/ML
20 INJECTION, SOLUTION INTRAVENOUS ONCE AS NEEDED
Status: DISCONTINUED | OUTPATIENT
Start: 2020-10-27 | End: 2020-10-30 | Stop reason: HOSPADM

## 2020-10-27 RX ORDER — DEXTROSE MONOHYDRATE 50 MG/ML
20 INJECTION, SOLUTION INTRAVENOUS ONCE
Status: COMPLETED | OUTPATIENT
Start: 2020-10-27 | End: 2020-10-27

## 2020-10-27 RX ADMIN — FLUOROURACIL 850 MG: 50 INJECTION, SOLUTION INTRAVENOUS at 12:49

## 2020-10-27 RX ADMIN — DEXAMETHASONE SODIUM PHOSPHATE: 10 INJECTION, SOLUTION INTRAMUSCULAR; INTRAVENOUS at 10:11

## 2020-10-27 RX ADMIN — DEXTROSE 20 ML/HR: 5 SOLUTION INTRAVENOUS at 10:43

## 2020-10-27 RX ADMIN — IRON SUCROSE 200 MG: 20 INJECTION, SOLUTION INTRAVENOUS at 09:02

## 2020-10-27 RX ADMIN — OXALIPLATIN 180.2 MG: 5 INJECTION, SOLUTION INTRAVENOUS at 10:43

## 2020-10-27 RX ADMIN — LEUCOVORIN CALCIUM 848 MG: 200 INJECTION, POWDER, LYOPHILIZED, FOR SUSPENSION INTRAMUSCULAR; INTRAVENOUS at 10:43

## 2020-10-29 ENCOUNTER — HOSPITAL ENCOUNTER (OUTPATIENT)
Dept: INFUSION CENTER | Facility: HOSPITAL | Age: 60
Discharge: HOME | End: 2020-10-29
Attending: INTERNAL MEDICINE

## 2020-10-29 DIAGNOSIS — K63.89 MASS OF CECUM: Primary | ICD-10-CM

## 2020-11-03 DIAGNOSIS — K63.89 MASS OF CECUM: Primary | ICD-10-CM

## 2020-11-03 RX ORDER — FLUOROURACIL 50 MG/ML
400 INJECTION, SOLUTION INTRAVENOUS ONCE
Status: CANCELLED | OUTPATIENT
Start: 2020-11-10

## 2020-11-03 RX ORDER — DEXTROSE MONOHYDRATE 50 MG/ML
20 INJECTION, SOLUTION INTRAVENOUS ONCE
Status: CANCELLED | OUTPATIENT
Start: 2020-11-10

## 2020-11-03 RX ORDER — SODIUM CHLORIDE 9 MG/ML
20 INJECTION, SOLUTION INTRAVENOUS ONCE AS NEEDED
Status: CANCELLED | OUTPATIENT
Start: 2020-11-10

## 2020-11-05 ENCOUNTER — OFFICE VISIT (OUTPATIENT)
Dept: HEMATOLOGY ONCOLOGY | Facility: CLINIC | Age: 60
End: 2020-11-05
Payer: COMMERCIAL

## 2020-11-05 VITALS
HEIGHT: 69 IN | RESPIRATION RATE: 16 BRPM | BODY MASS INDEX: 31.84 KG/M2 | WEIGHT: 215 LBS | SYSTOLIC BLOOD PRESSURE: 124 MMHG | HEART RATE: 76 BPM | OXYGEN SATURATION: 98 % | TEMPERATURE: 97.5 F | DIASTOLIC BLOOD PRESSURE: 88 MMHG

## 2020-11-05 DIAGNOSIS — C18.2 CANCER OF ASCENDING COLON (HCC): Primary | ICD-10-CM

## 2020-11-05 PROCEDURE — 99214 OFFICE O/P EST MOD 30 MIN: CPT | Performed by: NURSE PRACTITIONER

## 2020-11-06 ENCOUNTER — APPOINTMENT (OUTPATIENT)
Dept: LAB | Age: 60
End: 2020-11-06
Payer: COMMERCIAL

## 2020-11-06 ENCOUNTER — TRANSCRIBE ORDERS (OUTPATIENT)
Dept: ADMINISTRATIVE | Age: 60
End: 2020-11-06

## 2020-11-06 LAB
ALBUMIN SERPL BCP-MCNC: 3.6 G/DL (ref 3.5–5)
ALP SERPL-CCNC: 75 U/L (ref 46–116)
ALT SERPL W P-5'-P-CCNC: 30 U/L (ref 12–78)
ANION GAP SERPL CALCULATED.3IONS-SCNC: 6 MMOL/L (ref 4–13)
AST SERPL W P-5'-P-CCNC: 15 U/L (ref 5–45)
BASOPHILS # BLD AUTO: 0.01 THOUSANDS/ΜL (ref 0–0.1)
BASOPHILS NFR BLD AUTO: 0 % (ref 0–1)
BILIRUB SERPL-MCNC: 0.68 MG/DL (ref 0.2–1)
BUN SERPL-MCNC: 15 MG/DL (ref 5–25)
CALCIUM SERPL-MCNC: 8.8 MG/DL (ref 8.3–10.1)
CHLORIDE SERPL-SCNC: 107 MMOL/L (ref 100–108)
CO2 SERPL-SCNC: 25 MMOL/L (ref 21–32)
CREAT SERPL-MCNC: 1.07 MG/DL (ref 0.6–1.3)
EOSINOPHIL # BLD AUTO: 0.06 THOUSAND/ΜL (ref 0–0.61)
EOSINOPHIL NFR BLD AUTO: 1 % (ref 0–6)
ERYTHROCYTE [DISTWIDTH] IN BLOOD BY AUTOMATED COUNT: 22.7 % (ref 11.6–15.1)
GFR SERPL CREATININE-BSD FRML MDRD: 75 ML/MIN/1.73SQ M
GLUCOSE P FAST SERPL-MCNC: 101 MG/DL (ref 65–99)
HCT VFR BLD AUTO: 39.1 % (ref 36.5–49.3)
HGB BLD-MCNC: 12.3 G/DL (ref 12–17)
IMM GRANULOCYTES # BLD AUTO: 0.01 THOUSAND/UL (ref 0–0.2)
IMM GRANULOCYTES NFR BLD AUTO: 0 % (ref 0–2)
LYMPHOCYTES # BLD AUTO: 1.53 THOUSANDS/ΜL (ref 0.6–4.47)
LYMPHOCYTES NFR BLD AUTO: 35 % (ref 14–44)
MCH RBC QN AUTO: 28.1 PG (ref 26.8–34.3)
MCHC RBC AUTO-ENTMCNC: 31.5 G/DL (ref 31.4–37.4)
MCV RBC AUTO: 90 FL (ref 82–98)
MONOCYTES # BLD AUTO: 0.63 THOUSAND/ΜL (ref 0.17–1.22)
MONOCYTES NFR BLD AUTO: 15 % (ref 4–12)
NEUTROPHILS # BLD AUTO: 2.1 THOUSANDS/ΜL (ref 1.85–7.62)
NEUTS SEG NFR BLD AUTO: 49 % (ref 43–75)
NRBC BLD AUTO-RTO: 0 /100 WBCS
PLATELET # BLD AUTO: 202 THOUSANDS/UL (ref 149–390)
PMV BLD AUTO: 10.3 FL (ref 8.9–12.7)
POTASSIUM SERPL-SCNC: 4.5 MMOL/L (ref 3.5–5.3)
PROT SERPL-MCNC: 7.3 G/DL (ref 6.4–8.2)
RBC # BLD AUTO: 4.37 MILLION/UL (ref 3.88–5.62)
SODIUM SERPL-SCNC: 138 MMOL/L (ref 136–145)
WBC # BLD AUTO: 4.34 THOUSAND/UL (ref 4.31–10.16)

## 2020-11-06 PROCEDURE — 85025 COMPLETE CBC W/AUTO DIFF WBC: CPT

## 2020-11-06 PROCEDURE — 36415 COLL VENOUS BLD VENIPUNCTURE: CPT

## 2020-11-06 PROCEDURE — 80053 COMPREHEN METABOLIC PANEL: CPT

## 2020-11-10 ENCOUNTER — HOSPITAL ENCOUNTER (OUTPATIENT)
Dept: INFUSION CENTER | Facility: HOSPITAL | Age: 60
Discharge: HOME | End: 2020-11-10
Attending: INTERNAL MEDICINE
Payer: COMMERCIAL

## 2020-11-10 VITALS
HEIGHT: 69 IN | RESPIRATION RATE: 18 BRPM | DIASTOLIC BLOOD PRESSURE: 85 MMHG | HEART RATE: 81 BPM | SYSTOLIC BLOOD PRESSURE: 127 MMHG | TEMPERATURE: 97.3 F | BODY MASS INDEX: 32.07 KG/M2 | WEIGHT: 216.49 LBS

## 2020-11-10 DIAGNOSIS — K63.89 MASS OF CECUM: Primary | ICD-10-CM

## 2020-11-10 PROCEDURE — 96367 TX/PROPH/DG ADDL SEQ IV INF: CPT

## 2020-11-10 PROCEDURE — 96413 CHEMO IV INFUSION 1 HR: CPT

## 2020-11-10 PROCEDURE — 96415 CHEMO IV INFUSION ADDL HR: CPT

## 2020-11-10 PROCEDURE — 96368 THER/DIAG CONCURRENT INF: CPT

## 2020-11-10 PROCEDURE — 96411 CHEMO IV PUSH ADDL DRUG: CPT

## 2020-11-10 PROCEDURE — G0498 CHEMO EXTEND IV INFUS W/PUMP: HCPCS

## 2020-11-10 RX ORDER — FLUOROURACIL 50 MG/ML
400 INJECTION, SOLUTION INTRAVENOUS ONCE
Status: COMPLETED | OUTPATIENT
Start: 2020-11-10 | End: 2020-11-10

## 2020-11-10 RX ORDER — DEXTROSE MONOHYDRATE 50 MG/ML
20 INJECTION, SOLUTION INTRAVENOUS ONCE
Status: COMPLETED | OUTPATIENT
Start: 2020-11-10 | End: 2020-11-10

## 2020-11-10 RX ORDER — SODIUM CHLORIDE 9 MG/ML
20 INJECTION, SOLUTION INTRAVENOUS ONCE AS NEEDED
Status: DISCONTINUED | OUTPATIENT
Start: 2020-11-10 | End: 2020-11-13 | Stop reason: HOSPADM

## 2020-11-10 RX ADMIN — IRON SUCROSE 200 MG: 20 INJECTION, SOLUTION INTRAVENOUS at 08:52

## 2020-11-10 RX ADMIN — SODIUM CHLORIDE 20 ML/HR: 0.9 INJECTION, SOLUTION INTRAVENOUS at 08:45

## 2020-11-10 RX ADMIN — OXALIPLATIN 180.2 MG: 5 INJECTION, SOLUTION INTRAVENOUS at 10:31

## 2020-11-10 RX ADMIN — DEXAMETHASONE SODIUM PHOSPHATE: 10 INJECTION, SOLUTION INTRAMUSCULAR; INTRAVENOUS at 09:53

## 2020-11-10 RX ADMIN — FLUOROURACIL 850 MG: 50 INJECTION, SOLUTION INTRAVENOUS at 12:48

## 2020-11-10 RX ADMIN — DEXTROSE 20 ML/HR: 5 SOLUTION INTRAVENOUS at 10:29

## 2020-11-10 RX ADMIN — LEUCOVORIN CALCIUM 848 MG: 200 INJECTION, POWDER, LYOPHILIZED, FOR SUSPENSION INTRAMUSCULAR; INTRAVENOUS at 10:32

## 2020-11-12 ENCOUNTER — HOSPITAL ENCOUNTER (OUTPATIENT)
Dept: INFUSION CENTER | Facility: HOSPITAL | Age: 60
Discharge: HOME | End: 2020-11-12
Attending: INTERNAL MEDICINE

## 2020-11-12 VITALS — TEMPERATURE: 98.2 F

## 2020-11-12 DIAGNOSIS — K63.89 MASS OF CECUM: Primary | ICD-10-CM

## 2020-11-17 DIAGNOSIS — K63.89 MASS OF CECUM: Primary | ICD-10-CM

## 2020-11-17 RX ORDER — FLUOROURACIL 50 MG/ML
400 INJECTION, SOLUTION INTRAVENOUS ONCE
Status: CANCELLED | OUTPATIENT
Start: 2020-11-23

## 2020-11-17 RX ORDER — SODIUM CHLORIDE 9 MG/ML
20 INJECTION, SOLUTION INTRAVENOUS ONCE AS NEEDED
Status: CANCELLED | OUTPATIENT
Start: 2020-11-23

## 2020-11-17 RX ORDER — DEXTROSE MONOHYDRATE 50 MG/ML
20 INJECTION, SOLUTION INTRAVENOUS ONCE
Status: CANCELLED | OUTPATIENT
Start: 2020-11-23

## 2020-11-19 ENCOUNTER — CLINICAL SUPPORT (OUTPATIENT)
Dept: CARDIOLOGY CLINIC | Facility: CLINIC | Age: 60
End: 2020-11-19
Payer: COMMERCIAL

## 2020-11-19 ENCOUNTER — TELEPHONE (OUTPATIENT)
Dept: CARDIOLOGY CLINIC | Facility: CLINIC | Age: 60
End: 2020-11-19

## 2020-11-19 DIAGNOSIS — I48.0 PAF (PAROXYSMAL ATRIAL FIBRILLATION) (HCC): ICD-10-CM

## 2020-11-19 DIAGNOSIS — I48.0 PAF (PAROXYSMAL ATRIAL FIBRILLATION) (HCC): Primary | ICD-10-CM

## 2020-11-19 PROCEDURE — 0298T PR EXT ECG > 48HR TO 21 DAY REVIEW AND INTERPRETATN: CPT | Performed by: INTERNAL MEDICINE

## 2020-11-20 ENCOUNTER — LAB (OUTPATIENT)
Dept: LAB | Age: 60
End: 2020-11-20
Payer: COMMERCIAL

## 2020-11-20 DIAGNOSIS — K63.89 MASS OF CECUM: ICD-10-CM

## 2020-11-20 LAB
ALBUMIN SERPL BCP-MCNC: 3.5 G/DL (ref 3.5–5)
ALP SERPL-CCNC: 72 U/L (ref 46–116)
ALT SERPL W P-5'-P-CCNC: 57 U/L (ref 12–78)
ANION GAP SERPL CALCULATED.3IONS-SCNC: 5 MMOL/L (ref 4–13)
AST SERPL W P-5'-P-CCNC: 28 U/L (ref 5–45)
BASOPHILS # BLD AUTO: 0.02 THOUSANDS/ΜL (ref 0–0.1)
BASOPHILS NFR BLD AUTO: 0 % (ref 0–1)
BILIRUB SERPL-MCNC: 0.6 MG/DL (ref 0.2–1)
BUN SERPL-MCNC: 23 MG/DL (ref 5–25)
CALCIUM SERPL-MCNC: 9.2 MG/DL (ref 8.3–10.1)
CHLORIDE SERPL-SCNC: 108 MMOL/L (ref 100–108)
CO2 SERPL-SCNC: 27 MMOL/L (ref 21–32)
CREAT SERPL-MCNC: 1.14 MG/DL (ref 0.6–1.3)
EOSINOPHIL # BLD AUTO: 0.05 THOUSAND/ΜL (ref 0–0.61)
EOSINOPHIL NFR BLD AUTO: 1 % (ref 0–6)
ERYTHROCYTE [DISTWIDTH] IN BLOOD BY AUTOMATED COUNT: 23 % (ref 11.6–15.1)
GFR SERPL CREATININE-BSD FRML MDRD: 70 ML/MIN/1.73SQ M
GLUCOSE P FAST SERPL-MCNC: 104 MG/DL (ref 65–99)
HCT VFR BLD AUTO: 39.3 % (ref 36.5–49.3)
HGB BLD-MCNC: 12.4 G/DL (ref 12–17)
IMM GRANULOCYTES # BLD AUTO: 0.01 THOUSAND/UL (ref 0–0.2)
IMM GRANULOCYTES NFR BLD AUTO: 0 % (ref 0–2)
LYMPHOCYTES # BLD AUTO: 1.38 THOUSANDS/ΜL (ref 0.6–4.47)
LYMPHOCYTES NFR BLD AUTO: 30 % (ref 14–44)
MCH RBC QN AUTO: 29.4 PG (ref 26.8–34.3)
MCHC RBC AUTO-ENTMCNC: 31.6 G/DL (ref 31.4–37.4)
MCV RBC AUTO: 93 FL (ref 82–98)
MONOCYTES # BLD AUTO: 0.67 THOUSAND/ΜL (ref 0.17–1.22)
MONOCYTES NFR BLD AUTO: 15 % (ref 4–12)
NEUTROPHILS # BLD AUTO: 2.5 THOUSANDS/ΜL (ref 1.85–7.62)
NEUTS SEG NFR BLD AUTO: 54 % (ref 43–75)
NRBC BLD AUTO-RTO: 0 /100 WBCS
PLATELET # BLD AUTO: 196 THOUSANDS/UL (ref 149–390)
PMV BLD AUTO: 10 FL (ref 8.9–12.7)
POTASSIUM SERPL-SCNC: 4.1 MMOL/L (ref 3.5–5.3)
PROT SERPL-MCNC: 7.4 G/DL (ref 6.4–8.2)
RBC # BLD AUTO: 4.22 MILLION/UL (ref 3.88–5.62)
SODIUM SERPL-SCNC: 140 MMOL/L (ref 136–145)
WBC # BLD AUTO: 4.63 THOUSAND/UL (ref 4.31–10.16)

## 2020-11-20 PROCEDURE — 80053 COMPREHEN METABOLIC PANEL: CPT

## 2020-11-20 PROCEDURE — 85025 COMPLETE CBC W/AUTO DIFF WBC: CPT

## 2020-11-20 PROCEDURE — 36415 COLL VENOUS BLD VENIPUNCTURE: CPT

## 2020-11-23 ENCOUNTER — HOSPITAL ENCOUNTER (OUTPATIENT)
Dept: INFUSION CENTER | Facility: HOSPITAL | Age: 60
Discharge: HOME | End: 2020-11-23
Attending: INTERNAL MEDICINE
Payer: COMMERCIAL

## 2020-11-23 VITALS
HEART RATE: 92 BPM | BODY MASS INDEX: 32.88 KG/M2 | TEMPERATURE: 96.7 F | SYSTOLIC BLOOD PRESSURE: 133 MMHG | RESPIRATION RATE: 18 BRPM | DIASTOLIC BLOOD PRESSURE: 86 MMHG | HEIGHT: 69 IN | OXYGEN SATURATION: 97 % | WEIGHT: 222 LBS

## 2020-11-23 DIAGNOSIS — K63.89 MASS OF CECUM: Primary | ICD-10-CM

## 2020-11-23 PROCEDURE — 96368 THER/DIAG CONCURRENT INF: CPT

## 2020-11-23 PROCEDURE — 36593 DECLOT VASCULAR DEVICE: CPT

## 2020-11-23 PROCEDURE — 96411 CHEMO IV PUSH ADDL DRUG: CPT

## 2020-11-23 PROCEDURE — G0498 CHEMO EXTEND IV INFUS W/PUMP: HCPCS

## 2020-11-23 PROCEDURE — 96367 TX/PROPH/DG ADDL SEQ IV INF: CPT

## 2020-11-23 PROCEDURE — 96415 CHEMO IV INFUSION ADDL HR: CPT

## 2020-11-23 PROCEDURE — 96413 CHEMO IV INFUSION 1 HR: CPT

## 2020-11-23 RX ORDER — FLUOROURACIL 50 MG/ML
400 INJECTION, SOLUTION INTRAVENOUS ONCE
Status: COMPLETED | OUTPATIENT
Start: 2020-11-23 | End: 2020-11-23

## 2020-11-23 RX ORDER — DEXTROSE MONOHYDRATE 50 MG/ML
20 INJECTION, SOLUTION INTRAVENOUS ONCE
Status: COMPLETED | OUTPATIENT
Start: 2020-11-23 | End: 2020-11-23

## 2020-11-23 RX ORDER — SODIUM CHLORIDE 9 MG/ML
20 INJECTION, SOLUTION INTRAVENOUS ONCE AS NEEDED
Status: DISCONTINUED | OUTPATIENT
Start: 2020-11-23 | End: 2020-11-26 | Stop reason: HOSPADM

## 2020-11-23 RX ADMIN — SODIUM CHLORIDE 20 ML/HR: 0.9 INJECTION, SOLUTION INTRAVENOUS at 10:03

## 2020-11-23 RX ADMIN — FLUOROURACIL 850 MG: 50 INJECTION, SOLUTION INTRAVENOUS at 12:42

## 2020-11-23 RX ADMIN — IRON SUCROSE 200 MG: 20 INJECTION, SOLUTION INTRAVENOUS at 09:05

## 2020-11-23 RX ADMIN — DEXAMETHASONE SODIUM PHOSPHATE: 10 INJECTION, SOLUTION INTRAMUSCULAR; INTRAVENOUS at 10:03

## 2020-11-23 RX ADMIN — OXALIPLATIN 180.2 MG: 5 INJECTION, SOLUTION INTRAVENOUS at 10:40

## 2020-11-23 RX ADMIN — LEUCOVORIN CALCIUM 848 MG: 200 INJECTION, POWDER, LYOPHILIZED, FOR SUSPENSION INTRAMUSCULAR; INTRAVENOUS at 10:41

## 2020-11-23 RX ADMIN — DEXTROSE 20 ML/HR: 5 SOLUTION INTRAVENOUS at 10:23

## 2020-11-23 RX ADMIN — ALTEPLASE 2 MG: 2.2 INJECTION, POWDER, LYOPHILIZED, FOR SOLUTION INTRAVENOUS at 13:09

## 2020-11-25 ENCOUNTER — HOSPITAL ENCOUNTER (OUTPATIENT)
Dept: INFUSION CENTER | Facility: HOSPITAL | Age: 60
Discharge: HOME | End: 2020-11-25
Attending: INTERNAL MEDICINE

## 2020-11-25 VITALS — TEMPERATURE: 98.1 F

## 2020-11-25 DIAGNOSIS — K63.89 MASS OF CECUM: Primary | ICD-10-CM

## 2020-11-27 ENCOUNTER — TELEPHONE (OUTPATIENT)
Dept: HEMATOLOGY ONCOLOGY | Facility: CLINIC | Age: 60
End: 2020-11-27

## 2020-12-01 DIAGNOSIS — K63.89 MASS OF CECUM: Primary | ICD-10-CM

## 2020-12-01 DIAGNOSIS — I48.91 ATRIAL FIBRILLATION, UNSPECIFIED TYPE (HCC): ICD-10-CM

## 2020-12-01 RX ORDER — DEXTROSE MONOHYDRATE 50 MG/ML
20 INJECTION, SOLUTION INTRAVENOUS ONCE
Status: CANCELLED | OUTPATIENT
Start: 2020-12-08

## 2020-12-01 RX ORDER — DIGOXIN 125 MCG
TABLET ORAL
Qty: 90 TABLET | Refills: 1 | Status: SHIPPED | OUTPATIENT
Start: 2020-12-01 | End: 2021-01-26 | Stop reason: HOSPADM

## 2020-12-01 RX ORDER — FLUOROURACIL 50 MG/ML
400 INJECTION, SOLUTION INTRAVENOUS ONCE
Status: CANCELLED | OUTPATIENT
Start: 2020-12-08

## 2020-12-01 RX ORDER — SODIUM CHLORIDE 9 MG/ML
20 INJECTION, SOLUTION INTRAVENOUS ONCE AS NEEDED
Status: CANCELLED | OUTPATIENT
Start: 2020-12-08

## 2020-12-03 ENCOUNTER — OFFICE VISIT (OUTPATIENT)
Dept: HEMATOLOGY ONCOLOGY | Facility: CLINIC | Age: 60
End: 2020-12-03
Payer: COMMERCIAL

## 2020-12-03 VITALS
OXYGEN SATURATION: 98 % | TEMPERATURE: 97.8 F | RESPIRATION RATE: 16 BRPM | WEIGHT: 222 LBS | DIASTOLIC BLOOD PRESSURE: 82 MMHG | SYSTOLIC BLOOD PRESSURE: 132 MMHG | BODY MASS INDEX: 32.88 KG/M2 | HEIGHT: 69 IN | HEART RATE: 90 BPM

## 2020-12-03 DIAGNOSIS — C18.2 CANCER OF ASCENDING COLON (HCC): Primary | ICD-10-CM

## 2020-12-03 PROCEDURE — 99214 OFFICE O/P EST MOD 30 MIN: CPT | Performed by: NURSE PRACTITIONER

## 2020-12-04 ENCOUNTER — APPOINTMENT (OUTPATIENT)
Dept: LAB | Age: 60
End: 2020-12-04
Payer: COMMERCIAL

## 2020-12-04 ENCOUNTER — TRANSCRIBE ORDERS (OUTPATIENT)
Dept: ADMINISTRATIVE | Age: 60
End: 2020-12-04

## 2020-12-04 DIAGNOSIS — K63.89 MASS OF CECUM: ICD-10-CM

## 2020-12-04 LAB
BASOPHILS # BLD AUTO: 0.04 THOUSANDS/ΜL (ref 0–0.1)
BASOPHILS NFR BLD AUTO: 1 % (ref 0–1)
EOSINOPHIL # BLD AUTO: 0.05 THOUSAND/ΜL (ref 0–0.61)
EOSINOPHIL NFR BLD AUTO: 1 % (ref 0–6)
ERYTHROCYTE [DISTWIDTH] IN BLOOD BY AUTOMATED COUNT: 23.2 % (ref 11.6–15.1)
HCT VFR BLD AUTO: 41.8 % (ref 36.5–49.3)
HGB BLD-MCNC: 13.4 G/DL (ref 12–17)
IMM GRANULOCYTES # BLD AUTO: 0.05 THOUSAND/UL (ref 0–0.2)
IMM GRANULOCYTES NFR BLD AUTO: 1 % (ref 0–2)
LYMPHOCYTES # BLD AUTO: 1.54 THOUSANDS/ΜL (ref 0.6–4.47)
LYMPHOCYTES NFR BLD AUTO: 30 % (ref 14–44)
MCH RBC QN AUTO: 30.7 PG (ref 26.8–34.3)
MCHC RBC AUTO-ENTMCNC: 32.1 G/DL (ref 31.4–37.4)
MCV RBC AUTO: 96 FL (ref 82–98)
MONOCYTES # BLD AUTO: 0.77 THOUSAND/ΜL (ref 0.17–1.22)
MONOCYTES NFR BLD AUTO: 15 % (ref 4–12)
NEUTROPHILS # BLD AUTO: 2.76 THOUSANDS/ΜL (ref 1.85–7.62)
NEUTS SEG NFR BLD AUTO: 52 % (ref 43–75)
NRBC BLD AUTO-RTO: 0 /100 WBCS
PLATELET # BLD AUTO: 185 THOUSANDS/UL (ref 149–390)
PMV BLD AUTO: 10.8 FL (ref 8.9–12.7)
RBC # BLD AUTO: 4.37 MILLION/UL (ref 3.88–5.62)
WBC # BLD AUTO: 5.21 THOUSAND/UL (ref 4.31–10.16)

## 2020-12-04 PROCEDURE — 85025 COMPLETE CBC W/AUTO DIFF WBC: CPT

## 2020-12-04 PROCEDURE — 36415 COLL VENOUS BLD VENIPUNCTURE: CPT

## 2020-12-08 ENCOUNTER — HOSPITAL ENCOUNTER (OUTPATIENT)
Dept: INFUSION CENTER | Facility: HOSPITAL | Age: 60
Discharge: HOME | End: 2020-12-08
Attending: INTERNAL MEDICINE
Payer: COMMERCIAL

## 2020-12-08 VITALS
SYSTOLIC BLOOD PRESSURE: 130 MMHG | HEIGHT: 69 IN | WEIGHT: 225.31 LBS | DIASTOLIC BLOOD PRESSURE: 60 MMHG | HEART RATE: 72 BPM | RESPIRATION RATE: 18 BRPM | BODY MASS INDEX: 33.37 KG/M2 | TEMPERATURE: 96.5 F

## 2020-12-08 DIAGNOSIS — C18.2 CANCER OF ASCENDING COLON (HCC): ICD-10-CM

## 2020-12-08 DIAGNOSIS — K63.89 MASS OF CECUM: Primary | ICD-10-CM

## 2020-12-08 LAB
ALBUMIN SERPL BCP-MCNC: 3.4 G/DL (ref 3.5–5)
ALP SERPL-CCNC: 76 U/L (ref 46–116)
ALT SERPL W P-5'-P-CCNC: 43 U/L (ref 12–78)
ANION GAP SERPL CALCULATED.3IONS-SCNC: 7 MMOL/L (ref 4–13)
AST SERPL W P-5'-P-CCNC: 26 U/L (ref 5–45)
BILIRUB SERPL-MCNC: 0.88 MG/DL (ref 0.2–1)
BUN SERPL-MCNC: 16 MG/DL (ref 5–25)
CALCIUM ALBUM COR SERPL-MCNC: 9.8 MG/DL (ref 8.3–10.1)
CALCIUM SERPL-MCNC: 9.3 MG/DL (ref 8.3–10.1)
CHLORIDE SERPL-SCNC: 105 MMOL/L (ref 100–108)
CO2 SERPL-SCNC: 27 MMOL/L (ref 21–32)
CREAT SERPL-MCNC: 1.06 MG/DL (ref 0.6–1.3)
GFR SERPL CREATININE-BSD FRML MDRD: 76 ML/MIN/1.73SQ M
GLUCOSE SERPL-MCNC: 111 MG/DL (ref 65–140)
POTASSIUM SERPL-SCNC: 3.8 MMOL/L (ref 3.5–5.3)
PROT SERPL-MCNC: 7.6 G/DL (ref 6.4–8.2)
SODIUM SERPL-SCNC: 139 MMOL/L (ref 136–145)

## 2020-12-08 PROCEDURE — 96415 CHEMO IV INFUSION ADDL HR: CPT

## 2020-12-08 PROCEDURE — 96411 CHEMO IV PUSH ADDL DRUG: CPT

## 2020-12-08 PROCEDURE — 96368 THER/DIAG CONCURRENT INF: CPT

## 2020-12-08 PROCEDURE — 80053 COMPREHEN METABOLIC PANEL: CPT

## 2020-12-08 PROCEDURE — G0498 CHEMO EXTEND IV INFUS W/PUMP: HCPCS

## 2020-12-08 PROCEDURE — 96367 TX/PROPH/DG ADDL SEQ IV INF: CPT

## 2020-12-08 PROCEDURE — 96413 CHEMO IV INFUSION 1 HR: CPT

## 2020-12-08 RX ORDER — FLUOROURACIL 50 MG/ML
400 INJECTION, SOLUTION INTRAVENOUS ONCE
Status: COMPLETED | OUTPATIENT
Start: 2020-12-08 | End: 2020-12-08

## 2020-12-08 RX ORDER — DEXTROSE MONOHYDRATE 50 MG/ML
20 INJECTION, SOLUTION INTRAVENOUS ONCE
Status: COMPLETED | OUTPATIENT
Start: 2020-12-08 | End: 2020-12-08

## 2020-12-08 RX ORDER — SODIUM CHLORIDE 9 MG/ML
20 INJECTION, SOLUTION INTRAVENOUS ONCE AS NEEDED
Status: DISCONTINUED | OUTPATIENT
Start: 2020-12-08 | End: 2020-12-11 | Stop reason: HOSPADM

## 2020-12-08 RX ADMIN — FLUOROURACIL 850 MG: 50 INJECTION, SOLUTION INTRAVENOUS at 12:30

## 2020-12-08 RX ADMIN — OXALIPLATIN 180.2 MG: 5 INJECTION, SOLUTION INTRAVENOUS at 10:20

## 2020-12-08 RX ADMIN — DEXTROSE 20 ML/HR: 5 SOLUTION INTRAVENOUS at 10:19

## 2020-12-08 RX ADMIN — ONDANSETRON: 2 SOLUTION INTRAMUSCULAR; INTRAVENOUS at 09:55

## 2020-12-08 RX ADMIN — SODIUM CHLORIDE 20 ML/HR: 0.9 INJECTION, SOLUTION INTRAVENOUS at 09:55

## 2020-12-08 RX ADMIN — LEUCOVORIN CALCIUM 848 MG: 200 INJECTION, POWDER, LYOPHILIZED, FOR SUSPENSION INTRAMUSCULAR; INTRAVENOUS at 10:20

## 2020-12-08 RX ADMIN — IRON SUCROSE 200 MG: 20 INJECTION, SOLUTION INTRAVENOUS at 08:50

## 2020-12-09 ENCOUNTER — TELEPHONE (OUTPATIENT)
Dept: HEMATOLOGY ONCOLOGY | Facility: CLINIC | Age: 60
End: 2020-12-09

## 2020-12-10 ENCOUNTER — HOSPITAL ENCOUNTER (OUTPATIENT)
Dept: INFUSION CENTER | Facility: HOSPITAL | Age: 60
Discharge: HOME | End: 2020-12-10
Attending: INTERNAL MEDICINE

## 2020-12-10 VITALS — TEMPERATURE: 98.2 F

## 2020-12-10 DIAGNOSIS — K63.89 MASS OF CECUM: Primary | ICD-10-CM

## 2021-01-04 DIAGNOSIS — I48.91 ATRIAL FIBRILLATION, UNSPECIFIED TYPE (HCC): Primary | ICD-10-CM

## 2021-01-18 PROBLEM — Z95.828 PORT-A-CATH IN PLACE: Status: ACTIVE | Noted: 2021-01-18

## 2021-01-20 ENCOUNTER — LAB (OUTPATIENT)
Dept: LAB | Age: 61
End: 2021-01-20
Payer: COMMERCIAL

## 2021-01-20 LAB
ALBUMIN SERPL BCP-MCNC: 3.7 G/DL (ref 3.5–5)
ALP SERPL-CCNC: 82 U/L (ref 46–116)
ALT SERPL W P-5'-P-CCNC: 30 U/L (ref 12–78)
ANION GAP SERPL CALCULATED.3IONS-SCNC: 5 MMOL/L (ref 4–13)
AST SERPL W P-5'-P-CCNC: 18 U/L (ref 5–45)
BASOPHILS # BLD AUTO: 0.05 THOUSANDS/ΜL (ref 0–0.1)
BASOPHILS NFR BLD AUTO: 1 % (ref 0–1)
BILIRUB SERPL-MCNC: 1.01 MG/DL (ref 0.2–1)
BUN SERPL-MCNC: 21 MG/DL (ref 5–25)
CALCIUM SERPL-MCNC: 9.7 MG/DL (ref 8.3–10.1)
CHLORIDE SERPL-SCNC: 109 MMOL/L (ref 100–108)
CO2 SERPL-SCNC: 27 MMOL/L (ref 21–32)
CREAT SERPL-MCNC: 1.22 MG/DL (ref 0.6–1.3)
EOSINOPHIL # BLD AUTO: 0.11 THOUSAND/ΜL (ref 0–0.61)
EOSINOPHIL NFR BLD AUTO: 2 % (ref 0–6)
ERYTHROCYTE [DISTWIDTH] IN BLOOD BY AUTOMATED COUNT: 15.1 % (ref 11.6–15.1)
GFR SERPL CREATININE-BSD FRML MDRD: 64 ML/MIN/1.73SQ M
GLUCOSE P FAST SERPL-MCNC: 110 MG/DL (ref 65–99)
HCT VFR BLD AUTO: 42.4 % (ref 36.5–49.3)
HGB BLD-MCNC: 13.4 G/DL (ref 12–17)
IMM GRANULOCYTES # BLD AUTO: 0.02 THOUSAND/UL (ref 0–0.2)
IMM GRANULOCYTES NFR BLD AUTO: 0 % (ref 0–2)
INR PPP: 1.02 (ref 0.84–1.19)
LYMPHOCYTES # BLD AUTO: 1.62 THOUSANDS/ΜL (ref 0.6–4.47)
LYMPHOCYTES NFR BLD AUTO: 27 % (ref 14–44)
MCH RBC QN AUTO: 33.7 PG (ref 26.8–34.3)
MCHC RBC AUTO-ENTMCNC: 31.6 G/DL (ref 31.4–37.4)
MCV RBC AUTO: 107 FL (ref 82–98)
MONOCYTES # BLD AUTO: 0.66 THOUSAND/ΜL (ref 0.17–1.22)
MONOCYTES NFR BLD AUTO: 11 % (ref 4–12)
NEUTROPHILS # BLD AUTO: 3.66 THOUSANDS/ΜL (ref 1.85–7.62)
NEUTS SEG NFR BLD AUTO: 59 % (ref 43–75)
NRBC BLD AUTO-RTO: 0 /100 WBCS
PLATELET # BLD AUTO: 212 THOUSANDS/UL (ref 149–390)
PMV BLD AUTO: 11 FL (ref 8.9–12.7)
POTASSIUM SERPL-SCNC: 4.7 MMOL/L (ref 3.5–5.3)
PROT SERPL-MCNC: 8.1 G/DL (ref 6.4–8.2)
PROTHROMBIN TIME: 13.4 SECONDS (ref 11.6–14.5)
RBC # BLD AUTO: 3.98 MILLION/UL (ref 3.88–5.62)
SODIUM SERPL-SCNC: 141 MMOL/L (ref 136–145)
WBC # BLD AUTO: 6.12 THOUSAND/UL (ref 4.31–10.16)

## 2021-01-20 PROCEDURE — 85025 COMPLETE CBC W/AUTO DIFF WBC: CPT

## 2021-01-20 PROCEDURE — 36415 COLL VENOUS BLD VENIPUNCTURE: CPT | Performed by: INTERNAL MEDICINE

## 2021-01-20 PROCEDURE — 85610 PROTHROMBIN TIME: CPT | Performed by: INTERNAL MEDICINE

## 2021-01-20 PROCEDURE — 80053 COMPREHEN METABOLIC PANEL: CPT

## 2021-01-21 ENCOUNTER — HOSPITAL ENCOUNTER (OUTPATIENT)
Dept: INFUSION CENTER | Facility: HOSPITAL | Age: 61
Discharge: HOME | End: 2021-01-21
Payer: COMMERCIAL

## 2021-01-21 VITALS — TEMPERATURE: 97.2 F

## 2021-01-21 DIAGNOSIS — Z95.828 PORT-A-CATH IN PLACE: Primary | ICD-10-CM

## 2021-01-21 PROCEDURE — 96523 IRRIG DRUG DELIVERY DEVICE: CPT

## 2021-01-21 NOTE — PLAN OF CARE
Problem: Potential for Falls  Goal: Patient will remain free of falls  Description: INTERVENTIONS:  - Assess patient frequently for physical needs  -  Identify cognitive and physical deficits and behaviors that affect risk of falls    -  Tracy fall precautions as indicated by assessment   - Educate patient/family on patient safety including physical limitations  - Instruct patient to call for assistance with activity based on assessment  - Modify environment to reduce risk of injury  - Consider OT/PT consult to assist with strengthening/mobility  Outcome: Progressing

## 2021-01-22 ENCOUNTER — TELEPHONE (OUTPATIENT)
Dept: INPATIENT UNIT | Facility: HOSPITAL | Age: 61
End: 2021-01-22

## 2021-01-22 RX ORDER — PANTOPRAZOLE SODIUM 40 MG/1
40 INJECTION, POWDER, FOR SOLUTION INTRAVENOUS ONCE
Status: CANCELLED | OUTPATIENT
Start: 2021-01-22 | End: 2021-01-22

## 2021-01-25 ENCOUNTER — ANESTHESIA (OUTPATIENT)
Dept: NON INVASIVE DIAGNOSTICS | Facility: HOSPITAL | Age: 61
End: 2021-01-25
Payer: COMMERCIAL

## 2021-01-25 ENCOUNTER — HOSPITAL ENCOUNTER (OUTPATIENT)
Dept: NON INVASIVE DIAGNOSTICS | Facility: HOSPITAL | Age: 61
Discharge: HOME | End: 2021-01-26
Attending: INTERNAL MEDICINE | Admitting: INTERNAL MEDICINE
Payer: COMMERCIAL

## 2021-01-25 ENCOUNTER — ANESTHESIA EVENT (OUTPATIENT)
Dept: NON INVASIVE DIAGNOSTICS | Facility: HOSPITAL | Age: 61
End: 2021-01-25
Payer: COMMERCIAL

## 2021-01-25 ENCOUNTER — APPOINTMENT (OUTPATIENT)
Dept: NON INVASIVE DIAGNOSTICS | Facility: HOSPITAL | Age: 61
End: 2021-01-25
Attending: INTERNAL MEDICINE
Payer: COMMERCIAL

## 2021-01-25 VITALS — HEART RATE: 82 BPM

## 2021-01-25 DIAGNOSIS — I48.0 PAF (PAROXYSMAL ATRIAL FIBRILLATION) (HCC): ICD-10-CM

## 2021-01-25 LAB
ANION GAP SERPL CALCULATED.3IONS-SCNC: 6 MMOL/L (ref 4–13)
ATRIAL RATE: 72 BPM
BASOPHILS # BLD AUTO: 0.06 THOUSANDS/ΜL (ref 0–0.1)
BASOPHILS NFR BLD AUTO: 1 % (ref 0–1)
BUN SERPL-MCNC: 23 MG/DL (ref 5–25)
CALCIUM SERPL-MCNC: 9.2 MG/DL (ref 8.3–10.1)
CHLORIDE SERPL-SCNC: 108 MMOL/L (ref 100–108)
CO2 SERPL-SCNC: 25 MMOL/L (ref 21–32)
CREAT SERPL-MCNC: 1.29 MG/DL (ref 0.6–1.3)
EOSINOPHIL # BLD AUTO: 0.15 THOUSAND/ΜL (ref 0–0.61)
EOSINOPHIL NFR BLD AUTO: 2 % (ref 0–6)
ERYTHROCYTE [DISTWIDTH] IN BLOOD BY AUTOMATED COUNT: 14.3 % (ref 11.6–15.1)
GFR SERPL CREATININE-BSD FRML MDRD: 60 ML/MIN/1.73SQ M
GLUCOSE P FAST SERPL-MCNC: 96 MG/DL (ref 65–99)
GLUCOSE SERPL-MCNC: 96 MG/DL (ref 65–140)
HCT VFR BLD AUTO: 41.3 % (ref 36.5–49.3)
HGB BLD-MCNC: 13.4 G/DL (ref 12–17)
IMM GRANULOCYTES # BLD AUTO: 0.01 THOUSAND/UL (ref 0–0.2)
IMM GRANULOCYTES NFR BLD AUTO: 0 % (ref 0–2)
INR PPP: 1.04 (ref 0.84–1.19)
KCT BLD-ACNC: 143 SEC (ref 89–137)
KCT BLD-ACNC: 318 SEC (ref 89–137)
KCT BLD-ACNC: 325 SEC (ref 89–137)
KCT BLD-ACNC: 331 SEC (ref 89–137)
KCT BLD-ACNC: 338 SEC (ref 89–137)
KCT BLD-ACNC: 344 SEC (ref 89–137)
KCT BLD-ACNC: 357 SEC (ref 89–137)
KCT BLD-ACNC: 371 SEC (ref 89–137)
KCT BLD-ACNC: 384 SEC (ref 89–137)
KCT BLD-ACNC: 397 SEC (ref 89–137)
LYMPHOCYTES # BLD AUTO: 1.77 THOUSANDS/ΜL (ref 0.6–4.47)
LYMPHOCYTES NFR BLD AUTO: 29 % (ref 14–44)
MCH RBC QN AUTO: 34 PG (ref 26.8–34.3)
MCHC RBC AUTO-ENTMCNC: 32.4 G/DL (ref 31.4–37.4)
MCV RBC AUTO: 105 FL (ref 82–98)
MONOCYTES # BLD AUTO: 0.76 THOUSAND/ΜL (ref 0.17–1.22)
MONOCYTES NFR BLD AUTO: 12 % (ref 4–12)
NEUTROPHILS # BLD AUTO: 3.42 THOUSANDS/ΜL (ref 1.85–7.62)
NEUTS SEG NFR BLD AUTO: 56 % (ref 43–75)
NRBC BLD AUTO-RTO: 0 /100 WBCS
P AXIS: 58 DEGREES
PLATELET # BLD AUTO: 231 THOUSANDS/UL (ref 149–390)
PMV BLD AUTO: 11.1 FL (ref 8.9–12.7)
POTASSIUM SERPL-SCNC: 4.3 MMOL/L (ref 3.5–5.3)
PR INTERVAL: 150 MS
PROTHROMBIN TIME: 13.6 SECONDS (ref 11.6–14.5)
QRS AXIS: 46 DEGREES
QRSD INTERVAL: 92 MS
QT INTERVAL: 398 MS
QTC INTERVAL: 435 MS
RBC # BLD AUTO: 3.94 MILLION/UL (ref 3.88–5.62)
SODIUM SERPL-SCNC: 139 MMOL/L (ref 136–145)
SPECIMEN SOURCE: ABNORMAL
T WAVE AXIS: 39 DEGREES
VENTRICULAR RATE: 72 BPM
WBC # BLD AUTO: 6.17 THOUSAND/UL (ref 4.31–10.16)

## 2021-01-25 PROCEDURE — 93662 INTRACARDIAC ECG (ICE): CPT | Performed by: INTERNAL MEDICINE

## 2021-01-25 PROCEDURE — 76937 US GUIDE VASCULAR ACCESS: CPT | Performed by: INTERNAL MEDICINE

## 2021-01-25 PROCEDURE — C1769 GUIDE WIRE: HCPCS | Performed by: INTERNAL MEDICINE

## 2021-01-25 PROCEDURE — C1759 CATH, INTRA ECHOCARDIOGRAPHY: HCPCS | Performed by: INTERNAL MEDICINE

## 2021-01-25 PROCEDURE — 80048 BASIC METABOLIC PNL TOTAL CA: CPT | Performed by: PHYSICIAN ASSISTANT

## 2021-01-25 PROCEDURE — C1766 INTRO/SHEATH,STRBLE,NON-PEEL: HCPCS | Performed by: INTERNAL MEDICINE

## 2021-01-25 PROCEDURE — 93010 ELECTROCARDIOGRAM REPORT: CPT | Performed by: INTERNAL MEDICINE

## 2021-01-25 PROCEDURE — C1894 INTRO/SHEATH, NON-LASER: HCPCS | Performed by: INTERNAL MEDICINE

## 2021-01-25 PROCEDURE — 93623 PRGRMD STIMJ&PACG IV RX NFS: CPT | Performed by: INTERNAL MEDICINE

## 2021-01-25 PROCEDURE — 93005 ELECTROCARDIOGRAM TRACING: CPT

## 2021-01-25 PROCEDURE — NC001 PR NO CHARGE: Performed by: INTERNAL MEDICINE

## 2021-01-25 PROCEDURE — 85610 PROTHROMBIN TIME: CPT | Performed by: PHYSICIAN ASSISTANT

## 2021-01-25 PROCEDURE — C1732 CATH, EP, DIAG/ABL, 3D/VECT: HCPCS | Performed by: INTERNAL MEDICINE

## 2021-01-25 PROCEDURE — 93657 TX L/R ATRIAL FIB ADDL: CPT | Performed by: INTERNAL MEDICINE

## 2021-01-25 PROCEDURE — 93613 INTRACARDIAC EPHYS 3D MAPG: CPT | Performed by: INTERNAL MEDICINE

## 2021-01-25 PROCEDURE — 85347 COAGULATION TIME ACTIVATED: CPT

## 2021-01-25 PROCEDURE — 93656 COMPRE EP EVAL ABLTJ ATR FIB: CPT | Performed by: INTERNAL MEDICINE

## 2021-01-25 PROCEDURE — 85025 COMPLETE CBC W/AUTO DIFF WBC: CPT | Performed by: PHYSICIAN ASSISTANT

## 2021-01-25 PROCEDURE — C1730 CATH, EP, 19 OR FEW ELECT: HCPCS | Performed by: INTERNAL MEDICINE

## 2021-01-25 RX ORDER — DILTIAZEM HYDROCHLORIDE 120 MG/1
120 CAPSULE, COATED, EXTENDED RELEASE ORAL DAILY
Status: CANCELLED | OUTPATIENT
Start: 2021-01-25

## 2021-01-25 RX ORDER — PANTOPRAZOLE SODIUM 40 MG/1
40 INJECTION, POWDER, FOR SOLUTION INTRAVENOUS ONCE
Status: DISCONTINUED | OUTPATIENT
Start: 2021-01-25 | End: 2021-01-26 | Stop reason: HOSPADM

## 2021-01-25 RX ORDER — ADENOSINE 3 MG/ML
INJECTION INTRAVENOUS CODE/TRAUMA/SEDATION MEDICATION
Status: COMPLETED | OUTPATIENT
Start: 2021-01-25 | End: 2021-01-25

## 2021-01-25 RX ORDER — SODIUM CHLORIDE 9 MG/ML
INJECTION, SOLUTION INTRAVENOUS CONTINUOUS PRN
Status: DISCONTINUED | OUTPATIENT
Start: 2021-01-25 | End: 2021-01-25

## 2021-01-25 RX ORDER — ROCURONIUM BROMIDE 10 MG/ML
INJECTION, SOLUTION INTRAVENOUS AS NEEDED
Status: DISCONTINUED | OUTPATIENT
Start: 2021-01-25 | End: 2021-01-25

## 2021-01-25 RX ORDER — FENTANYL CITRATE/PF 50 MCG/ML
50 SYRINGE (ML) INJECTION
Status: DISCONTINUED | OUTPATIENT
Start: 2021-01-25 | End: 2021-01-25 | Stop reason: HOSPADM

## 2021-01-25 RX ORDER — GLYCOPYRROLATE 0.2 MG/ML
INJECTION INTRAMUSCULAR; INTRAVENOUS AS NEEDED
Status: DISCONTINUED | OUTPATIENT
Start: 2021-01-25 | End: 2021-01-25

## 2021-01-25 RX ORDER — LIDOCAINE HYDROCHLORIDE 10 MG/ML
INJECTION, SOLUTION EPIDURAL; INFILTRATION; INTRACAUDAL; PERINEURAL AS NEEDED
Status: DISCONTINUED | OUTPATIENT
Start: 2021-01-25 | End: 2021-01-25

## 2021-01-25 RX ORDER — ATORVASTATIN CALCIUM 40 MG/1
40 TABLET, FILM COATED ORAL
Status: DISCONTINUED | OUTPATIENT
Start: 2021-01-25 | End: 2021-01-26 | Stop reason: HOSPADM

## 2021-01-25 RX ORDER — PANTOPRAZOLE SODIUM 40 MG/1
40 TABLET, DELAYED RELEASE ORAL
Status: DISCONTINUED | OUTPATIENT
Start: 2021-01-26 | End: 2021-01-26 | Stop reason: HOSPADM

## 2021-01-25 RX ORDER — DEXAMETHASONE SODIUM PHOSPHATE 10 MG/ML
INJECTION, SOLUTION INTRAMUSCULAR; INTRAVENOUS AS NEEDED
Status: DISCONTINUED | OUTPATIENT
Start: 2021-01-25 | End: 2021-01-25

## 2021-01-25 RX ORDER — PROPOFOL 10 MG/ML
INJECTION, EMULSION INTRAVENOUS AS NEEDED
Status: DISCONTINUED | OUTPATIENT
Start: 2021-01-25 | End: 2021-01-25

## 2021-01-25 RX ORDER — ALLOPURINOL 100 MG/1
100 TABLET ORAL DAILY
Status: DISCONTINUED | OUTPATIENT
Start: 2021-01-26 | End: 2021-01-26 | Stop reason: HOSPADM

## 2021-01-25 RX ORDER — ASPIRIN 81 MG/1
81 TABLET ORAL DAILY
Status: DISCONTINUED | OUTPATIENT
Start: 2021-01-26 | End: 2021-01-26 | Stop reason: HOSPADM

## 2021-01-25 RX ORDER — CEFAZOLIN SODIUM 1 G/3ML
INJECTION, POWDER, FOR SOLUTION INTRAMUSCULAR; INTRAVENOUS AS NEEDED
Status: DISCONTINUED | OUTPATIENT
Start: 2021-01-25 | End: 2021-01-25

## 2021-01-25 RX ORDER — METOPROLOL SUCCINATE 100 MG/1
100 TABLET, EXTENDED RELEASE ORAL 2 TIMES DAILY
Status: DISCONTINUED | OUTPATIENT
Start: 2021-01-25 | End: 2021-01-26 | Stop reason: HOSPADM

## 2021-01-25 RX ORDER — ACETAMINOPHEN 325 MG/1
650 TABLET ORAL EVERY 4 HOURS PRN
Status: DISCONTINUED | OUTPATIENT
Start: 2021-01-25 | End: 2021-01-26 | Stop reason: HOSPADM

## 2021-01-25 RX ORDER — HEPARIN SODIUM 1000 [USP'U]/ML
INJECTION, SOLUTION INTRAVENOUS; SUBCUTANEOUS CODE/TRAUMA/SEDATION MEDICATION
Status: COMPLETED | OUTPATIENT
Start: 2021-01-25 | End: 2021-01-25

## 2021-01-25 RX ORDER — MIDAZOLAM HYDROCHLORIDE 2 MG/2ML
INJECTION, SOLUTION INTRAMUSCULAR; INTRAVENOUS AS NEEDED
Status: DISCONTINUED | OUTPATIENT
Start: 2021-01-25 | End: 2021-01-25

## 2021-01-25 RX ORDER — FENTANYL CITRATE 50 UG/ML
INJECTION, SOLUTION INTRAMUSCULAR; INTRAVENOUS AS NEEDED
Status: DISCONTINUED | OUTPATIENT
Start: 2021-01-25 | End: 2021-01-25

## 2021-01-25 RX ORDER — EPHEDRINE SULFATE 50 MG/ML
INJECTION INTRAVENOUS AS NEEDED
Status: DISCONTINUED | OUTPATIENT
Start: 2021-01-25 | End: 2021-01-25

## 2021-01-25 RX ORDER — COLCHICINE 0.6 MG/1
0.6 TABLET ORAL 2 TIMES DAILY
Status: DISCONTINUED | OUTPATIENT
Start: 2021-01-25 | End: 2021-01-26 | Stop reason: HOSPADM

## 2021-01-25 RX ORDER — ONDANSETRON 2 MG/ML
INJECTION INTRAMUSCULAR; INTRAVENOUS AS NEEDED
Status: DISCONTINUED | OUTPATIENT
Start: 2021-01-25 | End: 2021-01-25

## 2021-01-25 RX ORDER — FLECAINIDE ACETATE 100 MG/1
100 TABLET ORAL EVERY 12 HOURS SCHEDULED
Status: DISCONTINUED | OUTPATIENT
Start: 2021-01-25 | End: 2021-01-26 | Stop reason: HOSPADM

## 2021-01-25 RX ORDER — ALBUTEROL SULFATE 2.5 MG/3ML
2.5 SOLUTION RESPIRATORY (INHALATION) ONCE AS NEEDED
Status: DISCONTINUED | OUTPATIENT
Start: 2021-01-25 | End: 2021-01-25 | Stop reason: HOSPADM

## 2021-01-25 RX ORDER — SPIRONOLACTONE 25 MG/1
12.5 TABLET ORAL DAILY
Status: DISCONTINUED | OUTPATIENT
Start: 2021-01-26 | End: 2021-01-26 | Stop reason: HOSPADM

## 2021-01-25 RX ORDER — HYDROMORPHONE HCL/PF 1 MG/ML
0.5 SYRINGE (ML) INJECTION
Status: DISCONTINUED | OUTPATIENT
Start: 2021-01-25 | End: 2021-01-25 | Stop reason: HOSPADM

## 2021-01-25 RX ORDER — HEPARIN SODIUM 10000 [USP'U]/100ML
INJECTION, SOLUTION INTRAVENOUS
Status: COMPLETED | OUTPATIENT
Start: 2021-01-25 | End: 2021-01-25

## 2021-01-25 RX ORDER — OXYCODONE HYDROCHLORIDE 5 MG/1
5 TABLET ORAL EVERY 4 HOURS PRN
Status: DISCONTINUED | OUTPATIENT
Start: 2021-01-25 | End: 2021-01-26 | Stop reason: HOSPADM

## 2021-01-25 RX ORDER — PROCHLORPERAZINE MALEATE 10 MG
10 TABLET ORAL EVERY 6 HOURS PRN
Status: DISCONTINUED | OUTPATIENT
Start: 2021-01-25 | End: 2021-01-26 | Stop reason: HOSPADM

## 2021-01-25 RX ORDER — SUCCINYLCHOLINE/SOD CL,ISO/PF 100 MG/5ML
SYRINGE (ML) INTRAVENOUS AS NEEDED
Status: DISCONTINUED | OUTPATIENT
Start: 2021-01-25 | End: 2021-01-25

## 2021-01-25 RX ORDER — PROTAMINE SULFATE 10 MG/ML
INJECTION, SOLUTION INTRAVENOUS AS NEEDED
Status: DISCONTINUED | OUTPATIENT
Start: 2021-01-25 | End: 2021-01-25

## 2021-01-25 RX ADMIN — PROTAMINE SULFATE 50 MG: 10 INJECTION, SOLUTION INTRAVENOUS at 12:09

## 2021-01-25 RX ADMIN — FLECAINIDE ACETATE 100 MG: 100 TABLET ORAL at 21:50

## 2021-01-25 RX ADMIN — SODIUM CHLORIDE: 0.9 INJECTION, SOLUTION INTRAVENOUS at 11:41

## 2021-01-25 RX ADMIN — Medication 100 MG: at 07:50

## 2021-01-25 RX ADMIN — EPHEDRINE SULFATE 10 MG: 50 INJECTION, SOLUTION INTRAVENOUS at 08:08

## 2021-01-25 RX ADMIN — FENTANYL CITRATE 50 MCG: 50 INJECTION INTRAMUSCULAR; INTRAVENOUS at 07:50

## 2021-01-25 RX ADMIN — COLCHICINE 0.6 MG: 0.6 TABLET ORAL at 18:16

## 2021-01-25 RX ADMIN — CEFAZOLIN 2000 MG: 1 INJECTION, POWDER, FOR SOLUTION INTRAVENOUS at 07:55

## 2021-01-25 RX ADMIN — PHENYLEPHRINE HYDROCHLORIDE 100 MCG: 10 INJECTION INTRAVENOUS at 11:53

## 2021-01-25 RX ADMIN — SACUBITRIL AND VALSARTAN 1 TABLET: 24; 26 TABLET, FILM COATED ORAL at 18:16

## 2021-01-25 RX ADMIN — LIDOCAINE HYDROCHLORIDE 50 MG: 10 INJECTION, SOLUTION EPIDURAL; INFILTRATION; INTRACAUDAL; PERINEURAL at 07:50

## 2021-01-25 RX ADMIN — ADENOSINE 18 MG: 3 INJECTION INTRAVENOUS at 11:52

## 2021-01-25 RX ADMIN — PHENYLEPHRINE HYDROCHLORIDE 200 MCG: 10 INJECTION INTRAVENOUS at 12:07

## 2021-01-25 RX ADMIN — CEFAZOLIN 2000 MG: 1 INJECTION, POWDER, FOR SOLUTION INTRAVENOUS at 11:54

## 2021-01-25 RX ADMIN — HEPARIN SODIUM 3000 UNITS: 1000 INJECTION INTRAVENOUS; SUBCUTANEOUS at 09:07

## 2021-01-25 RX ADMIN — FENTANYL CITRATE 50 MCG: 50 INJECTION INTRAMUSCULAR; INTRAVENOUS at 08:15

## 2021-01-25 RX ADMIN — PHENYLEPHRINE HYDROCHLORIDE 200 MCG: 10 INJECTION INTRAVENOUS at 08:00

## 2021-01-25 RX ADMIN — GLYCOPYRROLATE 0.1 MG: 0.2 INJECTION, SOLUTION INTRAMUSCULAR; INTRAVENOUS at 07:59

## 2021-01-25 RX ADMIN — PHENYLEPHRINE HYDROCHLORIDE 200 MCG: 10 INJECTION INTRAVENOUS at 12:04

## 2021-01-25 RX ADMIN — METOPROLOL SUCCINATE 100 MG: 100 TABLET, EXTENDED RELEASE ORAL at 18:15

## 2021-01-25 RX ADMIN — PHENYLEPHRINE HYDROCHLORIDE 100 MCG: 10 INJECTION INTRAVENOUS at 11:29

## 2021-01-25 RX ADMIN — ADENOSINE 18 MG: 3 INJECTION INTRAVENOUS at 12:04

## 2021-01-25 RX ADMIN — HEPARIN SODIUM 2000 UNITS: 1000 INJECTION INTRAVENOUS; SUBCUTANEOUS at 11:39

## 2021-01-25 RX ADMIN — MIDAZOLAM 2 MG: 1 INJECTION INTRAMUSCULAR; INTRAVENOUS at 07:45

## 2021-01-25 RX ADMIN — HEPARIN SODIUM 5000 UNITS: 1000 INJECTION INTRAVENOUS; SUBCUTANEOUS at 09:27

## 2021-01-25 RX ADMIN — DEXAMETHASONE SODIUM PHOSPHATE 10 MG: 10 INJECTION, SOLUTION INTRAMUSCULAR; INTRAVENOUS at 07:59

## 2021-01-25 RX ADMIN — SUGAMMADEX 200 MG: 100 INJECTION, SOLUTION INTRAVENOUS at 12:25

## 2021-01-25 RX ADMIN — PHENYLEPHRINE HYDROCHLORIDE 100 MCG: 10 INJECTION INTRAVENOUS at 11:41

## 2021-01-25 RX ADMIN — PHENYLEPHRINE HYDROCHLORIDE 25 MCG/MIN: 10 INJECTION INTRAVENOUS at 08:00

## 2021-01-25 RX ADMIN — HEPARIN SODIUM 1200 UNITS/HR: 10000 INJECTION, SOLUTION INTRAVENOUS at 08:23

## 2021-01-25 RX ADMIN — HEPARIN SODIUM 18000 UNITS: 1000 INJECTION INTRAVENOUS; SUBCUTANEOUS at 08:23

## 2021-01-25 RX ADMIN — ROCURONIUM BROMIDE 30 MG: 50 INJECTION, SOLUTION INTRAVENOUS at 07:55

## 2021-01-25 RX ADMIN — ADENOSINE 12 MG: 3 INJECTION INTRAVENOUS at 11:30

## 2021-01-25 RX ADMIN — ROCURONIUM BROMIDE 20 MG: 50 INJECTION, SOLUTION INTRAVENOUS at 09:00

## 2021-01-25 RX ADMIN — APIXABAN 5 MG: 5 TABLET, FILM COATED ORAL at 18:15

## 2021-01-25 RX ADMIN — ONDANSETRON 4 MG: 2 INJECTION INTRAMUSCULAR; INTRAVENOUS at 07:59

## 2021-01-25 RX ADMIN — Medication 50 MCG: at 13:11

## 2021-01-25 RX ADMIN — ADENOSINE 18 MG: 3 INJECTION INTRAVENOUS at 11:40

## 2021-01-25 RX ADMIN — HEPARIN SODIUM 2000 UNITS: 1000 INJECTION INTRAVENOUS; SUBCUTANEOUS at 08:46

## 2021-01-25 RX ADMIN — SODIUM CHLORIDE: 0.9 INJECTION, SOLUTION INTRAVENOUS at 07:40

## 2021-01-25 RX ADMIN — PROPOFOL 200 MG: 10 INJECTION, EMULSION INTRAVENOUS at 07:50

## 2021-01-25 RX ADMIN — ATORVASTATIN CALCIUM 40 MG: 40 TABLET, FILM COATED ORAL at 18:15

## 2021-01-25 NOTE — H&P
EPS Procedure H&P Note - Go Cleveland 61 y o  male MRN: [id number]        CC/HPI:   It was a pleasure to see Go Cleveland in our arrhythmia clinic at [place] [address] in Oct 2020  As you know he is a 61 y o  Man with recovered non-ischemic/tachy-mediated cardiomyopathy due to atrial fibrillation, colon cancer status post hemicolectomy (on chemotherapy), coronary artery disease (left heart catheterization 2018-80% ramus, 60% PDA), morbid obesity, who presented to discuss atrial fibrillation management  He was diagnosed with atrial fibrillation on several years ago  He had DCCV on 12/15/2017  His sinus rhythm was maintained with metoprolol and digoxin  He was setup for outpatient stress test on 8/21/2020 but was noted to be in atrial fibrillation with RVR on test day  He was sent over to ER where he was found to be anemic  Further work-up revealed Stage III colon cancer  He was status post right hemicolectomy s/p ileocolic anastomosis when seen in office  He had chemotherapy as well  He underwent cardioversion on 9/24/2020  However at a follow-up appointment, he was noted to be back in atrial fibrillation  He was asymptomatic at the time though  Rate control strategy was employed and diltiazem 120 mg daily dose was started  He had a an echocardiogram in August 2020 that showed ejection fraction of 50%  He did not have any overt signs of congestive heart failure and therefore sinus rhythm was not restored  He was maintained on metoprolol succinate 100 mg twice daily, spironolactone 12 5 mg daily and diltiazem 120 mg daily  He reported doing well since cardioversion  He denied any chest pain, nausea/vomiting, dyspnea on exertion, fever/chills, swelling in legs, orthopnea, PND or syncope  After discussing options, he opted to proceed with atrial fibrillation ablation  He reports feeling palpitations but otherwise no new symptoms       Past Medical History:  Past Medical History:   Diagnosis Date    [facility]     Coronary artery disease     Gout     Herpes zoster     last assessed 12/18/17    Hypertension     Shingles        Medications:      Current Facility-Administered Medications:     pantoprazole (PROTONIX) injection 40 mg, 40 mg, Intravenous, Once, Ely Carreno PA-C     Family History   Problem Relation Age of Onset    Coronary artery disease Mother     Alcohol abuse Father     Coronary artery disease Father     Colon cancer Neg Hx      Social History     Socioeconomic History    Marital status: Single     Spouse name: Not on file    Number of children: Not on file    Years of education: Not on file    Highest education level: Not on file   Occupational History    Not on file   Social Needs    Financial resource strain: Not on file    Food insecurity     Worry: Not on file     Inability: Not on file    Transportation needs     Medical: Not on file     Non-medical: Not on file   Tobacco Use    Smoking status: Former Smoker     Quit date: 8/11/2017     Years since quitting: 3 4    Smokeless tobacco: Never Used   Substance and Sexual Activity    Alcohol use: Not Currently     Comment: Socially, former alcohol    Drug use: No    Sexual activity: Yes     Partners: Female   Lifestyle    Physical activity     Days per week: Not on file     Minutes per session: Not on file    Stress: Not on file   Relationships    Social connections     Talks on phone: Not on file     Gets together: Not on file     Attends Sikh service: Not on file     Active member of club or organization: Not on file     Attends meetings of clubs or organizations: Not on file     Relationship status: Not on file    Intimate partner violence     Fear of current or ex partner: Not on file     Emotionally abused: Not on file     Physically abused: Not on file     Forced sexual activity: Not on file   Other Topics Concern    Not on file   Social History Narrative    Not on file Social History     Tobacco Use   Smoking Status Former Smoker    Quit date: 8/11/2017    Years since quitting: 3 4   Smokeless Tobacco Never Used     Social History     Substance and Sexual Activity   Alcohol Use Not Currently    Comment: Socially, former alcohol       Review of Systems   Constitution: Positive for malaise/fatigue  Negative for chills and fever  HENT: Negative  Eyes: Negative for blurred vision and double vision  Cardiovascular: Positive for palpitations  Negative for chest pain, dyspnea on exertion, leg swelling, near-syncope, orthopnea, paroxysmal nocturnal dyspnea and syncope  Respiratory: Negative for cough and sputum production  Endocrine: Negative  Skin: Negative  Negative for rash  Musculoskeletal: Negative  Negative for arthritis and joint pain  Gastrointestinal: Negative for abdominal pain, nausea and vomiting  Genitourinary: Negative  Neurological: Negative  Negative for dizziness and light-headedness  Psychiatric/Behavioral: Negative  The patient is not nervous/anxious  Objective:     Vitals: Pulse 80, temperature 98 7 °F (37 1 °C), temperature source Tympanic, resp  rate 16, height 5' 9" (1 753 m), weight 102 kg (224 lb 13 9 oz), SpO2 96 %  , Body mass index is 33 21 kg/m²  ,        Physical Exam:    GEN: Fely Sylvester appears well, alert and oriented x 3, pleasant and cooperative; morbidly obese      HEENT: pupils equal, round, and reactive to light; extraocular muscles intact  NECK: supple, no carotid bruits   HEART: regular rhythm, normal S1 and S2, no murmurs, clicks, gallops or rubs   LUNGS: clear to auscultation bilaterally; no wheezes, rales, or rhonchi   ABDOMEN: normal bowel sounds, soft, no tenderness, no distention  EXTREMITIES: peripheral pulses normal; no clubbing, cyanosis, or edema  NEURO: no focal findings   SKIN: normal without suspicious lesions on exposed skin      Labs & Results:  Below is the patient's most recent value for Albumin, ALT, AST, BUN, Calcium, Chloride, Cholesterol, CO2, Creatinine, GFR, Glucose, HDL, Hematocrit, Hemoglobin, Hemoglobin A1C, LDL, Magnesium, Phosphorus, Platelets, Potassium, PSA, Sodium, Triglycerides, and WBC  Lab Results   Component Value Date    ALT 30 2021    AST 18 2021    BUN 23 2021    CALCIUM 9 2 2021     2021    CO2 25 2021    CREATININE 1 29 2021    HDL 41 2020    HCT 41 3 2021    HGB 13 4 2021    HGBA1C 6 2 2017    MG 2 3 2020    PHOS 4 1 2020     2021    K 4 3 2021    PSA 0 9 10/09/2020    TRIG 74 2020    WBC 6 17 2021     Note: for a comprehensive list of the patient's lab results, access the Results Review activity  Cardiac testing:     I personally reviewed the ECG performed in the clinic in Oct 2020  It reveals normal sinus rhythm at 83 beats per minute  Echocardiograms:  Results for orders placed during the hospital encounter of 10/17/18   Echo complete with contrast if indicated    N[facility]ative [facility] [address] [place] [address]  [phone]    Transthoracic Echocardiogram  2D, M-mode, Doppler, and Color Doppler    Study date:  17-Oct-2018    Patient: Sherry Vang  MR number: [id number]  Account number: [de-identified]  : 1960  Age: 62 years  Gender: Male  Status: Outpatient  Location: [address] [place] [address] [facility]  Height: 69 in  Weight: 216 lb  BP: 140/ 82 mmHg    Indications: Heart Failure    Diagnoses: I50 9 - Heart failure, unspecified    Sonographer:  SANKET Diaz  Primary Physician:  Pablo Plunkett DO  Referring Physician:  Mely Slater MD  Group:  [facility]  Interpreting Physician:  Nataliia Velazquez MD    SUMMARY    LEFT VENTRICLE:  Systolic function was at the lower limits of normal  Ejection fraction was estimated to be 50 %    There were no regional wall motion abnormalities  Wall thickness was mildly to moderately increased  Doppler parameters were consistent with abnormal left ventricular relaxation (grade 1 diastolic dysfunction)  RIGHT VENTRICLE:  The size was normal   Systolic function was normal     PERICARDIUM:  A trace pericardial effusion was identified  There was no evidence of hemodynamic compromise  COMPARISONS:  There has been no significant interval change  Comparison was made with the previous study of 29-Mar-2018  HISTORY: PRIOR HISTORY: MI, CAD, HTN, AFIB, SVT    PROCEDURE: The study was performed in the [address]  This was a routine study  The transthoracic approach was used  The study included complete 2D imaging, M-mode, complete spectral Doppler, and color Doppler  The  heart rate was 61 bpm, at the start of the study  Images were obtained from the parasternal, apical, subcostal, and suprasternal notch acoustic windows  Echocardiographic views were limited due to lung interference  Image quality was  adequate  LEFT VENTRICLE: Size was normal  Systolic function was at the lower limits of normal  Ejection fraction was estimated to be 50 %  There were no regional wall motion abnormalities  Wall thickness was mildly to moderately increased  DOPPLER:  Doppler parameters were consistent with abnormal left ventricular relaxation (grade 1 diastolic dysfunction)  RIGHT VENTRICLE: The size was normal  Systolic function was normal  Wall thickness was normal     LEFT ATRIUM: Size was at the upper limits of normal     RIGHT ATRIUM: Size was normal     MITRAL VALVE: Valve structure was normal  There was normal leaflet separation  DOPPLER: The transmitral velocity was within the normal range  There was no evidence for stenosis  There was no significant regurgitation  AORTIC VALVE: The valve was trileaflet  Leaflets exhibited normal thickness and normal cuspal separation   DOPPLER: Transaortic velocity was within the normal range  There was no evidence for stenosis  There was no significant  regurgitation  TRICUSPID VALVE: The valve structure was normal  There was normal leaflet separation  DOPPLER: The transtricuspid velocity was within the normal range  There was no evidence for stenosis  There was no significant regurgitation  PULMONIC VALVE: Leaflets exhibited normal thickness, no calcification, and normal cuspal separation  DOPPLER: The transpulmonic velocity was within the normal range  There was no significant regurgitation  PERICARDIUM: A trace pericardial effusion was identified  There was no evidence of hemodynamic compromise  AORTA: The root exhibited normal size  SYSTEMIC VEINS: IVC: The inferior vena cava was normal in size   Respirophasic changes were normal     SYSTEM MEASUREMENT TABLES    2D  %FS: 26 08 %  Ao Diam: 4 01 cm  EDV(Teich): 105 38 ml  EF Biplane: 52 16 %  EF(Cube): 59 62 %  EF(Teich): 51 15 %  ESV(Cube): 43 52 ml  ESV(Teich): 51 48 ml  IVSd: 1 45 cm  LA Area: 20 83 cm2  LA Diam: 3 55 cm  LVEDV MOD A2C: 129 95 ml  LVEDV MOD A4C: 153 5 ml  LVEDV MOD BP: 146 59 ml  LVEF MOD A2C: 47 85 %  LVEF MOD A4C: 59 43 %  LVESV MOD A2C: 67 77 ml  LVESV MOD A4C: 62 27 ml  LVESV MOD BP: 70 12 ml  LVIDd: 4 76 cm  LVIDs: 3 52 cm  LVLd A2C: 8 67 cm  LVLd A4C: 8 72 cm  LVLs A2C: 7 68 cm  LVLs A4C: 7 6 cm  LVPWd: 1 36 cm  RA Area: 17 39 cm2  RV Diam : 3 55 cm  SV MOD A2C: 62 18 ml  SV MOD A4C: 91 23 ml  SV(Cube): 64 25 ml  SV(Teich): 53 9 ml    MM  TAPSE: 1 49 cm    PW  E': 0 04 m/s  E/E': 23 04  MV A Brennan: 0 66 m/s  MV Dec [place]: 3 43 m/s2  MV DecT: 248 53 ms  MV E Brennan: 0 85 m/s  MV E/A Ratio: 1 3    [employer] Accredited Echocardiography Laboratory    Prepared and electronically signed by    Sloane Hargrove MD  Signed 18-Oct-2018 11:44:59       Results for orders placed during the hospital encounter of 08/21/20   JOSE LUIS    [facility] [address] [place] [address]  [phone]    Transesophageal Echocardiogram  2D, Doppler, and Color Doppler    Study date:  28-Aug-2020    Patient: Rudolph Thompson  MR number: [id number]  Account number: [de-identified]  : 1960  Age: 61 years  Gender: Male  Status: Inpatient  Location: Operating room  Height: 70 in  Weight: 150 lb  BP:    Indications: TIA    Diagnoses: G45 9 - Transient cerebral ischemic attack, unspecified    Sonographer:  SANKET Vang  Referring Physician:  Lacie Morrow MD  Group:  [facility]  Cardiology Fellow:  Raymon Severance, MD  Interpreting Physician:  Lacie Morrow MD    SUMMARY    LEFT VENTRICLE:  Systolic function was at the lower limits of normal  Ejection fraction was estimated to be 50 %  There were no regional wall motion abnormalities  Wall thickness was normal     RIGHT VENTRICLE:  The size was normal   Systolic function was normal   Wall thickness was normal     LEFT ATRIAL APPENDAGE:  No thrombus was identified  There was mild spontaneous echo contrast ("smoke") in the appendage  MITRAL VALVE:  There was trace regurgitation  HISTORY: PRIOR HISTORY: HTN, Afib, REGULO    PROCEDURE: The procedure was performed in the operating room  This was a routine study  The risks and alternatives of the procedure were explained to the patient and informed consent was obtained  The transesophageal approach was used  The  study included complete 2D imaging, complete spectral Doppler, and color Doppler  The heart rate was 102 bpm, at the start of the study  An adult omniplane probe was inserted by the cardiology fellow under direct supervision of the  attending cardiologist  Intubated with ease  One intubation attempt(s)  There was no blood detected on the probe  Image quality was adequate  MEDICATIONS: Anesthesia  LEFT VENTRICLE: Size was normal  Systolic function was at the lower limits of normal  Ejection fraction was estimated to be 50 %   There were no regional wall motion abnormalities  Wall thickness was normal     RIGHT VENTRICLE: The size was normal  Systolic function was normal  Wall thickness was normal     LEFT ATRIUM: Size was normal  No thrombus was identified  APPENDAGE: No thrombus was identified  There was mild spontaneous echo contrast ("smoke") in the appendage  ATRIAL SEPTUM: No defect or patent foramen ovale was identified  RIGHT ATRIUM: Size was normal  No thrombus was identified  MITRAL VALVE: Valve structure was normal  There was normal leaflet separation  There was no echocardiographic evidence of vegetation  DOPPLER: There was trace regurgitation  AORTIC VALVE: The valve was trileaflet  Leaflets exhibited normal thickness and normal cuspal separation  There was no echocardiographic evidence of vegetation  DOPPLER: There was no regurgitation  TRICUSPID VALVE: The valve structure was normal  There was normal leaflet separation  There was no echocardiographic evidence of vegetation  DOPPLER: There was no regurgitation  PULMONIC VALVE: Leaflets exhibited normal thickness, no calcification, and normal cuspal separation  There was no echocardiographic evidence of vegetation  DOPPLER: There was trace regurgitation  PERICARDIUM: There was no pericardial effusion  The pericardium was normal in appearance  AORTA: The root exhibited normal size  There was no atheroma  There was no evidence for dissection  There was no evidence for aneurysm  [address] Accredited Echocardiography Laboratory    Prepared and electronically signed by    Xi Dejesus MD  Signed 28-Aug-2020 14:25:55         Catheterizations:   No results found for this or any previous visit      Stress Tests:  Results for orders placed during the hospital encounter of 02/19/18   NM myocardial perfusion spect (stress and/or rest)    [address]  [facility], [address] [place] [address]  [phone]    Stress Gated SPECT Myocardial Perfusion Imaging after Regadenoson    Patient: Wale Walker  MR number: [id number]  Account number: [de-identified]  : 1960  Age: 62 years  Gender: Male  Status: Outpatient  Location: [address] [place] [address] and [facility] [place]  Height: 69 in  Weight: 189 lb  BP: 120/ 90 mmHg    Allergies: NO KNOWN ALLERGIES    Diagnosis: U75 40 - Chronic systolic (congestive) heart failure    Referring Physician:  Preethi Carbone MD  Primary Physician:  Warren Albarado MD  Technician:  Jeffrey Gonzales CCT  RN:  Alda Perrin RN  Group:  [facility]  Cardiology Fellow:  Dr John Moran  Report Prepared by[de-identified]  Alda Perrin RN  Interpreting Physician:  Rosalind Fu DO    INDICATIONS: Assessment of cardiomyopathy  HISTORY: Cardiomyopathy, Life[place]  The patient is a 62year old  male  Chest pain status: chest pain  Other symptoms: edema  Coronary artery disease risk factors: none  Medications: a beta blocker and a lipid lowering agent  PHYSICAL EXAM: Baseline physical exam screening: no wheezes audible  REST ECG: Normal sinus rhythm  PROCEDURE: The study was performed at the [address] [facility] [place]  A regadenoson infusion pharmacologic stress test was performed  Gated SPECT myocardial perfusion imaging was performed during stress  Systolic blood pressure was  120 mmHg, at the start of the study  Diastolic blood pressure was 90 mmHg, at the start of the study  The heart rate was 65 bpm, at the start of the study  IV double checked  Regadenoson protocol:  HR bpm SBP mmHg DBP mmHg Symptoms  Baseline 65 120 90 none  Immediate 81 122 92 mild dyspnea  1 min 73 120 90 none  No medications or fluids given  STRESS SUMMARY: Duration of pharmacologic stress was 3 min and 0 sec  Maximal heart rate during stress was 91 bpm  The heart rate response to stress was normal  Normal blood pressure response to regadenosan   The rate-pressure product for  the peak heart rate and blood pressure was [address]  There was no chest pain during stress  The stress test was terminated due to protocol completion  Pre oxygen saturation: 100 %  Peak oxygen saturation: 99 %  The stress ECG was negative for  ischemia and normal  There were no stress arrhythmias or conduction abnormalities  ISOTOPE ADMINISTRATION:  Resting isotope administration Stress isotope administration  Agent Tetrofosmin Tetrofosmin  Dose 10 12 mCi 30 9 mCi  Date 02/19/2018 02/19/2018  Injection-image interval 47 min 52 min    The radiopharmaceutical was injected at the peak effect of pharmacologic stress  MYOCARDIAL PERFUSION IMAGING:  The image quality was good  Rotating projection images reveal mild patient motion  Left ventricular size was normal  The TID ratio was 1 17  PERFUSION DEFECTS:  -  There was a moderate-sized, moderately severe, partially reversible myocardial perfusion defect of the basal to mid inferolateral wall  GATED SPECT:  The calculated left ventricular ejection fraction was 47 %  Left ventricular ejection fraction was mildly decreased by visual estimate  SUMMARY:  -  Stress results: There was no chest pain during stress  -  ECG conclusions: The stress ECG was negative for ischemia and normal   -  Perfusion imaging: There was a moderate-sized, moderately severe, partially reversible myocardial perfusion defect of the basal to mid inferolateral wall  -  Gated SPECT: The calculated left ventricular ejection fraction was 47 %  Left ventricular ejection fraction was mildly decreased by visual estimate  IMPRESSIONS: Abnormal study after vasodilation and low level exercise without reproduction of symptoms  Partially reversible defect of the inferolateral wall  Fixed defect Apical lateral  Left ventricular systolic function was reduced,  with regional wall motion abnormalities      Prepared and signed by    Momo Boyd DO  Signed 02/19/2018 14:38:42         Holter monitor -   No results found for this or any previous visit  No results found for this or any previous visit  ASSESSMENT/PLAN:  Paroxysmal atrial fibrillation   · In sinus rhythm today  · Had symptoms with atrial fibrillation in the past (fatigue, dyspnea on exertion) though did not have any at time of office with Dr Brandon Day when he was in atrial fibrillation   · Recent occurrence in setting of newly discovered colon cancer  · Ziopatch showed low burden and patient was presented with the options  · He did not mind meds or ablation   · He is already on multiple AV mami blocking agents   Escalating them may not prevent atrial fibrillation   · This was discussed with patient and rhythm control with ablation was selected as the next options  · He now presents for the procedure   · Continue diltiazem, metoprolol, digoxin and Eliquis    Non-ischemic cardiomyopathy   · EF improved to 50%  · Maintained on Entresto, metoprolol, spironolactone and Lasix  · Euvolemic in office and today    Colon cancer  · Stage III colon cancer discovered in [address] [place] [address]  · S/p right hemicolectomy and ileocolic anastomosis  · completed chemotherapy    CAD  · Left heart catheterization 2018  · 80% ramus, 60% PDA  · No history of stents   · Continue statin, beta-blocker

## 2021-01-25 NOTE — ANESTHESIA PREPROCEDURE EVALUATION
Procedure:  JOSE LUIS  CARDIAC EPS/AFIB ABLATION    Relevant Problems   CARDIO   (+) Chronic systolic congestive heart failure (HCC)   (+) Coronary artery disease   (+) PAF (paroxysmal atrial fibrillation) (HCC)      GI/HEPATIC   (+) Cancer of ascending colon (HCC)   (+) GI bleeding   (+) Gastrointestinal hemorrhage with melena      /RENAL   (+) SCARLETT (acute kidney injury) ([facility] [address] )      HEMATOLOGY   (+) Microcytic anemia      NEURO/PSYCH   (+) History of gout      Other   (+) Mass of cecum   (+) Port-A-Cath in place   Obesity, BMI 33 2     Physical Exam    Airway      TM Distance: >3 FB  Neck ROM: full     Dental       Cardiovascular  Rhythm: regular, Rate: normal, Cardiovascular exam normal    Pulmonary  Pulmonary exam normal     Other Findings        Anesthesia Plan  ASA Score- 3     Anesthesia Type- general with ASA Monitors  Additional Monitors:   Airway Plan: ETT  Plan Factors-    Chart reviewed  Patient summary reviewed  Induction- intravenous  Postoperative Plan- Plan for postoperative opioid use  Planned trial extubation    Informed Consent- Anesthetic plan and risks discussed with patient  I personally reviewed this patient with the CRNA  Discussed and agreed on the Anesthesia Plan with the CRNA  Ino Townsend

## 2021-01-25 NOTE — ANESTHESIA POSTPROCEDURE EVALUATION
Post-Op Assessment Note    CV Status:  Stable  Pain Score: 0    Pain management: adequate     Mental Status:  Alert   Hydration Status:  Stable   PONV Controlled:  None   Airway Patency:  Patent      Post Op Vitals Reviewed: Yes      Staff: Anesthesiologist, CRNA         No complications documented      BP 91/55 (01/25/21 1247)    Temp (!) 97 1 °F (36 2 °C) (01/25/21 1247)    Pulse 78 (01/25/21 1247)   Resp 16 (01/25/21 1247)    SpO2 100 % (01/25/21 1247)

## 2021-01-26 ENCOUNTER — APPOINTMENT (OUTPATIENT)
Dept: NON INVASIVE DIAGNOSTICS | Facility: HOSPITAL | Age: 61
End: 2021-01-26
Payer: COMMERCIAL

## 2021-01-26 VITALS
TEMPERATURE: 97.3 F | DIASTOLIC BLOOD PRESSURE: 80 MMHG | SYSTOLIC BLOOD PRESSURE: 117 MMHG | RESPIRATION RATE: 18 BRPM | OXYGEN SATURATION: 97 % | WEIGHT: 224.87 LBS | BODY MASS INDEX: 33.31 KG/M2 | HEIGHT: 69 IN | HEART RATE: 79 BPM

## 2021-01-26 LAB
ANION GAP SERPL CALCULATED.3IONS-SCNC: 5 MMOL/L (ref 4–13)
ATRIAL RATE: 78 BPM
BUN SERPL-MCNC: 19 MG/DL (ref 5–25)
CALCIUM SERPL-MCNC: 8 MG/DL (ref 8.3–10.1)
CHLORIDE SERPL-SCNC: 110 MMOL/L (ref 100–108)
CO2 SERPL-SCNC: 24 MMOL/L (ref 21–32)
CREAT SERPL-MCNC: 1.27 MG/DL (ref 0.6–1.3)
ERYTHROCYTE [DISTWIDTH] IN BLOOD BY AUTOMATED COUNT: 14.6 % (ref 11.6–15.1)
GFR SERPL CREATININE-BSD FRML MDRD: 61 ML/MIN/1.73SQ M
GLUCOSE P FAST SERPL-MCNC: 136 MG/DL (ref 65–99)
GLUCOSE SERPL-MCNC: 136 MG/DL (ref 65–140)
HCT VFR BLD AUTO: 35.1 % (ref 36.5–49.3)
HGB BLD-MCNC: 11.2 G/DL (ref 12–17)
MCH RBC QN AUTO: 33.9 PG (ref 26.8–34.3)
MCHC RBC AUTO-ENTMCNC: 31.9 G/DL (ref 31.4–37.4)
MCV RBC AUTO: 106 FL (ref 82–98)
P AXIS: 37 DEGREES
PLATELET # BLD AUTO: 179 THOUSANDS/UL (ref 149–390)
PMV BLD AUTO: 11.4 FL (ref 8.9–12.7)
POTASSIUM SERPL-SCNC: 3.8 MMOL/L (ref 3.5–5.3)
PR INTERVAL: 150 MS
QRS AXIS: 71 DEGREES
QRSD INTERVAL: 100 MS
QT INTERVAL: 417 MS
QTC INTERVAL: 475 MS
RBC # BLD AUTO: 3.3 MILLION/UL (ref 3.88–5.62)
SODIUM SERPL-SCNC: 139 MMOL/L (ref 136–145)
T WAVE AXIS: 45 DEGREES
VENTRICULAR RATE: 78 BPM
WBC # BLD AUTO: 9 THOUSAND/UL (ref 4.31–10.16)

## 2021-01-26 PROCEDURE — 93321 DOPPLER ECHO F-UP/LMTD STD: CPT | Performed by: INTERNAL MEDICINE

## 2021-01-26 PROCEDURE — 93325 DOPPLER ECHO COLOR FLOW MAPG: CPT | Performed by: INTERNAL MEDICINE

## 2021-01-26 PROCEDURE — 85027 COMPLETE CBC AUTOMATED: CPT | Performed by: PHYSICIAN ASSISTANT

## 2021-01-26 PROCEDURE — NC001 PR NO CHARGE: Performed by: PHYSICIAN ASSISTANT

## 2021-01-26 PROCEDURE — 93010 ELECTROCARDIOGRAM REPORT: CPT | Performed by: INTERNAL MEDICINE

## 2021-01-26 PROCEDURE — 80048 BASIC METABOLIC PNL TOTAL CA: CPT | Performed by: PHYSICIAN ASSISTANT

## 2021-01-26 PROCEDURE — 93308 TTE F-UP OR LMTD: CPT | Performed by: INTERNAL MEDICINE

## 2021-01-26 PROCEDURE — 93308 TTE F-UP OR LMTD: CPT

## 2021-01-26 RX ORDER — PANTOPRAZOLE SODIUM 40 MG/1
40 TABLET, DELAYED RELEASE ORAL
Qty: 30 TABLET | Refills: 0 | Status: SHIPPED | OUTPATIENT
Start: 2021-01-27 | End: 2021-02-24

## 2021-01-26 RX ORDER — ASPIRIN 81 MG/1
81 TABLET ORAL DAILY
Qty: 30 TABLET | Refills: 0 | Status: SHIPPED | OUTPATIENT
Start: 2021-01-26

## 2021-01-26 RX ADMIN — COLCHICINE 0.6 MG: 0.6 TABLET ORAL at 09:33

## 2021-01-26 RX ADMIN — APIXABAN 5 MG: 5 TABLET, FILM COATED ORAL at 09:32

## 2021-01-26 RX ADMIN — SACUBITRIL AND VALSARTAN 1 TABLET: 24; 26 TABLET, FILM COATED ORAL at 09:34

## 2021-01-26 RX ADMIN — FLECAINIDE ACETATE 100 MG: 100 TABLET ORAL at 09:34

## 2021-01-26 RX ADMIN — ALLOPURINOL 100 MG: 100 TABLET ORAL at 09:34

## 2021-01-26 RX ADMIN — FUROSEMIDE 60 MG: 40 TABLET ORAL at 09:33

## 2021-01-26 RX ADMIN — ASPIRIN 81 MG: 81 TABLET, COATED ORAL at 09:32

## 2021-01-26 RX ADMIN — SPIRONOLACTONE 12.5 MG: 25 TABLET, FILM COATED ORAL at 09:31

## 2021-01-26 RX ADMIN — METOPROLOL SUCCINATE 100 MG: 100 TABLET, EXTENDED RELEASE ORAL at 09:32

## 2021-01-26 RX ADMIN — PANTOPRAZOLE SODIUM 40 MG: 40 TABLET, DELAYED RELEASE ORAL at 05:17

## 2021-01-26 NOTE — DISCHARGE INSTRUCTIONS
Please note the following medication recommendations:  - continue metoprolol and diltiazem as previously instructed  - discontinue digoxin  - continue Eliquis 5 mg twice daily  - take baby aspirin 81 mg daily for 30 days, at which time it can be discontinued  - take pantoprazole 40 mg daily for 30 days, at which time it can be discontinued    Please contact our office if you are experiencing chest pain or burning with urination upon discharge  No heavy lifting or strenuous activity for one week  No soaking in a bath tub/hot tub/swimming pool for one week or until groin heals  You may shower - please let soap and water run over the groins, no scrubbing, and pat the area dry  Please place band-aid on groins daily for up to five days, but you may remove sooner if no issues are noted  If you notice ongoing bleeding, swelling, or firm lumps in groin near ablation incision, please contact Dr Suyapa García office - [phone]

## 2021-01-26 NOTE — PLAN OF CARE
Problem: Potential for Falls  Goal: Patient will remain free of falls  Description: INTERVENTIONS:  - Assess patient frequently for physical needs  -  Identify cognitive and physical deficits and behaviors that affect risk of falls    -  [facility] fall precautions as indicated by assessment   - Educate patient/family on patient safety including physical limitations  - Instruct patient to call for assistance with activity based on assessment  - Modify environment to reduce risk of injury  - Consider OT/PT consult to assist with strengthening/mobility  Outcome: Progressing     Problem: PAIN - ADULT  Goal: Verbalizes/displays adequate comfort level or baseline comfort level  Description: Interventions:  - Encourage patient to monitor pain and request assistance  - Assess pain using appropriate pain scale  - Administer analgesics based on type and severity of pain and evaluate response  - Implement non-pharmacological measures as appropriate and evaluate response  - Consider cultural and social influences on pain and pain management  - Notify physician/advanced practitioner if interventions unsuccessful or patient reports new pain  Outcome: Progressing     Problem: INFECTION - ADULT  Goal: Absence or prevention of progression during hospitalization  Description: INTERVENTIONS:  - Assess and monitor for signs and symptoms of infection  - Monitor lab/diagnostic results  - Monitor all insertion sites, i e  indwelling lines, tubes, and drains  - Monitor endotracheal if appropriate and nasal secretions for changes in amount and color  - [facility] appropriate cooling/warming therapies per order  - Administer medications as ordered  - Instruct and encourage patient and family to use good hand hygiene technique  - Identify and instruct in appropriate isolation precautions for identified infection/condition  Outcome: Progressing  Goal: Absence of fever/infection during neutropenic period  Description: INTERVENTIONS:  - Monitor WBC    Outcome: Progressing     Problem: SAFETY ADULT  Goal: Patient will remain free of falls  Description: INTERVENTIONS:  - Assess patient frequently for physical needs  -  Identify cognitive and physical deficits and behaviors that affect risk of falls    -  [facility] fall precautions as indicated by assessment   - Educate patient/family on patient safety including physical limitations  - Instruct patient to call for assistance with activity based on assessment  - Modify environment to reduce risk of injury  - Consider OT/PT consult to assist with strengthening/mobility  Outcome: Progressing  Goal: Maintain or return to baseline ADL function  Description: INTERVENTIONS:  -  Assess patient's ability to carry out ADLs; assess patient's baseline for ADL function and identify physical deficits which impact ability to perform ADLs (bathing, care of mouth/teeth, toileting, grooming, dressing, etc )  - Assess/evaluate cause of self-care deficits   - Assess range of motion  - Assess patient's mobility; develop plan if impaired  - Assess patient's need for assistive devices and provide as appropriate  - Encourage maximum independence but intervene and supervise when necessary  - Involve family in performance of ADLs  - Assess for home care needs following discharge   - Consider OT consult to assist with ADL evaluation and planning for discharge  - Provide patient education as appropriate  Outcome: Progressing  Goal: Maintain or return mobility status to optimal level  Description: INTERVENTIONS:  - Assess patient's baseline mobility status (ambulation, transfers, stairs, etc )    - Identify cognitive and physical deficits and behaviors that affect mobility  - Identify mobility aids required to assist with transfers and/or ambulation (gait belt, sit-to-stand, lift, walker, cane, etc )  - [facility] fall precautions as indicated by assessment  - Record patient progress and toleration of activity level on Mobility SBAR; progress patient to next Phase/Stage  - Instruct patient to call for assistance with activity based on assessment  - Consider rehabilitation consult to assist with strengthening/weightbearing, etc   Outcome: Progressing     Problem: DISCHARGE PLANNING  Goal: Discharge to home or other facility with appropriate resources  Description: INTERVENTIONS:  - Identify barriers to discharge w/patient and caregiver  - Arrange for needed discharge resources and transportation as appropriate  - Identify discharge learning needs (meds, wound care, etc )  - Arrange for interpretive services to assist at discharge as needed  - Refer to Case Management Department for coordinating discharge planning if the patient needs post-hospital services based on physician/advanced practitioner order or complex needs related to functional status, cognitive ability, or social support system  Outcome: Progressing     Problem: Knowledge Deficit  Goal: Patient/family/caregiver demonstrates understanding of disease process, treatment plan, medications, and discharge instructions  Description: Complete learning assessment and assess knowledge base    Interventions:  - Provide teaching at level of understanding  - Provide teaching via preferred learning methods  Outcome: Progressing

## 2021-01-26 NOTE — DISCHARGE SUMMARY
Discharge Summary - Crista Arias 61 y o  male MRN: [id number]    Unit/Bed#: CW2 213-01 Encounter: [id number]      Admission Date: 1/25/2021   Discharge Date: 1/26/2021    Discharge Diagnosis:   1  Paroxysmal atrial fibrillation with periods of rapid ventricular response, status post pulmonary vein isolation and posterior wall isolation 1/25/2021   A ) maintained on Eliquis anticoagulation   B ) rate controlled with metoprolol and diltiazem, digoxin discontinued in the post ablation setting  2  Nonischemic/tachy mediated cardiomyopathy with LVEF 50% per JOSE LUIS 08/2020, improved with LVEF 65% per limited echo this admission   A ) small pericardial effusion noted prior to ablation, limited echo follow-up showed no change and no evidence of hemodynamic compromise   B ) maintained on Entresto and Toprol-XL  3  CAD with isolated 80% ramus stenosis, EF out of proportion to CAD  4  Colon cancer status post hemicolectomy and chemotherapy  5  Hyperlipidemia  6  Chronic HFpEF      Procedures Performed:   Orders Placed This Encounter   Procedures    Cardiac eps/afib ablation   -Atrial fibrillation ablation  -Intracardiac echo  -Transseptal puncture  -3D mapping (Carto)  -Posterior wall isolation    Consultants: none    HPI: Please refer to the initial history and physical as well as procedure notes for full details  Briefly, Crista Arias is a 61y o  year old male with prior tachy mediated cardiomyopathy with now recovered LVEF, paroxysmal atrial fibrillation with rapid ventricular response, CAD with isolated 80% ramus, colon cancer status post hemicolectomy and chemo, and hyperlipidemia  He typically follows with Dr Maria Elena Yarbrough as an outpatient  He was 1st diagnosed with atrial fibrillation 12/2017, at which time he was found to have a tachy mediated cardiomyopathy with LVEF 20%  Cardiac catheterization showed 80% proximal ramus stenosis, but his drop in EF was felt to be out of proportion to this degree of CAD    He underwent JOSE LUIS/cardioversion, and was discharged on rate-controlling medications  Fortunately, his EF improved after maintaining sinus rhythm  In 07/2020, he reported worsening dyspnea with exertion  A stress echo was recommended however when he presented for this study he was found to again be in atrial fibrillation with rapid ventricular response  Limited echo showed slight drop in ejection fraction with a low blood pressure, thus he was sent to the emergency room for further evaluation  Initial workup showed new onset anemia, ultimately he was found to have colon cancer and underwent right hemicolectomy followed by chemotherapy  After he recovered and was seen in outpatient follow-up, he was later referred to EP for more aggressive management of his atrial fibrillation given his prior tachy mediated cardiomyopathy  He was seen in consultation by Dr Jogre Han, and after discussing different treatment options an ablation was ultimately recommended  He presented this hospital admission to undergo this procedure  Hospital Course: Jane Jiang presented at his baseline state of health  After the procedure was explained in detail and consent was obtained, he underwent the above procedures without complications  Please see operative notes by Dr Jorge Han for full details  Prior to beginning the procedure, imaging showed a likely chronic small pericardial effusion with no hemodynamic compromise  He tolerated the procedure well  He was then monitored overnight for further observation  Initially, flecainide antiarrhythmic therapy was going to be recommended  After several doses, it was decided to discontinue this medication given his prior history of nonobstructive CAD  There were no acute issues or events overnight  The following morning he denied all cardiac complaints, including chest pain/pressure, dyspnea, palpitations, dizziness, lightheadedness, or syncope   His vital signs were reviewed and labs are stable  Telemetry showed normal sinus rhythm without recurrent arrhythmias  His groins were soft without significant hematoma or recurrent bleeding, and groin sutures were removed with incident  A repeat limited echo was performed for ongoing evaluation of his effusion  It again showed small free-flowing pericardial effusion with no evidence of hemodynamic compromise  No changes were recommended based on these findings  Review of Systems   Constitution: Negative for fever and malaise/fatigue  Cardiovascular: Negative for chest pain, dyspnea on exertion, irregular heartbeat, leg swelling, near-syncope, orthopnea, palpitations, paroxysmal nocturnal dyspnea and syncope  Physical exam:  GEN: NAD, alert and oriented x 3, well appearing  SKIN: dry without significant lesions or rashes  HEENT: NCAT, PERRL, EOMs intact  NECK: No JVD appreciated  CARDIOVASCULAR: RRR, normal S1, S2 without murmurs, rubs, or gallops appreciated  LUNGS: Clear to auscultation bilaterally without wheezes, rhonchi, or rales  ABDOMEN: Soft, nontender, nondistended  EXTREMITIES/VASCULAR: perfused without clubbing, cyanosis, or LE edema b/l  PSYCH: Normal mood and affect  NEURO: CN ll-Xll grossly intact    He was given routine post ablation discharge instructions and restrictions, and these were explained in detail  He was given a follow up appointment with Dr Maura Cameron, and he was instructed to follow up with his primary cardiologist as previously instructed  In terms of his medications, as noted above he will not begin antiarrhythmic therapy  Instead, he will continue his prior doses of Toprol-XL and diltiazem  Digoxin has been discontinued  He will continue Eliquis anticoagulation as previously recommended  He was also asked to take aspirin 81 mg daily along with pantoprazole 40 mg daily for 30 days, at which time he will be discontinued  He is stable for discharge at this time with all questions answered   He was discussed in detail with Dr Melanie Mathews who is in agreement with this discharge summary  Discharge Medications:  See after visit summary for reconciled discharge medications provided to patient and family  Medications Prior to Admission   Medication    allopurinol (ZYLOPRIM) 100 mg tablet    atorvastatin (LIPITOR) 40 mg tablet    digoxin (LANOXIN) 0 125 mg tablet    diltiazem (CARDIZEM CD) 120 mg 24 hr capsule    ELIQUIS 5 MG    ENTRESTO 24-26 MG TABS    furosemide (LASIX) 40 mg tablet    metoprolol succinate (TOPROL-XL) 100 mg 24 hr tablet    prochlorperazine (COMPAZINE) 10 mg tablet    spironolactone (ALDACTONE) 25 mg tablet         [facility] Labs/diagnostics:  CBC with diff:   Results from last 7 days   Lab Units 01/26/21  0535 01/25/21  0634 01/20/21  0741   WBC Thousand/uL 9 00 6 17 6 12   HEMOGLOBIN g/dL 11 2* 13 4 13 4   HEMATOCRIT % 35 1* 41 3 42 4   MCV fL 106* 105* 107*   PLATELETS Thousands/uL 179 231 212   MCH pg 33 9 34 0 33 7   MCHC g/dL 31 9 32 4 31 6   RDW % 14 6 14 3 15 1   MPV fL 11 4 11 1 11 0   NRBC AUTO /100 WBCs  --  0 0       BMP:  Results from last 7 days   Lab Units 01/26/21  0535 01/25/21  0634 01/20/21  0741   POTASSIUM mmol/L 3 8 4 3 4 7   CHLORIDE mmol/L 110* 108 109*   CO2 mmol/L 24 25 27   BUN mg/dL 19 23 21   CREATININE mg/dL 1 27 1 29 1 22   CALCIUM mg/dL 8 0* 9 2 9 7       Magnesium:       Coags:   Results from last 7 days   Lab Units 01/25/21  0634 01/20/21  0741   INR  1 04 2 26         Complications: none    Condition at Discharge: good     Discharge instructions/Information to patient and family:   See after visit summary for information provided to patient and family  Provisions for Follow-Up Care:  See after visit summary for information related to follow-up care and any pertinent home health orders  Disposition: Home    Planned Readmission: No    Discharge Statement   I spent 45 minutes minutes discharging the patient   This time was spent on the day of discharge  I had direct contact with the patient on the day of discharge  Additional documentation is required if more than 30 minutes were spent on discharge   Evaluating the incision, discussing discharge instructions and restrictions, arranging follow up appointments, discussing medications

## 2021-02-07 NOTE — TELEPHONE ENCOUNTER
Attempted update to James's family  His wife is in a different hospital and no one picked up at their home number  Patient reports having a son, no contact information available per chart  Will need further investigation if there is a family member available to call for him  Please arrange for a return visit in one month for symptom reassessment and PVR

## 2021-02-11 ENCOUNTER — OFFICE VISIT (OUTPATIENT)
Dept: CARDIOLOGY CLINIC | Facility: CLINIC | Age: 61
End: 2021-02-11
Payer: COMMERCIAL

## 2021-02-11 VITALS
HEIGHT: 69 IN | OXYGEN SATURATION: 98 % | DIASTOLIC BLOOD PRESSURE: 82 MMHG | SYSTOLIC BLOOD PRESSURE: 128 MMHG | WEIGHT: 225 LBS | HEART RATE: 82 BPM | BODY MASS INDEX: 33.33 KG/M2

## 2021-02-11 DIAGNOSIS — N17.9 AKI (ACUTE KIDNEY INJURY) (HCC): ICD-10-CM

## 2021-02-11 DIAGNOSIS — I50.22 CHRONIC SYSTOLIC HEART FAILURE (HCC): ICD-10-CM

## 2021-02-11 DIAGNOSIS — I48.0 PAF (PAROXYSMAL ATRIAL FIBRILLATION) (HCC): ICD-10-CM

## 2021-02-11 DIAGNOSIS — I25.10 CORONARY ARTERY DISEASE INVOLVING NATIVE HEART WITHOUT ANGINA PECTORIS, UNSPECIFIED VESSEL OR LESION TYPE: ICD-10-CM

## 2021-02-11 DIAGNOSIS — I47.2 NSVT (NONSUSTAINED VENTRICULAR TACHYCARDIA) (HCC): ICD-10-CM

## 2021-02-11 DIAGNOSIS — I50.22 CHRONIC SYSTOLIC CONGESTIVE HEART FAILURE (HCC): Primary | ICD-10-CM

## 2021-02-11 PROCEDURE — 99215 OFFICE O/P EST HI 40 MIN: CPT | Performed by: INTERNAL MEDICINE

## 2021-02-11 RX ORDER — FUROSEMIDE 40 MG/1
40 TABLET ORAL DAILY
Qty: 90 TABLET | Refills: 3 | Status: SHIPPED | OUTPATIENT
Start: 2021-02-11 | End: 2021-12-06 | Stop reason: SDUPTHER

## 2021-02-11 NOTE — PATIENT INSTRUCTIONS
Look into the [facility] device, a quick internet search will take you to the company website  This device allows a fingerprint ECG to detect afib  Please do it it daily for now  If Afib is detected, call us immediately  Decrease Furosemide to 40mg daily

## 2021-02-11 NOTE — PROGRESS NOTES
[facility]  Follow up note  Morenita Saenz 61 y o  male MRN: [id number]        Problems    1  Chronic systolic congestive heart failure ([facility] )     2  Coronary artery disease involving native heart without angina pectoris, unspecified vessel or lesion type     3  NSVT (nonsustained ventricular tachycardia) ([facility] )     4  PAF (paroxysmal atrial fibrillation) ([facility] )     5   SCARLETT (acute kidney injury) ([place] [id number] )         Impression:     Recovered nonischemic cardiomyopathy   o  primarily felt to be tachycardia mediated due to atrial fibrillation  o  LVEF 1/26/2021 was 65%    chronic systolic CHF  o  he is euvolemic, feels well, class 1 symptoms  o He continues on Entresto chronically, furosemide 60 mg daily, spironolactone 12 5 mg daily, metoprolol succinate 100 mg daily   o LV function is fully recovered as noted above   o We discussed possible down titration of medical therapy slowly   Paroxysmal atrial fibrillation   o  recurred in the setting of hemicolectomy /chemotherapy for colon cancer fall of 2020   o Rate control strategy was instituted aggressively   o Underwent ablation 1/21 successfully  o In normal sinus rhythm   o Continues on Eliquis, diltiazem, metoprolol, although chronically was also on digoxin which was discontinued post ablation  o  blood pressure is stable    coronary artery disease  o  isolated 80% ramus on remote cardiac catheterization, treated medically and felt not unrelated to his cardiomyopathy diagnosis   o In the near future will consider ischemic evaluation    Plan:     I recommend close home monitoring for recurrence of atrial fibrillation using the [employer] device   he is to call with any recurrence   decrease furosemide to 40 mg daily   close follow-up, will be seeing EP, will further discuss down titration of medical therapy   Especially considering discontinuation of diltiazem which was only added to his regimen because of persistent AFib with RVR in the fall of 2020, now status post ablation   Eliquis indefinitely        HPI:   Sandy Figueroa is a 61y o  year old male  Previously seeing Dr Komal Ward, for recovered nonischemic/tachy mediated cardiomyopathy due to atrial fibrillation,  Who had significant recurrence of AFib following hemicolectomy / chemotherapy for colon cancer, with a mild drop in EF to 50%, who was referred for AFib ablation successfully done 1/25/2021, who still maintains many of the aggressive goal-directed medical therapy treatments he was previously on although digoxin was discontinued  His blood pressure is stable and his heart rate is 82, he is still taking the diltiazem which was initiated in the fall of 2020 for adequate rate control  He maintains sinus rhythm  He is on furosemide 60 mg daily, and spironolactone 12 5 mg daily, and low-dose Entresto  Renal function is stable at 1 25  He never had any symptoms due to atrial fibrillation, and therefore all we discussed in detail ambulatory monitoring  Review of Systems   Constitutional: Negative  HENT: Negative  Eyes: Negative  Respiratory: Negative  Cardiovascular: Negative  Gastrointestinal: Negative  Endocrine: Negative  Genitourinary: Negative  Musculoskeletal: Negative  Skin: Negative  Neurological: Negative  Hematological: Negative  Psychiatric/Behavioral: Negative          Past Medical History:   Diagnosis Date    [facility]-fib [facility])     Coronary artery disease     Gout     Herpes zoster     last assessed 12/18/17    Hypertension     Shingles      Social History     Substance and Sexual Activity   Alcohol Use Not Currently    Comment: Socially, former alcohol     Social History     Substance and Sexual Activity   Drug Use No     Social History     Tobacco Use   Smoking Status Former Smoker    Quit date: 8/11/2017    Years since quitting: 3 5   Smokeless Tobacco Never Used       Allergies:  No Known Allergies    Medications:     Current Outpatient Medications:    allopurinol (ZYLOPRIM) 100 mg tablet, TAKE 1 TABLET (100 MG TOTAL) BY MOUTH DAILY, Disp: 90 tablet, Rfl: 3    aspirin (ECOTRIN LOW STRENGTH) 81 mg EC tablet, Take 1 tablet (81 mg total) by mouth daily, Disp: 30 tablet, Rfl: 0    atorvastatin (LIPITOR) 40 mg tablet, Take 1 tablet (40 mg total) by mouth daily with dinner, Disp: 90 tablet, Rfl: 3    diltiazem (CARDIZEM CD) 120 mg 24 hr capsule, Take 1 capsule (120 mg total) by mouth daily, Disp: 30 capsule, Rfl: 6    ELIQUIS 5 MG, TAKE 1 TABLET (5 MG TOTAL) BY MOUTH 2 (TWO) TIMES A DAY, Disp: 60 tablet, Rfl: 10    ENTRESTO 24-26 MG TABS, TAKE 1 TABLET BY MOUTH TWICE A DAY, Disp: 60 tablet, Rfl: 11    furosemide (LASIX) 40 mg tablet, TAKE 1 5 TABLETS (60 MG TOTAL) BY MOUTH DAILY, Disp: 135 tablet, Rfl: 5    metoprolol succinate (TOPROL-XL) 100 mg 24 hr tablet, TAKE 1 TABLET BY MOUTH TWICE A DAY, Disp: 180 tablet, Rfl: 3    pantoprazole (PROTONIX) 40 mg tablet, Take 1 tablet (40 mg total) by mouth daily in the early morning, Disp: 30 tablet, Rfl: 0    prochlorperazine (COMPAZINE) 10 mg tablet, Take 1 tablet (10 mg total) by mouth every 6 (six) hours as needed for nausea or vomiting, Disp: 45 tablet, Rfl: 3    spironolactone (ALDACTONE) 25 mg tablet, Take 0 5 tablets (12 5 mg total) by mouth daily, Disp: 45 tablet, Rfl: 3      Vitals:    02/11/21 1248   BP: 128/82   Pulse: 82   SpO2: 98%     Weight (last 2 days)     Date/Time   Weight    02/11/21 1248   102 (225)            Physical Exam  Constitutional:       General: He is not in acute distress  Appearance: Normal appearance  He is well-developed  He is not diaphoretic  HENT:      Head: Normocephalic and atraumatic  Eyes:      General: No scleral icterus  Conjunctiva/sclera: Conjunctivae normal       Pupils: Pupils are equal, round, and reactive to light  Neck:      Musculoskeletal: Normal range of motion and neck supple  Thyroid: No thyromegaly  Vascular: No JVD     Cardiovascular: Rate and Rhythm: Normal rate and regular rhythm  Heart sounds: Normal heart sounds  No murmur  No friction rub  No gallop  Pulmonary:      Effort: Pulmonary effort is normal  No respiratory distress  Breath sounds: Normal breath sounds  No wheezing or rales  Abdominal:      General: Bowel sounds are normal  There is no distension  Palpations: Abdomen is soft  Tenderness: There is no abdominal tenderness  Musculoskeletal: Normal range of motion  General: No deformity  Right lower leg: No edema  Left lower leg: No edema  Skin:     General: Skin is warm and dry  Findings: No erythema or rash  Neurological:      General: No focal deficit present  Mental Status: He is alert and oriented to person, place, and time  Cranial Nerves: No cranial nerve deficit  Motor: No weakness  Laboratory Studies:     laboratory studies personally reviewed    Cardiac testing:     EKG reviewed personally:    10/20-AFib with RVR, heart rate 115 beats per minute  1/25/2021-normal sinus rhythm, normal EKG      Echocardiogram:   2018-  EF 50%, no regional wall motion abnormalities, mild LVH, grade 1 diastolic dysfunction  8/40-EVACJKI echo-EF 50%, mild left atrial dilation, mild right atrial dilation, mild TR, small pericardial effusion    1/26/2021-EF 65%, mild left atrial dilation and right atrial dilation, mild TR, small pericardial effusion    Stress tests:      Catheterization:   2018- 80% ramus, 60% PDA, no intervention    Holter:         Iván Holley MD    Portions of the record may have been created with voice recognition software  Occasional wrong word or "sound a like" substitutions may have occurred due to the inherent limitations of voice recognition software  Read the chart carefully and recognize, using context, where substitutions have occurred

## 2021-02-19 DIAGNOSIS — I48.0 PAF (PAROXYSMAL ATRIAL FIBRILLATION) (HCC): ICD-10-CM

## 2021-02-19 RX ORDER — ASPIRIN 81 MG/1
TABLET ORAL
Qty: 30 TABLET | Refills: 0 | OUTPATIENT
Start: 2021-02-19

## 2021-02-19 RX ORDER — PANTOPRAZOLE SODIUM 40 MG/1
40 TABLET, DELAYED RELEASE ORAL
Qty: 30 TABLET | Refills: 0 | OUTPATIENT
Start: 2021-02-19

## 2021-02-22 NOTE — PROGRESS NOTES
Electrophysiology Hospital Follow Up  [address] Cardiology [facility]  [address] [facility], [place], [address]    Name: Jahaira Jimenez  : 1960  MRN: [id number]    ASSESSMENT:  1  PAF (paroxysmal atrial fibrillation) ([facility] [address] )     2  Chronic systolic congestive heart failure ([facility] [address] )     3  Syncope and collapse     4  NSVT (nonsustained ventricular tachycardia) ([facility] )     5  Coronary artery disease involving native heart without angina pectoris, unspecified vessel or lesion type         PLAN:  1  Paroxysmal atrial fibrillation  - anticoagulated with Eliquis / [id number] score of 2 (CHF, CAD)  - EF of 65% per echo 2021   - rate control: diltiazem 120mg daily and metoprolol succinate 100mg twice daily (had been on digoxin prior to ablation)  - antiarrhythmic therapy: none (flecainide was discontinued due to CAD)  - prior cardioversion: 2020  - status post ablation for atrial fibrillation with pulmonary vein isolation and posterior wall isolation by Dr Romayne Alias on 2021  - first diagnosed 2017  2  Chronic systolic congestive heart failure  - maintained on metoprolol succinate and Entresto   - diuretic regimen of furosemide 40mg daily  - also on spironolactone 12 5mg daily  3  Nonischemic cardiomyopathy  - felt to be tachy-mediated - in the past EF as low as 20% ()  - most recent EF prior to admission was 50% per JOSE LUIS 2020 - had improvement to 65% 2021  4  History of CAD  - prior cath revealing 80% stenosis of ramus  - maintained on aspirin, statin and beta blocker  5  Hyperlipidemia  6  Prior colon cancer  - status post hemicolectomy and chemotherapy    IMPRESSION:  1  Atrial fibrillation - Patient is currently maintaining sinus rhythm with a single PAC noted  Patient does not have symptoms with his atrial fibrillation  Therefore, we discussed multiple options for further monitoring    Patient was given pamphlet concerning [employer] loop recorder and he will also be doing research on the Bianca shethjuli  Will see him in a couple months at which point he can a term in which one he would like to utilize for further management of his atrial fibrillation  Will not change current regimen of metoprolol and diltiazem given the fact the patient's heart rate is in the 70s and his pressures though on the lower side are not symptomatic  Patient was advised to invest in a blood pressure cuff and let us know if he has symptoms anonymous with hypotension  As patient has completed Protonix for 1 month in the post ablation setting, this will be discontinued  2  Aspirin - As patient does have CAD, will continue on aspirin and Eliquis  3  Lifestyle modification - we did discuss limiting alcohol intake along with caffeine and trying to incorporate walking as exercise (patient does golf frequently when the weather is nice)  Patient is to follow up in our EP office in 2 months with Dr Teresita Thomson  He is to call our office with any further questions or concerns in the meantime  HPI:   Interim history: Randall Elizabeth is a 61 y o  male with paroxysmal atrial fibrillation anticoagulated with Eliquis status post ablation, chronic systolic congestive heart failure, nonischemic cardiomyopathy with prior tachy mediated cardiomyopathy, CAD with 80 percent stenosis of ramus, hyperlipidemia, and prior colon cancer status post hemicolectomy and chemotherapy  He presents today for hospital follow up  Patient has carried the diagnosis of atrial fibrillation since December 2017 - he was diagnosed when he presented to the hospital with RVR and newly diagnosed cardiomyopathy  Did have ischemic workup that showed 80 percent proximal stenosis of ramus artery that was out of proportion with his cardiomyopathy  He started on guideline directed medical therapy and discharged on LifeVest repeat echo as an outpatient showed improvement to 50% and device was not needed      He had subsequent hospitalization in August of 2020 at which point he was found to have malignant appearing multilobular and ulcerated mass in the proximal ascending colon for which he underwent right hemicolectomy and ileiocolic anastomosis  His atrial fibrillation during that time was rate controlled  He was set up for cardioversion after this hospitalization on 09/24  Unfortunately, when seen by his primary cardiologist, Dr Vanessa Dewey, shortly afterwards he is found to have converted back into atrial fibrillation  Therefore, he saw Dr Maura Cameron in consultation at which point ablation was recommended  He presented on 01/25 and underwent pulmonary vein isolation along with posterior wall isolation for atrial fibrillation  At that time his flecainide was also discontinued due to having isolated 80 percent ramus  Repeat echo during that hospitalization showed an ejection fraction of 65 percent  He presents today for post ablation follow up and reports doing well  He denies any chest pain or any symptoms similar to that after his ablation  He had no issues with groin area healing atferwards  Patient does not have a blood pressures cuff at home but does report that when he was in the hospital he did have hypotension and is aware of the signs of lightheadedness and dizziness and has not experienced this  Of note, patient denies any palpitations or racing heartbeat but has never felt his atrial fibrillation in the past     EKG:  Sinus rhythm with single PAC at 77 beats per minute, normal axis, no evidence of NJ or QT prolongation    ROS:   Review of Systems   Constitution: Negative for diaphoresis and malaise/fatigue  Cardiovascular: Negative for chest pain, dyspnea on exertion, irregular heartbeat, leg swelling, near-syncope, palpitations and syncope  Respiratory: Negative for shortness of breath and sleep disturbances due to breathing  All other systems reviewed and are negative        OBJECTIVE:   Vitals:   BP 104/68 (BP Location: Left arm, Patient Position: Sitting, Cuff Size: Large)   Pulse 77   Ht 5' 9" (1 753 m)   Wt 104 kg (230 lb 1 6 oz)   BMI 33 98 kg/m²       Physical Exam   Constitutional: He is oriented to person, place, and time  He appears well-developed and well-nourished  No distress  HENT:   Head: Normocephalic and atraumatic  Eyes: Pupils are equal, round, and reactive to light  Neck: Normal range of motion  No JVD present  Cardiovascular: Normal rate and regular rhythm  Exam reveals no gallop and no friction rub  No murmur heard  Pulmonary/Chest: Effort normal and breath sounds normal  No respiratory distress  He has no wheezes  Abdominal: Soft  Musculoskeletal: Normal range of motion  Neurological: He is alert and oriented to person, place, and time  Skin: Skin is warm and dry  He is not diaphoretic  Psychiatric: He has a normal mood and affect  Nursing note and vitals reviewed        Medications:      Current Outpatient Medications:     allopurinol (ZYLOPRIM) 100 mg tablet, TAKE 1 TABLET (100 MG TOTAL) BY MOUTH DAILY, Disp: 90 tablet, Rfl: 3    aspirin (ECOTRIN LOW STRENGTH) 81 mg EC tablet, Take 1 tablet (81 mg total) by mouth daily, Disp: 30 tablet, Rfl: 0    atorvastatin (LIPITOR) 40 mg tablet, Take 1 tablet (40 mg total) by mouth daily with dinner, Disp: 90 tablet, Rfl: 3    diltiazem (CARDIZEM CD) 120 mg 24 hr capsule, Take 1 capsule (120 mg total) by mouth daily, Disp: 30 capsule, Rfl: 6    ELIQUIS 5 MG, TAKE 1 TABLET (5 MG TOTAL) BY MOUTH 2 (TWO) TIMES A DAY, Disp: 60 tablet, Rfl: 10    ENTRESTO 24-26 MG TABS, TAKE 1 TABLET BY MOUTH TWICE A DAY, Disp: 60 tablet, Rfl: 11    furosemide (LASIX) 40 mg tablet, Take 1 tablet (40 mg total) by mouth daily, Disp: 90 tablet, Rfl: 3    metoprolol succinate (TOPROL-XL) 100 mg 24 hr tablet, TAKE 1 TABLET BY MOUTH TWICE A DAY, Disp: 180 tablet, Rfl: 3    pantoprazole (PROTONIX) 40 mg tablet, Take 1 tablet (40 mg total) by mouth daily in the early morning, Disp: 30 tablet, Rfl: 0    prochlorperazine (COMPAZINE) 10 mg tablet, Take 1 tablet (10 mg total) by mouth every 6 (six) hours as needed for nausea or vomiting, Disp: 45 tablet, Rfl: 3    spironolactone (ALDACTONE) 25 mg tablet, Take 0 5 tablets (12 5 mg total) by mouth daily, Disp: 45 tablet, Rfl: 3     Family History   Problem Relation Age of Onset    Coronary artery disease Mother     Alcohol abuse Father     Coronary artery disease Father     Colon cancer Neg Hx      Social History     Socioeconomic History    Marital status: Single     Spouse name: Not on file    Number of children: Not on file    Years of education: Not on file    Highest education level: Not on file   Occupational History    Not on file   Social Needs    Financial resource strain: Not on file    Food insecurity     Worry: Not on file     Inability: Not on file    Transportation needs     Medical: Not on file     Non-medical: Not on file   Tobacco Use    Smoking status: Former Smoker     Quit date: 8/11/2017     Years since quitting: 3 5    Smokeless tobacco: Never Used   Substance and Sexual Activity    Alcohol use: Not Currently     Comment: Socially, former alcohol    Drug use: No    Sexual activity: Yes     Partners: Female   Lifestyle    Physical activity     Days per week: Not on file     Minutes per session: Not on file    Stress: Not on file   Relationships    Social connections     Talks on phone: Not on file     Gets together: Not on file     Attends Oriental orthodox service: Not on file     Active member of club or organization: Not on file     Attends meetings of clubs or organizations: Not on file     Relationship status: Not on file    Intimate partner violence     Fear of current or ex partner: Not on file     Emotionally abused: Not on file     Physically abused: Not on file     Forced sexual activity: Not on file   Other Topics Concern    Not on file   Social History Narrative  Not on file     Social History     Tobacco Use   Smoking Status Former Smoker    Quit date: 2017    Years since quitting: 3 5   Smokeless Tobacco Never Used     Social History     Substance and Sexual Activity   Alcohol Use Not Currently    Comment: Socially, former alcohol       Labs & Results:  Below is the patient's most recent value for Albumin, ALT, AST, BUN, Calcium, Chloride, Cholesterol, CO2, Creatinine, GFR, Glucose, HDL, Hematocrit, Hemoglobin, Hemoglobin A1C, LDL, Magnesium, Phosphorus, Platelets, Potassium, PSA, Sodium, Triglycerides, and WBC  Lab Results   Component Value Date    ALT 30 2021    AST 18 2021    BUN 19 2021    CALCIUM 8 0 (L) 2021     (H) 2021    CO2 24 2021    CREATININE 1 27 2021    HDL 41 2020    HCT 35 1 (L) 2021    HGB 11 2 (L) 2021    HGBA1C 6 2 2017    MG 2 3 2020    PHOS 4 1 2020     2021    K 3 8 2021    PSA 0 9 10/09/2020    TRIG 74 2020    WBC 9 00 2021     Note: for a comprehensive list of the patient's lab results, access the Results Review activity      CARDIAC TESTING:   ECHO:   Results for orders placed during the hospital encounter of 10/17/18   Echo complete with contrast if indicated    [facility]  [facility] [address] [facility]  [phone]    Transthoracic Echocardiogram  2D, M-mode, Doppler, and Color Doppler    Study date:  17-Oct-2018    Patient: Marek Robles  MR number: [id number]  Account number: [de-identified]  : 1960  Age: 62 years  Gender: Male  Status: Outpatient  Location: [address] [place] [address] [facility] [place]  Height: 69 in  Weight: 216 lb  BP: 140/ 82 mmHg    Indications: Heart Failure    Diagnoses: I50 9 - Heart failure, unspecified    Sonographer:  Ellyn Kocher, RCS  Primary Physician:  Daniel Bright DO  Referring Physician:  Belgica Lynn MD  Group:  [facility]  Interpreting Physician:  Linda Rand MD    SUMMARY    LEFT VENTRICLE:  Systolic function was at the lower limits of normal  Ejection fraction was estimated to be 50 %  There were no regional wall motion abnormalities  Wall thickness was mildly to moderately increased  Doppler parameters were consistent with abnormal left ventricular relaxation (grade 1 diastolic dysfunction)  RIGHT VENTRICLE:  The size was normal   Systolic function was normal     PERICARDIUM:  A trace pericardial effusion was identified  There was no evidence of hemodynamic compromise  COMPARISONS:  There has been no significant interval change  Comparison was made with the previous study of 29-Mar-2018  HISTORY: PRIOR HISTORY: MI, CAD, HTN, AFIB, SVT    PROCEDURE: [facility]  This was a routine study  The transthoracic approach was used  The study included complete 2D imaging, M-mode, complete spectral Doppler, and color Doppler  The  heart rate was 61 bpm, at the start of the study  Images were obtained from the parasternal, apical, subcostal, and suprasternal notch acoustic windows  Echocardiographic views were limited due to lung interference  Image quality was  adequate  LEFT VENTRICLE: Size was normal  Systolic function was at the lower limits of normal  Ejection fraction was estimated to be 50 %  There were no regional wall motion abnormalities  Wall thickness was mildly to moderately increased  DOPPLER:  Doppler parameters were consistent with abnormal left ventricular relaxation (grade 1 diastolic dysfunction)  RIGHT VENTRICLE: The size was normal  Systolic function was normal  Wall thickness was normal     LEFT ATRIUM: Size was at the upper limits of normal     RIGHT ATRIUM: Size was normal     MITRAL VALVE: Valve structure was normal  There was normal leaflet separation  DOPPLER: The transmitral velocity was within the normal range   There was no evidence for stenosis  There was no significant regurgitation  AORTIC VALVE: The valve was trileaflet  Leaflets exhibited normal thickness and normal cuspal separation  DOPPLER: Transaortic velocity was within the normal range  There was no evidence for stenosis  There was no significant  regurgitation  TRICUSPID VALVE: The valve structure was normal  There was normal leaflet separation  DOPPLER: The transtricuspid velocity was within the normal range  There was no evidence for stenosis  There was no significant regurgitation  PULMONIC VALVE: Leaflets exhibited normal thickness, no calcification, and normal cuspal separation  DOPPLER: The transpulmonic velocity was within the normal range  There was no significant regurgitation  PERICARDIUM: A trace pericardial effusion was identified  There was no evidence of hemodynamic compromise  AORTA: The root exhibited normal size  SYSTEMIC VEINS: IVC: The inferior vena cava was normal in size   Respirophasic changes were normal     SYSTEM MEASUREMENT TABLES    2D  %FS: 26 08 %  Ao Diam: 4 01 cm  EDV(Teich): 105 38 ml  EF Biplane: 52 16 %  EF(Cube): 59 62 %  EF(Teich): 51 15 %  ESV(Cube): 43 52 ml  ESV(Teich): 51 48 ml  IVSd: 1 45 cm  LA Area: 20 83 cm2  LA Diam: 3 55 cm  LVEDV MOD A2C: 129 95 ml  LVEDV MOD A4C: 153 5 ml  LVEDV MOD BP: 146 59 ml  LVEF MOD A2C: 47 85 %  LVEF MOD A4C: 59 43 %  LVESV MOD A2C: 67 77 ml  LVESV MOD A4C: 62 27 ml  LVESV MOD BP: 70 12 ml  LVIDd: 4 76 cm  LVIDs: 3 52 cm  LVLd A2C: 8 67 cm  LVLd A4C: 8 72 cm  LVLs A2C: 7 68 cm  LVLs A4C: 7 6 cm  LVPWd: 1 36 cm  RA Area: 17 39 cm2  RV Diam : 3 55 cm  SV MOD A2C: 62 18 ml  SV MOD A4C: 91 23 ml  SV(Cube): 64 25 ml  SV(Teich): 53 9 ml    MM  TAPSE: 1 49 cm    PW  E': 0 04 m/s  E/E': 23 04  MV A Brennan: 0 66 m/s  MV Dec Beadle: 3 43 m/s2  MV DecT: 248 53 ms  MV E Brennan: 0 85 m/s  MV E/A Ratio: 1 3    Inters\A [facility]ology [facility] [place] [facility]\"" Commission Accredited Echocardiography Laboratory    Prepared and electronically signed by    Sherin Sorto MD  Signed 18-Oct-2018 11:44:59       Results for orders placed during the hospital encounter of 20   JOSE LUIS    Narrative [address]  [facility], [address] [facility]  [phone]    Transesophageal Echocardiogram  2D, Doppler, and Color Doppler    Study date:  28-Aug-2020    Patient: Lani Palmer  MR number: [id number]  Account number: [de-identified]  : 1960  Age: 61 years  Gender: Male  Status: Inpatient  Location: Operating room  Height: 70 in  Weight: 150 lb  BP:    Indications: TIA    Diagnoses: G45 9 - Transient cerebral ischemic attack, unspecified    Sonographer:  SANKET Justin  Referring Physician:  Pema Downs MD  Group:  [facility]  Cardiology Fellow:  Daina Villa MD  Interpreting Physician:  Pema Downs MD    SUMMARY    LEFT VENTRICLE:  Systolic function was at the lower limits of normal  Ejection fraction was estimated to be 50 %  There were no regional wall motion abnormalities  Wall thickness was normal     RIGHT VENTRICLE:  The size was normal   Systolic function was normal   Wall thickness was normal     LEFT ATRIAL APPENDAGE:  No thrombus was identified  There was mild spontaneous echo contrast ("smoke") in the appendage  MITRAL VALVE:  There was trace regurgitation  HISTORY: PRIOR HISTORY: HTN, Afib, REGULO    PROCEDURE: The procedure was performed in the operating room  This was a routine study  The risks and alternatives of the procedure were explained to the patient and informed consent was obtained  The transesophageal approach was used  The  study included complete 2D imaging, complete spectral Doppler, and color Doppler  The heart rate was 102 bpm, at the start of the study  An adult omniplane probe was inserted by the cardiology fellow under direct supervision of the  attending cardiologist  Intubated with ease  One intubation attempt(s)   There was no blood detected on the probe  Image quality was adequate  MEDICATIONS: Anesthesia  LEFT VENTRICLE: Size was normal  Systolic function was at the lower limits of normal  Ejection fraction was estimated to be 50 %  There were no regional wall motion abnormalities  Wall thickness was normal     RIGHT VENTRICLE: The size was normal  Systolic function was normal  Wall thickness was normal     LEFT ATRIUM: Size was normal  No thrombus was identified  APPENDAGE: No thrombus was identified  There was mild spontaneous echo contrast ("smoke") in the appendage  ATRIAL SEPTUM: No defect or patent foramen ovale was identified  RIGHT ATRIUM: Size was normal  No thrombus was identified  MITRAL VALVE: Valve structure was normal  There was normal leaflet separation  There was no echocardiographic evidence of vegetation  DOPPLER: There was trace regurgitation  AORTIC VALVE: The valve was trileaflet  Leaflets exhibited normal thickness and normal cuspal separation  There was no echocardiographic evidence of vegetation  DOPPLER: There was no regurgitation  TRICUSPID VALVE: The valve structure was normal  There was normal leaflet separation  There was no echocardiographic evidence of vegetation  DOPPLER: There was no regurgitation  PULMONIC VALVE: Leaflets exhibited normal thickness, no calcification, and normal cuspal separation  There was no echocardiographic evidence of vegetation  DOPPLER: There was trace regurgitation  PERICARDIUM: There was no pericardial effusion  The pericardium was normal in appearance  AORTA: The root exhibited normal size  There was no atheroma  There was no evidence for dissection  There was no evidence for aneurysm  [address] Accredited Echocardiography Laboratory    Prepared and electronically signed by    Jacinto Noyola MD  Signed 28-Aug-2020 14:25:55         CATH:  No results found for this or any previous visit      STRESS TEST:  Results for orders placed during the hospital encounter of 18   NM myocardial perfusion spect (stress and/or rest)    Narrative [address]  [facility], [address] [facility]  [phone]    Stress Gated SPECT Myocardial Perfusion Imaging after Regadenoson    Patient: Noe Rivera  MR number: [id number]  Account number: [de-identified]  : 1960  Age: 62 years  Gender: Male  Status: Outpatient  Location: [address] [place] [address] and Vascular [place]  Height: 69 in  Weight: 189 lb  BP: 120/ 90 mmHg    Allergies: NO KNOWN ALLERGIES    Diagnosis: H50 59 - Chronic systolic (congestive) heart failure    Referring Physician:  Sarah Edward MD  Primary Physician:  Dilan Colin MD  Technician:  SUN Fish  RN:  Mert Sahu RN  Group:  [facility]  Cardiology Fellow:  Dr Waylon Holley  Report Prepared by[de-identified]  Mert Sahu RN  Interpreting Physician:  Patrice Patterson DO    INDICATIONS: Assessment of cardiomyopathy  HISTORY: Cardiomyopathy, Lifevest  The patient is a 62year old  male  Chest pain status: chest pain  Other symptoms: edema  Coronary artery disease risk factors: none  Medications: a beta blocker and a lipid lowering agent  PHYSICAL EXAM: Baseline physical exam screening: no wheezes audible  REST ECG: Normal sinus rhythm  PROCEDURE: The study was performed at the [address]  A regadenoson infusion pharmacologic stress test was performed  Gated SPECT myocardial perfusion imaging was performed during stress  Systolic blood pressure was  120 mmHg, at the start of the study  Diastolic blood pressure was 90 mmHg, at the start of the study  The heart rate was 65 bpm, at the start of the study  IV double checked  Regadenoson protocol:  HR bpm SBP mmHg DBP mmHg Symptoms  Baseline 65 120 90 none  Immediate 81 122 92 mild dyspnea  1 min 73 120 90 none  No medications or fluids given      STRESS SUMMARY: Duration of pharmacologic stress was 3 min and 0 sec  Maximal heart rate during stress was 91 bpm  The heart rate response to stress was normal  Normal blood pressure response to regadenosan  The rate-pressure product for  the peak heart rate and blood pressure was 74889  There was no chest pain during stress  The stress test was terminated due to protocol completion  Pre oxygen saturation: 100 %  Peak oxygen saturation: 99 %  The stress ECG was negative for  ischemia and normal  There were no stress arrhythmias or conduction abnormalities  ISOTOPE ADMINISTRATION:  Resting isotope administration Stress isotope administration  Agent Tetrofosmin Tetrofosmin  Dose 10 12 mCi 30 9 mCi  Date 02/19/2018 02/19/2018  Injection-image interval 47 min 52 min    The radiopharmaceutical was injected at the peak effect of pharmacologic stress  MYOCARDIAL PERFUSION IMAGING:  The image quality was good  Rotating projection images reveal mild patient motion  Left ventricular size was normal  The TID ratio was 1 17  PERFUSION DEFECTS:  -  There was a moderate-sized, moderately severe, partially reversible myocardial perfusion defect of the basal to mid inferolateral wall  GATED SPECT:  The calculated left ventricular ejection fraction was 47 %  Left ventricular ejection fraction was mildly decreased by visual estimate  SUMMARY:  -  Stress results: There was no chest pain during stress  -  ECG conclusions: The stress ECG was negative for ischemia and normal   -  Perfusion imaging: There was a moderate-sized, moderately severe, partially reversible myocardial perfusion defect of the basal to mid inferolateral wall  -  Gated SPECT: The calculated left ventricular ejection fraction was 47 %  Left ventricular ejection fraction was mildly decreased by visual estimate  IMPRESSIONS: Abnormal study after vasodilation and low level exercise without reproduction of symptoms  Partially reversible defect of the inferolateral wall   Fixed defect Apical lateral  Left ventricular systolic function was reduced,  with regional wall motion abnormalities      Prepared and signed by    Jahaira Eckert DO  Signed 02/19/2018 14:38:42

## 2021-02-24 ENCOUNTER — OFFICE VISIT (OUTPATIENT)
Dept: CARDIOLOGY CLINIC | Facility: CLINIC | Age: 61
End: 2021-02-24
Payer: COMMERCIAL

## 2021-02-24 VITALS
DIASTOLIC BLOOD PRESSURE: 68 MMHG | BODY MASS INDEX: 34.08 KG/M2 | WEIGHT: 230.1 LBS | HEIGHT: 69 IN | SYSTOLIC BLOOD PRESSURE: 104 MMHG | HEART RATE: 77 BPM

## 2021-02-24 DIAGNOSIS — I50.22 CHRONIC SYSTOLIC CONGESTIVE HEART FAILURE (HCC): ICD-10-CM

## 2021-02-24 DIAGNOSIS — I47.2 NSVT (NONSUSTAINED VENTRICULAR TACHYCARDIA) (HCC): ICD-10-CM

## 2021-02-24 DIAGNOSIS — I25.10 CORONARY ARTERY DISEASE INVOLVING NATIVE HEART WITHOUT ANGINA PECTORIS, UNSPECIFIED VESSEL OR LESION TYPE: ICD-10-CM

## 2021-02-24 DIAGNOSIS — R55 SYNCOPE AND COLLAPSE: ICD-10-CM

## 2021-02-24 DIAGNOSIS — I48.0 PAF (PAROXYSMAL ATRIAL FIBRILLATION) (HCC): Primary | ICD-10-CM

## 2021-02-24 PROCEDURE — 99214 OFFICE O/P EST MOD 30 MIN: CPT | Performed by: PHYSICIAN ASSISTANT

## 2021-02-24 PROCEDURE — 93000 ELECTROCARDIOGRAM COMPLETE: CPT | Performed by: PHYSICIAN ASSISTANT

## 2021-02-24 NOTE — PATIENT INSTRUCTIONS
Please  81mg tablet aspirin  Stop after last two doses of Protonix - no need for refill  Look into monitor "Rahatdia" or loop monitor

## 2021-03-02 ENCOUNTER — TELEPHONE (OUTPATIENT)
Dept: HEMATOLOGY ONCOLOGY | Facility: CLINIC | Age: 61
End: 2021-03-02

## 2021-03-02 NOTE — TELEPHONE ENCOUNTER
Appointment Confirmation     Appointment with  [facility]    Appointment date & time 03-25-21 @ 8:40am    Location Yo    Patient verbilized Understanding

## 2021-03-03 ENCOUNTER — HOSPITAL ENCOUNTER (OUTPATIENT)
Dept: INFUSION CENTER | Facility: HOSPITAL | Age: 61
Discharge: HOME | End: 2021-03-03
Payer: COMMERCIAL

## 2021-03-03 ENCOUNTER — HOSPITAL ENCOUNTER (OUTPATIENT)
Dept: RADIOLOGY | Facility: HOSPITAL | Age: 61
Discharge: HOME | End: 2021-03-03
Payer: COMMERCIAL

## 2021-03-03 ENCOUNTER — APPOINTMENT (OUTPATIENT)
Dept: PEDIATRIC PROCEDURES | Facility: HOSPITAL | Age: 61
End: 2021-03-03
Payer: COMMERCIAL

## 2021-03-03 ENCOUNTER — TRANSCRIBE ORDERS (OUTPATIENT)
Dept: RADIOLOGY | Facility: HOSPITAL | Age: 61
End: 2021-03-03

## 2021-03-03 VITALS — TEMPERATURE: 97 F

## 2021-03-03 DIAGNOSIS — C18.2 CANCER OF ASCENDING COLON (HCC): ICD-10-CM

## 2021-03-03 DIAGNOSIS — Z95.828 PORT-A-CATH IN PLACE: Primary | ICD-10-CM

## 2021-03-03 LAB
BASOPHILS # BLD AUTO: 0.04 THOUSANDS/ΜL (ref 0–0.1)
BASOPHILS NFR BLD AUTO: 1 % (ref 0–1)
CEA SERPL-MCNC: 1.4 NG/ML (ref 0–3)
EOSINOPHIL # BLD AUTO: 0.07 THOUSAND/ΜL (ref 0–0.61)
EOSINOPHIL NFR BLD AUTO: 1 % (ref 0–6)
ERYTHROCYTE [DISTWIDTH] IN BLOOD BY AUTOMATED COUNT: 12.6 % (ref 11.6–15.1)
HCT VFR BLD AUTO: 41 % (ref 36.5–49.3)
HGB BLD-MCNC: 13.2 G/DL (ref 12–17)
IMM GRANULOCYTES # BLD AUTO: 0.04 THOUSAND/UL (ref 0–0.2)
IMM GRANULOCYTES NFR BLD AUTO: 1 % (ref 0–2)
LYMPHOCYTES # BLD AUTO: 1.3 THOUSANDS/ΜL (ref 0.6–4.47)
LYMPHOCYTES NFR BLD AUTO: 23 % (ref 14–44)
MCH RBC QN AUTO: 33.3 PG (ref 26.8–34.3)
MCHC RBC AUTO-ENTMCNC: 32.2 G/DL (ref 31.4–37.4)
MCV RBC AUTO: 104 FL (ref 82–98)
MONOCYTES # BLD AUTO: 0.71 THOUSAND/ΜL (ref 0.17–1.22)
MONOCYTES NFR BLD AUTO: 12 % (ref 4–12)
NEUTROPHILS # BLD AUTO: 3.62 THOUSANDS/ΜL (ref 1.85–7.62)
NEUTS SEG NFR BLD AUTO: 62 % (ref 43–75)
NRBC BLD AUTO-RTO: 0 /100 WBCS
PLATELET # BLD AUTO: 184 THOUSANDS/UL (ref 149–390)
PMV BLD AUTO: 10.6 FL (ref 8.9–12.7)
RBC # BLD AUTO: 3.96 MILLION/UL (ref 3.88–5.62)
WBC # BLD AUTO: 5.78 THOUSAND/UL (ref 4.31–10.16)

## 2021-03-03 PROCEDURE — 85025 COMPLETE CBC W/AUTO DIFF WBC: CPT

## 2021-03-03 PROCEDURE — 82378 CARCINOEMBRYONIC ANTIGEN: CPT

## 2021-03-03 PROCEDURE — G1004 CDSM NDSC: HCPCS

## 2021-03-03 PROCEDURE — 74177 CT ABD & PELVIS W/CONTRAST: CPT

## 2021-03-03 PROCEDURE — 71260 CT THORAX DX C+: CPT

## 2021-03-03 RX ADMIN — IOHEXOL 100 ML: 350 INJECTION, SOLUTION INTRAVENOUS at 07:49

## 2021-03-03 NOTE — PROGRESS NOTES
Port flushed and labs drawn as ordered  PT declines AVS   He has appt with dr Trudy Carlton this month and plans to discuss port removal   He will schedule flush appt if needed at that time

## 2021-03-03 NOTE — PLAN OF CARE
Problem: Potential for Falls  Goal: Patient will remain free of falls  Description: INTERVENTIONS:  - Assess patient frequently for physical needs  -  Identify cognitive and physical deficits and behaviors that affect risk of falls    -  [place] fall precautions as indicated by assessment   - Educate patient/family on patient safety including physical limitations  - Instruct patient to call for assistance with activity based on assessment  - Modify environment to reduce risk of injury  - Consider OT/PT consult to assist with strengthening/mobility  Outcome: Progressing

## 2021-03-08 ENCOUNTER — TELEPHONE (OUTPATIENT)
Dept: HEMATOLOGY ONCOLOGY | Facility: CLINIC | Age: 61
End: 2021-03-08

## 2021-03-08 NOTE — TELEPHONE ENCOUNTER
Patient called to review CT  Follow up 3/25/21 8:40 AM Dr Robles at [facility] office  3/3/21 CT  IMPRESSION:     1   Status post right hemicolectomy  No metastatic disease in the chest, abdomen or pelvis      2  Resolved pericardial effusion      3   Stable cholelithiasis with mild gallbladder wall thickening but without pericholecystic inflammatory change  Patient advised of above  Patient verbalized understanding of above

## 2021-03-25 ENCOUNTER — OFFICE VISIT (OUTPATIENT)
Dept: HEMATOLOGY ONCOLOGY | Facility: CLINIC | Age: 61
End: 2021-03-25
Payer: COMMERCIAL

## 2021-03-25 VITALS
SYSTOLIC BLOOD PRESSURE: 124 MMHG | RESPIRATION RATE: 16 BRPM | TEMPERATURE: 97.3 F | DIASTOLIC BLOOD PRESSURE: 76 MMHG | OXYGEN SATURATION: 96 % | HEIGHT: 69 IN | WEIGHT: 226 LBS | BODY MASS INDEX: 33.47 KG/M2 | HEART RATE: 96 BPM

## 2021-03-25 DIAGNOSIS — C18.2 CANCER OF ASCENDING COLON (HCC): Primary | ICD-10-CM

## 2021-03-25 PROCEDURE — 99214 OFFICE O/P EST MOD 30 MIN: CPT | Performed by: INTERNAL MEDICINE

## 2021-03-25 NOTE — PROGRESS NOTES
Hematology/Oncology Outpatient Follow- up Note  Crista Arias 61 y o  male MRN: @ Encounter: [id number]        Date:  3/25/2021    Presenting Complaint/Diagnosis : Stage III colon cancer    HPI:      Crista Arias is seen for initial consultation 9/15/2020 regarding newly diagnosed stage III colon cancer with 1/18 lymph nodes positive for malignancy  This makes him a stage IIIA  As far symptoms are concerned he states he is recovering well  Really denies any complaints  Denies any nausea denies any vomiting denies any diarrhea  Overall otherwise is at baseline  Does feel better than when he had his malignancy  Previous Hematologic/ Oncologic History:      Modified FOLFOX for 6 cycles    Current Hematologic/ Oncologic Treatment:      Observation    Interval History:      The patient returns for follow-up visit  He states he is doing well  His most recent imaging shows no evidence of metastatic disease or progression  Denies any nausea denies any vomiting denies any diarrhea  The rest of his 14 point review of systems today was negative  Test Results:    Imaging: Ct Chest Abdomen Pelvis W Contrast    Result Date: 3/7/2021  Narrative: CT CHEST, ABDOMEN AND PELVIS WITH IV CONTRAST INDICATION:   C18 2: Malignant neoplasm of ascending colon  Interval right hemicolectomy with anastomosis  COMPARISON:  CT chest/abdomen/pelvis from 8/24/2020  TECHNIQUE: CT examination of the chest, abdomen and pelvis was performed  Axial, sagittal, and coronal 2D reformatted images were created from the source data and submitted for interpretation  Radiation dose length product (DLP) for this visit:  1278 03 mGy-cm   This examination, like all CT scans performed in the [facility], was performed utilizing techniques to minimize radiation dose exposure, including the use of iterative reconstruction and automated exposure control   IV Contrast:  100 mL of iohexol (OMNIPAQUE) Enteric Contrast: Enteric contrast was administered  FINDINGS: CHEST LUNGS:  Lungs are clear  There is no tracheal or endobronchial lesion  PLEURA:  Unremarkable  HEART/GREAT VESSELS:  Heart is normal size  Previously noted pericardial effusion has essentially resolved  MEDIASTINUM AND JESSICA:  Unremarkable  CHEST WALL AND LOWER NECK:   Right chest port has been placed into satisfactory position with tip at the cavoatrial junction  ABDOMEN LIVER/BILIARY TREE:  Unremarkable  GALLBLADDER:  Stable cholelithiasis with a mildly thickened wall  No pericholecystic inflammation  SPLEEN:  Unremarkable  PANCREAS:  Unremarkable  ADRENAL GLANDS:  Unremarkable  KIDNEYS/URETERS:  Stable exophytic right renal cyst   Stable too small to characterize hypodense lesion in the left kidney is also likely a small cyst  No hydronephrosis  STOMACH AND BOWEL:  There is post right hemicolectomy  No tumor recurrence  No bowel obstruction  APPENDIX:  Removed  ABDOMINOPELVIC CAVITY:  No ascites  No pneumoperitoneum  No lymphadenopathy  VESSELS:  Unremarkable for patient's age  PELVIS REPRODUCTIVE ORGANS:  Unremarkable for patient's age  URINARY BLADDER:  Unremarkable  ABDOMINAL WALL/INGUINAL REGIONS:  Unremarkable  OSSEOUS STRUCTURES:  No acute fracture or destructive osseous lesion  Degenerative changes of the lumbar spine unchanged  Impression: 1  Status post right hemicolectomy  No metastatic disease in the chest, abdomen or pelvis  2   Resolved pericardial effusion  3   Stable cholelithiasis with mild gallbladder wall thickening but without pericholecystic inflammatory change   Workstation performed: [id number]       Labs:   Lab Results   Component Value Date    WBC 5 78 03/03/2021    HGB 13 2 03/03/2021    HCT 41 0 03/03/2021     (H) 03/03/2021     03/03/2021     Lab Results   Component Value Date    K 3 8 01/26/2021     (H) 01/26/2021    CO2 24 01/26/2021    BUN 19 01/26/2021    CREATININE 1 27 01/26/2021    GLUCOSE 107 12/11/2017 GLUF 136 (H) 01/26/2021    CALCIUM 8 0 (L) 01/26/2021    CORRECTEDCA 9 8 12/08/2020    AST 18 01/20/2021    ALT 30 01/20/2021    ALKPHOS 82 01/20/2021    EGFR 61 01/26/2021         Lab Results   Component Value Date    SPEP  12/12/2017     The serum total protein is within normal limits with hypoalbuminemia  The serum protein electrophoresis shows an increased alpha-1 region  The serum protein electrophoresis shows a decreased beta-2 region  The serum protein electrophoresis shows a faint possible band in the  gamma region  Immunofixation to be performed  Reviewed by: Natali Cazares MD  ([id number])  **Electronic Signature**       Lab Results   Component Value Date    PSA 0 9 10/09/2020       Lab Results   Component Value Date    CEA 1 4 03/03/2021     Lab Results   Component Value Date    IRON 20 (L) 08/29/2020    TIBC 304 08/29/2020    FERRITIN 391 (H) 08/29/2020     ROS: As stated in the history of present illness otherwise his 14 point review of systems today was negative        Active Problems:   Patient Active Problem List   Diagnosis    Serum total bilirubin elevated    Elevated blood pressure reading    PAF (paroxysmal atrial fibrillation) (HCC)    Chronic systolic congestive heart failure (HCC)    Syncope and collapse    [facility]onsustained [facility]) [facility]    Microcytic anemia    [facility]LETT [facility]) [facility]    Gastrointestinal hemorrhage with melena    Coronary artery disease    GI bleeding    Mass of cecum    History of gout    S/P right hemicolectomy    Cancer [facility] ascending colon ([facility] )    Port-A-Cath in place       Past Medical History:   Past Medical History:   Diagnosis Date    A-fib ([facility] )     Coronary artery disease     Gout     Herpes zoster     last assessed 12/18/17    Hypertension     Shingles        Surgical History:   Past Surgical History:   Procedure Laterality Date    ELECTRICAL CARDIOVERSION  12/15/2017         HEMICOLOECTOMY W/ ANASTOMOSIS N/A 8/28/2020 Procedure: RIGHT HEMICOLECTOMY WITH ILIOCOLIC ANASTAMOSIS;  Surgeon: Luz Suresh MD;  [facility];  Service: Colorectal    IR PORT PLACEMENT  9/21/2020       Family History:    Family History   Problem Relation Age of Onset    Coronary artery disease Mother     Alcohol abuse Father     Coronary artery disease Father     Colon cancer Neg Hx        Cancer-related family history is negative for Colon cancer      Social History:   Social History     Socioeconomic History    Marital status: Single     Spouse name: Not on file    Number of children: Not on file    Years of education: Not on file    Highest education level: Not on file   Occupational History    Not on file   Social Needs    Financial resource strain: Not on file    Food insecurity     Worry: Not on file     Inability: Not on file    Transportation needs     Medical: Not on file     Non-medical: Not on file   Tobacco Use    Smoking status: Former Smoker     Quit date: 8/11/2017     Years since quitting: 3 6    Smokeless tobacco: Never Used   Substance and Sexual Activity    Alcohol use: Not Currently     Comment: Socially, former alcohol    Drug use: No    Sexual activity: Yes     Partners: Female   Lifestyle    Physical activity     Days per week: Not on file     Minutes per session: Not on file    Stress: Not on file   Relationships    Social connections     Talks on phone: Not on file     Gets together: Not on file     Attends Sikhism service: Not on file     Active member of club or organization: Not on file     Attends meetings of clubs or organizations: Not on file     Relationship status: Not on file    Intimate partner violence     Fear of current or ex partner: Not on file     Emotionally abused: Not on file     Physically abused: Not on file     Forced sexual activity: Not on file   Other Topics Concern    Not on file   Social History Narrative    Not on file       Current Medications:   Current Outpatient Medications   Medication Sig Dispense Refill    allopurinol (ZYLOPRIM) 100 mg tablet TAKE 1 TABLET (100 MG TOTAL) BY MOUTH DAILY 90 tablet 3    aspirin (ECOTRIN LOW STRENGTH) 81 mg EC tablet Take 1 tablet (81 mg total) by mouth daily 30 tablet 0    atorvastatin (LIPITOR) 40 mg tablet Take 1 tablet (40 mg total) by mouth daily with dinner 90 tablet 3    diltiazem (CARDIZEM CD) 120 mg 24 hr capsule Take 1 capsule (120 mg total) by mouth daily 30 capsule 6    ELIQUIS 5 MG TAKE 1 TABLET (5 MG TOTAL) BY MOUTH 2 (TWO) TIMES A DAY 60 tablet 10    ENTRESTO 24-26 MG TABS TAKE 1 TABLET BY MOUTH TWICE A DAY 60 tablet 11    furosemide (LASIX) 40 mg tablet Take 1 tablet (40 mg total) by mouth daily 90 tablet 3    metoprolol succinate (TOPROL-XL) 100 mg 24 hr tablet TAKE 1 TABLET BY MOUTH TWICE A  tablet 3    prochlorperazine (COMPAZINE) 10 mg tablet Take 1 tablet (10 mg total) by mouth every 6 (six) hours as needed for nausea or vomiting 45 tablet 3    spironolactone (ALDACTONE) 25 mg tablet Take 0 5 tablets (12 5 mg total) by mouth daily 45 tablet 3     No current facility-administered medications for this visit  Allergies: No Known Allergies    Physical Exam:    Body surface area is 2 18 meters squared  Wt Readings from Last 3 Encounters:   03/25/21 103 kg (226 lb)   02/24/21 104 kg (230 lb 1 6 oz)   02/11/21 102 kg (225 lb)        Temp Readings from Last 3 Encounters:   03/25/21 (!) 97 3 °F (36 3 °C) (Temporal)   03/03/21 (!) 97 °F (36 1 °C) (Temporal)   01/26/21 (!) 97 3 °F (36 3 °C)        BP Readings from Last 3 Encounters:   03/25/21 124/76   02/24/21 104/68   02/11/21 128/82         Pulse Readings from Last 3 Encounters:   03/25/21 96   02/24/21 77   02/11/21 82         Physical Exam     Constitutional   General appearance: No acute distress, well appearing and well nourished  Eyes   Conjunctiva and lids: No swelling, erythema or discharge      Pupils and irises: Equal, round and reactive to light     Ears, Nose, Mouth, and Throat   External inspection of ears and nose: Normal     Nasal mucosa, septum, and turbinates: Normal without edema or erythema  Oropharynx: Normal with no erythema, edema, exudate or lesions  Pulmonary   Respiratory effort: No increased work of breathing or signs of respiratory distress  Auscultation of lungs: Clear to auscultation  Cardiovascular   Palpation of heart: Normal PMI, no thrills  Auscultation of heart: Normal rate and rhythm, normal S1 and S2, without murmurs  Examination of extremities for edema and/or varicosities: Normal     Carotid pulses: Normal     Abdomen   Abdomen: Non-tender, no masses  Liver and spleen: No hepatomegaly or splenomegaly  Lymphatic   Palpation of lymph nodes in neck: No lymphadenopathy  Musculoskeletal   Gait and station: Normal     Digits and nails: Normal without clubbing or cyanosis  Inspection/palpation of joints, bones, and muscles: Normal     Skin   Skin and subcutaneous tissue: Normal without rashes or lesions  Neurologic   Cranial nerves: Cranial nerves 2-12 intact  Sensation: No sensory loss  Psychiatric   Orientation to person, place, and time: Normal     Mood and affect: Normal         Assessment / Plan:      The patient is a pleasant 58-year-old male who was referred to see us for newly diagnosed stage IIIA colon cancer  This was a T3 N1 malignancy  It measured 4 5 cm  Based on the most recent guidelines he would qualify for 3 months of adjuvant chemotherapy  The patient agreed and we treated him with 6 cycles of modified FOLFOX 6  His most recent imaging shows no evidence of metastatic disease  His last CEA was normal   I will see him back in 4 months with repeat blood work  I will reimage him at 8 months  If he has any questions he will call our office  Goals and Barriers:  Current Goal:  Prolong Survival from   Colon cancer  Barriers: None        Patient's Capacity to Self Care: Patient able to self care  Portions of the record may have been created with voice recognition software   Occasional wrong word or "sound a like" substitutions may have occurred due to the inherent limitations of voice recognition software   Read the chart carefully and recognize, using context, where substitutions have occurred

## 2021-03-29 ENCOUNTER — TELEPHONE (OUTPATIENT)
Dept: RADIOLOGY | Facility: HOSPITAL | Age: 61
End: 2021-03-29

## 2021-03-29 RX ORDER — CEFAZOLIN SODIUM 2 G/50ML
2000 SOLUTION INTRAVENOUS ONCE
Status: CANCELLED | OUTPATIENT
Start: 2021-03-29 | End: 2021-03-29

## 2021-03-29 RX ORDER — SODIUM CHLORIDE 9 MG/ML
75 INJECTION, SOLUTION INTRAVENOUS CONTINUOUS
Status: CANCELLED | OUTPATIENT
Start: 2021-03-29

## 2021-03-29 NOTE — PRE-PROCEDURE INSTRUCTIONS
Phone Consult completed:Pre procedure instructions reviewed with verbal understanding  Allergies,meds,NPO, and ride  Approximate arrival time given,SDS phone call evening before procedure   COVID screening completed

## 2021-03-30 DIAGNOSIS — I48.0 PAF (PAROXYSMAL ATRIAL FIBRILLATION) (HCC): ICD-10-CM

## 2021-03-31 DIAGNOSIS — I48.0 PAF (PAROXYSMAL ATRIAL FIBRILLATION) (HCC): ICD-10-CM

## 2021-04-01 RX ORDER — DILTIAZEM HYDROCHLORIDE 120 MG/1
CAPSULE, COATED, EXTENDED RELEASE ORAL
Qty: 90 CAPSULE | Refills: 2 | Status: SHIPPED | OUTPATIENT
Start: 2021-04-01 | End: 2022-01-05

## 2021-04-02 ENCOUNTER — TELEPHONE (OUTPATIENT)
Dept: SURGERY | Facility: HOSPITAL | Age: 61
End: 2021-04-02

## 2021-04-03 ENCOUNTER — IMMUNIZATIONS (OUTPATIENT)
Dept: FAMILY MEDICINE CLINIC | Facility: HOSPITAL | Age: 61
End: 2021-04-03

## 2021-04-03 DIAGNOSIS — Z23 ENCOUNTER FOR IMMUNIZATION: Primary | ICD-10-CM

## 2021-04-03 PROCEDURE — 0001A SARS-COV-2 / COVID-19 MRNA VACCINE (PFIZER-BIONTECH) 30 MCG: CPT

## 2021-04-03 PROCEDURE — 91300 SARS-COV-2 / COVID-19 MRNA VACCINE (PFIZER-BIONTECH) 30 MCG: CPT

## 2021-04-05 ENCOUNTER — HOSPITAL ENCOUNTER (OUTPATIENT)
Dept: RADIOLOGY | Facility: HOSPITAL | Age: 61
Discharge: HOME | End: 2021-04-05
Attending: INTERNAL MEDICINE | Admitting: RADIOLOGY
Payer: COMMERCIAL

## 2021-04-05 VITALS
TEMPERATURE: 98.2 F | OXYGEN SATURATION: 96 % | RESPIRATION RATE: 16 BRPM | SYSTOLIC BLOOD PRESSURE: 114 MMHG | HEART RATE: 64 BPM | DIASTOLIC BLOOD PRESSURE: 72 MMHG

## 2021-04-05 DIAGNOSIS — C18.2 CANCER OF ASCENDING COLON (HCC): ICD-10-CM

## 2021-04-05 PROCEDURE — 99152 MOD SED SAME PHYS/QHP 5/>YRS: CPT | Performed by: RADIOLOGY

## 2021-04-05 PROCEDURE — 36590 REMOVAL TUNNELED CV CATH: CPT

## 2021-04-05 PROCEDURE — 99153 MOD SED SAME PHYS/QHP EA: CPT

## 2021-04-05 PROCEDURE — 99152 MOD SED SAME PHYS/QHP 5/>YRS: CPT

## 2021-04-05 PROCEDURE — 36590 REMOVAL TUNNELED CV CATH: CPT | Performed by: RADIOLOGY

## 2021-04-05 RX ORDER — ACETAMINOPHEN 325 MG/1
650 TABLET ORAL EVERY 4 HOURS PRN
Status: DISCONTINUED | OUTPATIENT
Start: 2021-04-05 | End: 2021-04-05 | Stop reason: HOSPADM

## 2021-04-05 RX ORDER — OXYCODONE HYDROCHLORIDE 5 MG/1
5 TABLET ORAL EVERY 4 HOURS PRN
Status: DISCONTINUED | OUTPATIENT
Start: 2021-04-05 | End: 2021-04-05 | Stop reason: HOSPADM

## 2021-04-05 RX ORDER — SODIUM CHLORIDE 9 MG/ML
75 INJECTION, SOLUTION INTRAVENOUS CONTINUOUS
Status: DISCONTINUED | OUTPATIENT
Start: 2021-04-05 | End: 2021-04-05 | Stop reason: HOSPADM

## 2021-04-05 RX ORDER — LIDOCAINE HYDROCHLORIDE AND EPINEPHRINE 10; 10 MG/ML; UG/ML
INJECTION, SOLUTION INFILTRATION; PERINEURAL CODE/TRAUMA/SEDATION MEDICATION
Status: COMPLETED | OUTPATIENT
Start: 2021-04-05 | End: 2021-04-05

## 2021-04-05 RX ORDER — OXYCODONE HYDROCHLORIDE 5 MG/1
10 TABLET ORAL EVERY 4 HOURS PRN
Status: DISCONTINUED | OUTPATIENT
Start: 2021-04-05 | End: 2021-04-05 | Stop reason: HOSPADM

## 2021-04-05 RX ORDER — MIDAZOLAM HYDROCHLORIDE 2 MG/2ML
INJECTION, SOLUTION INTRAMUSCULAR; INTRAVENOUS CODE/TRAUMA/SEDATION MEDICATION
Status: COMPLETED | OUTPATIENT
Start: 2021-04-05 | End: 2021-04-05

## 2021-04-05 RX ORDER — CEFAZOLIN SODIUM 2 G/50ML
2000 SOLUTION INTRAVENOUS ONCE
Status: DISCONTINUED | OUTPATIENT
Start: 2021-04-05 | End: 2021-04-05

## 2021-04-05 RX ORDER — FENTANYL CITRATE 50 UG/ML
25 INJECTION, SOLUTION INTRAMUSCULAR; INTRAVENOUS EVERY 2 HOUR PRN
Status: CANCELLED | OUTPATIENT
Start: 2021-04-05 | End: 2021-04-07

## 2021-04-05 RX ORDER — FENTANYL CITRATE 50 UG/ML
INJECTION, SOLUTION INTRAMUSCULAR; INTRAVENOUS CODE/TRAUMA/SEDATION MEDICATION
Status: COMPLETED | OUTPATIENT
Start: 2021-04-05 | End: 2021-04-05

## 2021-04-05 RX ADMIN — MIDAZOLAM 1 MG: 1 INJECTION INTRAMUSCULAR; INTRAVENOUS at 08:26

## 2021-04-05 RX ADMIN — MIDAZOLAM 1 MG: 1 INJECTION INTRAMUSCULAR; INTRAVENOUS at 08:20

## 2021-04-05 RX ADMIN — FENTANYL CITRATE 50 MCG: 50 INJECTION INTRAMUSCULAR; INTRAVENOUS at 08:11

## 2021-04-05 RX ADMIN — FENTANYL CITRATE 50 MCG: 50 INJECTION INTRAMUSCULAR; INTRAVENOUS at 08:15

## 2021-04-05 RX ADMIN — LIDOCAINE HYDROCHLORIDE,EPINEPHRINE BITARTRATE 20 ML: 10; .01 INJECTION, SOLUTION INFILTRATION; PERINEURAL at 08:27

## 2021-04-05 RX ADMIN — MIDAZOLAM 0.5 MG: 1 INJECTION INTRAMUSCULAR; INTRAVENOUS at 08:31

## 2021-04-05 RX ADMIN — MIDAZOLAM 1 MG: 1 INJECTION INTRAMUSCULAR; INTRAVENOUS at 08:11

## 2021-04-05 RX ADMIN — MIDAZOLAM 1 MG: 1 INJECTION INTRAMUSCULAR; INTRAVENOUS at 08:15

## 2021-04-05 RX ADMIN — SODIUM CHLORIDE 75 ML/HR: 0.9 INJECTION, SOLUTION INTRAVENOUS at 07:00

## 2021-04-05 RX ADMIN — FENTANYL CITRATE 50 MCG: 50 INJECTION INTRAMUSCULAR; INTRAVENOUS at 08:26

## 2021-04-05 RX ADMIN — FENTANYL CITRATE 50 MCG: 50 INJECTION INTRAMUSCULAR; INTRAVENOUS at 08:29

## 2021-04-05 NOTE — DISCHARGE INSTRUCTIONS
Implanted Venous Access Port Removal    WHAT YOU NEED TO KNOW:   An implanted venous access port is a device used to give treatments and take blood  It may also be called a central venous access device (CVAD)  The port is a small container that is placed under your skin, usually in your upper chest  A port can also be placed in your arm or abdomen  The port is attached to a catheter that enters a large vein  DISCHARGE INSTRUCTIONS:   Resume your normal diet  Small sips of flat soda will help with mild nausea  Prevent an infection:     Wash your hands often  Use soap and water  Clean your hands before and  after you care for your incision  Check your skin for infection every day  Look for redness, swelling, or fluid oozing from the incision site  Dressing may come off in 24 hours  Medical glue will peel off on its own in 5 to 10 days  You may shower 24 hours after procedure  Follow up with your healthcare provider as directed  Write down your questions so you remember to ask them during your visits  Activity:  You may return to your daily activities when the area heals  Contact Interventional Radiology at [phone] [place] PATIENTS: Contact Interventional Radiology at [phone]) [place] PATIENTS: Contact Interventional Radiology at [phone]) if:     You have a fever  You have persistent nausea  Your inciscion site is red, swollen, or draining pus  You have questions or concerns about your condition or care  Seek care immediately or call 911 if:  Blood soaks through your bandage  The skin over or around your incision breaks open  Your heart is jumping or fluttering  You have a headache, blurred vision, and feel confused  You have pain in your arm, neck, shoulder, or chest     You have trouble breathing that is getting worse over time

## 2021-04-05 NOTE — BRIEF OP NOTE (RAD/CATH)
INTERVENTIONAL RADIOLOGY PROCEDURE NOTE    Date: 4/5/2021    Procedure: IR PORT REMOVAL     Preoperative diagnosis:   1  Cancer of ascending colon ([facility] )      Postoperative diagnosis: Same  Surgeon: Marylee Paganini, MD     Assistant: None  No qualified resident was available  Blood loss: minimal    Specimens: none    Findings: Successful port removal     Complications: None immediate      Anesthesia: conscious sedation

## 2021-04-05 NOTE — SEDATION DOCUMENTATION
Right port removal done by Dr Melissa Mayo  Patient tolerated well and denies pain  VSS    Report given to Yobani Lin RN and patient to return to room for one hour of bedrest

## 2021-04-06 RX ORDER — ASPIRIN 81 MG/1
TABLET ORAL
Qty: 30 TABLET | Refills: 0 | OUTPATIENT
Start: 2021-04-06

## 2021-04-25 ENCOUNTER — IMMUNIZATIONS (OUTPATIENT)
Dept: FAMILY MEDICINE CLINIC | Facility: HOSPITAL | Age: 61
End: 2021-04-25

## 2021-04-25 DIAGNOSIS — Z23 ENCOUNTER FOR IMMUNIZATION: Primary | ICD-10-CM

## 2021-04-25 PROCEDURE — 91300 SARS-COV-2 / COVID-19 MRNA VACCINE (PFIZER-BIONTECH) 30 MCG: CPT

## 2021-04-25 PROCEDURE — 0002A SARS-COV-2 / COVID-19 MRNA VACCINE (PFIZER-BIONTECH) 30 MCG: CPT

## 2021-04-30 ENCOUNTER — OFFICE VISIT (OUTPATIENT)
Dept: CARDIOLOGY CLINIC | Facility: CLINIC | Age: 61
End: 2021-04-30
Payer: COMMERCIAL

## 2021-04-30 VITALS
HEART RATE: 82 BPM | DIASTOLIC BLOOD PRESSURE: 68 MMHG | BODY MASS INDEX: 33.62 KG/M2 | WEIGHT: 227 LBS | HEIGHT: 69 IN | SYSTOLIC BLOOD PRESSURE: 110 MMHG

## 2021-04-30 DIAGNOSIS — I50.22 CHRONIC SYSTOLIC HEART FAILURE (HCC): ICD-10-CM

## 2021-04-30 DIAGNOSIS — I48.0 PAF (PAROXYSMAL ATRIAL FIBRILLATION) (HCC): Primary | ICD-10-CM

## 2021-04-30 PROCEDURE — 93000 ELECTROCARDIOGRAM COMPLETE: CPT | Performed by: INTERNAL MEDICINE

## 2021-04-30 PROCEDURE — 3008F BODY MASS INDEX DOCD: CPT | Performed by: INTERNAL MEDICINE

## 2021-04-30 PROCEDURE — 99215 OFFICE O/P EST HI 40 MIN: CPT | Performed by: INTERNAL MEDICINE

## 2021-04-30 NOTE — PATIENT INSTRUCTIONS
Our schedulers will be in touch with your to set up loop monitor implantation  Continue all your medications

## 2021-04-30 NOTE — PROGRESS NOTES
EPS Consultation/New Patient Evaluation - Roxie Hollis 61 y o  male MRN: [id number]        CC/HPI:   It was a pleasure to see Roxie Hollis in our arrhythmia clinic at [place] [address]  As you know he is a 61 y o  Man with recovered non-ischemic/tachy-mediated cardiomyopathy due to atrial fibrillation, colon cancer status post hemicolectomy (on chemotherapy), coronary artery disease (left heart catheterization 2018-80% ramus, 60% PDA), morbid obesity,  He was diagnosed with atrial fibrillation on several years ago  He had DCCV on 12/15/2017  His sinus rhythm was maintained with metoprolol and digoxin  He was setup for outpatient stress test on 8/21/2020 but was noted to be in atrial fibrillation with RVR on test day  He was sent over to ER where he was found to be anemic  Further work-up revealed Stage III colon cancer  He is now status post right hemicolectomy s/p ileocolic anastomosis  He is currently on chemotherapy which is going to be completed in December 2020  He underwent cardioversion on 9/24/2020  However at a follow-up appointment, he was noted to be back in atrial fibrillation  He was asymptomatic at the time though  Rate control strategy was employed and diltiazem 120 mg daily dose was started  He had a an echocardiogram in August 2020 that showed ejection fraction of 50%  He did not have any overt signs of congestive heart failure and therefore sinus rhythm was not restored  He is currently maintained on metoprolol succinate 100 mg twice daily, spironolactone 12 5 mg daily and diltiazem 120 mg daily  He reports doing well since cardioversion  We obtain Ziopatch in November of 2020  It showed small burden of atrial fibrillation however his ventricular rates were elevated during those episodes  We discussed increasing AV mami blocking agents but he was already on metoprolol high dose and diltiazem    We discussed alternative option would be an ablation which she agreed to it  He underwent the procedure on January 25th 2021  He had posterior wall isolation and pulmonary vein isolation  He did have significant scar on the posterior wall  He reports doing well since the procedure  He denies any palpitations  He denies any swelling in lower extremities  He does report urinating frequently because of the water pill  He also has loose bowel movements which he believes is due to [place]  He denies any chest pain, nausea/vomiting, dyspnea on exertion, fever/chills, swelling in legs, orthopnea, PND or syncope       Past Medical History:  Past Medical History:   Diagnosis Date    A-fib [facility])     Coronary artery disease     Gout     Herpes zoster     last assessed 12/18/17    Hypertension     Shingles        Medications:      Current Outpatient Medications:     allopurinol (ZYLOPRIM) 100 mg tablet, TAKE 1 TABLET (100 MG TOTAL) BY MOUTH DAILY, Disp: 90 tablet, Rfl: 3    aspirin (ECOTRIN LOW STRENGTH) 81 mg EC tablet, Take 1 tablet (81 mg total) by mouth daily, Disp: 30 tablet, Rfl: 0    atorvastatin (LIPITOR) 40 mg tablet, Take 1 tablet (40 mg total) by mouth daily with dinner, Disp: 90 tablet, Rfl: 3    diltiazem (CARDIZEM CD) 120 mg 24 hr capsule, TAKE 1 CAPSULE BY MOUTH EVERY DAY, Disp: 90 capsule, Rfl: 2    ELIQUIS 5 MG, TAKE 1 TABLET (5 MG TOTAL) BY MOUTH 2 (TWO) TIMES A DAY, Disp: 60 tablet, Rfl: 10    ENTRESTO 24-26 MG TABS, TAKE 1 TABLET BY MOUTH TWICE A DAY, Disp: 60 tablet, Rfl: 11    furosemide (LASIX) 40 mg tablet, Take 1 tablet (40 mg total) by mouth daily, Disp: 90 tablet, Rfl: 3    metoprolol succinate (TOPROL-XL) 100 mg 24 hr tablet, TAKE 1 TABLET BY MOUTH TWICE A DAY, Disp: 180 tablet, Rfl: 3    prochlorperazine (COMPAZINE) 10 mg tablet, Take 1 tablet (10 mg total) by mouth every 6 (six) hours as needed for nausea or vomiting, Disp: 45 tablet, Rfl: 3    spironolactone (ALDACTONE) 25 mg tablet, Take 0 5 tablets (12 5 mg total) by mouth daily, Disp: 45 tablet, Rfl: 3     Family History   Problem Relation Age of Onset    Coronary artery disease Mother     Alcohol abuse Father     Coronary artery disease Father     Colon cancer Neg Hx      Social History     Socioeconomic History    Marital status: Single     Spouse name: Not on file    Number of children: Not on file    Years of education: Not on file    Highest education level: Not on file   Occupational History    Not on file   Social Needs    Financial resource strain: Not on file    Food insecurity     Worry: Not on file     Inability: Not on file    Transportation needs     Medical: Not on file     Non-medical: Not on file   Tobacco Use    Smoking status: Former Smoker     Quit date: 8/11/2017     Years since quitting: 3 7    Smokeless tobacco: Never Used   Substance and Sexual Activity    Alcohol use: Not Currently     Comment: Socially, former alcohol    Drug use: No    Sexual activity: Yes     Partners: Female   Lifestyle    Physical activity     Days per week: Not on file     Minutes per session: Not on file    Stress: Not on file   Relationships    Social connections     Talks on phone: Not on file     Gets together: Not on file     Attends Sikh service: Not on file     Active member of club or organization: Not on file     Attends meetings of clubs or organizations: Not on file     Relationship status: Not on file    Intimate partner violence     Fear of current or ex partner: Not on file     Emotionally abused: Not on file     Physically abused: Not on file     Forced sexual activity: Not on file   Other Topics Concern    Not on file   Social History Narrative    Not on file     Social History     Tobacco Use   Smoking Status Former Smoker    Quit date: 8/11/2017    Years since quitting: 3 7   Smokeless Tobacco Never Used     Social History     Substance and Sexual Activity   Alcohol Use Not Currently    Comment: Socially, former alcohol       Review of Systems   Constitution: Positive for malaise/fatigue  Negative for chills and fever  HENT: Negative  Eyes: Negative for blurred vision and double vision  Cardiovascular: Negative for chest pain, dyspnea on exertion, leg swelling, near-syncope, orthopnea, palpitations, paroxysmal nocturnal dyspnea and syncope  Respiratory: Negative for cough and sputum production  Endocrine: Negative  Skin: Negative  Negative for rash  Musculoskeletal: Negative  Negative for arthritis and joint pain  Gastrointestinal: Negative for abdominal pain, nausea and vomiting  Genitourinary: Negative  Neurological: Negative  Negative for dizziness and light-headedness  Psychiatric/Behavioral: Negative  The patient is not nervous/anxious  Objective:     Vitals: Blood pressure 110/68, pulse 82, height 5' 9" (1 753 m), weight 103 kg (227 lb)  , Body mass index is 33 52 kg/m²  ,        Physical Exam:    GEN: Rosa Mario appears well, alert and oriented x 3, pleasant and cooperative; morbidly obese  HEENT: pupils equal, round, and reactive to light; extraocular muscles intact  NECK: supple, no carotid bruits   HEART: regular rhythm, normal S1 and S2, no murmurs, clicks, gallops or rubs   LUNGS: clear to auscultation bilaterally; no wheezes, rales, or rhonchi   ABDOMEN: normal bowel sounds, soft, no tenderness, no distention  EXTREMITIES: peripheral pulses normal; no clubbing, cyanosis, or edema  NEURO: no focal findings   SKIN: normal without suspicious lesions on exposed skin      Labs & Results:  Below is the patient's most recent value for Albumin, ALT, AST, BUN, Calcium, Chloride, Cholesterol, CO2, Creatinine, GFR, Glucose, HDL, Hematocrit, Hemoglobin, Hemoglobin A1C, LDL, Magnesium, Phosphorus, Platelets, Potassium, PSA, Sodium, Triglycerides, and WBC     Lab Results   Component Value Date    ALT 30 01/20/2021    AST 18 01/20/2021    BUN 19 01/26/2021    CALCIUM 8 0 (L) 01/26/2021     (H) 2021    CO2 24 2021    CREATININE 1 27 2021    HDL 41 2020    HCT 41 0 2021    HGB 13 2 2021    HGBA1C 6 2 2017    MG 2 3 2020    PHOS 4 1 2020     2021    K 3 8 2021    PSA 0 9 10/09/2020    TRIG 74 2020    WBC 5 78 2021     Note: for a comprehensive list of the patient's lab results, access the Results Review activity  Cardiac testing:     I personally reviewed the ECG performed in the clinic on 21  It reveals normal sinus rhythm at 86 beats per minute  Echocardiograms:  Results for orders placed during the hospital encounter of 10/17/18   Echo complete with contrast if indicated    [address]  [address] [place] [address]  [phone]    Transthoracic Echocardiogram  2D, M-mode, Doppler, and Color Doppler    Study date:  17-Oct-2018    Patient: Jazz Wang  MR number: [id number]  Account number: [de-identified]  : 1960  Age: 62 years  Gender: Male  Status: Outpatient  Location: [address] [place] [address] [facility] [place]  Height: 69 in  Weight: 216 lb  BP: 140/ 82 mmHg    Indications: Heart Failure    Diagnoses: I50 9 - Heart failure, unspecified    Sonographer:  SANKET Torres  Primary Physician:  Flavia Schmidt DO  Referring Physician:  Toby Godinez MD  Group:  [facility]  Interpreting Physician:  Rosa Isela Bauman MD    SUMMARY    LEFT VENTRICLE:  Systolic function was at the lower limits of normal  Ejection fraction was estimated to be 50 %  There were no regional wall motion abnormalities  Wall thickness was mildly to moderately increased  Doppler parameters were consistent with abnormal left ventricular relaxation (grade 1 diastolic dysfunction)  RIGHT VENTRICLE:  The size was normal   Systolic function was normal     PERICARDIUM:  A trace pericardial effusion was identified   There was no evidence of hemodynamic compromise  COMPARISONS:  There has been no significant interval change  Comparison was made with the previous study of 29-Mar-2018  HISTORY: PRIOR HISTORY: MI, CAD, HTN, AFIB, SVT    PROCEDURE: The study was performed in the [address]  This was a routine study  The transthoracic approach was used  The study included complete 2D imaging, M-mode, complete spectral Doppler, and color Doppler  The  heart rate was 61 bpm, at the start of the study  Images were obtained from the parasternal, apical, subcostal, and suprasternal notch acoustic windows  Echocardiographic views were limited due to lung interference  Image quality was  adequate  LEFT VENTRICLE: Size was normal  Systolic function was at the lower limits of normal  Ejection fraction was estimated to be 50 %  There were no regional wall motion abnormalities  Wall thickness was mildly to moderately increased  DOPPLER:  Doppler parameters were consistent with abnormal left ventricular relaxation (grade 1 diastolic dysfunction)  RIGHT VENTRICLE: The size was normal  Systolic function was normal  Wall thickness was normal     LEFT ATRIUM: Size was at the upper limits of normal     RIGHT ATRIUM: Size was normal     MITRAL VALVE: Valve structure was normal  There was normal leaflet separation  DOPPLER: The transmitral velocity was within the normal range  There was no evidence for stenosis  There was no significant regurgitation  AORTIC VALVE: The valve was trileaflet  Leaflets exhibited normal thickness and normal cuspal separation  DOPPLER: Transaortic velocity was within the normal range  There was no evidence for stenosis  There was no significant  regurgitation  TRICUSPID VALVE: The valve structure was normal  There was normal leaflet separation  DOPPLER: The transtricuspid velocity was within the normal range  There was no evidence for stenosis  There was no significant regurgitation      PULMONIC VALVE: Leaflets exhibited normal thickness, no calcification, and normal cuspal separation  DOPPLER: The transpulmonic velocity was within the normal range  There was no significant regurgitation  PERICARDIUM: A trace pericardial effusion was identified  There was no evidence of hemodynamic compromise  AORTA: The root exhibited normal size  SYSTEMIC VEINS: IVC: The inferior vena cava was normal in size   Respirophasic changes were normal     SYSTEM MEASUREMENT TABLES    2D  %FS: 26 08 %  Ao Diam: 4 01 cm  EDV(Teich): 105 38 ml  EF Biplane: 52 16 %  EF(Cube): 59 62 %  EF(Teich): 51 15 %  ESV(Cube): 43 52 ml  ESV(Teich): 51 48 ml  IVSd: 1 45 cm  LA Area: 20 83 cm2  LA Diam: 3 55 cm  LVEDV MOD A2C: 129 95 ml  LVEDV MOD A4C: 153 5 ml  LVEDV MOD BP: 146 59 ml  LVEF MOD A2C: 47 85 %  LVEF MOD A4C: 59 43 %  LVESV MOD A2C: 67 77 ml  LVESV MOD A4C: 62 27 ml  LVESV MOD BP: 70 12 ml  LVIDd: 4 76 cm  LVIDs: 3 52 cm  LVLd A2C: 8 67 cm  LVLd A4C: 8 72 cm  LVLs A2C: 7 68 cm  LVLs A4C: 7 6 cm  LVPWd: 1 36 cm  RA Area: 17 39 cm2  RV Diam : 3 55 cm  SV MOD A2C: 62 18 ml  SV MOD A4C: 91 23 ml  SV(Cube): 64 25 ml  SV(Teich): 53 9 ml    MM  TAPSE: 1 49 cm    PW  E': 0 04 m/s  E/E': 23 04  MV A Brennan: 0 66 m/s  MV Dec [place]: 3 43 m/s2  MV DecT: 248 53 ms  MV E Brennan: 0 85 m/s  MV E/A Ratio: 1 3    [employer] Accredited Echocardiography Laboratory    Prepared and electronically signed by    Natali Guerra MD  Signed 18-Oct-2018 11:44:59       Results for orders placed during the hospital encounter of 20   JOSE LUIS    Narrative [facility]  [address]  [phone]    Transesophageal Echocardiogram  2D, Doppler, and Color Doppler    Study date:  28-Aug-2020    Patient: Aniket Burns  MR number: [id number]  Account number: [de-identified]  : 1960  Age: 61 years  Gender: Male  Status: Inpatient  Location: Operating room  Height: 70 in  Weight: 150 lb  BP:    Indications: TIA    Diagnoses: G45 9 - Transient cerebral ischemic attack, unspecified    Sonographer:  SANKET Garcia  Referring Physician:  Sharon Nunez MD  Group:  [facility]  Cardiology Fellow:  Miki Franklin MD  Interpreting Physician:  Sharon Nunez MD    SUMMARY    LEFT VENTRICLE:  Systolic function was at the lower limits of normal  Ejection fraction was estimated to be 50 %  There were no regional wall motion abnormalities  Wall thickness was normal     RIGHT VENTRICLE:  The size was normal   Systolic function was normal   Wall thickness was normal     LEFT ATRIAL APPENDAGE:  No thrombus was identified  There was mild spontaneous echo contrast ("smoke") in the appendage  MITRAL VALVE:  There was trace regurgitation  HISTORY: PRIOR HISTORY: HTN, Afib, REGULO    PROCEDURE: The procedure was performed in the operating room  This was a routine study  The risks and alternatives of the procedure were explained to the patient and informed consent was obtained  The transesophageal approach was used  The  study included complete 2D imaging, complete spectral Doppler, and color Doppler  The heart rate was 102 bpm, at the start of the study  An adult omniplane probe was inserted by the cardiology fellow under direct supervision of the  attending cardiologist  Intubated with ease  One intubation attempt(s)  There was no blood detected on the probe  Image quality was adequate  MEDICATIONS: Anesthesia  LEFT VENTRICLE: Size was normal  Systolic function was at the lower limits of normal  Ejection fraction was estimated to be 50 %  There were no regional wall motion abnormalities  Wall thickness was normal     RIGHT VENTRICLE: The size was normal  Systolic function was normal  Wall thickness was normal     LEFT ATRIUM: Size was normal  No thrombus was identified  APPENDAGE: No thrombus was identified  There was mild spontaneous echo contrast ("smoke") in the appendage      ATRIAL SEPTUM: No defect or patent foramen ovale was identified  RIGHT ATRIUM: Size was normal  No thrombus was identified  MITRAL VALVE: Valve structure was normal  There was normal leaflet separation  There was no echocardiographic evidence of vegetation  DOPPLER: There was trace regurgitation  AORTIC VALVE: The valve was trileaflet  Leaflets exhibited normal thickness and normal cuspal separation  There was no echocardiographic evidence of vegetation  DOPPLER: There was no regurgitation  TRICUSPID VALVE: The valve structure was normal  There was normal leaflet separation  There was no echocardiographic evidence of vegetation  DOPPLER: There was no regurgitation  PULMONIC VALVE: Leaflets exhibited normal thickness, no calcification, and normal cuspal separation  There was no echocardiographic evidence of vegetation  DOPPLER: There was trace regurgitation  PERICARDIUM: There was no pericardial effusion  The pericardium was normal in appearance  AORTA: The root exhibited normal size  There was no atheroma  There was no evidence for dissection  There was no evidence for aneurysm  [address] Accredited Echocardiography Laboratory    Prepared and electronically signed by    Natalie Castillo MD  Signed 28-Aug-2020 14:25:55         Catheterizations:   No results found for this or any previous visit      Stress Tests:  Results for orders placed during the hospital encounter of 18   NM myocardial perfusion spect (stress and/or rest)    Narrative [facility]  [facility] [address] [facility]  [phone]    Stress Gated SPECT Myocardial Perfusion Imaging after Regadenoson    Patient: Kassie Grove  MR number: [id number]  Account number: [de-identified]  : 1960  Age: 62 years  Gender: Male  Status: Outpatient  Location: [address] [place] [address] [facility]  Height: 69 in  Weight: 189 lb  BP: 120/ 90 mmHg    Allergies: NO KNOWN ALLERGIES    Diagnosis: I50 22 - Chronic systolic (congestive) heart failure    Referring Physician:  Gina Whipple MD  Primary Physician:  Vidya Garcia MD  Technician:  SUN Velazco  RN:  Kezia Laughlin RN  Group:  [facility]  Cardiology Fellow:  Dr Anant Robert  Report Prepared by[de-identified]  Kezia Laughlin RN  Interpreting Physician:  Guadalupe Bush DO    INDICATIONS: Assessment of cardiomyopathy  HISTORY: Cardiomyopathy, [place]  The patient is a 62year old  male  Chest pain status: chest pain  Other symptoms: edema  Coronary artery disease risk factors: none  Medications: a beta blocker and a lipid lowering agent  PHYSICAL EXAM: Baseline physical exam screening: no wheezes audible  REST ECG: Normal sinus rhythm  PROCEDURE: The study was performed at the [address] [facility] [place]  A regadenoson infusion pharmacologic stress test was performed  Gated SPECT myocardial perfusion imaging was performed during stress  Systolic blood pressure was  120 mmHg, at the start of the study  Diastolic blood pressure was 90 mmHg, at the start of the study  The heart rate was 65 bpm, at the start of the study  IV double checked  Regadenoson protocol:  HR bpm SBP mmHg DBP mmHg Symptoms  Baseline 65 120 90 none  Immediate 81 122 92 mild dyspnea  1 min 73 120 90 none  No medications or fluids given  STRESS SUMMARY: Duration of pharmacologic stress was 3 min and 0 sec  Maximal heart rate during stress was 91 bpm  The heart rate response to stress was normal  Normal blood pressure response to regadenosan  The rate-pressure product for  the peak heart rate and blood pressure was 46877  There was no chest pain during stress  The stress test was terminated due to protocol completion  Pre oxygen saturation: 100 %  Peak oxygen saturation: 99 %  The stress ECG was negative for  ischemia and normal  There were no stress arrhythmias or conduction abnormalities      ISOTOPE ADMINISTRATION:  Resting isotope administration Stress isotope administration  Agent Tetrofosmin Tetrofosmin  Dose 10 12 mCi 30 9 mCi  Date 02/19/2018 02/19/2018  Injection-image interval 47 min 52 min    The radiopharmaceutical was injected at the peak effect of pharmacologic stress  MYOCARDIAL PERFUSION IMAGING:  The image quality was good  Rotating projection images reveal mild patient motion  Left ventricular size was normal  The TID ratio was 1 17  PERFUSION DEFECTS:  -  There was a moderate-sized, moderately severe, partially reversible myocardial perfusion defect of the basal to mid inferolateral wall  GATED SPECT:  The calculated left ventricular ejection fraction was 47 %  Left ventricular ejection fraction was mildly decreased by visual estimate  SUMMARY:  -  Stress results: There was no chest pain during stress  -  ECG conclusions: The stress ECG was negative for ischemia and normal   -  Perfusion imaging: There was a moderate-sized, moderately severe, partially reversible myocardial perfusion defect of the basal to mid inferolateral wall  -  Gated SPECT: The calculated left ventricular ejection fraction was 47 %  Left ventricular ejection fraction was mildly decreased by visual estimate  IMPRESSIONS: Abnormal study after vasodilation and low level exercise without reproduction of symptoms  Partially reversible defect of the inferolateral wall  Fixed defect Apical lateral  Left ventricular systolic function was reduced,  with regional wall motion abnormalities  Prepared and signed by    Samir Chavez DO  Signed 02/19/2018 14:38:42         Holter monitor -   No results found for this or any previous visit  No results found for this or any previous visit        ASSESSMENT/PLAN:  Paroxysmal atrial fibrillation   · In sinus rhythm today  · Had symptoms with atrial fibrillation in the past (fatigue, dyspnea on exertion) though did not have any at time of office with Dr Surendra Kong when he was in atrial fibrillation · Recent occurrence in setting of newly discovered colon cancer  · Discussed options of rate control vs  Rhythm control (AAD or ablation)   · Obtain Shan Gains in Nov 2020 to evaluate burden, it showed atrial fibrillation with rates in 170s  · Discussed increasing AV mami agents but was already on diltiazem, metoprolol and digoxin  · Discussed ablation option and patient opted to proceed with it  · He had PVI and posterior wall on 1/25/2021  He reports feelign well since then without any symptoms  · He will continue current medications except digoxin  · Discussed loop monitor to assess long term burden incase he prefers to come off Eliquis  · He is agreeable  Will plan for loop implantation  Non-ischemic cardiomyopathy   · EF improved to 50%  · Maintained on Entresto, metoprolol, spironolactone and Lasix  · Euvolemic in office today  · Complains of urinating frequently  Asked him to talk to Dr Hien Rayo about Lasix given improved EF now  Colon cancer  · Stage III colon cancer discovered in Aguust 2020  · S/p right hemicolectomy and ileocolic anastomosis  · Undergoing chemotherapy; finished in Dec 2020    CAD  · Left heart catheterization 2018  · 80% ramus, 60% PDA  · No history of stents   · Continue statin, beta-blocker    Follow-up as needed    Thank you for allowing me to participate in this patient's care  Please do not hesitate to contact the office with any questions or concerns

## 2021-05-05 RX ORDER — ALLOPURINOL 100 MG/1
100 TABLET ORAL DAILY
Qty: 90 TABLET | Refills: 3 | Status: SHIPPED | OUTPATIENT
Start: 2021-05-05 | End: 2022-08-08

## 2021-05-17 ENCOUNTER — TELEPHONE (OUTPATIENT)
Dept: CARDIOLOGY CLINIC | Facility: CLINIC | Age: 61
End: 2021-05-17

## 2021-05-17 DIAGNOSIS — I48.0 PAF (PAROXYSMAL ATRIAL FIBRILLATION) (HCC): Primary | ICD-10-CM

## 2021-05-17 NOTE — TELEPHONE ENCOUNTER
COVID Pre-Visit Screening     1  Is this a family member screening? Yes  2  Have you traveled outside of your state in the past 2 weeks? No  3  Do you presently have a fever or flu-like symptoms? No  4  Do you have symptoms of an upper respiratory infection like runny nose, sore throat, or cough? No  5  Are you suffering from new headache that you have not had in the past?  No  6  Do you have/have you experienced any new shortness of breath recently? No  7  Do you have any new diarrhea, nausea or vomiting? No  8  Have you been in contact with anyone who has been sick or diagnosed with COVID-19? No  9  Do you have any new loss of taste or smell? No  10  Are you able to wear a mask without a valve for the entire visit? Yes       Patient schedule for loop implant at [facility] on 6/23/21 with Dr Denise Cortez  Patient aware of general instructions    Please Ann Blank can you check for insurance approval

## 2021-06-09 NOTE — TELEPHONE ENCOUNTER
He does not need auth for his Loop implant [id number], [id number] on 7/15/21 at [facility] per Ed m at [address] [place] [address] Dr   Ref# [id number]

## 2021-06-16 ENCOUNTER — TELEPHONE (OUTPATIENT)
Dept: CARDIOLOGY CLINIC | Facility: CLINIC | Age: 61
End: 2021-06-16

## 2021-06-16 NOTE — TELEPHONE ENCOUNTER
Dr Jeanie Hancock (cardiologist w/ [facility]) called office, patient is currently not working and is writing up a summary and faxing over documentation that will need to be signed by you in regarding to work  Patient works for [employer]- [employer]  Dr Alexandr Ortiz is aware you are on vacation, he is writing patient up as able to work light duty unless you have an objection to that  He has records and went through them      His cell phone # is [phone]

## 2021-06-21 NOTE — TELEPHONE ENCOUNTER
Okay, thank you, we shall a find these papers to review and sign?   If he need to get me this week, please fax to [phone]

## 2021-07-14 ENCOUNTER — HOSPITAL ENCOUNTER (OUTPATIENT)
Dept: NON INVASIVE DIAGNOSTICS | Facility: HOSPITAL | Age: 61
Discharge: HOME | End: 2021-07-14
Attending: INTERNAL MEDICINE | Admitting: INTERNAL MEDICINE
Payer: COMMERCIAL

## 2021-07-14 DIAGNOSIS — I48.0 PAF (PAROXYSMAL ATRIAL FIBRILLATION) (HCC): ICD-10-CM

## 2021-07-14 PROCEDURE — 33285 INSJ SUBQ CAR RHYTHM MNTR: CPT | Performed by: PHYSICIAN ASSISTANT

## 2021-07-14 PROCEDURE — 33285 INSJ SUBQ CAR RHYTHM MNTR: CPT

## 2021-07-14 PROCEDURE — NC001 PR NO CHARGE: Performed by: PHYSICIAN ASSISTANT

## 2021-07-14 PROCEDURE — C1764 EVENT RECORDER, CARDIAC: HCPCS

## 2021-07-14 RX ORDER — LIDOCAINE HYDROCHLORIDE 20 MG/ML
INJECTION, SOLUTION EPIDURAL; INFILTRATION; INTRACAUDAL; PERINEURAL CODE/TRAUMA/SEDATION MEDICATION
Status: COMPLETED | OUTPATIENT
Start: 2021-07-14 | End: 2021-07-14

## 2021-07-14 RX ORDER — LIDOCAINE HYDROCHLORIDE 10 MG/ML
INJECTION, SOLUTION EPIDURAL; INFILTRATION; INTRACAUDAL; PERINEURAL
Status: DISCONTINUED
Start: 2021-07-14 | End: 2021-07-14 | Stop reason: HOSPADM

## 2021-07-14 RX ADMIN — LIDOCAINE HYDROCHLORIDE 20 ML: 20 INJECTION, SOLUTION EPIDURAL; INFILTRATION; INTRACAUDAL; PERINEURAL at 14:39

## 2021-07-14 NOTE — PROCEDURES
Placement of cardiac event recorder    History and physical were reviewed  Patient was examined and history was reviewed  No change in patient's condition Since history and physical has been completed        The pre- operative diagnosis:   1  Paroxysmal atrial fibrillation         Postoperative diagnosis:  1  Paroxysmal atrial fibrillation s/p ILR        Procedure: placement of cardiac event recorder          Surgeon: Kaci Manzo PA-C    Assistants -none    Specimens - none    Estimated blood loss- none    Findings-none    Complications none    Anesthesia-  local lidocaine 15cc by myself      Details of the device [employer] Reveal Linq        Description of procedure: The patient was seen before the procedure  The details of the procedure was explained and patient agreed to the same  Appropriate consent was signed  Proper time out was done  Sterile dressing and draping was done  Local lidocaine was infiltrated, in the left third intercostal space, about 2 cm lateral to the sternal edge  Using the stab knife an incision was made  Thereafter the  was used, moved around to form a pocket, along the long axis of the heart, in the third intercostal space region  Then plunger was used to deploy the device  The plunger was disengaged and removed  The  was pulled back  Pressure was held and hemostasis was obtained  Pocket closed with interrupted 4-0 ethilon    Dressing was done with water resistant band aid       Summary of the procedure:  Linq implanted successfully and patient tolerated the procedure well

## 2021-07-14 NOTE — H&P
H&P Exam - Cardiology   Gail Sanchez 64 y o  male MRN: [id number]  Unit/Bed#: P3 PROCEDURE 1 Encounter: [id number]    Assessment/Plan   1  Paroxysmal atrial fibrillation, symptomatic    * patient presents for ILR to monitor PAF to guide long term AC         Imaging: I have personally reviewed pertinent reports  Results for orders placed during the hospital encounter of 10/17/18    Echo complete with contrast if indicated    Narrative  [address]  [facility] [place] [address] [facility]  [phone]    Transthoracic Echocardiogram  2D, M-mode, Doppler, and Color Doppler    Study date:  17-Oct-2018    Patient: Roger Christopher  MR number: [id number]  Account number: [de-identified]  : 1960  Age: 62 years  Gender: Male  Status: Outpatient  Location: [address] [place] [address] [facility] [place]  Height: 69 in  Weight: 216 lb  BP: 140/ 82 mmHg    Indications: Heart Failure    Diagnoses: I50 9 - Heart failure, unspecified    Sonographer:  SANKET Powell  Primary Physician:  Wyatt Concepcion DO  Referring Physician:  Aneesh Catalna MD  Group:  [id number]  Interpreting Physician:  Summer Garcia MD    SUMMARY    LEFT VENTRICLE:  Systolic function was at the lower limits of normal  Ejection fraction was estimated to be 50 %  There were no regional wall motion abnormalities  Wall thickness was mildly to moderately increased  Doppler parameters were consistent with abnormal left ventricular relaxation (grade 1 diastolic dysfunction)  RIGHT VENTRICLE:  The size was normal   Systolic function was normal     PERICARDIUM:  A trace pericardial effusion was identified  There was no evidence of hemodynamic compromise  COMPARISONS:  There has been no significant interval change  Comparison was made with the previous study of 29-Mar-2018  HISTORY: PRIOR HISTORY: MI, CAD, HTN, AFIB, SVT    PROCEDURE: The study was performed in the [address] [facility] [place]   This was a routine study  The transthoracic approach was used  The study included complete 2D imaging, M-mode, complete spectral Doppler, and color Doppler  The  heart rate was 61 bpm, at the start of the study  Images were obtained from the parasternal, apical, subcostal, and suprasternal notch acoustic windows  Echocardiographic views were limited due to lung interference  Image quality was  adequate  LEFT VENTRICLE: Size was normal  Systolic function was at the lower limits of normal  Ejection fraction was estimated to be 50 %  There were no regional wall motion abnormalities  Wall thickness was mildly to moderately increased  DOPPLER:  Doppler parameters were consistent with abnormal left ventricular relaxation (grade 1 diastolic dysfunction)  RIGHT VENTRICLE: The size was normal  Systolic function was normal  Wall thickness was normal     LEFT ATRIUM: Size was at the upper limits of normal     RIGHT ATRIUM: Size was normal     MITRAL VALVE: Valve structure was normal  There was normal leaflet separation  DOPPLER: The transmitral velocity was within the normal range  There was no evidence for stenosis  There was no significant regurgitation  AORTIC VALVE: The valve was trileaflet  Leaflets exhibited normal thickness and normal cuspal separation  DOPPLER: Transaortic velocity was within the normal range  There was no evidence for stenosis  There was no significant  regurgitation  TRICUSPID VALVE: The valve structure was normal  There was normal leaflet separation  DOPPLER: The transtricuspid velocity was within the normal range  There was no evidence for stenosis  There was no significant regurgitation  PULMONIC VALVE: Leaflets exhibited normal thickness, no calcification, and normal cuspal separation  DOPPLER: The transpulmonic velocity was within the normal range  There was no significant regurgitation  PERICARDIUM: A trace pericardial effusion was identified   There was no evidence of hemodynamic compromise  AORTA: The root exhibited normal size  SYSTEMIC VEINS: IVC: The inferior vena cava was normal in size  Respirophasic changes were normal     SYSTEM MEASUREMENT TABLES    2D  %FS: 26 08 %  Ao Diam: 4 01 cm  EDV(Teich): 105 38 ml  EF Biplane: 52 16 %  EF(Cube): 59 62 %  EF(Teich): 51 15 %  ESV(Cube): 43 52 ml  ESV(Teich): 51 48 ml  IVSd: 1 45 cm  LA Area: 20 83 cm2  LA Diam: 3 55 cm  LVEDV MOD A2C: 129 95 ml  LVEDV MOD A4C: 153 5 ml  LVEDV MOD BP: 146 59 ml  LVEF MOD A2C: 47 85 %  LVEF MOD A4C: 59 43 %  LVESV MOD A2C: 67 77 ml  LVESV MOD A4C: 62 27 ml  LVESV MOD BP: 70 12 ml  LVIDd: 4 76 cm  LVIDs: 3 52 cm  LVLd A2C: 8 67 cm  LVLd A4C: 8 72 cm  LVLs A2C: 7 68 cm  LVLs A4C: 7 6 cm  LVPWd: 1 36 cm  RA Area: 17 39 cm2  RV Diam : 3 55 cm  SV MOD A2C: 62 18 ml  SV MOD A4C: 91 23 ml  SV(Cube): 64 25 ml  SV(Teich): 53 9 ml    MM  TAPSE: 1 49 cm    PW  E': 0 04 m/s  E/E': 23 04  MV A Brennan: 0 66 m/s  MV Dec [place]: 3 43 m/s2  MV DecT: 248 53 ms  MV E Brennan: 0 85 m/s  MV E/A Ratio: 1 3    [facility] Accredited [facility]chocardi[facility]    Prepared and electronically signed by    Pete Rojo MD  Signed 18-Oct-2018 11:44:59      EKG: none    History of Present Illness   HPI:  Andre Fonseca is a 64y o  year old male with a history as above who presents for ILR due to paroxysmal atrial fibrillation  Review of Systems  ROS as noted above, otherwise 12 point review of systems was performed and is negative         Historical Information   Past Medical History:   Diagnosis Date    [facility]-fib [facility])     Coronary artery disease     Gout     Herpes zoster     last assessed 12/18/17    Hypertension     Shingles      Past Surgical History:   Procedure Laterality Date    ELECTRICAL CARDIOVERSION  12/15/2017         HEMICOLOECTOMY W/ ANASTOMOSIS N/A 8/28/2020    Procedure: RIGHT HEMICOLECTOMY WITH ILIOCOLIC ANASTAMOSIS;  Surgeon: Ricardo Stevenson MD;  [facility];  Service: Colorectal    IR PORT PLACEMENT  9/21/2020    IR PORT REMOVAL  4/5/2021     Family History:   Family History   Problem Relation Age of Onset    Coronary artery disease Mother     Alcohol abuse Father     Coronary artery disease Father     Colon cancer Neg Hx        Social History   Social History     Substance and Sexual Activity   Alcohol Use Not Currently    Comment: Socially, former alcohol     Social History     Substance and Sexual Activity   Drug Use No     Social History     Tobacco Use   Smoking Status Former Smoker    Quit date: 8/11/2017    Years since quitting: 3 9   Smokeless Tobacco Never Used         Meds/Allergies   all medications and allergies reviewed  Home Medications:   No medications prior to admission  No Known Allergies    Objective   Vitals: There were no vitals taken for this visit  No intake or output data in the 24 hours ending 07/14/21 1555    Invasive Devices     Peripheral Intravenous Line            Peripheral IV 04/05/21 Left Antecubital 100 days                Physical Exam  Constitutional:       Appearance: He is well-developed  HENT:      Head: Normocephalic and atraumatic  Eyes:      Pupils: Pupils are equal, round, and reactive to light  Pulmonary:      Effort: Pulmonary effort is normal    Abdominal:      General: Bowel sounds are normal       Palpations: Abdomen is soft  Musculoskeletal:         General: Normal range of motion  Cervical back: Normal range of motion and neck supple  Skin:     General: Skin is warm and dry  Neurological:      Mental Status: He is alert and oriented to person, place, and time  Lab Results: I have personally reviewed pertinent lab results                Invalid input(s): LABGLOM                Code Status: Prior

## 2021-07-14 NOTE — DISCHARGE INSTRUCTIONS
OK to shower with with the glue, glue will fall off in 1 week on its own, do not scrub the area or swim during the next 14 days  not use lotions/powders/creams on incision  Remove outer bandage 48 hours after procedure - if present, leave underlying steri-strips in place, they will either fall off on their own or will be removed at 2 week follow up appointment  Please call the office [phone] if you notice redness, swelling, bleeding, or drainage from incision or if you develop fevers  Cardiac Loop Recorder Insertion      WHAT YOU SHOULD KNOW:    A cardiac loop recorder is a device used to diagnose heart rhythm problems, such as a fast or irregular heartbeat  It is implanted in your left chest, just under the skin  The device records a pattern of your heart's rhythm, called an EKG  Your device records automatic EKGs, depending on how your caregiver programs it  You may also receive a handheld controller  You press a button on the controller when you have symptoms, such as dizziness, lightheadedness, or palpitations  The device will record an EKG at that moment  The recording can help your caregiver see if your symptoms may be caused by heart rhythm problems  Your caregiver will remove the device after it has collected enough data  You may need the device for up to 3 years  The procedure to remove the device is similar to the procedure used to implant it  AFTER YOU LEAVE:    Follow up with your cardiologist as directed: You will need to return in 1 to 2 weeks  Your cardiologist will check your incision  He may also program your device settings again  He will retrieve data from the device every 1 to 3 months with a monitor held over your skin  You may be able to transmit data from your device from home as well  You will do this by calling a number provided by your cardiologist, or as they have instructed you  Ask for information about this process   Write down your questions so you remember to ask them during your visits  Wound care: Keep loop recorder incision dry for one week  Do not use lotions/powders/creams on incision  Remove outer bandage 48 hours after procedure - leave underlying steri-strips in place, they will either fall off on their own or will be removed at 2 week follow up appointment  Please call the office if you notice redness, swelling, bleeding, or drainage from incision or if you develop fevers  After that first week, carefully wash your incision with soap and water  Keep the area clean and dry until it heals  Return to activity: If you received anesthesia, you will not be able to drive for 24 hours  Otherwise, most people can return to normal activities soon after the procedure  Your cardiologist may want to know if your work involves electrical current or high-voltage equipment  Ask about other electrical items that could interfere with your cardiac loop recorder  Contact your cardiologist if:   · You have a fever or chills  · Your wound is red, swollen, or draining pus  · You have questions or concerns about your condition or care  Seek care immediately or call 911 if:   · You feel weak, dizzy, or faint  · You lose consciousness  © 2014 [address] is for End User's use only and may not be sold, redistributed or otherwise used for commercial purposes  All illustrations and images included in CareNotes® are the copyrighted property of [employer] , [employer]  or Rui Schumacher  The above information is an  only  It is not intended as medical advice for individual conditions or treatments  Talk to your doctor, nurse or pharmacist before following any medical regimen to see if it is safe and effective for you

## 2021-07-23 ENCOUNTER — TELEPHONE (OUTPATIENT)
Dept: HEMATOLOGY ONCOLOGY | Facility: MEDICAL CENTER | Age: 61
End: 2021-07-23

## 2021-07-23 NOTE — TELEPHONE ENCOUNTER
I phoned the patient and left a message on his voicemail indicating that his appointment with Dr Kenyetta Contreras on 7/27 in the [place] office would need to be rescheduled to 8/11 at [address], as Dr Kenyetta Contreras will not be in the office on 7/27  The [facility] number was provided

## 2021-07-28 ENCOUNTER — TELEPHONE (OUTPATIENT)
Dept: HEMATOLOGY ONCOLOGY | Facility: CLINIC | Age: 61
End: 2021-07-28

## 2021-07-29 ENCOUNTER — IN-CLINIC DEVICE VISIT (OUTPATIENT)
Dept: CARDIOLOGY CLINIC | Facility: CLINIC | Age: 61
End: 2021-07-29
Payer: COMMERCIAL

## 2021-07-29 DIAGNOSIS — Z95.818 PRESENCE OF CARDIAC DEVICE: Primary | ICD-10-CM

## 2021-07-29 PROCEDURE — 93291 INTERROG DEV EVAL SCRMS IP: CPT | Performed by: INTERNAL MEDICINE

## 2021-07-29 NOTE — PROGRESS NOTES
Results for orders placed or performed in visit on 07/29/21   Cardiac EP device report    Narrative    MDT Manny Beverly 34: INCISION CLEAN AND DRY WITH EDGES APPROXIMATED; SUTURES REMOVED; WOUND CARE AND RESTRICTIONS REVIEWED WITH PATIENT  R-WAVES: 0 29 mV  NO NEW EPISODES SINCE LAST REMOTE TRANSMISSION  AF 0%  PT  TAKES ASA 81, METOPROLOL SUCC  DILTIAZEM & ELIQUIS  NO PROGRAMMING CHANGES MADE TO DEVICE PARAMETERS  NORMAL DEVICE FUNCTION   PL

## 2021-08-02 ENCOUNTER — TELEPHONE (OUTPATIENT)
Dept: HEMATOLOGY ONCOLOGY | Facility: CLINIC | Age: 61
End: 2021-08-02

## 2021-08-02 ENCOUNTER — APPOINTMENT (OUTPATIENT)
Dept: LAB | Age: 61
End: 2021-08-02
Payer: COMMERCIAL

## 2021-08-02 DIAGNOSIS — C18.2 CANCER OF ASCENDING COLON (HCC): ICD-10-CM

## 2021-08-02 LAB
ALBUMIN SERPL BCP-MCNC: 3.7 G/DL (ref 3.5–5)
ALP SERPL-CCNC: 64 U/L (ref 46–116)
ALT SERPL W P-5'-P-CCNC: 35 U/L (ref 12–78)
ANION GAP SERPL CALCULATED.3IONS-SCNC: 3 MMOL/L (ref 4–13)
AST SERPL W P-5'-P-CCNC: 15 U/L (ref 5–45)
BASOPHILS # BLD AUTO: 0.05 THOUSANDS/ΜL (ref 0–0.1)
BASOPHILS NFR BLD AUTO: 1 % (ref 0–1)
BILIRUB SERPL-MCNC: 1.04 MG/DL (ref 0.2–1)
BUN SERPL-MCNC: 18 MG/DL (ref 5–25)
CALCIUM SERPL-MCNC: 9.2 MG/DL (ref 8.3–10.1)
CEA SERPL-MCNC: 2.2 NG/ML (ref 0–3)
CHLORIDE SERPL-SCNC: 105 MMOL/L (ref 100–108)
CO2 SERPL-SCNC: 25 MMOL/L (ref 21–32)
CREAT SERPL-MCNC: 1.16 MG/DL (ref 0.6–1.3)
EOSINOPHIL # BLD AUTO: 0.05 THOUSAND/ΜL (ref 0–0.61)
EOSINOPHIL NFR BLD AUTO: 1 % (ref 0–6)
ERYTHROCYTE [DISTWIDTH] IN BLOOD BY AUTOMATED COUNT: 13.2 % (ref 11.6–15.1)
GFR SERPL CREATININE-BSD FRML MDRD: 68 ML/MIN/1.73SQ M
GLUCOSE SERPL-MCNC: 89 MG/DL (ref 65–140)
HCT VFR BLD AUTO: 44.7 % (ref 36.5–49.3)
HGB BLD-MCNC: 14.5 G/DL (ref 12–17)
IMM GRANULOCYTES # BLD AUTO: 0.04 THOUSAND/UL (ref 0–0.2)
IMM GRANULOCYTES NFR BLD AUTO: 1 % (ref 0–2)
LYMPHOCYTES # BLD AUTO: 1.39 THOUSANDS/ΜL (ref 0.6–4.47)
LYMPHOCYTES NFR BLD AUTO: 18 % (ref 14–44)
MCH RBC QN AUTO: 32.3 PG (ref 26.8–34.3)
MCHC RBC AUTO-ENTMCNC: 32.4 G/DL (ref 31.4–37.4)
MCV RBC AUTO: 100 FL (ref 82–98)
MONOCYTES # BLD AUTO: 0.78 THOUSAND/ΜL (ref 0.17–1.22)
MONOCYTES NFR BLD AUTO: 10 % (ref 4–12)
NEUTROPHILS # BLD AUTO: 5.35 THOUSANDS/ΜL (ref 1.85–7.62)
NEUTS SEG NFR BLD AUTO: 69 % (ref 43–75)
NRBC BLD AUTO-RTO: 0 /100 WBCS
PLATELET # BLD AUTO: 242 THOUSANDS/UL (ref 149–390)
PMV BLD AUTO: 11.6 FL (ref 8.9–12.7)
POTASSIUM SERPL-SCNC: 4.5 MMOL/L (ref 3.5–5.3)
PROT SERPL-MCNC: 8 G/DL (ref 6.4–8.2)
RBC # BLD AUTO: 4.49 MILLION/UL (ref 3.88–5.62)
SODIUM SERPL-SCNC: 133 MMOL/L (ref 136–145)
WBC # BLD AUTO: 7.66 THOUSAND/UL (ref 4.31–10.16)

## 2021-08-02 PROCEDURE — 36415 COLL VENOUS BLD VENIPUNCTURE: CPT | Performed by: INTERNAL MEDICINE

## 2021-08-02 PROCEDURE — 85025 COMPLETE CBC W/AUTO DIFF WBC: CPT | Performed by: INTERNAL MEDICINE

## 2021-08-02 PROCEDURE — 80053 COMPREHEN METABOLIC PANEL: CPT | Performed by: INTERNAL MEDICINE

## 2021-08-02 PROCEDURE — 82378 CARCINOEMBRYONIC ANTIGEN: CPT

## 2021-08-03 NOTE — PROGRESS NOTES
Hematology/Oncology Outpatient Follow- up Note  Lam Hurst, 1960, [id number]  2021        Chief Complaint   Patient presents with    Follow-up       HPI:  Macy Young a 61 male with newly diagnosed stage III colon cancer  He is s/p right hemicolectomy with iliocolic anastomosis on   Tumor measured 4 5 cm, 1 lymph nodes positive for malignancy  This is a T3 N1 malignancy  He was recommended adjuvant treatment with 3 months of modified FOLFOX 6      Previous Hematologic/ Oncologic History:    Surgery  Modified FOLFOX 6 x 6 cycles completed in 2020    Current Hematologic/ Oncologic Treatment:    Observation    ECO - Asymptomatic    Interval History:    The patient presents for routine follow up  He was last seen by Dr Rosi Ferreira on 3/25/21  He had his port removed on 21  Most recent blood work completed on  was reviewed and is in acceptable range  His CEA is normal     Patient is s/p loop implantation for A fib management on 21  He continues on Eliquis  He reports he feels well and remains active, continues to golf regularly  Denies any CP or heart palpitations  He denies any abdominal pain  He does have chronic diarrhea  He does report one episode over the past few weeks of BRBPR  No melena  Appetite is good, weight is stable  Due for colonoscopy  Cancer Staging:  Cancer Staging  No matching staging information was found for the patient  Molecular Testing:             Test Results:    Imaging: No results found      Labs:   Lab Results   Component Value Date    WBC 7 66 2021    HGB 14 5 2021    HCT 44 7 2021     (H) 2021     2021     Lab Results   Component Value Date    K 4 5 2021     2021    CO2 25 2021    BUN 18 2021    CREATININE 1 16 2021    GLUCOSE 107 2017    GLUF 136 (H) 2021    CALCIUM 9 2 2021    CORRECTEDCA 9 8 2020    AST 15 2021    ALT 35 08/02/2021    ALKPHOS 64 08/02/2021    EGFR 68 08/02/2021           Review of Systems   Gastrointestinal: Positive for diarrhea  All other systems reviewed and are negative  Patient denies any concerning symptoms today    Active Problems:   Patient Active Problem List   Diagnosis    Serum total bilirubin elevated    Elevated blood pressure reading    PAF (paroxysmal atrial fibrillation) (HCC)    Chronic systolic congestive heart failure (HCC)    Syncope and collapse    NSVT (nonsustained ventricular tachycardia) (HCC)    Microcytic anemia    [facility]    Gastrointestinal hemorrhage with melena    Coronary artery disease    GI bleeding    Mass of cecum    History of gout    S/P right hemicolectomy    Cancer of ascending colon ([facility] )    Port-A-Cath in place       Past Medical History:   Past Medical History:   Diagnosis Date    A-fib ([place] [address] )     Coronary artery disease     Gout     Herpes zoster     last assessed 12/18/17    Hypertension     Shingles        Surgical History:   Past Surgical History:   Procedure Laterality Date    ELECTRICAL CARDIOVERSION  12/15/2017         HEMICOLOECTOMY W/ ANASTOMOSIS N/A 8/28/2020    Procedure: RIGHT HEMICOLECTOMY WITH ILIOCOLIC ANASTAMOSIS;  Surgeon: Alexy Teague MD;  [facility];  Service: Colorectal    IR PORT PLACEMENT  9/21/2020    IR PORT REMOVAL  4/5/2021       Family History:    Family History   Problem Relation Age of Onset    Coronary artery disease Mother     Alcohol abuse Father     Coronary artery disease Father     Colon cancer Neg Hx        Cancer-related family history is negative for Colon cancer      Social History:   Social History     Socioeconomic History    Marital status: Single     Spouse name: Not on file    Number of children: Not on file    Years of education: Not on file    Highest education level: Not on file   Occupational History    Not on file   Tobacco Use    Smoking status: Former Smoker     Quit date: 8/11/2017     Years since quitting: 3 9    Smokeless tobacco: Never Used   Vaping Use    Vaping Use: Never used   Substance and Sexual Activity    Alcohol use: Not Currently     Comment: Socially, former alcohol    Drug use: No    Sexual activity: Yes     Partners: Female   Other Topics Concern    Not on file   Social History Narrative    Not on file     Social Determinants of Health     Financial Resource Strain:     Difficulty of Paying Living Expenses:    Food Insecurity:     Worried About Running Out of Food in the Last Year:     [address] in the Last Year:    Transportation Needs:     Lack of Transportation (Medical):      Lack of Transportation (Non-Medical):    Physical Activity:     Days of Exercise per Week:     Minutes of Exercise per Session:    Stress:     Feeling of Stress :    Social Connections:     Frequency of Communication with Friends and Family:     Frequency of Social Gatherings with Friends and Family:     Attends Confucianism Services:     Active Member of Clubs or Organizations:     Attends Club or Organization Meetings:     Marital Status:    Intimate Partner Violence:     Fear of Current or Ex-Partner:     Emotionally Abused:     Physically Abused:     Sexually Abused:        Current Medications:   Current Outpatient Medications   Medication Sig Dispense Refill    allopurinol (ZYLOPRIM) 100 mg tablet Take 1 tablet (100 mg total) by mouth daily 90 tablet 3    aspirin (ECOTRIN LOW STRENGTH) 81 mg EC tablet Take 1 tablet (81 mg total) by mouth daily 30 tablet 0    atorvastatin (LIPITOR) 40 mg tablet Take 1 tablet (40 mg total) by mouth daily with dinner 90 tablet 3    diltiazem (CARDIZEM CD) 120 mg 24 hr capsule TAKE 1 CAPSULE BY MOUTH EVERY DAY 90 capsule 2    ELIQUIS 5 MG TAKE 1 TABLET (5 MG TOTAL) BY MOUTH 2 (TWO) TIMES A DAY 60 tablet 10    ENTRESTO 24-26 MG TABS TAKE 1 TABLET BY MOUTH TWICE A DAY 60 tablet 11    furosemide (LASIX) 40 mg tablet Take 1 tablet (40 mg total) by mouth daily 90 tablet 3    metoprolol succinate (TOPROL-XL) 100 mg 24 hr tablet TAKE 1 TABLET BY MOUTH TWICE A  tablet 3    prochlorperazine (COMPAZINE) 10 mg tablet Take 1 tablet (10 mg total) by mouth every 6 (six) hours as needed for nausea or vomiting 45 tablet 3    spironolactone (ALDACTONE) 25 mg tablet Take 0 5 tablets (12 5 mg total) by mouth daily 45 tablet 3     No current facility-administered medications for this visit  Allergies: No Known Allergies    Physical Exam:  /68 (BP Location: Left arm, Patient Position: Sitting, Cuff Size: Adult)   Pulse 74   Temp 98 1 °F (36 7 °C) (Temporal)   Resp 18   Ht 5' 9" (1 753 m)   Wt 103 kg (226 lb 6 4 oz)   SpO2 97%   BMI 33 43 kg/m²   Body surface area is 2 18 meters squared  Wt Readings from Last 3 Encounters:   08/04/21 103 kg (226 lb 6 4 oz)   04/30/21 103 kg (227 lb)   03/25/21 103 kg (226 lb)           Physical Exam  Constitutional:       General: He is not in acute distress  Appearance: He is well-developed  He is not diaphoretic  HENT:      Head: Normocephalic and atraumatic  Mouth/Throat:      Pharynx: No oropharyngeal exudate  Eyes:      General: No scleral icterus  Pupils: Pupils are equal, round, and reactive to light  Cardiovascular:      Rate and Rhythm: Normal rate and regular rhythm  Heart sounds: No murmur heard  Pulmonary:      Effort: Pulmonary effort is normal  No respiratory distress  Breath sounds: Normal breath sounds  Abdominal:      General: Bowel sounds are normal  There is no distension  Palpations: Abdomen is soft  There is no mass  Tenderness: There is no abdominal tenderness  Musculoskeletal:         General: Normal range of motion  Cervical back: Normal range of motion and neck supple  Lymphadenopathy:      Cervical: No cervical adenopathy  Skin:     General: Skin is warm and dry     Neurological: General: No focal deficit present  Mental Status: He is alert and oriented to person, place, and time  Psychiatric:         Mood and Affect: Mood normal          Behavior: Behavior normal          Thought Content: Thought content normal          Judgment: Judgment normal          Assessment / Plan:    1  Malignant neoplasm of colon, unspecified part of colon [facility])    The patient is a 62 yo male with newly diagnosed stage IIIA colon cancer  He is s/p right hemicolectomy with iliocolic anastomosis on 0/41/18  Tumor measured 4 5 cm, 1/18 lymph nodes positive for malignancy  This is a T3 N1 malignancy  He completed adjuvant treatment with 3 months of modified FOLFOX 6 in 12/2020  His post treatment imaging completed on 3/3/21 was negative for recurrent/metastatic disease  He has been on observation  Most recent blood work is within acceptable range  CEA is normal     He is due for colonoscopy  He needs to be seen by his colorectal surgeon, Dr Denisse Muñoz  We will help schedule follow up  He will be due for repeat imaging in 4 months, orders placed today  He will return for a follow-up visit with imaging and repeat blood work to include CBC, CMP, CEA  Patient was in agreement to this plan of care  He was instructed to call with any questions or concerns prior to his next office visit      Goals and Barriers:  Current Goal:  Prolong Survival from colon cancer  Barriers: None  Patient's Capacity to Self Care:  Patient  able to self care  Portions of the record may have been created with voice recognition software  Occasional wrong word or "sound a like" substitutions may have occurred due to the inherent limitations of voice recognition software  Read the chart carefully and recognize, using context, where substitutions have occurred

## 2021-08-04 ENCOUNTER — OFFICE VISIT (OUTPATIENT)
Dept: HEMATOLOGY ONCOLOGY | Facility: HOSPITAL | Age: 61
End: 2021-08-04
Payer: COMMERCIAL

## 2021-08-04 VITALS
HEART RATE: 74 BPM | TEMPERATURE: 98.1 F | SYSTOLIC BLOOD PRESSURE: 118 MMHG | HEIGHT: 69 IN | WEIGHT: 226.4 LBS | BODY MASS INDEX: 33.53 KG/M2 | DIASTOLIC BLOOD PRESSURE: 68 MMHG | OXYGEN SATURATION: 97 % | RESPIRATION RATE: 18 BRPM

## 2021-08-04 DIAGNOSIS — C18.9 MALIGNANT NEOPLASM OF COLON, UNSPECIFIED PART OF COLON (HCC): Primary | ICD-10-CM

## 2021-08-04 PROCEDURE — 3008F BODY MASS INDEX DOCD: CPT | Performed by: NURSE PRACTITIONER

## 2021-08-04 PROCEDURE — 99214 OFFICE O/P EST MOD 30 MIN: CPT | Performed by: NURSE PRACTITIONER

## 2021-08-06 ENCOUNTER — TELEPHONE (OUTPATIENT)
Dept: CARDIOLOGY CLINIC | Facility: CLINIC | Age: 61
End: 2021-08-06

## 2021-08-06 NOTE — TELEPHONE ENCOUNTER
----- Message from Kaelyn Lew MD sent at 8/6/2021  7:46 AM EDT -----  Please call the patient about normal lab results  Thank you

## 2021-09-08 ENCOUNTER — TELEPHONE (OUTPATIENT)
Dept: CARDIOLOGY CLINIC | Facility: CLINIC | Age: 61
End: 2021-09-08

## 2021-09-08 NOTE — TELEPHONE ENCOUNTER
Pt has colonoscopy scheduled for 10/6/21  Dr Sigifredo Giordano would like to know how long he can hold his Eliquis?         Please advise

## 2021-10-03 DIAGNOSIS — I50.22 CHRONIC SYSTOLIC HEART FAILURE (HCC): ICD-10-CM

## 2021-10-04 RX ORDER — SPIRONOLACTONE 25 MG/1
TABLET ORAL
Qty: 45 TABLET | Refills: 3 | Status: SHIPPED | OUTPATIENT
Start: 2021-10-04

## 2021-10-05 ENCOUNTER — TELEPHONE (OUTPATIENT)
Dept: GASTROENTEROLOGY | Facility: HOSPITAL | Age: 61
End: 2021-10-05

## 2021-10-06 DIAGNOSIS — I50.22 CHRONIC SYSTOLIC HEART FAILURE (HCC): ICD-10-CM

## 2021-10-06 DIAGNOSIS — I48.91 ATRIAL FIBRILLATION, UNSPECIFIED TYPE (HCC): ICD-10-CM

## 2021-10-06 RX ORDER — SACUBITRIL AND VALSARTAN 24; 26 MG/1; MG/1
TABLET, FILM COATED ORAL
Qty: 60 TABLET | Refills: 11 | Status: SHIPPED | OUTPATIENT
Start: 2021-10-06 | End: 2021-10-11 | Stop reason: SDUPTHER

## 2021-10-06 RX ORDER — APIXABAN 5 MG/1
TABLET, FILM COATED ORAL
Qty: 180 TABLET | Refills: 3 | Status: SHIPPED | OUTPATIENT
Start: 2021-10-06 | End: 2021-10-11 | Stop reason: SDUPTHER

## 2021-10-11 DIAGNOSIS — I50.22 CHRONIC SYSTOLIC HEART FAILURE (HCC): ICD-10-CM

## 2021-10-11 DIAGNOSIS — I48.91 ATRIAL FIBRILLATION, UNSPECIFIED TYPE (HCC): ICD-10-CM

## 2021-10-13 DIAGNOSIS — I50.22 CHRONIC SYSTOLIC HEART FAILURE (HCC): ICD-10-CM

## 2021-10-13 DIAGNOSIS — I48.91 ATRIAL FIBRILLATION, UNSPECIFIED TYPE (HCC): ICD-10-CM

## 2021-10-13 RX ORDER — SACUBITRIL AND VALSARTAN 24; 26 MG/1; MG/1
1 TABLET, FILM COATED ORAL 2 TIMES DAILY
Qty: 56 TABLET | Refills: 0 | Status: SHIPPED | COMMUNITY
Start: 2021-10-13

## 2021-10-14 RX ORDER — SACUBITRIL AND VALSARTAN 24; 26 MG/1; MG/1
1 TABLET, FILM COATED ORAL 2 TIMES DAILY
Qty: 180 TABLET | Refills: 3 | Status: SHIPPED | OUTPATIENT
Start: 2021-10-14 | End: 2021-10-20 | Stop reason: SDUPTHER

## 2021-10-15 ENCOUNTER — TELEPHONE (OUTPATIENT)
Dept: CARDIOLOGY CLINIC | Facility: CLINIC | Age: 61
End: 2021-10-15

## 2021-10-20 DIAGNOSIS — I50.22 CHRONIC SYSTOLIC HEART FAILURE (HCC): ICD-10-CM

## 2021-10-20 DIAGNOSIS — I48.91 ATRIAL FIBRILLATION, UNSPECIFIED TYPE (HCC): ICD-10-CM

## 2021-10-21 ENCOUNTER — DOCUMENTATION (OUTPATIENT)
Dept: CARDIOLOGY CLINIC | Facility: CLINIC | Age: 61
End: 2021-10-21

## 2021-10-21 RX ORDER — SACUBITRIL AND VALSARTAN 24; 26 MG/1; MG/1
1 TABLET, FILM COATED ORAL 2 TIMES DAILY
Qty: 180 TABLET | Refills: 3 | OUTPATIENT
Start: 2021-10-21 | End: 2021-12-06 | Stop reason: SDUPTHER

## 2021-10-29 ENCOUNTER — REMOTE DEVICE CLINIC VISIT (OUTPATIENT)
Dept: CARDIOLOGY CLINIC | Facility: CLINIC | Age: 61
End: 2021-10-29
Payer: COMMERCIAL

## 2021-10-29 DIAGNOSIS — Z95.818 PRESENCE OF OTHER CARDIAC IMPLANTS AND GRAFTS: Primary | ICD-10-CM

## 2021-10-29 PROCEDURE — G2066 INTER DEVC REMOTE 30D: HCPCS | Performed by: INTERNAL MEDICINE

## 2021-10-29 PROCEDURE — 93298 REM INTERROG DEV EVAL SCRMS: CPT | Performed by: INTERNAL MEDICINE

## 2021-11-15 ENCOUNTER — DOCUMENTATION (OUTPATIENT)
Dept: CARDIOLOGY CLINIC | Facility: CLINIC | Age: 61
End: 2021-11-15

## 2021-11-23 ENCOUNTER — TELEPHONE (OUTPATIENT)
Dept: GASTROENTEROLOGY | Facility: HOSPITAL | Age: 61
End: 2021-11-23

## 2021-11-24 ENCOUNTER — ANESTHESIA EVENT (OUTPATIENT)
Dept: GASTROENTEROLOGY | Facility: HOSPITAL | Age: 61
End: 2021-11-24

## 2021-11-24 ENCOUNTER — HOSPITAL ENCOUNTER (OUTPATIENT)
Dept: GASTROENTEROLOGY | Facility: HOSPITAL | Age: 61
Setting detail: OUTPATIENT SURGERY
Discharge: HOME | End: 2021-11-24
Attending: COLON & RECTAL SURGERY | Admitting: COLON & RECTAL SURGERY
Payer: COMMERCIAL

## 2021-11-24 ENCOUNTER — ANESTHESIA (OUTPATIENT)
Dept: GASTROENTEROLOGY | Facility: HOSPITAL | Age: 61
End: 2021-11-24

## 2021-11-24 VITALS
HEART RATE: 79 BPM | RESPIRATION RATE: 16 BRPM | SYSTOLIC BLOOD PRESSURE: 113 MMHG | TEMPERATURE: 97.7 F | BODY MASS INDEX: 33.63 KG/M2 | OXYGEN SATURATION: 92 % | WEIGHT: 227.07 LBS | HEIGHT: 69 IN | DIASTOLIC BLOOD PRESSURE: 70 MMHG

## 2021-11-24 DIAGNOSIS — C18.2 CANCER OF ASCENDING COLON (HCC): ICD-10-CM

## 2021-11-24 PROCEDURE — 45378 DIAGNOSTIC COLONOSCOPY: CPT | Performed by: COLON & RECTAL SURGERY

## 2021-11-24 PROCEDURE — 99213 OFFICE O/P EST LOW 20 MIN: CPT | Performed by: COLON & RECTAL SURGERY

## 2021-11-24 RX ORDER — ALBUTEROL SULFATE 2.5 MG/3ML
2.5 SOLUTION RESPIRATORY (INHALATION) ONCE AS NEEDED
Status: CANCELLED | OUTPATIENT
Start: 2021-11-24

## 2021-11-24 RX ORDER — PROPOFOL 10 MG/ML
INJECTION, EMULSION INTRAVENOUS AS NEEDED
Status: DISCONTINUED | OUTPATIENT
Start: 2021-11-24 | End: 2021-11-24

## 2021-11-24 RX ORDER — ONDANSETRON 2 MG/ML
4 INJECTION INTRAMUSCULAR; INTRAVENOUS ONCE AS NEEDED
Status: CANCELLED | OUTPATIENT
Start: 2021-11-24

## 2021-11-24 RX ORDER — PROPOFOL 10 MG/ML
INJECTION, EMULSION INTRAVENOUS CONTINUOUS PRN
Status: DISCONTINUED | OUTPATIENT
Start: 2021-11-24 | End: 2021-11-24

## 2021-11-24 RX ORDER — SODIUM CHLORIDE 9 MG/ML
INJECTION, SOLUTION INTRAVENOUS CONTINUOUS PRN
Status: DISCONTINUED | OUTPATIENT
Start: 2021-11-24 | End: 2021-11-24

## 2021-11-24 RX ORDER — MEPERIDINE HYDROCHLORIDE 25 MG/ML
12.5 INJECTION INTRAMUSCULAR; INTRAVENOUS; SUBCUTANEOUS
Status: CANCELLED | OUTPATIENT
Start: 2021-11-24

## 2021-11-24 RX ORDER — LIDOCAINE HYDROCHLORIDE 20 MG/ML
INJECTION, SOLUTION EPIDURAL; INFILTRATION; INTRACAUDAL; PERINEURAL AS NEEDED
Status: DISCONTINUED | OUTPATIENT
Start: 2021-11-24 | End: 2021-11-24

## 2021-11-24 RX ORDER — HYDROMORPHONE HCL/PF 1 MG/ML
0.5 SYRINGE (ML) INJECTION
Status: CANCELLED | OUTPATIENT
Start: 2021-11-24

## 2021-11-24 RX ORDER — FENTANYL CITRATE/PF 50 MCG/ML
25 SYRINGE (ML) INJECTION
Status: CANCELLED | OUTPATIENT
Start: 2021-11-24

## 2021-11-24 RX ORDER — PROMETHAZINE HYDROCHLORIDE 25 MG/ML
12.5 INJECTION, SOLUTION INTRAMUSCULAR; INTRAVENOUS ONCE AS NEEDED
Status: CANCELLED | OUTPATIENT
Start: 2021-11-24

## 2021-11-24 RX ADMIN — SODIUM CHLORIDE: 0.9 INJECTION, SOLUTION INTRAVENOUS at 10:24

## 2021-11-24 RX ADMIN — LIDOCAINE HYDROCHLORIDE 50 MG: 20 INJECTION, SOLUTION EPIDURAL; INFILTRATION; INTRACAUDAL; PERINEURAL at 10:46

## 2021-11-24 RX ADMIN — PROPOFOL 150 MCG/KG/MIN: 10 INJECTION, EMULSION INTRAVENOUS at 10:46

## 2021-11-24 RX ADMIN — PROPOFOL 140 MG: 10 INJECTION, EMULSION INTRAVENOUS at 10:46

## 2021-11-29 ENCOUNTER — APPOINTMENT (OUTPATIENT)
Dept: LAB | Age: 61
End: 2021-11-29
Payer: COMMERCIAL

## 2021-11-29 ENCOUNTER — TELEPHONE (OUTPATIENT)
Dept: HEMATOLOGY ONCOLOGY | Facility: HOSPITAL | Age: 61
End: 2021-11-29

## 2021-11-29 DIAGNOSIS — C18.9 MALIGNANT NEOPLASM OF COLON, UNSPECIFIED PART OF COLON (HCC): ICD-10-CM

## 2021-11-29 LAB
ALBUMIN SERPL BCP-MCNC: 3.6 G/DL (ref 3.5–5)
ALP SERPL-CCNC: 55 U/L (ref 46–116)
ALT SERPL W P-5'-P-CCNC: 38 U/L (ref 12–78)
ANION GAP SERPL CALCULATED.3IONS-SCNC: 4 MMOL/L (ref 4–13)
AST SERPL W P-5'-P-CCNC: 19 U/L (ref 5–45)
BASOPHILS # BLD AUTO: 0.06 THOUSANDS/ΜL (ref 0–0.1)
BASOPHILS NFR BLD AUTO: 1 % (ref 0–1)
BILIRUB SERPL-MCNC: 1.26 MG/DL (ref 0.2–1)
BUN SERPL-MCNC: 17 MG/DL (ref 5–25)
CALCIUM SERPL-MCNC: 9.5 MG/DL (ref 8.3–10.1)
CEA SERPL-MCNC: 2.2 NG/ML (ref 0–3)
CHLORIDE SERPL-SCNC: 102 MMOL/L (ref 100–108)
CO2 SERPL-SCNC: 28 MMOL/L (ref 21–32)
CREAT SERPL-MCNC: 1.25 MG/DL (ref 0.6–1.3)
EOSINOPHIL # BLD AUTO: 0.04 THOUSAND/ΜL (ref 0–0.61)
EOSINOPHIL NFR BLD AUTO: 1 % (ref 0–6)
ERYTHROCYTE [DISTWIDTH] IN BLOOD BY AUTOMATED COUNT: 13 % (ref 11.6–15.1)
GFR SERPL CREATININE-BSD FRML MDRD: 62 ML/MIN/1.73SQ M
GLUCOSE SERPL-MCNC: 110 MG/DL (ref 65–140)
HCT VFR BLD AUTO: 45.7 % (ref 36.5–49.3)
HGB BLD-MCNC: 14.9 G/DL (ref 12–17)
IMM GRANULOCYTES # BLD AUTO: 0.03 THOUSAND/UL (ref 0–0.2)
IMM GRANULOCYTES NFR BLD AUTO: 0 % (ref 0–2)
LYMPHOCYTES # BLD AUTO: 1.03 THOUSANDS/ΜL (ref 0.6–4.47)
LYMPHOCYTES NFR BLD AUTO: 15 % (ref 14–44)
MCH RBC QN AUTO: 31.8 PG (ref 26.8–34.3)
MCHC RBC AUTO-ENTMCNC: 32.6 G/DL (ref 31.4–37.4)
MCV RBC AUTO: 98 FL (ref 82–98)
MONOCYTES # BLD AUTO: 0.78 THOUSAND/ΜL (ref 0.17–1.22)
MONOCYTES NFR BLD AUTO: 11 % (ref 4–12)
NEUTROPHILS # BLD AUTO: 5.01 THOUSANDS/ΜL (ref 1.85–7.62)
NEUTS SEG NFR BLD AUTO: 72 % (ref 43–75)
NRBC BLD AUTO-RTO: 0 /100 WBCS
PLATELET # BLD AUTO: 234 THOUSANDS/UL (ref 149–390)
PMV BLD AUTO: 10.9 FL (ref 8.9–12.7)
POTASSIUM SERPL-SCNC: 4.4 MMOL/L (ref 3.5–5.3)
PROT SERPL-MCNC: 7.9 G/DL (ref 6.4–8.2)
RBC # BLD AUTO: 4.68 MILLION/UL (ref 3.88–5.62)
SODIUM SERPL-SCNC: 134 MMOL/L (ref 136–145)
WBC # BLD AUTO: 6.95 THOUSAND/UL (ref 4.31–10.16)

## 2021-11-29 PROCEDURE — 80053 COMPREHEN METABOLIC PANEL: CPT

## 2021-11-29 PROCEDURE — 82378 CARCINOEMBRYONIC ANTIGEN: CPT

## 2021-11-29 PROCEDURE — 36415 COLL VENOUS BLD VENIPUNCTURE: CPT

## 2021-11-29 PROCEDURE — 85025 COMPLETE CBC W/AUTO DIFF WBC: CPT

## 2021-11-30 ENCOUNTER — HOSPITAL ENCOUNTER (OUTPATIENT)
Dept: RADIOLOGY | Facility: HOSPITAL | Age: 61
Discharge: HOME | End: 2021-11-30
Payer: COMMERCIAL

## 2021-11-30 DIAGNOSIS — C18.9 MALIGNANT NEOPLASM OF COLON, UNSPECIFIED PART OF COLON (HCC): ICD-10-CM

## 2021-11-30 PROCEDURE — 74177 CT ABD & PELVIS W/CONTRAST: CPT

## 2021-11-30 PROCEDURE — 71260 CT THORAX DX C+: CPT

## 2021-11-30 RX ADMIN — IOHEXOL 100 ML: 350 INJECTION, SOLUTION INTRAVENOUS at 08:11

## 2021-12-06 ENCOUNTER — OFFICE VISIT (OUTPATIENT)
Dept: CARDIOLOGY CLINIC | Facility: CLINIC | Age: 61
End: 2021-12-06
Payer: COMMERCIAL

## 2021-12-06 VITALS
BODY MASS INDEX: 34.64 KG/M2 | WEIGHT: 233.9 LBS | DIASTOLIC BLOOD PRESSURE: 56 MMHG | HEIGHT: 69 IN | SYSTOLIC BLOOD PRESSURE: 94 MMHG | HEART RATE: 85 BPM

## 2021-12-06 DIAGNOSIS — I25.10 CORONARY ARTERY DISEASE INVOLVING NATIVE HEART WITHOUT ANGINA PECTORIS, UNSPECIFIED VESSEL OR LESION TYPE: ICD-10-CM

## 2021-12-06 DIAGNOSIS — I50.22 CHRONIC SYSTOLIC CONGESTIVE HEART FAILURE (HCC): ICD-10-CM

## 2021-12-06 DIAGNOSIS — R55 SYNCOPE AND COLLAPSE: ICD-10-CM

## 2021-12-06 DIAGNOSIS — I50.22 CHRONIC SYSTOLIC HEART FAILURE (HCC): ICD-10-CM

## 2021-12-06 DIAGNOSIS — I47.2 NSVT (NONSUSTAINED VENTRICULAR TACHYCARDIA) (HCC): ICD-10-CM

## 2021-12-06 DIAGNOSIS — I48.0 PAF (PAROXYSMAL ATRIAL FIBRILLATION) (HCC): Primary | ICD-10-CM

## 2021-12-06 PROCEDURE — 93000 ELECTROCARDIOGRAM COMPLETE: CPT | Performed by: INTERNAL MEDICINE

## 2021-12-06 PROCEDURE — 99214 OFFICE O/P EST MOD 30 MIN: CPT | Performed by: INTERNAL MEDICINE

## 2021-12-06 RX ORDER — FUROSEMIDE 20 MG/1
20 TABLET ORAL DAILY
Qty: 90 TABLET | Refills: 3 | Status: SHIPPED | OUTPATIENT
Start: 2021-12-06

## 2021-12-06 RX ORDER — METOPROLOL SUCCINATE 50 MG/1
50 TABLET, EXTENDED RELEASE ORAL 2 TIMES DAILY
Qty: 90 TABLET | Refills: 3 | Status: SHIPPED | OUTPATIENT
Start: 2021-12-06

## 2021-12-07 ENCOUNTER — OFFICE VISIT (OUTPATIENT)
Dept: HEMATOLOGY ONCOLOGY | Facility: CLINIC | Age: 61
End: 2021-12-07
Payer: COMMERCIAL

## 2021-12-07 VITALS
DIASTOLIC BLOOD PRESSURE: 100 MMHG | HEART RATE: 88 BPM | RESPIRATION RATE: 14 BRPM | SYSTOLIC BLOOD PRESSURE: 150 MMHG | TEMPERATURE: 97.6 F | BODY MASS INDEX: 34.96 KG/M2 | HEIGHT: 69 IN | OXYGEN SATURATION: 98 % | WEIGHT: 236 LBS

## 2021-12-07 DIAGNOSIS — C18.9 MALIGNANT NEOPLASM OF COLON, UNSPECIFIED PART OF COLON (HCC): Primary | ICD-10-CM

## 2021-12-07 DIAGNOSIS — C18.2 CANCER OF ASCENDING COLON (HCC): ICD-10-CM

## 2021-12-07 PROCEDURE — 99214 OFFICE O/P EST MOD 30 MIN: CPT | Performed by: INTERNAL MEDICINE

## 2021-12-07 PROCEDURE — 1036F TOBACCO NON-USER: CPT | Performed by: INTERNAL MEDICINE

## 2021-12-07 PROCEDURE — 3008F BODY MASS INDEX DOCD: CPT | Performed by: INTERNAL MEDICINE

## 2021-12-21 ENCOUNTER — TELEMEDICINE (OUTPATIENT)
Dept: FAMILY MEDICINE CLINIC | Facility: CLINIC | Age: 61
End: 2021-12-21
Payer: COMMERCIAL

## 2021-12-21 ENCOUNTER — TELEPHONE (OUTPATIENT)
Dept: FAMILY MEDICINE CLINIC | Facility: CLINIC | Age: 61
End: 2021-12-21

## 2021-12-21 ENCOUNTER — APPOINTMENT (EMERGENCY)
Dept: RADIOLOGY | Facility: HOSPITAL | Age: 61
End: 2021-12-21
Payer: COMMERCIAL

## 2021-12-21 ENCOUNTER — HOSPITAL ENCOUNTER (EMERGENCY)
Facility: HOSPITAL | Age: 61
Discharge: HOME | End: 2021-12-21
Attending: EMERGENCY MEDICINE | Admitting: EMERGENCY MEDICINE
Payer: COMMERCIAL

## 2021-12-21 VITALS
SYSTOLIC BLOOD PRESSURE: 119 MMHG | RESPIRATION RATE: 18 BRPM | WEIGHT: 227.8 LBS | DIASTOLIC BLOOD PRESSURE: 77 MMHG | BODY MASS INDEX: 33.64 KG/M2 | TEMPERATURE: 98.8 F | HEART RATE: 92 BPM | OXYGEN SATURATION: 94 %

## 2021-12-21 DIAGNOSIS — R03.0 ELEVATED BLOOD PRESSURE READING: ICD-10-CM

## 2021-12-21 DIAGNOSIS — C18.2 CANCER OF ASCENDING COLON (HCC): ICD-10-CM

## 2021-12-21 DIAGNOSIS — Q21.1 PFO (PATENT FORAMEN OVALE): ICD-10-CM

## 2021-12-21 DIAGNOSIS — R11.0 NAUSEA: ICD-10-CM

## 2021-12-21 DIAGNOSIS — U07.1 COVID-19: Primary | ICD-10-CM

## 2021-12-21 DIAGNOSIS — I42.0 CARDIOMYOPATHY, DILATED (HCC): ICD-10-CM

## 2021-12-21 DIAGNOSIS — R06.00 DYSPNEA ON EXERTION: ICD-10-CM

## 2021-12-21 DIAGNOSIS — I50.22 CHRONIC SYSTOLIC HEART FAILURE (HCC): ICD-10-CM

## 2021-12-21 DIAGNOSIS — Z90.49 S/P RIGHT HEMICOLECTOMY: ICD-10-CM

## 2021-12-21 PROBLEM — I48.92 PAROXYSMAL ATRIAL FLUTTER (HCC): Status: ACTIVE | Noted: 2017-12-27

## 2021-12-21 PROBLEM — I48.19 PERSISTENT ATRIAL FIBRILLATION (HCC): Status: ACTIVE | Noted: 2017-12-18

## 2021-12-21 PROBLEM — I50.9 ACUTE HEART FAILURE (HCC): Status: ACTIVE | Noted: 2017-12-11

## 2021-12-21 PROBLEM — Q21.12 PFO (PATENT FORAMEN OVALE): Status: ACTIVE | Noted: 2017-12-28

## 2021-12-21 LAB
2HR DELTA HS TROPONIN: -8 NG/L
ALBUMIN SERPL BCP-MCNC: 3.3 G/DL (ref 3.5–5)
ALP SERPL-CCNC: 54 U/L (ref 46–116)
ALT SERPL W P-5'-P-CCNC: 47 U/L (ref 12–78)
ANION GAP SERPL CALCULATED.3IONS-SCNC: 8 MMOL/L (ref 4–13)
AST SERPL W P-5'-P-CCNC: 43 U/L (ref 5–45)
ATRIAL RATE: 90 BPM
BASOPHILS # BLD AUTO: 0.02 THOUSANDS/ΜL (ref 0–0.1)
BASOPHILS NFR BLD AUTO: 1 % (ref 0–1)
BILIRUB SERPL-MCNC: 0.81 MG/DL (ref 0.2–1)
BUN SERPL-MCNC: 21 MG/DL (ref 5–25)
CALCIUM ALBUM COR SERPL-MCNC: 9.1 MG/DL (ref 8.3–10.1)
CALCIUM SERPL-MCNC: 8.5 MG/DL (ref 8.3–10.1)
CARDIAC TROPONIN I PNL SERPL HS: 37 NG/L
CARDIAC TROPONIN I PNL SERPL HS: 45 NG/L
CHLORIDE SERPL-SCNC: 104 MMOL/L (ref 100–108)
CO2 SERPL-SCNC: 21 MMOL/L (ref 21–32)
CREAT SERPL-MCNC: 1.36 MG/DL (ref 0.6–1.3)
EOSINOPHIL # BLD AUTO: 0.01 THOUSAND/ΜL (ref 0–0.61)
EOSINOPHIL NFR BLD AUTO: 0 % (ref 0–6)
ERYTHROCYTE [DISTWIDTH] IN BLOOD BY AUTOMATED COUNT: 12.9 % (ref 11.6–15.1)
FLUAV RNA RESP QL NAA+PROBE: NEGATIVE
FLUBV RNA RESP QL NAA+PROBE: NEGATIVE
GFR SERPL CREATININE-BSD FRML MDRD: 55 ML/MIN/1.73SQ M
GLUCOSE SERPL-MCNC: 107 MG/DL (ref 65–140)
HCT VFR BLD AUTO: 43.1 % (ref 36.5–49.3)
HGB BLD-MCNC: 14.1 G/DL (ref 12–17)
IMM GRANULOCYTES # BLD AUTO: 0.01 THOUSAND/UL (ref 0–0.2)
IMM GRANULOCYTES NFR BLD AUTO: 0 % (ref 0–2)
LYMPHOCYTES # BLD AUTO: 0.69 THOUSANDS/ΜL (ref 0.6–4.47)
LYMPHOCYTES NFR BLD AUTO: 18 % (ref 14–44)
MCH RBC QN AUTO: 32.2 PG (ref 26.8–34.3)
MCHC RBC AUTO-ENTMCNC: 32.7 G/DL (ref 31.4–37.4)
MCV RBC AUTO: 98 FL (ref 82–98)
MONOCYTES # BLD AUTO: 0.35 THOUSAND/ΜL (ref 0.17–1.22)
MONOCYTES NFR BLD AUTO: 9 % (ref 4–12)
NEUTROPHILS # BLD AUTO: 2.73 THOUSANDS/ΜL (ref 1.85–7.62)
NEUTS SEG NFR BLD AUTO: 72 % (ref 43–75)
NRBC BLD AUTO-RTO: 0 /100 WBCS
NT-PROBNP SERPL-MCNC: 272 PG/ML
P AXIS: 78 DEGREES
PLATELET # BLD AUTO: 151 THOUSANDS/UL (ref 149–390)
PMV BLD AUTO: 10.4 FL (ref 8.9–12.7)
POTASSIUM SERPL-SCNC: 4.1 MMOL/L (ref 3.5–5.3)
PR INTERVAL: 132 MS
PROT SERPL-MCNC: 8.2 G/DL (ref 6.4–8.2)
QRS AXIS: 36 DEGREES
QRSD INTERVAL: 86 MS
QT INTERVAL: 358 MS
QTC INTERVAL: 437 MS
RBC # BLD AUTO: 4.38 MILLION/UL (ref 3.88–5.62)
RSV RNA RESP QL NAA+PROBE: NEGATIVE
SARS-COV-2 RNA RESP QL NAA+PROBE: POSITIVE
SODIUM SERPL-SCNC: 133 MMOL/L (ref 136–145)
T WAVE AXIS: 53 DEGREES
VENTRICULAR RATE: 90 BPM
WBC # BLD AUTO: 3.81 THOUSAND/UL (ref 4.31–10.16)

## 2021-12-21 PROCEDURE — 0241U HB NFCT DS VIR RESP RNA 4 TRGT: CPT | Performed by: EMERGENCY MEDICINE

## 2021-12-21 PROCEDURE — 93005 ELECTROCARDIOGRAM TRACING: CPT

## 2021-12-21 PROCEDURE — 83880 ASSAY OF NATRIURETIC PEPTIDE: CPT | Performed by: EMERGENCY MEDICINE

## 2021-12-21 PROCEDURE — 71045 X-RAY EXAM CHEST 1 VIEW: CPT

## 2021-12-21 PROCEDURE — 93010 ELECTROCARDIOGRAM REPORT: CPT | Performed by: INTERNAL MEDICINE

## 2021-12-21 PROCEDURE — 99285 EMERGENCY DEPT VISIT HI MDM: CPT

## 2021-12-21 PROCEDURE — 36415 COLL VENOUS BLD VENIPUNCTURE: CPT | Performed by: EMERGENCY MEDICINE

## 2021-12-21 PROCEDURE — 80053 COMPREHEN METABOLIC PANEL: CPT | Performed by: EMERGENCY MEDICINE

## 2021-12-21 PROCEDURE — 99285 EMERGENCY DEPT VISIT HI MDM: CPT | Performed by: EMERGENCY MEDICINE

## 2021-12-21 PROCEDURE — 99442 PR PHYS/QHP TELEPHONE EVALUATION 11-20 MIN: CPT | Performed by: FAMILY MEDICINE

## 2021-12-21 PROCEDURE — 84484 ASSAY OF TROPONIN QUANT: CPT | Performed by: EMERGENCY MEDICINE

## 2021-12-21 PROCEDURE — 85025 COMPLETE CBC W/AUTO DIFF WBC: CPT | Performed by: EMERGENCY MEDICINE

## 2021-12-21 RX ORDER — ACETAMINOPHEN 325 MG/1
975 TABLET ORAL ONCE
Status: COMPLETED | OUTPATIENT
Start: 2021-12-21 | End: 2021-12-21

## 2021-12-21 RX ORDER — ONDANSETRON 2 MG/ML
4 INJECTION INTRAMUSCULAR; INTRAVENOUS ONCE AS NEEDED
Status: CANCELLED | OUTPATIENT
Start: 2021-12-22

## 2021-12-21 RX ORDER — SODIUM CHLORIDE 9 MG/ML
20 INJECTION, SOLUTION INTRAVENOUS ONCE AS NEEDED
Status: CANCELLED | OUTPATIENT
Start: 2021-12-22

## 2021-12-21 RX ORDER — ALBUTEROL SULFATE 90 UG/1
3 AEROSOL, METERED RESPIRATORY (INHALATION) ONCE AS NEEDED
Status: CANCELLED | OUTPATIENT
Start: 2021-12-22

## 2021-12-21 RX ORDER — ONDANSETRON 4 MG/1
4 TABLET, ORALLY DISINTEGRATING ORAL EVERY 6 HOURS PRN
Qty: 20 TABLET | Refills: 0 | Status: SHIPPED | OUTPATIENT
Start: 2021-12-21 | End: 2021-12-26

## 2021-12-21 RX ORDER — ACETAMINOPHEN 325 MG/1
650 TABLET ORAL ONCE AS NEEDED
Status: CANCELLED | OUTPATIENT
Start: 2021-12-22

## 2021-12-21 RX ADMIN — ACETAMINOPHEN 975 MG: 325 TABLET, FILM COATED ORAL at 11:21

## 2021-12-22 ENCOUNTER — HOSPITAL ENCOUNTER (OUTPATIENT)
Dept: INFUSION CENTER | Facility: HOSPITAL | Age: 61
Discharge: HOME | End: 2021-12-22
Payer: COMMERCIAL

## 2021-12-22 VITALS
OXYGEN SATURATION: 96 % | HEART RATE: 81 BPM | DIASTOLIC BLOOD PRESSURE: 74 MMHG | RESPIRATION RATE: 14 BRPM | SYSTOLIC BLOOD PRESSURE: 116 MMHG | TEMPERATURE: 98.5 F

## 2021-12-22 DIAGNOSIS — R03.0 ELEVATED BLOOD PRESSURE READING: ICD-10-CM

## 2021-12-22 DIAGNOSIS — I42.0 CARDIOMYOPATHY, DILATED (HCC): ICD-10-CM

## 2021-12-22 DIAGNOSIS — Q21.1 PFO (PATENT FORAMEN OVALE): ICD-10-CM

## 2021-12-22 DIAGNOSIS — I50.22 CHRONIC SYSTOLIC HEART FAILURE (HCC): Primary | ICD-10-CM

## 2021-12-22 DIAGNOSIS — U07.1 COVID-19: ICD-10-CM

## 2021-12-22 DIAGNOSIS — C18.2 CANCER OF ASCENDING COLON (HCC): ICD-10-CM

## 2021-12-22 PROCEDURE — M0245 HB BAMLAN AND ETESEV INF ADMIN: HCPCS | Performed by: FAMILY MEDICINE

## 2021-12-22 RX ORDER — SODIUM CHLORIDE 9 MG/ML
20 INJECTION, SOLUTION INTRAVENOUS ONCE AS NEEDED
Status: DISCONTINUED | OUTPATIENT
Start: 2021-12-22 | End: 2021-12-25 | Stop reason: HOSPADM

## 2021-12-22 RX ORDER — ACETAMINOPHEN 325 MG/1
650 TABLET ORAL ONCE AS NEEDED
Status: DISCONTINUED | OUTPATIENT
Start: 2021-12-22 | End: 2021-12-25 | Stop reason: HOSPADM

## 2021-12-22 RX ORDER — ACETAMINOPHEN 325 MG/1
650 TABLET ORAL ONCE AS NEEDED
Status: CANCELLED | OUTPATIENT
Start: 2021-12-22

## 2021-12-22 RX ORDER — ALBUTEROL SULFATE 90 UG/1
3 AEROSOL, METERED RESPIRATORY (INHALATION) ONCE AS NEEDED
Status: CANCELLED | OUTPATIENT
Start: 2021-12-22

## 2021-12-22 RX ORDER — SODIUM CHLORIDE 9 MG/ML
20 INJECTION, SOLUTION INTRAVENOUS ONCE AS NEEDED
Status: CANCELLED | OUTPATIENT
Start: 2021-12-22

## 2021-12-22 RX ORDER — ALBUTEROL SULFATE 90 UG/1
3 AEROSOL, METERED RESPIRATORY (INHALATION) ONCE AS NEEDED
Status: DISCONTINUED | OUTPATIENT
Start: 2021-12-22 | End: 2021-12-25 | Stop reason: HOSPADM

## 2021-12-22 RX ORDER — ONDANSETRON 2 MG/ML
4 INJECTION INTRAMUSCULAR; INTRAVENOUS ONCE AS NEEDED
Status: DISCONTINUED | OUTPATIENT
Start: 2021-12-22 | End: 2021-12-25 | Stop reason: HOSPADM

## 2021-12-22 RX ORDER — ONDANSETRON 2 MG/ML
4 INJECTION INTRAMUSCULAR; INTRAVENOUS ONCE AS NEEDED
Status: CANCELLED | OUTPATIENT
Start: 2021-12-22

## 2021-12-22 RX ADMIN — SODIUM CHLORIDE 20 ML/HR: 0.9 INJECTION, SOLUTION INTRAVENOUS at 12:23

## 2021-12-22 RX ADMIN — SODIUM CHLORIDE 2100 MG COMBINED: 9 INJECTION, SOLUTION INTRAVENOUS at 12:20

## 2021-12-23 ENCOUNTER — TELEMEDICINE (OUTPATIENT)
Dept: FAMILY MEDICINE CLINIC | Facility: CLINIC | Age: 61
End: 2021-12-23
Payer: COMMERCIAL

## 2021-12-23 DIAGNOSIS — U07.1 COVID-19: Primary | ICD-10-CM

## 2021-12-23 PROCEDURE — 99442 PR PHYS/QHP TELEPHONE EVALUATION 11-20 MIN: CPT | Performed by: FAMILY MEDICINE

## 2022-01-03 DIAGNOSIS — I48.0 PAF (PAROXYSMAL ATRIAL FIBRILLATION) (HCC): ICD-10-CM

## 2022-01-05 RX ORDER — DILTIAZEM HYDROCHLORIDE 120 MG/1
CAPSULE, COATED, EXTENDED RELEASE ORAL
Qty: 90 CAPSULE | Refills: 2 | Status: SHIPPED | OUTPATIENT
Start: 2022-01-05

## 2022-01-28 ENCOUNTER — REMOTE DEVICE CLINIC VISIT (OUTPATIENT)
Dept: CARDIOLOGY CLINIC | Facility: CLINIC | Age: 62
End: 2022-01-28
Payer: COMMERCIAL

## 2022-01-28 DIAGNOSIS — Z95.818 PRESENCE OF OTHER CARDIAC IMPLANTS AND GRAFTS: Primary | ICD-10-CM

## 2022-01-28 PROCEDURE — 93298 REM INTERROG DEV EVAL SCRMS: CPT | Performed by: INTERNAL MEDICINE

## 2022-01-28 PROCEDURE — G2066 INTER DEVC REMOTE 30D: HCPCS | Performed by: INTERNAL MEDICINE

## 2022-01-28 NOTE — PROGRESS NOTES
MDT LNQ22/ ACTIVE SYSTEM IS MRI CONDITIONAL   CARELINK TRANSMISSION: LOOP RECORDER  PRESENTING RHYTHM SB W/ PVC @ 54 BPM  BATTERY STATUS "OK " 1 TACHY EPISODE W/ EGRAM SHOWING SVT vs RVR @ 176 BPM FOR 1:05 MINS  70 AF EPISODES W/ EGRAMS SUGGESTING SR W/ PAC , PVCs AND PROB AF  AF BURDEN = 1 1%  HX OF PAF  PT TAKES CARDIZEM, METOPROLOL SUCC AND ELIQUIS  NO PATIENT ACTIVATED EPISODES  NORMAL DEVICE FUNCTION   DL

## 2022-03-07 ENCOUNTER — OFFICE VISIT (OUTPATIENT)
Dept: CARDIOLOGY CLINIC | Facility: CLINIC | Age: 62
End: 2022-03-07
Payer: COMMERCIAL

## 2022-03-07 VITALS
DIASTOLIC BLOOD PRESSURE: 88 MMHG | HEIGHT: 69 IN | SYSTOLIC BLOOD PRESSURE: 142 MMHG | WEIGHT: 230.8 LBS | HEART RATE: 78 BPM | BODY MASS INDEX: 34.18 KG/M2

## 2022-03-07 DIAGNOSIS — I50.42 CHRONIC COMBINED SYSTOLIC AND DIASTOLIC HEART FAILURE (HCC): ICD-10-CM

## 2022-03-07 DIAGNOSIS — I42.0 CARDIOMYOPATHY, DILATED (HCC): ICD-10-CM

## 2022-03-07 DIAGNOSIS — I48.92 PAROXYSMAL ATRIAL FLUTTER (HCC): ICD-10-CM

## 2022-03-07 DIAGNOSIS — I48.19 PERSISTENT ATRIAL FIBRILLATION (HCC): Primary | ICD-10-CM

## 2022-03-07 DIAGNOSIS — I50.22 CHRONIC SYSTOLIC HEART FAILURE (HCC): ICD-10-CM

## 2022-03-07 DIAGNOSIS — I47.2 NSVT (NONSUSTAINED VENTRICULAR TACHYCARDIA) (HCC): ICD-10-CM

## 2022-03-07 DIAGNOSIS — Q21.1 PFO (PATENT FORAMEN OVALE): ICD-10-CM

## 2022-03-07 PROBLEM — R55 SYNCOPE AND COLLAPSE: Status: RESOLVED | Noted: 2018-07-27 | Resolved: 2022-03-07

## 2022-03-07 PROBLEM — N17.9 AKI (ACUTE KIDNEY INJURY) (HCC): Status: RESOLVED | Noted: 2020-08-21 | Resolved: 2022-03-07

## 2022-03-07 PROBLEM — I50.9 ACUTE HEART FAILURE (HCC): Status: RESOLVED | Noted: 2017-12-11 | Resolved: 2022-03-07

## 2022-03-07 PROBLEM — I21.4: Status: RESOLVED | Noted: 2017-12-11 | Resolved: 2022-03-07

## 2022-03-07 PROCEDURE — 93000 ELECTROCARDIOGRAM COMPLETE: CPT | Performed by: INTERNAL MEDICINE

## 2022-03-07 PROCEDURE — 1036F TOBACCO NON-USER: CPT | Performed by: INTERNAL MEDICINE

## 2022-03-07 PROCEDURE — 3008F BODY MASS INDEX DOCD: CPT | Performed by: INTERNAL MEDICINE

## 2022-03-07 PROCEDURE — 99215 OFFICE O/P EST HI 40 MIN: CPT | Performed by: INTERNAL MEDICINE

## 2022-03-07 NOTE — PROGRESS NOTES
[facility]  Follow up note  Michelle Puga 64 y o  male MRN: [id number]        Problems    1  Persistent atrial fibrillation (HCC)  POCT ECG    Echo complete w/ contrast if indicated    Basic metabolic panel   2  Chronic systolic heart failure (HCC)  Echo complete w/ contrast if indicated    Lipid panel   3  NSVT (nonsustained ventricular tachycardia) (HCC)     4  Cardiomyopathy, dilated (HCC)     5  Paroxysmal atrial flutter ([address] )     6  PFO (patent foramen ovale)     7  Chronic combined systolic and diastolic heart failure (HCC)         Impression:     Recovered nonischemic cardiomyopathy   o  due to tachy mediated cardiomyopathy in the setting of atrial fibrillation with RVR  o Last EF 1/21 was 65%    Chronic  Diastolic/systolic CHF  o With recovered ejection fraction, 65% as of 1/21  o  no orthopnea, no edema, prior NT proBNP was less than 500   o On spironolactone, furosemide, Entresto, metoprolol succinate  o  he complains of shortness of breath in the absence of signs of decompensated congestive heart failure   o He is 50 lb over his ideal body weight     o He does not exercise       Paroxysmal atrial fibrillation   o Recurrence fall of 2020 in the setting of colon cancer/hemicolectomy  o Ablation was done 1/21  o  as of 1/28/2022 loop recorder, a 1 1% burden of AFib recurrence   o Ongoing obesity continues to be a risk factor for recurrent AFib  o  he continues on Eliquis, metoprolol, diltiazem, metoprolol was reduced in dose at his last visit 12/21 due to hypotension   Nonsustained VT   o  rare episodes on loop recorder interrogation   coronary artery disease  o Isolated 80% ramus lesion on remote cardiac catheterization   o  he has no typical anginal symptoms  o His EKG today is normal    Plan:     update BMP/lipids   No medication changes for today   New isolated elevated blood pressure, having had hypotension in December prompting dose reduction of metoprolol   He is not volume overloaded History dyspnea on exertion will be re-evaluated by repeat echo considering there is some recurrence of AFib, but at 1 1% burden, unlikely to be significant enough to cause recurrent tachy mediated cardiomyopathy  he is overweight by 50 lb and weight loss is imperative, we discussed strategies today   I am concerned about his ongoing weight gain as a risk factor for increasing recurrence of atrial fibrillation despite ablation in 2021  I am also concerned about the possibility of sleep apnea  Will repeat his echo, if there is any sign of RV enlargement, will definitely refer for testing  Four-month follow-up recommended        HPI:   Sherita Morton is a 64y o  year old male  Previously seeing Dr Flori Barnes, for recovered nonischemic/tachy mediated cardiomyopathy due to atrial fibrillation,  Who had significant recurrence of AFib following hemicolectomy / chemotherapy for colon cancer, with a mild drop in EF to 50% in 2020, who was referred for AFib ablation successfully done 1/25/2021,  With recovered LVEF 1/21 up to 65%  Hypotension 12/21 prompted dose reduction of metoprolol, couple visits since then at other physician's offices revealed normotension  Today he is mildly hypertensive   He is inactive, he has gained 4 lb since my last visit  He is 230 lb  He is about 50 lb overweight  He admits to dyspnea on exertion  He denies orthopnea, edema, or any other CHF decompensation symptoms  He is compliant with furosemide, Entresto, spironolactone, diltiazem, metoprolol, atorvastatin, aspirin, Eliquis  He has no stroke-like symptoms  He has no symptomatic recurrence of AFib, but was never symptomatic in the past, but has been susceptible to tachy mediated cardiomyopathy  His loop recorder interrogation has revealed about 1 1% recurrence of atrial fibrillation    There were 71 episode suggestive of AFib, many were sinus rhythm with premature atrial contractions and premature ventricular contractions  Review of Systems   Constitutional: Negative for appetite change, diaphoresis, fatigue and fever  HENT: Negative  Eyes: Negative  Respiratory: Positive for shortness of breath  Negative for cough, chest tightness and wheezing  Cardiovascular: Negative for chest pain, palpitations and leg swelling  Gastrointestinal: Negative for abdominal pain and blood in stool  Endocrine: Negative  Genitourinary: Negative  Musculoskeletal: Negative for arthralgias and joint swelling  Skin: Negative  Negative for rash  Neurological: Negative for dizziness, syncope and light-headedness  Hematological: Negative  Psychiatric/Behavioral: Negative          Past Medical History:   Diagnosis Date    A-fib ([place] [address] )     Cancer ([place] [address] )     Colon cancer ([place] [address] )     Colon polyp     Coronary artery disease     Gout     Herpes zoster     last assessed 17    Hypertension     Irregular heart beat     Shingles      Social History     Substance and Sexual Activity   Alcohol Use Not Currently    Comment: Socially, former alcohol     Social History     Substance and Sexual Activity   Drug Use No     Social History     Tobacco Use   Smoking Status Former Smoker    Quit date: 2017    Years since quittin 5   Smokeless Tobacco Never Used       Allergies:  No Known Allergies    Medications:     Current Outpatient Medications:     allopurinol (ZYLOPRIM) 100 mg tablet, Take 1 tablet (100 mg total) by mouth daily, Disp: 90 tablet, Rfl: 3    apixaban (Eliquis) 5 mg, Take 1 tablet (5 mg total) by mouth 2 (two) times a day, Disp: 180 tablet, Rfl: 3    aspirin (ECOTRIN LOW STRENGTH) 81 mg EC tablet, Take 1 tablet (81 mg total) by mouth daily, Disp: 30 tablet, Rfl: 0    atorvastatin (LIPITOR) 40 mg tablet, Take 1 tablet (40 mg total) by mouth daily with dinner, Disp: 90 tablet, Rfl: 3    diltiazem (CARDIZEM CD) 120 mg 24 hr capsule, TAKE 1 CAPSULE BY MOUTH EVERY DAY, Disp: 90 capsule, Rfl: 2    furosemide (LASIX) 20 mg tablet, Take 1 tablet (20 mg total) by mouth daily, Disp: 90 tablet, Rfl: 3    metoprolol succinate (TOPROL-XL) 50 mg 24 hr tablet, Take 1 tablet (50 mg total) by mouth 2 (two) times a day, Disp: 90 tablet, Rfl: 3    sacubitril-valsartan (Entresto) 24-26 MG TABS, Take 1 tablet by mouth 2 (two) times a day, Disp: 56 tablet, Rfl: 0    spironolactone (ALDACTONE) 25 mg tablet, TAKE 1/2 TABLET BY MOUTH EVERY DAY, Disp: 45 tablet, Rfl: 3    ondansetron (Zofran ODT) 4 mg disintegrating tablet, Take 1 tablet (4 mg total) by mouth every 6 (six) hours as needed for nausea or vomiting for up to 5 days (Patient not taking: Reported on 3/7/2022 ), Disp: 20 tablet, Rfl: 0      Vitals:    03/07/22 0755   BP: 142/88   Pulse: 78     Weight (last 2 days)     Date/Time Weight    03/07/22 0755 105 (230 8)        Physical Exam  Vitals reviewed  Constitutional:       General: He is not in acute distress  Appearance: Normal appearance  He is well-developed  He is obese  He is not diaphoretic  HENT:      Head: Normocephalic and atraumatic  Eyes:      General: No scleral icterus  Conjunctiva/sclera: Conjunctivae normal       Pupils: Pupils are equal, round, and reactive to light  Neck:      Thyroid: No thyromegaly  Vascular: No hepatojugular reflux or JVD  Cardiovascular:      Rate and Rhythm: Normal rate and regular rhythm  Heart sounds: Normal heart sounds  No murmur heard  No friction rub  No gallop  Pulmonary:      Effort: Pulmonary effort is normal  No respiratory distress  Breath sounds: Normal breath sounds  No wheezing or rales  Abdominal:      General: Bowel sounds are normal  There is no distension  Palpations: Abdomen is soft  Tenderness: There is no abdominal tenderness  Musculoskeletal:         General: No deformity  Normal range of motion  Cervical back: Normal range of motion and neck supple        Right lower leg: No edema  Left lower leg: No edema  Skin:     General: Skin is warm and dry  Findings: No erythema or rash  Neurological:      General: No focal deficit present  Mental Status: He is alert and oriented to person, place, and time  Cranial Nerves: No cranial nerve deficit  Motor: No weakness  Laboratory Studies: All laboratory studies personally reviewed    Cardiac testing:     EKG reviewed personally:   Results for orders placed or performed in visit on 03/07/22   POCT ECG    Impression     Normal sinus rhythm, normal EKG       Echocardiogram:   2018-  EF 50%, no regional wall motion abnormalities, mild LVH, grade 1 diastolic dysfunction  6/64-RQJSHAK echo-EF 50%, mild left atrial dilation, mild right atrial dilation, mild TR, small pericardial effusion    1/26/2021-EF 65%, mild left atrial dilation and right atrial dilation, mild TR, small pericardial effusion    Stress tests:      Catheterization:   2018- 80% ramus, 60% PDA, no intervention    Holter:           Yu Catherine MD    Portions of the record may have been created with voice recognition software  Occasional wrong word or "sound a like" substitutions may have occurred due to the inherent limitations of voice recognition software  Read the chart carefully and recognize, using context, where substitutions have occurred

## 2022-04-01 ENCOUNTER — APPOINTMENT (OUTPATIENT)
Dept: LAB | Age: 62
End: 2022-04-01
Payer: COMMERCIAL

## 2022-04-01 DIAGNOSIS — I48.19 PERSISTENT ATRIAL FIBRILLATION (HCC): ICD-10-CM

## 2022-04-01 DIAGNOSIS — I50.22 CHRONIC SYSTOLIC HEART FAILURE (HCC): ICD-10-CM

## 2022-04-01 LAB
ANION GAP SERPL CALCULATED.3IONS-SCNC: 7 MMOL/L (ref 4–13)
BUN SERPL-MCNC: 13 MG/DL (ref 5–25)
CALCIUM SERPL-MCNC: 9.2 MG/DL (ref 8.3–10.1)
CHLORIDE SERPL-SCNC: 101 MMOL/L (ref 100–108)
CHOLEST SERPL-MCNC: 195 MG/DL
CO2 SERPL-SCNC: 25 MMOL/L (ref 21–32)
CREAT SERPL-MCNC: 1.17 MG/DL (ref 0.6–1.3)
GFR SERPL CREATININE-BSD FRML MDRD: 66 ML/MIN/1.73SQ M
GLUCOSE P FAST SERPL-MCNC: 100 MG/DL (ref 65–99)
HDLC SERPL-MCNC: 62 MG/DL
LDLC SERPL CALC-MCNC: 116 MG/DL (ref 0–100)
NONHDLC SERPL-MCNC: 133 MG/DL
POTASSIUM SERPL-SCNC: 4.5 MMOL/L (ref 3.5–5.3)
SODIUM SERPL-SCNC: 133 MMOL/L (ref 136–145)
TRIGL SERPL-MCNC: 83 MG/DL

## 2022-04-01 PROCEDURE — 80048 BASIC METABOLIC PNL TOTAL CA: CPT

## 2022-04-01 PROCEDURE — 80061 LIPID PANEL: CPT

## 2022-04-01 PROCEDURE — 36415 COLL VENOUS BLD VENIPUNCTURE: CPT

## 2022-04-07 ENCOUNTER — HOSPITAL ENCOUNTER (OUTPATIENT)
Dept: NON INVASIVE DIAGNOSTICS | Facility: CLINIC | Age: 62
Discharge: HOME | End: 2022-04-07
Payer: COMMERCIAL

## 2022-04-07 VITALS
DIASTOLIC BLOOD PRESSURE: 88 MMHG | HEIGHT: 69 IN | WEIGHT: 230 LBS | BODY MASS INDEX: 34.07 KG/M2 | SYSTOLIC BLOOD PRESSURE: 142 MMHG | HEART RATE: 78 BPM

## 2022-04-07 DIAGNOSIS — I48.19 PERSISTENT ATRIAL FIBRILLATION (HCC): ICD-10-CM

## 2022-04-07 DIAGNOSIS — I50.22 CHRONIC SYSTOLIC HEART FAILURE (HCC): ICD-10-CM

## 2022-04-07 LAB
AORTIC ROOT: 4 CM
AORTIC VALVE MEAN VELOCITY: 6.5 M/S
APICAL FOUR CHAMBER EJECTION FRACTION: 57 %
ASCENDING AORTA: 3.8 CM (ref 2.17–3.25)
AV LVOT MEAN GRADIENT: 1 MMHG
AV LVOT PEAK GRADIENT: 2 MMHG
AV MEAN GRADIENT: 2 MMHG
AV PEAK GRADIENT: 4 MMHG
AV VELOCITY RATIO: 0.73
DOP CALC AO PEAK VEL: 1.04 M/S
DOP CALC AO VTI: 17.78 CM
DOP CALC LVOT PEAK VEL VTI: 14.87 CM
DOP CALC LVOT PEAK VEL: 0.76 M/S
DOP CALC MV VTI: 27.69 CM
E WAVE DECELERATION TIME: 181 MS
FRACTIONAL SHORTENING: 32 % (ref 28–44)
INTERVENTRICULAR SEPTUM IN DIASTOLE (PARASTERNAL SHORT AXIS VIEW): 0.9 CM
INTERVENTRICULAR SEPTUM: 0.9 CM (ref 0.56–1.05)
LAAS-AP2: 23.6 CM2
LAAS-AP4: 23.5 CM2
LEFT ATRIUM SIZE: 4 CM
LEFT INTERNAL DIMENSION IN SYSTOLE: 3.8 CM (ref 4.29–6.5)
LEFT VENTRICLE DIASTOLIC VOLUME (MOD BIPLANE): 131 ML (ref 111.99–252.21)
LEFT VENTRICLE SYSTOLIC VOLUME (MOD BIPLANE): 57 ML
LEFT VENTRICULAR INTERNAL DIMENSION IN DIASTOLE: 5.6 CM (ref 7.17–10.68)
LEFT VENTRICULAR POSTERIOR WALL IN END DIASTOLE: 0.9 CM (ref 0.55–1.04)
LEFT VENTRICULAR STROKE VOLUME: 91 ML
LV EF: 56 %
LVSV (TEICH): 91 ML
MV E'TISSUE VEL-LAT: 11 CM/S
MV E'TISSUE VEL-SEP: 5 CM/S
MV MEAN GRADIENT: 2 MMHG
MV PEAK A VEL: 0.41 M/S
MV PEAK E VEL: 100 CM/S
MV PEAK GRADIENT: 5 MMHG
MV STENOSIS PRESSURE HALF TIME: 53 MS
MV VALVE AREA P 1/2 METHOD: 4.15 CM2
RIGHT ATRIAL 2D VOLUME: 69 ML
RIGHT ATRIUM AREA SYSTOLE A4C: 21.9 CM2
RIGHT VENTRICLE ID DIMENSION: 4.3 CM
SL CV LEFT ATRIUM LENGTH A2C: 5.8 CM
SL CV LV DIAS VOL ENDO Z SCORE: -1.46
SL CV LV EF: 65
SL CV PED ECHO LEFT VENTRICLE DIASTOLIC VOLUME (MOD BIPLANE) 2D: 154 ML
SL CV PED ECHO LEFT VENTRICLE SYSTOLIC VOLUME (MOD BIPLANE) 2D: 63 ML
TR MAX PG: 28 MMHG
TR PEAK VELOCITY: 2.7 M/S
TRICUSPID VALVE PEAK REGURGITATION VELOCITY: 2.67 M/S
Z-SCORE OF ASCENDING AORTA: 4.01
Z-SCORE OF INTERVENTRICULAR SEPTUM IN END DIASTOLE: 0.73
Z-SCORE OF LEFT VENTRICULAR DIMENSION IN END DIASTOLE: -4.46
Z-SCORE OF LEFT VENTRICULAR DIMENSION IN END SYSTOLE: -2.6
Z-SCORE OF LEFT VENTRICULAR POSTERIOR WALL IN END DIASTOLE: 0.85

## 2022-04-07 PROCEDURE — 93306 TTE W/DOPPLER COMPLETE: CPT | Performed by: INTERNAL MEDICINE

## 2022-04-07 PROCEDURE — 93306 TTE W/DOPPLER COMPLETE: CPT

## 2022-04-08 ENCOUNTER — TELEPHONE (OUTPATIENT)
Dept: CARDIOLOGY CLINIC | Facility: CLINIC | Age: 62
End: 2022-04-08

## 2022-04-08 NOTE — TELEPHONE ENCOUNTER
----- Message from Dante Zaidi MD sent at 4/7/2022  4:51 PM EDT -----  Please let the patient know that his heart function/squeeze is normal, but he does have stiffening  Because of the recurrence of his atrial fibrillation I really wanted to make sure his heart function remained normal so that was the most important part of the test so I am happy with the results

## 2022-04-12 ENCOUNTER — TELEPHONE (OUTPATIENT)
Dept: HEMATOLOGY ONCOLOGY | Facility: CLINIC | Age: 62
End: 2022-04-12

## 2022-04-12 NOTE — TELEPHONE ENCOUNTER
04/12/22    Received medical record request from [id number]  Faxed it to medical record department

## 2022-04-29 ENCOUNTER — REMOTE DEVICE CLINIC VISIT (OUTPATIENT)
Dept: CARDIOLOGY CLINIC | Facility: CLINIC | Age: 62
End: 2022-04-29
Payer: COMMERCIAL

## 2022-04-29 DIAGNOSIS — Z95.818 PRESENCE OF OTHER CARDIAC IMPLANTS AND GRAFTS: Primary | ICD-10-CM

## 2022-04-29 PROCEDURE — G2066 INTER DEVC REMOTE 30D: HCPCS | Performed by: INTERNAL MEDICINE

## 2022-04-29 PROCEDURE — 93298 REM INTERROG DEV EVAL SCRMS: CPT | Performed by: INTERNAL MEDICINE

## 2022-04-29 NOTE — PROGRESS NOTES
MDT LNQ22/ ACTIVE SYSTEM IS MRI CONDITIONAL   CARELINK TRANSMISSION: LOOP RECORDER  PRESENTING RHYTHM NSR W/ PACs @ 60 BPM  BATTERY STATUS "OK "  2 DEVICE CLASSIFIED AF EPISODES, AVAILABLE STRIPS DEMONSTRATE NO ATRIAL FIBRILLATION  INSTEAD EGM'S SHOW SR W/ PACs AND PVCs   AF BURDEN IS 0 1%  AF BURDEN MAYBE OVERESTIMATED DUE TO INAPPROPRIATE CLASSIFICATION OF AF  SOME STRIPS MAY NOT BE INTERPRETABLE OR AVAILABLE, CANNOT DEFINITIVELY RULE OUT ATRIAL FIBRILLATION  HX OF PAF  PT TAKES CARDIZEM, METOPROLOL SUCC AND ELIQUIS  NO PATIENT ACTIVATED EPISODES  NORMAL DEVICE FUNCTION   DL

## 2022-05-25 ENCOUNTER — TELEPHONE (OUTPATIENT)
Dept: HEMATOLOGY ONCOLOGY | Facility: HOSPITAL | Age: 62
End: 2022-05-25

## 2022-05-25 NOTE — TELEPHONE ENCOUNTER
Called patient and spoke to patient  Patient confirmed the rescheduled CT appt to July due to IV contrast shortage  Rescheduled F/U to July  Patient confirmed these appointments, dates and times, and locations

## 2022-06-23 ENCOUNTER — APPOINTMENT (OUTPATIENT)
Dept: LAB | Age: 62
End: 2022-06-23
Payer: COMMERCIAL

## 2022-06-23 DIAGNOSIS — C18.9 MALIGNANT NEOPLASM OF COLON, UNSPECIFIED PART OF COLON (HCC): ICD-10-CM

## 2022-06-23 DIAGNOSIS — C18.2 CANCER OF ASCENDING COLON (HCC): ICD-10-CM

## 2022-06-23 DIAGNOSIS — I50.22 CHRONIC SYSTOLIC CONGESTIVE HEART FAILURE (HCC): ICD-10-CM

## 2022-06-23 LAB
ALBUMIN SERPL BCP-MCNC: 3.6 G/DL (ref 3.5–5)
ALP SERPL-CCNC: 57 U/L (ref 46–116)
ALT SERPL W P-5'-P-CCNC: 47 U/L (ref 12–78)
ANION GAP SERPL CALCULATED.3IONS-SCNC: 8 MMOL/L (ref 4–13)
AST SERPL W P-5'-P-CCNC: 21 U/L (ref 5–45)
BASOPHILS # BLD AUTO: 0.06 THOUSANDS/ΜL (ref 0–0.1)
BASOPHILS NFR BLD AUTO: 1 % (ref 0–1)
BILIRUB SERPL-MCNC: 1.34 MG/DL (ref 0.2–1)
BUN SERPL-MCNC: 12 MG/DL (ref 5–25)
CALCIUM SERPL-MCNC: 8.9 MG/DL (ref 8.3–10.1)
CEA SERPL-MCNC: 2 NG/ML (ref 0–3)
CHLORIDE SERPL-SCNC: 105 MMOL/L (ref 100–108)
CO2 SERPL-SCNC: 22 MMOL/L (ref 21–32)
CREAT SERPL-MCNC: 1.02 MG/DL (ref 0.6–1.3)
EOSINOPHIL # BLD AUTO: 0.08 THOUSAND/ΜL (ref 0–0.61)
EOSINOPHIL NFR BLD AUTO: 1 % (ref 0–6)
ERYTHROCYTE [DISTWIDTH] IN BLOOD BY AUTOMATED COUNT: 13.2 % (ref 11.6–15.1)
GFR SERPL CREATININE-BSD FRML MDRD: 78 ML/MIN/1.73SQ M
GLUCOSE P FAST SERPL-MCNC: 102 MG/DL (ref 65–99)
HCT VFR BLD AUTO: 43.4 % (ref 36.5–49.3)
HGB BLD-MCNC: 14.1 G/DL (ref 12–17)
IMM GRANULOCYTES # BLD AUTO: 0.01 THOUSAND/UL (ref 0–0.2)
IMM GRANULOCYTES NFR BLD AUTO: 0 % (ref 0–2)
LYMPHOCYTES # BLD AUTO: 1.17 THOUSANDS/ΜL (ref 0.6–4.47)
LYMPHOCYTES NFR BLD AUTO: 21 % (ref 14–44)
MCH RBC QN AUTO: 32.4 PG (ref 26.8–34.3)
MCHC RBC AUTO-ENTMCNC: 32.5 G/DL (ref 31.4–37.4)
MCV RBC AUTO: 100 FL (ref 82–98)
MONOCYTES # BLD AUTO: 0.53 THOUSAND/ΜL (ref 0.17–1.22)
MONOCYTES NFR BLD AUTO: 10 % (ref 4–12)
NEUTROPHILS # BLD AUTO: 3.71 THOUSANDS/ΜL (ref 1.85–7.62)
NEUTS SEG NFR BLD AUTO: 67 % (ref 43–75)
NRBC BLD AUTO-RTO: 0 /100 WBCS
PLATELET # BLD AUTO: 215 THOUSANDS/UL (ref 149–390)
PMV BLD AUTO: 12 FL (ref 8.9–12.7)
POTASSIUM SERPL-SCNC: 4.2 MMOL/L (ref 3.5–5.3)
PROT SERPL-MCNC: 7.8 G/DL (ref 6.4–8.2)
RBC # BLD AUTO: 4.35 MILLION/UL (ref 3.88–5.62)
SODIUM SERPL-SCNC: 135 MMOL/L (ref 136–145)
WBC # BLD AUTO: 5.56 THOUSAND/UL (ref 4.31–10.16)

## 2022-06-23 PROCEDURE — 36415 COLL VENOUS BLD VENIPUNCTURE: CPT

## 2022-06-23 PROCEDURE — 85025 COMPLETE CBC W/AUTO DIFF WBC: CPT

## 2022-06-23 PROCEDURE — 80053 COMPREHEN METABOLIC PANEL: CPT

## 2022-06-23 PROCEDURE — 82378 CARCINOEMBRYONIC ANTIGEN: CPT

## 2022-06-25 ENCOUNTER — HOSPITAL ENCOUNTER (OUTPATIENT)
Dept: RADIOLOGY | Facility: HOSPITAL | Age: 62
Discharge: HOME | End: 2022-06-25
Attending: INTERNAL MEDICINE
Payer: COMMERCIAL

## 2022-06-25 DIAGNOSIS — C18.9 MALIGNANT NEOPLASM OF COLON, UNSPECIFIED PART OF COLON (HCC): ICD-10-CM

## 2022-06-25 DIAGNOSIS — C18.2 CANCER OF ASCENDING COLON (HCC): ICD-10-CM

## 2022-06-25 PROCEDURE — 74177 CT ABD & PELVIS W/CONTRAST: CPT

## 2022-06-25 PROCEDURE — G1004 CDSM NDSC: HCPCS

## 2022-06-25 PROCEDURE — 71260 CT THORAX DX C+: CPT

## 2022-06-25 RX ADMIN — IOHEXOL 65 ML: 350 INJECTION, SOLUTION INTRAVENOUS at 08:44

## 2022-07-18 ENCOUNTER — TELEPHONE (OUTPATIENT)
Dept: HEMATOLOGY ONCOLOGY | Facility: CLINIC | Age: 62
End: 2022-07-18

## 2022-07-18 NOTE — TELEPHONE ENCOUNTER
Scheduling Appointment     Who Is Calling to Schedule Patient   Doctor Dr Job Kelley   Date and Time 7/20 at 11:40am    Reason for scheduling appointment 6m f/u w labs & CT    Patient verbalized understanding    Yes

## 2022-07-20 ENCOUNTER — OFFICE VISIT (OUTPATIENT)
Dept: HEMATOLOGY ONCOLOGY | Facility: CLINIC | Age: 62
End: 2022-07-20
Payer: COMMERCIAL

## 2022-07-20 VITALS
WEIGHT: 230 LBS | OXYGEN SATURATION: 98 % | SYSTOLIC BLOOD PRESSURE: 160 MMHG | HEIGHT: 69 IN | HEART RATE: 98 BPM | TEMPERATURE: 97.8 F | BODY MASS INDEX: 34.07 KG/M2 | RESPIRATION RATE: 14 BRPM | DIASTOLIC BLOOD PRESSURE: 94 MMHG

## 2022-07-20 DIAGNOSIS — C18.9 MALIGNANT NEOPLASM OF COLON, UNSPECIFIED PART OF COLON (HCC): Primary | ICD-10-CM

## 2022-07-20 DIAGNOSIS — C18.2 CANCER OF ASCENDING COLON (HCC): ICD-10-CM

## 2022-07-20 PROCEDURE — 99214 OFFICE O/P EST MOD 30 MIN: CPT | Performed by: INTERNAL MEDICINE

## 2022-07-20 NOTE — PROGRESS NOTES
Hematology/Oncology Outpatient Follow- up Note  Roxie Hollis 58 y o  male MRN: @ Encounter: [id number]        Date:  7/20/2022  In August of 2020  Presenting Complaint/Diagnosis : Stage III colon cancer diagnosed    HPI:    Troy Vasquez seen for initial consultation 9/15/2020 regarding newly diagnosed stage III colon cancer with 1/18 lymph nodes positive for malignancy   This makes him a stage IIIA   As far symptoms are concerned he states he is recovering well  Yasir Wright denies any complaints   Denies any nausea denies any vomiting denies any diarrhea   Overall otherwise is at baseline   Does feel better then when he had his malignancy  Previous Hematologic/ Oncologic History:    Modified FOLFOX for 6 cycles    Current Hematologic/ Oncologic Treatment:    Observation    Interval History:    The patient returns for follow-up visit  He states he is doing well  His most recent imaging shows no evidence of metastatic disease  Blood work is within acceptable limits with a normal tumor marker  Patient himself states he has no complaints today  Denies any nausea denies any vomiting denies any diarrhea  Overall is doing well  Is up-to-date on his colonoscopies does not have a primary care physician and wishes to be referred to 1  We will try to set this up for somebody close to his home  The rest of his 14 point review of systems today was negative  Test Results:    Imaging: CT chest abdomen pelvis w contrast    Result Date: 6/29/2022  Narrative: CT CHEST, ABDOMEN AND PELVIS WITH IV CONTRAST INDICATION:   C18 9: Malignant neoplasm of colon, unspecified C18 2: Malignant neoplasm of ascending colon  COMPARISON:  11/30/2021; 3/3/2021; 8/24/2020 TECHNIQUE: CT examination of the chest, abdomen and pelvis was performed  Axial, sagittal, and coronal 2D reformatted images were created from the source data and submitted for interpretation   Radiation dose length product (DLP) for this visit:  1354 46 mGy-cm    This examination, like all CT scans performed in the [facility], was performed utilizing techniques to minimize radiation dose exposure, including the use of iterative reconstruction and automated exposure control  IV Contrast:  65 mL of iohexol (OMNIPAQUE) Enteric Contrast: Enteric contrast was administered  FINDINGS: CHEST LUNGS:  Left lower lobe 2 mm nodule image 76, series 3, stable  Right lower lobe 2 mm nodule image 90 and image 89, stable  There is no tracheal or endobronchial lesion  PLEURA:  Unremarkable  HEART/GREAT VESSELS: Coronary calcifications are present     No thoracic aortic aneurysm  MEDIASTINUM AND JESSICA:  Unremarkable  CHEST WALL AND LOWER NECK:  Loop recorder is evident in the anterior chest wall  ABDOMEN LIVER/BILIARY TREE: Mild fatty infiltration of the liver is again demonstrated  GALLBLADDER:  There are gallstone(s) within the gallbladder, without pericholecystic inflammatory changes  The gallbladder is contracted prior studies  SPLEEN:  Unremarkable  PANCREAS:  Unremarkable  ADRENAL GLANDS:  Unremarkable  KIDNEYS/URETERS:  No hydronephrosis  Bilateral renal cysts  STOMACH AND BOWEL:  The patient is status post partial colectomy  A right-sided ileocolic anastomosis is unremarkable  A few small left colon diverticula are noted  No small bowel dilatation  Stomach is not well distended  APPENDIX:  There are expected postoperative changes of appendectomy  ABDOMINOPELVIC CAVITY:  No ascites  No pneumoperitoneum  No lymphadenopathy  VESSELS:  Unremarkable for patient's age  PELVIS REPRODUCTIVE ORGANS:  Unremarkable for patient's age  URINARY BLADDER:  Mild diffuse bladder wall thickening is similar prior studies  ABDOMINAL WALL/INGUINAL REGIONS:  Unremarkable  OSSEOUS STRUCTURES:  No acute fracture or destructive osseous lesion  Mild degenerative changes of the spine are noted as well as the hips       Impression: No definite evidence of metastatic disease to the chest abdomen and pelvis  A few small pulmonary nodules are stable  Gallstones Workstation performed: [id number]       Labs:   Lab Results   Component Value Date    WBC 5 56 06/23/2022    HGB 14 1 06/23/2022    HCT 43 4 06/23/2022     (H) 06/23/2022     06/23/2022     Lab Results   Component Value Date    K 4 2 06/23/2022     06/23/2022    CO2 22 06/23/2022    BUN 12 06/23/2022    CREATININE 1 02 06/23/2022    GLUCOSE 107 12/11/2017    GLUF 102 (H) 06/23/2022    CALCIUM 8 9 06/23/2022    CORRECTEDCA 9 1 12/21/2021    AST 21 06/23/2022    ALT 47 06/23/2022    ALKPHOS 57 06/23/2022    EGFR 78 06/23/2022         Lab Results   Component Value Date    SPEP  12/12/2017     The serum total protein is within normal limits with hypoalbuminemia  The serum protein electrophoresis shows an increased alpha-1 region  The serum protein electrophoresis shows a decreased beta-2 region  The serum protein electrophoresis shows a faint possible band in the  gamma region  Immunofixation to be performed  Reviewed by: Yobany Pena MD  ([id number])  **Electronic Signature**       Lab Results   Component Value Date    PSA 0 9 10/09/2020       Lab Results   Component Value Date    CEA 2 0 06/23/2022       Lab Results   Component Value Date    IRON 20 (L) 08/29/2020    TIBC 304 08/29/2020    FERRITIN 391 (H) 08/29/2020       ROS: As stated in the history of present illness otherwise his 14 point review of systems today was negative        Active Problems:   Patient Active Problem List   Diagnosis    Serum total bilirubin elevated    Elevated blood pressure reading    Persistent atrial fibrillation (HCC)    Chronic combined systolic and diastolic heart failure (HCC)    NSVT (nonsustained ventricular tachycardia) (HCC)    Microcytic anemia    Gastrointestinal hemorrhage with melena    Cardiomyopathy, dilated ([address] )    GI bleeding    Mass of cecum    History of gout    S/P right hemicolectomy    Cancer of ascending colon ([facility] )    Port-A-Cath in place    Paroxysmal atrial flutter (HCC)    PFO (patent foramen ovale)    COVID-19       Past Medical History:   Past Medical History:   Diagnosis Date    A-fib ([facility] )     Cancer ([facility] )     Colon cancer ([facility] )     Colon polyp     Coronary artery disease     Gout     Herpes zoster     last assessed 17    Hypertension     Irregular heart beat     Shingles        Surgical History:   Past Surgical History:   Procedure Laterality Date    ELECTRICAL CARDIOVERSION  12/15/2017         HEMICOLOECTOMY W/ ANASTOMOSIS N/A 2020    Procedure: RIGHT HEMICOLECTOMY WITH ILIOCOLIC ANASTAMOSIS;  Surgeon: Laird Baumgarten, MD;  [facility]: [facility];  Service: Colorectal    IR PORT PLACEMENT  2020    IR PORT REMOVAL  2021       Family History:    Family History   Problem Relation Age of Onset    Coronary artery disease Mother     Alcohol abuse Father     Coronary artery disease Father     Colon cancer Neg Hx        Cancer-related family history is negative for Colon cancer      Social History:   Social History     Socioeconomic History    Marital status: Single     Spouse name: Not on file    Number of children: Not on file    Years of education: Not on file    Highest education level: Not on file   Occupational History    Not on file   Tobacco Use    Smoking status: Former Smoker     Quit date: 2017     Years since quittin 9    Smokeless tobacco: Never Used   Vaping Use    Vaping Use: Never used   Substance and Sexual Activity    Alcohol use: Not Currently     Comment: Socially, former alcohol    Drug use: No    Sexual activity: Yes     Partners: Female   Other Topics Concern    Not on file   Social History Narrative    Not on file     Social Determinants of Health     Financial Resource Strain: Not on file   Food Insecurity: Not on file   Transportation Needs: Not on file   Physical Activity: Not on file   Stress: Not on file   Social Connections: Not on file   Intimate Partner Violence: Not on file   Housing Stability: Not on file       Current Medications:   Current Outpatient Medications   Medication Sig Dispense Refill    apixaban (Eliquis) 5 mg Take 1 tablet (5 mg total) by mouth 2 (two) times a day 180 tablet 3    aspirin (ECOTRIN LOW STRENGTH) 81 mg EC tablet Take 1 tablet (81 mg total) by mouth daily 30 tablet 0    atorvastatin (LIPITOR) 40 mg tablet Take 1 tablet (40 mg total) by mouth daily with dinner 90 tablet 3    diltiazem (CARDIZEM CD) 120 mg 24 hr capsule TAKE 1 CAPSULE BY MOUTH EVERY DAY 90 capsule 2    furosemide (LASIX) 20 mg tablet Take 1 tablet (20 mg total) by mouth daily 90 tablet 3    metoprolol succinate (TOPROL-XL) 50 mg 24 hr tablet Take 1 tablet (50 mg total) by mouth 2 (two) times a day 90 tablet 3    sacubitril-valsartan (Entresto) 24-26 MG TABS Take 1 tablet by mouth 2 (two) times a day 56 tablet 0    spironolactone (ALDACTONE) 25 mg tablet TAKE 1/2 TABLET BY MOUTH EVERY DAY 45 tablet 3    allopurinol (ZYLOPRIM) 100 mg tablet Take 1 tablet (100 mg total) by mouth daily (Patient not taking: Reported on 7/20/2022) 90 tablet 3    ondansetron (Zofran ODT) 4 mg disintegrating tablet Take 1 tablet (4 mg total) by mouth every 6 (six) hours as needed for nausea or vomiting for up to 5 days (Patient not taking: No sig reported) 20 tablet 0     No current facility-administered medications for this visit  Allergies: No Known Allergies    Physical Exam:    Body surface area is 2 19 meters squared      Wt Readings from Last 3 Encounters:   07/20/22 104 kg (230 lb)   04/07/22 104 kg (230 lb)   03/07/22 105 kg (230 lb 12 8 oz)        Temp Readings from Last 3 Encounters:   07/20/22 97 8 °F (36 6 °C) (Temporal)   12/22/21 98 5 °F (36 9 °C) (Tympanic)   12/21/21 98 8 °F (37 1 °C) (Oral)        BP Readings from Last 3 Encounters:   07/20/22 160/94   04/07/22 142/88   03/07/22 142/88         Pulse Readings from Last 3 Encounters: 07/20/22 98   04/07/22 78   03/07/22 78        Physical Exam     Constitutional   General appearance: No acute distress, well appearing and well nourished  Eyes   Conjunctiva and lids: No swelling, erythema or discharge  Pupils and irises: Equal, round and reactive to light  Ears, Nose, Mouth, and Throat   External inspection of ears and nose: Normal     Nasal mucosa, septum, and turbinates: Normal without edema or erythema  Oropharynx: Normal with no erythema, edema, exudate or lesions  Pulmonary   Respiratory effort: No increased work of breathing or signs of respiratory distress  Auscultation of lungs: Clear to auscultation  Cardiovascular   Palpation of heart: Normal PMI, no thrills  Auscultation of heart: Normal rate and rhythm, normal S1 and S2, without murmurs  Examination of extremities for edema and/or varicosities: Normal     Carotid pulses: Normal     Abdomen   Abdomen: Non-tender, no masses  Liver and spleen: No hepatomegaly or splenomegaly  Lymphatic   Palpation of lymph nodes in neck: No lymphadenopathy  Musculoskeletal   Gait and station: Normal     Digits and nails: Normal without clubbing or cyanosis  Inspection/palpation of joints, bones, and muscles: Normal     Skin   Skin and subcutaneous tissue: Normal without rashes or lesions  Neurologic   Cranial nerves: Cranial nerves 2-12 intact  Sensation: No sensory loss  Psychiatric   Orientation to person, place, and time: Normal     Mood and affect: Normal         Assessment / Plan:    The patient is a pleasant 60-year-old male who was referred to see us for newly diagnosed stage IIIA colon cancer   This was a T3 N1 malignancy   It measured 4 5 cm   Based on the most recent guidelines he would qualify for 3 months of adjuvant chemotherapy     The patient agreed and we treated him with 6 cycles of modified FOLFOX 6   His most recent imaging shows no evidence of metastatic disease    At this point I will see him back in 6 months with repeat blood work and imaging  He is currently 2 years out from his surgery  If he has any questions he will call our office  Goals and Barriers:  Current Goal:  Prolong Survival from colon cancer  Barriers: None  Patient's Capacity to Self Care:  Patient  able to self care  Portions of the record may have been created with voice recognition software  Occasional wrong word or "sound a like" substitutions may have occurred due to the inherent limitations of voice recognition software  Read the chart carefully and recognize, using context, where substitutions have occurred

## 2022-07-29 ENCOUNTER — REMOTE DEVICE CLINIC VISIT (OUTPATIENT)
Dept: CARDIOLOGY CLINIC | Facility: CLINIC | Age: 62
End: 2022-07-29
Payer: COMMERCIAL

## 2022-07-29 DIAGNOSIS — Z95.818 PRESENCE OF OTHER CARDIAC IMPLANTS AND GRAFTS: Primary | ICD-10-CM

## 2022-07-29 PROCEDURE — G2066 INTER DEVC REMOTE 30D: HCPCS | Performed by: INTERNAL MEDICINE

## 2022-07-29 PROCEDURE — 93298 REM INTERROG DEV EVAL SCRMS: CPT | Performed by: INTERNAL MEDICINE

## 2022-07-29 NOTE — PROGRESS NOTES
MDT LNQ22/ ACTIVE SYSTEM IS MRI CONDITIONAL   CARELINK TRANSMISSION: LOOP RECORDER  PRESENTING RHYTHM NSR W/ PACs @ 60 BPM  BATTERY STATUS "OK " 2 DEVICE CLASSIFIED AF EPISODES, LONGEST EPISODE IS 20 MINS  AVAILABLE STRIPS DEMONSTRATE ATRIAL FIBRILLATION, RVR AND PVCs  AF BURDEN IS 0 1%  AF BURDEN MAYBE OVERESTIMATED DUE TO INAPPROPRIATE CLASSIFICATION OF AF  SOME STRIPS MAY NOT BE INTERPRETABLE OR AVAILABLE, CANNOT DEFINITIVELY RULE OUT ATRIAL FIBRILLATION  HX OF AF  PT TAKES DILTIAZEM, METOPROLOL SUCC, ELIQUIS AND ASA 81  NO PATIENT ACTIVATED EPISODES  NORMAL DEVICE FUNCTION   DL

## 2022-08-08 ENCOUNTER — OFFICE VISIT (OUTPATIENT)
Dept: INTERNAL MEDICINE CLINIC | Facility: CLINIC | Age: 62
End: 2022-08-08
Payer: COMMERCIAL

## 2022-08-08 VITALS
BODY MASS INDEX: 33.59 KG/M2 | SYSTOLIC BLOOD PRESSURE: 142 MMHG | TEMPERATURE: 97.1 F | OXYGEN SATURATION: 95 % | DIASTOLIC BLOOD PRESSURE: 90 MMHG | HEIGHT: 69 IN | HEART RATE: 87 BPM | WEIGHT: 226.8 LBS

## 2022-08-08 DIAGNOSIS — Z00.00 HEALTHCARE MAINTENANCE: ICD-10-CM

## 2022-08-08 DIAGNOSIS — C18.2 CANCER OF ASCENDING COLON (HCC): ICD-10-CM

## 2022-08-08 DIAGNOSIS — I50.42 CHRONIC COMBINED SYSTOLIC AND DIASTOLIC HEART FAILURE (HCC): ICD-10-CM

## 2022-08-08 DIAGNOSIS — I48.19 PERSISTENT ATRIAL FIBRILLATION (HCC): ICD-10-CM

## 2022-08-08 DIAGNOSIS — Z00.00 HEALTH MAINTENANCE EXAMINATION: ICD-10-CM

## 2022-08-08 DIAGNOSIS — E66.09 CLASS 1 OBESITY DUE TO EXCESS CALORIES WITH SERIOUS COMORBIDITY AND BODY MASS INDEX (BMI) OF 33.0 TO 33.9 IN ADULT: ICD-10-CM

## 2022-08-08 DIAGNOSIS — R03.0 ELEVATED BLOOD PRESSURE READING: ICD-10-CM

## 2022-08-08 DIAGNOSIS — I42.0 CARDIOMYOPATHY, DILATED (HCC): ICD-10-CM

## 2022-08-08 DIAGNOSIS — R73.9 HYPERGLYCEMIA: ICD-10-CM

## 2022-08-08 DIAGNOSIS — Z87.39 HISTORY OF GOUT: Primary | ICD-10-CM

## 2022-08-08 PROBLEM — E66.811 CLASS 1 OBESITY DUE TO EXCESS CALORIES WITH SERIOUS COMORBIDITY IN ADULT: Status: ACTIVE | Noted: 2022-08-08

## 2022-08-08 PROCEDURE — 99214 OFFICE O/P EST MOD 30 MIN: CPT | Performed by: INTERNAL MEDICINE

## 2022-08-08 RX ORDER — ALLOPURINOL 100 MG/1
100 TABLET ORAL DAILY
Qty: 30 TABLET | Refills: 5 | Status: SHIPPED | OUTPATIENT
Start: 2022-08-08 | End: 2022-08-30

## 2022-08-08 NOTE — ASSESSMENT & PLAN NOTE
Patient is a 41-year-old male history of extensive medical problems including chronic atrial fibrillation, history of colon cancer status post resection now undergoing chemotherapy, history of severe anemia secondary to GI bleed with colon cancer, hypertension, gouty arthritis, DJD  Patient states he has not had a regular physician for an extended period time and has only been followed by his cardiologist and oncologist and colon rectal surgeon  Patient states in general he is feeling better overall now that his heart underwent ablation and his anemia which was profound is now been corrected  He does have a history of gout but has not had uric acid level checked in a prolonged period of time  Does understand that he does have problems with weight but has not been given any instructions as far as diet and he is very limited with his exercise capacity with shortness of breath with exertion which can be multifactorial with a history of tobacco abuse in the past and congestive heart failure  I told the patient we need to get him up-to-date as far as routine screening and was given a slip for labs to be performed prior to his next visit in 3-4 weeks  Patient will need full physical with his next visit  Again have concerns with possible underlying lung disease with a history of tobacco abuse in the past but also congestive heart failure and this may be multifactorial and we need may need to have at the very least some pulmonary function testing performed  Patient did undergo exam today in the office with no acute abnormalities    Again will have labs performed prior to his next visit in approximately 3-4 weeks and at that time will undergo full physical

## 2022-08-08 NOTE — PATIENT INSTRUCTIONS
Obesity   AMBULATORY CARE:   Obesity  means your body mass index (BMI) is greater than 30  Your healthcare provider will use your height and weight to measure your BMI  The risks of obesity include  many health problems, including injuries or physical disability  · Diabetes (high blood sugar level)    · High blood pressure or high cholesterol    · Heart disease    · Stroke    · Gallbladder or liver disease    · Cancer of the colon, breast, prostate, liver, or kidney    · Sleep apnea    · Arthritis or gout    Screening  is done to check for health conditions before you have signs or symptoms  If you are 28to 79years old, your blood sugar level may be checked every 3 years for signs of prediabetes or diabetes  Your healthcare provider will check your blood pressure at each visit  High blood pressure can lead to a stroke or other problems  Your provider may check for signs of heart disease, cancer, or other health problems  Seek care immediately if:   · You have a severe headache, confusion, or difficulty speaking  · You have weakness on one side of your body  · You have chest pain, sweating, or shortness of breath  Call your doctor if:   · You have symptoms of gallbladder or liver disease, such as pain in your upper abdomen  · You have knee or hip pain and discomfort while walking  · You have symptoms of diabetes, such as intense hunger and thirst, and frequent urination  · You have symptoms of sleep apnea, such as snoring or daytime sleepiness  · You have questions or concerns about your condition or care  Treatment for obesity  focuses on helping you lose weight to improve your health  Even a small decrease in BMI can reduce the risk for many health problems  Your healthcare provider will help you set a weight-loss goal   · Lifestyle changes  are the first step in treating obesity  These include making healthy food choices and getting regular physical activity   Your healthcare provider may suggest a weight-loss program that involves coaching, education, and therapy  · Medicine  may help you lose weight when it is used with a healthy foods and physical activity  · Surgery  can help you lose weight if you are very obese and have other health problems  There are several types of weight-loss surgery  Ask your healthcare provider for more information  Tips for safe weight loss:   · Set small, realistic goals  An example of a small goal is to walk for 20 minutes 5 days a week  Anther goal is to lose 5% of your body weight  · Tell friends, family members, and coworkers about your goals  and ask for their support  Ask a friend to lose weight with you, or join a weight-loss support group  · Identify foods or triggers that may cause you to overeat , and find ways to avoid them  Remove tempting high-calorie foods from your home and workplace  Place a bowl of fresh fruit on your kitchen counter  If stress causes you to eat, then find other ways to cope with stress  A counselor or therapist may be able to help you  · Keep a diary to track what you eat and drink  Also write down how many minutes of physical activity you do each day  Weigh yourself once a week and record it in your diary  Eating changes: You will need to eat 500 to 1,000 fewer calories each day than you currently eat to lose 1 to 2 pounds a week  The following changes will help you cut calories:  · Eat smaller portions  Use small plates, no larger than 9 inches in diameter  Fill your plate half full of fruits and vegetables  Measure your food using measuring cups until you know what a serving size looks like  · Eat 3 meals and 1 or 2 snacks each day  Plan your meals in advance  Honey Franco and eat at home most of the time  Eat slowly  Do not skip meals  Skipping meals can lead to overeating later in the day  This can make it harder for you to lose weight   Talk with a dietitian to help you make a meal plan and schedule that is right for you  · Eat fruits and vegetables at every meal   They are low in calories and high in fiber, which makes you feel full  Do not add butter, margarine, or cream sauce to vegetables  Use herbs to season steamed vegetables  · Eat less fat and fewer fried foods  Eat more baked or grilled chicken and fish  These protein sources are lower in calories and fat than red meat  Limit fast food  Dress your salads with olive oil and vinegar instead of bottled dressing  · Limit the amount of sugar you eat  Do not drink sugary beverages  Limit alcohol  Activity changes:  Physical activity is good for your body in many ways  It helps you burn calories and build strong muscles  It decreases stress and depression, and improves your mood  It can also help you sleep better  Talk to your healthcare provider before you begin an exercise program   · Exercise for at least 30 minutes 5 days a week  Start slowly  Set aside time each day for physical activity that you enjoy and that is convenient for you  It is best to do both weight training and an activity that increases your heart rate, such as walking, bicycling, or swimming  · Find ways to be more active  Do yard work and housecleaning  Walk up the stairs instead of using elevators  Spend your leisure time going to events that require walking, such as outdoor festivals or fairs  This extra physical activity can help you lose weight and keep it off  Follow up with your doctor as directed: You may need to meet with a dietitian  Write down your questions so you remember to ask them during your visits  © Copyright [employer] 2022 Information is for End User's use only and may not be sold, redistributed or otherwise used for commercial purposes  All illustrations and images included in CareNotes® are the copyrighted property of A D A M , Inc  or Bindu Gandhi   The above information is an  only   It is not intended as medical advice for individual conditions or treatments  Talk to your doctor, nurse or pharmacist before following any medical regimen to see if it is safe and effective for you  Heart Healthy Diet   AMBULATORY CARE:   A heart healthy diet  is an eating plan low in unhealthy fats and sodium (salt)  The plan is high in healthy fats and fiber  A heart healthy diet helps improve your cholesterol levels and lowers your risk for heart disease and stroke  A dietitian will teach you how to read and understand food labels  Heart healthy diet guidelines to follow:   · Choose foods that contain healthy fats  ? Unsaturated fats  include monounsaturated and polyunsaturated fats  Unsaturated fat is found in foods such as soybean, canola, olive, corn, and safflower oils  It is also found in soft tub margarine that is made with liquid vegetable oil  ? Omega-3 fat  is found in certain fish, such as salmon, tuna, and trout, and in walnuts and flaxseed  Eat fish high in omega-3 fats at least 2 times a week  · Get 20 to 30 grams of fiber each day  Fruits, vegetables, whole-grain foods, and legumes (cooked beans) are good sources of fiber  · Limit or do not have unhealthy fats  ? Cholesterol  is found in animal foods, such as eggs and lobster, and in dairy products made from whole milk  Limit cholesterol to less than 200 mg each day  ? Saturated fat  is found in meats, such as malik and hamburger  It is also found in chicken or turkey skin, whole milk, and butter  Limit saturated fat to less than 7% of your total daily calories  ? Trans fat  is found in packaged foods, such as potato chips and cookies  It is also in hard margarine, some fried foods, and shortening  Do not eat foods that contain trans fats  · Limit sodium as directed  You may be told to limit sodium to 2,000 to 2,300 mg each day  Choose low-sodium or no-salt-added foods  Add little or no salt to food you prepare   Use herbs and spices in place of salt  Include the following in your heart healthy plan:  Ask your dietitian or healthcare provider how many servings to have from each of the following food groups:  · Grains:      ? Whole-wheat breads, cereals, and pastas, and brown rice    ? Low-fat, low-sodium crackers and chips    · Vegetables:      ? Broccoli, green beans, green peas, and spinach    ? Collards, kale, and lima beans    ? Carrots, sweet potatoes, tomatoes, and peppers    ? Canned vegetables with no salt added    · Fruits:      ? Bananas, peaches, pears, and pineapple    ? Grapes, raisins, and dates    ? Oranges, tangerines, grapefruit, orange juice, and grapefruit juice    ? Apricots, mangoes, melons, and papaya    ? Raspberries and strawberries    ? Canned fruit with no added sugar    · Low-fat dairy:      ? Nonfat (skim) milk, 1% milk, and low-fat almond, cashew, or soy milks fortified with calcium    ? Low-fat cheese, regular or frozen yogurt, and cottage cheese    · Meats and proteins:      ? Lean cuts of beef and pork (loin, leg, round), skinless chicken and turkey    ? Legumes, soy products, egg whites, or nuts    Limit or do not include the following in your heart healthy plan:   · Unhealthy fats and oils:      ? Whole or 2% milk, cream cheese, sour cream, or cheese    ? High-fat cuts of beef (T-bone steaks, ribs), chicken or turkey with skin, and organ meats such as liver    ? Butter, stick margarine, shortening, and cooking oils such as coconut or palm oil    · Foods and liquids high in sodium:      ? Packaged foods, such as frozen dinners, cookies, macaroni and cheese, and cereals with more than 300 mg of sodium per serving    ? Vegetables with added sodium, such as instant potatoes, vegetables with added sauces, or regular canned vegetables    ? Cured or smoked meats, such as hot dogs, malik, and sausage    ?  High-sodium ketchup, barbecue sauce, salad dressing, pickles, olives, soy sauce, or miso    · Foods and liquids high in sugar:      ? Candy, cake, cookies, pies, or doughnuts    ? Soft drinks (soda), sports drinks, or sweetened tea    ? Canned or dry mixes for cakes, soups, sauces, or gravies    Other healthy heart guidelines:   · Do not smoke  Nicotine and other chemicals in cigarettes and cigars can cause lung and heart damage  Ask your healthcare provider for information if you currently smoke and need help to quit  E-cigarettes or smokeless tobacco still contain nicotine  Talk to your healthcare provider before you use these products  · Limit or do not drink alcohol as directed  Alcohol can damage your heart and raise your blood pressure  Your healthcare provider may give you specific daily and weekly limits  The general recommended limit is 1 drink a day for women 21 or older and for men 72 or older  Do not have more than 3 drinks in a day or 7 in a week  The recommended limit is 2 drinks a day for men 24to 59years of age  Do not have more than 4 drinks in a day or 14 in a week  A drink of alcohol is 12 ounces of beer, 5 ounces of wine, or 1½ ounces of liquor  · Exercise regularly  Exercise can help you maintain a healthy weight and improve your blood pressure and cholesterol levels  Regular exercise can also decrease your risk for heart problems  Ask your healthcare provider about the best exercise plan for you  Do not start an exercise program without asking your healthcare provider  Follow up with your doctor or cardiologist as directed:  Write down your questions so you remember to ask them during your visits  © Copyright [employer] 2022 Information is for End User's use only and may not be sold, redistributed or otherwise used for commercial purposes  All illustrations and images included in CareNotes® are the copyrighted property of [employer]  or [facility] Bon Gandhi   The above information is an  only   It is not intended as medical advice for individual conditions or treatments  Talk to your doctor, nurse or pharmacist before following any medical regimen to see if it is safe and effective for you  Calorie Counting Diet   WHAT YOU NEED TO KNOW:   What is a calorie counting diet? It is a meal plan based on counting calories each day to reach a healthy body weight  You will need to eat fewer calories if you are trying to lose weight  Weight loss may decrease your risk for certain health problems or improve your health if you have health problems  Some of these health problems include heart disease, high blood pressure, and diabetes  What foods should I avoid? Your dietitian will tell you if you need to avoid certain foods based on your body weight and health condition  You may need to avoid high-fat foods if you are at risk for or have heart disease  You may need to eat fewer foods from the breads and starches food group if you have diabetes  How many calories are in foods? The following is a list of foods and drinks with the approximate number of calories in each  Check the food label to find the exact number of calories  A dietitian can tell you how many calories you should have from each food group each day  · Carbohydrate:      ? ½ of a 3-inch bagel, 1 slice of bread, or ½ of a hamburger bun or hot dog bun (80)    ? 1 (8-inch) flour tortilla or ½ cup of cooked rice (100)    ? 1 (6-inch) corn tortilla (80)    ? 1 (6-inch) pancake or 1 cup of bran flakes cereal (110)    ? ½ cup of cooked cereal (80)    ? ½ cup of cooked pasta (85)    ? 1 ounce of pretzels (100)    ? 3 cups of air-popped popcorn without butter or oil (80)    · Dairy:      ? 1 cup of skim or 1% milk (90)    ? 1 cup of 2% milk (120)    ? 1 cup of whole milk (160)    ? 1 cup of 2% chocolate milk (220)    ? 1 ounce of low-fat cheese with 3 grams of fat per ounce (70)    ? 1 ounce of cheddar cheese (114)    ? ½ cup of 1% fat cottage cheese (80)    ?  1 cup of plain or sugar-free, fat-free yogurt (90)    · Protein foods:      ? 3 ounces of fish (not breaded or fried) (95)    ? 3 ounces of breaded, fried fish (195)    ? ¾ cup of tuna canned in water (105)    ? 3 ounces of chicken breast without skin (105)    ? 1 fried chicken breast with skin (350)    ? ¼ cup of fat free egg substitute (40)    ? 1 large egg (75)    ? 3 ounces of lean beef or pork (165)    ? 3 ounces of fried pork chop or ham (185)    ? ½ cup of cooked dried beans, such as kidney, cardoso, lentils, or navy (115)    ? 3 ounces of bologna or lunch meat (225)    ? 2 links of breakfast sausage (140)    · Vegetables:      ? ½ cup of sliced mushrooms (10)    ? 1 cup of salad greens, such as lettuce, spinach, or stiven (15)    ? ½ cup of steamed asparagus (20)    ? ½ cup of cooked summer squash, zucchini squash, or green or wax beans (25)    ? 1 cup of broccoli or cauliflower florets, or 1 medium tomato (25)    ? 1 large raw carrot or ½ cup of cooked carrots (40)    ? ? of a medium cucumber or 1 stalk of celery (5)    ? 1 small baked potato (160)    ? 1 cup of breaded, fried vegetables (230)    · Fruit:      ? 1 (6-inch) banana (55)     ? ½ of a 4-inch grapefruit (55)    ? 15 grapes (60)    ? 1 medium orange or apple (70)    ? 1 large peach (65)    ? 1 cup of fresh pineapple chunks (75)    ? 1 cup of melon cubes (50)    ? 1¼ cups of whole strawberries (45)    ? ½ cup of fruit canned in juice (55)    ? ½ cup of fruit canned in heavy syrup (110)    ? ? cup of raisins (130)    ? ½ cup of unsweetened fruit juice (60)    ? ½ cup of grape, cranberry, or prune juice (90)    · Fat:      ? 10 peanuts or 2 teaspoons of peanut butter (55)    ? 2 tablespoons of avocado or 1 tablespoon of regular salad dressing (45)    ? 2 slices of malik (90)    ? 1 teaspoon of oil, such as safflower, canola, corn, or olive oil (45)    ? 2 teaspoons of low-fat margarine, or 1 tablespoon of low-fat mayonnaise (50)    ? 1 teaspoon of regular margarine (40)    ?  1 tablespoon of regular mayonnaise (135)    ? 1 tablespoon of cream cheese or 2 tablespoons of low-fat cream cheese (45)    ? 2 tablespoons of vegetable shortening (215)    · Dessert and sweets:      ? 8 animal crackers or 5 vanilla wafers (80)    ? 1 frozen fruit juice bar (80)    ? ½ cup of ice milk or low-fat frozen yogurt (90)    ? ½ cup of sherbet or sorbet (125)    ? ½ cup of sugar-free pudding or custard (60)    ? ½ cup of ice cream (140)    ? ½ cup of pudding or custard (175)    ? 1 (2-inch) square chocolate brownie (185)    · Combination foods:      ? Bean burrito made with an 8-inch tortilla, without cheese (275)    ? Chicken breast sandwich with lettuce and tomato (325)    ? 1 cup of chicken noodle soup (60)    ? 1 beef taco (175)    ? Regular hamburger with lettuce and tomato (310)    ? Regular cheeseburger with lettuce and tomato (410)     ? ¼ of a 12-inch cheese pizza (280)    ? Fried fish sandwich with lettuce and tomato (425)    ? Hot dog and bun (275)    ? 1½ cups of macaroni and cheese (310)    ? Taco salad with a fried tortilla shell (870)    · Low-calorie foods:      ? 1 tablespoon of ketchup or 1 tablespoon of fat free sour cream (15)    ? 1 teaspoon of mustard (5)    ? ¼ cup of salsa (20)    ? 1 large dill pickle (15)    ? 1 tablespoon of fat free salad dressing (10)    ? 2 teaspoons of low-sugar, light jam or jelly, or 1 tablespoon of sugar-free syrup (15)    ? 1 sugar-free popsicle (15)    ? 1 cup of club soda, seltzer water, or diet soda (0)    CARE AGREEMENT:   You have the right to help plan your care  Discuss treatment options with your healthcare provider to decide what care you want to receive  You always have the right to refuse treatment  The above information is an  only  It is not intended as medical advice for individual conditions or treatments  Talk to your doctor, nurse or pharmacist before following any medical regimen to see if it is safe and effective for you    © Copyright [employer] 2022 Information is for [employer] use only and may not be sold, redistributed or otherwise used for commercial purposes   All illustrations and images included in CareNotes® are the copyrighted property of A D A M , Inc  or [facility] Bon Gandhi

## 2022-08-08 NOTE — ASSESSMENT & PLAN NOTE
History of gout  Remains on allopurinol 100 mg daily  We will check a uric acid level increase the dose if necessary  Patient was told the importance of hydration and denies any recent exacerbations of his gouty arthritis

## 2022-08-08 NOTE — ASSESSMENT & PLAN NOTE
Complication of his colon cancer was severe anemia and patient did undergo resection of his cancer and is now completing chemotherapy  CEA levels have normalized     Has tolerated chemotherapy without major difficulties

## 2022-08-08 NOTE — ASSESSMENT & PLAN NOTE
Patient does have a history of hypertension  He remains on medication including Aldactone and Entresto, also taking Lasix metoprolol  Blood pressure was slightly elevated with his diastolic reading today and we will be checking on his renal function which has been stable    We will continue to make adjustments further with medication with consultation with his cardiologist if indicated

## 2022-08-08 NOTE — ASSESSMENT & PLAN NOTE
Patient has undergone ablation and now has better control of his heart rhythm  Patient does have a device implanted to his chest wall on the left to keep an eye on his heart rhythm  Patient's heart rate is under control and rhythm is regular today in the office  Will continue with oral anti coag medication  Denies any abnormal bleeding or bruising

## 2022-08-08 NOTE — ASSESSMENT & PLAN NOTE
With the patient's BMI he is considered obese  Patient does have multiple underlying medical problems that hopefully with show some improvement with weight loss  Patient is not had a discussion with previous physicians about his weight and methods in order to lose weight  He admits he is extremely restricted with his exercise capacity because of shortness of breath with exertion  States that he does not watch his dietary intake of calories and we have instructed the patient to look very closely the at the amount of calories ingesting on a daily basis in order to help lose weight  Has an early goal he was told he should reduce his caloric intake to approximately 1800 calories per day

## 2022-08-08 NOTE — ASSESSMENT & PLAN NOTE
Wt Readings from Last 3 Encounters:   08/08/22 103 kg (226 lb 12 8 oz)   07/20/22 104 kg (230 lb)   04/07/22 104 kg (230 lb)     Patient does have a history of heart failure  Patient states that after undergoing ablation procedure he has some improvement with his respiratory status which also improved after normalization of his hemoglobin  With the patient still having some shortness of breath with exertion and a slight end-expiratory wheeze heard with lung fields on auscultation we do have concerns as far as underlying lung disease with a history of tobacco abuse in the past and also his history being exposed to lot of inhaled chemicals with his previous job    Will need pulmonary function testing and hopefully this can be accomplished in our office

## 2022-08-08 NOTE — PROGRESS NOTES
Assessment/Plan:    Healthcare maintenance  Patient is a 45-year-old male history of extensive medical problems including chronic atrial fibrillation, history of colon cancer status post resection now undergoing chemotherapy, history of severe anemia secondary to GI bleed with colon cancer, hypertension, gouty arthritis, DJD  Patient states he has not had a regular physician for an extended period time and has only been followed by his cardiologist and oncologist and colon rectal surgeon  Patient states in general he is feeling better overall now that his heart underwent ablation and his anemia which was profound is now been corrected  He does have a history of gout but has not had uric acid level checked in a prolonged period of time  Does understand that he does have problems with weight but has not been given any instructions as far as diet and he is very limited with his exercise capacity with shortness of breath with exertion which can be multifactorial with a history of tobacco abuse in the past and congestive heart failure  I told the patient we need to get him up-to-date as far as routine screening and was given a slip for labs to be performed prior to his next visit in 3-4 weeks  Patient will need full physical with his next visit  Again have concerns with possible underlying lung disease with a history of tobacco abuse in the past but also congestive heart failure and this may be multifactorial and we need may need to have at the very least some pulmonary function testing performed  Patient did undergo exam today in the office with no acute abnormalities  Again will have labs performed prior to his next visit in approximately 3-4 weeks and at that time will undergo full physical     Elevated blood pressure reading  Patient does have a history of hypertension  He remains on medication including Aldactone and Entresto, also taking Lasix metoprolol    Blood pressure was slightly elevated with his diastolic reading today and we will be checking on his renal function which has been stable  We will continue to make adjustments further with medication with consultation with his cardiologist if indicated    Cancer of ascending colon [facility])  Complication of his colon cancer was severe anemia and patient did undergo resection of his cancer and is now completing chemotherapy  CEA levels have normalized     Has tolerated chemotherapy without major difficulties  History of gout  History of gout  Remains on allopurinol 100 mg daily  We will check a uric acid level increase the dose if necessary  Patient was told the importance of hydration and denies any recent exacerbations of his gouty arthritis  Persistent atrial fibrillation [facility])  Patient has undergone ablation and now has better control of his heart rhythm  Patient does have a device implanted to his chest wall on the left to keep an eye on his heart rhythm  Patient's heart rate is under control and rhythm is regular today in the office  Will continue with oral anti coag medication  Denies any abnormal bleeding or bruising  Cardiomyopathy, dilated ([facility] [address] )  Has a dilated cardiomyopathy by history  We did review recent echocardiogram which shows some improvement with his cardiac function with ejection fraction approximately 65%  Patient will continue surveillance with Cardiology and medication as prescribed    Chronic combined systolic and diastolic heart failure ([address] )  Wt Readings from Last 3 Encounters:   08/08/22 103 kg (226 lb 12 8 oz)   07/20/22 104 kg (230 lb)   04/07/22 104 kg (230 lb)     Patient does have a history of heart failure  Patient states that after undergoing ablation procedure he has some improvement with his respiratory status which also improved after normalization of his hemoglobin    With the patient still having some shortness of breath with exertion and a slight end-expiratory wheeze heard with lung fields on auscultation we do have concerns as far as underlying lung disease with a history of tobacco abuse in the past and also his history being exposed to lot of inhaled chemicals with his previous job  Will need pulmonary function testing and hopefully this can be accomplished in our office        Class 1 obesity due to excess calories with serious comorbidity in adult  With the patient's BMI he is considered obese  Patient does have multiple underlying medical problems that hopefully with show some improvement with weight loss  Patient is not had a discussion with previous physicians about his weight and methods in order to lose weight  He admits he is extremely restricted with his exercise capacity because of shortness of breath with exertion  States that he does not watch his dietary intake of calories and we have instructed the patient to look very closely the at the amount of calories ingesting on a daily basis in order to help lose weight  Has an early goal he was told he should reduce his caloric intake to approximately 1800 calories per day  Diagnoses and all orders for this visit:    History of gout  -     allopurinol (ZYLOPRIM) 100 mg tablet; Take 1 tablet (100 mg total) by mouth daily    Persistent atrial fibrillation (HCC)    Cancer of ascending colon (HCC)    Elevated blood pressure reading  -     Comprehensive metabolic panel; Future  -     CBC and differential; Future  -     Lipid panel; Future    Hyperglycemia  -     Hemoglobin A1C; Future  -     PSA, Total Screen; Future    Health maintenance examination  -     Comprehensive metabolic panel; Future  -     CBC and differential; Future  -     Lipid panel; Future  -     Uric acid; Future  -     Hemoglobin A1C; Future  -     PSA, Total Screen;  Future    Healthcare maintenance    Cardiomyopathy, dilated ([facility] [address] )    Chronic combined systolic and diastolic heart failure (HCC)    Class 1 obesity due to excess calories with serious comorbidity and body mass index (BMI) of 33 0 to 33 9 in adult    BMI 33 0-33 9,adult          Subjective:      Patient ID: Sushila Valadez is a 58 y o  male  Patient is a 80-year-old male with a history of extensive medical problems as noted on his chart  Patient is here today to establish care in our practice  Patient has a history of profound anemia secondary to GI bleeding which is chronic with colon cancer status post resection now completing chemotherapy, atrial fibrillation status post ablation procedure which apparently has been successful, history of gout  Patient continues to follow-up with his specialists, Hematology-Oncology, Cardiology and colon rectal surgeon  States he has not had a regular physician in a prolonged period of time      The following portions of the patient's history were reviewed and updated as appropriate:   He  has a past medical history of A-fib ([facility] [address] ), Cancer ([address] ), Colon cancer ([address] ), Colon polyp, Coronary artery disease, Gout, Herpes zoster, Hypertension, Irregular heart beat, and Shingles    He   Patient Active Problem List    Diagnosis Date Noted   [place] [id number] maintenance 08/08/2022    Class 1 obesity due to excess calories with serious comorbidity in adult 08/08/2022    COVID-19 12/21/2021    Port-A-Cath in place 01/18/2021    Cancer of ascending colon ([facility] [address] ) 09/30/2020    S/P right hemicolectomy 09/10/2020    Mass of cecum 08/25/2020    History of gout 08/25/2020    GI bleeding     Microcytic anemia 08/21/2020    Gastrointestinal hemorrhage with melena 08/21/2020    NSVT (nonsustained ventricular tachycardia) ([address] ) 07/27/2018    PFO (patent foramen ovale) 12/28/2017    Chronic combined systolic and diastolic heart failure ([address] ) 12/27/2017    Paroxysmal atrial flutter ([address] ) 12/27/2017    Persistent atrial fibrillation ([address] ) 12/18/2017    Cardiomyopathy, dilated ([address] ) 12/18/2017    Serum total bilirubin elevated 12/11/2017    Elevated blood pressure reading 12/11/2017     He  has a past surgical history that includes Electrical Cardioversion (12/15/2017); Hemicoloectomy w/ anastomosis (N/A, 8/28/2020); IR port placement (9/21/2020); and IR port removal (4/5/2021)  His family history includes Alcohol abuse in his father; Coronary artery disease in his father and mother  He  reports that he quit smoking about 4 years ago  He has never used smokeless tobacco  He reports previous alcohol use  He reports that he does not use drugs  Current Outpatient Medications   Medication Sig Dispense Refill    allopurinol (ZYLOPRIM) 100 mg tablet Take 1 tablet (100 mg total) by mouth daily 30 tablet 5    apixaban (Eliquis) 5 mg Take 1 tablet (5 mg total) by mouth 2 (two) times a day 180 tablet 3    aspirin (ECOTRIN LOW STRENGTH) 81 mg EC tablet Take 1 tablet (81 mg total) by mouth daily 30 tablet 0    atorvastatin (LIPITOR) 40 mg tablet Take 1 tablet (40 mg total) by mouth daily with dinner 90 tablet 3    diltiazem (CARDIZEM CD) 120 mg 24 hr capsule TAKE 1 CAPSULE BY MOUTH EVERY DAY 90 capsule 2    furosemide (LASIX) 20 mg tablet Take 1 tablet (20 mg total) by mouth daily 90 tablet 3    metoprolol succinate (TOPROL-XL) 50 mg 24 hr tablet Take 1 tablet (50 mg total) by mouth 2 (two) times a day 90 tablet 3    sacubitril-valsartan (Entresto) 24-26 MG TABS Take 1 tablet by mouth 2 (two) times a day 56 tablet 0    spironolactone (ALDACTONE) 25 mg tablet TAKE 1/2 TABLET BY MOUTH EVERY DAY 45 tablet 3    ondansetron (Zofran ODT) 4 mg disintegrating tablet Take 1 tablet (4 mg total) by mouth every 6 (six) hours as needed for nausea or vomiting for up to 5 days (Patient not taking: No sig reported) 20 tablet 0     No current facility-administered medications for this visit       Current Outpatient Medications on File Prior to Visit   Medication Sig    apixaban (Eliquis) 5 mg Take 1 tablet (5 mg total) by mouth 2 (two) times a day    aspirin (ECOTRIN LOW STRENGTH) 81 mg EC tablet Take 1 tablet (81 mg total) by mouth daily    atorvastatin (LIPITOR) 40 mg tablet Take 1 tablet (40 mg total) by mouth daily with dinner    diltiazem (CARDIZEM CD) 120 mg 24 hr capsule TAKE 1 CAPSULE BY MOUTH EVERY DAY    furosemide (LASIX) 20 mg tablet Take 1 tablet (20 mg total) by mouth daily    metoprolol succinate (TOPROL-XL) 50 mg 24 hr tablet Take 1 tablet (50 mg total) by mouth 2 (two) times a day    sacubitril-valsartan (Entresto) 24-26 MG TABS Take 1 tablet by mouth 2 (two) times a day    spironolactone (ALDACTONE) 25 mg tablet TAKE 1/2 TABLET BY MOUTH EVERY DAY    ondansetron (Zofran ODT) 4 mg disintegrating tablet Take 1 tablet (4 mg total) by mouth every 6 (six) hours as needed for nausea or vomiting for up to 5 days (Patient not taking: No sig reported)    [DISCONTINUED] allopurinol (ZYLOPRIM) 100 mg tablet Take 1 tablet (100 mg total) by mouth daily (Patient not taking: No sig reported)     No current facility-administered medications on file prior to visit  He has No Known Allergies       Review of Systems   Constitutional: Negative  States that there has been no dramatic change in activity level  Is restricted with his exercise capacity with shortness of breath with exertion   HENT: Negative  Eyes: Negative  Respiratory: Positive for shortness of breath and wheezing  Negative for apnea, cough, choking, chest tightness and stridor  Cardiovascular: Negative  Gastrointestinal: Negative  Endocrine: Negative  Genitourinary: Negative  Musculoskeletal: Negative  Skin: Negative  Allergic/Immunologic: Negative  Neurological: Negative  Hematological: Negative  Psychiatric/Behavioral: Negative  Objective:      /90 (BP Location: Left arm, Patient Position: Sitting)   Pulse 87   Temp (!) 97 1 °F (36 2 °C) (Tympanic)   Ht 5' 9" (1 753 m)   Wt 103 kg (226 lb 12 8 oz)   SpO2 95%   BMI 33 49 kg/m²          Physical Exam  Vitals and nursing note reviewed  Constitutional:       General: He is not in acute distress  Appearance: Normal appearance  He is obese  He is not ill-appearing, toxic-appearing or diaphoretic  Comments: Very pleasant, obese 69-year-old male who is awake alert in no acute distress and oriented x3   HENT:      Head: Normocephalic and atraumatic  Right Ear: Tympanic membrane, ear canal and external ear normal  There is no impacted cerumen  Left Ear: Tympanic membrane, ear canal and external ear normal  There is no impacted cerumen  Nose: Nose normal  No congestion or rhinorrhea  Mouth/Throat:      Mouth: Mucous membranes are moist       Pharynx: Oropharynx is clear  No oropharyngeal exudate or posterior oropharyngeal erythema  Comments: Some crowding and obstruction of airway  Eyes:      General: No scleral icterus  Right eye: No discharge  Left eye: No discharge  Extraocular Movements: Extraocular movements intact  Conjunctiva/sclera: Conjunctivae normal       Pupils: Pupils are equal, round, and reactive to light  Neck:      Vascular: No carotid bruit  Comments: Thick neck  Cardiovascular:      Rate and Rhythm: Normal rate  Rhythm irregular  Pulses: Normal pulses  Heart sounds: Normal heart sounds  No murmur heard  No friction rub  No gallop  Pulmonary:      Effort: Pulmonary effort is normal  No respiratory distress  Breath sounds: No stridor  Wheezing present  No rhonchi or rales  Comments: Patient on evaluation has some decreased breath sounds anteriorly and posteriorly with very faint end-expiratory wheeze heard at his right formed base  Chest:      Chest wall: No tenderness  Abdominal:      General: Bowel sounds are normal  There is no distension  Palpations: Abdomen is soft  There is no mass  Tenderness: There is no abdominal tenderness  There is no right CVA tenderness, left CVA tenderness, guarding or rebound        Hernia: No hernia is present  Comments: Obese   Musculoskeletal:         General: No swelling, tenderness, deformity or signs of injury  Normal range of motion  Cervical back: Normal range of motion and neck supple  No rigidity or tenderness  Right lower leg: No edema  Left lower leg: No edema  Lymphadenopathy:      Cervical: No cervical adenopathy  Skin:     General: Skin is warm and dry  Coloration: Skin is not jaundiced or pale  Findings: No bruising, erythema, lesion or rash  Neurological:      General: No focal deficit present  Mental Status: He is alert and oriented to person, place, and time  Mental status is at baseline  Cranial Nerves: No cranial nerve deficit  Sensory: No sensory deficit  Motor: No weakness  Coordination: Coordination normal       Gait: Gait normal       Deep Tendon Reflexes: Reflexes normal    Psychiatric:         Mood and Affect: Mood normal          Behavior: Behavior normal          Thought Content: Thought content normal          Judgment: Judgment normal          BMI Counseling: Body mass index is 33 49 kg/m²  The BMI is above normal  Nutrition recommendations include reducing portion sizes, decreasing overall calorie intake, 3-5 servings of fruits/vegetables daily, consuming healthier snacks, moderation in carbohydrate intake, increasing intake of lean protein, reducing intake of saturated fat and trans fat and reducing intake of cholesterol

## 2022-08-23 ENCOUNTER — RA CDI HCC (OUTPATIENT)
Dept: OTHER | Facility: HOSPITAL | Age: 62
End: 2022-08-23

## 2022-08-23 NOTE — PROGRESS NOTES
[facility]  coding opportunities          Chart Reviewed number of suggestions sent to Provider: 1   I11 [id number]    Patients Insurance        Commercial Insurance: [employer]

## 2022-08-27 ENCOUNTER — APPOINTMENT (OUTPATIENT)
Dept: LAB | Age: 62
End: 2022-08-27
Payer: COMMERCIAL

## 2022-08-27 DIAGNOSIS — Z00.00 HEALTH MAINTENANCE EXAMINATION: ICD-10-CM

## 2022-08-27 DIAGNOSIS — R03.0 ELEVATED BLOOD PRESSURE READING: ICD-10-CM

## 2022-08-27 DIAGNOSIS — R73.9 HYPERGLYCEMIA: ICD-10-CM

## 2022-08-27 LAB
ALBUMIN SERPL BCP-MCNC: 3.4 G/DL (ref 3.5–5)
ALP SERPL-CCNC: 61 U/L (ref 46–116)
ALT SERPL W P-5'-P-CCNC: 78 U/L (ref 12–78)
ANION GAP SERPL CALCULATED.3IONS-SCNC: 7 MMOL/L (ref 4–13)
AST SERPL W P-5'-P-CCNC: 25 U/L (ref 5–45)
BASOPHILS # BLD AUTO: 0.06 THOUSANDS/ΜL (ref 0–0.1)
BASOPHILS NFR BLD AUTO: 1 % (ref 0–1)
BILIRUB SERPL-MCNC: 1.24 MG/DL (ref 0.2–1)
BUN SERPL-MCNC: 17 MG/DL (ref 5–25)
CALCIUM ALBUM COR SERPL-MCNC: 9.4 MG/DL (ref 8.3–10.1)
CALCIUM SERPL-MCNC: 8.9 MG/DL (ref 8.3–10.1)
CHLORIDE SERPL-SCNC: 104 MMOL/L (ref 96–108)
CHOLEST SERPL-MCNC: 195 MG/DL
CO2 SERPL-SCNC: 23 MMOL/L (ref 21–32)
CREAT SERPL-MCNC: 1.29 MG/DL (ref 0.6–1.3)
EOSINOPHIL # BLD AUTO: 0.07 THOUSAND/ΜL (ref 0–0.61)
EOSINOPHIL NFR BLD AUTO: 1 % (ref 0–6)
ERYTHROCYTE [DISTWIDTH] IN BLOOD BY AUTOMATED COUNT: 12.4 % (ref 11.6–15.1)
EST. AVERAGE GLUCOSE BLD GHB EST-MCNC: 108 MG/DL
GFR SERPL CREATININE-BSD FRML MDRD: 59 ML/MIN/1.73SQ M
GLUCOSE P FAST SERPL-MCNC: 113 MG/DL (ref 65–99)
HBA1C MFR BLD: 5.4 %
HCT VFR BLD AUTO: 45.1 % (ref 36.5–49.3)
HDLC SERPL-MCNC: 55 MG/DL
HGB BLD-MCNC: 14.5 G/DL (ref 12–17)
IMM GRANULOCYTES # BLD AUTO: 0.02 THOUSAND/UL (ref 0–0.2)
IMM GRANULOCYTES NFR BLD AUTO: 0 % (ref 0–2)
LDLC SERPL CALC-MCNC: 113 MG/DL (ref 0–100)
LYMPHOCYTES # BLD AUTO: 1.26 THOUSANDS/ΜL (ref 0.6–4.47)
LYMPHOCYTES NFR BLD AUTO: 22 % (ref 14–44)
MCH RBC QN AUTO: 31.9 PG (ref 26.8–34.3)
MCHC RBC AUTO-ENTMCNC: 32.2 G/DL (ref 31.4–37.4)
MCV RBC AUTO: 99 FL (ref 82–98)
MONOCYTES # BLD AUTO: 0.65 THOUSAND/ΜL (ref 0.17–1.22)
MONOCYTES NFR BLD AUTO: 12 % (ref 4–12)
NEUTROPHILS # BLD AUTO: 3.56 THOUSANDS/ΜL (ref 1.85–7.62)
NEUTS SEG NFR BLD AUTO: 64 % (ref 43–75)
NONHDLC SERPL-MCNC: 140 MG/DL
NRBC BLD AUTO-RTO: 0 /100 WBCS
PLATELET # BLD AUTO: 222 THOUSANDS/UL (ref 149–390)
PMV BLD AUTO: 10.7 FL (ref 8.9–12.7)
POTASSIUM SERPL-SCNC: 4.2 MMOL/L (ref 3.5–5.3)
PROT SERPL-MCNC: 7.9 G/DL (ref 6.4–8.4)
PSA SERPL-MCNC: 0.9 NG/ML (ref 0–4)
RBC # BLD AUTO: 4.54 MILLION/UL (ref 3.88–5.62)
SODIUM SERPL-SCNC: 134 MMOL/L (ref 135–147)
TRIGL SERPL-MCNC: 134 MG/DL
URATE SERPL-MCNC: 7.3 MG/DL (ref 3.5–8.5)
WBC # BLD AUTO: 5.62 THOUSAND/UL (ref 4.31–10.16)

## 2022-08-27 PROCEDURE — 80061 LIPID PANEL: CPT

## 2022-08-27 PROCEDURE — 83036 HEMOGLOBIN GLYCOSYLATED A1C: CPT

## 2022-08-27 PROCEDURE — 80053 COMPREHEN METABOLIC PANEL: CPT

## 2022-08-27 PROCEDURE — 85025 COMPLETE CBC W/AUTO DIFF WBC: CPT

## 2022-08-27 PROCEDURE — G0103 PSA SCREENING: HCPCS

## 2022-08-27 PROCEDURE — 84550 ASSAY OF BLOOD/URIC ACID: CPT

## 2022-08-27 PROCEDURE — 36415 COLL VENOUS BLD VENIPUNCTURE: CPT

## 2022-08-29 ENCOUNTER — OFFICE VISIT (OUTPATIENT)
Dept: INTERNAL MEDICINE CLINIC | Facility: CLINIC | Age: 62
End: 2022-08-29
Payer: COMMERCIAL

## 2022-08-29 VITALS
HEART RATE: 78 BPM | WEIGHT: 224 LBS | BODY MASS INDEX: 33.18 KG/M2 | DIASTOLIC BLOOD PRESSURE: 85 MMHG | SYSTOLIC BLOOD PRESSURE: 145 MMHG | HEIGHT: 69 IN | RESPIRATION RATE: 18 BRPM | TEMPERATURE: 98.6 F | OXYGEN SATURATION: 98 %

## 2022-08-29 DIAGNOSIS — E66.09 CLASS 1 OBESITY DUE TO EXCESS CALORIES WITH SERIOUS COMORBIDITY AND BODY MASS INDEX (BMI) OF 33.0 TO 33.9 IN ADULT: ICD-10-CM

## 2022-08-29 DIAGNOSIS — I48.92 PAROXYSMAL ATRIAL FLUTTER (HCC): ICD-10-CM

## 2022-08-29 DIAGNOSIS — R03.0 ELEVATED BLOOD PRESSURE READING: ICD-10-CM

## 2022-08-29 DIAGNOSIS — C18.2 CANCER OF ASCENDING COLON (HCC): ICD-10-CM

## 2022-08-29 DIAGNOSIS — I10 HYPERTENSION, UNSPECIFIED TYPE: ICD-10-CM

## 2022-08-29 DIAGNOSIS — Z00.00 HEALTHCARE MAINTENANCE: Primary | ICD-10-CM

## 2022-08-29 DIAGNOSIS — R73.9 HYPERGLYCEMIA: ICD-10-CM

## 2022-08-29 PROCEDURE — 99396 PREV VISIT EST AGE 40-64: CPT | Performed by: INTERNAL MEDICINE

## 2022-08-29 PROCEDURE — 3725F SCREEN DEPRESSION PERFORMED: CPT | Performed by: INTERNAL MEDICINE

## 2022-08-29 NOTE — ASSESSMENT & PLAN NOTE
Patient is a 71-year-old male history of multiple medical problems as outlined previously who is here today for a routine physical   He did have extensive lab testing performed prior to the visit today and we did discuss the results of length  Patient states in general doing well  Appreciates all consult some evaluations by his specialist including his colon rectal surgeon, oncologist, cardiologist   He denies any chest pain or pressure but still has a slight cough and chest congestion  Is no longer smoking  Reviewed the results of CT scan of chest recently which will be repeated in the near future  We did discuss at length problem the patient has having with his weight and methods in order to reduce his caloric intake in order to reduce his weight and trying as best he can to work on some type of routine exercise program which she states is difficult because of his chronic shortness of breath  He did undergo physical exam today in the office  No acute findings  Will continue with present medication and surveillance by Hematology/Oncology, colon rectal surgeons and Cardiology    Will be seen again in the office in 4 months and will check his kidney function prior to that visit along with a hemoglobin A1c

## 2022-08-29 NOTE — ASSESSMENT & PLAN NOTE
History of hypertension  Remains on multiple medications to control his blood pressure and heart rate  Blood pressure was mildly elevated today in the office as previously  He will continue present medication and surveillance, check on his renal function with his next visit

## 2022-08-29 NOTE — ASSESSMENT & PLAN NOTE
Patient's heart rate is regular today with occasional ectopy  Patient remains on oral anticoagulants  Denies any abnormal bleeding or bruising  Will continue to follow-up with cardiology  Rate is controlled

## 2022-08-29 NOTE — ASSESSMENT & PLAN NOTE
Patient agrees that a lot of his difficulties with shortness of breath with exertion may be secondary to his exogenous obesity    Again we discussed at length the importance of looking at his caloric intake on a daily basis and ways to reduce this by checking a calories he takes in with every meal   Starting on some type of routine exercise program if only mild

## 2022-08-29 NOTE — PROGRESS NOTES
Assessment/Plan:    Healthcare maintenance  Patient is a 80-year-old male history of multiple medical problems as outlined previously who is here today for a routine physical   He did have extensive lab testing performed prior to the visit today and we did discuss the results of length  Patient states in general doing well  Appreciates all consult some evaluations by his specialist including his colon rectal surgeon, oncologist, cardiologist   He denies any chest pain or pressure but still has a slight cough and chest congestion  Is no longer smoking  Reviewed the results of CT scan of chest recently which will be repeated in the near future  We did discuss at length problem the patient has having with his weight and methods in order to reduce his caloric intake in order to reduce his weight and trying as best he can to work on some type of routine exercise program which she states is difficult because of his chronic shortness of breath  He did undergo physical exam today in the office  No acute findings  Will continue with present medication and surveillance by Hematology/Oncology, colon rectal surgeons and Cardiology  Will be seen again in the office in 4 months and will check his kidney function prior to that visit along with a hemoglobin A1c    Paroxysmal atrial flutter ([facility] [address] )  Patient's heart rate is regular today with occasional ectopy  Patient remains on oral anticoagulants  Denies any abnormal bleeding or bruising  Will continue to follow-up with cardiology  Rate is controlled  Elevated blood pressure reading  History of hypertension  Remains on multiple medications to control his blood pressure and heart rate  Blood pressure was mildly elevated today in the office as previously  He will continue present medication and surveillance, check on his renal function with his next visit      Class 1 obesity due to excess calories with serious comorbidity in adult  Patient agrees that a lot of his difficulties with shortness of breath with exertion may be secondary to his exogenous obesity  Again we discussed at length the importance of looking at his caloric intake on a daily basis and ways to reduce this by checking a calories he takes in with every meal   Starting on some type of routine exercise program if only mild    Serum total bilirubin elevated  The labs show a very slight elevation in bilirubin level  No hepatic disease  Cancer of ascending colon ([address] )  Continues to follow-up with Oncology  Will have a repeat CT scan of chest abdomen and pelvis to follow up with disease       Diagnoses and all orders for this visit:    Healthcare maintenance    Class 1 obesity due to excess calories with serious comorbidity and body mass index (BMI) of 33 0 to 33 9 in adult    Elevated blood pressure reading    Hypertension, unspecified type  -     Basic metabolic panel; Future    Hyperglycemia  -     Hemoglobin A1C; Future    Paroxysmal atrial flutter (HCC)    Cancer of ascending colon (HCC)          Subjective:      Patient ID: Lorri Wong is a 58 y o  male  66-year-old male history of multiple medical problems who is here today for routine physical   He did have extensive lab testing performed prior to the visit today we did discuss the results of length with the patient  In general states he is doing relatively well but admits restriction in his exercise capacity which has been chronic without change      The following portions of the patient's history were reviewed and updated as appropriate:   He  has a past medical history of A-fib ([address] ), Cancer ([address] ), Colon cancer ([address] ), Colon polyp, Coronary artery disease, Gout, Herpes zoster, Hypertension, Irregular heart beat, and Shingles    He   Patient Active Problem List    Diagnosis Date Noted   Miya Ambriz maintenance 08/08/2022    Class 1 obesity due to excess calories with serious comorbidity in adult 08/08/2022    COVID-19 12/21/2021    Port-A-Cath in place 01/18/2021    [facility] ) 09/30/2020    S/P right hemicolectomy 09/10/2020    Mass of cecum 08/25/2020    History of gout 08/25/2020    GI bleeding     Microcytic anemia 08/21/2020    Gastrointestinal hemorrhage with melena 08/21/2020    NSVT (nonsustained ventricular tachycardia) ([facility] ) 07/27/2018    PFO (patent foramen ovale) 12/28/2017    Chronic combined systolic and diastolic heart failure ([facility] ) 12/27/2017    Paroxysmal atrial flutter ([facility] ) 12/27/2017    Persistent atrial fibrillation ([place] [address] ) 12/18/2017    Cardiomyopathy, dilated ([place] [address] ) 12/18/2017    Serum total bilirubin elevated 12/11/2017    Elevated blood pressure reading 12/11/2017     He  has a past surgical history that includes Electrical Cardioversion (12/15/2017); Hemicoloectomy w/ anastomosis (N/A, 8/28/2020); IR port placement (9/21/2020); and IR port removal (4/5/2021)  His family history includes Alcohol abuse in his father; Coronary artery disease in his father and mother  He  reports that he quit smoking about 5 years ago  He has never used smokeless tobacco  He reports previous alcohol use  He reports that he does not use drugs    Current Outpatient Medications   Medication Sig Dispense Refill    allopurinol (ZYLOPRIM) 100 mg tablet Take 1 tablet (100 mg total) by mouth daily 30 tablet 5    apixaban (Eliquis) 5 mg Take 1 tablet (5 mg total) by mouth 2 (two) times a day 180 tablet 3    aspirin (ECOTRIN LOW STRENGTH) 81 mg EC tablet Take 1 tablet (81 mg total) by mouth daily 30 tablet 0    atorvastatin (LIPITOR) 40 mg tablet Take 1 tablet (40 mg total) by mouth daily with dinner 90 tablet 3    diltiazem (CARDIZEM CD) 120 mg 24 hr capsule TAKE 1 CAPSULE BY MOUTH EVERY DAY 90 capsule 2    furosemide (LASIX) 20 mg tablet Take 1 tablet (20 mg total) by mouth daily 90 tablet 3    metoprolol succinate (TOPROL-XL) 50 mg 24 hr tablet Take 1 tablet (50 mg total) by mouth 2 (two) times a day 90 tablet 3    sacubitril-valsartan (Entresto) 24-26 MG TABS Take 1 tablet by mouth 2 (two) times a day 56 tablet 0    spironolactone (ALDACTONE) 25 mg tablet TAKE 1/2 TABLET BY MOUTH EVERY DAY 45 tablet 3    ondansetron (Zofran ODT) 4 mg disintegrating tablet Take 1 tablet (4 mg total) by mouth every 6 (six) hours as needed for nausea or vomiting for up to 5 days (Patient not taking: No sig reported) 20 tablet 0     No current facility-administered medications for this visit  Current Outpatient Medications on File Prior to Visit   Medication Sig    allopurinol (ZYLOPRIM) 100 mg tablet Take 1 tablet (100 mg total) by mouth daily    apixaban (Eliquis) 5 mg Take 1 tablet (5 mg total) by mouth 2 (two) times a day    aspirin (ECOTRIN LOW STRENGTH) 81 mg EC tablet Take 1 tablet (81 mg total) by mouth daily    atorvastatin (LIPITOR) 40 mg tablet Take 1 tablet (40 mg total) by mouth daily with dinner    diltiazem (CARDIZEM CD) 120 mg 24 hr capsule TAKE 1 CAPSULE BY MOUTH EVERY DAY    furosemide (LASIX) 20 mg tablet Take 1 tablet (20 mg total) by mouth daily    metoprolol succinate (TOPROL-XL) 50 mg 24 hr tablet Take 1 tablet (50 mg total) by mouth 2 (two) times a day    sacubitril-valsartan (Entresto) 24-26 MG TABS Take 1 tablet by mouth 2 (two) times a day    spironolactone (ALDACTONE) 25 mg tablet TAKE 1/2 TABLET BY MOUTH EVERY DAY    ondansetron (Zofran ODT) 4 mg disintegrating tablet Take 1 tablet (4 mg total) by mouth every 6 (six) hours as needed for nausea or vomiting for up to 5 days (Patient not taking: No sig reported)     No current facility-administered medications on file prior to visit  He has No Known Allergies       Review of Systems   Constitutional: Negative  HENT: Negative  Eyes: Negative  Respiratory: Positive for shortness of breath  Negative for apnea, cough, choking, chest tightness, wheezing and stridor           Some episodic chest congestion and some shortness of breath with any type of brisk exertion  Multifactorial   Cardiovascular: Negative  Gastrointestinal: Negative  Endocrine: Negative  Genitourinary: Negative  Musculoskeletal: Negative  Skin: Negative  Allergic/Immunologic: Negative  Neurological: Negative  Negative for facial asymmetry  Hematological: Negative  Psychiatric/Behavioral: Negative  Objective:      /85 (BP Location: Left arm, Patient Position: Sitting, Cuff Size: Adult)   Pulse 78   Temp 98 6 °F (37 °C) (Tympanic)   Resp 18   Ht 5' 9" (1 753 m)   Wt 102 kg (224 lb)   SpO2 98%   BMI 33 08 kg/m²          Physical Exam  Vitals and nursing note reviewed  Constitutional:       General: He is not in acute distress  Appearance: Normal appearance  He is obese  He is not ill-appearing, toxic-appearing or diaphoretic  Comments: Pleasant, articulate, obese 71-year-old male who is awake alert in no acute distress and oriented x3   HENT:      Head: Normocephalic and atraumatic  Right Ear: Tympanic membrane, ear canal and external ear normal  There is no impacted cerumen  Left Ear: Tympanic membrane, ear canal and external ear normal  There is no impacted cerumen  Nose: Nose normal  No congestion or rhinorrhea  Mouth/Throat:      Mouth: Mucous membranes are moist       Pharynx: Oropharynx is clear  No oropharyngeal exudate or posterior oropharyngeal erythema  Eyes:      General: No scleral icterus  Right eye: No discharge  Left eye: No discharge  Extraocular Movements: Extraocular movements intact  Conjunctiva/sclera: Conjunctivae normal       Pupils: Pupils are equal, round, and reactive to light  Neck:      Vascular: No carotid bruit  Cardiovascular:      Rate and Rhythm: Normal rate  Rhythm irregular  Pulses: Normal pulses  Heart sounds: Normal heart sounds  No murmur heard  No friction rub  No gallop     Pulmonary:      Effort: Pulmonary effort is normal  No respiratory distress  Breath sounds: Normal breath sounds  No stridor  No wheezing, rhonchi or rales  Chest:      Chest wall: No tenderness  Abdominal:      General: Bowel sounds are normal  There is no distension  Palpations: Abdomen is soft  There is no mass  Tenderness: There is no abdominal tenderness (Large ventral hernia, easily reducible)  There is no right CVA tenderness, left CVA tenderness, guarding or rebound  Hernia: A hernia is present  Genitourinary:     Comments: Defers exam sees colon rectal specialist, PSA is normal  Musculoskeletal:         General: No swelling, tenderness, deformity or signs of injury  Normal range of motion  Cervical back: Normal range of motion and neck supple  No rigidity or tenderness  Right lower leg: No edema  Left lower leg: No edema  Lymphadenopathy:      Cervical: No cervical adenopathy  Skin:     General: Skin is warm and dry  Capillary Refill: Capillary refill takes less than 2 seconds  Coloration: Skin is not jaundiced or pale  Findings: No bruising, erythema, lesion or rash  Neurological:      General: No focal deficit present  Mental Status: He is alert and oriented to person, place, and time  Mental status is at baseline  Cranial Nerves: No cranial nerve deficit  Sensory: No sensory deficit  Motor: No weakness  Coordination: Coordination normal       Gait: Gait normal       Deep Tendon Reflexes: Reflexes normal    Psychiatric:         Mood and Affect: Mood normal          Behavior: Behavior normal          Thought Content:  Thought content normal          Judgment: Judgment normal

## 2022-08-29 NOTE — ASSESSMENT & PLAN NOTE
Continues to follow-up with Oncology    Will have a repeat CT scan of chest abdomen and pelvis to follow up with disease

## 2022-08-30 DIAGNOSIS — Z87.39 HISTORY OF GOUT: ICD-10-CM

## 2022-08-30 RX ORDER — ALLOPURINOL 100 MG/1
TABLET ORAL
Qty: 90 TABLET | Refills: 2 | Status: SHIPPED | OUTPATIENT
Start: 2022-08-30

## 2022-09-09 ENCOUNTER — TELEPHONE (OUTPATIENT)
Dept: HEMATOLOGY ONCOLOGY | Facility: CLINIC | Age: 62
End: 2022-09-09

## 2022-09-09 NOTE — TELEPHONE ENCOUNTER
09/09/22    Received LTD form but it has been at least 2 years from pt's Sx and he has finished 6 cycles of chemo  He is now on observation  From Hem onc perspective, we are unable to provide him the LTD form  Dr Germaine Brower noted  Spoke with pt relaying above info and he is agreeable  He is asking for the LTD b/c his heart issue and he has already reached out to them

## 2022-10-11 PROBLEM — Z00.00 HEALTHCARE MAINTENANCE: Status: RESOLVED | Noted: 2022-08-08 | Resolved: 2022-10-11

## 2022-10-23 DIAGNOSIS — I48.0 PAF (PAROXYSMAL ATRIAL FIBRILLATION) (HCC): ICD-10-CM

## 2022-10-24 DIAGNOSIS — I48.91 ATRIAL FIBRILLATION, UNSPECIFIED TYPE (HCC): ICD-10-CM

## 2022-10-24 DIAGNOSIS — I50.22 CHRONIC SYSTOLIC HEART FAILURE (HCC): ICD-10-CM

## 2022-10-24 RX ORDER — DILTIAZEM HYDROCHLORIDE 120 MG/1
CAPSULE, COATED, EXTENDED RELEASE ORAL
Qty: 90 CAPSULE | Refills: 2 | Status: SHIPPED | OUTPATIENT
Start: 2022-10-24

## 2022-10-28 ENCOUNTER — TELEPHONE (OUTPATIENT)
Dept: CARDIOLOGY CLINIC | Facility: CLINIC | Age: 62
End: 2022-10-28

## 2022-10-28 ENCOUNTER — REMOTE DEVICE CLINIC VISIT (OUTPATIENT)
Dept: CARDIOLOGY CLINIC | Facility: CLINIC | Age: 62
End: 2022-10-28
Payer: COMMERCIAL

## 2022-10-28 ENCOUNTER — OFFICE VISIT (OUTPATIENT)
Dept: CARDIOLOGY CLINIC | Facility: CLINIC | Age: 62
End: 2022-10-28
Payer: COMMERCIAL

## 2022-10-28 VITALS
BODY MASS INDEX: 34.51 KG/M2 | OXYGEN SATURATION: 97 % | WEIGHT: 233 LBS | DIASTOLIC BLOOD PRESSURE: 74 MMHG | SYSTOLIC BLOOD PRESSURE: 110 MMHG | HEART RATE: 86 BPM | HEIGHT: 69 IN

## 2022-10-28 DIAGNOSIS — I48.19 PERSISTENT ATRIAL FIBRILLATION (HCC): Primary | ICD-10-CM

## 2022-10-28 DIAGNOSIS — I50.22 CHRONIC SYSTOLIC HEART FAILURE (HCC): ICD-10-CM

## 2022-10-28 DIAGNOSIS — Z95.818 PRESENCE OF OTHER CARDIAC IMPLANTS AND GRAFTS: Primary | ICD-10-CM

## 2022-10-28 DIAGNOSIS — I50.42 CHRONIC COMBINED SYSTOLIC AND DIASTOLIC HEART FAILURE (HCC): ICD-10-CM

## 2022-10-28 DIAGNOSIS — I42.0 CARDIOMYOPATHY, DILATED (HCC): ICD-10-CM

## 2022-10-28 DIAGNOSIS — I47.29 NSVT (NONSUSTAINED VENTRICULAR TACHYCARDIA): ICD-10-CM

## 2022-10-28 DIAGNOSIS — Q21.12 PFO (PATENT FORAMEN OVALE): ICD-10-CM

## 2022-10-28 DIAGNOSIS — I48.91 ATRIAL FIBRILLATION, UNSPECIFIED TYPE (HCC): ICD-10-CM

## 2022-10-28 DIAGNOSIS — I48.92 PAROXYSMAL ATRIAL FLUTTER (HCC): ICD-10-CM

## 2022-10-28 PROCEDURE — 99214 OFFICE O/P EST MOD 30 MIN: CPT | Performed by: INTERNAL MEDICINE

## 2022-10-28 PROCEDURE — G2066 INTER DEVC REMOTE 30D: HCPCS | Performed by: INTERNAL MEDICINE

## 2022-10-28 PROCEDURE — 93298 REM INTERROG DEV EVAL SCRMS: CPT | Performed by: INTERNAL MEDICINE

## 2022-10-28 RX ORDER — APIXABAN 5 MG/1
TABLET, FILM COATED ORAL
Qty: 180 TABLET | Refills: 3 | Status: SHIPPED | OUTPATIENT
Start: 2022-10-28

## 2022-10-28 RX ORDER — SACUBITRIL AND VALSARTAN 24; 26 MG/1; MG/1
TABLET, FILM COATED ORAL
Qty: 180 TABLET | Refills: 3 | Status: SHIPPED | OUTPATIENT
Start: 2022-10-28

## 2022-10-28 RX ORDER — SPIRONOLACTONE 25 MG/1
TABLET ORAL
Qty: 45 TABLET | Refills: 3 | Status: SHIPPED | OUTPATIENT
Start: 2022-10-28

## 2022-10-28 RX ORDER — SACUBITRIL AND VALSARTAN 24; 26 MG/1; MG/1
1 TABLET, FILM COATED ORAL 2 TIMES DAILY
Qty: 180 TABLET | Refills: 3 | Status: SHIPPED | OUTPATIENT
Start: 2022-10-28

## 2022-10-28 NOTE — PROGRESS NOTES
[address]  Cardiology  Follow up note  Jen Ortega 58 y o  male MRN: [id number]        Problems    1  Persistent atrial fibrillation ([facility] [address] )     2  Chronic combined systolic and diastolic heart failure (HCC)  Lipid panel   3  NSVT (nonsustained ventricular tachycardia)     4  Cardiomyopathy, dilated (HCC)     5  Paroxysmal atrial flutter ([facility] [address] )     6  PFO (patent foramen ovale)     7  Atrial fibrillation, unspecified type ([facility])  apixaban (Eliquis) 5 mg   8  Chronic systolic heart failure (HCC)  sacubitril-valsartan (Entresto) 24-26 MG TABS       Impression:    Recovered nonischemic cardiomyopathy    due to tachy mediated cardiomyopathy in the setting of atrial fibrillation with RVR  LVEF reassessed 4/22, remains normal at 65%   Chronic  Diastolic/systolic CHF  With recovered ejection fraction, 65% as of 1/21, confirmed unchanged 4/22  Continues on appropriate medical therapy with spironolactone, furosemide, Entresto, metoprolol  No CHF exacerbations  Paroxysmal atrial fibrillation   Underwent ablation 1/21   Has had very low burden of recurrence, nothing with symptoms, and nothing that would be risky for recurrent tachy mediated cardiomyopathy  Continues on appropriate anticoagulation  Nonsustained VT    Rare episodes on his loop recorder interrogation, none recently  coronary artery disease  Isolated 80% ramus lesion on remote cardiac catheterization   He has no typical anginal symptoms    Plan:    Refilled Entresto, refilled Eliquis, asked our office to reassess his preauthorization  Minimal recurrence of AFib  Doing well on current medical therapy  Euvolemic, no CHF decompensation  We discussed increased exercise, decreased calorie intake in changing how and when he eats  Overall goal is a 30 lb weight loss over the long-term, I would like him to shoot for 200 lb          HPI:   Jen Ortega is a 58y o  year old male  Previously seeing Dr Yee Beckwith, for recovered nonischemic/tachy mediated cardiomyopathy due to atrial fibrillation,  Who had significant recurrence of AFib following hemicolectomy / chemotherapy for colon cancer, with a mild drop in EF to 50% in 2020, who was referred for AFib ablation successfully done 2021,  With recovered LVEF  up to 65%  Unfortunately he has not had any effective weight loss   He has not abiding by low calorie diet  He does have a tendency to eat dinner late sometimes 7:08 p m  He is not getting enough physical activity, but he is clinical mills walk once in a while around [facility], he does play golf a couple times a week but typically rides a golf cart  Blood pressure is excellent   CHF is stable   He has no complaints of palpitations  Loop recorder interrogation show low burden of AFib  He continues on appropriate anticoagulation      Review of Systems   Constitutional: Negative for appetite change, diaphoresis, fatigue and fever  Respiratory: Negative for chest tightness, shortness of breath and wheezing  Cardiovascular: Negative for chest pain, palpitations and leg swelling  Gastrointestinal: Negative for abdominal pain and blood in stool  Musculoskeletal: Negative for arthralgias and joint swelling  Skin: Negative for rash  Neurological: Negative for dizziness, syncope and light-headedness         Past Medical History:   Diagnosis Date   • A-fib [facility])    • Cancer [facility])    • Colon cancer ([facility] [address] )    • Colon polyp    • Coronary artery disease    • Gout    • Herpes zoster     last assessed 17   • Hypertension    • Irregular heart beat    • Shingles      Social History     Substance and Sexual Activity   Alcohol Use Not Currently    Comment: Socially, former alcohol     Social History     Substance and Sexual Activity   Drug Use No     Social History     Tobacco Use   Smoking Status Former Smoker   • Quit date: 2017   • Years since quittin 2   Smokeless Tobacco Never Used       Allergies:  No Known Allergies    Medications:     Current Outpatient Medications:   •  allopurinol (ZYLOPRIM) 100 mg tablet, TAKE 1 TABLET BY MOUTH EVERY DAY, Disp: 90 tablet, Rfl: 2  •  apixaban (Eliquis) 5 mg, Take 1 tablet (5 mg total) by mouth 2 (two) times a day, Disp: 180 tablet, Rfl: 3  •  aspirin (ECOTRIN LOW STRENGTH) 81 mg EC tablet, Take 1 tablet (81 mg total) by mouth daily, Disp: 30 tablet, Rfl: 0  •  atorvastatin (LIPITOR) 40 mg tablet, Take 1 tablet (40 mg total) by mouth daily with dinner, Disp: 90 tablet, Rfl: 3  •  diltiazem (CARDIZEM CD) 120 mg 24 hr capsule, TAKE 1 CAPSULE BY MOUTH EVERY DAY, Disp: 90 capsule, Rfl: 2  •  furosemide (LASIX) 20 mg tablet, Take 1 tablet (20 mg total) by mouth daily, Disp: 90 tablet, Rfl: 3  •  metoprolol succinate (TOPROL-XL) 50 mg 24 hr tablet, Take 1 tablet (50 mg total) by mouth 2 (two) times a day, Disp: 90 tablet, Rfl: 3  •  sacubitril-valsartan (Entresto) 24-26 MG TABS, Take 1 tablet by mouth 2 (two) times a day, Disp: 180 tablet, Rfl: 3  •  spironolactone (ALDACTONE) 25 mg tablet, TAKE 1/2 TABLET BY MOUTH EVERY DAY, Disp: 45 tablet, Rfl: 3  •  ondansetron (Zofran ODT) 4 mg disintegrating tablet, Take 1 tablet (4 mg total) by mouth every 6 (six) hours as needed for nausea or vomiting for up to 5 days (Patient not taking: No sig reported), Disp: 20 tablet, Rfl: 0      Vitals:    10/28/22 1038   BP: 110/74   Pulse: 86   SpO2: 97%     Weight (last 2 days)     Date/Time Weight    10/28/22 1038 106 (233)        Physical Exam  Constitutional:       General: He is not in acute distress  Appearance: He is not diaphoretic  HENT:      Head: Normocephalic and atraumatic  Eyes:      General: No scleral icterus  Conjunctiva/sclera: Conjunctivae normal    Neck:      Vascular: No JVD  Cardiovascular:      Rate and Rhythm: Normal rate and regular rhythm  Heart sounds: Normal heart sounds  No murmur heard  Pulmonary:      Effort: Pulmonary effort is normal  No respiratory distress        Breath sounds: Normal breath sounds  No decreased breath sounds, wheezing, rhonchi or rales  Musculoskeletal:      Cervical back: Normal range of motion  Right lower leg: Normal  No edema  Left lower leg: Normal  No edema  Skin:     General: Skin is warm and dry  Neurological:      Mental Status: He is alert and oriented to person, place, and time  Laboratory Studies:      Laboratory studies personally reviewed    Cardiac testing:     EKG reviewed personally:   No results found for this visit on 10/28/22  Echocardiogram:   2018-  EF 50%, no regional wall motion abnormalities, mild LVH, grade 1 diastolic dysfunction  6/40-[facility] echo-EF 50%, mild left atrial dilation, mild right atrial dilation, mild TR, small pericardial effusion    1/26/2021-EF 65%, mild left atrial dilation and right atrial dilation, mild TR, small pericardial effusion  4/22-EF 65%, mild left atrial dilation, mild right atrial dilation, mild TR    Stress tests:      Catheterization:   2018- 80% ramus, 60% PDA, no intervention    Holter:           Cydney Fu MD    Portions of the record may have been created with voice recognition software  Occasional wrong word or "sound a like" substitutions may have occurred due to the inherent limitations of voice recognition software  Read the chart carefully and recognize, using context, where substitutions have occurred

## 2022-10-28 NOTE — PROGRESS NOTES
MDT LNQ22/ ACTIVE SYSTEM IS MRI CONDITIONAL   CARELINK TRANSMISSION: LOOP RECORDER  PRESENTING RHYTHM NSR @ 66 BPM  BATTERY STATUS "OK " NO PATIENT OR DEVICE ACTIVATED EPISODES  NORMAL DEVICE FUNCTION   DL

## 2023-01-04 ENCOUNTER — REMOTE DEVICE CLINIC VISIT (OUTPATIENT)
Dept: CARDIOLOGY CLINIC | Facility: CLINIC | Age: 63
End: 2023-01-04

## 2023-01-04 DIAGNOSIS — Z95.818 PRESENCE OF OTHER CARDIAC IMPLANTS AND GRAFTS: Primary | ICD-10-CM

## 2023-01-13 ENCOUNTER — RA CDI HCC (OUTPATIENT)
Dept: OTHER | Facility: HOSPITAL | Age: 63
End: 2023-01-13

## 2023-01-13 NOTE — PROGRESS NOTES
[facility]  coding opportunities          Chart Reviewed number of suggestions sent to Provider: 4   I11 0  C18 2  I42 0  I48 [id number]    Patients Insurance        Commercial Insurance: [employer]

## 2023-01-14 ENCOUNTER — APPOINTMENT (OUTPATIENT)
Dept: LAB | Age: 63
End: 2023-01-14

## 2023-01-14 DIAGNOSIS — I10 HYPERTENSION, UNSPECIFIED TYPE: ICD-10-CM

## 2023-01-14 DIAGNOSIS — R73.9 HYPERGLYCEMIA: ICD-10-CM

## 2023-01-14 LAB
ANION GAP SERPL CALCULATED.3IONS-SCNC: 6 MMOL/L (ref 4–13)
BUN SERPL-MCNC: 11 MG/DL (ref 5–25)
CALCIUM SERPL-MCNC: 9.2 MG/DL (ref 8.3–10.1)
CHLORIDE SERPL-SCNC: 103 MMOL/L (ref 96–108)
CO2 SERPL-SCNC: 26 MMOL/L (ref 21–32)
CREAT SERPL-MCNC: 1.17 MG/DL (ref 0.6–1.3)
EST. AVERAGE GLUCOSE BLD GHB EST-MCNC: 100 MG/DL
GFR SERPL CREATININE-BSD FRML MDRD: 66 ML/MIN/1.73SQ M
GLUCOSE P FAST SERPL-MCNC: 103 MG/DL (ref 65–99)
HBA1C MFR BLD: 5.1 %
POTASSIUM SERPL-SCNC: 4.6 MMOL/L (ref 3.5–5.3)
SODIUM SERPL-SCNC: 135 MMOL/L (ref 135–147)

## 2023-01-17 ENCOUNTER — HOSPITAL ENCOUNTER (OUTPATIENT)
Dept: RADIOLOGY | Facility: HOSPITAL | Age: 63
Discharge: HOME | End: 2023-01-17
Attending: INTERNAL MEDICINE

## 2023-01-17 DIAGNOSIS — C18.9 MALIGNANT NEOPLASM OF COLON, UNSPECIFIED PART OF COLON (HCC): ICD-10-CM

## 2023-01-17 DIAGNOSIS — C18.2 CANCER OF ASCENDING COLON (HCC): ICD-10-CM

## 2023-01-17 RX ADMIN — IOHEXOL 100 ML: 350 INJECTION, SOLUTION INTRAVENOUS at 12:04

## 2023-01-18 ENCOUNTER — OFFICE VISIT (OUTPATIENT)
Dept: INTERNAL MEDICINE CLINIC | Facility: CLINIC | Age: 63
End: 2023-01-18

## 2023-01-18 VITALS
DIASTOLIC BLOOD PRESSURE: 80 MMHG | WEIGHT: 230 LBS | BODY MASS INDEX: 34.07 KG/M2 | HEART RATE: 79 BPM | OXYGEN SATURATION: 97 % | SYSTOLIC BLOOD PRESSURE: 120 MMHG | RESPIRATION RATE: 16 BRPM | TEMPERATURE: 98 F | HEIGHT: 69 IN

## 2023-01-18 DIAGNOSIS — R03.0 ELEVATED BLOOD PRESSURE READING: ICD-10-CM

## 2023-01-18 DIAGNOSIS — E66.09 CLASS 1 OBESITY DUE TO EXCESS CALORIES WITH SERIOUS COMORBIDITY AND BODY MASS INDEX (BMI) OF 33.0 TO 33.9 IN ADULT: ICD-10-CM

## 2023-01-18 DIAGNOSIS — R05.2 SUBACUTE COUGH: Primary | ICD-10-CM

## 2023-01-18 DIAGNOSIS — Z87.39 HISTORY OF GOUT: ICD-10-CM

## 2023-01-18 DIAGNOSIS — I48.92 PAROXYSMAL ATRIAL FLUTTER (HCC): ICD-10-CM

## 2023-01-18 RX ORDER — FLUTICASONE PROPIONATE 50 MCG
1 SPRAY, SUSPENSION (ML) NASAL DAILY
Qty: 16 G | Refills: 0 | Status: SHIPPED | OUTPATIENT
Start: 2023-01-18

## 2023-01-18 NOTE — PROGRESS NOTES
Name: Everardo Melo      : 1960      MRN: [id number]  Encounter Provider: Gautam Johnson DO  Encounter Date: 2023   Encounter department: [facility]    Assessment & Plan     1  Subacute cough  Assessment & Plan:  Patient has been having problems with chest congestion which is mild, recurrent cough  States this seems to be worse when he is laying down  On evaluation he does have some slight erythema to nares with a whitish postnasal drip  His cardiologist reassured him that he is not in congestive heart failure  Patient was given a prescription for Flonase to use 1 spray to each nostril daily  Told he can take over-the-counter [place] DM or Mucinex DM to help break up the mucus  To call if not improving  Orders:  -     fluticasone (FLONASE) 50 mcg/act nasal spray; 1 spray into each nostril daily    2  Paroxysmal atrial flutter (HCC)  Assessment & Plan:  Patient's heart rate is controlled  Patient remains on Eliquis 5 mg twice daily  No abnormal bleeding or bruising  Patient continues to follow-up with cardiologist and they are pleased with his heart rate control and cardiac function  3  Class 1 obesity due to excess calories with serious comorbidity and body mass index (BMI) of 33 0 to 33 9 in adult  Assessment & Plan:  Does have some variable readings as far as his weight is concerned  We did discuss with the patient that he is considered obese and the need to watch his caloric intake more closely and work at weight loss  He hopes to be more physically active in the spring and summer  Patient is an avid golfer      4  History of gout  Assessment & Plan:  History of gout  States he has been on allopurinol for extended period of time with no exacerbations  Relates that during the  holiday he did have some gouty arthritic pain and took a nonsteroidal anti-inflammatory for 1 day with relief of his discomfort    He was told being on blood thinners he should not be taking any nonsteroidal anti-inflammatories except for his 81 mg of aspirin daily  Check a uric acid level and we will increase allopurinol if needed  Orders:  -     Uric acid; Future    5  Elevated blood pressure reading  Assessment & Plan:  Patient's blood pressure showing adequate control today with present treatment  Patient continues to have this followed with his cardiologist, oncologist   Renal function is stable  Patient will continue present treatment and we will continue to monitor his renal function which we checked along with his blood pressure with his next visit    Orders:  -     Basic metabolic panel; Future           Subjective     78-year-old male history of multiple medical problems who is here today for routine follow-up  He is continuing to follow-up with his specialists including cardiology, oncology  States in general feeling well other than a slight tickle, cough in his throat especially worse when he lays down  No increased swelling to the feet and ankles and blood pressures controlled  Was reassured he does not have congestive heart failure symptoms  Patient relates further note that during the winter months is not as physically active as in the spring and summer when he is out golfing on a regular basis  Review of Systems   Constitutional: Positive for activity change  Negative for appetite change, chills, diaphoresis, fatigue, fever and unexpected weight change  HENT: Negative  Eyes: Negative  Respiratory: Positive for cough  Negative for apnea, choking, chest tightness, shortness of breath, wheezing and stridor  Cardiovascular: Negative  Gastrointestinal: Negative  Endocrine: Negative  Genitourinary: Negative  Musculoskeletal: Negative  Skin: Negative  Allergic/Immunologic: Negative  Neurological: Negative  Hematological: Negative  Psychiatric/Behavioral: Negative          Past Medical History:   Diagnosis Date   • A-fib [facility])    • Cancer [facility])    • Colon cancer ([facility] [address] )    • Colon polyp    • Coronary artery disease    • Gout    • Herpes zoster     last assessed 17   • Hypertension    • Irregular heart beat    • Shingles      Past Surgical History:   Procedure Laterality Date   • ELECTRICAL CARDIOVERSION  12/15/2017        • HEMICOLOECTOMY W/ ANASTOMOSIS N/A 2020    Procedure: RIGHT HEMICOLECTOMY WITH ILIOCOLIC ANASTAMOSIS;  Surgeon: Mary Louis MD;  Location: [facility];  Service: Colorectal   • IR PORT PLACEMENT  2020   • IR PORT REMOVAL  2021     Family History   Problem Relation Age of Onset   • Coronary artery disease Mother    • Alcohol abuse Father    • Coronary artery disease Father    • Colon cancer Neg Hx      Social History     Socioeconomic History   • Marital status: Single     Spouse name: None   • Number of children: None   • Years of education: None   • Highest education level: None   Occupational History   • None   Tobacco Use   • Smoking status: Former     Types: Cigarettes     Quit date: 2017     Years since quittin 4   • Smokeless tobacco: Never   Vaping Use   • Vaping Use: Never used   Substance and Sexual Activity   • Alcohol use: Not Currently     Comment: Socially, former alcohol   • Drug use: No   • Sexual activity: Yes     Partners: Female   Other Topics Concern   • None   Social History Narrative   • None     Social Determinants of Health     Financial Resource Strain: Not on file   Food Insecurity: Not on file   Transportation Needs: Not on file   Physical Activity: Not on file   Stress: Not on file   Social Connections: Not on file   Intimate Partner Violence: Not on file   Housing Stability: Not on file     Current Outpatient Medications on File Prior to Visit   Medication Sig   • allopurinol (ZYLOPRIM) 100 mg tablet TAKE 1 TABLET BY MOUTH EVERY DAY   • apixaban (Eliquis) 5 mg Take 1 tablet (5 mg total) by mouth 2 (two) times a day   • aspirin (ECOTRIN LOW STRENGTH) 81 mg EC tablet Take 1 tablet (81 mg total) by mouth daily   • atorvastatin (LIPITOR) 40 mg tablet Take 1 tablet (40 mg total) by mouth daily with dinner   • diltiazem (CARDIZEM CD) 120 mg 24 hr capsule TAKE 1 CAPSULE BY MOUTH EVERY DAY   • Eliquis 5 MG TAKE 1 TABLET BY MOUTH TWICE A DAY   • Entresto 24-26 MG TABS TAKE 1 TABLET BY MOUTH TWICE A DAY   • furosemide (LASIX) 20 mg tablet Take 1 tablet (20 mg total) by mouth daily   • metoprolol succinate (TOPROL-XL) 50 mg 24 hr tablet Take 1 tablet (50 mg total) by mouth 2 (two) times a day   • sacubitril-valsartan (Entresto) 24-26 MG TABS Take 1 tablet by mouth 2 (two) times a day   • spironolactone (ALDACTONE) 25 mg tablet TAKE 1/2 TABLET BY MOUTH EVERY DAY   • ondansetron (Zofran ODT) 4 mg disintegrating tablet Take 1 tablet (4 mg total) by mouth every 6 (six) hours as needed for nausea or vomiting for up to 5 days (Patient not taking: No sig reported)     No Known Allergies  Immunization History   Administered Date(s) Administered   • COVID-19 PFIZER VACCINE 0 3 ML IM 04/03/2021, 04/25/2021   • INFLUENZA 11/12/2014, 10/14/2015, 11/09/2016, 10/11/2017   • Influenza Injectable, MDCK, Preservative Free, Quadrivalent, 0 5 mL 11/12/2019   • Influenza, injectable, quadrivalent, preservative free 0 5 mL 10/10/2018   • Influenza, seasonal, injectable 10/10/2017       Objective     /80   Pulse 79   Temp 98 °F (36 7 °C)   Resp 16   Ht 5' 9" (1 753 m)   Wt 104 kg (230 lb)   SpO2 97%   BMI 33 97 kg/m²     Physical Exam  Vitals and nursing note reviewed  Constitutional:       General: He is not in acute distress  Appearance: Normal appearance  He is obese  He is not ill-appearing, toxic-appearing or diaphoretic  Comments: Pleasant, articulate, obese 60-year-old male who is awake alert in no acute distress and oriented x3   HENT:      Head: Normocephalic and atraumatic        Right Ear: Tympanic membrane, ear canal and external ear normal  There is no impacted cerumen  Left Ear: Tympanic membrane, ear canal and external ear normal  There is no impacted cerumen  Nose: Rhinorrhea present  No congestion  Comments: Slight diffuse erythema to nasal mucosa with a whitish nasal discharge,     Mouth/Throat:      Mouth: Mucous membranes are moist       Pharynx: Oropharyngeal exudate and posterior oropharyngeal erythema present  Comments: Slight erythema to posterior airway with a thick whitish postnasal drip  Eyes:      General: No scleral icterus  Right eye: No discharge  Left eye: No discharge  Extraocular Movements: Extraocular movements intact  Conjunctiva/sclera: Conjunctivae normal       Pupils: Pupils are equal, round, and reactive to light  Neck:      Vascular: No carotid bruit  Cardiovascular:      Rate and Rhythm: Normal rate  Rhythm irregular  Pulses: Normal pulses  Heart sounds: Normal heart sounds  No murmur heard  No friction rub  No gallop  Pulmonary:      Effort: Pulmonary effort is normal  No respiratory distress  Breath sounds: Normal breath sounds  No stridor  No wheezing, rhonchi or rales  Chest:      Chest wall: No tenderness  Abdominal:      General: Bowel sounds are normal  There is no distension  Palpations: Abdomen is soft  There is no mass  Tenderness: There is no abdominal tenderness  There is no right CVA tenderness, left CVA tenderness, guarding or rebound  Hernia: No hernia is present  Musculoskeletal:         General: No swelling, tenderness, deformity or signs of injury  Normal range of motion  Cervical back: Normal range of motion and neck supple  No rigidity or tenderness  Right lower leg: No edema  Left lower leg: No edema  Lymphadenopathy:      Cervical: No cervical adenopathy  Skin:     General: Skin is warm and dry  Capillary Refill: Capillary refill takes less than 2 seconds  Coloration: Skin is not jaundiced or pale  Findings: No bruising, erythema, lesion or rash  Neurological:      General: No focal deficit present  Mental Status: He is alert and oriented to person, place, and time  Mental status is at baseline  Cranial Nerves: No cranial nerve deficit  Sensory: No sensory deficit  Motor: No weakness  Coordination: Coordination normal       Gait: Gait normal       Deep Tendon Reflexes: Reflexes normal    Psychiatric:         Mood and Affect: Mood normal          Behavior: Behavior normal          Thought Content:  Thought content normal          Judgment: Judgment normal        Robert Abarca,

## 2023-01-18 NOTE — ASSESSMENT & PLAN NOTE
Patient's heart rate is controlled  Patient remains on Eliquis 5 mg twice daily  No abnormal bleeding or bruising  Patient continues to follow-up with cardiologist and they are pleased with his heart rate control and cardiac function

## 2023-01-18 NOTE — ASSESSMENT & PLAN NOTE
Patient has been having problems with chest congestion which is mild, recurrent cough  States this seems to be worse when he is laying down  On evaluation he does have some slight erythema to nares with a whitish postnasal drip  His cardiologist reassured him that he is not in congestive heart failure  Patient was given a prescription for Flonase to use 1 spray to each nostril daily  Told he can take over-the-counter [place] DM or Mucinex DM to help break up the mucus  To call if not improving

## 2023-01-18 NOTE — ASSESSMENT & PLAN NOTE
History of gout  States he has been on allopurinol for extended period of time with no exacerbations  Relates that during the Christmas holiday he did have some gouty arthritic pain and took a nonsteroidal anti-inflammatory for 1 day with relief of his discomfort  He was told being on blood thinners he should not be taking any nonsteroidal anti-inflammatories except for his 81 mg of aspirin daily  Check a uric acid level and we will increase allopurinol if needed

## 2023-01-18 NOTE — ASSESSMENT & PLAN NOTE
Does have some variable readings as far as his weight is concerned  We did discuss with the patient that he is considered obese and the need to watch his caloric intake more closely and work at weight loss  He hopes to be more physically active in the spring and summer    Patient is an avid golfer

## 2023-01-18 NOTE — ASSESSMENT & PLAN NOTE
Patient's blood pressure showing adequate control today with present treatment  Patient continues to have this followed with his cardiologist, oncologist   Renal function is stable    Patient will continue present treatment and we will continue to monitor his renal function which we checked along with his blood pressure with his next visit

## 2023-01-19 ENCOUNTER — TELEPHONE (OUTPATIENT)
Dept: HEMATOLOGY ONCOLOGY | Facility: CLINIC | Age: 63
End: 2023-01-19

## 2023-01-19 NOTE — TELEPHONE ENCOUNTER
01/19/23    Spoke with patient reminding updated labs prior to appt  CT scan is pending at this time  I will repeat CEA test     Advising pt to go to any [address] [place] [address]'s walk-in labs to get blood work done

## 2023-01-20 ENCOUNTER — APPOINTMENT (OUTPATIENT)
Dept: LAB | Age: 63
End: 2023-01-20

## 2023-01-20 DIAGNOSIS — C18.9 MALIGNANT NEOPLASM OF COLON, UNSPECIFIED PART OF COLON (HCC): ICD-10-CM

## 2023-01-20 DIAGNOSIS — C18.2 CANCER OF ASCENDING COLON (HCC): ICD-10-CM

## 2023-01-20 LAB
ALBUMIN SERPL BCP-MCNC: 3.6 G/DL (ref 3.5–5)
ALP SERPL-CCNC: 48 U/L (ref 46–116)
ALT SERPL W P-5'-P-CCNC: 49 U/L (ref 12–78)
ANION GAP SERPL CALCULATED.3IONS-SCNC: 5 MMOL/L (ref 4–13)
AST SERPL W P-5'-P-CCNC: 25 U/L (ref 5–45)
BASOPHILS # BLD AUTO: 0.07 THOUSANDS/ÂΜL (ref 0–0.1)
BASOPHILS NFR BLD AUTO: 1 % (ref 0–1)
BILIRUB SERPL-MCNC: 0.72 MG/DL (ref 0.2–1)
BUN SERPL-MCNC: 15 MG/DL (ref 5–25)
CALCIUM SERPL-MCNC: 9.5 MG/DL (ref 8.3–10.1)
CEA SERPL-MCNC: 1.9 NG/ML (ref 0–3)
CHLORIDE SERPL-SCNC: 102 MMOL/L (ref 96–108)
CO2 SERPL-SCNC: 28 MMOL/L (ref 21–32)
CREAT SERPL-MCNC: 1.29 MG/DL (ref 0.6–1.3)
EOSINOPHIL # BLD AUTO: 0.11 THOUSAND/ÂΜL (ref 0–0.61)
EOSINOPHIL NFR BLD AUTO: 2 % (ref 0–6)
ERYTHROCYTE [DISTWIDTH] IN BLOOD BY AUTOMATED COUNT: 12.6 % (ref 11.6–15.1)
GFR SERPL CREATININE-BSD FRML MDRD: 59 ML/MIN/1.73SQ M
GLUCOSE P FAST SERPL-MCNC: 108 MG/DL (ref 65–99)
HCT VFR BLD AUTO: 44.6 % (ref 36.5–49.3)
HGB BLD-MCNC: 14.3 G/DL (ref 12–17)
IMM GRANULOCYTES # BLD AUTO: 0.02 THOUSAND/UL (ref 0–0.2)
IMM GRANULOCYTES NFR BLD AUTO: 0 % (ref 0–2)
LYMPHOCYTES # BLD AUTO: 1.3 THOUSANDS/ÂΜL (ref 0.6–4.47)
LYMPHOCYTES NFR BLD AUTO: 23 % (ref 14–44)
MCH RBC QN AUTO: 32.2 PG (ref 26.8–34.3)
MCHC RBC AUTO-ENTMCNC: 32.1 G/DL (ref 31.4–37.4)
MCV RBC AUTO: 101 FL (ref 82–98)
MONOCYTES # BLD AUTO: 0.78 THOUSAND/ÂΜL (ref 0.17–1.22)
MONOCYTES NFR BLD AUTO: 14 % (ref 4–12)
NEUTROPHILS # BLD AUTO: 3.44 THOUSANDS/ÂΜL (ref 1.85–7.62)
NEUTS SEG NFR BLD AUTO: 60 % (ref 43–75)
NRBC BLD AUTO-RTO: 0 /100 WBCS
PLATELET # BLD AUTO: 222 THOUSANDS/UL (ref 149–390)
PMV BLD AUTO: 11.3 FL (ref 8.9–12.7)
POTASSIUM SERPL-SCNC: 5.4 MMOL/L (ref 3.5–5.3)
PROT SERPL-MCNC: 7.7 G/DL (ref 6.4–8.4)
RBC # BLD AUTO: 4.44 MILLION/UL (ref 3.88–5.62)
SODIUM SERPL-SCNC: 135 MMOL/L (ref 135–147)
WBC # BLD AUTO: 5.72 THOUSAND/UL (ref 4.31–10.16)

## 2023-01-24 ENCOUNTER — OFFICE VISIT (OUTPATIENT)
Dept: HEMATOLOGY ONCOLOGY | Facility: CLINIC | Age: 63
End: 2023-01-24

## 2023-01-24 ENCOUNTER — TELEPHONE (OUTPATIENT)
Age: 63
End: 2023-01-24

## 2023-01-24 VITALS
HEIGHT: 69 IN | RESPIRATION RATE: 14 BRPM | WEIGHT: 238 LBS | HEART RATE: 94 BPM | OXYGEN SATURATION: 96 % | TEMPERATURE: 98 F | BODY MASS INDEX: 35.25 KG/M2 | DIASTOLIC BLOOD PRESSURE: 74 MMHG | SYSTOLIC BLOOD PRESSURE: 136 MMHG

## 2023-01-24 DIAGNOSIS — C18.9 MALIGNANT NEOPLASM OF COLON, UNSPECIFIED PART OF COLON (HCC): Primary | ICD-10-CM

## 2023-01-24 NOTE — PROGRESS NOTES
Hematology/Oncology Outpatient Follow- up Note  Everardo Melo 58 y o  male MRN: @ Encounter: [id number]        Date:  1/24/2023    Presenting Complaint/Diagnosis : Stage III colon cancer diagnosed In August of 2020    HPI:    Maisie Skiff seen for initial consultation 9/15/2020 regarding newly diagnosed stage III colon cancer with 1/18 lymph nodes positive for malignancy   This makes him a stage IIIA   As far symptoms are concerned he states he is recovering well  Christell Amen denies any complaints   Denies any nausea denies any vomiting denies any diarrhea   Overall otherwise is at baseline   Does feel better then when he had his malignancy  Previous Hematologic/ Oncologic History:      Surgery  Modified FOLFOX for 6 cycles    Current Hematologic/ Oncologic Treatment:    Observation    Interval History:    Patient returns for follow-up visit  He states he is doing well  Denies any complaints today  Denies any nausea denies any vomiting denies any diarrhea  Is due for his colonoscopy and I advised him to stop over at the colorectal surgeons office on his way out to schedule this  Imaging blood work with acceptable limits with no evidence of metastatic disease or recurrence  His 14 point review of systems today was negative  Test Results:    Imaging: CT chest abdomen pelvis w contrast    Result Date: 1/22/2023  Narrative: CT CHEST, ABDOMEN AND PELVIS WITH IV CONTRAST INDICATION:   C18 9: Malignant neoplasm of colon, unspecified C18 2: Malignant neoplasm of ascending colon  COMPARISON:  6/25/2020  TECHNIQUE: CT examination of the chest, abdomen and pelvis was performed  Axial, sagittal, and coronal 2D reformatted images were created from the source data and submitted for interpretation  Radiation dose length product (DLP) for this visit:  1330 17 mGy-cm     This examination, like all CT scans performed in the [facility], was performed utilizing techniques to minimize radiation dose exposure, including the use of iterative reconstruction and automated exposure control  IV Contrast:  100 mL of iohexol (OMNIPAQUE) Enteric Contrast: Enteric contrast was administered  FINDINGS: CHEST LUNGS:  Lungs are clear  There is no tracheal or endobronchial lesion  PLEURA:  Unremarkable  HEART/GREAT VESSELS: Heavy atherosclerotic coronary artery calcification is noted  Heart is otherwise unremarkable  No thoracic aortic aneurysm  MEDIASTINUM AND JESSICA:  Unremarkable  CHEST WALL AND LOWER NECK:  Unremarkable  ABDOMEN LIVER/BILIARY TREE:  Liver is diffusely decreased in density consistent with fatty change  No CT evidence of suspicious hepatic mass  Normal hepatic contours  No biliary dilatation  GALLBLADDER:  There are gallstone(s) within the gallbladder, without pericholecystic inflammatory changes  SPLEEN:  Unremarkable  PANCREAS:  Unremarkable  ADRENAL GLANDS:  Unremarkable  KIDNEYS/URETERS:  Unremarkable  No hydronephrosis  STOMACH AND BOWEL:  Status post partial colectomy  APPENDIX:  No findings to suggest appendicitis  ABDOMINOPELVIC CAVITY:  No ascites  No pneumoperitoneum  No lymphadenopathy  VESSELS:  Unremarkable for patient's age  PELVIS REPRODUCTIVE ORGANS:  Unremarkable for patient's age  URINARY BLADDER:  Unremarkable  ABDOMINAL WALL/INGUINAL REGIONS:  Unremarkable  OSSEOUS STRUCTURES:  No acute fracture or destructive osseous lesion  Impression: No evidence of recurrent or metastatic disease throughout the chest, abdomen or pelvis  Workstation performed: [id number]     Cardiac EP device report    Result Date: 1/4/2023  Narrative: MDT LNQ22/ ACTIVE SYSTEM IS MRI CONDITIONAL CARELINK TRANSMISSION: LOOP RECORDER  PRESENTING RHYTHM NSR @ 66 BPM  BATTERY STATUS "OK " NO PATIENT OR DEVICE ACTIVATED EPISODES  NORMAL DEVICE FUNCTION   DL       Labs:   Lab Results   Component Value Date    WBC 5 72 01/20/2023    HGB 14 3 01/20/2023    HCT 44 6 01/20/2023     (H) 01/20/2023     01/20/2023     Lab Results   Component Value Date    K 5 4 (H) 01/20/2023     01/20/2023    CO2 28 01/20/2023    BUN 15 01/20/2023    CREATININE 1 29 01/20/2023    GLUCOSE 107 12/11/2017    GLUF 108 (H) 01/20/2023    CALCIUM 9 5 01/20/2023    CORRECTEDCA 9 4 08/27/2022    AST 25 01/20/2023    ALT 49 01/20/2023    ALKPHOS 48 01/20/2023    EGFR 59 01/20/2023         Lab Results   Component Value Date    SPEP  12/12/2017     The serum total protein is within normal limits with hypoalbuminemia  The serum protein electrophoresis shows an increased alpha-1 region  The serum protein electrophoresis shows a decreased beta-2 region  The serum protein electrophoresis shows a faint possible band in the  gamma region  Immunofixation to be performed  Reviewed by: Farhat Cummings MD  ([id number])  **Electronic Signature**       Lab Results   Component Value Date    PSA 0 9 08/27/2022    PSA 0 9 10/09/2020       Lab Results   Component Value Date    CEA 1 9 01/20/2023       Lab Results   Component Value Date    IRON 20 (L) 08/29/2020    TIBC 304 08/29/2020    FERRITIN 391 (H) 08/29/2020         ROS: As stated in the history of present illness otherwise his 14 point review of systems today was negative        Active Problems:   Patient Active Problem List   Diagnosis   • Serum total bilirubin elevated   • Elevated blood pressure reading   • Persistent atrial fibrillation (HCC)   • Chronic combined systolic and diastolic heart failure (HCC)   • NSVT (nonsustained ventricular tachycardia)   • Microcytic anemia   • Gastrointestinal hemorrhage with melena   • Cardiomyopathy, dilated (HCC)   • GI bleeding   • Mass of cecum   • History of gout   • S/P right hemicolectomy   • Cancer of ascending colon ([facility] [address] )   • Port-A-Cath in place   • Paroxysmal atrial flutter (HCC)   • PFO (patent foramen ovale)   • COVID-19   • Medicare annual wellness visit, initial   • Class 1 obesity due to excess calories with serious comorbidity in adult   • Subacute cough       Past Medical History:   Past Medical History:   Diagnosis Date   • A-fib [facility])    • Cancer ([facility] [address] )    • Colon cancer ([facility] )    • Colon polyp    • Coronary artery disease    • Gout    • Herpes zoster     last assessed 17   • Hypertension    • Irregular heart beat    • Shingles        Surgical History:   Past Surgical History:   Procedure Laterality Date   • ELECTRICAL CARDIOVERSION  12/15/2017        • HEMICOLOECTOMY W/ ANASTOMOSIS N/A 2020    Procedure: RIGHT HEMICOLECTOMY WITH ILIOCOLIC ANASTAMOSIS;  Surgeon: Mary Lobo MD;  Location: [facility];  Service: Colorectal   • IR PORT PLACEMENT  2020   • IR PORT REMOVAL  2021       Family History:    Family History   Problem Relation Age of Onset   • Coronary artery disease Mother    • Alcohol abuse Father    • Coronary artery disease Father    • Colon cancer Neg Hx        Cancer-related family history is negative for Colon cancer      Social History:   Social History     Socioeconomic History   • Marital status: Single     Spouse name: Not on file   • Number of children: Not on file   • Years of education: Not on file   • Highest education level: Not on file   Occupational History   • Not on file   Tobacco Use   • Smoking status: Former     Types: Cigarettes     Quit date: 2017     Years since quittin 4   • Smokeless tobacco: Never   Vaping Use   • Vaping Use: Never used   Substance and Sexual Activity   • Alcohol use: Not Currently     Comment: Socially, former alcohol   • Drug use: No   • Sexual activity: Yes     Partners: Female   Other Topics Concern   • Not on file   Social History Narrative   • Not on file     Social Determinants of Health     Financial Resource Strain: Not on file   Food Insecurity: Not on file   Transportation Needs: Not on file   Physical Activity: Not on file   Stress: Not on file   Social Connections: Not on file   Intimate Partner Violence: Not on file   Housing Stability: Not on file       Current Medications:   Current Outpatient Medications   Medication Sig Dispense Refill   • allopurinol (ZYLOPRIM) 100 mg tablet TAKE 1 TABLET BY MOUTH EVERY DAY 90 tablet 2   • apixaban (Eliquis) 5 mg Take 1 tablet (5 mg total) by mouth 2 (two) times a day 180 tablet 3   • aspirin (ECOTRIN LOW STRENGTH) 81 mg EC tablet Take 1 tablet (81 mg total) by mouth daily 30 tablet 0   • atorvastatin (LIPITOR) 40 mg tablet Take 1 tablet (40 mg total) by mouth daily with dinner 90 tablet 3   • diltiazem (CARDIZEM CD) 120 mg 24 hr capsule TAKE 1 CAPSULE BY MOUTH EVERY DAY 90 capsule 2   • Entresto 24-26 MG TABS TAKE 1 TABLET BY MOUTH TWICE A  tablet 3   • fluticasone (FLONASE) 50 mcg/act nasal spray 1 spray into each nostril daily 16 g 0   • furosemide (LASIX) 20 mg tablet Take 1 tablet (20 mg total) by mouth daily 90 tablet 3   • metoprolol succinate (TOPROL-XL) 50 mg 24 hr tablet Take 1 tablet (50 mg total) by mouth 2 (two) times a day 90 tablet 3   • sacubitril-valsartan (Entresto) 24-26 MG TABS Take 1 tablet by mouth 2 (two) times a day 180 tablet 3   • spironolactone (ALDACTONE) 25 mg tablet TAKE 1/2 TABLET BY MOUTH EVERY DAY 45 tablet 3   • Eliquis 5 MG TAKE 1 TABLET BY MOUTH TWICE A DAY (Patient not taking: Reported on 1/24/2023) 180 tablet 3   • ondansetron (Zofran ODT) 4 mg disintegrating tablet Take 1 tablet (4 mg total) by mouth every 6 (six) hours as needed for nausea or vomiting for up to 5 days (Patient not taking: Reported on 3/7/2022) 20 tablet 0     No current facility-administered medications for this visit  Allergies: No Known Allergies    Physical Exam:    Body surface area is 2 22 meters squared      Wt Readings from Last 3 Encounters:   01/24/23 108 kg (238 lb)   01/18/23 104 kg (230 lb)   10/28/22 106 kg (233 lb)        Temp Readings from Last 3 Encounters:   01/24/23 98 °F (36 7 °C) (Temporal)   01/18/23 98 °F (36 7 °C)   08/29/22 98 6 °F (37 °C) (Tympanic)        BP Readings from Last 3 Encounters: 01/24/23 136/74   01/18/23 120/80   10/28/22 110/74         Pulse Readings from Last 3 Encounters:   01/24/23 94   01/18/23 79   10/28/22 86       Physical Exam     Constitutional   General appearance: No acute distress, well appearing and well nourished  Eyes   Conjunctiva and lids: No swelling, erythema or discharge  Pupils and irises: Equal, round and reactive to light  Ears, Nose, Mouth, and Throat   External inspection of ears and nose: Normal     Nasal mucosa, septum, and turbinates: Normal without edema or erythema  Oropharynx: Normal with no erythema, edema, exudate or lesions  Pulmonary   Respiratory effort: No increased work of breathing or signs of respiratory distress  Auscultation of lungs: Clear to auscultation  Cardiovascular   Palpation of heart: Normal PMI, no thrills  Auscultation of heart: Normal rate and rhythm, normal S1 and S2, without murmurs  Examination of extremities for edema and/or varicosities: Normal     Carotid pulses: Normal     Abdomen   Abdomen: Non-tender, no masses  Liver and spleen: No hepatomegaly or splenomegaly  Lymphatic   Palpation of lymph nodes in neck: No lymphadenopathy  Musculoskeletal   Gait and station: Normal     Digits and nails: Normal without clubbing or cyanosis  Inspection/palpation of joints, bones, and muscles: Normal     Skin   Skin and subcutaneous tissue: Normal without rashes or lesions  Neurologic   Cranial nerves: Cranial nerves 2-12 intact  Sensation: No sensory loss      Psychiatric   Orientation to person, place, and time: Normal     Mood and affect: Normal         Assessment / Plan:      The patient is a pleasant 70-year-old male who was referred to see us for newly diagnosed stage IIIA colon cancer   This was a T3 N1 malignancy   It measured 4 5 cm   Based on the most recent guidelines he would qualify for 3 months of adjuvant chemotherapy     The patient agreed and we treated him with 6 cycles of modified FOLFOX 6   His most recent imaging shows no evidence of metastatic disease   At this point I will see him back in 6 months with repeat blood work and imaging  He is currently 2 years out from his surgery     If he has any questions he will call our office  He is coming up on 3 years from his surgery  He is due for colonoscopy and advised him to stop at his colorectal surgeon's office to schedule this  Goals and Barriers:  Current Goal:  Prolong Survival from colon cancer  Barriers: None  Patient's Capacity to Self Care:  Patient able to self care  Portions of the record may have been created with voice recognition software  Occasional wrong word or "sound a like" substitutions may have occurred due to the inherent limitations of voice recognition software  Read the chart carefully and recognize, using context, where substitutions have occurred

## 2023-01-24 NOTE — TELEPHONE ENCOUNTER
PT called to schedule recall colonoscopy, last done 11/24/21 by Dr Milana Jiang  PT's info verified

## 2023-01-31 DIAGNOSIS — R05.2 SUBACUTE COUGH: ICD-10-CM

## 2023-01-31 RX ORDER — FLUTICASONE PROPIONATE 50 MCG
SPRAY, SUSPENSION (ML) NASAL
Qty: 48 ML | Refills: 1 | Status: SHIPPED | OUTPATIENT
Start: 2023-01-31

## 2023-02-07 NOTE — TELEPHONE ENCOUNTER
1 yr colon recall 11/24/2021 TR diverticulosis, Hx Colon cancer, BMI 35    Left message for pt to call back when his insurance is changed

## 2023-03-16 ENCOUNTER — TELEPHONE (OUTPATIENT)
Age: 63
End: 2023-03-16

## 2023-03-16 NOTE — PROGRESS NOTES
Patient complains of "gout" pain in left second toe  Nita Henriquez with [facility] made aware  No new orders at this time  Will continue to monitor  Procedure(s):  REPAIR UMBILICAL HERNIA WITH MESH. general    Anesthesia Post Evaluation      Multimodal analgesia: multimodal analgesia used between 6 hours prior to anesthesia start to PACU discharge  Patient location during evaluation: PACU  Patient participation: complete - patient participated  Level of consciousness: awake and alert  Pain score: 2  Pain management: satisfactory to patient  Airway patency: patent  Anesthetic complications: no  Cardiovascular status: acceptable  Respiratory status: acceptable  Hydration status: acceptable  Post anesthesia nausea and vomiting:  none  Final Post Anesthesia Temperature Assessment:  Normothermia (36.0-37.5 degrees C)      INITIAL Post-op Vital signs:   Vitals Value Taken Time   /82 03/16/23 1320   Temp 36.4 °C (97.6 °F) 03/16/23 1320   Pulse 56 03/16/23 1322   Resp 7 03/16/23 1322   SpO2 97 % 03/16/23 1322   Vitals shown include unvalidated device data.

## 2023-03-16 NOTE — TELEPHONE ENCOUNTER
Called patient and spoke with patient  Patient confirmed the rescheduled appointment due to provider not seeing patients at the location any longer

## 2023-03-19 PROBLEM — Z00.00 MEDICARE ANNUAL WELLNESS VISIT, INITIAL: Status: RESOLVED | Noted: 2022-08-08 | Resolved: 2023-03-19

## 2023-03-19 PROBLEM — R05.2 SUBACUTE COUGH: Status: RESOLVED | Noted: 2023-01-18 | Resolved: 2023-03-19

## 2023-04-05 ENCOUNTER — REMOTE DEVICE CLINIC VISIT (OUTPATIENT)
Dept: CARDIOLOGY CLINIC | Facility: CLINIC | Age: 63
End: 2023-04-05

## 2023-04-05 DIAGNOSIS — Z95.818 PRESENCE OF OTHER CARDIAC IMPLANTS AND GRAFTS: Primary | ICD-10-CM

## 2023-04-05 NOTE — PROGRESS NOTES
"Results for orders placed or performed in visit on 04/05/23   Cardiac EP device report    Narrative     [address]: BATTERY STATUS \"OK  \" 21 AF NOTED; 0% BURDEN; LONGEST 1:06 HRS  PT ON ELIQUIS & METO SUCC  AVAIL EGRAMS SHOWING KNOWN PAF  NO PATIENT ACTIVATED EPISODES  NORMAL DEVICE FUNCTION   NC         "

## 2023-06-08 DIAGNOSIS — Z87.39 HISTORY OF GOUT: ICD-10-CM

## 2023-06-08 RX ORDER — ALLOPURINOL 100 MG/1
TABLET ORAL
Qty: 90 TABLET | Refills: 2 | Status: SHIPPED | OUTPATIENT
Start: 2023-06-08

## 2023-07-05 ENCOUNTER — REMOTE DEVICE CLINIC VISIT (OUTPATIENT)
Dept: CARDIOLOGY CLINIC | Facility: CLINIC | Age: 63
End: 2023-07-05
Payer: MEDICARE

## 2023-07-05 DIAGNOSIS — Z95.818 PRESENCE OF OTHER CARDIAC IMPLANTS AND GRAFTS: Primary | ICD-10-CM

## 2023-07-05 PROCEDURE — 93298 REM INTERROG DEV EVAL SCRMS: CPT | Performed by: INTERNAL MEDICINE

## 2023-07-05 PROCEDURE — G2066 INTER DEVC REMOTE 30D: HCPCS | Performed by: INTERNAL MEDICINE

## 2023-07-05 NOTE — PROGRESS NOTES
MDT LNQ22/ ACTIVE SYSTEM IS MRI CONDITIONAL   CARELINK TRANSMISSION: LOOP RECORDER. PRESENTING RHYTHM NSR @ 60 BPM. BATTERY STATUS "OK." NO PATIENT OR DEVICE ACTIVATED EPISODES. HOWEVER THERE IS AN EGRAM PREVIOUSLY ADDRESSED IN ALERT. NORMAL DEVICE FUNCTION.  DL

## 2023-07-21 ENCOUNTER — TELEPHONE (OUTPATIENT)
Dept: RADIOLOGY | Facility: HOSPITAL | Age: 63
End: 2023-07-21

## 2023-07-21 ENCOUNTER — TELEPHONE (OUTPATIENT)
Dept: HEMATOLOGY ONCOLOGY | Facility: CLINIC | Age: 63
End: 2023-07-21

## 2023-07-21 DIAGNOSIS — C18.9 MALIGNANT NEOPLASM OF COLON, UNSPECIFIED PART OF COLON (HCC): Primary | ICD-10-CM

## 2023-07-21 NOTE — TELEPHONE ENCOUNTER
Phoned patient. Left voice message instructing to have lab work completed prior to 7/24. Provided direct call back number for questions/concerns.   CMP order entered

## 2023-07-21 NOTE — TELEPHONE ENCOUNTER
Patient Call    Who are you speaking with? [facility]    If it is not the patient, are they listed on an active communication consent form? N/A   What is the reason for this call? Liz Alonso calling in stating the patient needs to be notified that he needs to get labs done before his CT   Does this require a call back? No   If a call back is required, please list best call back number N/A   If a call back is required, advise that a message will be forwarded to their care team and someone will return their call as soon as possible. Did you relay this information to the patient?  No

## 2023-10-04 ENCOUNTER — REMOTE DEVICE CLINIC VISIT (OUTPATIENT)
Dept: CARDIOLOGY CLINIC | Facility: CLINIC | Age: 63
End: 2023-10-04
Payer: MEDICARE

## 2023-10-04 DIAGNOSIS — Z95.818 PRESENCE OF OTHER CARDIAC IMPLANTS AND GRAFTS: Primary | ICD-10-CM

## 2023-10-04 PROCEDURE — 93298 REM INTERROG DEV EVAL SCRMS: CPT | Performed by: INTERNAL MEDICINE

## 2023-10-04 PROCEDURE — G2066 INTER DEVC REMOTE 30D: HCPCS | Performed by: INTERNAL MEDICINE

## 2023-10-04 NOTE — PROGRESS NOTES
MDT LNQ22/ ACTIVE SYSTEM IS MRI CONDITIONAL   CARELINK TRANSMISSION: LOOP RECORDER. PRESENTING RHYTHM VS @ 72 BPM. BATTERY STATUS "OK." NO PATIENT OR DEVICE ACTIVATED EPISODES. HOWEVER THERE IS AN EGRAM SHOWING AF. HX OF PAF. Pt on Eliquis. NORMAL DEVICE FUNCTION.  DL

## 2023-12-29 ENCOUNTER — REMOTE DEVICE CLINIC VISIT (OUTPATIENT)
Dept: CARDIOLOGY CLINIC | Facility: CLINIC | Age: 63
End: 2023-12-29
Payer: MEDICARE

## 2023-12-29 DIAGNOSIS — Z95.818 PRESENCE OF OTHER CARDIAC IMPLANTS AND GRAFTS: Primary | ICD-10-CM

## 2023-12-29 PROCEDURE — 93298 REM INTERROG DEV EVAL SCRMS: CPT | Performed by: INTERNAL MEDICINE

## 2023-12-29 PROCEDURE — G2066 INTER DEVC REMOTE 30D: HCPCS | Performed by: INTERNAL MEDICINE

## 2023-12-29 NOTE — PROGRESS NOTES
"MDT LNQ22/ ACTIVE SYSTEM IS MRI CONDITIONAL   CARELINK TRANSMISSION: LOOP RECORDER. PRESENTING RHYTHM NSR @ 96 BPM. BATTERY STATUS \"OK.\" NO PATIENT OR DEVICE ACTIVATED EPISODES. HOWEVER THERE IS AN EGRAM SHOWING AF W/ PVCs. HX OF PAF. Pt on Eliquis.  NORMAL DEVICE FUNCTION. DL   "

## 2024-02-29 ENCOUNTER — OFFICE VISIT (OUTPATIENT)
Dept: CARDIOLOGY CLINIC | Facility: CLINIC | Age: 64
End: 2024-02-29
Payer: MEDICARE

## 2024-02-29 VITALS
HEART RATE: 98 BPM | HEIGHT: 69 IN | DIASTOLIC BLOOD PRESSURE: 90 MMHG | BODY MASS INDEX: 33.99 KG/M2 | SYSTOLIC BLOOD PRESSURE: 160 MMHG | WEIGHT: 229.5 LBS

## 2024-02-29 DIAGNOSIS — Z87.39 HISTORY OF GOUT: ICD-10-CM

## 2024-02-29 DIAGNOSIS — I48.0 PAF (PAROXYSMAL ATRIAL FIBRILLATION) (HCC): Primary | ICD-10-CM

## 2024-02-29 DIAGNOSIS — I50.22 CHRONIC SYSTOLIC HEART FAILURE (HCC): ICD-10-CM

## 2024-02-29 PROCEDURE — 99215 OFFICE O/P EST HI 40 MIN: CPT | Performed by: INTERNAL MEDICINE

## 2024-02-29 RX ORDER — FUROSEMIDE 20 MG/1
20 TABLET ORAL DAILY
Qty: 90 TABLET | Refills: 3 | Status: SHIPPED | OUTPATIENT
Start: 2024-02-29

## 2024-02-29 RX ORDER — SPIRONOLACTONE 25 MG/1
12.5 TABLET ORAL DAILY
Qty: 45 TABLET | Refills: 3 | Status: SHIPPED | OUTPATIENT
Start: 2024-02-29

## 2024-02-29 RX ORDER — METOPROLOL SUCCINATE 50 MG/1
50 TABLET, EXTENDED RELEASE ORAL 2 TIMES DAILY
Qty: 90 TABLET | Refills: 3 | Status: SHIPPED | OUTPATIENT
Start: 2024-02-29

## 2024-02-29 RX ORDER — DILTIAZEM HYDROCHLORIDE 120 MG/1
120 CAPSULE, COATED, EXTENDED RELEASE ORAL DAILY
Qty: 90 CAPSULE | Refills: 3 | Status: SHIPPED | OUTPATIENT
Start: 2024-02-29

## 2024-02-29 RX ORDER — ALLOPURINOL 100 MG/1
100 TABLET ORAL DAILY
Qty: 30 TABLET | Refills: 0 | Status: SHIPPED | OUTPATIENT
Start: 2024-02-29

## 2024-02-29 RX ORDER — ATORVASTATIN CALCIUM 40 MG/1
40 TABLET, FILM COATED ORAL
Qty: 90 TABLET | Refills: 3 | Status: SHIPPED | OUTPATIENT
Start: 2024-02-29

## 2024-02-29 NOTE — PROGRESS NOTES
EPS Follow-up Patient Evaluation - Bernardino Nunez 63 y.o. male MRN: [id number]        CC/HPI:   It was a pleasure to see Bernardino Nunez in our arrhythmia clinic at [facility]. As you know he is a 63 y.o.  Man with recovered non-ischemic/tachy-mediated cardiomyopathy due to atrial fibrillation, colon cancer status post hemicolectomy (on chemotherapy), coronary artery disease (left heart catheterization 2018-80% ramus, 60% PDA), morbid obesity,.     He was diagnosed with atrial fibrillation on several years ago. He had DCCV on 12/15/2017.  His sinus rhythm was maintained with metoprolol and digoxin. He was setup for outpatient stress test on 8/21/2020 but was noted to be in atrial fibrillation with RVR on test day. He was sent over to ER where he was found to be anemic. Further work-up revealed Stage III colon cancer. He is now status post right hemicolectomy s/p ileocolic anastomosis.  He is currently on chemotherapy which is going to be completed in December 2020.  He underwent cardioversion on 9/24/2020. However at a follow-up appointment, he was noted to be back in atrial fibrillation. He was asymptomatic at the time though. Rate control strategy was employed and diltiazem 120 mg daily dose was started.      He had a an echocardiogram in August 2020 that showed ejection fraction of 50%.  He did not have any overt signs of congestive heart failure and therefore sinus rhythm was not restored.  He is currently maintained on metoprolol succinate 100 mg twice daily, spironolactone 12.5 mg daily and diltiazem 120 mg daily.    4/30/2021:  He reported doing well since cardioversion. We obtained Ziopatch in November of 2020.  It showed small burden of atrial fibrillation however his ventricular rates were elevated during those episodes.  We discussed increasing AV mami blocking agents but he was already on metoprolol high dose and diltiazem.  We discussed alternative option would be an  ablation which he agreed to it.  He underwent the procedure on January 25th 2021.  He had posterior wall isolation and pulmonary vein isolation.  He did have significant scar on the posterior wall.  He reports doing well since the procedure.  He denies any palpitations.  He denies any swelling in lower extremities.  He does report urinating frequently because of the water pill.  He also has loose bowel movements which he believes is due to Entresto.    Interval history:  Patient's loop monitor has shown brief episodes of atrial fibrillation with RVR.  He reports today he has not been taking medications for the past 6-7 months due to financial hardships. He also drinks beers 4 times a week, and drinks about 5 beers in one setting. He has only taken Eliquis and allopurinol. He did not feel any of the afib episodes. He is dealing with his mother who has colostomy bag.     He denies any chest pain, nausea/vomiting, dyspnea on exertion, fever/chills, swelling in legs, orthopnea, PND or syncope.     Past Medical History:  Past Medical History:   Diagnosis Date    A-fib (HCC)     Cancer (HCC)     Colon cancer (HCC)     Colon polyp     Coronary artery disease     Gout     Herpes zoster     last assessed 12/18/17    Hypertension     Irregular heart beat     Shingles        Medications:      Current Outpatient Medications:     allopurinol (ZYLOPRIM) 100 mg tablet, Take 1 tablet (100 mg total) by mouth daily, Disp: 30 tablet, Rfl: 0    apixaban (Eliquis) 5 mg, Take 1 tablet (5 mg total) by mouth 2 (two) times a day, Disp: 180 tablet, Rfl: 3    aspirin (ECOTRIN LOW STRENGTH) 81 mg EC tablet, Take 1 tablet (81 mg total) by mouth daily, Disp: 30 tablet, Rfl: 0    atorvastatin (LIPITOR) 40 mg tablet, Take 1 tablet (40 mg total) by mouth daily with dinner, Disp: 90 tablet, Rfl: 3    diltiazem (CARDIZEM CD) 120 mg 24 hr capsule, Take 1 capsule (120 mg total) by mouth daily, Disp: 90 capsule, Rfl: 3    Entresto 24-26 MG TABS, TAKE 1  TABLET BY MOUTH TWICE A DAY, Disp: 180 tablet, Rfl: 3    furosemide (LASIX) 20 mg tablet, Take 1 tablet (20 mg total) by mouth daily, Disp: 90 tablet, Rfl: 3    metoprolol succinate (TOPROL-XL) 50 mg 24 hr tablet, Take 1 tablet (50 mg total) by mouth 2 (two) times a day, Disp: 90 tablet, Rfl: 3    sacubitril-valsartan (Entresto) 24-26 MG TABS, Take 1 tablet by mouth 2 (two) times a day, Disp: 180 tablet, Rfl: 3    spironolactone (ALDACTONE) 25 mg tablet, Take 0.5 tablets (12.5 mg total) by mouth daily, Disp: 45 tablet, Rfl: 3    Eliquis 5 MG, TAKE 1 TABLET BY MOUTH TWICE A DAY (Patient not taking: Reported on 2023), Disp: 180 tablet, Rfl: 3    fluticasone (FLONASE) 50 mcg/act nasal spray, SPRAY 1 SPRAY INTO EACH NOSTRIL EVERY DAY (Patient not taking: Reported on 2024), Disp: 48 mL, Rfl: 1    ondansetron (Zofran ODT) 4 mg disintegrating tablet, Take 1 tablet (4 mg total) by mouth every 6 (six) hours as needed for nausea or vomiting for up to 5 days (Patient not taking: Reported on 3/7/2022), Disp: 20 tablet, Rfl: 0     Family History   Problem Relation Age of Onset    Coronary artery disease Mother     Alcohol abuse Father     Coronary artery disease Father     Colon cancer Neg Hx      Social History     Socioeconomic History    Marital status: Single     Spouse name: Not on file    Number of children: Not on file    Years of education: Not on file    Highest education level: Not on file   Occupational History    Not on file   Tobacco Use    Smoking status: Former     Current packs/day: 0.00     Types: Cigarettes     Quit date: 2017     Years since quittin.5    Smokeless tobacco: Never   Vaping Use    Vaping status: Never Used   Substance and Sexual Activity    Alcohol use: Not Currently     Comment: Socially, former alcohol    Drug use: No    Sexual activity: Yes     Partners: Female   Other Topics Concern    Not on file   Social History Narrative    Not on file     Social Determinants of Health  "    Financial Resource Strain: Not on file   Food Insecurity: Not on file   Transportation Needs: Not on file   Physical Activity: Not on file   Stress: Not on file   Social Connections: Not on file   Intimate Partner Violence: Not on file   Housing Stability: Not on file     Social History     Tobacco Use   Smoking Status Former    Current packs/day: 0.00    Types: Cigarettes    Quit date: 2017    Years since quittin.5   Smokeless Tobacco Never     Social History     Substance and Sexual Activity   Alcohol Use Not Currently    Comment: Socially, former alcohol       Review of Systems   Constitutional: Positive for malaise/fatigue. Negative for chills and fever.   HENT: Negative.     Eyes:  Negative for blurred vision and double vision.   Cardiovascular:  Negative for chest pain, dyspnea on exertion, leg swelling, near-syncope, orthopnea, palpitations, paroxysmal nocturnal dyspnea and syncope.   Respiratory:  Negative for cough and sputum production.    Endocrine: Negative.    Skin: Negative.  Negative for rash.   Musculoskeletal: Negative.  Negative for arthritis and joint pain.   Gastrointestinal:  Negative for abdominal pain, nausea and vomiting.   Genitourinary: Negative.    Neurological: Negative.  Negative for dizziness and light-headedness.   Psychiatric/Behavioral: Negative.  The patient is not nervous/anxious.         Objective:     Vitals: Blood pressure 160/90, pulse 98, height 5' 9\" (1.753 m), weight 104 kg (229 lb 8 oz)., Body mass index is 33.89 kg/m².,        Physical Exam:    GEN: Bernardino Nunez appears well, alert and oriented x 3, pleasant and cooperative; morbidly obese.    HEENT: pupils equal, round, and reactive to light; extraocular muscles intact  NECK: supple, no carotid bruits   HEART: regular rhythm, normal S1 and S2, no murmurs, clicks, gallops or rubs   LUNGS: clear to auscultation bilaterally; no wheezes, rales, or rhonchi   ABDOMEN: normal bowel sounds, soft, no tenderness, no " distention  EXTREMITIES: peripheral pulses normal; no clubbing, cyanosis, or edema  NEURO: no focal findings   SKIN: normal without suspicious lesions on exposed skin      Labs & Results:  Below is the patient's most recent value for Albumin, ALT, AST, BUN, Calcium, Chloride, Cholesterol, CO2, Creatinine, GFR, Glucose, HDL, Hematocrit, Hemoglobin, Hemoglobin A1C, LDL, Magnesium, Phosphorus, Platelets, Potassium, PSA, Sodium, Triglycerides, and WBC.   Lab Results   Component Value Date    ALT 49 2023    AST 25 2023    BUN 15 2023    CALCIUM 9.5 2023     2023    CO2 28 2023    CREATININE 1.29 2023    HDL 55 2022    HCT 44.6 2023    HGB 14.3 2023    HGBA1C 5.1 2023    MG 2.3 2020    PHOS 4.1 2020     2023    K 5.4 (H) 2023    PSA 0.9 2022    TRIG 134 2022    WBC 5.72 2023     Note: for a comprehensive list of the patient's lab results, access the Results Review activity.          Cardiac testing:     I personally reviewed the ECG performed in the clinic on 24. It reveals normal sinus rhythm at 98 beats per minute.    Echocardiograms:  Results for orders placed during the hospital encounter of 10/17/18   Echo complete with contrast if indicated    Narrative [place] [id number] [phone]    Transthoracic Echocardiogram  2D, M-mode, Doppler, and Color Doppler    Study date:  17-Oct-2018    Patient: HERMINIA LOTT  MR number: [id number]  Account number: [id number]  : 1960  Age: 58 years  Gender: Male  Status: Outpatient  Location: [address] [place] [address] [facility]  Height: 69 in  Weight: 216 lb  BP: 140/ 82 mmHg    Indications: Heart Failure    Diagnoses: I50.9 - Heart failure, unspecified    Sonographer:  SANKET Samayoa  Primary Physician:  Anaya Gilbert DO  Referring Physician:  Solitario Murray MD  Group:  [facility]  Interpreting Physician:  Emigdio Doss MD    SUMMARY    LEFT VENTRICLE:  Systolic function was at the lower limits of normal. Ejection fraction was estimated to be 50 %.  There were no regional wall motion abnormalities.  Wall thickness was mildly to moderately increased.  Doppler parameters were consistent with abnormal left ventricular relaxation (grade 1 diastolic dysfunction).    RIGHT VENTRICLE:  The size was normal.  Systolic function was normal.    PERICARDIUM:  A trace pericardial effusion was identified. There was no evidence of hemodynamic compromise.    COMPARISONS:  There has been no significant interval change. Comparison was made with the previous study of 29-Mar-2018.    HISTORY: PRIOR HISTORY: MI, CAD, HTN, AFIB, SVT    PROCEDURE: The study was performed in the [address] [place] [address] [facility]. This was a routine study. The transthoracic approach was used. The study included complete 2D imaging, M-mode, complete spectral Doppler, and color Doppler. The  heart rate was 61 bpm, at the start of the study. Images were obtained from the parasternal, apical, subcostal, and suprasternal notch acoustic windows. Echocardiographic views were limited due to lung interference. Image quality was  adequate.    LEFT VENTRICLE: Size was normal. Systolic function was at the lower limits of normal. Ejection fraction was estimated to be 50 %. There were no regional wall motion abnormalities. Wall thickness was mildly to moderately increased. DOPPLER:  Doppler parameters were consistent with abnormal left ventricular relaxation (grade 1 diastolic dysfunction).    RIGHT VENTRICLE: The size was normal. Systolic function was normal. Wall thickness was normal.    LEFT ATRIUM: Size was at the upper limits of normal.    RIGHT ATRIUM: Size was normal.    MITRAL VALVE: Valve structure was normal. There was normal leaflet separation. DOPPLER: The transmitral velocity was within the normal range. There  was no evidence for stenosis. There was no significant regurgitation.    AORTIC VALVE: The valve was trileaflet. Leaflets exhibited normal thickness and normal cuspal separation. DOPPLER: Transaortic velocity was within the normal range. There was no evidence for stenosis. There was no significant  regurgitation.    TRICUSPID VALVE: The valve structure was normal. There was normal leaflet separation. DOPPLER: The transtricuspid velocity was within the normal range. There was no evidence for stenosis. There was no significant regurgitation.    PULMONIC VALVE: Leaflets exhibited normal thickness, no calcification, and normal cuspal separation. DOPPLER: The transpulmonic velocity was within the normal range. There was no significant regurgitation.    PERICARDIUM: A trace pericardial effusion was identified. There was no evidence of hemodynamic compromise.    AORTA: The root exhibited normal size.    SYSTEMIC VEINS: IVC: The inferior vena cava was normal in size. Respirophasic changes were normal.    SYSTEM MEASUREMENT TABLES    2D  %FS: 26.08 %  Ao Diam: 4.01 cm  EDV(Teich): 105.38 ml  EF Biplane: 52.16 %  EF(Cube): 59.62 %  EF(Teich): 51.15 %  ESV(Cube): 43.52 ml  ESV(Teich): 51.48 ml  IVSd: 1.45 cm  LA Area: 20.83 cm2  LA Diam: 3.55 cm  LVEDV MOD A2C: 129.95 ml  LVEDV MOD A4C: 153.5 ml  LVEDV MOD BP: 146.59 ml  LVEF MOD A2C: 47.85 %  LVEF MOD A4C: 59.43 %  LVESV MOD A2C: 67.77 ml  LVESV MOD A4C: 62.27 ml  LVESV MOD BP: 70.12 ml  LVIDd: 4.76 cm  LVIDs: 3.52 cm  LVLd A2C: 8.67 cm  LVLd A4C: 8.72 cm  LVLs A2C: 7.68 cm  LVLs A4C: 7.6 cm  LVPWd: 1.36 cm  RA Area: 17.39 cm2  RV Diam.: 3.55 cm  SV MOD A2C: 62.18 ml  SV MOD A4C: 91.23 ml  SV(Cube): 64.25 ml  SV(Teich): 53.9 ml    MM  TAPSE: 1.49 cm    PW  E': 0.04 m/s  E/E': 23.04  MV A [place]: 0.66 m/s  MV Dec [place]: 3.43 m/s2  MV DecT: 248.53 ms  MV E Brennan: 0.85 m/s  MV E/A Ratio: 1.3    Intersocietal Commission Accredited Echocardiography Laboratory    Prepared and  "electronically signed by    Emigdio Doss MD  Signed 18-Oct-2018 11:44:59       Results for orders placed during the hospital encounter of 20   JOSE LUIS    Narrative [address] [address] [phone]    Transesophageal Echocardiogram  2D, Doppler, and Color Doppler    Study date:  28-Aug-2020    Patient: HERMINIA LOTT  MR number: [id number]  Account number: [id number]  : 1960  Age: 60 years  Gender: Male  Status: Inpatient  Location: Operating room  Height: 70 in  Weight: 150 lb  BP:    Indications: TIA    Diagnoses: G45.9 - Transient cerebral ischemic attack, unspecified    Sonographer:  SANKET Flor  Referring Physician:  Jose Herzog MD  Group:  [facility]  Cardiology Fellow:  Viola Wilburn MD  Interpreting Physician:  Jose Herzog MD    SUMMARY    LEFT VENTRICLE:  Systolic function was at the lower limits of normal. Ejection fraction was estimated to be 50 %.  There were no regional wall motion abnormalities.  Wall thickness was normal.    RIGHT VENTRICLE:  The size was normal.  Systolic function was normal.  Wall thickness was normal.    LEFT ATRIAL APPENDAGE:  No thrombus was identified.  There was mild spontaneous echo contrast (\"smoke\") in the appendage.    MITRAL VALVE:  There was trace regurgitation.    HISTORY: PRIOR HISTORY: HTN, Afib, REGULO    PROCEDURE: The procedure was performed in the operating room. This was a routine study. The risks and alternatives of the procedure were explained to the patient and informed consent was obtained. The transesophageal approach was used. The  study included complete 2D imaging, complete spectral Doppler, and color Doppler. The heart rate was 102 bpm, at the start of the study. An adult omniplane probe was inserted by the cardiology fellow under direct supervision of the  attending cardiologist. Intubated with ease. One intubation attempt(s). There was no " "blood detected on the probe. Image quality was adequate. MEDICATIONS: Anesthesia.    LEFT VENTRICLE: Size was normal. Systolic function was at the lower limits of normal. Ejection fraction was estimated to be 50 %. There were no regional wall motion abnormalities. Wall thickness was normal.    RIGHT VENTRICLE: The size was normal. Systolic function was normal. Wall thickness was normal.    LEFT ATRIUM: Size was normal. No thrombus was identified. APPENDAGE: No thrombus was identified. There was mild spontaneous echo contrast (\"smoke\") in the appendage.    ATRIAL SEPTUM: No defect or patent foramen ovale was identified.    RIGHT ATRIUM: Size was normal. No thrombus was identified.    MITRAL VALVE: Valve structure was normal. There was normal leaflet separation. There was no echocardiographic evidence of vegetation. DOPPLER: There was trace regurgitation.    AORTIC VALVE: The valve was trileaflet. Leaflets exhibited normal thickness and normal cuspal separation. There was no echocardiographic evidence of vegetation. DOPPLER: There was no regurgitation.    TRICUSPID VALVE: The valve structure was normal. There was normal leaflet separation. There was no echocardiographic evidence of vegetation. DOPPLER: There was no regurgitation.    PULMONIC VALVE: Leaflets exhibited normal thickness, no calcification, and normal cuspal separation. There was no echocardiographic evidence of vegetation. DOPPLER: There was trace regurgitation.    PERICARDIUM: There was no pericardial effusion. The pericardium was normal in appearance.    AORTA: The root exhibited normal size. There was no atheroma. There was no evidence for dissection. There was no evidence for aneurysm.    In[employer]ocietal Commission Accredited Echocardiography Laboratory    Prepared and electronically signed by    Jose Herzog MD  Signed 28-Aug-2020 14:25:55         Catheterizations:   No results found for this or any previous visit.    Stress Tests:  Results for " orders placed during the hospital encounter of 18   NM myocardial perfusion spect (stress and/or rest)    Narrative [address] [address] [phone]    Stress Gated SPECT Myocardial Perfusion Imaging after Regadenoson    Patient: HERMINIA LOTT  MR number: [id number]  Account number: [id number]  : 1960  Age: 57 years  Gender: Male  Status: Outpatient  Location: [address] [place] [address]  Height: 69 in  Weight: 189 lb  BP: 120/ 90 mmHg    Allergies: NO KNOWN ALLERGIES    Diagnosis: I50.22 - Chronic systolic (congestive) heart failure    Referring Physician:  Solitario Murray MD  Primary Physician:  Uma Tracey MD  Technician:  SUN Rizzo  RN:  Carine Raines RN  Group:  [facility]  Cardiology Fellow:  Dr. Hayden Huitron  Report Prepared by::  Carine Raines RN  Interpreting Physician:  Dre Butler DO    INDICATIONS: Assessment of cardiomyopathy.    HISTORY: Cardiomyopathy, Lifevest. The patient is a 57 year old  male. Chest pain status: chest pain. Other symptoms: edema. Coronary artery disease risk factors: none. Medications: a beta blocker and a lipid lowering agent.    PHYSICAL EXAM: Baseline physical exam screening: no wheezes audible.    REST ECG: Normal sinus rhythm.    PROCEDURE: The study was performed at the [address] [place] [address]. A regadenoson infusion pharmacologic stress test was performed. Gated SPECT myocardial perfusion imaging was performed during stress. Systolic blood pressure was  120 mmHg, at the start of the study. Diastolic blood pressure was 90 mmHg, at the start of the study. The heart rate was 65 bpm, at the start of the study. IV double checked.  Regadenoson protocol:  HR bpm SBP mmHg DBP mmHg Symptoms  Baseline 65 120 90 none  Immediate 81 122 92 mild dyspnea  1 min 73 120 90 none  No medications or fluids given.    STRESS SUMMARY: Duration of  pharmacologic stress was 3 min and 0 sec. Maximal heart rate during stress was 91 bpm. The heart rate response to stress was normal. Normal blood pressure response to regadenosan. The rate-pressure product for  the peak heart rate and blood pressure was 15834. There was no chest pain during stress. The stress test was terminated due to protocol completion. Pre oxygen saturation: 100 %. Peak oxygen saturation: 99 %. The stress ECG was negative for  ischemia and normal. There were no stress arrhythmias or conduction abnormalities.    ISOTOPE ADMINISTRATION:  Resting isotope administration Stress isotope administration  Agent Tetrofosmin Tetrofosmin  Dose 10.12 mCi 30.9 mCi  Date 02/19/2018 02/19/2018  Injection-image interval 47 min 52 min    The radiopharmaceutical was injected at the peak effect of pharmacologic stress.    MYOCARDIAL PERFUSION IMAGING:  The image quality was good. Rotating projection images reveal mild patient motion. Left ventricular size was normal. The TID ratio was 1.17.    PERFUSION DEFECTS:  -  There was a moderate-sized, moderately severe, partially reversible myocardial perfusion defect of the basal to mid inferolateral wall.    GATED SPECT:  The calculated left ventricular ejection fraction was 47 %. Left ventricular ejection fraction was mildly decreased by visual estimate.    SUMMARY:  -  Stress results: There was no chest pain during stress.  -  ECG conclusions: The stress ECG was negative for ischemia and normal.  -  Perfusion imaging: There was a moderate-sized, moderately severe, partially reversible myocardial perfusion defect of the basal to mid inferolateral wall.  -  Gated SPECT: The calculated left ventricular ejection fraction was 47 %. Left ventricular ejection fraction was mildly decreased by visual estimate.    IMPRESSIONS: Abnormal study after vasodilation and low level exercise without reproduction of symptoms. Partially reversible defect of the inferolateral wall. Fixed  defect Apical lateral. Left ventricular systolic function was reduced,  with regional wall motion abnormalities.    Prepared and signed by    Dre Butler DO  Signed 02/19/2018 14:38:42         Holter monitor -   No results found for this or any previous visit.  No results found for this or any previous visit.      ASSESSMENT/PLAN:  Paroxysmal atrial fibrillation   In sinus rhythm today  Had symptoms with atrial fibrillation in the past (fatigue, dyspnea on exertion) though did not have any at time of office with Dr. Alcaraz when he was in atrial fibrillation   Recent occurrence in setting of newly discovered colon cancer  Discussed options of rate control vs. Rhythm control (AAD or ablation)   Obtain Ziopatch in Nov 2020 to evaluate burden, it showed atrial fibrillation with rates in 170s  Discussed increasing AV mami agents but was already on diltiazem, metoprolol and digoxin.   Discussed ablation option and patient opted to proceed with it  He had PVI and posterior wall on 1/25/2021.   He reports feelign well since then without any symptoms  S/p loop monitor 7/24/21  Has been having atrial fibrillation with RVR  Has not taken medications except Eliquis due to financial hardship  He agreed to take the medications today; refill sent to pharmacy for most medications except Entresto  Will assess his burden on loop monitor    Non-ischemic cardiomyopathy   EF improved to 50%  Maintained on Entresto, metoprolol, spironolactone and Lasix   Euvolemic in office today  Has not taken medications for the past 7 months  Refilled most medications except Entresto    Colon cancer  Stage III colon cancer discovered in Aguust 2020  S/p right hemicolectomy and ileocolic anastomosis  Undergoing chemotherapy; finished in Dec 2020    CAD  Left heart catheterization 2018  80% ramus, 60% PDA  No history of stents   Continue statin, beta-blocker; refilled today; was not taking them for 7 months.     Follow-up in 6 months    Thank  you for allowing me to participate in this patient's care. Please do not hesitate to contact the office with any questions or concerns.

## 2024-03-01 PROCEDURE — 93000 ELECTROCARDIOGRAM COMPLETE: CPT | Performed by: INTERNAL MEDICINE

## 2024-03-04 DIAGNOSIS — I50.42 CHRONIC COMBINED SYSTOLIC AND DIASTOLIC HEART FAILURE (HCC): Primary | ICD-10-CM

## 2024-03-04 RX ORDER — LOSARTAN POTASSIUM 50 MG/1
50 TABLET ORAL DAILY
Qty: 90 TABLET | Refills: 3 | Status: SHIPPED | OUTPATIENT
Start: 2024-03-04

## 2024-03-04 NOTE — PROGRESS NOTES
Saw EP, they shared with me that he is noncompliant with much of his GDMT.  Entresto too expensive.  Will start losartan 50 mg daily.  I will ask the office to reach out to him with this advice and get him into see me in 2 months

## 2024-03-06 ENCOUNTER — TELEPHONE (OUTPATIENT)
Dept: CARDIOLOGY CLINIC | Facility: CLINIC | Age: 64
End: 2024-03-06

## 2024-03-27 ENCOUNTER — DOCUMENTATION (OUTPATIENT)
Dept: CARDIOLOGY CLINIC | Facility: CLINIC | Age: 64
End: 2024-03-27

## 2024-03-27 NOTE — PROGRESS NOTES
Informed that he is refusing his loop recorder interrogations quarterly, alerts will still come through, but apparently he is concerned about the bills for each interrogation.

## 2024-04-07 DIAGNOSIS — I50.22 CHRONIC SYSTOLIC HEART FAILURE (HCC): ICD-10-CM

## 2024-04-08 RX ORDER — METOPROLOL SUCCINATE 50 MG/1
50 TABLET, EXTENDED RELEASE ORAL 2 TIMES DAILY
Qty: 180 TABLET | Refills: 2 | Status: SHIPPED | OUTPATIENT
Start: 2024-04-08

## 2024-04-12 ENCOUNTER — TELEPHONE (OUTPATIENT)
Dept: CARDIOLOGY CLINIC | Facility: CLINIC | Age: 64
End: 2024-04-12

## 2024-04-12 NOTE — TELEPHONE ENCOUNTER
I will ask my medical assistant to ensure he is taking the losartan 50 mg which should be must more cost efficient for him compared to Entresto.  Knowing that he has a recovered tachycardia related cardiomyopathy, and has a preserved EF, I think considering his financial constraints, a visit with EP is probably all he needs now to deal with his A-fib.

## 2024-04-12 NOTE — TELEPHONE ENCOUNTER
Thanks. Let him know any check we do within 3 months are non-billables.     Lets check in one week to see if he is still in afib.     Thanks.

## 2024-04-12 NOTE — TELEPHONE ENCOUNTER
Left message for pt regarding a more affordable alternative for entresto per Dr. Alcaraz - I will ask my medical assistant to ensure he is taking the losartan 50 mg which should be must more cost efficient for him compared to Entresto.

## 2024-04-12 NOTE — TELEPHONE ENCOUNTER
Thank you Dr. Alcaraz, I will have our office call and set him up.  He is having a device check in one week per DR. Burrows and I will have Marisol Loder from our device clinic call and explain the billing to him.  He gets billed $112 for each loop reading, which is the MD portion.

## 2024-04-12 NOTE — TELEPHONE ENCOUNTER
----- Message from Jonathan Burrows MD sent at 4/12/2024  9:32 AM EDT -----  Afib with RVR noted, possibly medication non-compliance.   EP team can we ask him if he is taking medications?     Thx  ----- Message -----  From: Interface, [place] Results Incoming  Sent: 4/11/2024   3:06 PM EDT  To: Jonathan Burrows MD

## 2024-04-12 NOTE — TELEPHONE ENCOUNTER
Spoke with patient.  He states compliance with metoprolol, diltiazem and Eliquis.    However, he states he d/c'd his Entresto a few months ago because he cannot afford this medication.      He also is concerned because of the bills he receives for his loop checks, states he cannot afford.      He is due to see you in May. I will have scheduling call and set him up for an appointment to see you.      I will send to Dr. Alcaraz to see if he needs a follow-up with him as well.

## 2024-04-22 NOTE — CONSULTS
Consultation - [facility]) [facility]  Chavez Andres 61 y o  male MRN: [id number]  Unit/Bed#: -01 Encounter: [id number]              Inpatient consult to gastroenterology     Performed by  Rica Hoffmann MD     Authorized by Yasmine Altamirano MD              Reason for Consult / Principal Problem:     Anemia  Melena  Low ferritin    ASSESSMENT AND PLAN:      Anemia  Melena  Low ferritin  -suspect subacute/chronic GI bleed with increased risk due to anticoagulation with Eliquis  -given need for continued anticoagulation would recommend endoscopic EGD and colonoscopy as an inpatient-will plan for this on Monday given no signs of overt active bleed and last dose of Eliquis on Friday a m  with his CKD  -cardiac preprocedure risk assessment/optimization  -cardiac diet for now  -clear liquids tomorrow  -prep tomorrow  -NPO after midnight Monday    Elevated bilirubin  -chronic in nature, likely Gilbert's syndrome; other liver enzymes normal  -will fractionate    ______________________________________________________________________    HPI:  61year old male seen in consultation for anemia and reported loose stool melena  Patient with history of a on Eliquis, hypertension, CAD, gout  Patient presented from outpatient stress echo an EKG was found to be in rapid AFib with reported worsening of dyspnea on exertion for the last 1 month  He also was noted to have lower blood pressure so he was referred to ED for further evaluation  Patient reports loose bowel movements since starting Entresto however the past 1 month he has been noticing black tarry stool  He denies abdominal pain, nausea, vomiting, fever  Denies NSAID use  He reports more than 2 alcoholic drinks per day for many decades  He is a former smoker, quit 4 years ago  He denies GERD symptoms  He denies family history of colon cancer  He has never undergone endoscopic evaluation  Last dose of Eliquis was Friday a m        REVIEW OF No chief complaint on file.       HPI:  89 y.o. female with history of CHF, dementia, hypertension, recurrent falls, sick sinus syndrome status post pacemaker placement, CKD who is presenting to the ED after a unwitnessed fall at her nursing home.  History provided by family at bedside due to patient's severe dementia and difficulty with hearing.  They do report the patient is currently at her neurologic baseline.  Daughter received a call around 6 AM this morning that patient had an unwitnessed fall in her room.  She does have a carpeted floor but has several pieces of furniture that she could have hit.  Daughter does not believe that she lost consciousness but it is unclear based on limited history.  Daughter went there this evening and found patient with significant bruising over face and chest that was not present yesterday, prompting her to come to ED for evaluation. Patient is not on anticoagulation.    History provided by: patient  Limitations to history: altered mentation    ------------------------------------------------------------------------------------------------------------------------------------------    Physical Exam:    Triage vitals reviewed.  Constitutional: Elderly female, in no acute distress, non toxic in appearance  Head: Normocephalic, see below.  Skin: Ecchymosis over L face, L anterolateral chest/shoulder, L hip, medial R knee. Skin tear to L posterior shoulder.   Eyes: Pupils are equal, reactive to light. No conjunctival injection.  Neck: Supple. Trachea is midline.  No midline tenderness of cervical spine.  C-collar in place.  Pulmonary: Normal work of breathing with no accessory muscle use noted.  Clear to auscultation bilaterally.  No wheezing rhonchi or rales.  Cardiovascular: RRR. 2+ radial pulses bilaterally.  3+ pitting edema of BLEs.  Abdomen: Soft, nondistended. Nontender to palpation.  Hips: Left anterior hip ecchymosis.  Otherwise stable to compression.  Extremities:  Ecchymosis and skin tear over left shoulder but no gross deformity.  No tenderness of humeral, elbow, forearm, or wrist/hand on left upper extremity or right upper extremity.  No reproducible tenderness of lower extremities.  No gross deformities.    Neuro: Awake and alert.  Extremely hard of hearing, limiting ability to answer questions.  Speech is clear otherwise and moving all 4 extremities spontaneously without difficulty.  Back: No reproducible thoracic or lumbar spine tenderness.  No step-offs.  No skin changes.    ------------------------------------------------------------------------------------------------------------------------------------------    ED Course:  Diagnoses as of 04/29/24 0341   Closed nondisplaced fracture of acromial end of left clavicle, initial encounter   Closed nondisplaced fracture of acromial process, unspecified laterality, initial encounter        Medical Decision Making:  This patient is a 89 y.o. female with history of CHF, dementia, hypertension, recurrent falls, sick sinus syndrome status post pacemaker placement, CKD who is presenting to the ED after a unwitnessed fall at her nursing home.  She has severe dementia at baseline and is hard of hearing, limiting her ability to provide history or participate in exam.  Family here providing history for her.  She does appear awake and alert and is moving all extremities spontaneously.  Has significant bruising to left side of face chest and a small amount on the left hip.  She was placed in c-collar given her age and limitations to exam.  Given the extent of the ecchymosis and again limited exam we will perform CT head neck face and chest abdomen pelvis with CT L-spine.  Will also obtain plain films of the shoulder chest and pelvis.    Imaging was reviewed and has isolated clavicle fracture. Otherwise, no acute traumatic injuries on CT or remainder of plain films. Case discussed with surgery, Dr Jones, who agrees with plan. Also  SYSTEMS:    CONSTITUTIONAL: Denies any fever, chills, rigors, and weight loss  HEENT: No earache or tinnitus  Denies hearing loss or visual disturbances  CARDIOVASCULAR: No chest pain or palpitations  RESPIRATORY: + shortness of breath + dyspnea on exertion  GASTROINTESTINAL: As noted in the History of Present Illness  GENITOURINARY: No problems with urination  Denies any hematuria or dysuria  NEUROLOGIC: No dizziness or vertigo, denies headaches  MUSCULOSKELETAL: Denies any muscle or joint pain  SKIN: Denies skin rashes or itching  ENDOCRINE: Denies excessive thirst  Denies intolerance to heat or cold  PSYCHOSOCIAL: Denies depression or anxiety  Denies any recent memory loss         Historical Information   Past Medical History:   Diagnosis Date    A-fib [facility])     Coronary artery disease     Gout     Herpes zoster     last assessed 12/18/17    Hypertension     Shingles      Past Surgical History:   Procedure Laterality Date    ELECTRICAL CARDIOVERSION  12/15/2017          Social History   Social History     Substance and Sexual Activity   Alcohol Use Yes    Comment: Socially, former alcohol     Social History     Substance and Sexual Activity   Drug Use No     Social History     Tobacco Use   Smoking Status Former Smoker    Last attempt to quit: 8/11/2017    Years since quitting: 3 0   Smokeless Tobacco Never Used     Family History   Problem Relation Age of Onset    Coronary artery disease Mother     Alcohol abuse Father     Coronary artery disease Father        Meds/Allergies     Medications Prior to Admission   Medication    allopurinol (ZYLOPRIM) 100 mg tablet    atorvastatin (LIPITOR) 40 mg tablet    digoxin (LANOXIN) 0 125 mg tablet    ELIQUIS 5 MG    ENTRESTO 24-26 MG TABS    furosemide (LASIX) 40 mg tablet    metoprolol succinate (TOPROL-XL) 100 mg 24 hr tablet    spironolactone (ALDACTONE) 25 mg tablet     Current Facility-Administered Medications   Medication Dose Route Frequency    allopurinol (ZYLOPRIM) tablet 100 mg  100 mg Oral Daily    atorvastatin (LIPITOR) tablet 40 mg  40 mg Oral Daily With Dinner    digoxin (LANOXIN) tablet 125 mcg  125 mcg Oral Daily    melatonin tablet 3 mg  3 mg Oral HS PRN    metoprolol succinate (TOPROL-XL) 24 hr tablet 100 mg  100 mg Oral BID       No Known Allergies        Objective     Blood pressure 107/78, pulse (!) 108, temperature 98 4 °F (36 9 °C), temperature source Oral, resp  rate 18, height 5' 9" (1 753 m), weight 96 6 kg (212 lb 15 4 oz), SpO2 97 %  Body mass index is 31 45 kg/m²        Intake/Output Summary (Last 24 hours) at 8/22/2020 1306  Last data filed at 8/22/2020 1200  Gross per 24 hour   Intake 830 ml   Output 1425 ml   Net -595 ml         PHYSICAL EXAM:      General Appearance:   Alert, cooperative, no distress   HEENT:   Normocephalic, atraumatic, anicteric      Neck:  Supple, symmetrical, trachea midline   Lungs:   Clear to auscultation bilaterally; no rales, rhonchi or wheezing; respirations unlabored    Heart[de-identified]   Irregularly irregular    Abdomen:   Soft, non-tender, non-distended; normal bowel sounds; no masses, no organomegaly    Genitalia:   Deferred    Rectal:   Deferred    Extremities:  No cyanosis, clubbing or edema        Skin:  No jaundice, rashes, or lesions    Lymph nodes:  No palpable cervical lymphadenopathy        Lab Results:   Admission on 08/21/2020   Component Date Value    WBC 08/21/2020 8 06     RBC 08/21/2020 4 10     Hemoglobin 08/21/2020 9 3*    Hematocrit 08/21/2020 32 0*    MCV 08/21/2020 78*    MCH 08/21/2020 22 7*    MCHC 08/21/2020 29 1*    RDW 08/21/2020 16 2*    MPV 08/21/2020 10 0     Platelets 83/40/3228 457*    nRBC 08/21/2020 0     Neutrophils Relative 08/21/2020 70     Immat GRANS % 08/21/2020 0     Lymphocytes Relative 08/21/2020 15     Monocytes Relative 08/21/2020 13*    Eosinophils Relative 08/21/2020 1     Basophils Relative 08/21/2020 1     Neutrophils Absolute discussed with orthopedics and will place patient in sling, refer for outpatient follow. Family updated and agreeable with plan. Discharged back to [facility].    Clinical Impression:  Fall with L sided clavicle fracture    Disposition:  Discharge to assisted living facility    Caryl Collins DO  Emergency Medicine         Caryl Collins DO  04/29/24 [id number]     08/21/2020 5 67     Immature Grans Absolute 08/21/2020 0 01     Lymphocytes Absolute 08/21/2020 1 21     Monocytes Absolute 08/21/2020 1 02     Eosinophils Absolute 08/21/2020 0 08     Basophils Absolute 08/21/2020 0 07     Sodium 08/21/2020 138     Potassium 08/21/2020 4 3     Chloride 08/21/2020 108     CO2 08/21/2020 24     ANION GAP 08/21/2020 6     BUN 08/21/2020 37*    Creatinine 08/21/2020 2 31*    Glucose 08/21/2020 100     Calcium 08/21/2020 8 9     eGFR 08/21/2020 30     TSH 3RD GENERATON 08/21/2020 1 190     Phosphorus 08/21/2020 4 1     Magnesium 08/21/2020 2 4     Troponin I 08/21/2020 0 10*    Digoxin Lvl 08/21/2020 0 6*    ABO Grouping 08/21/2020 A     Rh Factor 08/21/2020 Positive     Antibody Screen 08/21/2020 Negative     Specimen Expiration Date 08/21/2020 21648146     Unit Product Code 08/22/2020 X9556I04     Unit Number 08/22/2020 [id number]     Unit ABO 08/22/2020 A     Unit RH 08/22/2020 POS     Crossmatch 08/22/2020 Compatible     Unit Dispense Status 08/22/2020 Presumed Trans     ABO Grouping 08/21/2020 A     Rh Factor 08/21/2020 Positive     NT-proBNP 08/22/2020 393*    WBC 08/22/2020 6 90     RBC 08/22/2020 4 06     Hemoglobin 08/22/2020 9 4*    Hematocrit 08/22/2020 32 7*    MCV 08/22/2020 81*    MCH 08/22/2020 23 2*    MCHC 08/22/2020 28 7*    RDW 08/22/2020 16 6*    MPV 08/22/2020 10 3     Platelets 68/78/1553 363     nRBC 08/22/2020 0     Neutrophils Relative 08/22/2020 56     Immat GRANS % 08/22/2020 0     Lymphocytes Relative 08/22/2020 26     Monocytes Relative 08/22/2020 15*    Eosinophils Relative 08/22/2020 2     Basophils Relative 08/22/2020 1     Neutrophils Absolute 08/22/2020 3 91     Immature Grans Absolute 08/22/2020 0 02     Lymphocytes Absolute 08/22/2020 1 77     Monocytes Absolute 08/22/2020 1 02     Eosinophils Absolute 08/22/2020 0 11     Basophils Absolute 08/22/2020 0 07     Sodium 08/22/2020 139     Potassium 08/22/2020 4 0     Chloride 08/22/2020 109*    CO2 08/22/2020 25     ANION GAP 08/22/2020 5     BUN 08/22/2020 42*    Creatinine 08/22/2020 1 96*    Glucose 08/22/2020 102     Calcium 08/22/2020 8 2*    AST 08/22/2020 8     ALT 08/22/2020 23     Alkaline Phosphatase 08/22/2020 53     Total Protein 08/22/2020 7 5     Albumin 08/22/2020 3 6     Total Bilirubin 08/22/2020 2 73*    eGFR 08/22/2020 36     Magnesium 08/22/2020 2 4     Iron Saturation 08/22/2020 43     TIBC 08/22/2020 444     Iron 08/22/2020 189*    Ferritin 08/22/2020 9     Protime 08/22/2020 14 4     INR 08/22/2020 1 11     Cholesterol 08/22/2020 96     Triglycerides 08/22/2020 74     HDL, Direct 08/22/2020 41     LDL Calculated 08/22/2020 40     Non-HDL-Chol (CHOL-HDL) 08/22/2020 55     TSH 3RD GENERATON 08/22/2020 1 050        Imaging Studies: I have personally reviewed pertinent imaging studies

## 2024-07-23 ENCOUNTER — APPOINTMENT (EMERGENCY)
Dept: RADIOLOGY | Facility: HOSPITAL | Age: 64
DRG: 309 | End: 2024-07-23
Payer: MEDICARE

## 2024-07-23 ENCOUNTER — HOSPITAL ENCOUNTER (INPATIENT)
Facility: HOSPITAL | Age: 64
LOS: 2 days | Discharge: HOME | DRG: 309 | End: 2024-07-25
Attending: EMERGENCY MEDICINE | Admitting: INTERNAL MEDICINE
Payer: MEDICARE

## 2024-07-23 DIAGNOSIS — I50.22 CHRONIC SYSTOLIC HEART FAILURE (HCC): ICD-10-CM

## 2024-07-23 DIAGNOSIS — R06.02 SOB (SHORTNESS OF BREATH): ICD-10-CM

## 2024-07-23 DIAGNOSIS — I48.91 ATRIAL FIBRILLATION, UNSPECIFIED TYPE (HCC): ICD-10-CM

## 2024-07-23 DIAGNOSIS — I48.91 ATRIAL FIBRILLATION WITH RVR (HCC): Primary | ICD-10-CM

## 2024-07-23 DIAGNOSIS — R79.89 ELEVATED TROPONIN: ICD-10-CM

## 2024-07-23 DIAGNOSIS — I50.42 CHRONIC COMBINED SYSTOLIC AND DIASTOLIC HEART FAILURE (HCC): ICD-10-CM

## 2024-07-23 PROBLEM — N17.9 ACUTE RENAL FAILURE (ARF) (HCC): Status: ACTIVE | Noted: 2024-07-23

## 2024-07-23 LAB
2HR DELTA HS TROPONIN: -26 NG/L
ALBUMIN SERPL BCG-MCNC: 4 G/DL (ref 3.5–5)
ALP SERPL-CCNC: 57 U/L (ref 34–104)
ALT SERPL W P-5'-P-CCNC: 39 U/L (ref 7–52)
ANION GAP SERPL CALCULATED.3IONS-SCNC: 12 MMOL/L (ref 4–13)
AST SERPL W P-5'-P-CCNC: 25 U/L (ref 13–39)
ATRIAL RATE: 105 BPM
ATRIAL RATE: 214 BPM
ATRIAL RATE: 228 BPM
BASOPHILS # BLD AUTO: 0.08 THOUSANDS/ÂΜL (ref 0–0.1)
BASOPHILS NFR BLD AUTO: 1 % (ref 0–1)
BILIRUB SERPL-MCNC: 1.23 MG/DL (ref 0.2–1)
BUN SERPL-MCNC: 17 MG/DL (ref 5–25)
CALCIUM SERPL-MCNC: 9.5 MG/DL (ref 8.4–10.2)
CARDIAC TROPONIN I PNL SERPL HS: 112 NG/L
CARDIAC TROPONIN I PNL SERPL HS: 138 NG/L
CHLORIDE SERPL-SCNC: 103 MMOL/L (ref 96–108)
CO2 SERPL-SCNC: 24 MMOL/L (ref 21–32)
CREAT SERPL-MCNC: 1.38 MG/DL (ref 0.6–1.3)
EOSINOPHIL # BLD AUTO: 0.09 THOUSAND/ÂΜL (ref 0–0.61)
EOSINOPHIL NFR BLD AUTO: 1 % (ref 0–6)
ERYTHROCYTE [DISTWIDTH] IN BLOOD BY AUTOMATED COUNT: 12.8 % (ref 11.6–15.1)
GFR SERPL CREATININE-BSD FRML MDRD: 53 ML/MIN/1.73SQ M
GLUCOSE SERPL-MCNC: 124 MG/DL (ref 65–140)
HCT VFR BLD AUTO: 50.5 % (ref 36.5–49.3)
HGB BLD-MCNC: 17 G/DL (ref 12–17)
IMM GRANULOCYTES # BLD AUTO: 0.05 THOUSAND/UL (ref 0–0.2)
IMM GRANULOCYTES NFR BLD AUTO: 1 % (ref 0–2)
LYMPHOCYTES # BLD AUTO: 2.1 THOUSANDS/ÂΜL (ref 0.6–4.47)
LYMPHOCYTES NFR BLD AUTO: 22 % (ref 14–44)
MCH RBC QN AUTO: 33.7 PG (ref 26.8–34.3)
MCHC RBC AUTO-ENTMCNC: 33.7 G/DL (ref 31.4–37.4)
MCV RBC AUTO: 100 FL (ref 82–98)
MONOCYTES # BLD AUTO: 0.9 THOUSAND/ÂΜL (ref 0.17–1.22)
MONOCYTES NFR BLD AUTO: 9 % (ref 4–12)
NEUTROPHILS # BLD AUTO: 6.4 THOUSANDS/ÂΜL (ref 1.85–7.62)
NEUTS SEG NFR BLD AUTO: 66 % (ref 43–75)
NRBC BLD AUTO-RTO: 0 /100 WBCS
P AXIS: 0 DEGREES
P AXIS: 140 DEGREES
PLATELET # BLD AUTO: 275 THOUSANDS/UL (ref 149–390)
PMV BLD AUTO: 11.3 FL (ref 8.9–12.7)
POTASSIUM SERPL-SCNC: 3.4 MMOL/L (ref 3.5–5.3)
PR INTERVAL: 134 MS
PROT SERPL-MCNC: 8.3 G/DL (ref 6.4–8.4)
QRS AXIS: 10 DEGREES
QRS AXIS: 27 DEGREES
QRS AXIS: 6 DEGREES
QRSD INTERVAL: 80 MS
QRSD INTERVAL: 80 MS
QRSD INTERVAL: 92 MS
QT INTERVAL: 188 MS
QT INTERVAL: 214 MS
QT INTERVAL: 332 MS
QTC INTERVAL: 352 MS
QTC INTERVAL: 386 MS
QTC INTERVAL: 438 MS
RBC # BLD AUTO: 5.05 MILLION/UL (ref 3.88–5.62)
SODIUM SERPL-SCNC: 139 MMOL/L (ref 135–147)
T WAVE AXIS: 195 DEGREES
T WAVE AXIS: 225 DEGREES
T WAVE AXIS: 93 DEGREES
VENTRICULAR RATE: 105 BPM
VENTRICULAR RATE: 196 BPM
VENTRICULAR RATE: 211 BPM
WBC # BLD AUTO: 9.62 THOUSAND/UL (ref 4.31–10.16)

## 2024-07-23 PROCEDURE — 84484 ASSAY OF TROPONIN QUANT: CPT

## 2024-07-23 PROCEDURE — 99223 1ST HOSP IP/OBS HIGH 75: CPT | Performed by: INTERNAL MEDICINE

## 2024-07-23 PROCEDURE — 36415 COLL VENOUS BLD VENIPUNCTURE: CPT

## 2024-07-23 PROCEDURE — 71046 X-RAY EXAM CHEST 2 VIEWS: CPT

## 2024-07-23 PROCEDURE — 99291 CRITICAL CARE FIRST HOUR: CPT | Performed by: EMERGENCY MEDICINE

## 2024-07-23 PROCEDURE — 93005 ELECTROCARDIOGRAM TRACING: CPT

## 2024-07-23 PROCEDURE — 93010 ELECTROCARDIOGRAM REPORT: CPT | Performed by: INTERNAL MEDICINE

## 2024-07-23 PROCEDURE — 96374 THER/PROPH/DIAG INJ IV PUSH: CPT

## 2024-07-23 PROCEDURE — 85025 COMPLETE CBC W/AUTO DIFF WBC: CPT

## 2024-07-23 PROCEDURE — 80053 COMPREHEN METABOLIC PANEL: CPT

## 2024-07-23 PROCEDURE — 99285 EMERGENCY DEPT VISIT HI MDM: CPT

## 2024-07-23 RX ORDER — SODIUM CHLORIDE 9 MG/ML
50 INJECTION, SOLUTION INTRAVENOUS CONTINUOUS
Status: DISCONTINUED | OUTPATIENT
Start: 2024-07-23 | End: 2024-07-24

## 2024-07-23 RX ORDER — DILTIAZEM HYDROCHLORIDE 5 MG/ML
INJECTION INTRAVENOUS
Status: COMPLETED
Start: 2024-07-23 | End: 2024-07-23

## 2024-07-23 RX ORDER — FLUTICASONE PROPIONATE 50 MCG
1 SPRAY, SUSPENSION (ML) NASAL 2 TIMES DAILY
Status: DISCONTINUED | OUTPATIENT
Start: 2024-07-23 | End: 2024-07-25 | Stop reason: HOSPADM

## 2024-07-23 RX ORDER — ATORVASTATIN CALCIUM 40 MG/1
40 TABLET, FILM COATED ORAL
Status: DISCONTINUED | OUTPATIENT
Start: 2024-07-23 | End: 2024-07-25 | Stop reason: HOSPADM

## 2024-07-23 RX ORDER — LOSARTAN POTASSIUM 50 MG/1
50 TABLET ORAL DAILY
Status: DISCONTINUED | OUTPATIENT
Start: 2024-07-24 | End: 2024-07-25 | Stop reason: HOSPADM

## 2024-07-23 RX ORDER — POTASSIUM CHLORIDE 20 MEQ/1
40 TABLET, EXTENDED RELEASE ORAL ONCE
Status: COMPLETED | OUTPATIENT
Start: 2024-07-23 | End: 2024-07-23

## 2024-07-23 RX ORDER — ALLOPURINOL 100 MG/1
100 TABLET ORAL DAILY
Status: DISCONTINUED | OUTPATIENT
Start: 2024-07-24 | End: 2024-07-25 | Stop reason: HOSPADM

## 2024-07-23 RX ORDER — METOPROLOL SUCCINATE 50 MG/1
50 TABLET, EXTENDED RELEASE ORAL 2 TIMES DAILY
Status: DISCONTINUED | OUTPATIENT
Start: 2024-07-23 | End: 2024-07-25 | Stop reason: HOSPADM

## 2024-07-23 RX ORDER — SPIRONOLACTONE 25 MG/1
12.5 TABLET ORAL DAILY
Status: DISCONTINUED | OUTPATIENT
Start: 2024-07-24 | End: 2024-07-25 | Stop reason: HOSPADM

## 2024-07-23 RX ORDER — DILTIAZEM HYDROCHLORIDE 5 MG/ML
30 INJECTION INTRAVENOUS ONCE
Status: COMPLETED | OUTPATIENT
Start: 2024-07-23 | End: 2024-07-23

## 2024-07-23 RX ORDER — FUROSEMIDE 20 MG/1
20 TABLET ORAL DAILY
Status: DISCONTINUED | OUTPATIENT
Start: 2024-07-24 | End: 2024-07-24

## 2024-07-23 RX ADMIN — SODIUM CHLORIDE 50 ML/HR: 0.9 INJECTION, SOLUTION INTRAVENOUS at 15:57

## 2024-07-23 RX ADMIN — DILTIAZEM HYDROCHLORIDE 30 MG: 5 INJECTION, SOLUTION INTRAVENOUS at 09:12

## 2024-07-23 RX ADMIN — METOPROLOL SUCCINATE 50 MG: 50 TABLET, EXTENDED RELEASE ORAL at 17:50

## 2024-07-23 RX ADMIN — APIXABAN 5 MG: 5 TABLET, FILM COATED ORAL at 17:50

## 2024-07-23 RX ADMIN — ATORVASTATIN CALCIUM 40 MG: 40 TABLET, FILM COATED ORAL at 17:50

## 2024-07-23 RX ADMIN — DILTIAZEM HYDROCHLORIDE 5 MG/HR: 5 INJECTION, SOLUTION INTRAVENOUS at 11:02

## 2024-07-23 RX ADMIN — DILTIAZEM HYDROCHLORIDE 30 MG: 30 TABLET ORAL at 15:40

## 2024-07-23 RX ADMIN — DILTIAZEM HYDROCHLORIDE 30 MG: 30 TABLET ORAL at 21:17

## 2024-07-23 RX ADMIN — DILTIAZEM HYDROCHLORIDE 30 MG: 5 INJECTION INTRAVENOUS at 09:12

## 2024-07-23 RX ADMIN — POTASSIUM CHLORIDE 40 MEQ: 1500 TABLET, EXTENDED RELEASE ORAL at 15:40

## 2024-07-23 NOTE — ED NOTES
Pt provided with food and a drink. Resting comfortably at this time.      Nimco Mcgill RN  07/23/24 [id number]

## 2024-07-23 NOTE — ASSESSMENT & PLAN NOTE
Patient initially presented with A-fib with RVR.  Check TSH  Consult cardiology  Continue with Cardizem drip   Anticoagulation with Eliquis

## 2024-07-23 NOTE — PLAN OF CARE
Problem: PAIN - ADULT  Goal: Verbalizes/displays adequate comfort level or baseline comfort level  Description: Interventions:  - Encourage patient to monitor pain and request assistance  - Assess pain using appropriate pain scale  - Administer analgesics based on type and severity of pain and evaluate response  - Implement non-pharmacological measures as appropriate and evaluate response  - Consider cultural and social influences on pain and pain management  - Notify physician/advanced practitioner if interventions unsuccessful or patient reports new pain  Outcome: Progressing     Problem: INFECTION - ADULT  Goal: Absence or prevention of progression during hospitalization  Description: INTERVENTIONS:  - Assess and monitor for signs and symptoms of infection  - Monitor lab/diagnostic results  - Monitor all insertion sites, i.e. indwelling lines, tubes, and drains  - Monitor endotracheal if appropriate and nasal secretions for changes in amount and color  - Mondamin appropriate cooling/warming therapies per order  - Administer medications as ordered  - Instruct and encourage patient and family to use good hand hygiene technique  - Identify and instruct in appropriate isolation precautions for identified infection/condition  Outcome: Progressing  Goal: Absence of fever/infection during neutropenic period  Description: INTERVENTIONS:  - Monitor WBC    Outcome: Progressing     Problem: SAFETY ADULT  Goal: Patient will remain free of falls  Description: INTERVENTIONS:  - Educate patient/family on patient safety including physical limitations  - Instruct patient to call for assistance with activity   - Consult OT/PT to assist with strengthening/mobility   - Keep Call bell within reach  - Keep bed low and locked with side rails adjusted as appropriate  - Keep care items and personal belongings within reach  - Initiate and maintain comfort rounds  - Make Fall Risk Sign visible to staff  - Offer Toileting every  Hours,  in advance of need  - Initiate/Maintain alarm  - Obtain necessary fall risk management equipment:   - Apply yellow socks and bracelet for high fall risk patients  - Consider moving patient to room near nurses station  Outcome: Progressing  Goal: Maintain or return to baseline ADL function  Description: INTERVENTIONS:  -  Assess patient's ability to carry out ADLs; assess patient's baseline for ADL function and identify physical deficits which impact ability to perform ADLs (bathing, care of mouth/teeth, toileting, grooming, dressing, etc.)  - Assess/evaluate cause of self-care deficits   - Assess range of motion  - Assess patient's mobility; develop plan if impaired  - Assess patient's need for assistive devices and provide as appropriate  - Encourage maximum independence but intervene and supervise when necessary  - Involve family in performance of ADLs  - Assess for home care needs following discharge   - Consider OT consult to assist with ADL evaluation and planning for discharge  - Provide patient education as appropriate  Outcome: Progressing  Goal: Maintains/Returns to pre admission functional level  Description: INTERVENTIONS:  - Perform AM-PAC 6 Click Basic Mobility/ Daily Activity assessment daily.  - Set and communicate daily mobility goal to care team and patient/family/caregiver.   - Collaborate with rehabilitation services on mobility goals if consulted  - Perform Range of Motion  times a day.  - Reposition patient every  hours.  - Dangle patient  times a day  - Stand patient  times a day  - Ambulate patient  times a day  - Out of bed to chair  times a day   - Out of bed for meals times a day  - Out of bed for toileting  - Record patient progress and toleration of activity level   Outcome: Progressing     Problem: DISCHARGE PLANNING  Goal: Discharge to home or other facility with appropriate resources  Description: INTERVENTIONS:  - Identify barriers to discharge w/patient and caregiver  - Arrange for  needed discharge resources and transportation as appropriate  - Identify discharge learning needs (meds, wound care, etc.)  - Arrange for interpretive services to assist at discharge as needed  - Refer to Case Management Department for coordinating discharge planning if the patient needs post-hospital services based on physician/advanced practitioner order or complex needs related to functional status, cognitive ability, or social support system  Outcome: Progressing     Problem: Knowledge Deficit  Goal: Patient/family/caregiver demonstrates understanding of disease process, treatment plan, medications, and discharge instructions  Description: Complete learning assessment and assess knowledge base.  Interventions:  - Provide teaching at level of understanding  - Provide teaching via preferred learning methods  Outcome: Progressing     Problem: CARDIOVASCULAR - ADULT  Goal: Maintains optimal cardiac output and hemodynamic stability  Description: INTERVENTIONS:  - Monitor I/O, vital signs and rhythm  - Monitor for S/S and trends of decreased cardiac output  - Administer and titrate ordered vasoactive medications to optimize hemodynamic stability  - Assess quality of pulses, skin color and temperature  - Assess for signs of decreased coronary artery perfusion  - Instruct patient to report change in severity of symptoms  Outcome: Progressing  Goal: Absence of cardiac dysrhythmias or at baseline rhythm  Description: INTERVENTIONS:  - Continuous cardiac monitoring, vital signs, obtain 12 lead EKG if ordered  - Administer antiarrhythmic and heart rate control medications as ordered  - Monitor electrolytes and administer replacement therapy as ordered  Outcome: Progressing     Problem: METABOLIC, FLUID AND ELECTROLYTES - ADULT  Goal: Electrolytes maintained within normal limits  Description: INTERVENTIONS:  - Monitor labs and assess patient for signs and symptoms of electrolyte imbalances  - Administer electrolyte  replacement as ordered  - Monitor response to electrolyte replacements, including repeat lab results as appropriate  - Instruct patient on fluid and nutrition as appropriate  Outcome: Progressing  Goal: Fluid balance maintained  Description: INTERVENTIONS:  - Monitor labs   - Monitor I/O and WT  - Instruct patient on fluid and nutrition as appropriate  - Assess for signs & symptoms of volume excess or deficit  Outcome: Progressing  Goal: Glucose maintained within target range  Description: INTERVENTIONS:  - Monitor Blood Glucose as ordered  - Assess for signs and symptoms of hyperglycemia and hypoglycemia  - Administer ordered medications to maintain glucose within target range  - Assess nutritional intake and initiate nutrition service referral as needed  Outcome: Progressing

## 2024-07-23 NOTE — Clinical Note
Case was discussed with   and the patient's admission status was agreed to be Admission Status: inpatient status to the service of Dr. Munoz

## 2024-07-23 NOTE — ED PROVIDER NOTES
History  Chief Complaint   Patient presents with    Shortness of Breath     Patient has been sob for a couple of days with chest pain and lightheadedness. Has h/o afib. Is on entresto and eliquis and metoprolol, but has been taking them intermittently due to financial issues. Started with left shoulder pain a couple of days ago.     HPI  Patient is 64-year-old male with past medical history of A-fib, CHF, hypertension, colon cancer presenting to ED for evaluation of shortness of breath.  Patient reports 3 days of shortness of breath and intermittent lightheadedness..  Patient also endorses palpitations and diaphoresis.  Patient reports has not taken his Entresto in 1 month and intermittently takes Eliquis and metoprolol due to financial issues.  Patient also reports left shoulder pain starting a few days ago.  Denies fever, chills, abdominal pain, N/V/D, urinary complaints.  Prior to Admission Medications   Prescriptions Last Dose Informant Patient Reported? Taking?   Eliquis 5 MG  Self No No   Sig: TAKE 1 TABLET BY MOUTH TWICE A DAY   Patient not taking: Reported on 1/24/2023   allopurinol (ZYLOPRIM) 100 mg tablet   No No   Sig: Take 1 tablet (100 mg total) by mouth daily   apixaban (Eliquis) 5 mg  Self No No   Sig: Take 1 tablet (5 mg total) by mouth 2 (two) times a day   aspirin (ECOTRIN LOW STRENGTH) 81 mg EC tablet  Self No No   Sig: Take 1 tablet (81 mg total) by mouth daily   atorvastatin (LIPITOR) 40 mg tablet   No No   Sig: Take 1 tablet (40 mg total) by mouth daily with dinner   diltiazem (CARDIZEM CD) 120 mg 24 hr capsule   No No   Sig: Take 1 capsule (120 mg total) by mouth daily   fluticasone (FLONASE) 50 mcg/act nasal spray  Self No No   Sig: SPRAY 1 SPRAY INTO EACH NOSTRIL EVERY DAY   Patient not taking: Reported on 2/29/2024   furosemide (LASIX) 20 mg tablet   No No   Sig: Take 1 tablet (20 mg total) by mouth daily   losartan (COZAAR) 50 mg tablet   No No   Sig: Take 1 tablet (50 mg total) by mouth  daily   metoprolol succinate (TOPROL-XL) 50 mg 24 hr tablet   No No   Sig: TAKE 1 TABLET BY MOUTH TWICE A DAY   ondansetron (Zofran ODT) 4 mg disintegrating tablet   No No   Sig: Take 1 tablet (4 mg total) by mouth every 6 (six) hours as needed for nausea or vomiting for up to 5 days   Patient not taking: Reported on 3/7/2022   spironolactone (ALDACTONE) 25 mg tablet   No No   Sig: Take 0.5 tablets (12.5 mg total) by mouth daily      Facility-Administered Medications: None       Past Medical History:   Diagnosis Date    A-fib (HCC)     Cancer (HCC)     Colon cancer (HCC)     Colon polyp     Coronary artery disease     Gout     Herpes zoster     last assessed 17    Hypertension     Irregular heart beat     Shingles        Past Surgical History:   Procedure Laterality Date    ELECTRICAL CARDIOVERSION  12/15/2017         HEMICOLOECTOMY W/ ANASTOMOSIS N/A 2020    Procedure: RIGHT HEMICOLECTOMY WITH ILIOCOLIC ANASTAMOSIS;  Surgeon: ANT Villavicencio MD;  Location: [facility];  Service: Colorectal    IR PORT PLACEMENT  2020    IR PORT REMOVAL  2021       Family History   Problem Relation Age of Onset    Coronary artery disease Mother     Alcohol abuse Father     Coronary artery disease Father     Colon cancer Neg Hx      I have reviewed and agree with the history as documented.    E-Cigarette/Vaping    E-Cigarette Use Never User      E-Cigarette/Vaping Substances     Social History     Tobacco Use    Smoking status: Former     Current packs/day: 0.00     Types: Cigarettes     Quit date: 2017     Years since quittin.9    Smokeless tobacco: Never   Vaping Use    Vaping status: Never Used   Substance Use Topics    Alcohol use: Not Currently     Comment: Socially, former alcohol    Drug use: No        Review of Systems   Constitutional:  Positive for diaphoresis. Negative for chills and fever.   HENT:  Negative for ear pain and sore throat.    Respiratory:  Positive for shortness of breath.  Negative for cough.    Cardiovascular:  Positive for palpitations. Negative for chest pain and leg swelling.   Gastrointestinal:  Negative for abdominal pain, diarrhea, nausea and vomiting.   Genitourinary:  Negative for dysuria, frequency and hematuria.   Musculoskeletal:  Negative for back pain and neck pain.   Skin:  Negative for rash.   Neurological:  Positive for light-headedness. Negative for dizziness and headaches.       Physical Exam  ED Triage Vitals   Temperature Pulse Respirations Blood Pressure SpO2   07/23/24 0853 07/23/24 0853 07/23/24 0853 07/23/24 0858 07/23/24 0853   (!) 97 °F (36.1 °C) (!) 46 22 (!) 86/66 96 %      Temp Source Heart Rate Source Patient Position - Orthostatic VS BP Location FiO2 (%)   07/23/24 0853 07/23/24 0857 07/23/24 0853 07/23/24 0853 --   Tympanic Monitor;Apical Sitting Right arm       Pain Score       07/23/24 0853       5             Orthostatic Vital Signs  Vitals:    07/23/24 0857 07/23/24 0858 07/23/24 0915 07/23/24 1000   BP:  (!) 86/66 162/87 144/84   Pulse: (!) 210  104 92   Patient Position - Orthostatic VS:           Physical Exam  Vitals reviewed.   Constitutional:       General: He is awake. He is in acute distress.      Appearance: He is ill-appearing.      Comments: diaphoretic   HENT:      Head: Normocephalic and atraumatic.      Mouth/Throat:      Mouth: Mucous membranes are moist.   Eyes:      Extraocular Movements: Extraocular movements intact.      Right eye: No nystagmus.      Left eye: No nystagmus.      Conjunctiva/sclera: Conjunctivae normal.      Pupils: Pupils are equal, round, and reactive to light.   Cardiovascular:      Rate and Rhythm: Tachycardia present. Rhythm irregularly irregular.      Pulses: Normal pulses.      Heart sounds: Normal heart sounds, S1 normal and S2 normal.   Pulmonary:      Breath sounds: No stridor. No wheezing, rhonchi or rales.      Comments: CTA b/l   Abdominal:      General: Bowel sounds are normal.      Palpations:  Abdomen is soft.      Tenderness: There is no abdominal tenderness.   Musculoskeletal:      Right lower leg: No edema.      Left lower leg: No edema.   Skin:     General: Skin is warm and dry.      Capillary Refill: Capillary refill takes less than 2 seconds.   Neurological:      Mental Status: He is alert and oriented to person, place, and time.      GCS: GCS eye subscore is 4. GCS verbal subscore is 5. GCS motor subscore is 6.         ED Medications  Medications   diltiazem (CARDIZEM) 125 mg in sodium chloride 0.9 % 125 mL infusion (has no administration in time range)   diltiazem (CARDIZEM) injection 30 mg (30 mg Intravenous Given 7/23/24 0912)       Diagnostic Studies  Results Reviewed       Procedure Component Value Units Date/Time    HS Troponin 0hr (reflex protocol) [[id number]]  (Abnormal) Collected: 07/23/24 0923    Lab Status: Final result Specimen: Blood from Arm, Left Updated: 07/23/24 1004     hs TnI 0hr 138 ng/L     HS Troponin I 2hr [[id number]]     Lab Status: No result Specimen: Blood     Comprehensive metabolic panel [[id number]]  (Abnormal) Collected: 07/23/24 0923    Lab Status: Final result Specimen: Blood from Arm, Left Updated: 07/23/24 1001     Sodium 139 mmol/L      Potassium 3.4 mmol/L      Chloride 103 mmol/L      CO2 24 mmol/L      ANION GAP 12 mmol/L      BUN 17 mg/dL      Creatinine 1.38 mg/dL      Glucose 124 mg/dL      Calcium 9.5 mg/dL      AST 25 U/L      ALT 39 U/L      Alkaline Phosphatase 57 U/L      Total Protein 8.3 g/dL      Albumin 4.0 g/dL      Total Bilirubin 1.23 mg/dL      eGFR 53 ml/min/1.73sq m     Narrative:      National Kid[employer] guidelines for Chronic Kidney Disease (CKD):     Stage 1 with normal or high GFR (GFR > 90 mL/min/1.73 square meters)    Stage 2 Mild CKD (GFR = 60-89 mL/min/1.73 square meters)    Stage 3A Moderate CKD (GFR = 45-59 mL/min/1.73 square meters)    Stage 3B Moderate CKD (GFR = 30-44 mL/min/1.73 square meters)    Stage 4 Severe CKD  (GFR = 15-29 mL/min/1.73 square meters)    Stage 5 End Stage CKD (GFR <15 mL/min/1.73 square meters)  Note: GFR calculation is accurate only with a steady state creatinine    CBC and differential [[id number]]  (Abnormal) Collected: 07/23/24 0923    Lab Status: Final result Specimen: Blood from Arm, Left Updated: 07/23/24 0942     WBC 9.62 Thousand/uL      RBC 5.05 Million/uL      Hemoglobin 17.0 g/dL      Hematocrit 50.5 %       fL      MCH 33.7 pg      MCHC 33.7 g/dL      RDW 12.8 %      MPV 11.3 fL      Platelets 275 Thousands/uL      nRBC 0 /100 WBCs      Segmented % 66 %      Immature Grans % 1 %      Lymphocytes % 22 %      Monocytes % 9 %      Eosinophils Relative 1 %      Basophils Relative 1 %      Absolute Neutrophils 6.40 Thousands/µL      Absolute Immature Grans 0.05 Thousand/uL      Absolute Lymphocytes 2.10 Thousands/µL      Absolute Monocytes 0.90 Thousand/µL      Eosinophils Absolute 0.09 Thousand/µL      Basophils Absolute 0.08 Thousands/µL                    XR chest 2 views   ED Interpretation by Lauren Eaton DO (07/23 1020)   No acute cardiopulmonary abnormality             Procedures  Procedures      ED Course  ED Course as of 07/23/24 1042   Tue Jul 23, 2024   0921 Procedure Note: EKG  Date/Time: 07/23/24 9:21 AM   Interpreted by: LAUREN EATON  Indications / Diagnosis: SOB  ECG reviewed by me, the ED Provider: yes   The EKG demonstrates:  Rhythm: Afib with  bpm  Axis: normal axis  QRS/Blocks: normal QRS     0922 Procedure Note: EKG  Date/Time: 07/23/24 9:23 AM   Interpreted by: LAUREN EATON  Indications / Diagnosis: repeat after Cardizem bolus   ECG reviewed by me, the ED Provider: yes   The EKG demonstrates:  Rhythm: sinus tachycardia, 105 bpm, with occasional PVCs  Intervals: normal intervals  Axis: normal axis  QRS/Blocks: normal QRS  ST Changes: No acute ST Changes, no STD/EZIO.     0946 WBC: 9.62   0946 Hemoglobin: 17.0   0946 Platelet Count: 275   1002 Potassium(!):  3.4  Mild hypokalemia    1003 Creatinine(!): 1.38   1018 hs TnI 0hr(!): 138                                       Medical Decision Making  Amount and/or Complexity of Data Reviewed  Labs: ordered. Decision-making details documented in ED Course.  Radiology: ordered and independent interpretation performed.    Risk  Prescription drug management.  Decision regarding hospitalization.        Patient is 64 y.o. male with PMH of  A-fib, CHF, hypertension, colon cancer presenting to ED for evaluation of shortness of breath. . See history and physical documented above.     On initial presentation patient ill appearing, diaphoretic. HR in 190s-210s Afib with RVR. Initial BP hypotensive and IVF started. Repeat BP normotensive. Cardizem bolus given with improvement of heart rate to low 100s and temporarily converted to NSR.    Differential diagnosis included but not limited to medication noncompliance, ACS, arrhythmia,electrolyte disturbance anemia. Plan CBC, CMP, troponin, CXR. Cardizem drip and admission for Afib with RVR and medication management.    View ED course above for further discussion on patient workup.     All labs reviewed and utilized in the medical decision making process  All radiology studies independently viewed by me and interpreted by the radiologist.  I reviewed all testing with the patient.     Case Discussed with SLIM admitted to SD[facility].     Disposition  Final diagnoses:   Atrial fibrillation with RVR (HCC)   SOB (shortness of breath)   Elevated troponin     Time reflects when diagnosis was documented in both MDM as applicable and the Disposition within this note       Time User Action Codes Description Comment    7/23/2024 10:40 AM Lauren Mclaughlin Add [I48.91] Atrial fibrillation with RVR (HCC)     7/23/2024 10:40 AM Lauren Mclaughlin Add [R06.02] SOB (shortness of breath)     7/23/2024 10:41 AM Lauren Mclaughlin Add [R79.89] Elevated troponin           ED Disposition       ED Disposition   Admit    Condition    Stable    Date/Time   Tue Jul 23, 2024 10:40 AM    Comment   Case was discussed with Dr. Johnson    and the patient's admission status was agreed to be Admission Status: inpatient status to the service of Dr. sosa  .               Follow-up Information    None         Patient's Medications   Discharge Prescriptions    No medications on file     No discharge procedures on file.    PDMP Review       None             ED Provider  Attending physically available and evaluated Bernardino Nunez. I managed the patient along with the ED Attending.    Electronically Signed by           Lauren Mclaughlin DO  07/23/24 4586

## 2024-07-23 NOTE — ASSESSMENT & PLAN NOTE
Patient initially presented with A-fib with RVR.  Check TSH  Consult cardiology  Continue with Cardizem drip   Anticoagulation

## 2024-07-23 NOTE — H&P
[facility]  H&P  Name: Bernardino Nunez 64 y.o. male I MRN: [id number]  Unit/Bed#: ED 23 I Date of Admission: 7/23/2024   Date of Service: 7/23/2024 I Hospital Day: 0      Assessment & Plan   * A-fib (HCC)  Assessment & Plan  Patient initially presented with A-fib with RVR.  Check TSH  Consult cardiology  Continue with Cardizem drip   Anticoagulation with Eliquis    Acute renal failure (ARF) (HCC)  Assessment & Plan  Continue gentle hydration.  Monitor labs closely.    History of gout  Assessment & Plan  Currently on allopurinol    Serum total bilirubin elevated  Assessment & Plan  Patient with reported prior history of elevated bilirubin.  Will continue to trend for now.           VTE Prophylaxis: Apixaban (Eliquis)  / sequential compression device   Code Status: fc  POLST: There is no POLST form on file for this patient (pre-hospital)      Anticipated Length of Stay:  Patient will be admitted on an Inpatient basis with an anticipated length of stay of  > 2 midnights.   Justification for Hospital Stay: need to monitor arrythmia    Total Time for Visit, including Counseling / Coordination of Care: 90 minutes.  Greater than 50% of this total time spent on direct patient counseling and coordination of care.    Chief Complaint: Shortness of breath  History of Present Illness:    Bernardino Nunez is a 64 y.o. male who presents with a past medical history atrial fibrillation, congestive heart failure hypertension colon cancer who presents to the ED for evaluation of shortness of breath.  Patient presented with a 3-day history of shortness of breath with intermittent lightheadedness.  Patient was reported to present with A-fib with RVR and was subsequently placed on Cardizem drip.  Presented for 3 days of shortness of breath, lightheadedness, palpitations and intermittent left shoulder pain. Has not taken Entresto in 1 month and intermittently takes Eliquis and metoprolol due to  financial issues. On arrival in [facility] with RVR 190s to 210s, diaphoretic.       Review of Systems:    Review of Systems   Constitutional:  Negative for chills and diaphoresis.   Respiratory:  Positive for shortness of breath. Negative for cough and chest tightness.    Neurological:  Negative for dizziness.   All other systems reviewed and are negative.      Past Medical and Surgical History:     Past Medical History:   Diagnosis Date    [facility] (HCC)     Cancer (HCC)     Colon cancer (HCC)     Colon polyp     Coronary artery disease     Gout     Herpes zoster     last assessed 12/18/17    Hypertension     Irregular heart beat     Shingles        Past Surgical History:   Procedure Laterality Date    ELECTRICAL CARDIOVERSION  12/15/2017         HEMICOLOECTOMY W/ ANASTOMOSIS N/A 8/28/2020    Procedure: RIGHT HEMICOLECTOMY WITH ILIOCOLIC ANASTAMOSIS;  Surgeon: ANT Villavicencio MD;  Location: [facility];  Service: Colorectal    IR PORT PLACEMENT  9/21/2020    IR PORT REMOVAL  4/5/2021       Meds/Allergies:    Prior to Admission medications    Medication Sig Start Date End Date Taking? Authorizing Provider   allopurinol (ZYLOPRIM) 100 mg tablet Take 1 tablet (100 mg total) by mouth daily 2/29/24   Jonathan Burrows MD   apixaban (Eliquis) 5 mg Take 1 tablet (5 mg total) by mouth 2 (two) times a day 10/28/22   Bryce Alcaraz MD   aspirin (ECOTRIN LOW STRENGTH) 81 mg EC tablet Take 1 tablet (81 mg total) by mouth daily 1/26/21   Madyson Franco PA-C   atorvastatin (LIPITOR) 40 mg tablet Take 1 tablet (40 mg total) by mouth daily with dinner 2/29/24   Jonathan Burrows MD   diltiazem (CARDIZEM CD) 120 mg 24 hr capsule Take 1 capsule (120 mg total) by mouth daily 2/29/24   Jonathan Burrows MD   Eliquis 5 MG TAKE 1 TABLET BY MOUTH TWICE A DAY  Patient not taking: Reported on 1/24/2023 10/28/22   Bryce Alcaraz MD   fluticasone (FLONASE) 50 mcg/act nasal spray SPRAY 1 SPRAY INTO EACH NOSTRIL EVERY DAY  Patient not taking:  "Reported on 2024   Pepe Chakraborty DO   furosemide (LASIX) 20 mg tablet Take 1 tablet (20 mg total) by mouth daily 24   Jonathan Burrows MD   losartan (COZAAR) 50 mg tablet Take 1 tablet (50 mg total) by mouth daily 3/4/24   Bryce Alcaraz MD   metoprolol succinate (TOPROL-XL) 50 mg 24 hr tablet TAKE 1 TABLET BY MOUTH TWICE A DAY 24   Jonathan Burrows MD   ondansetron (Zofran ODT) 4 mg disintegrating tablet Take 1 tablet (4 mg total) by mouth every 6 (six) hours as needed for nausea or vomiting for up to 5 days  Patient not taking: Reported on 3/7/2022 12/21/21 12/26/21  Leann Gaspar MD   spironolactone (ALDACTONE) 25 mg tablet Take 0.5 tablets (12.5 mg total) by mouth daily 24   Jonathan Burrows MD     I have reviewed home medications with patient personally.    Allergies: No Known Allergies    Social History:     Marital Status: Single   Patient Pre-hospital Living Situation: [facility]  Substance Use History:   Social History     Substance and Sexual Activity   Alcohol Use Yes    Alcohol/week: 20.0 standard drinks of alcohol    Types: 20 Cans of beer per week    Comment: drink about 4 days a weeks usually drink beer     Social History     Tobacco Use   Smoking Status Former    Current packs/day: 0.00    Types: Cigarettes    Quit date: 2017    Years since quittin.9   Smokeless Tobacco Never     Social History     Substance and Sexual Activity   Drug Use No       Family History:    non-contributory    Physical Exam:     Vitals:   Blood Pressure: 145/73 (24 1300)  Pulse: 90 (24 1300)  Temperature: (!) 97 °F (36.1 °C) (24 0853)  Temp Source: Tympanic (24 0853)  Respirations: 20 (24 1300)  Height: 5' 9\" (175.3 cm) (24 1115)  Weight - Scale: 104 kg (230 lb) (24 1115)  SpO2: 97 % (24 1300)    Physical Exam  HENT:      Head: Normocephalic and atraumatic.   Cardiovascular:      Rate and Rhythm: Normal rate and regular rhythm.      Heart " sounds: No murmur heard.  Pulmonary:      Effort: Pulmonary effort is normal.      Breath sounds: Normal breath sounds.   Abdominal:      General: Abdomen is flat.      Palpations: Abdomen is soft.   Neurological:      General: No focal deficit present.             Additional Data:     Lab Results: I have personally reviewed pertinent reports.      Results from last 7 days   Lab Units 07/23/24  0923   WBC Thousand/uL 9.62   HEMOGLOBIN g/dL 17.0   HEMATOCRIT % 50.5*   PLATELETS Thousands/uL 275   SEGS PCT % 66   LYMPHO PCT % 22   MONO PCT % 9   EOS PCT % 1     Results from last 7 days   Lab Units 07/23/24  0923   SODIUM mmol/L 139   POTASSIUM mmol/L 3.4*   CHLORIDE mmol/L 103   CO2 mmol/L 24   BUN mg/dL 17   CREATININE mg/dL 1.38*   ANION GAP mmol/L 12   CALCIUM mg/dL 9.5   ALBUMIN g/dL 4.0   TOTAL BILIRUBIN mg/dL 1.23*   ALK PHOS U/L 57   ALT U/L 39   AST U/L 25   GLUCOSE RANDOM mg/dL 124                       Imaging: I have personally reviewed pertinent reports.      XR chest 2 views   ED Interpretation by Lauren Mclaughlin DO (07/23 1020)   No acute cardiopulmonary abnormality       Final Result by Arthur Navarrete MD (07/23 1140)      No acute cardiopulmonary disease within limitations of the exam.            Resident: Jose Paul I, the attending radiologist, have reviewed the images and agree with the final report above.      Workstation performed: [id number]               ** Please Note: This note has been constructed using a voice recognition system. **

## 2024-07-24 ENCOUNTER — APPOINTMENT (INPATIENT)
Dept: NON INVASIVE DIAGNOSTICS | Facility: HOSPITAL | Age: 64
DRG: 309 | End: 2024-07-24
Payer: MEDICARE

## 2024-07-24 LAB
ANION GAP SERPL CALCULATED.3IONS-SCNC: 9 MMOL/L (ref 4–13)
AORTIC ROOT: 3.9 CM
APICAL FOUR CHAMBER EJECTION FRACTION: 43 %
ASCENDING AORTA: 3.6 CM
BASOPHILS # BLD AUTO: 0.06 THOUSANDS/ÂΜL (ref 0–0.1)
BASOPHILS NFR BLD AUTO: 1 % (ref 0–1)
BSA FOR ECHO PROCEDURE: 2.19 M2
BUN SERPL-MCNC: 14 MG/DL (ref 5–25)
CALCIUM SERPL-MCNC: 8.4 MG/DL (ref 8.4–10.2)
CHLORIDE SERPL-SCNC: 106 MMOL/L (ref 96–108)
CO2 SERPL-SCNC: 23 MMOL/L (ref 21–32)
CREAT SERPL-MCNC: 0.86 MG/DL (ref 0.6–1.3)
E WAVE DECELERATION TIME: 217 MS
E/A RATIO: 1.64
EOSINOPHIL # BLD AUTO: 0.11 THOUSAND/ÂΜL (ref 0–0.61)
EOSINOPHIL NFR BLD AUTO: 1 % (ref 0–6)
ERYTHROCYTE [DISTWIDTH] IN BLOOD BY AUTOMATED COUNT: 12.7 % (ref 11.6–15.1)
FRACTIONAL SHORTENING: 28 % (ref 28–44)
GFR SERPL CREATININE-BSD FRML MDRD: 91 ML/MIN/1.73SQ M
GLUCOSE SERPL-MCNC: 96 MG/DL (ref 65–140)
HCT VFR BLD AUTO: 42.4 % (ref 36.5–49.3)
HGB BLD-MCNC: 13.8 G/DL (ref 12–17)
IMM GRANULOCYTES # BLD AUTO: 0.02 THOUSAND/UL (ref 0–0.2)
IMM GRANULOCYTES NFR BLD AUTO: 0 % (ref 0–2)
INTERVENTRICULAR SEPTUM IN DIASTOLE (PARASTERNAL SHORT AXIS VIEW): 1.39 CM
INTERVENTRICULAR SEPTUM: 1.6 CM (ref 0.6–1.1)
LAAS-AP2: 24 CM2
LAAS-AP4: 24.2 CM2
LEFT ATRIUM SIZE: 4.6 CM
LEFT ATRIUM VOLUME (MOD BIPLANE): 75 ML
LEFT ATRIUM VOLUME INDEX (MOD BIPLANE): 34.2 ML/M2
LEFT INTERNAL DIMENSION IN SYSTOLE: 4.3 CM (ref 2.1–4)
LEFT VENTRICULAR INTERNAL DIMENSION IN DIASTOLE: 5.95 CM (ref 3.5–6)
LEFT VENTRICULAR POSTERIOR WALL IN END DIASTOLE: 1.11 CM
LEFT VENTRICULAR STROKE VOLUME: 85 ML
LVSV (TEICH): 85 ML
LYMPHOCYTES # BLD AUTO: 1.49 THOUSANDS/ÂΜL (ref 0.6–4.47)
LYMPHOCYTES NFR BLD AUTO: 17 % (ref 14–44)
MCH RBC QN AUTO: 32.9 PG (ref 26.8–34.3)
MCHC RBC AUTO-ENTMCNC: 32.5 G/DL (ref 31.4–37.4)
MCV RBC AUTO: 101 FL (ref 82–98)
MONOCYTES # BLD AUTO: 1.06 THOUSAND/ÂΜL (ref 0.17–1.22)
MONOCYTES NFR BLD AUTO: 12 % (ref 4–12)
MV E'TISSUE VEL-SEP: 7 CM/S
MV PEAK A VEL: 0.67 M/S
MV PEAK E VEL: 110 CM/S
MV STENOSIS PRESSURE HALF TIME: 63 MS
MV VALVE AREA P 1/2 METHOD: 3.49
NEUTROPHILS # BLD AUTO: 5.88 THOUSANDS/ÂΜL (ref 1.85–7.62)
NEUTS SEG NFR BLD AUTO: 69 % (ref 43–75)
NRBC BLD AUTO-RTO: 0 /100 WBCS
PLATELET # BLD AUTO: 198 THOUSANDS/UL (ref 149–390)
PMV BLD AUTO: 10.9 FL (ref 8.9–12.7)
POTASSIUM SERPL-SCNC: 3.7 MMOL/L (ref 3.5–5.3)
RA PRESSURE ESTIMATED: 3 MMHG
RBC # BLD AUTO: 4.2 MILLION/UL (ref 3.88–5.62)
RIGHT ATRIAL 2D VOLUME: 46 ML
RIGHT ATRIUM AREA SYSTOLE A4C: 18.3 CM2
RIGHT VENTRICLE ID DIMENSION: 4.2 CM
SINOTUBULAR JUNCTION: 3 CM
SL CV LEFT ATRIUM LENGTH A2C: 6.1 CM
SL CV LV EF: 45
SL CV PED ECHO LEFT VENTRICLE DIASTOLIC VOLUME (MOD BIPLANE) 2D: 166 ML
SL CV PED ECHO LEFT VENTRICLE SYSTOLIC VOLUME (MOD BIPLANE) 2D: 81 ML
SL CV SINUS OF VALSALVA 2D: 3.7 CM
SODIUM SERPL-SCNC: 138 MMOL/L (ref 135–147)
STJ: 3 CM
TR PEAK VELOCITY: 2.2 M/S
TRICUSPID ANNULAR PLANE SYSTOLIC EXCURSION: 1.7 CM
TSH SERPL DL<=0.05 MIU/L-ACNC: 1.3 UIU/ML (ref 0.45–4.5)
WBC # BLD AUTO: 8.62 THOUSAND/UL (ref 4.31–10.16)

## 2024-07-24 PROCEDURE — 97162 PT EVAL MOD COMPLEX 30 MIN: CPT

## 2024-07-24 PROCEDURE — 97166 OT EVAL MOD COMPLEX 45 MIN: CPT

## 2024-07-24 PROCEDURE — 99223 1ST HOSP IP/OBS HIGH 75: CPT | Performed by: INTERNAL MEDICINE

## 2024-07-24 PROCEDURE — 80048 BASIC METABOLIC PNL TOTAL CA: CPT | Performed by: INTERNAL MEDICINE

## 2024-07-24 PROCEDURE — 93306 TTE W/DOPPLER COMPLETE: CPT

## 2024-07-24 PROCEDURE — 99233 SBSQ HOSP IP/OBS HIGH 50: CPT | Performed by: INTERNAL MEDICINE

## 2024-07-24 PROCEDURE — 84443 ASSAY THYROID STIM HORMONE: CPT | Performed by: INTERNAL MEDICINE

## 2024-07-24 PROCEDURE — 85025 COMPLETE CBC W/AUTO DIFF WBC: CPT | Performed by: INTERNAL MEDICINE

## 2024-07-24 PROCEDURE — 93306 TTE W/DOPPLER COMPLETE: CPT | Performed by: INTERNAL MEDICINE

## 2024-07-24 RX ORDER — FUROSEMIDE 20 MG/1
20 TABLET ORAL ONCE
Status: COMPLETED | OUTPATIENT
Start: 2024-07-25 | End: 2024-07-25

## 2024-07-24 RX ORDER — FUROSEMIDE 10 MG/ML
40 INJECTION INTRAMUSCULAR; INTRAVENOUS ONCE
Status: COMPLETED | OUTPATIENT
Start: 2024-07-24 | End: 2024-07-24

## 2024-07-24 RX ADMIN — ATORVASTATIN CALCIUM 40 MG: 40 TABLET, FILM COATED ORAL at 17:58

## 2024-07-24 RX ADMIN — APIXABAN 5 MG: 5 TABLET, FILM COATED ORAL at 09:06

## 2024-07-24 RX ADMIN — LOSARTAN POTASSIUM 50 MG: 50 TABLET, FILM COATED ORAL at 09:06

## 2024-07-24 RX ADMIN — APIXABAN 5 MG: 5 TABLET, FILM COATED ORAL at 17:58

## 2024-07-24 RX ADMIN — DILTIAZEM HYDROCHLORIDE 30 MG: 30 TABLET ORAL at 06:07

## 2024-07-24 RX ADMIN — ASPIRIN 81 MG: 81 TABLET, COATED ORAL at 09:06

## 2024-07-24 RX ADMIN — FUROSEMIDE 40 MG: 10 INJECTION, SOLUTION INTRAMUSCULAR; INTRAVENOUS at 13:21

## 2024-07-24 RX ADMIN — SPIRONOLACTONE 12.5 MG: 25 TABLET ORAL at 09:06

## 2024-07-24 RX ADMIN — METOPROLOL SUCCINATE 50 MG: 50 TABLET, EXTENDED RELEASE ORAL at 09:06

## 2024-07-24 RX ADMIN — METOPROLOL SUCCINATE 50 MG: 50 TABLET, EXTENDED RELEASE ORAL at 17:58

## 2024-07-24 RX ADMIN — ALLOPURINOL 100 MG: 100 TABLET ORAL at 09:06

## 2024-07-24 NOTE — OCCUPATIONAL THERAPY NOTE
Occupational Therapy Evaluation     Patient Name: Bernardino Nunez  Today's Date: 7/24/2024  Problem List  Principal Problem:    A-fib (HCC)  Active Problems:    Serum total bilirubin elevated    Chronic combined systolic and diastolic heart failure (HCC)    History of gout    Cancer of ascending colon (HCC)    Acute renal failure (ARF) (HCC)    Past Medical History  Past Medical History:   Diagnosis Date    A-fib (HCC)     Cancer (HCC)     Colon cancer (HCC)     Colon polyp     Coronary artery disease     Gout     Herpes zoster     last assessed 12/18/17    Hypertension     Irregular heart beat     Shingles      Past Surgical History  Past Surgical History:   Procedure Laterality Date    ELECTRICAL CARDIOVERSION  12/15/2017         HEMICOLOECTOMY W/ ANASTOMOSIS N/A 8/28/2020    Procedure: RIGHT HEMICOLECTOMY WITH ILIOCOLIC ANASTAMOSIS;  Surgeon: ANT Villavicencio MD;  Location: [facility];  Service: Colorectal    IR PORT PLACEMENT  9/21/2020    IR PORT REMOVAL  4/5/2021 07/24/24 1020   OT Last Visit   OT Visit Date 07/24/24   Note Type   Note type Evaluation   Pain Assessment   Pain Assessment Tool 0-10   Pain Score No Pain   Restrictions/Precautions   Weight Bearing Precautions Per Order No   Other Precautions Telemetry   Home Living   Type of Home Apartment   Home Layout One level;Stairs to enter with rails  (15 EZIO)   Bathroom Shower/Tub Tub/shower unit   Bathroom Toilet Standard   Bathroom Equipment   (denies)   Home Equipment   (denies)   Prior Function   Level of [place] Independent with ADLs;Independent with functional mobility;Independent with IADLS   Lives With Alone   IADLs Independent with driving;Independent with meal prep;Independent with medication management   Falls in the last 6 months 0   Vocational Retired   Lifestyle   Autonomy Pt reports (I) with ADLs, IADLs and functional mobility. Pt +  and retired   Service to Others retired   ADL   Where Assessed Edge of bed   Eating  Assistance 6  Modified independent   Grooming Assistance 6  Modified Independent   UB Bathing Assistance 6  Modified Independent   LB Bathing Assistance 6  Modified Independent   UB Dressing Assistance 6  Modified independent   LB Dressing Assistance 6  Modified independent   Toileting Assistance  6  Modified independent   Functional Assistance 6  Modified independent   Bed Mobility   Supine to Sit 6  Modified independent   Transfers   Sit to Stand 6  Modified independent   Stand to Sit 6  Modified independent   Additional Comments no AD   Functional Mobility   Functional Mobility 6  Modified independent   Additional Comments Pt MOD IND to ambulate household distance functional mobility without AD   Balance   Static Sitting Normal   Dynamic Sitting Good   Static Standing Good   Dynamic Standing Good   Ambulatory Good   Activity Tolerance   Activity Tolerance Patient tolerated treatment well   Medical Staff Made Aware PT   Nurse Made Aware RN cleared   RUE Assessment   RUE Assessment WFL   LUE Assessment   LUE Assessment WFL   Hand Function   Gross Motor Coordination Functional   Fine Motor Coordination Functional   Cognition   Overall Cognitive Status WFL   Arousal/Participation Alert;Responsive;Cooperative   Attention Within functional limits   Orientation Level Oriented X4   Memory Within functional limits   Following Commands Follows all commands and directions without difficulty   Comments Pt agreeable to therapy with good safety awareness   Assessment   Prognosis Good   Assessment Pt is a 65 y/o male that was admitted to [facility] 7/23/2024 with A fib. Pt  has a past medical history of A-fib (HCC), Cancer (HCC), Colon cancer (HCC), Colon polyp, Coronary artery disease, Gout, Herpes zoster, Hypertension, Irregular heart beat, and Shingles. Pt lives alone dora  one level apartment with 15 EZIO, standard toilet, and tub shower unit. Pt reports using no AD at baseline. Prior to admission pt (I) ADLs, IADLs,  and functional mobility. Pt currently MOD IND for all ADLs, functional transfers, and functional mobility. Pt supine in bed at begning of session, pt seated EOB at end of session with items within reach. The patient's raw score on the AM-PAC Daily Activity Inpatient Short Form is 24. A raw score of greater than or equal to 19 suggests the patient may benefit from discharge to home. Please refer to the recommendation of the Occupational Therapist for safe discharge planning. Pt appears to be functioning at/close to baseline, further OT services not indicated at this time. Will d/c OT services, please re-consult if OT needs arise.   Goals   Patient Goals to go home   Plan   OT Frequency Eval only   Discharge Recommendation   Rehab Resource Intensity Level, OT No post-acute rehabilitation needs   AM-PAC Daily Activity Inpatient   Lower Body Dressing 4   Bathing 4   Toileting 4   Upper Body Dressing 4   Grooming 4   Eating 4   Daily Activity Raw Score 24   Daily Activity Standardized Score (Calc for Raw Score >=11) 57.54   AM-PAC Applied Cognition Inpatient   Following a Speech/Presentation 4   Understanding Ordinary Conversation 4   Taking Medications 4   Remembering Where Things Are Placed or Put Away 4   Remembering List of 4-5 Errands 4   Taking Care of Complicated Tasks 4   Applied Cognition Raw Score 24   Applied Cognition Standardized Score 62.21   End of Consult   Education Provided Yes   Patient Position at End of Consult Seated edge of bed;All needs within reach   Nurse Communication Nurse aware of consult     NEW [place], POLAR/L

## 2024-07-24 NOTE — CONSULTS
Consultation - Cardiology Team 1  Bernardino Nunez 64 y.o. male MRN: [id number]  Unit/Bed#: -01 Encounter: [id number]    Assessment & Plan     Principal Problem:    A-fib (HCC)  Active Problems:    Serum total bilirubin elevated    History of gout    Acute renal failure (ARF) ([facility])        Assessment  Atrial fibrillation with RVR- PAF        Patient presented tachycardic _ 211BPM and hypotensive        Subsequent ECG looks like 2-1 atrial flutter and ultimately sinus rhythm        History of multiple cardioversions with ultimate A-fib ablation-details in HPI        Loop recorder indicates largely maintaining NSR through 7/18 interrogation        Patient reports he is unable to afford medications and not consistently taking them        Patient presented with palpitations.         IV Cardizem now off.  Patient in sinus rhythm.  Asymptomatic at this time.          Chronic combined systolic and diastolic heart failure -with recovered LV function .         Not consistently taking GDMT.  Is not taking Entresto at all.  Inconsistently taking metoprolol and         Spironolactone.         Patient has noticed increasing cough.  Worsening MARION prior to the last 4 days of palpitations.    Nonischemic cardiomyopathy-tachycardic mediated        Recovered LV function        Most recent TTE LVEF 65% 4/2022        Current TTE pending    Coronary artery disease        Patient had several days of left shoulder discomfort may be anginal.  This was during his atrial fibrillation         with RVR.  Now resolved.  He is in sinus rhythm.        Left heart cath 3/2018 RI 80%.  Ostial PDA 60%        Mild troponin elevation        AP-aspirin 81 mg.  Statin-atorvastatin 40 mg    5.   SCARLETT-hypoperfusion due to tachycardia and hypotension.  Resolved.    6.   Moderate to heavy alcohol use    7.   2-1 heart block-briefly overnight.    8.   Highly suspected REGULO    Plan:  Continue telemetry today  Will give 40 mg IV Lasix now.  Otherwise agree  with resumption of spironolactone 12.5 mg daily.  CHF teaching.  2 g sodium restriction 1800 cc fluid restriction  Eventual ischemic evaluation.  Stress test likely but to be determined upon review of echocardiogram.  5.   Would recommend complete cessation of alcohol  6.   REGULO evaluation  7.   Once assure renal function stable would initiate ARB.  Avoid Entresto due to cost.  8.   Metoprolol tartrate resumed to 50 mg twice daily. Will hold off on oral cardizem for now.   9.   Will price check Eliquis but likely will need warfarin     History of Present Illness   Physician Requesting Consult: Maribeth Pettit DO  Reason for Consult / Principal Problem: Atrial fibrillation with RVR    Is followed by Dr. Alcaraz and Dr. Burrows    HPI: Bernardino Nunez is a 64 y.o. year old male with nonischemic cardiomyopathy -tachycardic mediated with recovery of LV function, paroxysmal atrial fibrillation (history of multiple cardioversions and ultimately atrial fibrillation ablation, 1/2021) ,  CAD  (left heart cath 2018- 80% ramus and 60% PDA ), morbid obesity who presents with chest discomfort or lightheadedness.  Noted to be in atrial fibrillation with RVR.  At 1 point he felt presyncopal.  Patient has been having palpitations for about 4 days.  Felt poorly.  Also had left shoulder discomfort for a few days that he thought he slept oddly.  Not necessarily worsened with exertion.  Reports increased cough.  Worse lying down.  No PND orthopnea.  No edema or abdominal bloating.  He is unable to afford his medication and he has been inconsistently taking spironolactone, metoprolol and diltiazem.  He is not taking Entresto or Eliquis.  He drinks 7-8 beers several times a week.  He has been doing this for many years.  He does not monitor his sodium intake.  In the ED on arrival patient heart rate 211 bpm and was hypotensive.  Subsequent ECG 2-1 atrial flutter with eventual sinus rhythm.  On review of telemetry brief 2-1 heart block  overnight.    0-hour troponin 138  2-hour troponin 112  BUN/creatinine-17/1.38    He is followed by EP/Dr. Burrows.  Additionally followed by Dr. Alcaraz. History of cardioversion 2017 and maintained sinus rhythm on metoprolol and digoxin.  An outpatient stress test 8/2020 noted to be in atrial fibrillation with RVR.  Sent to the ED.  He was anemic.  At that time he was diagnosed with stage III colon cancer.  He since underwent right hemicolectomy.  Completed chemotherapy.  Underwent cardioversion 9/2020.  Follow-up appointment noted to be back in atrial fibrillation but asymptomatic.  Was determined to continue with rate control strategy.  He was started on diltiazem 120 mg daily.  Echocardiogram revealed EF at that time 50% and there was no overt signs of heart failure therefore sinus rhythm was not restored.  At that point he was on metoprolol succinate 100 mg twice a day, spironolactone 12.  5 mg daily and the diltiazem 120 mg daily was started.  He ultimately was admitted with A-fib with RVR.  He was cardioverted to sinus rhythm.  He was started on Entresto.  He ultimately underwent atrial fibrillation ablation 1/2021.     Loop recorder interrogation yesterday atrial fibrillation with RVR.  A-fib burden 35%  Loop recorder interrogation 7/18-A-fib burden 0.2%  Current interrogation 6/13 episode of  bpm 5 seconds.    TTE 4/2022-LVEF 65%.  Grade 2 diastolic dysfunction.  RV mildly dilated with normal function.  Biatrial dilatation mild.  Mild MR.  Small pericardial effusion no evidence of tamponade.    Inpatient consult to Cardiology  Consult performed by: KOFFI Maxwell  Consult ordered by: Maribeth Pettit DO        Review of Systems   Constitutional:  Positive for activity change and fatigue. Negative for unexpected weight change.   HENT: Negative.     Eyes: Negative.    Respiratory:  Positive for cough and shortness of breath.    Cardiovascular:  Positive for palpitations.   Gastrointestinal:  Negative.    Endocrine: Negative.    Genitourinary: Negative.    Musculoskeletal: Negative.    Skin: Negative.    Allergic/Immunologic: Negative.    Neurological:  Positive for light-headedness.   Hematological: Negative.    Psychiatric/Behavioral: Negative.     All other systems reviewed and are negative.      Historical Information   Past Medical History:   Diagnosis Date    A-fib (HCC)     Cancer (HCC)     Colon cancer (HCC)     Colon polyp     Coronary artery disease     Gout     Herpes zoster     last assessed 17    Hypertension     Irregular heart beat     Shingles      Past Surgical History:   Procedure Laterality Date    ELECTRICAL CARDIOVERSION  12/15/2017         HEMICOLOECTOMY W/ ANASTOMOSIS N/A 2020    Procedure: RIGHT HEMICOLECTOMY WITH ILIOCOLIC ANASTAMOSIS;  Surgeon: ANT Villavicencio MD;  Location: [facility];  Service: Colorectal    IR PORT PLACEMENT  2020    IR PORT REMOVAL  2021     Social History     Substance and Sexual Activity   Alcohol Use Yes    Alcohol/week: 20.0 standard drinks of alcohol    Types: 20 Cans of beer per week    Comment: drink about 4 days a weeks usually drink beer     Social History     Substance and Sexual Activity   Drug Use No     Social History     Tobacco Use   Smoking Status Former    Current packs/day: 0.00    Types: Cigarettes    Quit date: 2017    Years since quittin.9   Smokeless Tobacco Never     Family History:   Family History   Problem Relation Age of Onset    Coronary artery disease Mother     Alcohol abuse Father     Coronary artery disease Father     Colon cancer Neg Hx        Meds/Allergies   current meds:   Current Facility-Administered Medications   Medication Dose Route Frequency    allopurinol (ZYLOPRIM) tablet 100 mg  100 mg Oral Daily    apixaban (ELIQUIS) tablet 5 mg  5 mg Oral BID    aspirin (ECOTRIN LOW STRENGTH) EC tablet 81 mg  81 mg Oral Daily    atorvastatin (LIPITOR) tablet 40 mg  40 mg Oral Daily With Dinner     "diltiazem (CARDIZEM) tablet 30 mg  30 mg Oral Q6H DARREN    fluticasone (FLONASE) 50 mcg/act nasal spray 1 spray  1 spray Each Nare BID    furosemide (LASIX) tablet 20 mg  20 mg Oral Daily    losartan (COZAAR) tablet 50 mg  50 mg Oral Daily    metoprolol succinate (TOPROL-XL) 24 hr tablet 50 mg  50 mg Oral BID    sodium chloride 0.9 % infusion  50 mL/hr Intravenous Continuous    spironolactone (ALDACTONE) tablet 12.5 mg  12.5 mg Oral Daily    and PTA meds:    Medications Prior to Admission:     allopurinol (ZYLOPRIM) 100 mg tablet    apixaban (Eliquis) 5 mg    atorvastatin (LIPITOR) 40 mg tablet    diltiazem (CARDIZEM CD) 120 mg 24 hr capsule    furosemide (LASIX) 20 mg tablet    losartan (COZAAR) 50 mg tablet    metoprolol succinate (TOPROL-XL) 50 mg 24 hr tablet    spironolactone (ALDACTONE) 25 mg tablet    aspirin (ECOTRIN LOW STRENGTH) 81 mg EC tablet    Eliquis 5 MG    fluticasone (FLONASE) 50 mcg/act nasal spray    ondansetron (Zofran ODT) 4 mg disintegrating tablet    Sacubitril-Valsartan (ENTRESTO PO)  No Known Allergies    Objective   Vitals: Blood pressure 126/84, pulse 83, temperature 98.3 °F (36.8 °C), resp. rate 18, height 5' 9\" (1.753 m), weight 104 kg (230 lb), SpO2 96%.  Orthostatic Blood Pressures      Flowsheet Row Most Recent Value   Blood Pressure 126/84 filed at 07/24/2024 0706   Patient Position - Orthostatic VS Lying filed at 07/24/2024 0250              Intake/Output Summary (Last 24 hours) at 7/24/2024 0829  Last data filed at 7/24/2024 0612  Gross per 24 hour   Intake 370 ml   Output --   Net 370 ml       Invasive Devices       Peripheral Intravenous Line  Duration             Peripheral IV 07/23/24 Left Antecubital <1 day                    Physical Exam: /84   Pulse 83   Temp 98.3 °F (36.8 °C)   Resp 18   Ht 5' 9\" (1.753 m)   Wt 104 kg (230 lb)   SpO2 96%   BMI 33.97 kg/m²   General Appearance:    Alert, cooperative, no distress, appears stated age   Head:    Normocephalic, no " scleral icterus   Eyes:    PERRL   Nose:   Nares normal, septum midline, mucosa normal, no drainage    Throat:   Lips, mucosa, and tongue normal   Neck:   Supple, symmetrical, trachea midline     no carotid bruit or JVD   Back:     Symmetric   Lungs:     Clear to auscultation bilaterally, respirations unlabored   Chest Wall:    No tenderness or deformity    Heart:    Regular rate and rhythm, S1 and S2 normal, no murmur, rub   or gallop   Abdomen:     Soft, non-tender, bowel sounds active all four quadrants,     no masses, no organomegaly   Extremities:   Extremities normal, atraumatic, no cyanosis or edema   Pulses:   2+ and symmetric all extremities   Skin:   Skin color, texture, turgor normal, no rashes or lesions   Neurologic:   Alert and oriented to person place and time. No focal deficits       Lab Results:   Recent Results (from the past 72 hour(s))   ECG 12 lead    Collection Time: 07/23/24  8:57 AM   Result Value Ref Range    Ventricular Rate 211 BPM    Atrial Rate 214 BPM    IA Interval  ms    QRSD Interval 80 ms    QT Interval 188 ms    QTC Interval 352 ms    P Axis  degrees    QRS Axis 6 degrees    T Wave Axis 225 degrees   ECG 12 lead    Collection Time: 07/23/24  9:06 AM   Result Value Ref Range    Ventricular Rate 196 BPM    Atrial Rate 228 BPM    IA Interval  ms    QRSD Interval 80 ms    QT Interval 214 ms    QTC Interval 386 ms    P Axis 0 degrees    QRS Axis 27 degrees    T Wave Axis 195 degrees   ECG 12 lead    Collection Time: 07/23/24  9:14 AM   Result Value Ref Range    Ventricular Rate 105 BPM    Atrial Rate 105 BPM    IA Interval 134 ms    QRSD Interval 92 ms    QT Interval 332 ms    QTC Interval 438 ms    P Axis 140 degrees    QRS Axis 10 degrees    T Wave Axis 93 degrees   CBC and differential    Collection Time: 07/23/24  9:23 AM   Result Value Ref Range    WBC 9.62 4.31 - 10.16 Thousand/uL    RBC 5.05 3.88 - 5.62 Million/uL    Hemoglobin 17.0 12.0 - 17.0 g/dL    Hematocrit 50.5 (H) 36.5 -  "49.3 %     (H) 82 - 98 fL    MCH 33.7 26.8 - 34.3 pg    MCHC 33.7 31.4 - 37.4 g/dL    RDW 12.8 11.6 - 15.1 %    MPV 11.3 8.9 - 12.7 fL    Platelets 275 149 - 390 Thousands/uL    nRBC 0 /100 WBCs    Segmented % 66 43 - 75 %    Immature Grans % 1 0 - 2 %    Lymphocytes % 22 14 - 44 %    Monocytes % 9 4 - 12 %    Eosinophils Relative 1 0 - 6 %    Basophils Relative 1 0 - 1 %    Absolute Neutrophils 6.40 1.85 - 7.62 Thousands/µL    Absolute Immature Grans 0.05 0.00 - 0.20 Thousand/uL    Absolute Lymphocytes 2.10 0.60 - 4.47 Thousands/µL    Absolute Monocytes 0.90 0.17 - 1.22 Thousand/µL    Eosinophils Absolute 0.09 0.00 - 0.61 Thousand/µL    Basophils Absolute 0.08 0.00 - 0.10 Thousands/µL   Comprehensive metabolic panel    Collection Time: 07/23/24  9:23 AM   Result Value Ref Range    Sodium 139 135 - 147 mmol/L    Potassium 3.4 (L) 3.5 - 5.3 mmol/L    Chloride 103 96 - 108 mmol/L    CO2 24 21 - 32 mmol/L    ANION GAP 12 4 - 13 mmol/L    BUN 17 5 - 25 mg/dL    Creatinine 1.38 (H) 0.60 - 1.30 mg/dL    Glucose 124 65 - 140 mg/dL    Calcium 9.5 8.4 - 10.2 mg/dL    AST 25 13 - 39 U/L    ALT 39 7 - 52 U/L    Alkaline Phosphatase 57 34 - 104 U/L    Total Protein 8.3 6.4 - 8.4 g/dL    Albumin 4.0 3.5 - 5.0 g/dL    Total Bilirubin 1.23 (H) 0.20 - 1.00 mg/dL    eGFR 53 ml/min/1.73sq m   HS Troponin 0hr (reflex protocol)    Collection Time: 07/23/24  9:23 AM   Result Value Ref Range    hs TnI 0hr 138 (H) \"Refer to ACS Flowchart\"- see link ng/L   HS Troponin I 2hr    Collection Time: 07/23/24 11:54 AM   Result Value Ref Range    hs TnI 2hr 112 (H) \"Refer to ACS Flowchart\"- see link ng/L    Delta 2hr hsTnI -26 <20 ng/L   CBC and differential    Collection Time: 07/24/24  5:47 AM   Result Value Ref Range    WBC 8.62 4.31 - 10.16 Thousand/uL    RBC 4.20 3.88 - 5.62 Million/uL    Hemoglobin 13.8 12.0 - 17.0 g/dL    Hematocrit 42.4 36.5 - 49.3 %     (H) 82 - 98 fL    MCH 32.9 26.8 - 34.3 pg    MCHC 32.5 31.4 - 37.4 g/dL "    RDW 12.7 11.6 - 15.1 %    MPV 10.9 8.9 - 12.7 fL    Platelets 198 149 - 390 Thousands/uL    nRBC 0 /100 WBCs    Segmented % 69 43 - 75 %    Immature Grans % 0 0 - 2 %    Lymphocytes % 17 14 - 44 %    Monocytes % 12 4 - 12 %    Eosinophils Relative 1 0 - 6 %    Basophils Relative 1 0 - 1 %    Absolute Neutrophils 5.88 1.85 - 7.62 Thousands/µL    Absolute Immature Grans 0.02 0.00 - 0.20 Thousand/uL    Absolute Lymphocytes 1.49 0.60 - 4.47 Thousands/µL    Absolute Monocytes 1.06 0.17 - 1.22 Thousand/µL    Eosinophils Absolute 0.11 0.00 - 0.61 Thousand/µL    Basophils Absolute 0.06 0.00 - 0.10 Thousands/µL   Basic metabolic panel    Collection Time: 07/24/24  5:47 AM   Result Value Ref Range    Sodium 138 135 - 147 mmol/L    Potassium 3.7 3.5 - 5.3 mmol/L    Chloride 106 96 - 108 mmol/L    CO2 23 21 - 32 mmol/L    ANION GAP 9 4 - 13 mmol/L    BUN 14 5 - 25 mg/dL    Creatinine 0.86 0.60 - 1.30 mg/dL    Glucose 96 65 - 140 mg/dL    Calcium 8.4 8.4 - 10.2 mg/dL    eGFR 91 ml/min/1.73sq m     Imaging: I have personally reviewed pertinent reports.    EKG: afib rvr    Code Status: Level 1 - Full Code  Advance Directive and Living Will:      Power of :    POLST:      Counseling / Coordination of Care  Total floor / unit time spent today 60 minutes.  Greater than 50% of total time was spent with the patient and / or family counseling and / or coordination of care.

## 2024-07-24 NOTE — ASSESSMENT & PLAN NOTE
Patient previously was seen by Dr. Robles  He is being followed for stage III cancer of the colon  Continue with supportive care

## 2024-07-24 NOTE — PROGRESS NOTES
[facility]  Progress Note  Name: Bernardino Nunez I  MRN: [id number]  Unit/Bed#: -01 I Date of Admission: 2024   Date of Service: 2024 I Hospital Day: 1    Assessment & Plan   * A-fib (HCC)  Assessment & Plan  Patient initially presented with A-fib with RVR.  Check TSH  Consult cardiology  Continue with Cardizem drip   Anticoagulation with Eliquis  May need to transiton to coumadin after ischemic w/up completed      Acute renal failure (ARF) (HCC)may need to transition to coumadin  Assessment & Plan  Continue gentle hydration.  Monitor labs closely.    Cancer of ascending colon (HCC)  Assessment & Plan  Patient previously was seen by Dr. Robles  He is being followed for stage III cancer of the colon  Continue with supportive care    History of gout  Assessment & Plan  Currently on allopurinol    Chronic combined systolic and diastolic heart failure (HCC)  Assessment & Plan  Wt Readings from Last 3 Encounters:   24 104 kg (230 lb)   24 104 kg (229 lb 8 oz)   23 108 kg (238 lb)       Followed by cardiology in the outpatient setting with Dr. Alcaraz        Serum total bilirubin elevated  Assessment & Plan  Patient with reported prior history of elevated bilirubin.  Will continue to trend for now.           VTE Pharmacologic Prophylaxis:   Pharmacologic: Warfarin (Coumadin)  Mechanical VTE Prophylaxis in Place: No    Patient Centered Rounds: I have performed bedside rounds with nursing staff today.      Time Spent for Care: 1 hour.  More than 50% of total time spent on counseling and coordination of care as described above.    Current Length of Stay: 1 day(s)      Code Status: Level 1 - Full Code      Subjective:   nad    Objective:     Vitals:   Temp (24hrs), Av.3 °F (36.8 °C), Min:97.4 °F (36.3 °C), Max:98.7 °F (37.1 °C)    Temp:  [97.4 °F (36.3 °C)-98.7 °F (37.1 °C)] 98.3 °F (36.8 °C)  HR:  [] 86  Resp:  [18-24] 18  BP: (124-162)/()  128/85  SpO2:  [95 %-98 %] 96 %  Body mass index is 33.97 kg/m².     Input and Output Summary (last 24 hours):       Intake/Output Summary (Last 24 hours) at 7/24/2024 0914  Last data filed at 7/24/2024 0612  Gross per 24 hour   Intake 370 ml   Output --   Net 370 ml       Physical Exam:     Physical Exam  Constitutional:       Appearance: Normal appearance.   Cardiovascular:      Rate and Rhythm: Normal rate and regular rhythm.   Neurological:      Mental Status: He is alert.         Additional Data:     Labs:    Results from last 7 days   Lab Units 07/24/24  0547   WBC Thousand/uL 8.62   HEMOGLOBIN g/dL 13.8   HEMATOCRIT % 42.4   PLATELETS Thousands/uL 198   SEGS PCT % 69   LYMPHO PCT % 17   MONO PCT % 12   EOS PCT % 1     Results from last 7 days   Lab Units 07/24/24  0547 07/23/24  0923   POTASSIUM mmol/L 3.7 3.4*   CHLORIDE mmol/L 106 103   CO2 mmol/L 23 24   BUN mg/dL 14 17   CREATININE mg/dL 0.86 1.38*   CALCIUM mg/dL 8.4 9.5   ALK PHOS U/L  --  57   ALT U/L  --  39   AST U/L  --  25           * I Have Reviewed All Lab Data Listed Above.  * Additional Pertinent Lab Tests Reviewed: All Labs Within Last 24 Hours Reviewed      Recent Cultures (last 7 days):           Last 24 Hours Medication List:   Current Facility-Administered Medications   Medication Dose Route Frequency Provider Last Rate    allopurinol  100 mg Oral Daily Hetul Pettit, DO      apixaban  5 mg Oral BID Hetul Pettit, DO      aspirin  81 mg Oral Daily Hetul Pettit, DO      atorvastatin  40 mg Oral Daily With Dinner Hetul Pettit, DO      diltiazem  30 mg Oral Q6H [facility] Hetul Pettit, DO      fluticasone  1 spray Each Nare BID Hetul Pettit, DO      furosemide  20 mg Oral Daily Hetul Pettit, DO      losartan  50 mg Oral Daily Hetul Pettit, DO      metoprolol succinate  50 mg Oral BID Hetul Pettit, DO      sodium chloride  50 mL/hr Intravenous Continuous Hetul Pettit, DO Stopped (07/23/24 1700)    spironolactone  12.5 mg Oral Daily Hetul Pettit, DO           Today, Patient Was Seen By: Maribeth Pettit DO    ** Please Note: Dictation voice to text software may have been used in the creation of this document. **

## 2024-07-24 NOTE — ASSESSMENT & PLAN NOTE
Wt Readings from Last 3 Encounters:   07/23/24 104 kg (230 lb)   02/29/24 104 kg (229 lb 8 oz)   01/24/23 108 kg (238 lb)       Followed by cardiology in the outpatient setting with Dr. Alcaraz

## 2024-07-24 NOTE — PLAN OF CARE
Problem: PAIN - ADULT  Goal: Verbalizes/displays adequate comfort level or baseline comfort level  Description: Interventions:  - Encourage patient to monitor pain and request assistance  - Assess pain using appropriate pain scale  - Administer analgesics based on type and severity of pain and evaluate response  - Implement non-pharmacological measures as appropriate and evaluate response  - Consider cultural and social influences on pain and pain management  - Notify physician/advanced practitioner if interventions unsuccessful or patient reports new pain  Outcome: Progressing     Problem: INFECTION - ADULT  Goal: Absence or prevention of progression during hospitalization  Description: INTERVENTIONS:  - Assess and monitor for signs and symptoms of infection  - Monitor lab/diagnostic results  - Monitor all insertion sites, i.e. indwelling lines, tubes, and drains  - Monitor endotracheal if appropriate and nasal secretions for changes in amount and color  - [place] appropriate cooling/warming therapies per order  - Administer medications as ordered  - Instruct and encourage patient and family to use good hand hygiene technique  - Identify and instruct in appropriate isolation precautions for identified infection/condition  Outcome: Progressing  Goal: Absence of fever/infection during neutropenic period  Description: INTERVENTIONS:  - Monitor WBC    Outcome: Progressing     Problem: SAFETY ADULT  Goal: Patient will remain free of falls  Description: INTERVENTIONS:  - Educate patient/family on patient safety including physical limitations  - Instruct patient to call for assistance with activity   - Consult OT/PT to assist with strengthening/mobility   - Keep Call bell within reach  - Keep bed low and locked with side rails adjusted as appropriate  - Keep care items and personal belongings within reach  - Initiate and maintain comfort rounds  - Make Fall Risk Sign visible to staff  - Offer Toileting every  Hours,  in advance of need  - Initiate/Maintain alarm  - Obtain necessary fall risk management equipment:   - Apply yellow socks and bracelet for high fall risk patients  - Consider moving patient to room near nurses station  Outcome: Progressing  Goal: Maintain or return to baseline ADL function  Description: INTERVENTIONS:  -  Assess patient's ability to carry out ADLs; assess patient's baseline for ADL function and identify physical deficits which impact ability to perform ADLs (bathing, care of mouth/teeth, toileting, grooming, dressing, etc.)  - Assess/evaluate cause of self-care deficits   - Assess range of motion  - Assess patient's mobility; develop plan if impaired  - Assess patient's need for assistive devices and provide as appropriate  - Encourage maximum independence but intervene and supervise when necessary  - Involve family in performance of ADLs  - Assess for home care needs following discharge   - Consider OT consult to assist with ADL evaluation and planning for discharge  - Provide patient education as appropriate  Outcome: Progressing  Goal: Maintains/Returns to pre admission functional level  Description: INTERVENTIONS:  - Perform AM-PAC 6 Click Basic Mobility/ Daily Activity assessment daily.  - Set and communicate daily mobility goal to care team and patient/family/caregiver.   - Collaborate with rehabilitation services on mobility goals if consulted  - Perform Range of Motion  times a day.  - Reposition patient every  hours.  - Dangle patient  times a day  - Stand patient  times a day  - Ambulate patient  times a day  - Out of bed to chair  times a day   - Out of bed for meals  times a day  - Out of bed for toileting  - Record patient progress and toleration of activity level   Outcome: Progressing     Problem: DISCHARGE PLANNING  Goal: Discharge to home or other facility with appropriate resources  Description: INTERVENTIONS:  - Identify barriers to discharge w/patient and caregiver  - Arrange for  needed discharge resources and transportation as appropriate  - Identify discharge learning needs (meds, wound care, etc.)  - Arrange for interpretive services to assist at discharge as needed  - Refer to Case Management Department for coordinating discharge planning if the patient needs post-hospital services based on physician/advanced practitioner order or complex needs related to functional status, cognitive ability, or social support system  Outcome: Progressing     Problem: Knowledge Deficit  Goal: Patient/family/caregiver demonstrates understanding of disease process, treatment plan, medications, and discharge instructions  Description: Complete learning assessment and assess knowledge base.  Interventions:  - Provide teaching at level of understanding  - Provide teaching via preferred learning methods  Outcome: Progressing     Problem: CARDIOVASCULAR - ADULT  Goal: Maintains optimal cardiac output and hemodynamic stability  Description: INTERVENTIONS:  - Monitor I/O, vital signs and rhythm  - Monitor for S/S and trends of decreased cardiac output  - Administer and titrate ordered vasoactive medications to optimize hemodynamic stability  - Assess quality of pulses, skin color and temperature  - Assess for signs of decreased coronary artery perfusion  - Instruct patient to report change in severity of symptoms  Outcome: Progressing  Goal: Absence of cardiac dysrhythmias or at baseline rhythm  Description: INTERVENTIONS:  - Continuous cardiac monitoring, vital signs, obtain 12 lead EKG if ordered  - Administer antiarrhythmic and heart rate control medications as ordered  - Monitor electrolytes and administer replacement therapy as ordered  Outcome: Progressing     Problem: METABOLIC, FLUID AND ELECTROLYTES - ADULT  Goal: Electrolytes maintained within normal limits  Description: INTERVENTIONS:  - Monitor labs and assess patient for signs and symptoms of electrolyte imbalances  - Administer electrolyte  replacement as ordered  - Monitor response to electrolyte replacements, including repeat lab results as appropriate  - Instruct patient on fluid and nutrition as appropriate  Outcome: Progressing  Goal: Fluid balance maintained  Description: INTERVENTIONS:  - Monitor labs   - Monitor I/O and WT  - Instruct patient on fluid and nutrition as appropriate  - Assess for signs & symptoms of volume excess or deficit  Outcome: Progressing  Goal: Glucose maintained within target range  Description: INTERVENTIONS:  - Monitor Blood Glucose as ordered  - Assess for signs and symptoms of hyperglycemia and hypoglycemia  - Administer ordered medications to maintain glucose within target range  - Assess nutritional intake and initiate nutrition service referral as needed  Outcome: Progressing

## 2024-07-24 NOTE — CASE MANAGEMENT
Case Management Assessment & Discharge Planning Note    Patient name Bernardino Nunez  Location /-01 MRN [id number]  : 1960 Date 2024       Current Admission Date: 2024  Current Admission Diagnosis:A-fib (HCC)   Patient Active Problem List    Diagnosis Date Noted Date Diagnosed    Acute renal failure (ARF) (HCC) 2024     Class 1 obesity due to excess calories with serious comorbidity in adult 2022     COVID-19 2021     Port-A-Cath in place 2021     Cancer of ascending colon (HCC) 2020     S/P right hemicolectomy 09/10/2020     Mass of cecum 2020     History of gout 2020     GI bleeding      Microcytic anemia 2020     Gastrointestinal hemorrhage with melena 2020     NSVT (nonsustained ventricular tachycardia) (HCC) 2018     PFO (patent foramen ovale) 2017     Chronic combined systolic and diastolic heart failure (HCC) 2017     Paroxysmal atrial flutter (HCC) 2017     A-fib (HCC) 2017     Cardiomyopathy, dilated (HCC) 2017     Serum total bilirubin elevated 2017     Elevated blood pressure reading 2017       LOS (days): 1  Geometric Mean LOS (GMLOS) (days): 2.3  Days to GMLOS:1.1     OBJECTIVE:    Risk of Unplanned Readmission Score: 11.91         Current admission status: Inpatient  Referral Reason: Medication Assistance (Price check)    Preferred Pharmacy:   [id number] - [place], [place] - [address]  [place] [address]  Phone: [phone] Fax: [phone]    [facility] [place] - [address]  [address]  [place] [address]  Phone: [phone] Fax: [phone]    Primary Care Provider: Pepe Chakraborty DO    Primary Insurance: MEDICARE  Secondary Insurance: [employer] [place]    ASSESSMENT:  Active Health Care Proxies    There are no active Health Care Proxies on file.                 Readmission Root  Cause  30 Day Readmission: No    Patient Information  Admitted from:: Home  Mental Status: Alert  During Assessment patient was accompanied by: Not accompanied during assessment  Assessment information provided by:: Patient  Primary Caregiver: Self  Support Systems: Self, Family members, Friend  County of Residence: [place]  What city do you live in?: [place]                               Social Determinants of Health (SDOH)      Flowsheet Row Most Recent Value   Housing Stability    In the last 12 months, was there a time when you were not able to pay the mortgage or rent on time? N   In the past 12 months, how many times have you moved where you were living? 0   At any time in the past 12 months, were you homeless or living in a shelter (including now)? N   Transportation Needs    In the past 12 months, has lack of transportation kept you from medical appointments or from getting medications? no   In the past 12 months, has lack of transportation kept you from meetings, work, or from getting things needed for daily living? No   Food Insecurity    Within the past 12 months, you worried that your food would run out before you got the money to buy more. Never true   Within the past 12 months, the food you bought just didn't last and you didn't have money to get more. Never true   Utilities    In the past 12 months has the electric, gas, oil, or water company threatened to shut off services in your home? No            DISCHARGE DETAILS:    Discharge planning discussed with:: Pt  Freedom of Choice: Yes  Comments - Freedom of Choice: Discussed FOC  CM contacted family/caregiver?: No- see comments (Pt alert and oriented)  Were Treatment Team discharge recommendations reviewed with patient/caregiver?: Yes  Did patient/caregiver verbalize understanding of patient care needs?: Yes  Were patient/caregiver advised of the risks associated with not following Treatment Team discharge recommendations?: Yes    Contacts  Patient  Contacts: dalia Lebron  Relationship to Patient:: Other (Comment) (Self)  Contact Method: In Person  Reason/Outcome: Discharge Planning    Requested Home Health Care         Is the patient interested in HHC at discharge?: No    DME Referral Provided  Referral made for DME?: No    Other Referral/Resources/Interventions Provided:  Interventions: Prescription Price Check  Referral Comments: Assisted with price checking Eliquis @ home pharmacy. Medicaiton will be > $1,000 / month; relayed information to primary provider (Dr. Pettit) and cards team ([employer]). Pt will be started on warfarin due to cost. CM met pt bedside to explain current plan, pt verbalized undertanding. He stated that he has enough support and did not anticipate needing CM assistance.         Treatment Team Recommendation: Home  Discharge Destination Plan:: Home

## 2024-07-24 NOTE — PHYSICAL THERAPY NOTE
Physical Therapy Evaluation     Patient's Name: Bernardino Nunez    Admitting Diagnosis  Shortness of breath [R06.02]  Rapid heart rate [R00.0]  SOB (shortness of breath) [R06.02]  Elevated troponin [R79.89]  Atrial fibrillation with RVR (HCC) [I48.91]    Problem List  Patient Active Problem List   Diagnosis    Serum total bilirubin elevated    Elevated blood pressure reading    A-fib (HCC)    Chronic combined systolic and diastolic heart failure (HCC)    NSVT (nonsustained ventricular tachycardia) (HCC)    Microcytic anemia    Gastrointestinal hemorrhage with melena    Cardiomyopathy, dilated (HCC)    GI bleeding    Mass of cecum    History of gout    S/P right hemicolectomy    Cancer of ascending colon (HCC)    Port-A-Cath in place    Paroxysmal atrial flutter (HCC)    PFO (patent foramen ovale)    COVID-19    Class 1 obesity due to excess calories with serious comorbidity in adult    Acute renal failure (ARF) (HCC)       Past Medical History  Past Medical History:   Diagnosis Date    A-fib (HCC)     Cancer (HCC)     Colon cancer (HCC)     Colon polyp     Coronary artery disease     Gout     Herpes zoster     last assessed 12/18/17    Hypertension     Irregular heart beat     Shingles        Past Surgical History  Past Surgical History:   Procedure Laterality Date    ELECTRICAL CARDIOVERSION  12/15/2017         HEMICOLOECTOMY W/ ANASTOMOSIS N/A 8/28/2020    Procedure: RIGHT HEMICOLECTOMY WITH ILIOCOLIC ANASTAMOSIS;  Surgeon: ANT Villavicencio MD;  [facility]: [facility];  Service: Colorectal    IR PORT PLACEMENT  9/21/2020    IR PORT REMOVAL  4/5/2021 07/24/24 1019   PT Last Visit   PT Visit Date 07/24/24   Note Type   Note type Evaluation   Pain Assessment   Pain Assessment Tool 0-10   Pain Score No Pain   Restrictions/Precautions   Weight Bearing Precautions Per Order No   Other Precautions Fall Risk   Home Living   Type of Home Apartment   Home Layout One level;Stairs to enter with rails  (15STE)    Bathroom Shower/Tub Tub/shower unit   Bathroom Toilet Standard   Bathroom Equipment   (denies)   Bathroom Accessibility Accessible   Home Equipment   (denies)   Prior Function   Level of [place] Independent with ADLs;Independent with functional mobility;Independent with IADLS   Lives With Alone   IADLs Independent with driving;Independent with meal prep;Independent with medication management   Falls in the last 6 months 0   Vocational Retired   General   Family/Caregiver Present No   Cognition   Overall Cognitive Status WFL   Arousal/Participation Alert   Orientation Level Oriented to person;Oriented to place;Oriented to time;Oriented to situation   Memory Within functional limits   Following Commands Follows all commands and directions without difficulty   Subjective   Subjective pt pleasant and cooperative throughout therapy session. pt received supine in bed   RLE Assessment   RLE Assessment WFL   LLE Assessment   LLE Assessment WFL   Bed Mobility   Supine to Sit 6  Modified independent   Sit to Supine 6  Modified independent   Transfers   Sit to Stand 6  Modified independent   Stand to Sit 6  Modified independent   Additional Comments transfers w/o AD   Ambulation/Elevation   Gait pattern Forward Flexion;Wide ANDREI   Gait Assistance 6  Modified independent   Assistive Device None   Distance 250'   Stair Management Assistance 5  Supervision   Additional items Verbal cues   Stair Management Technique One rail L;Alternating pattern;Foreward   Number of Stairs 10   Balance   Static Sitting Good   Dynamic Sitting Good   Static Standing Good   Dynamic Standing Fair +   Ambulatory Fair +   Endurance Deficit   Endurance Deficit No   Activity Tolerance   Activity Tolerance Patient tolerated treatment well   Medical Staff Made Aware araceli Silva due to medical complexity and multiple comorbidities   Nurse Made Aware RN cleared and updated   Assessment   Prognosis Good   Assessment Pt seen for moderate  (evolving) complexity PT evaluation. Moderate eval due to Ongoing medical management for primary dx, Continuous pulse oximetry monitoring  Patient is a 64 y.o. male  who was admitted to [facility] on 7/23/2024  with A-fib (HCC) . [facility] .  At baseline, pt resides alone in apartment and was independent prior to hospital admission. Currently, upon initial examination, pt  is requiring  modified independent   for bed mobility skills;  modified independent   for functional transfers and  modified independent   for ambulation with no AD. Pt was left supine at the end of PT session with all needs in reach. Pt with no questions or concerns regarding d/c home; appears to be functioning at/ near baseline mobility levels. Pt with no further acute inpatient PT needs at this time- please re-consult if needed. PT to discharge pt at this time. Encourage pt to ambulate at least 3-4x/day with restorative/ nursing staff while remaining in hospital. The patient's AM-PAC Basic Mobility Inpatient Short Form Raw Score is 23, Standardized Score is 50.88. Based on AM-PAC scoring and patient presentation, PT currently recommending No Post Acute Rehab Needs. Please also refer to the recommendation of the Physical Therapist for safe discharge planning.   Barriers to Discharge None   Goals   Patient Goals to go home   Plan   Treatment/Interventions   (eval only, dc IPPT)   Discharge Recommendation   Rehab Resource Intensity Level, PT No post-acute rehabilitation needs   AM-PAC Basic Mobility Inpatient   Turning in Flat Bed Without Bedrails 4   Lying on Back to Sitting on Edge of Flat Bed Without Bedrails 4   Moving Bed to Chair 4   Standing Up From Chair Using Arms 4   Walk in Room 4   Climb 3-5 Stairs With Railing 3   Basic Mobility Inpatient Raw Score 23   Basic Mobility Standardized Score 50.88   [facility] Highest Level Of Mobility   -HLM Goal 7: Walk 25 feet or more   JH-HLM Achieved 7: Walk 25 feet or  more   End of Consult   Patient Position at End of Consult Supine;All needs within reach         Dipesh Escobar, PT

## 2024-07-25 ENCOUNTER — ANTICOAG VISIT (OUTPATIENT)
Dept: CARDIOLOGY CLINIC | Facility: CLINIC | Age: 64
End: 2024-07-25

## 2024-07-25 ENCOUNTER — TRANSITIONAL CARE MANAGEMENT (OUTPATIENT)
Dept: INTERNAL MEDICINE CLINIC | Facility: CLINIC | Age: 64
End: 2024-07-25

## 2024-07-25 VITALS
DIASTOLIC BLOOD PRESSURE: 77 MMHG | BODY MASS INDEX: 31.28 KG/M2 | RESPIRATION RATE: 18 BRPM | TEMPERATURE: 98.2 F | HEIGHT: 69 IN | SYSTOLIC BLOOD PRESSURE: 116 MMHG | OXYGEN SATURATION: 96 % | WEIGHT: 211.2 LBS | HEART RATE: 68 BPM

## 2024-07-25 DIAGNOSIS — I48.11 LONGSTANDING PERSISTENT ATRIAL FIBRILLATION (HCC): Primary | ICD-10-CM

## 2024-07-25 LAB
ANION GAP SERPL CALCULATED.3IONS-SCNC: 10 MMOL/L (ref 4–13)
BUN SERPL-MCNC: 16 MG/DL (ref 5–25)
CALCIUM SERPL-MCNC: 8.9 MG/DL (ref 8.4–10.2)
CHLORIDE SERPL-SCNC: 102 MMOL/L (ref 96–108)
CO2 SERPL-SCNC: 25 MMOL/L (ref 21–32)
CREAT SERPL-MCNC: 0.91 MG/DL (ref 0.6–1.3)
GFR SERPL CREATININE-BSD FRML MDRD: 88 ML/MIN/1.73SQ M
GLUCOSE SERPL-MCNC: 101 MG/DL (ref 65–140)
INR PPP: 1.17 (ref 0.84–1.19)
POTASSIUM SERPL-SCNC: 3.8 MMOL/L (ref 3.5–5.3)
PROTHROMBIN TIME: 14.8 SECONDS (ref 11.6–14.5)
SODIUM SERPL-SCNC: 137 MMOL/L (ref 135–147)

## 2024-07-25 PROCEDURE — 99232 SBSQ HOSP IP/OBS MODERATE 35: CPT | Performed by: INTERNAL MEDICINE

## 2024-07-25 PROCEDURE — 80048 BASIC METABOLIC PNL TOTAL CA: CPT | Performed by: NURSE PRACTITIONER

## 2024-07-25 PROCEDURE — 99239 HOSP IP/OBS DSCHRG MGMT >30: CPT | Performed by: INTERNAL MEDICINE

## 2024-07-25 PROCEDURE — 85610 PROTHROMBIN TIME: CPT | Performed by: NURSE PRACTITIONER

## 2024-07-25 RX ORDER — SPIRONOLACTONE 25 MG/1
12.5 TABLET ORAL DAILY
Qty: 45 TABLET | Refills: 0 | Status: SHIPPED | OUTPATIENT
Start: 2024-07-25

## 2024-07-25 RX ORDER — LOSARTAN POTASSIUM 50 MG/1
50 TABLET ORAL DAILY
Qty: 90 TABLET | Refills: 0 | Status: SHIPPED | OUTPATIENT
Start: 2024-07-25

## 2024-07-25 RX ORDER — WARFARIN SODIUM 5 MG/1
TABLET ORAL
Qty: 30 TABLET | Refills: 0 | Status: SHIPPED | OUTPATIENT
Start: 2024-07-25

## 2024-07-25 RX ORDER — FUROSEMIDE 20 MG/1
20 TABLET ORAL DAILY
Qty: 90 TABLET | Refills: 0 | Status: SHIPPED | OUTPATIENT
Start: 2024-07-25

## 2024-07-25 RX ORDER — ATORVASTATIN CALCIUM 40 MG/1
40 TABLET, FILM COATED ORAL
Qty: 90 TABLET | Refills: 0 | Status: SHIPPED | OUTPATIENT
Start: 2024-07-25

## 2024-07-25 RX ORDER — METOPROLOL SUCCINATE 50 MG/1
50 TABLET, EXTENDED RELEASE ORAL 2 TIMES DAILY
Qty: 60 TABLET | Refills: 0 | Status: SHIPPED | OUTPATIENT
Start: 2024-07-25

## 2024-07-25 RX ORDER — FUROSEMIDE 20 MG/1
20 TABLET ORAL DAILY
Status: DISCONTINUED | OUTPATIENT
Start: 2024-07-26 | End: 2024-07-25 | Stop reason: HOSPADM

## 2024-07-25 RX ADMIN — APIXABAN 5 MG: 5 TABLET, FILM COATED ORAL at 09:54

## 2024-07-25 RX ADMIN — LOSARTAN POTASSIUM 50 MG: 50 TABLET, FILM COATED ORAL at 09:54

## 2024-07-25 RX ADMIN — METOPROLOL SUCCINATE 50 MG: 50 TABLET, EXTENDED RELEASE ORAL at 09:55

## 2024-07-25 RX ADMIN — SPIRONOLACTONE 12.5 MG: 25 TABLET ORAL at 09:55

## 2024-07-25 RX ADMIN — ASPIRIN 81 MG: 81 TABLET, COATED ORAL at 09:55

## 2024-07-25 RX ADMIN — FUROSEMIDE 20 MG: 20 TABLET ORAL at 00:31

## 2024-07-25 RX ADMIN — ALLOPURINOL 100 MG: 100 TABLET ORAL at 09:55

## 2024-07-25 NOTE — DISCHARGE INSTR - AVS FIRST PAGE
Take your medications as directed, and keep your follow up appointments. Adhere to a heart healthy lifestyle, maintaining a low sodium diet.  Daily weight and record.  If your weight increases 2-3 lbs in one day, or 5 lbs in 2 days, you are short of breath or have lower extremity swelling, please call [facility] Cardiology office  at [phone]

## 2024-07-25 NOTE — DISCHARGE SUMMARY
[facility]  Discharge- Bernardino Nunez 1960, 64 y.o. male MRN: [id number]  Unit/Bed#: -01 Encounter: [id number]  Primary Care Provider: Pepe Chakraborty DO   Date and time admitted to hospital: 7/23/2024  9:00 AM    * A-fib (HCC)  Assessment & Plan  Patient initially presented with A-fib with RVR.  Tsh stable  Consult cardiology  Management as per cardiology.  Possible ischemic workup  Eliquis long-term appears cost prohibitive.  Will need to transition to warfarin.  Discussed with cardiology no bridge.    Acute renal failure (ARF) (HCC)  Assessment & Plan  Continue gentle hydration.  Monitor labs closely.    History of gout  Assessment & Plan  Currently on allopurinol    Chronic combined systolic and diastolic heart failure (HCC)  Assessment & Plan  Wt Readings from Last 3 Encounters:   07/24/24 104 kg (230 lb)   02/29/24 104 kg (229 lb 8 oz)   01/24/23 108 kg (238 lb)       Followed by cardiology in the outpatient setting with Dr. Alcaraz        Serum total bilirubin elevated  Assessment & Plan  Patient with reported prior history of elevated bilirubin.  Will continue to trend for now.              Medical Problems       Resolved Problems  Date Reviewed: 1/18/2023   None         Admission Date:   Admission Orders (From admission, onward)       Ordered        07/23/24 1041  INPATIENT ADMISSION  Once                            Admitting Diagnosis: Shortness of breath [R06.02]  Rapid heart rate [R00.0]  SOB (shortness of breath) [R06.02]  Elevated troponin [R79.89]  Atrial fibrillation with RVR (HCC) [I48.91]    HPI: This is a very pleasant 64-year-old male who presents to the hospital with a past medical history of atrial fibrillation, congestive heart failure hypertension colon cancer presents with symptoms of shortness of breath.  Patient noted to have 3-day history of intermittent shortness of breath and lightheaded symptoms.  He was noted to have A-fib with RVR and  was placed on a Cardizem drip.  Of note he has been having on and off issues with compliance due to affordability of his medications.  He previously was on Eliquis and metoprolol.  However due to financial issues he has been sporadically taking these medications.    Procedures Performed: No orders of the defined types were placed in this encounter.      Summary of Hospital Course: He was admitted for further workup and evaluation.  After discussion with cardiology we will transition him to Coumadin therapy.  He will be discharged on Coumadin 5 mg daily.  Discussed with cardiology they will follow-up in the outpatient setting regarding INR labs.  He has an INR set up for Monday.  Discussed bridging with cardiology.  Will defer on bridge and continue with Coumadin 5 mg daily.        Condition at Discharge: good         Discharge instructions/Information to patient and family:   See after visit summary for information provided to patient and family.      Provisions for Follow-Up Care:  See after visit summary for information related to follow-up care and any pertinent home health orders.      PCP: Pepe Chakrbaorty DO    Disposition: Home    Planned Readmission: No    Discharge Statement   I spent 85 minutes discharging the patient. This time was spent on the day of discharge. I had direct contact with the patient on the day of discharge. Additional documentation is required if more than 30 minutes were spent on discharge.     Discharge Medications:  See after visit summary for reconciled discharge medications provided to patient and family.

## 2024-07-25 NOTE — ASSESSMENT & PLAN NOTE
Wt Readings from Last 3 Encounters:   07/24/24 104 kg (230 lb)   02/29/24 104 kg (229 lb 8 oz)   01/24/23 108 kg (238 lb)       Followed by cardiology in the outpatient setting with Dr. Alcaraz

## 2024-07-25 NOTE — ASSESSMENT & PLAN NOTE
Patient initially presented with A-fib with RVR.  Check TSH  Consult cardiology  Management as per cardiology.  Possible ischemic workup  Eliquis long-term appears cost prohibitive.  Will need to transition to warfarin.  Discussed with cardiology no bridge.

## 2024-07-25 NOTE — PROGRESS NOTES
Progress Note - Cardiology Team 1  Bernardino Nunez 64 y.o. male MRN: [id number]  Unit/Bed#: -01 Encounter: [id number]        Principal Problem:    A-fib (HCC)  Active Problems:    Serum total bilirubin elevated    Chronic combined systolic and diastolic heart failure (HCC)    History of gout    Cancer of ascending colon (HCC)    Acute renal failure (ARF) (HCC)        Assessment  Atrial fibrillation with RVR- PAF        Patient presented tachycardic - 211BPM and hypotensive        Subsequent ECG looks like 2-1 atrial flutter and ultimately sinus rhythm        History of multiple cardioversions with ultimate A-fib ablation-details in HPI        Loop recorder indicates largely maintaining NSR through 7/18 interrogation        Patient reports he is unable to afford medications and not consistently taking them        Patient presented with palpitations.         IV Cardizem now off.  Remains in sinus rhythm.  Asymptomatic at this time.          Chronic combined systolic and diastolic heart failure -with recovered LV function .         Not consistently taking GDMT.  Is not taking Entresto at all.  Inconsistently taking metoprolol and         Spironolactone.         Patient has noticed increasing cough.  Worsening MARION prior to the last 4 days of palpitations.        Gave 40mg IV lasix with good diuresis.         Now on lasix 20mg oral daily spironolactone 12.5 mg daily.        On losartan 50 mg daily and Toprol 50 mg BID    Nonischemic cardiomyopathy-tachycardic mediated        Recovered LV function-most recent echo LVEF 65% 4/2022        Current echo LVEF 45 to 50% with mild global hypokinesis.  Likely tachycardic mediated again.        ARB/BB.  Diuretic as above.     Coronary artery disease        Patient had several days of left shoulder discomfort .  Was concerned this may have been anginal in the        Setting of afib with RVR and resolution upon arrival to ED and restoration of sinus rhythm. Pt however         "says should is sore to touch today , more likely musculoskeletal.            Left heart cath 3/2018 RI 80%.  Ostial PDA 60%        Mild troponin elevation        AP-aspirin 81 mg.  Statin-atorvastatin 40 mg     5.   SCARLETT-hypoperfusion due to tachycardia and hypotension.  Resolved.     6.   Moderate to heavy alcohol use     7.   2-1 heart block-briefly overnight. Likely d/t #8     8.   Highly suspected REGULO     Plan:  Will need to transition to warfarin. Eliquis cost prohibitive.  Stroke risk is not significantly elevated and no history of stroke so not strong indication for bridging.  Patient was not taking anticoagulation prior to admission anyway.    Patient's does not routinely see PCP so cardiology will have to manage INR.  Goal INR 2-3. Will check baseline INR. Can start on warfarin 5mg daily. Would check INR in 3-4 days.   Recommend complete alcohol cessation.  Check standing weight and BMP prior to discharge  REGULO workup  Patient will continue Lasix 20 mg daily, spironolactone 12.5mg daily, losartan 50 mg and Toprol 50 mg twice daily  Will schedule hospital follow up early next week  Enforced 2L fluid restriction and 2 g sodium restriction.  Where to check daily weights.  Call for weight gain to 3 pounds in 1 day or 5 pounds in 1 week.    Subjective/Objective   Chief Complaint/subjective  Patient feels well is anxious to go home.  Out of bed washed in the bathroom with no symptoms.  Long discussion regarding medication compliance as well as outpatient follow-up. Aware of need for INR testing for Coumadin.        Vitals: /76   Pulse 68   Temp 98.2 °F (36.8 °C) (Oral)   Resp 17   Ht 5' 9\" (1.753 m)   Wt 104 kg (230 lb)   SpO2 97%   BMI 33.97 kg/m²     Vitals:    07/23/24 1115 07/24/24 1230   Weight: 104 kg (230 lb) 104 kg (230 lb)     Orthostatic Blood Pressures      Flowsheet Row Most Recent Value   Blood Pressure 113/76 filed at 07/25/2024 0957   Patient Position - Orthostatic VS Lying filed at " "07/25/2024 0730              Intake/Output Summary (Last 24 hours) at 7/25/2024 1018  Last data filed at 7/25/2024 0900  Gross per 24 hour   Intake 401 ml   Output 2500 ml   Net -2099 ml       Invasive Devices       Peripheral Intravenous Line  Duration             Peripheral IV 07/23/24 Left Antecubital 2 days                    Current Facility-Administered Medications   Medication Dose Route Frequency    allopurinol (ZYLOPRIM) tablet 100 mg  100 mg Oral Daily    apixaban (ELIQUIS) tablet 5 mg  5 mg Oral BID    aspirin (ECOTRIN LOW STRENGTH) EC tablet 81 mg  81 mg Oral Daily    atorvastatin (LIPITOR) tablet 40 mg  40 mg Oral Daily With Dinner    fluticasone (FLONASE) 50 mcg/act nasal spray 1 spray  1 spray Each Nare BID    losartan (COZAAR) tablet 50 mg  50 mg Oral Daily    metoprolol succinate (TOPROL-XL) 24 hr tablet 50 mg  50 mg Oral BID    spironolactone (ALDACTONE) tablet 12.5 mg  12.5 mg Oral Daily         Physical Exam: /76   Pulse 68   Temp 98.2 °F (36.8 °C) (Oral)   Resp 17   Ht 5' 9\" (1.753 m)   Wt 104 kg (230 lb)   SpO2 97%   BMI 33.97 kg/m²     General Appearance:    Alert, cooperative, no distress, appears stated age   Head:    Normocephalic, no scleral icterus   Eyes:    PERRL   Nose:   Nares normal, septum midline, no drainage    Throat:   Lips, mucosa, and tongue normal   Neck:   Supple, symmetrical, trachea midline,              Lungs:     Clear to auscultation bilaterally, respirations unlabored   Chest Wall:    No tenderness or deformity    Heart:    Regular rate and rhythm, S1 and S2 normal, no murmur, rub   or gallop   Abdomen:     Soft, non-tender, bowel sounds active all four quadrants,     no masses, no organomegaly   Extremities:   Extremities normal, atraumatic, no cyanosis or edema   Pulses:   2+ and symmetric all extremities   Skin:   Skin color, texture, turgor normal, no rashes or lesions   Neurologic:   Alert and oriented to person place and time, no focal deficits "                 Lab Results:   Recent Results (from the past 72 hour(s))   ECG 12 lead    Collection Time: 07/23/24  8:57 AM   Result Value Ref Range    Ventricular Rate 211 BPM    Atrial Rate 214 BPM    WV Interval  ms    QRSD Interval 80 ms    QT Interval 188 ms    QTC Interval 352 ms    P Axis  degrees    QRS Axis 6 degrees    T Wave Axis 225 degrees   ECG 12 lead    Collection Time: 07/23/24  9:06 AM   Result Value Ref Range    Ventricular Rate 196 BPM    Atrial Rate 228 BPM    WV Interval  ms    QRSD Interval 80 ms    QT Interval 214 ms    QTC Interval 386 ms    P Axis 0 degrees    QRS Axis 27 degrees    T Wave Axis 195 degrees   ECG 12 lead    Collection Time: 07/23/24  9:14 AM   Result Value Ref Range    Ventricular Rate 105 BPM    Atrial Rate 105 BPM    WV Interval 134 ms    QRSD Interval 92 ms    QT Interval 332 ms    QTC Interval 438 ms    P Axis 140 degrees    QRS Axis 10 degrees    T Wave Axis 93 degrees   CBC and differential    Collection Time: 07/23/24  9:23 AM   Result Value Ref Range    WBC 9.62 4.31 - 10.16 Thousand/uL    RBC 5.05 3.88 - 5.62 Million/uL    Hemoglobin 17.0 12.0 - 17.0 g/dL    Hematocrit 50.5 (H) 36.5 - 49.3 %     (H) 82 - 98 fL    MCH 33.7 26.8 - 34.3 pg    MCHC 33.7 31.4 - 37.4 g/dL    RDW 12.8 11.6 - 15.1 %    MPV 11.3 8.9 - 12.7 fL    Platelets 275 149 - 390 Thousands/uL    nRBC 0 /100 WBCs    Segmented % 66 43 - 75 %    Immature Grans % 1 0 - 2 %    Lymphocytes % 22 14 - 44 %    Monocytes % 9 4 - 12 %    Eosinophils Relative 1 0 - 6 %    Basophils Relative 1 0 - 1 %    Absolute Neutrophils 6.40 1.85 - 7.62 Thousands/µL    Absolute Immature Grans 0.05 0.00 - 0.20 Thousand/uL    Absolute Lymphocytes 2.10 0.60 - 4.47 Thousands/µL    Absolute Monocytes 0.90 0.17 - 1.22 Thousand/µL    Eosinophils Absolute 0.09 0.00 - 0.61 Thousand/µL    Basophils Absolute 0.08 0.00 - 0.10 Thousands/µL   Comprehensive metabolic panel    Collection Time: 07/23/24  9:23 AM   Result Value Ref  "Range    Sodium 139 135 - 147 mmol/L    Potassium 3.4 (L) 3.5 - 5.3 mmol/L    Chloride 103 96 - 108 mmol/L    CO2 24 21 - 32 mmol/L    ANION GAP 12 4 - 13 mmol/L    BUN 17 5 - 25 mg/dL    Creatinine 1.38 (H) 0.60 - 1.30 mg/dL    Glucose 124 65 - 140 mg/dL    Calcium 9.5 8.4 - 10.2 mg/dL    AST 25 13 - 39 U/L    ALT 39 7 - 52 U/L    Alkaline Phosphatase 57 34 - 104 U/L    Total Protein 8.3 6.4 - 8.4 g/dL    Albumin 4.0 3.5 - 5.0 g/dL    Total Bilirubin 1.23 (H) 0.20 - 1.00 mg/dL    eGFR 53 ml/min/1.73sq m   HS Troponin 0hr (reflex protocol)    Collection Time: 07/23/24  9:23 AM   Result Value Ref Range    hs TnI 0hr 138 (H) \"Refer to ACS Flowchart\"- see link ng/L   HS Troponin I 2hr    Collection Time: 07/23/24 11:54 AM   Result Value Ref Range    hs TnI 2hr 112 (H) \"Refer to ACS Flowchart\"- see link ng/L    Delta 2hr hsTnI -26 <20 ng/L   CBC and differential    Collection Time: 07/24/24  5:47 AM   Result Value Ref Range    WBC 8.62 4.31 - 10.16 Thousand/uL    RBC 4.20 3.88 - 5.62 Million/uL    Hemoglobin 13.8 12.0 - 17.0 g/dL    Hematocrit 42.4 36.5 - 49.3 %     (H) 82 - 98 fL    MCH 32.9 26.8 - 34.3 pg    MCHC 32.5 31.4 - 37.4 g/dL    RDW 12.7 11.6 - 15.1 %    MPV 10.9 8.9 - 12.7 fL    Platelets 198 149 - 390 Thousands/uL    nRBC 0 /100 WBCs    Segmented % 69 43 - 75 %    Immature Grans % 0 0 - 2 %    Lymphocytes % 17 14 - 44 %    Monocytes % 12 4 - 12 %    Eosinophils Relative 1 0 - 6 %    Basophils Relative 1 0 - 1 %    Absolute Neutrophils 5.88 1.85 - 7.62 Thousands/µL    Absolute Immature Grans 0.02 0.00 - 0.20 Thousand/uL    Absolute Lymphocytes 1.49 0.60 - 4.47 Thousands/µL    Absolute Monocytes 1.06 0.17 - 1.22 Thousand/µL    Eosinophils Absolute 0.11 0.00 - 0.61 Thousand/µL    Basophils Absolute 0.06 0.00 - 0.10 Thousands/µL   Basic metabolic panel    Collection Time: 07/24/24  5:47 AM   Result Value Ref Range    Sodium 138 135 - 147 mmol/L    Potassium 3.7 3.5 - 5.3 mmol/L    Chloride 106 96 - 108 " mmol/L    CO2 23 21 - 32 mmol/L    ANION GAP 9 4 - 13 mmol/L    BUN 14 5 - 25 mg/dL    Creatinine 0.86 0.60 - 1.30 mg/dL    Glucose 96 65 - 140 mg/dL    Calcium 8.4 8.4 - 10.2 mg/dL    eGFR 91 ml/min/1.73sq m   Echo complete w/ contrast if indicated    Collection Time: 07/24/24  1:00 PM   Result Value Ref Range    BSA 2.19 m2    A4C EF 43 %    LVIDd 5.95 cm    LVIDS 4.30 cm    IVSd 1.39 cm    LVPWd 1.11 cm    FS 28 28 - 44 %    MV E' Tissue Velocity Septal 7 cm/s    LA Volume Index (BP) 34.2 mL/m2    E/A ratio 1.64     E wave deceleration time 217 ms    MV Peak E Brennan 110 cm/s    MV Peak A Brennan 0.67 m/s    RVID d 4.2 cm    Tricuspid annular plane systolic excursion 1.70 cm    LA size 4.6 cm    LA length (A2C) 6.10 cm    LA volume (BP) 75 mL    RA 2D Volume 46.0 mL    RAA A4C 18.3 cm2    MV stenosis pressure 1/2 time 63 ms    MV valve area p 1/2 method 3.49     Ao root 3.90 cm    STJ 3 cm    Asc Ao 3.6 cm    Sinus of Valsalva, 2D 3.7 cm    Left ventricular stroke volume (2D) 85.00 mL    Ao STJ 3.00 cm    IVS 1.6 cm    LEFT VENTRICLE SYSTOLIC VOLUME (MOD BIPLANE) 2D 81 mL    LV DIASTOLIC VOLUME (MOD BIPLANE) 2D 166 mL    Left Atrium Area-systolic Four Chamber 24.2 cm2    Left Atrium Area-systolic Apical Two Chamber 24 cm2    LVSV, 2D 85 mL    LV EF 45     TR Peak Brennan 2.2 m/s    Est. RA pres 3.0 mmHg   TSH, 3rd generation with Free T4 reflex    Collection Time: 07/24/24  1:29 PM   Result Value Ref Range    TSH 3RD GENERATON 1.298 0.450 - 4.500 uIU/mL     Imaging: I have personally reviewed pertinent reports.    Tele- nsr    Counseling / Coordination of Care  Total time spent today 35 minutes. Greater than 50% of total time was spent with the patient and / or family counseling and / or coordination of care.

## 2024-07-26 NOTE — ED ATTENDING ATTESTATION
7/23/2024  I, Yong Celeste MD, saw and evaluated the patient. I have discussed the patient with the resident/non-physician practitioner and agree with the resident's/non-physician practitioner's findings, Plan of Care, and MDM as documented in the resident's/non-physician practitioner's note, except where noted. All available labs and Radiology studies were reviewed.  I was present for key portions of any procedure(s) performed by the resident/non-physician practitioner and I was immediately available to provide assistance.       At this point I agree with the current assessment done in the Emergency Department.  I have conducted an independent evaluation of this patient a history and physical is as follows:    ED Course     Patient presents for evaluation due to several days of chest pain and lightheadedness.  Patient has a history of atrial fibrillation but states that he has been taking his medication consistently due to cost.  Patient states that for the last several days he has been having left shoulder pain, shortness of breath, lightheadedness, palpitations, and diaphoresis.  No additional complaints.  Exam: AAOx3, moderate distress, tachycardic IRRR, CTA, S/NT/ND, no motor/sensory deficits, diaphoretic. A/P: A-fib with RVR.  Given the patient has not been compliant with Eliquis and that symptoms have been ongoing for several days, would be hesitant to cardioverting at this time as long as he remains stable.  Patient's blood pressure normal on evaluation.  Patient given bolus dose of 30 mg of Cardizem.  After this bolus, patient had a conversion to sinus rhythm with improvement in symptoms.  Patient still with intermittent short bursts of SVT/A-fib, Cardizem drip ordered.  Cardiac labs, EKG, and chest x-ray ordered.  Will plan to admit for cardiology evaluation and case management evaluation to help with medications.    Critical Care Time  CriticalCare Time    Date/Time: 7/23/2024 9:26  AM    Performed by: Yong Celeste MD  Authorized by: Yong Celeste MD    Critical care provider statement:     Critical care time (minutes):  37    Critical care time was exclusive of:  Separately billable procedures and treating other patients and teaching time    Critical care was necessary to treat or prevent imminent or life-threatening deterioration of the following conditions:  Cardiac failure    Critical care was time spent personally by me on the following activities:  Obtaining history from patient or surrogate, development of treatment plan with patient or surrogate, evaluation of patient's response to treatment, examination of patient, ordering and review of laboratory studies, ordering and review of radiographic studies and re-evaluation of patient's condition    I assumed direction of critical care for this patient from another provider in my specialty: no

## 2024-07-29 PROBLEM — Z90.49 S/P RIGHT HEMICOLECTOMY: Chronic | Status: ACTIVE | Noted: 2020-09-10

## 2024-07-29 PROBLEM — I42.0 CARDIOMYOPATHY, DILATED (HCC): Status: RESOLVED | Noted: 2017-12-18 | Resolved: 2024-07-29

## 2024-07-29 PROBLEM — U07.1 COVID-19: Status: RESOLVED | Noted: 2021-12-21 | Resolved: 2024-07-29

## 2024-07-29 PROBLEM — K63.89 MASS OF CECUM: Status: RESOLVED | Noted: 2020-08-25 | Resolved: 2024-07-29

## 2024-07-29 PROBLEM — N17.9 ACUTE RENAL FAILURE (ARF) (HCC): Status: RESOLVED | Noted: 2024-07-23 | Resolved: 2024-07-29

## 2024-07-29 PROBLEM — Z86.79 S/P ABLATION OF ATRIAL FIBRILLATION: Chronic | Status: ACTIVE | Noted: 2021-01-01

## 2024-07-29 PROBLEM — I48.92 ATRIAL FIBRILLATION AND FLUTTER (HCC): Chronic | Status: ACTIVE | Noted: 2017-12-18

## 2024-07-29 PROBLEM — I48.91 ATRIAL FIBRILLATION AND FLUTTER (HCC): Chronic | Status: ACTIVE | Noted: 2017-12-18

## 2024-07-29 PROBLEM — I50.32 HEART FAILURE WITH IMPROVED EJECTION FRACTION (HFIMPEF) (HCC): Chronic | Status: ACTIVE | Noted: 2018-03-01

## 2024-07-29 PROBLEM — Z98.890 S/P ABLATION OF ATRIAL FIBRILLATION: Chronic | Status: ACTIVE | Noted: 2021-01-01

## 2024-07-29 PROBLEM — I47.29 NSVT (NONSUSTAINED VENTRICULAR TACHYCARDIA) (HCC): Status: RESOLVED | Noted: 2018-07-27 | Resolved: 2024-07-29

## 2024-07-29 PROBLEM — I10 ESSENTIAL HYPERTENSION: Chronic | Status: ACTIVE | Noted: 2017-12-11

## 2024-07-29 NOTE — PROGRESS NOTES
General Cardiology Outpatient Visit    Bernardino Nunez 64 y.o. male   MRN: [id number]  Encounter: [id number]    Assessment:  Patient Active Problem List    Diagnosis Date Noted    Class 1 obesity due to excess calories with serious comorbidity in adult 08/08/2022    Port-A-Cath in place 01/18/2021    S/P ablation of atrial fibrillation 01/2021    Cancer of ascending colon (HCC) 09/30/2020    S/P right hemicolectomy 09/10/2020    History of gout 08/25/2020    Microcytic anemia 08/21/2020    Gastrointestinal hemorrhage with melena 08/21/2020    Heart failure with improved ejection fraction (HFimpEF) ([facility]) 03/2018    PFO (patent foramen ovale) 12/28/2017    Atrial fibrillation and flutter (HCC) 12/18/2017    Serum total bilirubin elevated 12/11/2017    Essential hypertension 12/11/2017       Today's Plan:  Continue current medications.  Advised to check INR today or tomorrow.  Reports higher co-pay cost for loop recorder interrogations. Will contact device clinic to ask for further assistance with this.    Plan:  Chronic HFimpEF; LVEF 45-50%   Etiology: nonischemic; felt to be tachy-mediated. LVEF 20% in 2017. Improved to 50% in 03/2018 and 65% in 01/2021.    TTE 07/24/2024: LVEF 45-50%. Grade 1 DD. Mild MR and TR. Small pericardial effusion.    Weight of 211 lbs on 07/25 (day of discharge). Today, weighs 218 lbs.    Most recent BMP from 07/25/2024: sodium 137; potassium 3.8; BUN 16; creatinine 0.91; eGFR 88.     Pharmacotherapies / Neurohormonal Blockade:  --Beta Blocker: metoprolol succinate 50 mg q12 hours.   --ARNi / ACEi / ARB: losartan 50 mg daily (ARNi, cost prohibitive).   --Aldosterone Antagonist: spironolactone 12.5 mg daily.   --SGLT2 Inhibitor: No.   --Diuretic: Lasix 20 mg daily.    Sudden Cardiac Death Risk Reduction:  --LVEF >35%.    Atrial fibrillation / flutter   [id number] = 3 (HF, HTN, CAD).   Anticoagulation on Coumadin.    S/p Afib ablation in 01/2021.   Rate control: BB as above.   Rhythm control:  No.   Follows with Dr. Burrows.     Coronary artery disease   Without active chest pain.   [facility] 03/2018: 80% stenosis proximal RI. 60% stenosis rPDA.   Continue on aspirin, statin, and BB as above.    Hypertension   BP of 130/82 mmHg in office today.   Continue on medications as above.     Frequent alcohol use  History of colon cancer  History of gout  History of tobacco abuse  ?sleep apnea    HPI:   Bernardino Nunez is a 64-year-old man with a PMH as above who presents to the office for hospital follow-up. Follows with Dr. Alcaraz.     Admitted to [facility] from 07/23 to 07/25/2024 after presenting with SOB, palpitations, and LH for 3+ days. Found to be in hypertensive and in Afib with RVR in setting of poor compliance with medications. Started on diltiazem drip, and cardiology consulted. Switched to Coumadin for AC and losartan (rather than Entresto) during admission due to cost. Lost 10-15 lbs this admission.     07/30/2024: Patient presents for hospital follow-up. Feeling better overall. Expresses frustrations regarding recent hospital stay. No current cardiac complaints. Reports medications are much more affordable, and denies any doses since discharge.    Past Medical History:   Diagnosis Date    A-fib (HCC)     Cardiomyopathy, dilated (HCC) 12/18/2017    Colon cancer (HCC)     Colon polyp     Coronary artery disease     COVID-19 12/21/2021    GI bleeding     Gout     Herpes zoster     last assessed 12/18/17    Hypertension     Mass of cecum 08/25/2020    NSVT (nonsustained ventricular tachycardia) (HCC) 07/27/2018    Shingles        Review of Systems   Constitutional:  Negative for activity change, appetite change, fatigue and unexpected weight change.   Respiratory:  Negative for cough, chest tightness and shortness of breath.    Cardiovascular:  Negative for chest pain, palpitations and leg swelling.   Gastrointestinal:  Negative for abdominal distention and abdominal pain.   Genitourinary:  Negative for  dysuria.   Musculoskeletal: Negative.    Skin: Negative.    Neurological:  Negative for dizziness, syncope, weakness and light-headedness.   Psychiatric/Behavioral:  Negative for confusion and sleep disturbance. The patient is not nervous/anxious.        No Known Allergies      Current Outpatient Medications:     allopurinol (ZYLOPRIM) 100 mg tablet, Take 1 tablet (100 mg total) by mouth daily, Disp: 30 tablet, Rfl: 0    aspirin (ECOTRIN LOW STRENGTH) 81 mg EC tablet, Take 1 tablet (81 mg total) by mouth daily, Disp: 30 tablet, Rfl: 0    atorvastatin (LIPITOR) 40 mg tablet, Take 1 tablet (40 mg total) by mouth daily with dinner, Disp: 90 tablet, Rfl: 0    furosemide (LASIX) 20 mg tablet, Take 1 tablet (20 mg total) by mouth daily, Disp: 90 tablet, Rfl: 0    losartan (COZAAR) 50 mg tablet, Take 1 tablet (50 mg total) by mouth daily, Disp: 90 tablet, Rfl: 0    metoprolol succinate (TOPROL-XL) 50 mg 24 hr tablet, Take 1 tablet (50 mg total) by mouth 2 (two) times a day, Disp: 60 tablet, Rfl: 0    spironolactone (ALDACTONE) 25 mg tablet, Take 0.5 tablets (12.5 mg total) by mouth daily, Disp: 45 tablet, Rfl: 0    warfarin (Coumadin) 5 mg tablet, 1 tab daily, Disp: 30 tablet, Rfl: 0    Social History     Socioeconomic History    Marital status: Single     Spouse name: Not on file    Number of children: Not on file    Years of education: Not on file    Highest education level: Not on file   Occupational History    Not on file   Tobacco Use    Smoking status: Former     Current packs/day: 0.00     Types: Cigarettes     Quit date: 2017     Years since quittin.9    Smokeless tobacco: Never   Vaping Use    Vaping status: Never Used   Substance and Sexual Activity    Alcohol use: Yes     Alcohol/week: 20.0 standard drinks of alcohol     Types: 20 Cans of beer per week     Comment: drink about 4 days a weeks usually drink beer    Drug use: No    Sexual activity: Yes     Partners: Female   Other Topics Concern    Not on  "file   Social History Narrative    Not on file     Social Determinants of Health     Financial Resource Strain: Not on file   Food Insecurity: No Food Insecurity (7/24/2024)    Hunger Vital Sign     Worried About Running Out of Food in the Last Year: Never true     Ran Out of Food in the Last Year: Never true   Transportation Needs: No Transportation Needs (7/24/2024)    PRAPARE - Transportation     Lack of Transportation (Medical): No     Lack of Transportation (Non-Medical): No   Physical Activity: Not on file   Stress: Not on file   Social Connections: Not on file   Intimate Partner Violence: Not on file   Housing Stability: Low Risk  (7/24/2024)    Housing Stability Vital Sign     Unable to Pay for Housing in the Last Year: No     Number of Times Moved in the Last Year: 0     Homeless in the Last Year: No     Family History   Problem Relation Age of Onset    Coronary artery disease Mother     Alcohol abuse Father     Coronary artery disease Father     Colon cancer Neg Hx        Vitals:   Blood pressure 130/82, pulse 70, height 5' 9\" (1.753 m), weight 99.2 kg (218 lb 12.8 oz), SpO2 97%.    Wt Readings from Last 10 Encounters:   07/30/24 99.2 kg (218 lb 12.8 oz)   07/25/24 95.8 kg (211 lb 3.2 oz)   02/29/24 104 kg (229 lb 8 oz)   01/24/23 108 kg (238 lb)   01/18/23 104 kg (230 lb)   10/28/22 106 kg (233 lb)   08/29/22 102 kg (224 lb)   08/08/22 103 kg (226 lb 12.8 oz)   07/20/22 104 kg (230 lb)   04/07/22 104 kg (230 lb)     Vitals:    07/30/24 0951   BP: 130/82   BP Location: Left arm   Patient Position: Sitting   Cuff Size: Large   Pulse: 70   SpO2: 97%   Weight: 99.2 kg (218 lb 12.8 oz)   Height: 5' 9\" (1.753 m)       Physical Exam  Vitals reviewed.   Constitutional:       General: He is awake. He is not in acute distress.     Appearance: Normal appearance. He is well-developed. He is not toxic-appearing or diaphoretic.   HENT:      Head: Normocephalic.      Nose: Nose normal.      Mouth/Throat:      Mouth: " "Mucous membranes are moist.   Eyes:      General: No scleral icterus.     Conjunctiva/sclera: Conjunctivae normal.   Neck:      Vascular: No JVD.   Cardiovascular:      Rate and Rhythm: Normal rate and regular rhythm.      Heart sounds: No murmur heard.  Pulmonary:      Effort: Pulmonary effort is normal. No tachypnea, bradypnea or respiratory distress.      Breath sounds: Normal air entry. No decreased air movement. No wheezing or rhonchi.   Abdominal:      Palpations: Abdomen is soft.   Musculoskeletal:      Cervical back: Neck supple.   Skin:     General: Skin is warm and dry.      Coloration: Skin is not jaundiced or pale.   Neurological:      General: No focal deficit present.      Mental Status: He is alert and oriented to person, place, and time.   Psychiatric:         Attention and Perception: Attention normal.         Mood and Affect: Mood and affect normal.         Speech: Speech normal.         Behavior: Behavior normal. Behavior is cooperative.         Thought Content: Thought content normal.       Labs & Results:  Lab Results   Component Value Date    WBC 8.62 07/24/2024    HGB 13.8 07/24/2024    HCT 42.4 07/24/2024     (H) 07/24/2024     07/24/2024     Lab Results   Component Value Date    SODIUM 137 07/25/2024    K 3.8 07/25/2024     07/25/2024    CO2 25 07/25/2024    BUN 16 07/25/2024    CREATININE 0.91 07/25/2024    GLUC 101 07/25/2024    CALCIUM 8.9 07/25/2024     Lab Results   Component Value Date    INR 1.17 07/25/2024    INR 1.04 01/25/2021    INR 1.02 01/20/2021    PROTIME 14.8 (H) 07/25/2024    PROTIME 13.6 01/25/2021    PROTIME 13.4 01/20/2021     No results found for: \"BNP\"     Marj Rawsl PA-C  "

## 2024-07-30 ENCOUNTER — TELEPHONE (OUTPATIENT)
Dept: CARDIOLOGY CLINIC | Facility: CLINIC | Age: 64
End: 2024-07-30

## 2024-07-30 ENCOUNTER — OFFICE VISIT (OUTPATIENT)
Dept: CARDIOLOGY CLINIC | Facility: CLINIC | Age: 64
End: 2024-07-30
Payer: MEDICARE

## 2024-07-30 VITALS
DIASTOLIC BLOOD PRESSURE: 82 MMHG | HEIGHT: 69 IN | SYSTOLIC BLOOD PRESSURE: 130 MMHG | WEIGHT: 218.8 LBS | HEART RATE: 70 BPM | OXYGEN SATURATION: 97 % | BODY MASS INDEX: 32.41 KG/M2

## 2024-07-30 DIAGNOSIS — I25.10 CORONARY ARTERY DISEASE INVOLVING NATIVE CORONARY ARTERY OF NATIVE HEART WITHOUT ANGINA PECTORIS: ICD-10-CM

## 2024-07-30 DIAGNOSIS — I48.92 ATRIAL FIBRILLATION AND FLUTTER (HCC): Chronic | ICD-10-CM

## 2024-07-30 DIAGNOSIS — I10 ESSENTIAL HYPERTENSION: Chronic | ICD-10-CM

## 2024-07-30 DIAGNOSIS — I48.91 ATRIAL FIBRILLATION AND FLUTTER (HCC): Chronic | ICD-10-CM

## 2024-07-30 DIAGNOSIS — Z09 HOSPITAL DISCHARGE FOLLOW-UP: Primary | ICD-10-CM

## 2024-07-30 DIAGNOSIS — I50.32 HEART FAILURE WITH IMPROVED EJECTION FRACTION (HFIMPEF) (HCC): Chronic | ICD-10-CM

## 2024-07-30 PROCEDURE — 99214 OFFICE O/P EST MOD 30 MIN: CPT | Performed by: PHYSICIAN ASSISTANT

## 2024-07-30 NOTE — TELEPHONE ENCOUNTER
----- Message from Mary ARROYO sent at 7/30/2024  1:39 PM EDT -----    ----- Message -----  From: Marj Rawls PA-C  Sent: 7/30/2024   1:35 PM EDT  To: Cardiology Ep Adri Castillo,    Patient seen in office today and expressed frustration with high cost of device interrogations for loop recorder. Reports he is being charged over $100 for each interrogation and is requesting assistance regarding this see if this charge could be further investigated (ideally reduced).     Thanks!  Marj

## 2024-07-30 NOTE — PATIENT INSTRUCTIONS
Continue current medications.     Complete blood test.     Please weigh yourself every day (after emptying your bladder) and keep a detailed log of weights.   Contact the Heart Failure program at [phone] if you gain 3+ lbs overnight or 5+ lbs in 5-7 days.  Limit daily sodium/salt intake to 2000 mg daily to prevent fluid retention.  Avoid canned foods, fast food/Chinese food, and processed meats (hot dogs, lunch meat, and sausage etc.). Caution with condiments.  Limit fluid intake to 2000 mL or 2 liters (about 60-65 ounces) daily.  Avoid electrolyte replacement drinks (such as Gatorade, Pedialyte, Propel, Liquid IV, etc.).  Bring complete list of medications and log of daily weights to your follow-up appointment.

## 2024-07-30 NOTE — TELEPHONE ENCOUNTER
Called patient reviewed loop recording billing and informed patient his last four were a non billable charge.   Stated he will review bill again and reach out to billing with any further questions/concerns.

## 2024-07-31 ENCOUNTER — TELEPHONE (OUTPATIENT)
Age: 64
End: 2024-07-31

## 2024-07-31 NOTE — TELEPHONE ENCOUNTER
Pt called stating he received a confirmation text for his appt on 8/7. Pt wants to cancel TCM appt as he already had a hospital f/u with his cardiologist; I did advise pt it is something his PCP highly suggests so he can continue providing the best treatment plan for him. Pt was disinterested in coming into office; please advise pt with Dr. Veloz opinion, I have not cancelled the appt as of yet until pt is contacted by the office.

## 2024-07-31 NOTE — TELEPHONE ENCOUNTER
Patient returned call from office in regards to TCM.  Patient disconnected call while on hold to reach clerical.  Please call patient back.

## 2024-07-31 NOTE — TELEPHONE ENCOUNTER
Letting you know gilles wants to follow up with cardiologist and wants to cancel tcm.  I left a message stressing we would like to do follow up but if would rather can do a virtual tcm.  Waiting to hear back.

## 2024-08-05 ENCOUNTER — APPOINTMENT (OUTPATIENT)
Dept: LAB | Facility: CLINIC | Age: 64
End: 2024-08-05
Payer: MEDICARE

## 2024-08-05 ENCOUNTER — ANTICOAG VISIT (OUTPATIENT)
Dept: CARDIOLOGY CLINIC | Facility: CLINIC | Age: 64
End: 2024-08-05

## 2024-08-05 DIAGNOSIS — I48.11 LONGSTANDING PERSISTENT ATRIAL FIBRILLATION (HCC): ICD-10-CM

## 2024-08-05 DIAGNOSIS — I48.91 ATRIAL FIBRILLATION AND FLUTTER (HCC): Primary | Chronic | ICD-10-CM

## 2024-08-05 DIAGNOSIS — I48.92 ATRIAL FIBRILLATION AND FLUTTER (HCC): Primary | Chronic | ICD-10-CM

## 2024-08-05 LAB
INR PPP: 1.6 (ref 0.85–1.19)
PROTHROMBIN TIME: 19.2 SECONDS (ref 12.3–15)

## 2024-08-05 PROCEDURE — 36415 COLL VENOUS BLD VENIPUNCTURE: CPT

## 2024-08-05 PROCEDURE — 85610 PROTHROMBIN TIME: CPT

## 2024-08-07 ENCOUNTER — OFFICE VISIT (OUTPATIENT)
Dept: INTERNAL MEDICINE CLINIC | Facility: CLINIC | Age: 64
End: 2024-08-07
Payer: MEDICARE

## 2024-08-07 VITALS
OXYGEN SATURATION: 96 % | HEIGHT: 69 IN | SYSTOLIC BLOOD PRESSURE: 138 MMHG | TEMPERATURE: 97.9 F | RESPIRATION RATE: 18 BRPM | HEART RATE: 76 BPM | WEIGHT: 216.8 LBS | DIASTOLIC BLOOD PRESSURE: 78 MMHG | BODY MASS INDEX: 32.11 KG/M2

## 2024-08-07 DIAGNOSIS — Z86.79 S/P ABLATION OF ATRIAL FIBRILLATION: Chronic | ICD-10-CM

## 2024-08-07 DIAGNOSIS — I10 ESSENTIAL HYPERTENSION: Chronic | ICD-10-CM

## 2024-08-07 DIAGNOSIS — Z87.39 HISTORY OF GOUT: ICD-10-CM

## 2024-08-07 DIAGNOSIS — I50.32 HEART FAILURE WITH IMPROVED EJECTION FRACTION (HFIMPEF) (HCC): Primary | Chronic | ICD-10-CM

## 2024-08-07 DIAGNOSIS — Z90.49 S/P RIGHT HEMICOLECTOMY: Chronic | ICD-10-CM

## 2024-08-07 DIAGNOSIS — Z98.890 S/P ABLATION OF ATRIAL FIBRILLATION: Chronic | ICD-10-CM

## 2024-08-07 DIAGNOSIS — K92.1 GASTROINTESTINAL HEMORRHAGE WITH MELENA: ICD-10-CM

## 2024-08-07 PROCEDURE — 99495 TRANSJ CARE MGMT MOD F2F 14D: CPT | Performed by: INTERNAL MEDICINE

## 2024-08-07 RX ORDER — ALLOPURINOL 100 MG/1
100 TABLET ORAL DAILY
Qty: 30 TABLET | Refills: 0 | Status: SHIPPED | OUTPATIENT
Start: 2024-08-07 | End: 2024-08-07

## 2024-08-07 RX ORDER — ALLOPURINOL 100 MG/1
100 TABLET ORAL DAILY
Qty: 90 TABLET | Refills: 3 | Status: SHIPPED | OUTPATIENT
Start: 2024-08-07

## 2024-08-07 NOTE — ASSESSMENT & PLAN NOTE
History of colon cancer status post hemicolectomy.  Continues to follow-up with his colorectal surgeon.

## 2024-08-07 NOTE — ASSESSMENT & PLAN NOTE
Wt Readings from Last 3 Encounters:   08/07/24 98.3 kg (216 lb 12.8 oz)   07/30/24 99.2 kg (218 lb 12.8 oz)   07/25/24 95.8 kg (211 lb 3.2 oz)   Patient did have control of his heart rate and diuresed.  His weight on discharge was 211 pounds but it has gone up to 216 as of today.  He is compliant taking his Lasix as prescribed and again has a regular rhythm on auscultation.  This may help with his cardiac function and he will have follow-up with a cardiologist

## 2024-08-07 NOTE — ASSESSMENT & PLAN NOTE
Asking for renewal prescription for his allopurinol which was sent to the pharmacy.  Check uric acid level

## 2024-08-07 NOTE — ASSESSMENT & PLAN NOTE
Patient is a 64-year-old male history of extensive medical problems as outlined previously including chronic atrial fibrillation, history of colon cancer status post resection with metastatic disease who is here today for transition of care visit after recent hospitalization.  Uncontrolled atrial fibrillation and was placed on Cardizem drip in order to control his heart rate.  Patient because of cost of medication was changed from Eliquis to Coumadin and is compliant now with medication and he continues to have his pro time checked through the cardiology office.  States that his exercise capacity has improved with control of his heart rate with less shortness of breath and better stamina.  On evaluation today patient had a normal sinus rhythm with no ectopy auscultated on evaluation checking on his heart rate for approximately 2-1/2 minutes.  Patient will continue to follow-up with cardiology and hopefully patient will remain in a sinus rhythm.  He will also continue with follow-up with his colorectal specialist and repeat CT scan chest to make sure there is no metastatic recurrence of disease

## 2024-08-07 NOTE — ASSESSMENT & PLAN NOTE
Patient does have a history of hypertension.  As noted his blood pressure can be variable.  Blood pressure today is elevated 152/96 initially.  Once the patient was allowed to sit and relax it did come down to an acceptable range at 138/70.  Will continue present medication and surveillance and make further adjustment of medication in the future if needed.

## 2024-08-19 ENCOUNTER — APPOINTMENT (OUTPATIENT)
Dept: LAB | Facility: CLINIC | Age: 64
End: 2024-08-19
Payer: MEDICARE

## 2024-08-19 DIAGNOSIS — I48.11 LONGSTANDING PERSISTENT ATRIAL FIBRILLATION (HCC): ICD-10-CM

## 2024-08-19 DIAGNOSIS — Z90.49 S/P RIGHT HEMICOLECTOMY: Chronic | ICD-10-CM

## 2024-08-19 DIAGNOSIS — I10 ESSENTIAL HYPERTENSION: Chronic | ICD-10-CM

## 2024-08-19 DIAGNOSIS — Z98.890 S/P ABLATION OF ATRIAL FIBRILLATION: Chronic | ICD-10-CM

## 2024-08-19 DIAGNOSIS — Z86.79 S/P ABLATION OF ATRIAL FIBRILLATION: Chronic | ICD-10-CM

## 2024-08-19 DIAGNOSIS — Z87.39 HISTORY OF GOUT: ICD-10-CM

## 2024-08-19 LAB
ANION GAP SERPL CALCULATED.3IONS-SCNC: 11 MMOL/L (ref 4–13)
BASOPHILS # BLD AUTO: 0.06 THOUSANDS/ÂΜL (ref 0–0.1)
BASOPHILS NFR BLD AUTO: 1 % (ref 0–1)
BUN SERPL-MCNC: 13 MG/DL (ref 5–25)
CALCIUM SERPL-MCNC: 8.9 MG/DL (ref 8.4–10.2)
CHLORIDE SERPL-SCNC: 103 MMOL/L (ref 96–108)
CO2 SERPL-SCNC: 25 MMOL/L (ref 21–32)
CREAT SERPL-MCNC: 0.93 MG/DL (ref 0.6–1.3)
EOSINOPHIL # BLD AUTO: 0.05 THOUSAND/ÂΜL (ref 0–0.61)
EOSINOPHIL NFR BLD AUTO: 1 % (ref 0–6)
ERYTHROCYTE [DISTWIDTH] IN BLOOD BY AUTOMATED COUNT: 12.7 % (ref 11.6–15.1)
GFR SERPL CREATININE-BSD FRML MDRD: 86 ML/MIN/1.73SQ M
GLUCOSE SERPL-MCNC: 107 MG/DL (ref 65–140)
HCT VFR BLD AUTO: 46.2 % (ref 36.5–49.3)
HGB BLD-MCNC: 14.8 G/DL (ref 12–17)
IMM GRANULOCYTES # BLD AUTO: 0.01 THOUSAND/UL (ref 0–0.2)
IMM GRANULOCYTES NFR BLD AUTO: 0 % (ref 0–2)
INR PPP: 2.79 (ref 0.85–1.19)
LYMPHOCYTES # BLD AUTO: 1.31 THOUSANDS/ÂΜL (ref 0.6–4.47)
LYMPHOCYTES NFR BLD AUTO: 23 % (ref 14–44)
MCH RBC QN AUTO: 32 PG (ref 26.8–34.3)
MCHC RBC AUTO-ENTMCNC: 32 G/DL (ref 31.4–37.4)
MCV RBC AUTO: 100 FL (ref 82–98)
MONOCYTES # BLD AUTO: 0.51 THOUSAND/ÂΜL (ref 0.17–1.22)
MONOCYTES NFR BLD AUTO: 9 % (ref 4–12)
NEUTROPHILS # BLD AUTO: 3.69 THOUSANDS/ÂΜL (ref 1.85–7.62)
NEUTS SEG NFR BLD AUTO: 66 % (ref 43–75)
NRBC BLD AUTO-RTO: 0 /100 WBCS
PLATELET # BLD AUTO: 177 THOUSANDS/UL (ref 149–390)
PMV BLD AUTO: 12.9 FL (ref 8.9–12.7)
POTASSIUM SERPL-SCNC: 3.9 MMOL/L (ref 3.5–5.3)
PROTHROMBIN TIME: 29.2 SECONDS (ref 12.3–15)
RBC # BLD AUTO: 4.62 MILLION/UL (ref 3.88–5.62)
SODIUM SERPL-SCNC: 139 MMOL/L (ref 135–147)
URATE SERPL-MCNC: 8.1 MG/DL (ref 3.5–8.5)
WBC # BLD AUTO: 5.63 THOUSAND/UL (ref 4.31–10.16)

## 2024-08-19 PROCEDURE — 85610 PROTHROMBIN TIME: CPT

## 2024-08-19 PROCEDURE — 80048 BASIC METABOLIC PNL TOTAL CA: CPT

## 2024-08-19 PROCEDURE — 85025 COMPLETE CBC W/AUTO DIFF WBC: CPT

## 2024-08-19 PROCEDURE — 36415 COLL VENOUS BLD VENIPUNCTURE: CPT

## 2024-08-19 PROCEDURE — 84550 ASSAY OF BLOOD/URIC ACID: CPT

## 2024-08-20 ENCOUNTER — ANTICOAG VISIT (OUTPATIENT)
Dept: CARDIOLOGY CLINIC | Facility: CLINIC | Age: 64
End: 2024-08-20

## 2024-08-20 DIAGNOSIS — I48.92 ATRIAL FIBRILLATION AND FLUTTER (HCC): Primary | Chronic | ICD-10-CM

## 2024-08-20 DIAGNOSIS — I48.91 ATRIAL FIBRILLATION AND FLUTTER (HCC): Primary | Chronic | ICD-10-CM

## 2024-08-22 ENCOUNTER — TELEPHONE (OUTPATIENT)
Dept: CARDIOLOGY CLINIC | Facility: CLINIC | Age: 64
End: 2024-08-22

## 2024-08-22 ENCOUNTER — DOCUMENTATION (OUTPATIENT)
Dept: CARDIOLOGY CLINIC | Facility: CLINIC | Age: 64
End: 2024-08-22

## 2024-08-22 NOTE — PROGRESS NOTES
Likely alcohol induced Afib/RVR last month.  Went home on metoprolol 50mg twice daily, not a change from prior, entresto changed to losartan and eliquis changed to warfarin.  Habing more breakthrough afib, EP advised 75mg metoprolol BID.    Will get loop interrogation in 1 week and if not improved, may need admission under EP for tikosyn or sotalol.

## 2024-08-22 NOTE — TELEPHONE ENCOUNTER
----- Message from Jonathan Burrows MD sent at 8/22/2024 12:27 PM EDT -----  Normal device function.  Can we have him increase metoprolol to 75 mg twice daily ?     Thanks.

## 2024-08-23 ENCOUNTER — TELEPHONE (OUTPATIENT)
Dept: CARDIOLOGY CLINIC | Facility: CLINIC | Age: 64
End: 2024-08-23

## 2024-08-23 DIAGNOSIS — I48.0 PAF (PAROXYSMAL ATRIAL FIBRILLATION) (HCC): Primary | ICD-10-CM

## 2024-08-23 NOTE — LETTER
2024               CARDIAC ABLATION INSTRUCTIONS     Bernardino Nunez   : 1960  MRN: [id number]  [address] [facility] [id number]    Procedure: JOSE LUIS/AFIB PFA ABLATION/MED ADMISSION DOFETILIDE     Procedure Date: 10/22/24    Location: [facility]  Address: [address] [place] [address], [place] [address]        Labs to be done on 10/18/24: PT INR / CMP / CBC (FASTING 8 HOURS)    BLOOD THINNER INSTRUCTIONS (Coumadin / Warfarin / Pradaxa / Eliquis / Xarelto):     Warfarin - Keep taking and do not stop.     Medication Hold:     Spironolactone - Do not take morning of procedure.     Losartan - Do not take day before and morning of procedure.      Furosemide (Lasix) - Do not take morning of procedure.    Arrival Time: The Hospital will contact you the day prior to your procedure, usually between 4PM - 6PM to instruct you on the time and place to report. If you do not hear from a [facility]  by 5PM the evening prior to your procedure, please contact the [facility] at [phone].    DO NOT eat or drink ANYTHING after midnight the night prior to your procedure including gum & candy.     You may have a SIP of WATER with your morning medications, UNLESS ADVISE OTHERWISE.     Please notify us if you have been prescribed a NEW MEDICATION prior to your procedure or admitted to the hospital within 30 days.      If you develop a cold, sore throat, fever or any other illness prior to your procedure date, notify your surgeon immediately.    Arrange for a responsible adult to drive you to and from the hospital.    DO NOT stop taking Plavix or Aspirin unless advised otherwise.     If you are currently taking Fish Oil, Krill oil and/or Vitamin E please DO NOT TAKE FOR A WEEK PRIOR TO PROCEDURE.     If you are diabetic, DO NOT take any of your diabetic pills the morning of your procedure. Oral diabetic medications may include Glucophage, Prandin, Glyburide, Micronase, Avandia, Glucovance, Precose,  "Glynase, and Starlix.     Bring a list of daily medications, vitamins, minerals, herbals and nutritional supplements you take. Please include dosages and the times you take them each day.     If you are packing an overnight bag, pack minimal clothing, you will be given hospital sleepwear.   DO NOT bring money, valuables or jewelry. The wedding band is ok.     If you use CPAP machine, bring it to the hospital.      Bring your Photo ID and Insurance cards with you.       FAILURE TO FOLLOW ANY OF THESE INSTRUCTIONS COULD RESULT IN THE CANCELLATION OF YOUR PROCEDURE      Please call [phone] if you do not hear from the  by 5:00PM the night before your procedure.    All patients enter through ENTRANCE B. [employer] Parking is available free of charge or park on Parking Deck B.        Thank you,   Zari \"Latasha\" Farooq    [facility]   [address] [place] [id number]  Teams: [phone]             "

## 2024-08-23 NOTE — TELEPHONE ENCOUNTER
",    Both patient and I called Medicare to find out who is patient's prescription plan.     Per Pat at Medicare, patient does not have a prescription plan.     Pat provided us with phone number to see if patient can qualify for assistance program for Dofetilide using [employer] and for Sotalol using [place].    Both patient and I called [employer]  for Dofetilide and seems like patient will qualify and will be $0.00 for Dofetilide using the assistance program.     [employer] will fax form to our office for patient and doctor to complete and fax back with patient's Social Security Award letter as proof of income.     It will take 2-3 weeks for eligibility to process.    [employer] will send letter of approval/denial to patient and doctor.     If approved, will be good until end of year 12/31/24.     They supply 3 month supply at a time and doctor's office will need to call in the refill every 3 month and they get shipped to doctor's office.     When calling [employer] for patient use Patient ID: [id number]    : Is this something you want to move forward with?     Thanks,  Zari \"Latasha\" Farooq         "

## 2024-08-23 NOTE — TELEPHONE ENCOUNTER
----- Message from Jonathan Burrows MD sent at 8/22/2024  5:04 PM EDT -----  Regarding: RE: Symptomatic RVR  Oh k.     Lets do repeat ablation and dofetilide/sotalol. He won't stay in normal rhythm without abl + AAD.     EP team - can we set-up for PFA ablation with dofetilide/sotalol admission?     No hold on meds    Routine labs    PFA+carto system    Please price out meds.     Thanks  ----- Message -----  From: Bryce Alcaraz MD  Sent: 8/22/2024   1:35 PM EDT  To: Felicitas Dave; Jonathan Burrows MD  Subject: RE: Symptomatic RVR                              So he will go to 75mg BID metoprolol, can we do 1 week loop check.  He is okay with admission for AAD or possible repeat ablation [place].  ----- Message -----  From: Jonathan Burrows MD  Sent: 8/21/2024   3:24 PM EDT  To: Bryce Alcaraz MD; Felicitas Dave  Subject: RE: Symptomatic RVR                              Thanks. Please ask him if he is taking metoprolol. We will increase the dose if he is taking metoprolol.  ----- Message -----  From: Felicitas Dave  Sent: 8/21/2024   9:58 AM EDT  To: Bryce Alcaraz MD; Jonathan Burrows MD  Subject: Symptomatic RVR                                  Please see loop alert below.      NON-BILLABLE. [employer] TRANSMISSION: ALERT FOR 5 TACHY AND 8 DEVICE CLASSIFIED AF EPISODES, LONGEST EPISODE IS 2:01 HOURS, AVAILABLE STRIPS DEMONSTRATE PACs, PVCs AND ATRIAL FIBRILLATION W/ RVR @ 109-207 BPM. AF BURDEN IS 9.8%. HX OF PAF. PT TAKES METOPROLOL SUCC AND COUMADIN. SPOKE TO PT WHOM STATED HE FELT A FAST HEART RATE, HAD SOME LIGHTHEADENESS, DIZINESS, SOB AND CHEST DISCOMFORT. PT CANCELED HIS GOLF OUTING DUE TO HOW HE FELT. HE STATED HE FEELS FINE TODAY. TASKED TO TRISTON AND JUAN. DL

## 2024-08-26 ENCOUNTER — PREP FOR PROCEDURE (OUTPATIENT)
Dept: CARDIOLOGY CLINIC | Facility: CLINIC | Age: 64
End: 2024-08-26

## 2024-08-26 DIAGNOSIS — I48.91 ATRIAL FIBRILLATION WITH RVR (HCC): ICD-10-CM

## 2024-08-26 DIAGNOSIS — I48.0 PAF (PAROXYSMAL ATRIAL FIBRILLATION) (HCC): Primary | ICD-10-CM

## 2024-08-26 RX ORDER — WARFARIN SODIUM 5 MG/1
TABLET ORAL
Qty: 30 TABLET | Refills: 3 | Status: SHIPPED | OUTPATIENT
Start: 2024-08-26

## 2024-08-26 NOTE — TELEPHONE ENCOUNTER
"Patient scheduled for JOSE LUIS/AFIB PFA Ablation /MED ADMISSION DOFETILIDE on 10/22/24 at [facility] with Dr. Burrows.      Mailed patient instructions.     Patient aware of all general instructions.    Medication holds:   Spironolactone - Do not take morning of procedure.    Losartan - Do not take day before and morning of procedure.     Furosemide (Lasix) - Do not take morning of procedure.    Blood Thinners:  Warfarin - Keep taking and do not stop.     Labs to be done on 10/18/24:  PT INR / CMP / CBC (FASTING 8 HOURS)    JOSE LUIS ordered/completed.      Thank you,  Zari \"Latasha\" Farooq    "

## 2024-08-26 NOTE — TELEPHONE ENCOUNTER
Reason for call:   [x] Refill   [] Prior Auth  [x] Other: PATIENT STATED HE IS TAKING THIS MEDICATION DIFFERENTLY THAN LISTED IN HIS CHART.    Office:   [] PCP/Provider -   [x] Specialty/Provider - CARDIO    Medication: warfarin (Coumadin) 5 mg tablet     Dose/Frequency: 1 tab daily, only 1/2 tab on monday    Quantity: 30    Pharmacy: [facility]/pharm[facility] #[id number] - [place], [place]  [address] [facility] [phone]    Does the patient have enough for 3 days?   [] Yes   [x] No - Send as HP to POD

## 2024-08-26 NOTE — TELEPHONE ENCOUNTER
" / Nila,     Received fax from [employer] Patient Assistance Program (for Dofetilide).     Patient completed his section.     Placed forms on Nila's desk today 8/26/24.     to complete his sections.    All forms along with patient's proof of income (Award letter from [facility]) attached and office cover letter to be faxed to [employer] Patient Assistance Program at Fx: [phone].    It takes about 2-3 weeks for eligibility notice.     Thanks,  Zari \"Latasha\" Farooq  "

## 2024-08-30 ENCOUNTER — TELEPHONE (OUTPATIENT)
Dept: CARDIOLOGY CLINIC | Facility: CLINIC | Age: 64
End: 2024-08-30

## 2024-08-30 ENCOUNTER — REMOTE DEVICE CLINIC VISIT (OUTPATIENT)
Dept: CARDIOLOGY CLINIC | Facility: CLINIC | Age: 64
End: 2024-08-30
Payer: MEDICARE

## 2024-08-30 DIAGNOSIS — I48.91 ATRIAL FIBRILLATION AND FLUTTER (HCC): Primary | Chronic | ICD-10-CM

## 2024-08-30 DIAGNOSIS — I48.92 ATRIAL FIBRILLATION AND FLUTTER (HCC): Primary | Chronic | ICD-10-CM

## 2024-08-30 PROCEDURE — 93298 REM INTERROG DEV EVAL SCRMS: CPT | Performed by: INTERNAL MEDICINE

## 2024-08-30 NOTE — TELEPHONE ENCOUNTER
[employer] Patient Assistance Program Form faxed over to fax # [phone]. For Dofitilide medication.

## 2024-08-30 NOTE — PROGRESS NOTES
"MDT LNQ22/ ACTIVE SYSTEM IS MRI CONDITIONAL   NON-BILLABLE: [employer]ELINK TRANSMISSION: RHYTHM RECHECK PER JM.  LOOP RECORDER. PRESENTING RHYTHM NSR W/ PAC @ 60 BPM. BATTERY STATUS \"OK.\" NO PATIENT OR DEVICE ACTIVATED EPISODES. HOWEVER THERE IS AN EGRAM SHOWING RVR. HX OF SAME. PT TAKES COUMADIN AND METOPROLOL SUCC. PT SCHEDULED FOR 10/22/24. NORMAL DEVICE FUNCTION. DL   "

## 2024-09-05 NOTE — TELEPHONE ENCOUNTER
Request of missing information received, after completed information missing faxed over form to [employer] at fax # [phone].

## 2024-09-11 ENCOUNTER — REMOTE DEVICE CLINIC VISIT (OUTPATIENT)
Dept: CARDIOLOGY CLINIC | Facility: CLINIC | Age: 64
End: 2024-09-11

## 2024-09-11 ENCOUNTER — TELEPHONE (OUTPATIENT)
Age: 64
End: 2024-09-11

## 2024-09-11 DIAGNOSIS — I48.91 ATRIAL FIBRILLATION, UNSPECIFIED TYPE (HCC): Primary | ICD-10-CM

## 2024-09-11 PROCEDURE — RECHECK: Performed by: INTERNAL MEDICINE

## 2024-09-11 NOTE — PROGRESS NOTES
"MDT LNQ22/ ACTIVE SYSTEM IS MRI CONDITIONAL   NON-BILLABLE. CARELINK TRANSMISSION: LOOP RECORDER. PRESENTING RHYTHM NSR W/ PVC @ 67 BPM. BATTERY STATUS \"OK.\" NO PATIENT OR DEVICE ACTIVATED EPISODES. HOWEVER THERE IS AN EGRAM PREVIOUSLY ADDRESSED IN ALERT.  HX OF PAF. PT TAKES COUMADIN, ASA AND METOPROLOL SUCC. NORMAL DEVICE FUNCTION. DL   "

## 2024-09-11 NOTE — TELEPHONE ENCOUNTER
Caller: Bernardino Nunez     Doctor: Stormy    Reason for call: He wants to speak directly to Dr Burrows about medication that is at the office to be picked up.    Call back#: [phone]

## 2024-09-16 ENCOUNTER — OFFICE VISIT (OUTPATIENT)
Dept: CARDIOLOGY CLINIC | Facility: CLINIC | Age: 64
End: 2024-09-16
Payer: MEDICARE

## 2024-09-16 VITALS
DIASTOLIC BLOOD PRESSURE: 84 MMHG | SYSTOLIC BLOOD PRESSURE: 130 MMHG | HEART RATE: 76 BPM | BODY MASS INDEX: 32.42 KG/M2 | OXYGEN SATURATION: 96 % | HEIGHT: 69 IN | WEIGHT: 218.9 LBS

## 2024-09-16 DIAGNOSIS — R79.89 ELEVATED TROPONIN: ICD-10-CM

## 2024-09-16 DIAGNOSIS — I48.92 ATRIAL FIBRILLATION AND FLUTTER (HCC): Chronic | ICD-10-CM

## 2024-09-16 DIAGNOSIS — I25.10 CORONARY ARTERY DISEASE INVOLVING NATIVE CORONARY ARTERY OF NATIVE HEART WITHOUT ANGINA PECTORIS: ICD-10-CM

## 2024-09-16 DIAGNOSIS — I48.91 ATRIAL FIBRILLATION AND FLUTTER (HCC): Chronic | ICD-10-CM

## 2024-09-16 DIAGNOSIS — I10 ESSENTIAL HYPERTENSION: Primary | Chronic | ICD-10-CM

## 2024-09-16 DIAGNOSIS — I48.91 ATRIAL FIBRILLATION WITH RVR (HCC): ICD-10-CM

## 2024-09-16 DIAGNOSIS — Q21.12 PFO (PATENT FORAMEN OVALE): ICD-10-CM

## 2024-09-16 DIAGNOSIS — I50.22 HEART FAILURE WITH MID-RANGE EJECTION FRACTION (HCC): ICD-10-CM

## 2024-09-16 PROCEDURE — G2211 COMPLEX E/M VISIT ADD ON: HCPCS | Performed by: INTERNAL MEDICINE

## 2024-09-16 PROCEDURE — 99214 OFFICE O/P EST MOD 30 MIN: CPT | Performed by: INTERNAL MEDICINE

## 2024-09-16 RX ORDER — METOPROLOL SUCCINATE 50 MG/1
75 TABLET, EXTENDED RELEASE ORAL 2 TIMES DAILY
Qty: 90 TABLET | Refills: 11 | Status: SHIPPED | OUTPATIENT
Start: 2024-09-16

## 2024-09-16 NOTE — PROGRESS NOTES
[facility] Cardiology  Follow up note  Bernardino Nunez 64 y.o. male MRN: [id number]        Impression:    Tachycardia mediated  cardiomyopathy   EF normalized by 4/22  Last echo 7/24 showed recurrence of cardiomyopathy with recent increase in afib, EF 45%   Chronic Diastolic/systolic CHF  EF currently 45% as of 7/24  Med cost was a problem, now on losartan in place of entresto  Paroxysmal atrial fibrillation   Ablation 2021, now recurrence with alcohol  Warfarin now in place of eliquis  Nonsustained VT   Rare  Coronary artery disease  Isolated 80% ramus lesion on remote cardiac catheterization   Elevated troponin during recent hospital stay, needs ischemic eval.    Plan:    I will have him undergo a Lexiscan stress Myoview  He has ablation/initiation of Tikosyn plan for 10/22/2024  I will reach out to EP with an update about how well he is doing without any recent A-fib recurrence after up titration of metoprolol and significant reduction in alcohol intake.  8 month f/u      HPI:   Bernardino Nunez is a 64 y.o. year old male with coronary artery disease, colon cancer status post hemicolectomy, paroxysmal symptomatic A-fib/flutter with a history of ablation, chronic diastolic/midrange ejection fraction heart failure returns for a follow-up visit    He recently had alcohol induced A-fib/flutter recurrence, was hospitalized with recurrent mild tachycardia mediated cardiomyopathy, treated with a rate control strategy, but significant outpatient recurrence with associated symptoms prompted dose escalation of metoprolol succinate to 75 mg twice a day which has drastically reduced his A-fib recurrence, and he tells me he has not had anything in the last few weeks, and his most recent device check is down to 0.4% A-fib recurrence.  He is on anticoagulation with warfarin, he had issues affording Eliquis, he is also on losartan, had issues affording Entresto.  Cholesterol is well-controlled.  He is also on spironolactone.  EP  has been involved, they have planned Tikosyn/ablation.          Review of Systems   Constitutional:  Negative for appetite change, diaphoresis, fatigue and fever.   Respiratory:  Negative for chest tightness, shortness of breath and wheezing.    Cardiovascular:  Negative for chest pain, palpitations and leg swelling.   Gastrointestinal:  Negative for abdominal pain and blood in stool.   Musculoskeletal:  Negative for arthralgias and joint swelling.   Skin:  Negative for rash.   Neurological:  Negative for dizziness, syncope and light-headedness.       Past Medical History:   Diagnosis Date    A-fib (HCC)     Cardiomyopathy, dilated (HCC) 2017    Colon cancer (HCC)     Colon polyp     Coronary artery disease     COVID-19 2021    GI bleeding     Gout     Herpes zoster     last assessed 17    Hypertension     [place] 2020    NSVT (nonsustained ventricular tachycardia) (HCC) 2018    Shingles      Social History     Substance and Sexual Activity   Alcohol Use Yes    Alcohol/week: 20.0 standard drinks of alcohol    Types: 20 Cans of beer per week    Comment: drink about 4 days a weeks usually drink beer     Social History     Substance and Sexual Activity   Drug Use No     Social History     Tobacco Use   Smoking Status Former    Current packs/day: 0.00    Types: Cigarettes    Quit date: 2017    Years since quittin.1   Smokeless Tobacco Never       Allergies:  No Known Allergies    Medications:     Current Outpatient Medications:     allopurinol (ZYLOPRIM) 100 mg tablet, Take 1 tablet (100 mg total) by mouth daily, Disp: 90 tablet, Rfl: 3    aspirin (ECOTRIN LOW STRENGTH) 81 mg EC tablet, Take 1 tablet (81 mg total) by mouth daily, Disp: 30 tablet, Rfl: 0    atorvastatin (LIPITOR) 40 mg tablet, Take 1 tablet (40 mg total) by mouth daily with dinner, Disp: 90 tablet, Rfl: 0    furosemide (LASIX) 20 mg tablet, Take 1 tablet (20 mg total) by mouth daily, Disp: 90 tablet, Rfl: 0     losartan (COZAAR) 50 mg tablet, Take 1 tablet (50 mg total) by mouth daily, Disp: 90 tablet, Rfl: 0    metoprolol succinate (TOPROL-XL) 50 mg 24 hr tablet, Take 1 tablet (50 mg total) by mouth 2 (two) times a day, Disp: 60 tablet, Rfl: 0    spironolactone (ALDACTONE) 25 mg tablet, Take 0.5 tablets (12.5 mg total) by mouth daily, Disp: 45 tablet, Rfl: 0    warfarin (Coumadin) 5 mg tablet, 1 tab daily, Disp: 30 tablet, Rfl: 3      Vitals:    09/16/24 1253   BP: 130/84   Pulse: 76   SpO2: 96%     Weight (last 2 days)       Date/Time Weight    09/16/24 1253 99.3 (218.9)          Physical Exam  Constitutional:       General: He is not in acute distress.     Appearance: He is not diaphoretic.   HENT:      Head: Normocephalic and atraumatic.   Eyes:      General: No scleral icterus.     Conjunctiva/sclera: Conjunctivae normal.   Neck:      Vascular: No JVD.   Cardiovascular:      Rate and Rhythm: Normal rate and regular rhythm.      Heart sounds: Normal heart sounds. No murmur heard.  Pulmonary:      Effort: Pulmonary effort is normal. No respiratory distress.      Breath sounds: Normal breath sounds. No decreased breath sounds, wheezing, rhonchi or rales.   Musculoskeletal:      Cervical back: Normal range of motion.      Right lower leg: Normal. No edema.      Left lower leg: Normal. No edema.   Skin:     General: Skin is warm and dry.   Neurological:      Mental Status: He is alert and oriented to person, place, and time.         Laboratory Studies:    Labs personally reviewed    Cardiac testing:     EKG reviewed personally:   No results found for this visit on 09/16/24.    Echocardiogram:   2018-  EF 50%, no regional wall motion abnormalities, mild LVH, grade 1 diastolic dysfunction  8/20-limited echo-EF 50%, mild left atrial dilation, mild right atrial dilation, mild TR, small pericardial effusion    1/26/2021-EF 65%, mild left atrial dilation and right atrial dilation, mild TR, small pericardial effusion  4/22-EF  "65%, mild left atrial dilation, mild right atrial dilation, mild TR  7/24-EF 45%, mild global hypokinesis, mild valve disease, personally reviewed    Stress tests:      Catheterization:   2018- 80% ramus, 60% PDA, no intervention    Holter:           Bryce Alcaraz MD    Portions of the record may have been created with voice recognition software.  Occasional wrong word or \"sound a like\" substitutions may have occurred due to the inherent limitations of voice recognition software.  Read the chart carefully and recognize, using context, where substitutions have occurred.  "

## 2024-09-20 ENCOUNTER — HOSPITAL ENCOUNTER (OUTPATIENT)
Dept: NON INVASIVE DIAGNOSTICS | Facility: CLINIC | Age: 64
Discharge: HOME | End: 2024-09-20
Payer: MEDICARE

## 2024-09-20 DIAGNOSIS — I25.10 CORONARY ARTERY DISEASE INVOLVING NATIVE CORONARY ARTERY OF NATIVE HEART WITHOUT ANGINA PECTORIS: ICD-10-CM

## 2024-09-20 DIAGNOSIS — R79.89 ELEVATED TROPONIN: ICD-10-CM

## 2024-09-20 LAB
MAX HR PERCENT: 52 %
MAX HR: 82 BPM
NUC STRESS EJECTION FRACTION: 40 %
RATE PRESSURE PRODUCT: 9840
SL CV REST NUCLEAR ISOTOPE DOSE: 10.1 MCI
SL CV STRESS NUCLEAR ISOTOPE DOSE: 30.4 MCI
SL CV STRESS RECOVERY BP: NORMAL MMHG
SL CV STRESS RECOVERY HR: 69 BPM
SL CV STRESS RECOVERY O2 SAT: 99 %
STRESS ANGINA INDEX: 0
STRESS BASELINE BP: 66 MMHG
STRESS BASELINE HR: 2 BPM
STRESS O2 SAT REST: 98 %
STRESS PEAK HR: 82 BPM
STRESS POST O2 SAT PEAK: 98 %
STRESS POST PEAK BP: 120 MMHG
STRESS/REST PERFUSION RATIO: 1.14

## 2024-09-20 PROCEDURE — 78452 HT MUSCLE IMAGE SPECT MULT: CPT | Performed by: INTERNAL MEDICINE

## 2024-09-20 PROCEDURE — A9502 TC99M TETROFOSMIN: HCPCS

## 2024-09-20 PROCEDURE — 93018 CV STRESS TEST I&R ONLY: CPT | Performed by: INTERNAL MEDICINE

## 2024-09-20 PROCEDURE — 93017 CV STRESS TEST TRACING ONLY: CPT

## 2024-09-20 PROCEDURE — 78452 HT MUSCLE IMAGE SPECT MULT: CPT

## 2024-09-20 PROCEDURE — 93016 CV STRESS TEST SUPVJ ONLY: CPT | Performed by: INTERNAL MEDICINE

## 2024-09-20 RX ORDER — REGADENOSON 0.08 MG/ML
0.4 INJECTION, SOLUTION INTRAVENOUS ONCE
Status: COMPLETED | OUTPATIENT
Start: 2024-09-20 | End: 2024-09-20

## 2024-09-20 RX ADMIN — REGADENOSON 0.4 MG: 0.08 INJECTION, SOLUTION INTRAVENOUS at 09:33

## 2024-09-23 LAB
CHEST PAIN STATEMENT: NORMAL
MAX DIASTOLIC BP: 78 MMHG
MAX PREDICTED HEART RATE: 156 BPM
PROTOCOL NAME: NORMAL
REASON FOR TERMINATION: NORMAL
STRESS POST EXERCISE DUR MIN: 3 MIN
STRESS POST EXERCISE DUR SEC: 0 SEC
STRESS POST PEAK HR: 82 BPM
STRESS POST PEAK SYSTOLIC BP: 138 MMHG
TARGET HR FORMULA: NORMAL
TEST INDICATION: NORMAL

## 2024-10-03 ENCOUNTER — TELEPHONE (OUTPATIENT)
Dept: CARDIOLOGY CLINIC | Facility: CLINIC | Age: 64
End: 2024-10-03

## 2024-10-03 NOTE — TELEPHONE ENCOUNTER
LVM regarding Dr. Alcaraz message of -   Bernardino does not have MyChart.  Please let him know that his stress test is abnormal, shows an old scar of the side and bottom wall of his heart muscle, this is unchanged from 2018, no new significant abnormality discovered.

## 2024-10-12 DIAGNOSIS — I48.91 ATRIAL FIBRILLATION WITH RVR (HCC): ICD-10-CM

## 2024-10-14 RX ORDER — METOPROLOL SUCCINATE 50 MG/1
TABLET, EXTENDED RELEASE ORAL
Qty: 270 TABLET | Refills: 4 | Status: SHIPPED | OUTPATIENT
Start: 2024-10-14

## 2024-10-15 ENCOUNTER — APPOINTMENT (OUTPATIENT)
Dept: LAB | Facility: CLINIC | Age: 64
DRG: 274 | End: 2024-10-15
Payer: MEDICARE

## 2024-10-15 DIAGNOSIS — I48.11 LONGSTANDING PERSISTENT ATRIAL FIBRILLATION (HCC): ICD-10-CM

## 2024-10-15 LAB
ALBUMIN SERPL BCG-MCNC: 3.7 G/DL (ref 3.5–5)
ALP SERPL-CCNC: 88 U/L (ref 34–104)
ALT SERPL W P-5'-P-CCNC: 41 U/L (ref 7–52)
ANION GAP SERPL CALCULATED.3IONS-SCNC: 12 MMOL/L (ref 4–13)
AST SERPL W P-5'-P-CCNC: 22 U/L (ref 13–39)
BASOPHILS # BLD AUTO: 0.08 THOUSANDS/ΜL (ref 0–0.1)
BASOPHILS NFR BLD AUTO: 1 % (ref 0–1)
BILIRUB SERPL-MCNC: 0.95 MG/DL (ref 0.2–1)
BUN SERPL-MCNC: 16 MG/DL (ref 5–25)
CALCIUM SERPL-MCNC: 8.7 MG/DL (ref 8.4–10.2)
CHLORIDE SERPL-SCNC: 104 MMOL/L (ref 96–108)
CO2 SERPL-SCNC: 22 MMOL/L (ref 21–32)
CREAT SERPL-MCNC: 0.9 MG/DL (ref 0.6–1.3)
EOSINOPHIL # BLD AUTO: 0.08 THOUSAND/ΜL (ref 0–0.61)
EOSINOPHIL NFR BLD AUTO: 1 % (ref 0–6)
ERYTHROCYTE [DISTWIDTH] IN BLOOD BY AUTOMATED COUNT: 14.1 % (ref 11.6–15.1)
GFR SERPL CREATININE-BSD FRML MDRD: 89 ML/MIN/1.73SQ M
GLUCOSE P FAST SERPL-MCNC: 93 MG/DL (ref 65–99)
HCT VFR BLD AUTO: 44.1 % (ref 36.5–49.3)
HGB BLD-MCNC: 14.2 G/DL (ref 12–17)
IMM GRANULOCYTES # BLD AUTO: 0.02 THOUSAND/UL (ref 0–0.2)
IMM GRANULOCYTES NFR BLD AUTO: 0 % (ref 0–2)
INR PPP: 3.61 (ref 0.85–1.19)
LYMPHOCYTES # BLD AUTO: 1.55 THOUSANDS/ΜL (ref 0.6–4.47)
LYMPHOCYTES NFR BLD AUTO: 28 % (ref 14–44)
MCH RBC QN AUTO: 31.2 PG (ref 26.8–34.3)
MCHC RBC AUTO-ENTMCNC: 32.2 G/DL (ref 31.4–37.4)
MCV RBC AUTO: 97 FL (ref 82–98)
MONOCYTES # BLD AUTO: 0.57 THOUSAND/ΜL (ref 0.17–1.22)
MONOCYTES NFR BLD AUTO: 10 % (ref 4–12)
NEUTROPHILS # BLD AUTO: 3.32 THOUSANDS/ΜL (ref 1.85–7.62)
NEUTS SEG NFR BLD AUTO: 60 % (ref 43–75)
NRBC BLD AUTO-RTO: 0 /100 WBCS
PLATELET # BLD AUTO: 234 THOUSANDS/UL (ref 149–390)
PMV BLD AUTO: 11.8 FL (ref 8.9–12.7)
POTASSIUM SERPL-SCNC: 4.4 MMOL/L (ref 3.5–5.3)
PROT SERPL-MCNC: 7.6 G/DL (ref 6.4–8.4)
PROTHROMBIN TIME: 35.5 SECONDS (ref 12.3–15)
RBC # BLD AUTO: 4.55 MILLION/UL (ref 3.88–5.62)
SODIUM SERPL-SCNC: 138 MMOL/L (ref 135–147)
WBC # BLD AUTO: 5.62 THOUSAND/UL (ref 4.31–10.16)

## 2024-10-16 ENCOUNTER — ANTICOAG VISIT (OUTPATIENT)
Dept: CARDIOLOGY CLINIC | Facility: CLINIC | Age: 64
End: 2024-10-16

## 2024-10-16 ENCOUNTER — TELEPHONE (OUTPATIENT)
Dept: CARDIOLOGY CLINIC | Facility: CLINIC | Age: 64
End: 2024-10-16

## 2024-10-16 DIAGNOSIS — I48.92 ATRIAL FIBRILLATION AND FLUTTER (HCC): Primary | Chronic | ICD-10-CM

## 2024-10-16 DIAGNOSIS — I48.91 ATRIAL FIBRILLATION AND FLUTTER (HCC): Primary | Chronic | ICD-10-CM

## 2024-10-16 NOTE — TELEPHONE ENCOUNTER
Called patient and let him know that lab results are normal, he wanted me to let you know that the Tikosyn he got prescribed is at our office. I will call him back to tell him to pick it up.

## 2024-10-16 NOTE — TELEPHONE ENCOUNTER
----- Message from Jonathan Burrows MD sent at 10/15/2024 10:21 PM EDT -----  Please call the patient about normal lab results.     Thank you.

## 2024-10-22 ENCOUNTER — APPOINTMENT (INPATIENT)
Dept: NON INVASIVE DIAGNOSTICS | Facility: HOSPITAL | Age: 64
DRG: 274 | End: 2024-10-22
Payer: MEDICARE

## 2024-10-22 ENCOUNTER — HOSPITAL ENCOUNTER (INPATIENT)
Facility: HOSPITAL | Age: 64
LOS: 4 days | Discharge: HOME | DRG: 274 | End: 2024-10-26
Attending: INTERNAL MEDICINE | Admitting: INTERNAL MEDICINE
Payer: MEDICARE

## 2024-10-22 DIAGNOSIS — I48.0 PAF (PAROXYSMAL ATRIAL FIBRILLATION) (HCC): ICD-10-CM

## 2024-10-22 PROBLEM — I25.10 CAD (CORONARY ARTERY DISEASE): Chronic | Status: ACTIVE | Noted: 2024-10-22

## 2024-10-22 LAB
ABO GROUP BLD: NORMAL
ANION GAP SERPL CALCULATED.3IONS-SCNC: 10 MMOL/L (ref 4–13)
AORTIC ROOT: 3.7 CM
APICAL FOUR CHAMBER EJECTION FRACTION: 53 %
ATRIAL RATE: 68 BPM
ATRIAL RATE: 73 BPM
BLD GP AB SCN SERPL QL: NEGATIVE
BSA FOR ECHO PROCEDURE: 2.14 M2
BUN SERPL-MCNC: 16 MG/DL (ref 5–25)
CALCIUM SERPL-MCNC: 8.9 MG/DL (ref 8.4–10.2)
CHLORIDE SERPL-SCNC: 103 MMOL/L (ref 96–108)
CO2 SERPL-SCNC: 23 MMOL/L (ref 21–32)
CREAT SERPL-MCNC: 0.89 MG/DL (ref 0.6–1.3)
E WAVE DECELERATION TIME: 194 MS
E/A RATIO: 3.26
ERYTHROCYTE [DISTWIDTH] IN BLOOD BY AUTOMATED COUNT: 14.1 % (ref 11.6–15.1)
FRACTIONAL SHORTENING: 39 (ref 28–44)
GFR SERPL CREATININE-BSD FRML MDRD: 90 ML/MIN/1.73SQ M
GLUCOSE P FAST SERPL-MCNC: 101 MG/DL (ref 65–99)
GLUCOSE SERPL-MCNC: 101 MG/DL (ref 65–140)
HCT VFR BLD AUTO: 43.6 % (ref 36.5–49.3)
HGB BLD-MCNC: 14.3 G/DL (ref 12–17)
INR PPP: 4.85 (ref 0.85–1.19)
INR PPP: 6.78 (ref 0.85–1.19)
INTERVENTRICULAR SEPTUM IN DIASTOLE (PARASTERNAL SHORT AXIS VIEW): 1.4 CM
INTERVENTRICULAR SEPTUM: 1.4 CM (ref 0.6–1.1)
LEFT ATRIUM SIZE: 4.2 CM
LEFT INTERNAL DIMENSION IN SYSTOLE: 2.8 CM (ref 2.1–4)
LEFT VENTRICULAR INTERNAL DIMENSION IN DIASTOLE: 4.6 CM (ref 3.5–6)
LEFT VENTRICULAR POSTERIOR WALL IN END DIASTOLE: 1.4 CM
LEFT VENTRICULAR STROKE VOLUME: 70 ML
LVSV (TEICH): 70 ML
MCH RBC QN AUTO: 31.1 PG (ref 26.8–34.3)
MCHC RBC AUTO-ENTMCNC: 32.8 G/DL (ref 31.4–37.4)
MCV RBC AUTO: 95 FL (ref 82–98)
MV PEAK A VEL: 0.27 M/S
MV PEAK E VEL: 88 CM/S
MV STENOSIS PRESSURE HALF TIME: 56 MS
MV VALVE AREA P 1/2 METHOD: 3.93
P AXIS: 53 DEGREES
P AXIS: 60 DEGREES
PLATELET # BLD AUTO: 210 THOUSANDS/UL (ref 149–390)
PMV BLD AUTO: 10.5 FL (ref 8.9–12.7)
POTASSIUM SERPL-SCNC: 3.8 MMOL/L (ref 3.5–5.3)
PR INTERVAL: 150 MS
PR INTERVAL: 154 MS
PROTHROMBIN TIME: 44.2 SECONDS (ref 12.3–15)
PROTHROMBIN TIME: 57 SECONDS (ref 12.3–15)
QRS AXIS: 40 DEGREES
QRS AXIS: 62 DEGREES
QRSD INTERVAL: 100 MS
QRSD INTERVAL: 98 MS
QT INTERVAL: 436 MS
QT INTERVAL: 452 MS
QTC INTERVAL: 463 MS
QTC INTERVAL: 497 MS
RA PRESSURE ESTIMATED: 3 MMHG
RBC # BLD AUTO: 4.6 MILLION/UL (ref 3.88–5.62)
RH BLD: POSITIVE
SL CV LV EF: 50
SL CV PED ECHO LEFT VENTRICLE DIASTOLIC VOLUME (MOD BIPLANE) 2D: 100 ML
SL CV PED ECHO LEFT VENTRICLE SYSTOLIC VOLUME (MOD BIPLANE) 2D: 29 ML
SODIUM SERPL-SCNC: 136 MMOL/L (ref 135–147)
SPECIMEN EXPIRATION DATE: NORMAL
T WAVE AXIS: 55 DEGREES
T WAVE AXIS: 68 DEGREES
VENTRICULAR RATE: 68 BPM
VENTRICULAR RATE: 73 BPM
WBC # BLD AUTO: 7.16 THOUSAND/UL (ref 4.31–10.16)

## 2024-10-22 PROCEDURE — 02K83ZZ MAP CONDUCTION MECHANISM, PERCUTANEOUS APPROACH: ICD-10-PCS | Performed by: INTERNAL MEDICINE

## 2024-10-22 PROCEDURE — 30233K1 TRANSFUSION OF NONAUTOLOGOUS FROZEN PLASMA INTO PERIPHERAL VEIN, PERCUTANEOUS APPROACH: ICD-10-PCS | Performed by: INTERNAL MEDICINE

## 2024-10-22 PROCEDURE — 02583ZZ DESTRUCTION OF CONDUCTION MECHANISM, PERCUTANEOUS APPROACH: ICD-10-PCS | Performed by: INTERNAL MEDICINE

## 2024-10-22 PROCEDURE — 93655 ICAR CATH ABLTJ DSCRT ARRHYT: CPT | Performed by: INTERNAL MEDICINE

## 2024-10-22 PROCEDURE — C1730 CATH, EP, 19 OR FEW ELECT: HCPCS | Performed by: INTERNAL MEDICINE

## 2024-10-22 PROCEDURE — 80048 BASIC METABOLIC PNL TOTAL CA: CPT | Performed by: PHYSICIAN ASSISTANT

## 2024-10-22 PROCEDURE — 85610 PROTHROMBIN TIME: CPT | Performed by: PHYSICIAN ASSISTANT

## 2024-10-22 PROCEDURE — 93010 ELECTROCARDIOGRAM REPORT: CPT | Performed by: INTERNAL MEDICINE

## 2024-10-22 PROCEDURE — 93657 TX L/R ATRIAL FIB ADDL: CPT | Performed by: INTERNAL MEDICINE

## 2024-10-22 PROCEDURE — C1894 INTRO/SHEATH, NON-LASER: HCPCS | Performed by: INTERNAL MEDICINE

## 2024-10-22 PROCEDURE — 93308 TTE F-UP OR LMTD: CPT

## 2024-10-22 PROCEDURE — 85610 PROTHROMBIN TIME: CPT | Performed by: INTERNAL MEDICINE

## 2024-10-22 PROCEDURE — C1760 CLOSURE DEV, VASC: HCPCS | Performed by: INTERNAL MEDICINE

## 2024-10-22 PROCEDURE — 93656 COMPRE EP EVAL ABLTJ ATR FIB: CPT | Performed by: INTERNAL MEDICINE

## 2024-10-22 PROCEDURE — 86901 BLOOD TYPING SEROLOGIC RH(D): CPT | Performed by: INTERNAL MEDICINE

## 2024-10-22 PROCEDURE — P9017 PLASMA 1 DONOR FRZ W/IN 8 HR: HCPCS

## 2024-10-22 PROCEDURE — NC001 PR NO CHARGE: Performed by: PHYSICIAN ASSISTANT

## 2024-10-22 PROCEDURE — C1769 GUIDE WIRE: HCPCS | Performed by: INTERNAL MEDICINE

## 2024-10-22 PROCEDURE — B246ZZ4 ULTRASONOGRAPHY OF RIGHT AND LEFT HEART, TRANSESOPHAGEAL: ICD-10-PCS | Performed by: ANESTHESIOLOGY

## 2024-10-22 PROCEDURE — 76937 US GUIDE VASCULAR ACCESS: CPT | Performed by: INTERNAL MEDICINE

## 2024-10-22 PROCEDURE — 93005 ELECTROCARDIOGRAM TRACING: CPT

## 2024-10-22 PROCEDURE — C1766 INTRO/SHEATH,STRBLE,NON-PEEL: HCPCS | Performed by: INTERNAL MEDICINE

## 2024-10-22 PROCEDURE — 85347 COAGULATION TIME ACTIVATED: CPT

## 2024-10-22 PROCEDURE — 86850 RBC ANTIBODY SCREEN: CPT | Performed by: INTERNAL MEDICINE

## 2024-10-22 PROCEDURE — C1732 CATH, EP, DIAG/ABL, 3D/VECT: HCPCS | Performed by: INTERNAL MEDICINE

## 2024-10-22 PROCEDURE — C1759 CATH, INTRA ECHOCARDIOGRAPHY: HCPCS | Performed by: INTERNAL MEDICINE

## 2024-10-22 PROCEDURE — 86900 BLOOD TYPING SEROLOGIC ABO: CPT | Performed by: INTERNAL MEDICINE

## 2024-10-22 PROCEDURE — 93308 TTE F-UP OR LMTD: CPT | Performed by: STUDENT IN AN ORGANIZED HEALTH CARE EDUCATION/TRAINING PROGRAM

## 2024-10-22 PROCEDURE — 85027 COMPLETE CBC AUTOMATED: CPT | Performed by: PHYSICIAN ASSISTANT

## 2024-10-22 PROCEDURE — C1733 CATH, EP, OTHR THAN COOL-TIP: HCPCS | Performed by: INTERNAL MEDICINE

## 2024-10-22 DEVICE — DVC VASC CLSR VASCADE MVP 6-12FR: Type: IMPLANTABLE DEVICE | Site: GROIN | Status: FUNCTIONAL

## 2024-10-22 DEVICE — PERCLOSE™ PROSTYLE™ SUTURE-MEDIATED CLOSURE AND REPAIR SYSTEM
Type: IMPLANTABLE DEVICE | Site: GROIN | Status: FUNCTIONAL
Brand: PERCLOSE™ PROSTYLE™

## 2024-10-22 RX ORDER — DOFETILIDE 0.5 MG/1
500 CAPSULE ORAL EVERY 12 HOURS SCHEDULED
Status: DISCONTINUED | OUTPATIENT
Start: 2024-10-22 | End: 2024-10-23

## 2024-10-22 RX ORDER — LIDOCAINE HYDROCHLORIDE 10 MG/ML
INJECTION, SOLUTION EPIDURAL; INFILTRATION; INTRACAUDAL; PERINEURAL AS NEEDED
Status: DISCONTINUED | OUTPATIENT
Start: 2024-10-22 | End: 2024-10-22

## 2024-10-22 RX ORDER — LOSARTAN POTASSIUM 50 MG/1
50 TABLET ORAL DAILY
Status: DISCONTINUED | OUTPATIENT
Start: 2024-10-22 | End: 2024-10-26 | Stop reason: HOSPADM

## 2024-10-22 RX ORDER — ONDANSETRON 2 MG/ML
4 INJECTION INTRAMUSCULAR; INTRAVENOUS ONCE AS NEEDED
Status: DISCONTINUED | OUTPATIENT
Start: 2024-10-22 | End: 2024-10-22

## 2024-10-22 RX ORDER — ALLOPURINOL 100 MG/1
100 TABLET ORAL DAILY
Status: DISCONTINUED | OUTPATIENT
Start: 2024-10-22 | End: 2024-10-26 | Stop reason: HOSPADM

## 2024-10-22 RX ORDER — PROTAMINE SULFATE 10 MG/ML
INJECTION, SOLUTION INTRAVENOUS AS NEEDED
Status: DISCONTINUED | OUTPATIENT
Start: 2024-10-22 | End: 2024-10-22

## 2024-10-22 RX ORDER — PROPOFOL 10 MG/ML
INJECTION, EMULSION INTRAVENOUS AS NEEDED
Status: DISCONTINUED | OUTPATIENT
Start: 2024-10-22 | End: 2024-10-22

## 2024-10-22 RX ORDER — FENTANYL CITRATE 50 UG/ML
INJECTION, SOLUTION INTRAMUSCULAR; INTRAVENOUS AS NEEDED
Status: DISCONTINUED | OUTPATIENT
Start: 2024-10-22 | End: 2024-10-22

## 2024-10-22 RX ORDER — PHENYLEPHRINE HCL IN 0.9% NACL 1 MG/10 ML
SYRINGE (ML) INTRAVENOUS AS NEEDED
Status: DISCONTINUED | OUTPATIENT
Start: 2024-10-22 | End: 2024-10-22

## 2024-10-22 RX ORDER — ONDANSETRON 2 MG/ML
INJECTION INTRAMUSCULAR; INTRAVENOUS AS NEEDED
Status: DISCONTINUED | OUTPATIENT
Start: 2024-10-22 | End: 2024-10-22

## 2024-10-22 RX ORDER — EPHEDRINE SULFATE 50 MG/ML
INJECTION INTRAVENOUS AS NEEDED
Status: DISCONTINUED | OUTPATIENT
Start: 2024-10-22 | End: 2024-10-22

## 2024-10-22 RX ORDER — HEPARIN SODIUM 10000 [USP'U]/100ML
INJECTION, SOLUTION INTRAVENOUS
Status: DISCONTINUED | OUTPATIENT
Start: 2024-10-22 | End: 2024-10-22 | Stop reason: HOSPADM

## 2024-10-22 RX ORDER — ATORVASTATIN CALCIUM 40 MG/1
40 TABLET, FILM COATED ORAL
Status: DISCONTINUED | OUTPATIENT
Start: 2024-10-22 | End: 2024-10-26 | Stop reason: HOSPADM

## 2024-10-22 RX ORDER — FUROSEMIDE 20 MG/1
20 TABLET ORAL DAILY
Status: DISCONTINUED | OUTPATIENT
Start: 2024-10-22 | End: 2024-10-26 | Stop reason: HOSPADM

## 2024-10-22 RX ORDER — SODIUM CHLORIDE 9 MG/ML
20 INJECTION, SOLUTION INTRAVENOUS CONTINUOUS
Status: DISCONTINUED | OUTPATIENT
Start: 2024-10-22 | End: 2024-10-23

## 2024-10-22 RX ORDER — METOPROLOL SUCCINATE 25 MG/1
25 TABLET, EXTENDED RELEASE ORAL 2 TIMES DAILY
Status: DISCONTINUED | OUTPATIENT
Start: 2024-10-22 | End: 2024-10-26 | Stop reason: HOSPADM

## 2024-10-22 RX ORDER — HYDROMORPHONE HCL/PF 1 MG/ML
0.5 SYRINGE (ML) INJECTION
Status: DISCONTINUED | OUTPATIENT
Start: 2024-10-22 | End: 2024-10-22 | Stop reason: HOSPADM

## 2024-10-22 RX ORDER — NITROGLYCERIN 20 MG/100ML
INJECTION INTRAVENOUS CODE/TRAUMA/SEDATION MEDICATION
Status: DISCONTINUED | OUTPATIENT
Start: 2024-10-22 | End: 2024-10-22 | Stop reason: HOSPADM

## 2024-10-22 RX ORDER — HEPARIN SODIUM 1000 [USP'U]/ML
INJECTION, SOLUTION INTRAVENOUS; SUBCUTANEOUS CODE/TRAUMA/SEDATION MEDICATION
Status: DISCONTINUED | OUTPATIENT
Start: 2024-10-22 | End: 2024-10-22 | Stop reason: HOSPADM

## 2024-10-22 RX ORDER — ROCURONIUM BROMIDE 10 MG/ML
INJECTION, SOLUTION INTRAVENOUS AS NEEDED
Status: DISCONTINUED | OUTPATIENT
Start: 2024-10-22 | End: 2024-10-22

## 2024-10-22 RX ORDER — ACETAMINOPHEN 325 MG/1
650 TABLET ORAL EVERY 4 HOURS PRN
Status: DISCONTINUED | OUTPATIENT
Start: 2024-10-22 | End: 2024-10-26 | Stop reason: HOSPADM

## 2024-10-22 RX ORDER — SPIRONOLACTONE 25 MG/1
12.5 TABLET ORAL DAILY
Status: DISCONTINUED | OUTPATIENT
Start: 2024-10-23 | End: 2024-10-26 | Stop reason: HOSPADM

## 2024-10-22 RX ORDER — FENTANYL CITRATE/PF 50 MCG/ML
50 SYRINGE (ML) INJECTION
Status: DISCONTINUED | OUTPATIENT
Start: 2024-10-22 | End: 2024-10-22 | Stop reason: HOSPADM

## 2024-10-22 RX ORDER — SODIUM CHLORIDE 9 MG/ML
20 INJECTION, SOLUTION INTRAVENOUS ONCE
Status: COMPLETED | OUTPATIENT
Start: 2024-10-22 | End: 2024-10-22

## 2024-10-22 RX ADMIN — ROCURONIUM BROMIDE 30 MG: 10 INJECTION, SOLUTION INTRAVENOUS at 11:56

## 2024-10-22 RX ADMIN — Medication 100 MCG: at 11:27

## 2024-10-22 RX ADMIN — DOFETILIDE 500 MCG: 500 CAPSULE ORAL at 19:59

## 2024-10-22 RX ADMIN — PROTAMINE SULFATE 10 MG: 10 INJECTION, SOLUTION INTRAVENOUS at 12:18

## 2024-10-22 RX ADMIN — PROTAMINE SULFATE 10 MG: 10 INJECTION, SOLUTION INTRAVENOUS at 12:15

## 2024-10-22 RX ADMIN — PROTAMINE SULFATE 20 MG: 10 INJECTION, SOLUTION INTRAVENOUS at 12:23

## 2024-10-22 RX ADMIN — PHENYLEPHRINE HYDROCHLORIDE 50 MCG/MIN: 10 INJECTION INTRAVENOUS at 11:08

## 2024-10-22 RX ADMIN — SODIUM CHLORIDE 20 ML/HR: 0.9 INJECTION, SOLUTION INTRAVENOUS at 10:02

## 2024-10-22 RX ADMIN — FENTANYL CITRATE 25 MCG: 50 INJECTION INTRAMUSCULAR; INTRAVENOUS at 10:46

## 2024-10-22 RX ADMIN — FENTANYL CITRATE 25 MCG: 50 INJECTION INTRAMUSCULAR; INTRAVENOUS at 11:09

## 2024-10-22 RX ADMIN — PROPOFOL 50 MG: 10 INJECTION, EMULSION INTRAVENOUS at 11:56

## 2024-10-22 RX ADMIN — METOPROLOL SUCCINATE 25 MG: 25 TABLET, EXTENDED RELEASE ORAL at 20:00

## 2024-10-22 RX ADMIN — Medication 100 MCG: at 11:32

## 2024-10-22 RX ADMIN — ATORVASTATIN CALCIUM 40 MG: 40 TABLET, FILM COATED ORAL at 15:54

## 2024-10-22 RX ADMIN — ALLOPURINOL 100 MG: 100 TABLET ORAL at 15:54

## 2024-10-22 RX ADMIN — FUROSEMIDE 20 MG: 20 TABLET ORAL at 15:54

## 2024-10-22 RX ADMIN — ONDANSETRON 4 MG: 2 INJECTION INTRAMUSCULAR; INTRAVENOUS at 12:16

## 2024-10-22 RX ADMIN — FENTANYL CITRATE 50 MCG: 50 INJECTION INTRAMUSCULAR; INTRAVENOUS at 11:56

## 2024-10-22 RX ADMIN — SODIUM CHLORIDE: 0.9 INJECTION, SOLUTION INTRAVENOUS at 10:41

## 2024-10-22 RX ADMIN — LIDOCAINE HYDROCHLORIDE 50 MG: 10 INJECTION, SOLUTION EPIDURAL; INFILTRATION; INTRACAUDAL; PERINEURAL at 10:46

## 2024-10-22 RX ADMIN — EPHEDRINE SULFATE 10 MG: 50 INJECTION, SOLUTION INTRAVENOUS at 11:27

## 2024-10-22 RX ADMIN — EPHEDRINE SULFATE 10 MG: 50 INJECTION, SOLUTION INTRAVENOUS at 11:32

## 2024-10-22 RX ADMIN — PROPOFOL 150 MG: 10 INJECTION, EMULSION INTRAVENOUS at 10:46

## 2024-10-22 RX ADMIN — LOSARTAN POTASSIUM 50 MG: 50 TABLET, FILM COATED ORAL at 15:54

## 2024-10-22 RX ADMIN — SUGAMMADEX 400 MG: 100 INJECTION, SOLUTION INTRAVENOUS at 12:23

## 2024-10-22 RX ADMIN — PROTAMINE SULFATE 20 MG: 10 INJECTION, SOLUTION INTRAVENOUS at 12:20

## 2024-10-22 RX ADMIN — PHYTONADIONE 2.5 MG: 10 INJECTION, EMULSION INTRAMUSCULAR; INTRAVENOUS; SUBCUTANEOUS at 11:40

## 2024-10-22 RX ADMIN — ROCURONIUM BROMIDE 50 MG: 10 INJECTION, SOLUTION INTRAVENOUS at 10:47

## 2024-10-22 NOTE — ASSESSMENT & PLAN NOTE
Prior RF ablation with pulmonary vein isolation and posterior wall isolation 1/2021 (see 'Atrial fibrillation and flutter' for further details)

## 2024-10-22 NOTE — PLAN OF CARE
Problem: PAIN - ADULT  Goal: Verbalizes/displays adequate comfort level or baseline comfort level  Description: Interventions:  - Encourage patient to monitor pain and request assistance  - Assess pain using appropriate pain scale  - Administer analgesics based on type and severity of pain and evaluate response  - Implement non-pharmacological measures as appropriate and evaluate response  - Consider cultural and social influences on pain and pain management  - Notify physician/advanced practitioner if interventions unsuccessful or patient reports new pain  Outcome: Progressing     Problem: INFECTION - ADULT  Goal: Absence or prevention of progression during hospitalization  Description: INTERVENTIONS:  - Assess and monitor for signs and symptoms of infection  - Monitor lab/diagnostic results  - Monitor all insertion sites, i.e. indwelling lines, tubes, and drains  - Monitor endotracheal if appropriate and nasal secretions for changes in amount and color  - [place] appropriate cooling/warming therapies per order  - Administer medications as ordered  - Instruct and encourage patient and family to use good hand hygiene technique  - Identify and instruct in appropriate isolation precautions for identified infection/condition  Outcome: Progressing

## 2024-10-22 NOTE — H&P
H&P Exam - Cardiology   Bernradino Nunez 64 y.o. male MRN: [id number]  Unit/Bed#: [facility] C[facility] ROOM Encounter: [id number]    Assessment & Plan     Assessment & Plan  Atrial fibrillation and flutter (HCC)  Paroxysmal atrial fibrillation with rapid ventricular response  12/2017 - initially diagnosed, at that time found to have a tachycardia mediated cardiomyopathy with LVEF 20%  Cardiac cath at that time showed 80% proximal ramus, EF thought to be out of proportion to level of CAD  Status post successful JOSE LUIS/cardioversion  Discharged on rate controlling medications, Eliquis  Subsequent improvement in LVEF (50% per echo 10/2018)  7/2020 - worsening dyspnea, found to be in recurrent rapid atrial flutter at time of stress echo  Slight drop in EF again noted, sent to ED for evaluation  New onset anemia found, workup revealed colon cancer requiring hemicolectomy and chemotherapy --- A-fib management delayed  9/2020 - recurrent rapid atrial fibrillation, status post successful cardioversion  1/2021 - underwent RF ablation with pulmonary vein isolation and posterior wall isolation  7/2021 - underwent [employer] loop recorder implantation for ongoing A-fib monitoring  9/2022 - echocardiogram showed LVEF 65%, improved with maintenance of normal sinus rhythm  2/2024 - noted to have increasing A-fib burden on loop recorder in the setting of medication noncompliance --- rate controlling medications restarted  At 1 point change to Coumadin anticoagulation due to cost of Eliquis ([id number] = 3)  7/2024 - readmitted with rapid atrial fibrillation, echo showed recurrent drop in LVEF to 45-50% --- rate control uptitrated  8/2024 - ongoing A-fib noted, Toprol-XL again increased --- ultimately A-fib ablation and antiarrhythmic initiation recommended  Essential hypertension  Well-controlled, maintained on Toprol-XL, losartan, and spironolactone  S/P ablation of atrial fibrillation  Prior RF ablation with pulmonary vein isolation and  posterior wall isolation 1/2021 (see 'Atrial fibrillation and flutter' for further details)  Heart failure with mid-range ejection fraction (HCC)  Wt Readings from Last 3 Encounters:   10/22/24 98.9 kg (218 lb)   09/16/24 99.3 kg (218 lb 14.4 oz)   08/07/24 98.3 kg (216 lb 12.8 oz)   Chronic HFmEF, nonischemic cardiomyopathy with most recent LVEF 45-50% per echo 7/2024  Thought to be due to tachycardia +/- ETOH   Ejection fraction normalized in the past with maintenance of normal sinus rhythm  Cardiomyopathy felt to be out of proportion to level of CAD  Currently euvolemic  CAD (coronary artery disease)  CAD with 80% proximal ramus per cardiac catheterization 3/2018  Medical management pursued given lack of symptoms  Maintained on aspirin, statin, beta-blocker  Nuclear stress test 9/2024 showed evidence of scar with irene-infarct ischemia, no interventions recommended        Plan:  Atrial fibrillation ablation + dofetilide initiation.    He will require several hours of bedrest in the post ablation setting, with subsequent hospital admission for dofetilide initiation.  He will require EKGs 2 hours after each dose of dofetilide, with continuous telemetry monitoring.  Continue to trend labs and vital signs closely.    Prior to the procedure it was found that he had an elevated INR and was given 2.5 of vitamin K IV during the procedure.  Will recheck an INR in the immediate postoperative setting for further evaluation.      History of Present Illness   HPI:  Bernardino Nunez is a 64 y.o. year old male with paroxysmal atrial fibrillation with rapid ventricular response, suspected tachycardia mediated cardiomyopathy/nonischemic cardiomyopathy with LVEF 45-50% per echo 7/2024, chronic HFmEF, nonobstructive CAD with known 80% proximal ramus lesion being medically managed, hypertension, dyslipidemia, and obesity with BMI 32.  He typically follows with Dr. Alcaraz as an outpatient. He was 1st diagnosed with atrial fibrillation  12/2017, at which time he was found to have a tachy mediated cardiomyopathy with LVEF 20%.  Cardiac catheterization showed 80% proximal ramus stenosis, but his drop in EF was felt to be out of proportion to this degree of CAD.  He underwent JOSE LUIS/cardioversion, and was discharged on rate-controlling medications. Fortunately, his EF improved after maintaining sinus rhythm.   In 07/2020, he reported worsening dyspnea with exertion.  A stress echo was recommended however when he presented for this study he was found to again be in atrial fibrillation with rapid ventricular response.  Limited echo showed slight drop in ejection fraction with a low blood pressure, thus he was sent to the emergency room for further evaluation.  Initial workup showed new onset anemia, ultimately he was found to have colon cancer and underwent right hemicolectomy followed by chemotherapy.  After he recovered and was seen in outpatient follow-up, he was later referred to EP for more aggressive management of his atrial fibrillation given his prior tachy mediated cardiomyopathy.  He was seen in consultation by Dr. Burrows, and ultimately underwent an RF ablation with pulmonary vein isolation and posterior wall isolation 1/2021.  To help guide atrial fibrillation management moving forward given that he is largely asymptomatic with his A-fib, he underwent loop recorder implantation 7/2021.  Echocardiogram 9/2022 showed improved LVEF of 65% with maintenance of normal sinus rhythm.  More recently in 2/2024 he was noted to have increasing atrial fibrillation burden on his loop recorder.  He was seen again by EP, at which time he admitted to medical noncompliance due to cost of the medications.  He was eventually restarted on rate controlling agents and further monitored.  He was then admitted to the hospital 7/2024 with rapid atrial fibrillation, at which time repeat echo showed reduced LVEF 45-50%.  His rate controlling medications were  uptitrated, and ongoing loop recorder monitoring was recommended.  Subsequent interrogation showed ongoing episodes of atrial fibrillation with biventricular response, for which his metoprolol was further increased.  After being reviewed by Dr. Burrows, repeat ablation along with dofetilide initiation was recommended and he presents this admission to undergo that procedure.  Of note, he appears to have been approved for the dofetilide assistance program.      Review of Systems  ROS as noted above, otherwise 12 point review of systems was performed and is negative.       Historical Information   Past Medical History:   Diagnosis Date    A-fib (HCC)     Cardiomyopathy, dilated (HCC) 12/18/2017    Colon cancer (HCC)     Colon polyp     Coronary artery disease     COVID-19 12/21/2021    GI bleeding     Gout     Herpes zoster     last assessed 12/18/17    Hypertension     Mass of cecum 08/25/2020    NSVT (nonsustained ventricular tachycardia) (HCC) 07/27/2018    Shingles      Past Surgical History:   Procedure Laterality Date    ELECTRICAL CARDIOVERSION  12/15/2017         HEMICOLOECTOMY W/ ANASTOMOSIS N/A 8/28/2020    Procedure: RIGHT HEMICOLECTOMY WITH ILIOCOLIC ANASTAMOSIS;  Surgeon: ANT Villavicencio MD;  Location: [facility];  Service: Colorectal    IR PORT PLACEMENT  9/21/2020    IR PORT REMOVAL  4/5/2021     Family History:   Family History   Problem Relation Age of Onset    Coronary artery disease Mother     Alcohol abuse Father     Coronary artery disease Father     Colon cancer Neg Hx        Social History   Social History     Substance and Sexual Activity   Alcohol Use Yes    Alcohol/week: 20.0 standard drinks of alcohol    Types: 20 Cans of beer per week    Comment: drink about 4 days a weeks usually drink beer     Social History     Substance and Sexual Activity   Drug Use No     Social History     Tobacco Use   Smoking Status Former    Current packs/day: 0.00    Types: Cigarettes    Quit date: 8/11/2017     Years since quittin.2   Smokeless Tobacco Never         Meds/Allergies   all medications and allergies reviewed  Home Medications:   Medications Prior to Admission:     atorvastatin (LIPITOR) 40 mg tablet    metoprolol succinate (TOPROL-XL) 50 mg 24 hr tablet    spironolactone (ALDACTONE) 25 mg tablet    warfarin (Coumadin) 5 mg tablet    allopurinol (ZYLOPRIM) 100 mg tablet    aspirin (ECOTRIN LOW STRENGTH) 81 mg EC tablet    furosemide (LASIX) 20 mg tablet    losartan (COZAAR) 50 mg tablet    No Known Allergies    Objective   Vitals: There were no vitals taken for this visit.        Intake/Output Summary (Last 24 hours) at 10/22/2024 1157  Last data filed at 10/22/2024 1140  Gross per 24 hour   Intake 50 ml   Output --   Net 50 ml       Invasive Devices       Peripheral Intravenous Line  Duration             Peripheral IV 10/22/24 Distal;Left;Ventral (anterior) Wrist <1 day    Peripheral IV 10/22/24 Proximal;Right;Ventral (anterior) Forearm <1 day              Line  Duration             Venous Sheath 8 Fr. Left Femoral <1 day    Venous Sheath 8 Fr. Right Femoral <1 day    Venous Sheath Other (Comment) Left Femoral <1 day              Airway  Duration             ETT  Cuffed;Oral;Inflated;Pre-curved 8 mm <1 day                    Physical Exam  GEN: NAD, alert and oriented x 3, well appearing  SKIN: dry without significant lesions or rashes  HEENT: NCAT, PERRL, EOMs intact  NECK: No JVD appreciated  CARDIOVASCULAR: RRR, normal S1, S2 without murmurs, rubs, or gallops appreciated  LUNGS: Clear to auscultation bilaterally without wheezes, rhonchi, or rales  ABDOMEN: Soft, nontender, nondistended  EXTREMITIES/VASCULAR: perfused without clubbing, cyanosis, or LE edema b/l  PSYCH: Normal mood and affect  NEURO: CN ll-Xll grossly intact      Lab Results: I have personally reviewed pertinent lab results.    Results from last 7 days   Lab Units 10/22/24  0958   WBC Thousand/uL 7.16   HEMOGLOBIN g/dL 14.3    HEMATOCRIT % 43.6   PLATELETS Thousands/uL 210     Results from last 7 days   Lab Units 10/22/24  0958   POTASSIUM mmol/L 3.8   CHLORIDE mmol/L 103   CO2 mmol/L 23   BUN mg/dL 16   CREATININE mg/dL 0.89   CALCIUM mg/dL 8.9     Results from last 7 days   Lab Units 10/22/24  0958   INR  4.85*             Imaging: Results Review Statement: No pertinent imaging studies reviewed.    ECHO: Results for orders placed during the hospital encounter of 10/17/18    Echo complete with contrast if indicated    [place] [address]  [phone]    Transthoracic Echocardiogram  2D, M-mode, Doppler, and Color Doppler    Study date:  17-Oct-2018    Patient: HERMINIA LOTT  MR number: [id number]  Account number: [id number]  : 1960  Age: 58 years  Gender: Male  Status: Outpatient  Location: [address] [place] [address] [facility]  Height: 69 in  Weight: 216 lb  BP: 140/ 82 mmHg    Indications: Heart Failure    Diagnoses: I50.9 - Heart failure, unspecified    Sonographer:  SANKET Samayoa  Primary Physician:  Anaya Gilbert DO  Referring Physician:  Solitario Murray MD  Group:  [facility]  Interpreting Physician:  Emigdio Doss MD    SUMMARY    LEFT VENTRICLE:  Systolic function was at the lower limits of normal. Ejection fraction was estimated to be 50 %.  There were no regional wall motion abnormalities.  Wall thickness was mildly to moderately increased.  Doppler parameters were consistent with abnormal left ventricular relaxation (grade 1 diastolic dysfunction).    RIGHT VENTRICLE:  The size was normal.  Systolic function was normal.    PERICARDIUM:  A trace pericardial effusion was identified. There was no evidence of hemodynamic compromise.    COMPARISONS:  There has been no significant interval change. Comparison was made with the previous study of 29-Mar-2018.    HISTORY: PRIOR HISTORY: MI, CAD, HTN, AFIB, SVT    PROCEDURE: The  study was performed in the [address] [place] [address] [facility]. This was a routine study. The transthoracic approach was used. The study included complete 2D imaging, M-mode, complete spectral Doppler, and color Doppler. The  heart rate was 61 bpm, at the start of the study. Images were obtained from the parasternal, apical, subcostal, and suprasternal notch acoustic windows. Echocardiographic views were limited due to lung interference. Image quality was  adequate.    LEFT VENTRICLE: Size was normal. Systolic function was at the lower limits of normal. Ejection fraction was estimated to be 50 %. There were no regional wall motion abnormalities. Wall thickness was mildly to moderately increased. DOPPLER:  Doppler parameters were consistent with abnormal left ventricular relaxation (grade 1 diastolic dysfunction).    RIGHT VENTRICLE: The size was normal. Systolic function was normal. Wall thickness was normal.    LEFT ATRIUM: Size was at the upper limits of normal.    RIGHT ATRIUM: Size was normal.    MITRAL VALVE: Valve structure was normal. There was normal leaflet separation. DOPPLER: The transmitral velocity was within the normal range. There was no evidence for stenosis. There was no significant regurgitation.    AORTIC VALVE: The valve was trileaflet. Leaflets exhibited normal thickness and normal cuspal separation. DOPPLER: Transaortic velocity was within the normal range. There was no evidence for stenosis. There was no significant  regurgitation.    TRICUSPID VALVE: The valve structure was normal. There was normal leaflet separation. DOPPLER: The transtricuspid velocity was within the normal range. There was no evidence for stenosis. There was no significant regurgitation.    PULMONIC VALVE: Leaflets exhibited normal thickness, no calcification, and normal cuspal separation. DOPPLER: The transpulmonic velocity was within the normal range. There was no significant regurgitation.    PERICARDIUM: A trace  pericardial effusion was identified. There was no evidence of hemodynamic compromise.    AORTA: The root exhibited normal size.    SYSTEMIC VEINS: IVC: The inferior vena cava was normal in size. Respirophasic changes were normal.    SYSTEM MEASUREMENT TABLES    2D  %FS: 26.08 %  Ao Diam: 4.01 cm  EDV(Teich): 105.38 ml  EF Biplane: 52.16 %  EF(Cube): 59.62 %  EF(Teich): 51.15 %  ESV(Cube): 43.52 ml  ESV(Teich): 51.48 ml  IVSd: 1.45 cm  LA Area: 20.83 cm2  LA Diam: 3.55 cm  LVEDV MOD A2C: 129.95 ml  LVEDV MOD A4C: 153.5 ml  LVEDV MOD BP: 146.59 ml  LVEF MOD A2C: 47.85 %  LVEF MOD A4C: 59.43 %  LVESV MOD A2C: 67.77 ml  LVESV MOD A4C: 62.27 ml  LVESV MOD BP: 70.12 ml  LVIDd: 4.76 cm  LVIDs: 3.52 cm  LVLd A2C: 8.67 cm  LVLd A4C: 8.72 cm  LVLs A2C: 7.68 cm  LVLs A4C: 7.6 cm  LVPWd: 1.36 cm  RA Area: 17.39 cm2  RV Diam.: 3.55 cm  SV MOD A2C: 62.18 ml  SV MOD A4C: 91.23 ml  SV(Cube): 64.25 ml  SV(Teich): 53.9 ml    MM  TAPSE: 1.49 cm    PW  E': 0.04 m/s  E/E': 23.04  MV A Brennan: 0.66 m/s  MV Dec Smyth: 3.43 m/s2  MV DecT: 248.53 ms  MV E Brennan: 0.85 m/s  MV E/A Ratio: 1.3    [employer] Accredited Echocardiography Laboratory    Prepared and electronically signed by    Emigdio Doss MD  Signed 18-Oct-2018 11:44:59      Results for orders placed during the hospital encounter of 07/23/24    Echo complete w/ contrast if indicated    Interpretation Summary    Left Ventricle: Left ventricular cavity size is mildly dilated. Wall thickness is moderately increased. There is eccentric hypertrophy. The left ventricular ejection fraction is 45-50% by biplane measurement. Systolic function is mildly reduced. There is mild global hypokinesis. Diastolic function is mildly abnormal, consistent with grade I (abnormal) relaxation.    Right Ventricle: Right ventricular cavity size is mildly dilated. Systolic function is normal.    Mitral Valve: There is mild regurgitation.    Tricuspid Valve: There is mild regurgitation.     Pericardium: There is a small pericardial effusion posterior to the heart. There is no echocardiographic evidence of tamponade.        EKG: pending        Code Status: Level 1 - Full Code

## 2024-10-22 NOTE — ANESTHESIA POSTPROCEDURE EVALUATION
Post-Op Assessment Note    CV Status:  Stable  Pain Score: 0    Pain management: adequate       Mental Status:  Awake and alert   Hydration Status:  Stable   PONV Controlled:  None   Airway Patency:  Patent     Post Op Vitals Reviewed: Yes    No anethesia notable event occurred.    Staff: CRNA           Last Filed PACU Vitals:  Vitals Value Taken Time   Temp  97.6   Pulse 74 10/22/24 1241   /90 10/22/24 1241   Resp  16   SpO2 94 % 10/22/24 1241   Vitals shown include unfiled device data.    Modified Kasey:  No data recorded

## 2024-10-22 NOTE — ASSESSMENT & PLAN NOTE
Wt Readings from Last 3 Encounters:   10/22/24 98.9 kg (218 lb)   09/16/24 99.3 kg (218 lb 14.4 oz)   08/07/24 98.3 kg (216 lb 12.8 oz)   Chronic HFmEF, nonischemic cardiomyopathy with most recent LVEF 45-50% per echo 7/2024  Thought to be due to tachycardia +/- ETOH   Ejection fraction normalized in the past with maintenance of normal sinus rhythm  Cardiomyopathy felt to be out of proportion to level of CAD  Currently euvolemic

## 2024-10-22 NOTE — ASSESSMENT & PLAN NOTE
Paroxysmal atrial fibrillation with rapid ventricular response  12/2017 - initially diagnosed, at that time found to have a tachycardia mediated cardiomyopathy with LVEF 20%  Cardiac cath at that time showed 80% proximal ramus, EF thought to be out of proportion to level of CAD  Status post successful JOSE LUIS/cardioversion  Discharged on rate controlling medications, Eliquis  Subsequent improvement in LVEF (50% per echo 10/2018)  7/2020 - worsening dyspnea, found to be in recurrent rapid atrial flutter at time of stress echo  Slight drop in EF again noted, sent to ED for evaluation  New onset anemia found, workup revealed colon cancer requiring hemicolectomy and chemotherapy --- A-fib management delayed  9/2020 - recurrent rapid atrial fibrillation, status post successful cardioversion  1/2021 - underwent RF ablation with pulmonary vein isolation and posterior wall isolation  7/2021 - underwent [employer] loop recorder implantation for ongoing A-fib monitoring  9/2022 - echocardiogram showed LVEF 65%, improved with maintenance of normal sinus rhythm  2/2024 - noted to have increasing A-fib burden on loop recorder in the setting of medication noncompliance --- rate controlling medications restarted  At 1 point change to Coumadin anticoagulation due to cost of Eliquis ([id number] = 3)  7/2024 - readmitted with rapid atrial fibrillation, echo showed recurrent drop in LVEF to 45-50% --- rate control uptitrated  8/2024 - ongoing A-fib noted, Toprol-XL again increased --- ultimately A-fib ablation and antiarrhythmic initiation recommended

## 2024-10-22 NOTE — ASSESSMENT & PLAN NOTE
CAD with 80% proximal ramus per cardiac catheterization 3/2018  Medical management pursued given lack of symptoms  Maintained on aspirin, statin, beta-blocker  Nuclear stress test 9/2024 showed evidence of scar with irene-infarct ischemia, no interventions recommended

## 2024-10-23 LAB
ABO GROUP BLD BPU: NORMAL
ANION GAP SERPL CALCULATED.3IONS-SCNC: 9 MMOL/L (ref 4–13)
ATRIAL RATE: 65 BPM
ATRIAL RATE: 71 BPM
BPU ID: NORMAL
BUN SERPL-MCNC: 11 MG/DL (ref 5–25)
CALCIUM SERPL-MCNC: 7.8 MG/DL (ref 8.4–10.2)
CHLORIDE SERPL-SCNC: 106 MMOL/L (ref 96–108)
CO2 SERPL-SCNC: 23 MMOL/L (ref 21–32)
CREAT SERPL-MCNC: 0.8 MG/DL (ref 0.6–1.3)
ERYTHROCYTE [DISTWIDTH] IN BLOOD BY AUTOMATED COUNT: 14.6 % (ref 11.6–15.1)
GFR SERPL CREATININE-BSD FRML MDRD: 94 ML/MIN/1.73SQ M
GLUCOSE SERPL-MCNC: 104 MG/DL (ref 65–140)
HCT VFR BLD AUTO: 38.6 % (ref 36.5–49.3)
HGB BLD-MCNC: 12.5 G/DL (ref 12–17)
INR PPP: 1.99 (ref 0.85–1.19)
INR PPP: 2.37 (ref 0.85–1.19)
KCT BLD-ACNC: 347 SEC (ref 89–137)
KCT BLD-ACNC: 429 SEC (ref 89–137)
MAGNESIUM SERPL-MCNC: 1.7 MG/DL (ref 1.9–2.7)
MCH RBC QN AUTO: 31.3 PG (ref 26.8–34.3)
MCHC RBC AUTO-ENTMCNC: 32.4 G/DL (ref 31.4–37.4)
MCV RBC AUTO: 97 FL (ref 82–98)
P AXIS: 34 DEGREES
P AXIS: 55 DEGREES
POTASSIUM SERPL-SCNC: 3.5 MMOL/L (ref 3.5–5.3)
PR INTERVAL: 144 MS
PR INTERVAL: 154 MS
PROTHROMBIN TIME: 22.6 SECONDS (ref 12.3–15)
PROTHROMBIN TIME: 25.8 SECONDS (ref 12.3–15)
QRS AXIS: 15 DEGREES
QRS AXIS: 35 DEGREES
QRSD INTERVAL: 102 MS
QRSD INTERVAL: 92 MS
QT INTERVAL: 472 MS
QT INTERVAL: 484 MS
QTC INTERVAL: 490 MS
QTC INTERVAL: 525 MS
RBC # BLD AUTO: 3.99 MILLION/UL (ref 3.88–5.62)
SODIUM SERPL-SCNC: 138 MMOL/L (ref 135–147)
SPECIMEN SOURCE: ABNORMAL
SPECIMEN SOURCE: ABNORMAL
T WAVE AXIS: 36 DEGREES
T WAVE AXIS: 49 DEGREES
UNIT DISPENSE STATUS: NORMAL
UNIT PRODUCT CODE: NORMAL
UNIT PRODUCT VOLUME: 280 ML
UNIT RH: NORMAL
VENTRICULAR RATE: 65 BPM
VENTRICULAR RATE: 71 BPM
WBC # BLD AUTO: 6.56 THOUSAND/UL (ref 4.31–10.16)

## 2024-10-23 PROCEDURE — 83735 ASSAY OF MAGNESIUM: CPT | Performed by: PHYSICIAN ASSISTANT

## 2024-10-23 PROCEDURE — 93005 ELECTROCARDIOGRAM TRACING: CPT

## 2024-10-23 PROCEDURE — 93010 ELECTROCARDIOGRAM REPORT: CPT | Performed by: INTERNAL MEDICINE

## 2024-10-23 PROCEDURE — 85610 PROTHROMBIN TIME: CPT | Performed by: PHYSICIAN ASSISTANT

## 2024-10-23 PROCEDURE — 85027 COMPLETE CBC AUTOMATED: CPT | Performed by: PHYSICIAN ASSISTANT

## 2024-10-23 PROCEDURE — 80048 BASIC METABOLIC PNL TOTAL CA: CPT | Performed by: PHYSICIAN ASSISTANT

## 2024-10-23 PROCEDURE — 99232 SBSQ HOSP IP/OBS MODERATE 35: CPT | Performed by: INTERNAL MEDICINE

## 2024-10-23 RX ORDER — DOFETILIDE 0.25 MG/1
250 CAPSULE ORAL EVERY 12 HOURS SCHEDULED
Qty: 60 CAPSULE | Refills: 6 | Status: SHIPPED | OUTPATIENT
Start: 2024-10-23 | End: 2024-10-25

## 2024-10-23 RX ORDER — DOFETILIDE 0.25 MG/1
250 CAPSULE ORAL EVERY 12 HOURS SCHEDULED
Status: DISCONTINUED | OUTPATIENT
Start: 2024-10-23 | End: 2024-10-23

## 2024-10-23 RX ORDER — MAGNESIUM SULFATE 1 G/100ML
1 INJECTION INTRAVENOUS ONCE
Status: COMPLETED | OUTPATIENT
Start: 2024-10-23 | End: 2024-10-23

## 2024-10-23 RX ORDER — POTASSIUM CHLORIDE 1500 MG/1
40 TABLET, EXTENDED RELEASE ORAL
Status: DISCONTINUED | OUTPATIENT
Start: 2024-10-23 | End: 2024-10-23

## 2024-10-23 RX ORDER — DOFETILIDE 0.25 MG/1
250 CAPSULE ORAL EVERY 12 HOURS SCHEDULED
Status: DISCONTINUED | OUTPATIENT
Start: 2024-10-23 | End: 2024-10-24

## 2024-10-23 RX ORDER — POTASSIUM CHLORIDE 1500 MG/1
40 TABLET, EXTENDED RELEASE ORAL ONCE
Status: COMPLETED | OUTPATIENT
Start: 2024-10-23 | End: 2024-10-23

## 2024-10-23 RX ADMIN — ATORVASTATIN CALCIUM 40 MG: 40 TABLET, FILM COATED ORAL at 16:55

## 2024-10-23 RX ADMIN — METOPROLOL SUCCINATE 25 MG: 25 TABLET, EXTENDED RELEASE ORAL at 09:15

## 2024-10-23 RX ADMIN — POTASSIUM CHLORIDE 40 MEQ: 1500 TABLET, EXTENDED RELEASE ORAL at 11:28

## 2024-10-23 RX ADMIN — METOPROLOL SUCCINATE 25 MG: 25 TABLET, EXTENDED RELEASE ORAL at 21:02

## 2024-10-23 RX ADMIN — DOFETILIDE 250 MCG: 0.25 CAPSULE ORAL at 21:02

## 2024-10-23 RX ADMIN — ASPIRIN 81 MG: 81 TABLET, COATED ORAL at 09:16

## 2024-10-23 RX ADMIN — POTASSIUM CHLORIDE 40 MEQ: 1500 TABLET, EXTENDED RELEASE ORAL at 09:27

## 2024-10-23 RX ADMIN — MAGNESIUM SULFATE HEPTAHYDRATE 1 G: 1 INJECTION, SOLUTION INTRAVENOUS at 11:27

## 2024-10-23 RX ADMIN — APIXABAN 5 MG: 5 TABLET, FILM COATED ORAL at 11:27

## 2024-10-23 RX ADMIN — FUROSEMIDE 20 MG: 20 TABLET ORAL at 09:15

## 2024-10-23 RX ADMIN — LOSARTAN POTASSIUM 50 MG: 50 TABLET, FILM COATED ORAL at 09:15

## 2024-10-23 RX ADMIN — ALLOPURINOL 100 MG: 100 TABLET ORAL at 09:16

## 2024-10-23 RX ADMIN — APIXABAN 5 MG: 5 TABLET, FILM COATED ORAL at 21:02

## 2024-10-23 RX ADMIN — SPIRONOLACTONE 12.5 MG: 25 TABLET ORAL at 09:15

## 2024-10-23 RX ADMIN — APIXABAN 5 MG: 5 TABLET, FILM COATED ORAL at 00:32

## 2024-10-23 RX ADMIN — DOFETILIDE 250 MCG: 0.25 CAPSULE ORAL at 10:03

## 2024-10-23 NOTE — CASE MANAGEMENT
Case Management Assessment & Discharge Planning Note    Patient name Bernardino Nunez  [facility] MRN [id number]  : 1960 Date 10/23/2024       Current Admission Date: 10/22/2024  Current Admission Diagnosis:Atrial fibrillation and flutter (HCC)   Patient Active Problem List    Diagnosis Date Noted Date Diagnosed    CAD (coronary artery disease) 10/22/2024     Class 1 obesity due to excess calories with serious comorbidity in adult 2022     Port-A-Cath in place 2021     S/P ablation of atrial fibrillation 2021     Cancer of ascending colon (HCC) 2020     S/P right hemicolectomy 09/10/2020     History of gout 2020     Microcytic anemia 2020     Gastrointestinal hemorrhage with melena 2020     Heart failure with mid-range ejection fraction (HCC) 2018     PFO (patent foramen ovale) 2017     Atrial fibrillation and flutter (HCC) 2017     Serum total bilirubin elevated 2017     Essential hypertension 2017       LOS (days): 1  Geometric Mean LOS (GMLOS) (days):   Days to GMLOS:     OBJECTIVE:    Risk of Unplanned Readmission Score: 16.24         Current admission status: Inpatient       Preferred Pharmacy:   [id number] - [place], [place] - [address] [place] [address]  [place] [address]  Phone: [phone] Fax: [phone]    [facility] [place] - [place] - [address]  [address]  [place] [address]  Phone: [phone] Fax: [phone]    Primary Care Provider: Pepe Chakraborty DO    Primary Insurance: MEDICARE  Secondary Insurance: [facility]    ASSESSMENT:  Active Health Care Proxies    There are no active Health Care Proxies on file.       Advance Directives  Does patient have a Health Care POA?: No  Was patient offered paperwork?: Yes (Declined)  Does patient have Advance Directives?: No  Was patient offered paperwork?: Yes (Declined)      Patient  Information  Admitted from:: Home  Mental Status: Alert  During Assessment patient was accompanied by: Not accompanied during assessment  Assessment information provided by:: Patient  Primary Caregiver: Self  Support Systems: Self, Friend, Family members  Home entry access options. Select all that apply.: Stairs  Number of steps to enter home.: One Flight  Type of Current Residence: Apartment  Floor Level: 2  Upon entering residence, is there a bedroom on the main floor (no further steps)?: Yes  Upon entering residence, is there a bathroom on the main floor (no further steps)?: Yes  Living Arrangements: Lives Alone    Activities of Daily Living Prior to Admission  Functional Status: Independent  Completes ADLs independently?: Yes  Ambulates independently?: Yes  Does patient use assisted devices?: No  Does patient have a history of Outpatient Therapy (PT/OT)?: No  Does the patient have a history of Short-Term Rehab?: No  Does patient have a history of HHC?: No  Does patient currently have HHC?: No         Patient Information Continued  Income Source: Pension/alf  Does patient have prescription coverage?: No  Does patient receive dialysis treatments?: No  Does patient have a history of substance abuse?: No  Does patient have a history of Mental Health Diagnosis?: No         Means of Transportation  Means of Transport to Appts:: Drives Self          DISCHARGE DETAILS:    Discharge planning discussed with:: Patient  Freedom of Choice: Yes     CM contacted family/caregiver?: No- see comments (Patient alert and oriented)           Additional Comments: CM met with patient at bedside and introduced self and explained role. Patient reports that he lives alone in 2nd floor apartment. Patient reports that he was independent prior to admisison. Patient reports no DME use. Patient reports that he drives.

## 2024-10-23 NOTE — ASSESSMENT & PLAN NOTE
CAD with 80% proximal ramus per cardiac catheterization 3/2018  Medical management pursued given lack of symptoms  Maintained on aspirin, statin, beta-blocker  Nuclear stress test 9/2024 showed evidence of scar with riene-infarct ischemia, no interventions recommended

## 2024-10-23 NOTE — PROGRESS NOTES
Progress Note - Electrophysiology-Cardiology (EP)   Bernardino Nunez 64 y.o. male MRN: [id number]  Unit/Bed#: [facility] 426-01 Encounter: [id number]      Assessment & Plan  Atrial fibrillation and flutter (HCC)  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%  Cardiac cath at that time showed 80% proximal ramus, EF thought to be out of proportion to level of CAD  Status post successful JOSE LUIS/cardioversion  Discharged on rate controlling medications, Eliquis  Subsequent improvement in LVEF (50% per echo 10/2018)  7/2020 - worsening dyspnea, found to be in recurrent rapid atrial flutter at time of stress echo  Slight drop in EF again noted, sent to ED for evaluation  New onset anemia found, workup revealed colon cancer requiring hemicolectomy and chemotherapy --- A-fib management delayed  9/2020 - recurrent rapid atrial fibrillation, status post successful cardioversion  1/2021 - underwent RF ablation with pulmonary vein isolation and posterior wall isolation  7/2021 - underwent Medtronic loop recorder implantation for ongoing A-fib monitoring  9/2022 - echocardiogram showed LVEF 65%, improved with maintenance of normal sinus rhythm  2/2024 - noted to have increasing A-fib burden on loop recorder in the setting of medication noncompliance --- rate controlling medications restarted  At 1 point change to Coumadin anticoagulation due to cost of Eliquis ([id number] = 3)  7/2024 - readmitted with rapid atrial fibrillation, echo showed recurrent drop in LVEF to 45-50% --- rate control uptitrated  8/2024 - ongoing A-fib noted, Toprol-XL again increased --- ultimately A-fib ablation and antiarrhythmic initiation recommended  Dofetilide initiation in the post ablation  setting  Started on 500 mcg dosing with subsequent QTc prolongation  Dose reduced to 250 mcg, electrolytes repleted  Essential hypertension  Well-controlled, maintained on Toprol-XL, losartan, and spironolactone  S/P ablation of atrial fibrillation  Prior RF ablation with pulmonary vein isolation and posterior wall isolation 1/2021 (see 'Atrial fibrillation and flutter' for further details)  Heart failure with mid-range ejection fraction (HCC)  Wt Readings from Last 3 Encounters:   10/22/24 98.9 kg (218 lb)   09/16/24 99.3 kg (218 lb 14.4 oz)   08/07/24 98.3 kg (216 lb 12.8 oz)   Chronic HFmEF, nonischemic cardiomyopathy with most recent LVEF 45-50% per echo 7/2024  Thought to be due to tachycardia +/- ETOH   Ejection fraction normalized in the past with maintenance of normal sinus rhythm  Cardiomyopathy felt to be out of proportion to level of CAD  Currently euvolemic  CAD (coronary artery disease)  CAD with 80% proximal ramus per cardiac catheterization 3/2018  Medical management pursued given lack of symptoms  Maintained on aspirin, statin, beta-blocker  Nuclear stress test 9/2024 showed evidence of scar with irene-infarct ischemia, no interventions recommended      Plan:  EKG overnight shows QTc prolongation after receiving dofetilide 500 mcg dosing.  We will thus reduce a dose of 250 mcg dosing this morning, with close EKG monitoring.    His magnesium and potassium were low this morning, will be repleted.    His INR overnight was 1.99, thus he was started on Eliquis anticoagulation.  INR this morning was above 2, however given his issues with INR in the past we are recommending that he continue Eliquis anticoagulation moving forward.  Will give him another dose of Eliquis later this morning, and try to get him on a more normal schedule.    Case management will be consulted to help look into the cost of his medications.  He has had issues with Eliquis in the past, question if we can use good Rx or if there are  "other options.  Per notes from our office, he has been approved for the dofetilide assistance program, not sure how this would work upon hospital discharge.    Left groin was closed with Vascade, no issues noted.  Right groin suture was removed without incident, no hematoma or bleeding noted.      Subjective/Objective   Chief Complaint: No acute complaints    Subjective: He has had mild discomfort in his right groin, otherwise denies significant chest pain or pressure, shortness of breath, palpitations, dizziness, or lightheadedness.  He has been ambulating around the room without issues.      Objective:     Vitals: /80   Pulse 65   Temp 98.2 °F (36.8 °C)   Resp 18   Ht 5' 9\" (1.753 m)   Wt 98.9 kg (218 lb)   SpO2 96%   BMI 32.19 kg/m²   Vitals:    10/22/24 1613   Weight: 98.9 kg (218 lb)     Orthostatic Blood Pressures      Flowsheet Row Most Recent Value   Blood Pressure 132/80 filed at 10/23/2024 0816   Patient Position - Orthostatic VS Lying filed at 10/23/2024 0300              Intake/Output Summary (Last 24 hours) at 10/23/2024 0959  Last data filed at 10/23/2024 0928  Gross per 24 hour   Intake 1870 ml   Output 0 ml   Net 1870 ml       Invasive Devices       Peripheral Intravenous Line  Duration             Peripheral IV 10/22/24 Distal;Left;Ventral (anterior) Wrist 1 day    Peripheral IV 10/22/24 Proximal;Right;Ventral (anterior) Forearm <1 day                                Scheduled Meds:  Current Facility-Administered Medications   Medication Dose Route Frequency Provider Last Rate    acetaminophen  650 mg Oral Q4H PRN Madyson Franco PA-C      allopurinol  100 mg Oral Daily Madyson Franco PA-C      apixaban  5 mg Oral Q11H Madyson Franco PA-C      aspirin  81 mg Oral Daily Madyson Franco PA-C      atorvastatin  40 mg Oral Daily With Dinner Madyson Franco PA-C      dofetilide  250 mcg Oral Q12H [facility] Madyson Franco PA-C      furosemide  20 mg Oral Daily Madyson Franco PA-C   "    losartan  50 mg Oral Daily Madyson Franco PA-C      magnesium sulfate  1 g Intravenous Once Madyson Franco PA-C      metoprolol succinate  25 mg Oral BID Madyson Franco PA-C      potassium chloride  40 mEq Oral Once Madyson Franco PA-C      sodium chloride  20 mL/hr Intravenous Continuous Jolanta Lopez SHANNAN Mason 20 mL/hr (10/22/24 1002)    spironolactone  12.5 mg Oral Daily Madyson Franco PA-C       Continuous Infusions:sodium chloride, 20 mL/hr, Last Rate: 20 mL/hr (10/22/24 1002)      PRN Meds:.  acetaminophen    Review of Systems   Constitutional: Negative for fever and malaise/fatigue.   Cardiovascular:  Negative for chest pain, dyspnea on exertion, irregular heartbeat, leg swelling, near-syncope, orthopnea, palpitations, paroxysmal nocturnal dyspnea and syncope.   All other systems reviewed and are negative.        Physical Exam:   GEN: NAD, alert and oriented x 3, well appearing  SKIN: dry without significant lesions or rashes  HEENT: NCAT, PERRL, EOMs intact  NECK: No JVD appreciated  CARDIOVASCULAR: RRR, normal S1, S2 without murmurs, rubs, or gallops appreciated  LUNGS: Clear to auscultation bilaterally without wheezes, rhonchi, or rales  ABDOMEN: Soft, nontender, nondistended  EXTREMITIES/VASCULAR: perfused without clubbing, cyanosis, or LE edema b/l  PSYCH: Normal mood and affect  NEURO: CN ll-Xll grossly intact                Lab Results: I have personally reviewed pertinent lab results.    Results from last 7 days   Lab Units 10/23/24  0512 10/22/24  0958   WBC Thousand/uL 6.56 7.16   HEMOGLOBIN g/dL 12.5 14.3   HEMATOCRIT % 38.6 43.6   PLATELETS Thousands/uL  --  210     Results from last 7 days   Lab Units 10/23/24  0512 10/22/24  0958   POTASSIUM mmol/L 3.5 3.8   CHLORIDE mmol/L 106 103   CO2 mmol/L 23 23   BUN mg/dL 11 16   CREATININE mg/dL 0.80 0.89   CALCIUM mg/dL 7.8* 8.9     Results from last 7 days   Lab Units 10/23/24  0512 10/23/24  0000 10/22/24  1223   INR  2.37* 1.99*  6.78*     Results from last 7 days   Lab Units 10/23/24  0512   MAGNESIUM mg/dL 1.7*         Imaging: Results Review Statement: No pertinent imaging studies reviewed.    ECHO: Results for orders placed during the hospital encounter of 10/17/18    Echo complete with contrast if indicated    Narrative  [address] [id number] [phone]    Transthoracic Echocardiogram  2D, M-mode, Doppler, and Color Doppler    Study date:  17-Oct-2018    Patient: HERMINIA LOTT  MR number: [id number]  Account number: [id number]  : 1960  Age: 58 years  Gender: Male  Status: Outpatient  Location: [address] [place] [address]  Height: 69 in  Weight: 216 lb  BP: 140/ 82 mmHg    Indications: Heart Failure    Diagnoses: I50.9 - Heart failure, unspecified    Sonographer:  SANKET Samayoa  Primary Physician:  Anaya Gilbert DO  Referring Physician:  Solitario Murray MD  Group:  [facility]  Interpreting Physician:  Emigdio Doss MD    SUMMARY    LEFT VENTRICLE:  Systolic function was at the lower limits of normal. Ejection fraction was estimated to be 50 %.  There were no regional wall motion abnormalities.  Wall thickness was mildly to moderately increased.  Doppler parameters were consistent with abnormal left ventricular relaxation (grade 1 diastolic dysfunction).    RIGHT VENTRICLE:  The size was normal.  Systolic function was normal.    PERICARDIUM:  A trace pericardial effusion was identified. There was no evidence of hemodynamic compromise.    COMPARISONS:  There has been no significant interval change. Comparison was made with the previous study of 29-Mar-2018.    HISTORY: PRIOR HISTORY: MI, CAD, HTN, AFIB, SVT    PROCEDURE: The study was performed in the [address] [place] [address]. This was a routine study. The transthoracic approach was used. The study included complete 2D imaging, M-mode, complete spectral Doppler, and color  Doppler. The  heart rate was 61 bpm, at the start of the study. Images were obtained from the parasternal, apical, subcostal, and suprasternal notch acoustic windows. Echocardiographic views were limited due to lung interference. Image quality was  adequate.    LEFT VENTRICLE: Size was normal. Systolic function was at the lower limits of normal. Ejection fraction was estimated to be 50 %. There were no regional wall motion abnormalities. Wall thickness was mildly to moderately increased. DOPPLER:  Doppler parameters were consistent with abnormal left ventricular relaxation (grade 1 diastolic dysfunction).    RIGHT VENTRICLE: The size was normal. Systolic function was normal. Wall thickness was normal.    LEFT ATRIUM: Size was at the upper limits of normal.    RIGHT ATRIUM: Size was normal.    MITRAL VALVE: Valve structure was normal. There was normal leaflet separation. DOPPLER: The transmitral velocity was within the normal range. There was no evidence for stenosis. There was no significant regurgitation.    AORTIC VALVE: The valve was trileaflet. Leaflets exhibited normal thickness and normal cuspal separation. DOPPLER: Transaortic velocity was within the normal range. There was no evidence for stenosis. There was no significant  regurgitation.    TRICUSPID VALVE: The valve structure was normal. There was normal leaflet separation. DOPPLER: The transtricuspid velocity was within the normal range. There was no evidence for stenosis. There was no significant regurgitation.    PULMONIC VALVE: Leaflets exhibited normal thickness, no calcification, and normal cuspal separation. DOPPLER: The transpulmonic velocity was within the normal range. There was no significant regurgitation.    PERICARDIUM: A trace pericardial effusion was identified. There was no evidence of hemodynamic compromise.    AORTA: The root exhibited normal size.    SYSTEMIC VEINS: IVC: The inferior vena cava was normal in size. Respirophasic changes  were normal.    SYSTEM MEASUREMENT TABLES    2D  %FS: 26.08 %  Ao Diam: 4.01 cm  EDV(Teich): 105.38 ml  EF Biplane: 52.16 %  EF(Cube): 59.62 %  EF(Teich): 51.15 %  ESV(Cube): 43.52 ml  ESV(Teich): 51.48 ml  IVSd: 1.45 cm  LA Area: 20.83 cm2  LA Diam: 3.55 cm  LVEDV MOD A2C: 129.95 ml  LVEDV MOD A4C: 153.5 ml  LVEDV MOD BP: 146.59 ml  LVEF MOD A2C: 47.85 %  LVEF MOD A4C: 59.43 %  LVESV MOD A2C: 67.77 ml  LVESV MOD A4C: 62.27 ml  LVESV MOD BP: 70.12 ml  LVIDd: 4.76 cm  LVIDs: 3.52 cm  LVLd A2C: 8.67 cm  LVLd A4C: 8.72 cm  LVLs A2C: 7.68 cm  LVLs A4C: 7.6 cm  LVPWd: 1.36 cm  RA Area: 17.39 cm2  RV Diam.: 3.55 cm  SV MOD A2C: 62.18 ml  SV MOD A4C: 91.23 ml  SV(Cube): 64.25 ml  SV(Teich): 53.9 ml    MM  TAPSE: 1.49 cm    PW  E': 0.04 m/s  E/E': 23.04  MV A Brennan: 0.66 m/s  MV Dec McCook: 3.43 m/s2  MV DecT: 248.53 ms  MV E Brennan: 0.85 m/s  MV E/A Ratio: 1.3    [employer] Accredited Echocardiography Laboratory    Prepared and electronically signed by    Emigdio Doss MD  Signed 18-Oct-2018 11:44:59      Results for orders placed during the hospital encounter of 07/23/24    Echo complete w/ contrast if indicated    Interpretation Summary    Left Ventricle: Left ventricular cavity size is mildly dilated. Wall thickness is moderately increased. There is eccentric hypertrophy. The left ventricular ejection fraction is 45-50% by biplane measurement. Systolic function is mildly reduced. There is mild global hypokinesis. Diastolic function is mildly abnormal, consistent with grade I (abnormal) relaxation.    Right Ventricle: Right ventricular cavity size is mildly dilated. Systolic function is normal.    Mitral Valve: There is mild regurgitation.    Tricuspid Valve: There is mild regurgitation.    Pericardium: There is a small pericardial effusion posterior to the heart. There is no echocardiographic evidence of tamponade.        EKG: QTc prolongation after dofetilide 500 mcg given        TELEMETRY: normal sinus  rhythm, no arrhythmias given      VTE Pharmacologic Prophylaxis: Eliquis

## 2024-10-23 NOTE — ASSESSMENT & PLAN NOTE
Last seen 4/21. [facility] 4/22   Wt Readings from Last 3 Encounters:   10/22/24 98.9 kg (218 lb)   09/16/24 99.3 kg (218 lb 14.4 oz)   08/07/24 98.3 kg (216 lb 12.8 oz)   Chronic HFmEF, nonischemic cardiomyopathy with most recent LVEF 45-50% per echo 7/2024  Thought to be due to tachycardia +/- ETOH   Ejection fraction normalized in the past with maintenance of normal sinus rhythm  Cardiomyopathy felt to be out of proportion to level of CAD  Currently euvolemic

## 2024-10-23 NOTE — PROGRESS NOTES
10/23/24 0911   Provider Notification   Reason for Communication Other (Comment)  (medication confirmation)   Provider Name Madyson Franco PA-C   Provider Role   (EP)   Method of Communication Other (Comment)  (Secure rover)   Response See orders  (reduced dosage of medication in question due to QTC elevation, tikosyn)   Notification Time 0911   Shift Event Other (Comment)  (no shift event, medication reduced and continued at reduced rate)

## 2024-10-23 NOTE — CASE MANAGEMENT
Case Management Discharge Planning Note    Patient name Bernardino Nunez  Location [facility] 426/[facility] 426-01 MRN [id number]  : 1960 Date 10/23/2024       Current Admission Date: 10/22/2024  Current Admission Diagnosis:Atrial fibrillation and flutter (HCC)   Patient Active Problem List    Diagnosis Date Noted Date Diagnosed    CAD (coronary artery disease) 10/22/2024     Class 1 obesity due to excess calories with serious comorbidity in adult 2022     Port-A-Cath in place 2021     S/P ablation of atrial fibrillation 2021     Cancer of ascending colon (HCC) 2020     S/P right hemicolectomy 09/10/2020     History of gout 2020     Microcytic anemia 2020     Gastrointestinal hemorrhage with melena 2020     Heart failure with mid-range ejection fraction (HCC) 2018     PFO (patent foramen ovale) 2017     Atrial fibrillation and flutter (HCC) 2017     Serum total bilirubin elevated 2017     Essential hypertension 2017       LOS (days): 1  Geometric Mean LOS (GMLOS) (days):   Days to GMLOS:     OBJECTIVE:  Risk of Unplanned Readmission Score: 16.24         Current admission status: Inpatient   Preferred Pharmacy:   [id number] - [place], [place] - [address]  [place] [address]  Phone: [phone] Fax: [phone]    [facility] - [place] - [address]  [address]  [place] [address]  Phone: [phone] Fax: [phone]    Primary Care Provider: Pepe Chakraborty DO    Primary Insurance: MEDICARE  Secondary Insurance: [facility]    DISCHARGE DETAILS:    CM notified by provider that patient has difficulty affording medications. Patient will need to be on Tikosyn and Eliquis at discharge. Per chart review, OP notes state that patient was approved for Tikosyn assistance program.     CM called OP cardiology office for more information regarding Tikosyn assistance program. MADISON  spoke to Nila and Nila informed that patient had been approved for 500mcgs. As of now, patient needs 250mcgs of Tikosyn. Due to this, OP will need submit paperwork for corrected dose. CM was informed that this could take some time to process and recommended that patient pay for about 4 weeks of medication so he can discharge with correct dose.       CM updated provider regarding this. Provider sent Eliquis and Tikosyn medication to [facility]. CM called in price check. Tikosyn cost is $400.95 and Eliquis cost is $738. [facility] was uncertain if patient had used 30 day Eliquis card in the past. CM updated provider with medication cost.   CM sent email to [facility] regarding Financial Assistance Eligibility. CM informed that patient is covered 100%.     OP cardiology will need to know dose of Tikosyn at discharge to have patient approved for correct dose for Tikosyn assistance program. Please call [phone] or Secure Chat Nila Alberts.

## 2024-10-23 NOTE — ASSESSMENT & PLAN NOTE
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%  Cardiac cath at that time showed 80% proximal ramus, EF thought to be out of proportion to level of CAD  Status post successful JOSE LUIS/cardioversion  Discharged on rate controlling medications, Eliquis  Subsequent improvement in LVEF (50% per echo 10/2018)  7/2020 - worsening dyspnea, found to be in recurrent rapid atrial flutter at time of stress echo  Slight drop in EF again noted, sent to ED for evaluation  New onset anemia found, workup revealed colon cancer requiring hemicolectomy and chemotherapy --- A-fib management delayed  9/2020 - recurrent rapid atrial fibrillation, status post successful cardioversion  1/2021 - underwent RF ablation with pulmonary vein isolation and posterior wall isolation  7/2021 - underwent Medtronic loop recorder implantation for ongoing A-fib monitoring  9/2022 - echocardiogram showed LVEF 65%, improved with maintenance of normal sinus rhythm  2/2024 - noted to have increasing A-fib burden on loop recorder in the setting of medication noncompliance --- rate controlling medications restarted  At 1 point change to Coumadin anticoagulation due to cost of Eliquis ([id number] = 3)  7/2024 - readmitted with rapid atrial fibrillation, echo showed recurrent drop in LVEF to 45-50% --- rate control uptitrated  8/2024 - ongoing A-fib noted, Toprol-XL again increased --- ultimately A-fib ablation and antiarrhythmic initiation recommended  Dofetilide initiation in the post ablation setting  Started on 500 mcg dosing with subsequent QTc prolongation  Dose reduced to 250 mcg, electrolytes repleted

## 2024-10-24 LAB
ANION GAP SERPL CALCULATED.3IONS-SCNC: 9 MMOL/L (ref 4–13)
ATRIAL RATE: 67 BPM
ATRIAL RATE: 70 BPM
BUN SERPL-MCNC: 11 MG/DL (ref 5–25)
CALCIUM SERPL-MCNC: 8.2 MG/DL (ref 8.4–10.2)
CHLORIDE SERPL-SCNC: 105 MMOL/L (ref 96–108)
CO2 SERPL-SCNC: 26 MMOL/L (ref 21–32)
CREAT SERPL-MCNC: 0.86 MG/DL (ref 0.6–1.3)
GFR SERPL CREATININE-BSD FRML MDRD: 91 ML/MIN/1.73SQ M
GLUCOSE SERPL-MCNC: 119 MG/DL (ref 65–140)
MAGNESIUM SERPL-MCNC: 1.7 MG/DL (ref 1.9–2.7)
P AXIS: 41 DEGREES
P AXIS: 45 DEGREES
POTASSIUM SERPL-SCNC: 3.4 MMOL/L (ref 3.5–5.3)
PR INTERVAL: 148 MS
PR INTERVAL: 154 MS
QRS AXIS: 49 DEGREES
QRS AXIS: 51 DEGREES
QRSD INTERVAL: 104 MS
QRSD INTERVAL: 94 MS
QT INTERVAL: 462 MS
QT INTERVAL: 466 MS
QTC INTERVAL: 488 MS
QTC INTERVAL: 503 MS
SODIUM SERPL-SCNC: 140 MMOL/L (ref 135–147)
T WAVE AXIS: 51 DEGREES
T WAVE AXIS: 68 DEGREES
VENTRICULAR RATE: 67 BPM
VENTRICULAR RATE: 70 BPM

## 2024-10-24 PROCEDURE — 93010 ELECTROCARDIOGRAM REPORT: CPT | Performed by: INTERNAL MEDICINE

## 2024-10-24 PROCEDURE — 93005 ELECTROCARDIOGRAM TRACING: CPT

## 2024-10-24 PROCEDURE — 80048 BASIC METABOLIC PNL TOTAL CA: CPT | Performed by: PHYSICIAN ASSISTANT

## 2024-10-24 PROCEDURE — 83735 ASSAY OF MAGNESIUM: CPT | Performed by: PHYSICIAN ASSISTANT

## 2024-10-24 PROCEDURE — 99232 SBSQ HOSP IP/OBS MODERATE 35: CPT | Performed by: INTERNAL MEDICINE

## 2024-10-24 RX ORDER — LANOLIN ALCOHOL/MO/W.PET/CERES
400 CREAM (GRAM) TOPICAL 2 TIMES DAILY
Status: DISCONTINUED | OUTPATIENT
Start: 2024-10-24 | End: 2024-10-26 | Stop reason: HOSPADM

## 2024-10-24 RX ORDER — POTASSIUM CHLORIDE 1500 MG/1
40 TABLET, EXTENDED RELEASE ORAL
Status: COMPLETED | OUTPATIENT
Start: 2024-10-24 | End: 2024-10-24

## 2024-10-24 RX ORDER — DOFETILIDE 0.25 MG/1
250 CAPSULE ORAL EVERY 12 HOURS SCHEDULED
Status: DISCONTINUED | OUTPATIENT
Start: 2024-10-24 | End: 2024-10-25

## 2024-10-24 RX ADMIN — MAGNESIUM OXIDE TAB 400 MG (241.3 MG ELEMENTAL MG) 400 MG: 400 (241.3 MG) TAB at 18:08

## 2024-10-24 RX ADMIN — DOFETILIDE 250 MCG: 0.25 CAPSULE ORAL at 08:48

## 2024-10-24 RX ADMIN — ATORVASTATIN CALCIUM 40 MG: 40 TABLET, FILM COATED ORAL at 18:10

## 2024-10-24 RX ADMIN — RIVAROXABAN 20 MG: 20 TABLET, FILM COATED ORAL at 08:45

## 2024-10-24 RX ADMIN — SPIRONOLACTONE 12.5 MG: 25 TABLET ORAL at 08:45

## 2024-10-24 RX ADMIN — LOSARTAN POTASSIUM 50 MG: 50 TABLET, FILM COATED ORAL at 08:45

## 2024-10-24 RX ADMIN — FUROSEMIDE 20 MG: 20 TABLET ORAL at 08:45

## 2024-10-24 RX ADMIN — ALLOPURINOL 100 MG: 100 TABLET ORAL at 08:45

## 2024-10-24 RX ADMIN — POTASSIUM CHLORIDE 40 MEQ: 1500 TABLET, EXTENDED RELEASE ORAL at 14:38

## 2024-10-24 RX ADMIN — ASPIRIN 81 MG: 81 TABLET, COATED ORAL at 08:45

## 2024-10-24 RX ADMIN — MAGNESIUM OXIDE TAB 400 MG (241.3 MG ELEMENTAL MG) 400 MG: 400 (241.3 MG) TAB at 11:32

## 2024-10-24 RX ADMIN — DOFETILIDE 250 MCG: 0.25 CAPSULE ORAL at 21:33

## 2024-10-24 RX ADMIN — METOPROLOL SUCCINATE 25 MG: 25 TABLET, EXTENDED RELEASE ORAL at 08:45

## 2024-10-24 RX ADMIN — METOPROLOL SUCCINATE 25 MG: 25 TABLET, EXTENDED RELEASE ORAL at 21:31

## 2024-10-24 RX ADMIN — POTASSIUM CHLORIDE 40 MEQ: 1500 TABLET, EXTENDED RELEASE ORAL at 11:32

## 2024-10-24 NOTE — PROGRESS NOTES
Progress Note - Electrophysiology-Cardiology (EP)   Bernardino Nunez 64 y.o. male MRN: [id number]  Unit/Bed#: [facility] 426-01 Encounter: [id number]      Assessment:  Assessment & Plan  Atrial fibrillation and flutter (HCC)  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%  Cardiac cath at that time showed 80% proximal ramus, EF thought to be out of proportion to level of CAD  Status post successful JOSE LUIS/cardioversion  Discharged on rate controlling medications, Eliquis  Subsequent improvement in LVEF (50% per echo 10/2018)  7/2020 - worsening dyspnea, found to be in recurrent rapid atrial flutter at time of stress echo  Slight drop in EF again noted, sent to ED for evaluation  New onset anemia found, workup revealed colon cancer requiring hemicolectomy and chemotherapy --- A-fib management delayed  9/2020 - recurrent rapid atrial fibrillation, status post successful cardioversion  1/2021 - underwent RF ablation with pulmonary vein isolation and posterior wall isolation  7/2021 - underwent Medtr[employer] loop recorder implantation for ongoing A-fib monitoring  9/2022 - echocardiogram showed LVEF 65%, improved with maintenance of normal sinus rhythm  2/2024 - noted to have increasing A-fib burden on loop recorder in the setting of medication noncompliance --- rate controlling medications restarted  At 1 point change to Coumadin anticoagulation due to cost of Eliquis ([id number] = 3)  7/2024 - readmitted with rapid atrial fibrillation, echo showed recurrent drop in LVEF to 45-50% --- rate control uptitrated  8/2024 - ongoing A-fib noted, Toprol-XL again increased --- ultimately A-fib ablation and antiarrhythmic initiation recommended  Dofetilide initiation in the post ablation  setting  Started on 500 mcg dosing with subsequent QTc prolongation  Dose reduced to 250 mcg, electrolytes repleted  Essential hypertension  Well-controlled, maintained on Toprol-XL, losartan, and spironolactone  S/P ablation of atrial fibrillation  Prior RF ablation with pulmonary vein isolation and posterior wall isolation 1/2021 (see 'Atrial fibrillation and flutter' for further details)  Heart failure with mid-range ejection fraction (HCC)  Wt Readings from Last 3 Encounters:   10/22/24 98.9 kg (218 lb)   09/16/24 99.3 kg (218 lb 14.4 oz)   08/07/24 98.3 kg (216 lb 12.8 oz)   Chronic HFmEF, nonischemic cardiomyopathy with most recent LVEF 45-50% per echo 7/2024  Thought to be due to tachycardia +/- ETOH   Ejection fraction normalized in the past with maintenance of normal sinus rhythm  Cardiomyopathy felt to be out of proportion to level of CAD  Currently euvolemic  CAD (coronary artery disease)  CAD with 80% proximal ramus per cardiac catheterization 3/2018  Medical management pursued given lack of symptoms  Maintained on aspirin, statin, beta-blocker  Nuclear stress test 9/2024 showed evidence of scar with irene-infarct ischemia, no interventions recommended        Plan:  EKG overnight shows QTc right around 500 ms.  Reviewed with Dr. Burrows, we will give another dose of dofetilide 250 mcg this morning and reassess EKG to see if further dose adjustments are needed.  Once an accurate dose of dofetilide is known, we will need to work with case management to try to obtain assistance in regards with getting this medication upon discharge and submitting new paperwork to the dofetilide assistance program.    He has been changed to Xarelto 20 mg daily.  He should be able to receive a 1 month free supply upon discharge, and apply for an assistance program as well.    His magnesium and potassium are still low today, will again try to replete and reassess tomorrow.    Continue to monitor telemetry and EKGs 2 hours  "after each dose of dofetilide.  He will need to remain in the hospital until least tomorrow, if not Saturday if there is still question regarding dofetilide dosing.  He understands and agrees with this plan.      Subjective/Objective   Chief Complaint: No acute complaints    Subjective: He denies chest pain or pressure, shortness of breath, palpitations, dizziness, or lightheadedness.  He has been ambulating around the room without issues.  He noted for about 5 minutes yesterday flashing sensation in both eyes, not associated with loss of vision or other neurologic symptoms.  These subsequently resolved and have not recurred.      Objective:     Vitals: /91   Pulse 80   Temp 98.7 °F (37.1 °C) (Oral)   Resp 19   Ht 5' 9\" (1.753 m)   Wt 98.9 kg (218 lb)   SpO2 95%   BMI 32.19 kg/m²   Vitals:    10/22/24 1613   Weight: 98.9 kg (218 lb)     Orthostatic Blood Pressures      Flowsheet Row Most Recent Value   Blood Pressure 127/91 filed at 10/24/2024 0848   Patient Position - Orthostatic VS Lying filed at 10/24/2024 0715              Intake/Output Summary (Last 24 hours) at 10/24/2024 1008  Last data filed at 10/24/2024 0900  Gross per 24 hour   Intake 520 ml   Output 0 ml   Net 520 ml       Invasive Devices       Peripheral Intravenous Line  Duration             Peripheral IV 10/22/24 Proximal;Right;Ventral (anterior) Forearm 2 days                                Scheduled Meds:  Current Facility-Administered Medications   Medication Dose Route Frequency Provider Last Rate    acetaminophen  650 mg Oral Q4H PRN Madyson Franco PA-C      allopurinol  100 mg Oral Daily Madyson Franco PA-C      aspirin  81 mg Oral Daily Madyson Franco PA-C      atorvastatin  40 mg Oral Daily With Dinner Madyson Franco PA-C      dofetilide  250 mcg Oral Q12H DARREN Madyson Franco PA-C      furosemide  20 mg Oral Daily Madyson Franco PA-C      losartan  50 mg Oral Daily Madyson Franco PA-C      metoprolol succinate "  25 mg Oral BID Madyson Franco PA-C      rivaroxaban  20 mg Oral Daily With Breakfast Madyson Franco PA-C      spironolactone  12.5 mg Oral Daily Madyson Franco PA-C       Continuous Infusions:   PRN Meds:.  acetaminophen    Review of Systems   Constitutional: Negative for fever and malaise/fatigue.   Cardiovascular:  Negative for chest pain, dyspnea on exertion, irregular heartbeat, leg swelling, near-syncope, orthopnea, palpitations, paroxysmal nocturnal dyspnea and syncope.   All other systems reviewed and are negative.        Physical Exam:   GEN: NAD, alert and oriented x 3, well appearing  SKIN: dry without significant lesions or rashes  HEENT: NCAT, PERRL, EOMs intact  NECK: No JVD appreciated  CARDIOVASCULAR: RRR, normal S1, S2 without murmurs, rubs, or gallops appreciated  LUNGS: Clear to auscultation bilaterally without wheezes, rhonchi, or rales  ABDOMEN: Soft, nontender, nondistended  EXTREMITIES/VASCULAR: perfused without clubbing, cyanosis, or LE edema b/l  PSYCH: Normal mood and affect  NEURO: CN ll-Xll grossly intact                Lab Results: I have personally reviewed pertinent lab results.    Results from last 7 days   Lab Units 10/23/24  0512 10/22/24  0958   WBC Thousand/uL 6.56 7.16   HEMOGLOBIN g/dL 12.5 14.3   HEMATOCRIT % 38.6 43.6   PLATELETS Thousands/uL  --  210     Results from last 7 days   Lab Units 10/24/24  0531 10/23/24  0512 10/22/24  0958   POTASSIUM mmol/L 3.4* 3.5 3.8   CHLORIDE mmol/L 105 106 103   CO2 mmol/L 26 23 23   BUN mg/dL 11 11 16   CREATININE mg/dL 0.86 0.80 0.89   CALCIUM mg/dL 8.2* 7.8* 8.9     Results from last 7 days   Lab Units 10/23/24  0512 10/23/24  0000 10/22/24  1223   INR  2.37* 1.99* 6.78*     Results from last 7 days   Lab Units 10/24/24  0531 10/23/24  0512   MAGNESIUM mg/dL 1.7* 1.7*         Imaging: Results Review Statement: No pertinent imaging studies reviewed.    ECHO: Results for orders placed during the hospital encounter of  10/17/18    Echo complete with contrast if indicated    Narrative  [place] [phone]    Transthoracic Echocardiogram  2D, M-mode, Doppler, and Color Doppler    Study date:  17-Oct-2018    Patient: HERMINIA LOTT  MR number: [id number]  Account number: [id number]  : 1960  Age: 58 years  Gender: Male  Status: Outpatient  Location: [address] [place] [address]  Height: 69 in  Weight: 216 lb  BP: 140/ 82 mmHg    Indications: Heart Failure    Diagnoses: I50.9 - Heart failure, unspecified    Sonographer:  SANKET Samayoa  Primary Physician:  Anaya Gilbert DO  Referring Physician:  Solitario Murray MD  Group:  [facility]  Interpreting Physician:  Emigdio Doss MD    SUMMARY    LEFT VENTRICLE:  Systolic function was at the lower limits of normal. Ejection fraction was estimated to be 50 %.  There were no regional wall motion abnormalities.  Wall thickness was mildly to moderately increased.  Doppler parameters were consistent with abnormal left ventricular relaxation (grade 1 diastolic dysfunction).    RIGHT VENTRICLE:  The size was normal.  Systolic function was normal.    PERICARDIUM:  A trace pericardial effusion was identified. There was no evidence of hemodynamic compromise.    COMPARISONS:  There has been no significant interval change. Comparison was made with the previous study of 29-Mar-2018.    HISTORY: PRIOR HISTORY: MI, CAD, HTN, AFIB, SVT    PROCEDURE: The study was performed in the [address] [place] [address]. This was a routine study. The transthoracic approach was used. The study included complete 2D imaging, M-mode, complete spectral Doppler, and color Doppler. The  heart rate was 61 bpm, at the start of the study. Images were obtained from the parasternal, apical, subcostal, and suprasternal notch acoustic windows. Echocardiographic views were limited due to lung interference. Image  quality was  adequate.    LEFT VENTRICLE: Size was normal. Systolic function was at the lower limits of normal. Ejection fraction was estimated to be 50 %. There were no regional wall motion abnormalities. Wall thickness was mildly to moderately increased. DOPPLER:  Doppler parameters were consistent with abnormal left ventricular relaxation (grade 1 diastolic dysfunction).    RIGHT VENTRICLE: The size was normal. Systolic function was normal. Wall thickness was normal.    LEFT ATRIUM: Size was at the upper limits of normal.    RIGHT ATRIUM: Size was normal.    MITRAL VALVE: Valve structure was normal. There was normal leaflet separation. DOPPLER: The transmitral velocity was within the normal range. There was no evidence for stenosis. There was no significant regurgitation.    AORTIC VALVE: The valve was trileaflet. Leaflets exhibited normal thickness and normal cuspal separation. DOPPLER: Transaortic velocity was within the normal range. There was no evidence for stenosis. There was no significant  regurgitation.    TRICUSPID VALVE: The valve structure was normal. There was normal leaflet separation. DOPPLER: The transtricuspid velocity was within the normal range. There was no evidence for stenosis. There was no significant regurgitation.    PULMONIC VALVE: Leaflets exhibited normal thickness, no calcification, and normal cuspal separation. DOPPLER: The transpulmonic velocity was within the normal range. There was no significant regurgitation.    PERICARDIUM: A trace pericardial effusion was identified. There was no evidence of hemodynamic compromise.    AORTA: The root exhibited normal size.    SYSTEMIC VEINS: IVC: The inferior vena cava was normal in size. Respirophasic changes were normal.    SYSTEM MEASUREMENT TABLES    2D  %FS: 26.08 %  Ao Diam: 4.01 cm  EDV(Teich): 105.38 ml  EF Biplane: 52.16 %  EF(Cube): 59.62 %  EF(Teich): 51.15 %  ESV(Cube): 43.52 ml  ESV(Teich): 51.48 ml  IVSd: 1.45 cm  LA Area: 20.83  cm2  LA Diam: 3.55 cm  LVEDV MOD A2C: 129.95 ml  LVEDV MOD A4C: 153.5 ml  LVEDV MOD BP: 146.59 ml  LVEF MOD A2C: 47.85 %  LVEF MOD A4C: 59.43 %  LVESV MOD A2C: 67.77 ml  LVESV MOD A4C: 62.27 ml  LVESV MOD BP: 70.12 ml  LVIDd: 4.76 cm  LVIDs: 3.52 cm  LVLd A2C: 8.67 cm  LVLd A4C: 8.72 cm  LVLs A2C: 7.68 cm  LVLs A4C: 7.6 cm  LVPWd: 1.36 cm  RA Area: 17.39 cm2  RV Diam.: 3.55 cm  SV MOD A2C: 62.18 ml  SV MOD A4C: 91.23 ml  SV(Cube): 64.25 ml  SV(Teich): 53.9 ml    MM  TAPSE: 1.49 cm    PW  E': 0.04 m/s  E/E': 23.04  MV A Brennan: 0.66 m/s  MV Dec Mobile: 3.43 m/s2  MV DecT: 248.53 ms  MV E Brennan: 0.85 m/s  MV E/A Ratio: 1.3    [facility] Accredited [facility]    Prepared and electronically signed by    Emigdio Doss MD  Signed 18-Oct-2018 11:44:59      Results for orders placed during the hospital encounter of 07/23/24    Echo complete w/ contrast if indicated    Interpretation Summary    Left Ventricle: Left ventricular cavity size is mildly dilated. Wall thickness is moderately increased. There is eccentric hypertrophy. The left ventricular ejection fraction is 45-50% by biplane measurement. Systolic function is mildly reduced. There is mild global hypokinesis. Diastolic function is mildly abnormal, consistent with grade I (abnormal) relaxation.    Right Ventricle: Right ventricular cavity size is mildly dilated. Systolic function is normal.    Mitral Valve: There is mild regurgitation.    Tricuspid Valve: There is mild regurgitation.    Pericardium: There is a small pericardial effusion posterior to the heart. There is no echocardiographic evidence of tamponade.        EKG:        TELEMETRY: normal sinus rhythm, no arrhythmias noted      VTE Pharmacologic Prophylaxis:

## 2024-10-24 NOTE — CASE MANAGEMENT
Case Management Discharge Planning Note    Patient name Bernardino Nunez  Location [facility] 426-01 MRN [id number]  : 1960 Date 10/24/2024       Current Admission Date: 10/22/2024  Current Admission Diagnosis:Atrial fibrillation and flutter (HCC)   Patient Active Problem List    Diagnosis Date Noted Date Diagnosed    CAD (coronary artery disease) 10/22/2024     Class 1 obesity due to excess calories with serious comorbidity in adult 2022     Port-A-Cath in place 2021     S/P ablation of atrial fibrillation 2021     Cancer of ascending colon (HCC) 2020     S/P right hemicolectomy 09/10/2020     History of gout 2020     Microcytic anemia 2020     Gastrointestinal hemorrhage with melena 2020     Heart failure with mid-range ejection fraction (HCC) 2018     PFO (patent foramen ovale) 2017     Atrial fibrillation and flutter (HCC) 2017     Serum total bilirubin elevated 2017     Essential hypertension 2017       LOS (days): 2  Geometric Mean LOS (GMLOS) (days):   Days to GMLOS:     OBJECTIVE:  Risk of Unplanned Readmission Score: 16.82         Current admission status: Inpatient   Preferred Pharmacy:   [id number] - [place], [place] - [address]  [place] [address]  Phone: [phone] Fax: [phone]    [facility] [place] - [address]  [address]  [place] [address]  Phone: [phone] Fax: [phone]    Primary Care Provider: Pepe Chakraborty DO    Primary Insurance: MEDICARE  Secondary Insurance: [facility]    DISCHARGE DETAILS:    CM provided patient with [employer] financial assistance application. Patient requested assistance with completing. CM assisted patient with application. CM informed by provider that they completed their portion of the application and faxed it over to [facility] Cardiology. Provider requested that patient portion of application be  faxed to OP cardiology and they will submit application for patient. CM followed up with patient at bedside and he is agreeable to this. CM faxed application to OP cardiology office. CM sent message in secure chat to Nila to confirm that faxed was received. Nila confirmed that fax was received.

## 2024-10-24 NOTE — PLAN OF CARE
Problem: PAIN - ADULT  Goal: Verbalizes/displays adequate comfort level or baseline comfort level  Description: Interventions:  - Encourage patient to monitor pain and request assistance  - Assess pain using appropriate pain scale  - Administer analgesics based on type and severity of pain and evaluate response  - Implement non-pharmacological measures as appropriate and evaluate response  - Consider cultural and social influences on pain and pain management  - Notify physician/advanced practitioner if interventions unsuccessful or patient reports new pain  Outcome: Progressing     Problem: INFECTION - ADULT  Goal: Absence or prevention of progression during hospitalization  Description: INTERVENTIONS:  - Assess and monitor for signs and symptoms of infection  - Monitor lab/diagnostic results  - Monitor all insertion sites, i.e. indwelling lines, tubes, and drains  - Monitor endotracheal if appropriate and nasal secretions for changes in amount and color  - Esparto appropriate cooling/warming therapies per order  - Administer medications as ordered  - Instruct and encourage patient and family to use good hand hygiene technique  - Identify and instruct in appropriate isolation precautions for identified infection/condition  Outcome: Progressing  Goal: Absence of fever/infection during neutropenic period  Description: INTERVENTIONS:  - Monitor WBC    Outcome: Progressing     Problem: SAFETY ADULT  Goal: Patient will remain free of falls  Description: INTERVENTIONS:  - Educate patient/family on patient safety including physical limitations  - Instruct patient to call for assistance with activity   - Consult OT/PT to assist with strengthening/mobility   - Keep Call bell within reach  - Keep bed low and locked with side rails adjusted as appropriate  - Keep care items and personal belongings within reach  - Initiate and maintain comfort rounds  - Make Fall Risk Sign visible to staff  - Offer Toileting every  Hours,  in advance of need  - Initiate/Maintain alarm  - Obtain necessary fall risk management equipment:   - Apply yellow socks and bracelet for high fall risk patients  - Consider moving patient to room near nurses station  Outcome: Progressing  Goal: Maintain or return to baseline ADL function  Description: INTERVENTIONS:  -  Assess patient's ability to carry out ADLs; assess patient's baseline for ADL function and identify physical deficits which impact ability to perform ADLs (bathing, care of mouth/teeth, toileting, grooming, dressing, etc.)  - Assess/evaluate cause of self-care deficits   - Assess range of motion  - Assess patient's mobility; develop plan if impaired  - Assess patient's need for assistive devices and provide as appropriate  - Encourage maximum independence but intervene and supervise when necessary  - Involve family in performance of ADLs  - Assess for home care needs following discharge   - Consider OT consult to assist with ADL evaluation and planning for discharge  - Provide patient education as appropriate  Outcome: Progressing  Goal: Maintains/Returns to pre admission functional level  Description: INTERVENTIONS:  - Perform AM-PAC 6 Click Basic Mobility/ Daily Activity assessment daily.  - Set and communicate daily mobility goal to care team and patient/family/caregiver.   - Collaborate with rehabilitation services on mobility goals if consulted  - Perform Range of Motion  times a day.  - Reposition patient every  hours.  - Dangle patient  times a day  - Stand patient  times a day  - Ambulate patient  times a day  - Out of bed to chair  times a day   - Out of bed for meals  times a day  - Out of bed for toileting  - Record patient progress and toleration of activity level   Outcome: Progressing     Problem: DISCHARGE PLANNING  Goal: Discharge to home or other facility with appropriate resources  Description: INTERVENTIONS:  - Identify barriers to discharge w/patient and caregiver  - Arrange for  needed discharge resources and transportation as appropriate  - Identify discharge learning needs (meds, wound care, etc.)  - Arrange for interpretive services to assist at discharge as needed  - Refer to Case Management Department for coordinating discharge planning if the patient needs post-hospital services based on physician/advanced practitioner order or complex needs related to functional status, cognitive ability, or social support system  Outcome: Progressing     Problem: Knowledge Deficit  Goal: Patient/family/caregiver demonstrates understanding of disease process, treatment plan, medications, and discharge instructions  Description: Complete learning assessment and assess knowledge base.  Interventions:  - Provide teaching at level of understanding  - Provide teaching via preferred learning methods  Outcome: Progressing

## 2024-10-24 NOTE — ASSESSMENT & PLAN NOTE
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%  Cardiac cath at that time showed 80% proximal ramus, EF thought to be out of proportion to level of CAD  Status post successful JOSE LUIS/cardioversion  Discharged on rate controlling medications, Eliquis  Subsequent improvement in LVEF (50% per echo 10/2018)  7/2020 - worsening dyspnea, found to be in recurrent rapid atrial flutter at time of stress echo  Slight drop in EF again noted, sent to ED for evaluation  New onset anemia found, workup revealed colon cancer requiring hemicolectomy and chemotherapy --- A-fib management delayed  9/2020 - recurrent rapid atrial fibrillation, status post successful cardioversion  1/2021 - underwent RF ablation with pulmonary vein isolation and posterior wall isolation  7/2021 - underwent [employer] loop recorder implantation for ongoing A-fib monitoring  9/2022 - echocardiogram showed LVEF 65%, improved with maintenance of normal sinus rhythm  2/2024 - noted to have increasing A-fib burden on loop recorder in the setting of medication noncompliance --- rate controlling medications restarted  At 1 point change to Coumadin anticoagulation due to cost of Eliquis ([id number] = 3)  7/2024 - readmitted with rapid atrial fibrillation, echo showed recurrent drop in LVEF to 45-50% --- rate control uptitrated  8/2024 - ongoing A-fib noted, Toprol-XL again increased --- ultimately A-fib ablation and antiarrhythmic initiation recommended  Dofetilide initiation in the post ablation setting  Started on 500 mcg dosing with subsequent QTc prolongation  Dose reduced to 250 mcg, electrolytes repleted

## 2024-10-25 ENCOUNTER — TELEPHONE (OUTPATIENT)
Dept: CARDIOLOGY CLINIC | Facility: CLINIC | Age: 64
End: 2024-10-25

## 2024-10-25 LAB
ANION GAP SERPL CALCULATED.3IONS-SCNC: 6 MMOL/L (ref 4–13)
ATRIAL RATE: 62 BPM
ATRIAL RATE: 63 BPM
ATRIAL RATE: 73 BPM
BUN SERPL-MCNC: 10 MG/DL (ref 5–25)
CALCIUM SERPL-MCNC: 8.6 MG/DL (ref 8.4–10.2)
CHLORIDE SERPL-SCNC: 100 MMOL/L (ref 96–108)
CO2 SERPL-SCNC: 30 MMOL/L (ref 21–32)
CREAT SERPL-MCNC: 0.88 MG/DL (ref 0.6–1.3)
GFR SERPL CREATININE-BSD FRML MDRD: 90 ML/MIN/1.73SQ M
GLUCOSE SERPL-MCNC: 97 MG/DL (ref 65–140)
MAGNESIUM SERPL-MCNC: 1.7 MG/DL (ref 1.9–2.7)
P AXIS: 42 DEGREES
P AXIS: 46 DEGREES
P AXIS: 51 DEGREES
POTASSIUM SERPL-SCNC: 4 MMOL/L (ref 3.5–5.3)
PR INTERVAL: 156 MS
PR INTERVAL: 156 MS
PR INTERVAL: 158 MS
QRS AXIS: 17 DEGREES
QRS AXIS: 25 DEGREES
QRS AXIS: 34 DEGREES
QRSD INTERVAL: 100 MS
QRSD INTERVAL: 100 MS
QRSD INTERVAL: 102 MS
QT INTERVAL: 460 MS
QT INTERVAL: 478 MS
QT INTERVAL: 486 MS
QTC INTERVAL: 485 MS
QTC INTERVAL: 497 MS
QTC INTERVAL: 506 MS
SODIUM SERPL-SCNC: 136 MMOL/L (ref 135–147)
T WAVE AXIS: 47 DEGREES
T WAVE AXIS: 51 DEGREES
T WAVE AXIS: 63 DEGREES
VENTRICULAR RATE: 62 BPM
VENTRICULAR RATE: 63 BPM
VENTRICULAR RATE: 73 BPM

## 2024-10-25 PROCEDURE — 99232 SBSQ HOSP IP/OBS MODERATE 35: CPT | Performed by: INTERNAL MEDICINE

## 2024-10-25 PROCEDURE — 93010 ELECTROCARDIOGRAM REPORT: CPT | Performed by: INTERNAL MEDICINE

## 2024-10-25 PROCEDURE — 80048 BASIC METABOLIC PNL TOTAL CA: CPT | Performed by: PHYSICIAN ASSISTANT

## 2024-10-25 PROCEDURE — 83735 ASSAY OF MAGNESIUM: CPT | Performed by: PHYSICIAN ASSISTANT

## 2024-10-25 PROCEDURE — 93005 ELECTROCARDIOGRAM TRACING: CPT

## 2024-10-25 RX ORDER — DOFETILIDE 0.12 MG/1
125 CAPSULE ORAL EVERY 12 HOURS SCHEDULED
Qty: 60 CAPSULE | Refills: 6 | Status: SHIPPED | OUTPATIENT
Start: 2024-10-25

## 2024-10-25 RX ORDER — COLCHICINE 0.6 MG/1
1.2 TABLET ORAL ONCE
Status: COMPLETED | OUTPATIENT
Start: 2024-10-25 | End: 2024-10-25

## 2024-10-25 RX ORDER — DOFETILIDE 0.12 MG/1
125 CAPSULE ORAL EVERY 12 HOURS SCHEDULED
Status: DISCONTINUED | OUTPATIENT
Start: 2024-10-25 | End: 2024-10-26 | Stop reason: HOSPADM

## 2024-10-25 RX ORDER — COLCHICINE 0.6 MG/1
0.6 TABLET ORAL ONCE
Status: COMPLETED | OUTPATIENT
Start: 2024-10-25 | End: 2024-10-25

## 2024-10-25 RX ORDER — LANOLIN ALCOHOL/MO/W.PET/CERES
400 CREAM (GRAM) TOPICAL 2 TIMES DAILY
Qty: 60 TABLET | Refills: 6 | Status: SHIPPED | OUTPATIENT
Start: 2024-10-25

## 2024-10-25 RX ADMIN — METOPROLOL SUCCINATE 25 MG: 25 TABLET, EXTENDED RELEASE ORAL at 08:48

## 2024-10-25 RX ADMIN — ACETAMINOPHEN 650 MG: 325 TABLET, FILM COATED ORAL at 21:47

## 2024-10-25 RX ADMIN — RIVAROXABAN 20 MG: 20 TABLET, FILM COATED ORAL at 08:56

## 2024-10-25 RX ADMIN — COLCHICINE 1.2 MG: 0.6 TABLET ORAL at 09:57

## 2024-10-25 RX ADMIN — DOFETILIDE 250 MCG: 0.25 CAPSULE ORAL at 08:47

## 2024-10-25 RX ADMIN — LOSARTAN POTASSIUM 50 MG: 50 TABLET, FILM COATED ORAL at 08:48

## 2024-10-25 RX ADMIN — ASPIRIN 81 MG: 81 TABLET, COATED ORAL at 08:48

## 2024-10-25 RX ADMIN — MAGNESIUM OXIDE TAB 400 MG (241.3 MG ELEMENTAL MG) 400 MG: 400 (241.3 MG) TAB at 08:48

## 2024-10-25 RX ADMIN — METOPROLOL SUCCINATE 25 MG: 25 TABLET, EXTENDED RELEASE ORAL at 21:48

## 2024-10-25 RX ADMIN — FUROSEMIDE 20 MG: 20 TABLET ORAL at 08:48

## 2024-10-25 RX ADMIN — COLCHICINE 0.6 MG: 0.6 TABLET ORAL at 16:39

## 2024-10-25 RX ADMIN — ALLOPURINOL 100 MG: 100 TABLET ORAL at 08:48

## 2024-10-25 RX ADMIN — DOFETILIDE 125 MCG: 0.12 CAPSULE ORAL at 21:47

## 2024-10-25 RX ADMIN — SPIRONOLACTONE 12.5 MG: 25 TABLET ORAL at 08:46

## 2024-10-25 RX ADMIN — MAGNESIUM OXIDE TAB 400 MG (241.3 MG ELEMENTAL MG) 400 MG: 400 (241.3 MG) TAB at 18:39

## 2024-10-25 RX ADMIN — ATORVASTATIN CALCIUM 40 MG: 40 TABLET, FILM COATED ORAL at 16:39

## 2024-10-25 NOTE — CASE MANAGEMENT
Case Management Discharge Planning Note    Patient name Bernardino Nunez  Location [facility] 426/[facility] 426-01 MRN [id number]  : 1960 Date 10/25/2024       Current Admission Date: 10/22/2024  Current Admission Diagnosis:Atrial fibrillation and flutter (HCC)   Patient Active Problem List    Diagnosis Date Noted Date Diagnosed    CAD (coronary artery disease) 10/22/2024     Class 1 obesity due to excess calories with serious comorbidity in adult 2022     Port-A-Cath in place 2021     S/P ablation of atrial fibrillation 2021     Cancer of ascending colon (HCC) 2020     S/P right hemicolectomy 09/10/2020     History of gout 2020     Microcytic anemia 2020     Gastrointestinal hemorrhage with melena 2020     Heart failure with mid-range ejection fraction (HCC) 2018     PFO (patent foramen ovale) 2017     Atrial fibrillation and flutter (HCC) 2017     Serum total bilirubin elevated 2017     Essential hypertension 2017       LOS (days): 3  Geometric Mean LOS (GMLOS) (days):   Days to GMLOS:     OBJECTIVE:  Risk of Unplanned Readmission Score: 15.16         Current admission status: Inpatient   Preferred Pharmacy:   [id number] - [place], [place] - [address]  [place] [address]  Phone: [phone] Fax: [phone]    [facility] [place] - [address]  [address]  [place] [address]  Phone: [phone] Fax: [phone]    Primary Care Provider: Pepe Chakraborty DO    Primary Insurance: MEDICARE  Secondary Insurance: [employer] [place]    DISCHARGE DETAILS:    Discharge planning discussed with:: Patient  Freedom of Choice: Yes  Comments - Freedom of Choice: CM provided patient with 30 day Xarelto card. Patient's Tikosyn discharge dose changed. CM called [facility] and was informed that cost if $98.66. Patient reports that he is unable to afford that. CM completed financial  assistance form for cost of Tikosyn and faxed to pharmacy. OP cardiology is aware of patient's new dose of Tikosyn and faxed Tikoysn financial assistance paperwork.  CM contacted family/caregiver?: No- see comments (Patient alert and oriented)  Were Treatment Team discharge recommendations reviewed with patient/caregiver?: Yes  Did patient/caregiver verbalize understanding of patient care needs?: Yes  Were patient/caregiver advised of the risks associated with not following Treatment Team discharge recommendations?: Yes            Other Referral/Resources/Interventions Provided:  Financial Resources Provided: Indigent Medication

## 2024-10-25 NOTE — ASSESSMENT & PLAN NOTE
Paroxysmal atrial fibrillation with rapid ventricular response, status post pulsed field ablation with pulmonary vein isolation, posterior wall isolation, and typical CTI flutter line 10/22/2024, also underwent dofetilide initiation  12/2017 - initially diagnosed, at that time found to have a tachycardia mediated cardiomyopathy with LVEF 20%  Cardiac cath at that time showed 80% proximal ramus, EF thought to be out of proportion to level of CAD  Status post successful JOSE LUIS/cardioversion  Discharged on rate controlling medications, Eliquis  Subsequent improvement in LVEF (50% per echo 10/2018)  7/2020 - worsening dyspnea, found to be in recurrent rapid atrial flutter at time of stress echo  Slight drop in EF again noted, sent to ED for evaluation  New onset anemia found, workup revealed colon cancer requiring hemicolectomy and chemotherapy --- A-fib management delayed  9/2020 - recurrent rapid atrial fibrillation, status post successful cardioversion  1/2021 - underwent RF ablation with pulmonary vein isolation and posterior wall isolation  7/2021 - underwent Medtronic loop recorder implantation for ongoing A-fib monitoring  9/2022 - echocardiogram showed LVEF 65%, improved with maintenance of normal sinus rhythm  2/2024 - noted to have increasing A-fib burden on loop recorder in the setting of medication noncompliance --- rate controlling medications restarted  At 1 point change to Coumadin anticoagulation due to cost of Eliquis ([id number] = 3)  7/2024 - readmitted with rapid atrial fibrillation, echo showed recurrent drop in LVEF to 45-50% --- rate control uptitrated  8/2024 - ongoing A-fib noted, Toprol-XL again increased --- ultimately A-fib ablation and antiarrhythmic initiation recommended  Dofetilide initiation in the post ablation setting  Started on 500 mcg dosing with subsequent QTc prolongation  Dose reduced to 250 mcg, electrolytes repleted  QTc still borderline long on 250 mcg dosing, thus will be  reduced further to 125 mcg

## 2024-10-25 NOTE — CASE MANAGEMENT
Case Management Discharge Planning Note    Patient name Bernardino Nunez  Location [facility] 426/[facility] 426-01 MRN [id number]  : 1960 Date 10/25/2024       Current Admission Date: 10/22/2024  Current Admission Diagnosis:Atrial fibrillation and flutter (HCC)   Patient Active Problem List    Diagnosis Date Noted Date Diagnosed    CAD (coronary artery disease) 10/22/2024     Class 1 obesity due to excess calories with serious comorbidity in adult 2022     Port-A-Cath in place 2021     S/P ablation of atrial fibrillation 2021     Cancer of ascending colon (HCC) 2020     S/P right hemicolectomy 09/10/2020     History of gout 2020     Microcytic anemia 2020     Gastrointestinal hemorrhage with melena 2020     Heart failure with mid-range ejection fraction (HCC) 2018     PFO (patent foramen ovale) 2017     Atrial fibrillation and flutter (HCC) 2017     Serum total bilirubin elevated 2017     Essential hypertension 2017       LOS (days): 3  Geometric Mean LOS (GMLOS) (days):   Days to GMLOS:     OBJECTIVE:  Risk of Unplanned Readmission Score: 14.65         Current admission status: Inpatient   Preferred Pharmacy:   [id number] - [place] - [address]  [place] [address]  Phone: [phone] Fax: [phone]    [facility] [place] - [place] - [address]  [address]  [place] [address]  Phone: [phone] Fax: [phone]    Primary Care Provider: Pepe Chakraborty DO    Primary Insurance: MEDICARE  Secondary Insurance: [facility]    DISCHARGE DETAILS:       IMM Given (Date):: 10/25/24  IMM Given to:: Patient

## 2024-10-25 NOTE — PROGRESS NOTES
Progress Note - Electrophysiology-Cardiology (EP)   Bernardino Nunez 64 y.o. male MRN: [id number]  Unit/Bed#: [facility] 426-01 Encounter: [id number]      Assessment & Plan  Atrial fibrillation and flutter (HCC)  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%  Cardiac cath at that time showed 80% proximal ramus, EF thought to be out of proportion to level of CAD  Status post successful JOSE LUIS/cardioversion  Discharged on rate controlling medications, Eliquis  Subsequent improvement in LVEF (50% per echo 10/2018)  7/2020 - worsening dyspnea, found to be in recurrent rapid atrial flutter at time of stress echo  Slight drop in EF again noted, sent to ED for evaluation  New onset anemia found, workup revealed colon cancer requiring hemicolectomy and chemotherapy --- A-fib management delayed  9/2020 - recurrent rapid atrial fibrillation, status post successful cardioversion  1/2021 - underwent RF ablation with pulmonary vein isolation and posterior wall isolation  7/2021 - underwent Medtronic loop recorder implantation for ongoing A-fib monitoring  9/2022 - echocardiogram showed LVEF 65%, improved with maintenance of normal sinus rhythm  2/2024 - noted to have increasing A-fib burden on loop recorder in the setting of medication noncompliance --- rate controlling medications restarted  At 1 point change to Coumadin anticoagulation due to cost of Eliquis ([id number] = 3)  7/2024 - readmitted with rapid atrial fibrillation, echo showed recurrent drop in LVEF to 45-50% --- rate control uptitrated  8/2024 - ongoing A-fib noted, Toprol-XL again increased --- ultimately A-fib ablation and antiarrhythmic initiation recommended  Dofetilide initiation in the post ablation  setting  Started on 500 mcg dosing with subsequent QTc prolongation  Dose reduced to 250 mcg, electrolytes repleted  QTc still borderline long on 250 mcg dosing, thus will be reduced further to 125 mcg   Essential hypertension  Well-controlled, maintained on Toprol-XL, losartan, and spironolactone  S/P ablation of atrial fibrillation  Prior RF ablation with pulmonary vein isolation and posterior wall isolation 1/2021 (see 'Atrial fibrillation and flutter' for further details)  Heart failure with mid-range ejection fraction (HCC)  Wt Readings from Last 3 Encounters:   10/22/24 98.9 kg (218 lb)   09/16/24 99.3 kg (218 lb 14.4 oz)   08/07/24 98.3 kg (216 lb 12.8 oz)   Chronic HFmEF, nonischemic cardiomyopathy with most recent LVEF 45-50% per echo 7/2024  Thought to be due to tachycardia +/- ETOH   Ejection fraction normalized in the past with maintenance of normal sinus rhythm  Cardiomyopathy felt to be out of proportion to level of CAD  Currently euvolemic  CAD (coronary artery disease)  CAD with 80% proximal ramus per cardiac catheterization 3/2018  Medical management pursued given lack of symptoms  Maintained on aspirin, statin, beta-blocker  Nuclear stress test 9/2024 showed evidence of scar with irene-infarct ischemia, no interventions recommended  History of gout  Reports acute issues with gout, will be given colchicine        Plan:  EKGs reviewed, QTc hovering around 500 ms, stable in some leads however slightly long and others.  After reviewing with Dr. Burrows, recommend reducing dofetilide further to 125 mcg every 12 hours.  He will remain in the hospital tonight so that this dosing can be monitored, with potential discharge tomorrow if no issues noted.    I have been in touch with our office as well as case management to start the dofetilide assistance program paperwork for the 125 mcg dose.  Case management will weigh in on whether he can be provided with a small supply of dofetilide upon discharge so that  "he is covered until this assistance is approved.    He will also be discharged home on Xarelto 20 mg daily.  A prescription has been sent to [facility], and he should be able to receive a 1 month free supply upon discharge.  We have already submitted paperwork for the Xarelto assistance program.    He reports pain associated with acute gout, he has been given colchicine 1.2 mg x 1 dose to be followed by 0.6 mg x 1 dose today for acute treatment.  He can then continue colchicine moving forward if needed, will reevaluate tomorrow.    His potassium is stable today, however magnesium remains low.  Per Dr. Burrows will continue oral magnesium now and upon discharge, repeat labs tomorrow.    Continue to monitor telemetry and vital signs throughout his stay.      Subjective/Objective   Chief Complaint: Pain associated with gout    Subjective: He is doing well from a cardiac standpoint and denies cardiac symptoms.  His only complaint today is pain associated with acute gout for which he is asking for treatment.      Objective:     Vitals: BP (!) 130/104   Pulse 76   Temp 98.2 °F (36.8 °C)   Resp 16   Ht 5' 9\" (1.753 m)   Wt 98.9 kg (218 lb)   SpO2 97%   BMI 32.19 kg/m²   Vitals:    10/22/24 1613   Weight: 98.9 kg (218 lb)     Orthostatic Blood Pressures      Flowsheet Row Most Recent Value   Blood Pressure 130/104 filed at 10/25/2024 0825   Patient Position - Orthostatic VS Lying filed at 10/24/2024 2217              Intake/Output Summary (Last 24 hours) at 10/25/2024 1116  Last data filed at 10/25/2024 0825  Gross per 24 hour   Intake 939 ml   Output 0 ml   Net 939 ml       Invasive Devices       Peripheral Intravenous Line  Duration             Peripheral IV 10/22/24 Proximal;Right;Ventral (anterior) Forearm 3 days                                Scheduled Meds:  Current Facility-Administered Medications   Medication Dose Route Frequency Provider Last Rate    acetaminophen  650 mg Oral Q4H PRN Madyson Franco PA-C "      allopurinol  100 mg Oral Daily Madyson Franco PA-C      aspirin  81 mg Oral Daily Madyson Franco PA-C      atorvastatin  40 mg Oral Daily With Dinner Madyson Franco PA-C      colchicine  0.6 mg Oral Once Madyson Franco PA-C      dofetilide  125 mcg Oral Q12H [facility] Madyson Franco PA-C      furosemide  20 mg Oral Daily Madyson Franco PA-C      losartan  50 mg Oral Daily Madyson Franco PA-C      magnesium Oxide  400 mg Oral BID Madyson Franco PA-C      metoprolol succinate  25 mg Oral BID Madyson Franco PA-C      rivaroxaban  20 mg Oral Daily With Breakfast Madyson Franco PA-C      spironolactone  12.5 mg Oral Daily Madyson Franco PA-C       Continuous Infusions:   PRN Meds:.  acetaminophen    Review of Systems   Constitutional: Negative for fever and malaise/fatigue.   Cardiovascular:  Negative for chest pain, dyspnea on exertion, irregular heartbeat, leg swelling, near-syncope, orthopnea, palpitations, paroxysmal nocturnal dyspnea and syncope.   All other systems reviewed and are negative.        Physical Exam:   GEN: NAD, alert and oriented x 3, well appearing  SKIN: dry without significant lesions or rashes  HEENT: NCAT, PERRL, EOMs intact  NECK: No JVD appreciated  CARDIOVASCULAR: RRR  LUNGS: No respiratory distress, good overall effort, no audible wheezing or rhonchi noted  ABDOMEN: Soft, nontender, nondistended  EXTREMITIES/VASCULAR: perfused without clubbing, cyanosis, or edema b/l  PSYCH: Normal mood and affect  NEURO: CN ll-Xll grossly intact                Lab Results: I have personally reviewed pertinent lab results.    Results from last 7 days   Lab Units 10/23/24  0512 10/22/24  0958   WBC Thousand/uL 6.56 7.16   HEMOGLOBIN g/dL 12.5 14.3   HEMATOCRIT % 38.6 43.6   PLATELETS Thousands/uL  --  210     Results from last 7 days   Lab Units 10/25/24  0523 10/24/24  0531 10/23/24  0512   POTASSIUM mmol/L 4.0 3.4* 3.5   CHLORIDE mmol/L 100 105 106   CO2 mmol/L 30 26 23   BUN  mg/dL 10 11 11   CREATININE mg/dL 0.88 0.86 0.80   CALCIUM mg/dL 8.6 8.2* 7.8*     Results from last 7 days   Lab Units 10/23/24  0512 10/23/24  0000 10/22/24  1223   INR  2.37* 1.99* 6.78*     Results from last 7 days   Lab Units 10/25/24  0523 10/24/24  0531 10/23/24  0512   MAGNESIUM mg/dL 1.7* 1.7* 1.7*         Imaging: Results Review Statement: No pertinent imaging studies reviewed.    ECHO: Results for orders placed during the hospital encounter of 10/17/18    Echo complete with contrast if indicated    [place] [address]  [phone]    Transthoracic Echocardiogram  2D, M-mode, Doppler, and Color Doppler    Study date:  17-Oct-2018    Patient: HERMINIA LOTT  MR number: [id number]  Account number: [id number]  : 1960  Age: 58 years  Gender: Male  Status: Outpatient  Location: [address] [place] [address] [facility]  Height: 69 in  Weight: 216 lb  BP: 140/ 82 mmHg    Indications: Heart Failure    Diagnoses: I50.9 - Heart failure, unspecified    Sonographer:  SANKET Samayoa  Primary Physician:  Anaya Gilbert DO  Referring Physician:  Solitario Murray MD  Group:  [facility]  Interpreting Physician:  Emigdio Doss MD    SUMMARY    LEFT VENTRICLE:  Systolic function was at the lower limits of normal. Ejection fraction was estimated to be 50 %.  There were no regional wall motion abnormalities.  Wall thickness was mildly to moderately increased.  Doppler parameters were consistent with abnormal left ventricular relaxation (grade 1 diastolic dysfunction).    RIGHT VENTRICLE:  The size was normal.  Systolic function was normal.    PERICARDIUM:  A trace pericardial effusion was identified. There was no evidence of hemodynamic compromise.    COMPARISONS:  There has been no significant interval change. Comparison was made with the previous study of 29-Mar-2018.    HISTORY: PRIOR HISTORY: MI, CAD, HTN, AFIB,  SVT    PROCEDURE: The study was performed in the [address] [place] [address] [facility]. This was a routine study. The transthoracic approach was used. The study included complete 2D imaging, M-mode, complete spectral Doppler, and color Doppler. The  heart rate was 61 bpm, at the start of the study. Images were obtained from the parasternal, apical, subcostal, and suprasternal notch acoustic windows. Echocardiographic views were limited due to lung interference. Image quality was  adequate.    LEFT VENTRICLE: Size was normal. Systolic function was at the lower limits of normal. Ejection fraction was estimated to be 50 %. There were no regional wall motion abnormalities. Wall thickness was mildly to moderately increased. DOPPLER:  Doppler parameters were consistent with abnormal left ventricular relaxation (grade 1 diastolic dysfunction).    RIGHT VENTRICLE: The size was normal. Systolic function was normal. Wall thickness was normal.    LEFT ATRIUM: Size was at the upper limits of normal.    RIGHT ATRIUM: Size was normal.    MITRAL VALVE: Valve structure was normal. There was normal leaflet separation. DOPPLER: The transmitral velocity was within the normal range. There was no evidence for stenosis. There was no significant regurgitation.    AORTIC VALVE: The valve was trileaflet. Leaflets exhibited normal thickness and normal cuspal separation. DOPPLER: Transaortic velocity was within the normal range. There was no evidence for stenosis. There was no significant  regurgitation.    TRICUSPID VALVE: The valve structure was normal. There was normal leaflet separation. DOPPLER: The transtricuspid velocity was within the normal range. There was no evidence for stenosis. There was no significant regurgitation.    PULMONIC VALVE: Leaflets exhibited normal thickness, no calcification, and normal cuspal separation. DOPPLER: The transpulmonic velocity was within the normal range. There was no significant  regurgitation.    PERICARDIUM: A trace pericardial effusion was identified. There was no evidence of hemodynamic compromise.    AORTA: The root exhibited normal size.    SYSTEMIC VEINS: IVC: The inferior vena cava was normal in size. Respirophasic changes were normal.    SYSTEM MEASUREMENT TABLES    2D  %FS: 26.08 %  Ao Diam: 4.01 cm  EDV(Teich): 105.38 ml  EF Biplane: 52.16 %  EF(Cube): 59.62 %  EF(Teich): 51.15 %  ESV(Cube): 43.52 ml  ESV(Teich): 51.48 ml  IVSd: 1.45 cm  LA Area: 20.83 cm2  LA Diam: 3.55 cm  LVEDV MOD A2C: 129.95 ml  LVEDV MOD A4C: 153.5 ml  LVEDV MOD BP: 146.59 ml  LVEF MOD A2C: 47.85 %  LVEF MOD A4C: 59.43 %  LVESV MOD A2C: 67.77 ml  LVESV MOD A4C: 62.27 ml  LVESV MOD BP: 70.12 ml  LVIDd: 4.76 cm  LVIDs: 3.52 cm  LVLd A2C: 8.67 cm  LVLd A4C: 8.72 cm  LVLs A2C: 7.68 cm  LVLs A4C: 7.6 cm  LVPWd: 1.36 cm  RA Area: 17.39 cm2  RV Diam.: 3.55 cm  SV MOD A2C: 62.18 ml  SV MOD A4C: 91.23 ml  SV(Cube): 64.25 ml  SV(Teich): 53.9 ml    MM  TAPSE: 1.49 cm    PW  E': 0.04 m/s  E/E': 23.04  MV A Brennan: 0.66 m/s  MV Dec Norton: 3.43 m/s2  MV DecT: 248.53 ms  MV E Brennan: 0.85 m/s  MV E/A Ratio: 1.3    [employer] Accredited Echocardiography Laboratory    Prepared and electronically signed by    Emigdio Doss MD  Signed 18-Oct-2018 11:44:59      Results for orders placed during the hospital encounter of 07/23/24    Echo complete w/ contrast if indicated    Interpretation Summary    Left Ventricle: Left ventricular cavity size is mildly dilated. Wall thickness is moderately increased. There is eccentric hypertrophy. The left ventricular ejection fraction is 45-50% by biplane measurement. Systolic function is mildly reduced. There is mild global hypokinesis. Diastolic function is mildly abnormal, consistent with grade I (abnormal) relaxation.    Right Ventricle: Right ventricular cavity size is mildly dilated. Systolic function is normal.    Mitral Valve: There is mild regurgitation.    Tricuspid Valve:  There is mild regurgitation.    Pericardium: There is a small pericardial effusion posterior to the heart. There is no echocardiographic evidence of tamponade.        EKG:        VTE Pharmacologic Prophylaxis: VTE covered by:  rivaroxaban, Oral, 20 mg at 10/25/24 0848

## 2024-10-25 NOTE — PLAN OF CARE
Problem: PAIN - ADULT  Goal: Verbalizes/displays adequate comfort level or baseline comfort level  Description: Interventions:  - Encourage patient to monitor pain and request assistance  - Assess pain using appropriate pain scale  - Administer analgesics based on type and severity of pain and evaluate response  - Implement non-pharmacological measures as appropriate and evaluate response  - Consider cultural and social influences on pain and pain management  - Notify physician/advanced practitioner if interventions unsuccessful or patient reports new pain  Outcome: Progressing     Problem: INFECTION - ADULT  Goal: Absence or prevention of progression during hospitalization  Description: INTERVENTIONS:  - Assess and monitor for signs and symptoms of infection  - Monitor lab/diagnostic results  - Monitor all insertion sites, i.e. indwelling lines, tubes, and drains  - Monitor endotracheal if appropriate and nasal secretions for changes in amount and color  - [place] appropriate cooling/warming therapies per order  - Administer medications as ordered  - Instruct and encourage patient and family to use good hand hygiene technique  - Identify and instruct in appropriate isolation precautions for identified infection/condition  Outcome: Progressing  Goal: Absence of fever/infection during neutropenic period  Description: INTERVENTIONS:  - Monitor WBC    Outcome: Progressing     Problem: SAFETY ADULT  Goal: Patient will remain free of falls  Description: INTERVENTIONS:  - Educate patient/family on patient safety including physical limitations  - Instruct patient to call for assistance with activity   - Consult OT/PT to assist with strengthening/mobility   - Keep Call bell within reach  - Keep bed low and locked with side rails adjusted as appropriate  - Keep care items and personal belongings within reach  - Initiate and maintain comfort rounds  - Make Fall Risk Sign visible to staff  - Offer Toileting every  Hours,  in advance of need  - Initiate/Maintain alarm  - Obtain necessary fall risk management equipment:   - Apply yellow socks and bracelet for high fall risk patients  - Consider moving patient to room near nurses station  Outcome: Progressing  Goal: Maintain or return to baseline ADL function  Description: INTERVENTIONS:  -  Assess patient's ability to carry out ADLs; assess patient's baseline for ADL function and identify physical deficits which impact ability to perform ADLs (bathing, care of mouth/teeth, toileting, grooming, dressing, etc.)  - Assess/evaluate cause of self-care deficits   - Assess range of motion  - Assess patient's mobility; develop plan if impaired  - Assess patient's need for assistive devices and provide as appropriate  - Encourage maximum independence but intervene and supervise when necessary  - Involve family in performance of ADLs  - Assess for home care needs following discharge   - Consider OT consult to assist with ADL evaluation and planning for discharge  - Provide patient education as appropriate  Outcome: Progressing  Goal: Maintains/Returns to pre admission functional level  Description: INTERVENTIONS:  - Perform AM-PAC 6 Click Basic Mobility/ Daily Activity assessment daily.  - Set and communicate daily mobility goal to care team and patient/family/caregiver.   - Collaborate with rehabilitation services on mobility goals if consulted  - Perform Range of Motion  times a day.  - Reposition patient every  hours.  - Dangle patient  times a day  - Stand patient  times a day  - Ambulate patient  times a day  - Out of bed to chair  times a day   - Out of bed for meals  times a day  - Out of bed for toileting  - Record patient progress and toleration of activity level   Outcome: Progressing     Problem: DISCHARGE PLANNING  Goal: Discharge to home or other facility with appropriate resources  Description: INTERVENTIONS:  - Identify barriers to discharge w/patient and caregiver  - Arrange for  needed discharge resources and transportation as appropriate  - Identify discharge learning needs (meds, wound care, etc.)  - Arrange for interpretive services to assist at discharge as needed  - Refer to Case Management Department for coordinating discharge planning if the patient needs post-hospital services based on physician/advanced practitioner order or complex needs related to functional status, cognitive ability, or social support system  Outcome: Progressing     Problem: Knowledge Deficit  Goal: Patient/family/caregiver demonstrates understanding of disease process, treatment plan, medications, and discharge instructions  Description: Complete learning assessment and assess knowledge base.  Interventions:  - Provide teaching at level of understanding  - Provide teaching via preferred learning methods  Outcome: Progressing

## 2024-10-25 NOTE — ASSESSMENT & PLAN NOTE
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%  Cardiac cath at that time showed 80% proximal ramus, EF thought to be out of proportion to level of CAD  Status post successful JOSE LUIS/cardioversion  Discharged on rate controlling medications, Eliquis  Subsequent improvement in LVEF (50% per echo 10/2018)  7/2020 - worsening dyspnea, found to be in recurrent rapid atrial flutter at time of stress echo  Slight drop in EF again noted, sent to ED for evaluation  New onset anemia found, workup revealed colon cancer requiring hemicolectomy and chemotherapy --- A-fib management delayed  9/2020 - recurrent rapid atrial fibrillation, status post successful cardioversion  1/2021 - underwent RF ablation with pulmonary vein isolation and posterior wall isolation  7/2021 - underwent Medtronic loop recorder implantation for ongoing A-fib monitoring  9/2022 - echocardiogram showed LVEF 65%, improved with maintenance of normal sinus rhythm  2/2024 - noted to have increasing A-fib burden on loop recorder in the setting of medication noncompliance --- rate controlling medications restarted  At 1 point change to Coumadin anticoagulation due to cost of Eliquis ([id number] = 3)  7/2024 - readmitted with rapid atrial fibrillation, echo showed recurrent drop in LVEF to 45-50% --- rate control uptitrated  8/2024 - ongoing A-fib noted, Toprol-XL again increased --- ultimately A-fib ablation and antiarrhythmic initiation recommended  Dofetilide initiation in the post ablation setting  Started on 500 mcg dosing with subsequent QTc prolongation  Dose reduced to 250 mcg, electrolytes repleted  QTc still borderline long on 250 mcg dosing, thus will be reduced further to 125 mcg

## 2024-10-25 NOTE — DISCHARGE INSTR - AVS FIRST PAGE
PLEASE NOTE THE FOLLOWING MEDICATION RECOMMENDATIONS:  - continue dofetilide 125 mcg every 12 hours  - discontinue coumadin  - take Xarelto 20 mg daily with a meal  - take magnesium 400 mg twice daily  ** all scripts at [facility] pharmacy on ground floor prior to discharge**      Please check labs in 2 weeks.        FOLLOW UP:  After starting this new medication, you will need to come to one of our cardiology offices for a 1 week EKG appointment. Please see further discharge instructions for this appointment time and location. If you have any issues with the timing, please call our schedulers at [phone] if appointment was made at [facility] - otherwise the number listed above the appointment time at the [facility].    At this one week EKG appointment, please ask the medical assistant or nurse taking care of you to transfer your prescription over from the hospital pharmacy ([facility]) to your home pharmacy that you normally use if this has not already been done already.     You will have a follow up in the EP office in about 4-6 weeks. If you have any questions or concerns prior to this visit, please contact Dr. Burrows's office at [phone]. If you have any significant issues after hours or on the weekends, please call the on call cardiology number at [phone].    COMMON MEDICATION INTERACTION SHEET:  Please refer to this medication list prior to beginning any new medications while on Tikosyn:

## 2024-10-25 NOTE — TELEPHONE ENCOUNTER
Med change request form faxed over to [facility] for medication Dofetilide pt will be taking 125mcg dose post hospital discharge.     Form attached to this encounter.

## 2024-10-25 NOTE — DISCHARGE SUMMARY
Discharge Summary - Bernardino Nunez 64 y.o. 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%  Cardiac cath at that time showed 80% proximal ramus, EF thought to be out of proportion to level of CAD  Status post successful JOSE LUIS/cardioversion  Discharged on rate controlling medications, Eliquis  Subsequent improvement in LVEF (50% per echo 10/2018)  7/2020 - worsening dyspnea, found to be in recurrent rapid atrial flutter at time of stress echo  Slight drop in EF again noted, sent to ED for evaluation  New onset anemia found, workup revealed colon cancer requiring hemicolectomy and chemotherapy --- A-fib management delayed  9/2020 - recurrent rapid atrial fibrillation, status post successful cardioversion  1/2021 - underwent RF ablation with pulmonary vein isolation and posterior wall isolation  7/2021 - underwent Medtronic loop recorder implantation for ongoing A-fib monitoring  9/2022 - echocardiogram showed LVEF 65%, improved with maintenance of normal sinus rhythm  2/2024 - noted to have increasing A-fib burden on loop recorder in the setting of medication noncompliance --- rate controlling medications restarted  At 1 point change to Coumadin anticoagulation due to cost of Eliquis ([id number] = 3)  7/2024 - readmitted with rapid atrial fibrillation, echo showed recurrent drop in LVEF to 45-50% --- rate control uptitrated  8/2024 - ongoing A-fib noted, Toprol-XL again increased --- ultimately A-fib ablation and  antiarrhythmic initiation recommended  Dofetilide initiation in the post ablation setting  Started on 500 mcg dosing with subsequent QTc prolongation  Dose reduced to 250 mcg, electrolytes repleted  QTc still borderline long on 250 mcg dosing, thus will be reduced further to 125 mcg   Essential hypertension  Well-controlled, maintained on Toprol-XL, losartan, and spironolactone  S/P ablation of atrial fibrillation  Prior RF ablation with pulmonary vein isolation and posterior wall isolation 1/2021 (see 'Atrial fibrillation and flutter' for further details)  Heart failure with mid-range ejection fraction (HCC)  Wt Readings from Last 3 Encounters:   10/22/24 98.9 kg (218 lb)   09/16/24 99.3 kg (218 lb 14.4 oz)   08/07/24 98.3 kg (216 lb 12.8 oz)   Chronic HFmEF, nonischemic cardiomyopathy with most recent LVEF 45-50% per echo 7/2024  Thought to be due to tachycardia +/- ETOH   Ejection fraction normalized in the past with maintenance of normal sinus rhythm  Cardiomyopathy felt to be out of proportion to level of CAD  Currently euvolemic  CAD (coronary artery disease)  CAD with 80% proximal ramus per cardiac catheterization 3/2018  Medical management pursued given lack of symptoms  Maintained on aspirin, statin, beta-blocker  Nuclear stress test 9/2024 showed evidence of scar with irene-infarct ischemia, no interventions recommended  History of gout  Reports acute issues with gout, will be given colchicine        Procedures Performed:   Orders Placed This Encounter   Procedures    Cardiac ep lab eps/ablations   -Atrial fibrillation ablation  -Intracardiac echo  -Transseptal puncture  -3D mapping (Carto)  -Posterior wall isolation  -CTI line placement      Consultants: Case management      HPI: Please refer to the initial history and physical as well as procedure notes for full details. Briefly, Bernardino Nunez is a 64 y.o. year old male with rapid ventricular response, suspected tachycardia mediated  cardiomyopathy/nonischemic cardiomyopathy with LVEF 45-50% per echo 7/2024, chronic HFmEF, nonobstructive CAD with known 80% proximal ramus lesion being medically managed, hypertension, dyslipidemia, and obesity with BMI 32.  He typically follows with Dr. Alcaraz as an outpatient. He was 1st diagnosed with atrial fibrillation 12/2017, at which time he was found to have a tachy mediated cardiomyopathy with LVEF 20%.  Cardiac catheterization showed 80% proximal ramus stenosis, but his drop in EF was felt to be out of proportion to this degree of CAD.  He underwent JOSE LUIS/cardioversion, and was discharged on rate-controlling medications. Fortunately, his EF improved after maintaining sinus rhythm. In 07/2020, he reported worsening dyspnea with exertion.  A stress echo was recommended however when he presented for this study he was found to again be in atrial fibrillation with rapid ventricular response.  Limited echo showed slight drop in ejection fraction with a low blood pressure, thus he was sent to the emergency room for further evaluation.  Initial workup showed new onset anemia, ultimately he was found to have colon cancer and underwent right hemicolectomy followed by chemotherapy.  After he recovered and was seen in outpatient follow-up, he was later referred to EP for more aggressive management of his atrial fibrillation given his prior tachy mediated cardiomyopathy.  He was seen in consultation by Dr. Burrows, and ultimately underwent an RF ablation with pulmonary vein isolation and posterior wall isolation 1/2021.  To help guide atrial fibrillation management moving forward given that he is largely asymptomatic with his A-fib, he underwent loop recorder implantation 7/2021.  Echocardiogram 9/2022 showed improved LVEF of 65% with maintenance of normal sinus rhythm.  More recently in 2/2024 he was noted to have increasing atrial fibrillation burden on his loop recorder.  He was seen again by EP, at which time he  admitted to medical noncompliance due to cost of the medications.  He was eventually restarted on rate controlling agents and further monitored.  He was then admitted to the hospital 7/2024 with rapid atrial fibrillation, at which time repeat echo showed reduced LVEF 45-50%.  His rate controlling medications were uptitrated, and ongoing loop recorder monitoring was recommended.  Subsequent interrogation showed ongoing episodes of atrial fibrillation with biventricular response, for which his metoprolol was further increased.  After being reviewed by Dr. Burrows, repeat ablation along with dofetilide initiation was recommended and he presented this hospital admission to undergo this procedure. Of note, he was approved for the dofetilide assistance program prior to admission.       Hospital Course: Bernardino Nunez presented at his [facility]. After the procedure was explained in detail and consent was obtained, he underwent the above procedures without complications. Please see operative notes by Dr. Burrows for full details.  His INR was found to be elevated at the time of the procedure, and he was given 2.5 mg vitamin K IV for reversal.  In the immediate post ablation setting his INR was further elevated, thus he was given FFP.  He otherwise tolerated the procedure well. He was then monitored overnight for further observation.    A repeat INR was checked at midnight following the procedure, and it was just under 2 at 1.99.  He was a started on Eliquis 5 mg twice daily, and Coumadin was discontinued.  He then remained in the hospital to undergo dofetilide initiation    There were no acute issues or events overnight.  On  POD #1 he denied all cardiac complaints, including chest pain/pressure, dyspnea, palpitations, dizziness, lightheadedness, or syncope. His vital signs and labs were reviewed - his potassium and magnesium were low and were repleted. Telemetry showed normal rhythm without arrhythmias.  His groins were soft without significant hematoma or recurrent bleeding, and groin sutures were removed without incident.    His QTc was long after receiving 1 dose of dofetilide 500 mcg, thus it was reduced to 250 mcg.  He remained in the hospital for ongoing monitoring.  After his fifth dose of 250 mcg, his QTc was still borderline long and waiting right around 500 ms.  He was thus reduced further 225 mcg and kept in the hospital an additional day for ongoing QTc and telemetry monitoring.    His potassium was eventually repleted, however his magnesium remained low.  He has been continued on oral magnesium supplementation, which will be continued upon discharge.  His stay was otherwise uneventful, he did report pain associated with acute gout on POD #3, which was treated with colchicine.  Otherwise, no cardiac complaints.    Case management help to work with our EP team in regards to medications upon discharge.  While he did qualify for the dofetilide assistance program, this paperwork had already been filled out for the 500 mcg dose.  He will need to be resubmitted at his final dose of 125 mcg, and case management has helped arrange a small supply to be provided to the patient while we await this turnaround.    Given his issues with INR on Coumadin, it has been discontinued.  He will instead be discharged on Xarelto 20 mg once daily with a meal.  We already filled out and submitted Xarelto assistance paperwork.  He will be discharged with a 1 month free supply, and he knows to obtain samples if he does not hear back from Xarelto assistance program.    Review of Systems   Constitutional: Negative for fever and malaise/fatigue.   Cardiovascular:  Negative for chest pain, dyspnea on exertion, irregular heartbeat, palpitations and syncope.   Respiratory:  Negative for shortness of breath.    Neurological:  Negative for dizziness and light-headedness.         Physical exam:    GEN: NAD, alert and oriented x 3, well  appearing  SKIN: dry without significant lesions or rashes  HEENT: NCAT, PERRL, EOMs intact  NECK: No JVD appreciated  CARDIOVASCULAR: RRR, normal S1, S2 without murmurs, rubs, or gallops appreciated  LUNGS: Clear to auscultation bilaterally without wheezes, rhonchi, or rales  ABDOMEN: Soft, nontender, nondistended  EXTREMITIES/VASCULAR: perfused without clubbing, cyanosis, or LE edema b/l  PSYCH: Normal mood and affect  NEURO: CN ll-Xll grossly intact    He was given routine post ablation discharge instructions and restrictions, and these were explained in detail. He was given a one week ECG follow up as well as a one month follow up appointment with Emile Porras PA-C, and he was instructed to follow up with his primary cardiologist as previously instructed.    In terms of his medications:  - continue dofetilide 125 mcg every 12 hours  - discontinue coumadin  - take Xarelto 20 mg daily with a meal  - take magnesium 400 mg twice daily    He will check labs in 2 weeks for ongoing monitoring of his potassium and magnesium.    He is stable for discharge at this time with all questions answered. He was discussed in detail with Dr. Burrows who is in agreement with this discharge summary.       Discharge Medications:  See after visit summary for reconciled discharge medications provided to patient and family.      Medications Prior to Admission:     atorvastatin (LIPITOR) 40 mg tablet    metoprolol succinate (TOPROL-XL) 50 mg 24 hr tablet    spironolactone (ALDACTONE) 25 mg tablet    warfarin (Coumadin) 5 mg tablet    allopurinol (ZYLOPRIM) 100 mg tablet    aspirin (ECOTRIN LOW STRENGTH) 81 mg EC tablet    furosemide (LASIX) 20 mg tablet    losartan (COZAAR) 50 mg tablet      [facility] Labs/diagnostics:  CBC with diff:   Results from last 7 days   Lab Units 10/23/24  0512 10/22/24  0958   WBC Thousand/uL 6.56 7.16   HEMOGLOBIN g/dL 12.5 14.3   HEMATOCRIT % 38.6 43.6   MCV fL 97 95   PLATELETS Thousands/uL  --  210   RBC  Million/uL 3.99 4.60   MCH pg 31.3 31.1   MCHC g/dL 32.4 32.8   RDW % 14.6 14.1   MPV fL  --  10.5       BMP:  Results from last 7 days   Lab Units 10/25/24  0523 10/24/24  0531 10/23/24  0512 10/22/24  0958   POTASSIUM mmol/L 4.0 3.4* 3.5 3.8   CHLORIDE mmol/L 100 105 106 103   CO2 mmol/L 30 26 23 23   BUN mg/dL 10 11 11 16   CREATININE mg/dL 0.88 0.86 0.80 0.89   CALCIUM mg/dL 8.6 8.2* 7.8* 8.9       Magnesium:   Results from last 7 days   Lab Units 10/25/24  0523 10/24/24  0531 10/23/24  0512   MAGNESIUM mg/dL 1.7* 1.7* 1.7*       Coags:   Results from last 7 days   Lab Units 10/23/24  0512 10/23/24  0000 10/22/24  1223 10/22/24  0958   INR  2.37* 1.99* 6.78* 4.85*         Complications: none    Condition at Discharge: good     Discharge instructions/Information to patient and family:   See after visit summary for information provided to patient and family.      Provisions for Follow-Up Care:  See after visit summary for information related to follow-up care and any pertinent home health orders.      Disposition: Home    Planned Readmission: No    Discharge Statement   I spent 45 minutes minutes discharging the patient. This time was spent on the day of discharge. I had direct contact with the patient on the day of discharge. Additional documentation is required if more than 30 minutes were spent on discharge. Evaluating the incision, discussing discharge instructions and restrictions, arranging follow up appointments, discussing medications

## 2024-10-26 VITALS
RESPIRATION RATE: 18 BRPM | OXYGEN SATURATION: 96 % | DIASTOLIC BLOOD PRESSURE: 93 MMHG | HEART RATE: 68 BPM | BODY MASS INDEX: 32.29 KG/M2 | TEMPERATURE: 98 F | WEIGHT: 218 LBS | SYSTOLIC BLOOD PRESSURE: 143 MMHG | HEIGHT: 69 IN

## 2024-10-26 LAB
ANION GAP SERPL CALCULATED.3IONS-SCNC: 6 MMOL/L (ref 4–13)
ATRIAL RATE: 66 BPM
BUN SERPL-MCNC: 13 MG/DL (ref 5–25)
CALCIUM SERPL-MCNC: 8.9 MG/DL (ref 8.4–10.2)
CHLORIDE SERPL-SCNC: 99 MMOL/L (ref 96–108)
CO2 SERPL-SCNC: 31 MMOL/L (ref 21–32)
CREAT SERPL-MCNC: 0.92 MG/DL (ref 0.6–1.3)
GFR SERPL CREATININE-BSD FRML MDRD: 87 ML/MIN/1.73SQ M
GLUCOSE SERPL-MCNC: 100 MG/DL (ref 65–140)
MAGNESIUM SERPL-MCNC: 1.9 MG/DL (ref 1.9–2.7)
P AXIS: 56 DEGREES
POTASSIUM SERPL-SCNC: 4 MMOL/L (ref 3.5–5.3)
PR INTERVAL: 154 MS
QRS AXIS: 13 DEGREES
QRSD INTERVAL: 104 MS
QT INTERVAL: 484 MS
QTC INTERVAL: 507 MS
SODIUM SERPL-SCNC: 136 MMOL/L (ref 135–147)
T WAVE AXIS: 69 DEGREES
VENTRICULAR RATE: 66 BPM

## 2024-10-26 PROCEDURE — 93010 ELECTROCARDIOGRAM REPORT: CPT | Performed by: INTERNAL MEDICINE

## 2024-10-26 PROCEDURE — 83735 ASSAY OF MAGNESIUM: CPT | Performed by: PHYSICIAN ASSISTANT

## 2024-10-26 PROCEDURE — 99239 HOSP IP/OBS DSCHRG MGMT >30: CPT | Performed by: PHYSICIAN ASSISTANT

## 2024-10-26 PROCEDURE — 80048 BASIC METABOLIC PNL TOTAL CA: CPT | Performed by: PHYSICIAN ASSISTANT

## 2024-10-26 RX ADMIN — LOSARTAN POTASSIUM 50 MG: 50 TABLET, FILM COATED ORAL at 09:54

## 2024-10-26 RX ADMIN — RIVAROXABAN 20 MG: 20 TABLET, FILM COATED ORAL at 09:56

## 2024-10-26 RX ADMIN — METOPROLOL SUCCINATE 25 MG: 25 TABLET, EXTENDED RELEASE ORAL at 09:54

## 2024-10-26 RX ADMIN — ASPIRIN 81 MG: 81 TABLET, COATED ORAL at 09:53

## 2024-10-26 RX ADMIN — FUROSEMIDE 20 MG: 20 TABLET ORAL at 09:53

## 2024-10-26 RX ADMIN — DOFETILIDE 125 MCG: 0.12 CAPSULE ORAL at 09:54

## 2024-10-26 RX ADMIN — MAGNESIUM OXIDE TAB 400 MG (241.3 MG ELEMENTAL MG) 400 MG: 400 (241.3 MG) TAB at 09:53

## 2024-10-26 RX ADMIN — SPIRONOLACTONE 12.5 MG: 25 TABLET ORAL at 09:53

## 2024-10-26 RX ADMIN — ALLOPURINOL 100 MG: 100 TABLET ORAL at 09:53

## 2024-10-26 NOTE — PLAN OF CARE
Problem: PAIN - ADULT  Goal: Verbalizes/displays adequate comfort level or baseline comfort level  Description: Interventions:  - Encourage patient to monitor pain and request assistance  - Assess pain using appropriate pain scale  - Administer analgesics based on type and severity of pain and evaluate response  - Implement non-pharmacological measures as appropriate and evaluate response  - Consider cultural and social influences on pain and pain management  - Notify physician/advanced practitioner if interventions unsuccessful or patient reports new pain  Outcome: Adequate for Discharge     Problem: INFECTION - ADULT  Goal: Absence or prevention of progression during hospitalization  Description: INTERVENTIONS:  - Assess and monitor for signs and symptoms of infection  - Monitor lab/diagnostic results  - Monitor all insertion sites, i.e. indwelling lines, tubes, and drains  - Monitor endotracheal if appropriate and nasal secretions for changes in amount and color  - Ermine appropriate cooling/warming therapies per order  - Administer medications as ordered  - Instruct and encourage patient and family to use good hand hygiene technique  - Identify and instruct in appropriate isolation precautions for identified infection/condition  Outcome: Adequate for Discharge  Goal: Absence of fever/infection during neutropenic period  Description: INTERVENTIONS:  - Monitor WBC    Outcome: Adequate for Discharge     Problem: SAFETY ADULT  Goal: Patient will remain free of falls  Description: INTERVENTIONS:  - Educate patient/family on patient safety including physical limitations  - Instruct patient to call for assistance with activity   - Consult OT/PT to assist with strengthening/mobility   - Keep Call bell within reach  - Keep bed low and locked with side rails adjusted as appropriate  - Keep care items and personal belongings within reach  - Initiate and maintain comfort rounds  - Make Fall Risk Sign visible to  staff  - Offer Toileting every  Hours, in advance of need  - Initiate/Maintain alarm  - Obtain necessary fall risk management equipment:   - Apply yellow socks and bracelet for high fall risk patients  - Consider moving patient to room near nurses station  Outcome: Adequate for Discharge  Goal: Maintain or return to baseline ADL function  Description: INTERVENTIONS:  -  Assess patient's ability to carry out ADLs; assess patient's baseline for ADL function and identify physical deficits which impact ability to perform ADLs (bathing, care of mouth/teeth, toileting, grooming, dressing, etc.)  - Assess/evaluate cause of self-care deficits   - Assess range of motion  - Assess patient's mobility; develop plan if impaired  - Assess patient's need for assistive devices and provide as appropriate  - Encourage maximum independence but intervene and supervise when necessary  - Involve family in performance of ADLs  - Assess for home care needs following discharge   - Consider OT consult to assist with ADL evaluation and planning for discharge  - Provide patient education as appropriate  Outcome: Adequate for Discharge  Goal: Maintains/Returns to pre admission functional level  Description: INTERVENTIONS:  - Perform AM-PAC 6 Click Basic Mobility/ Daily Activity assessment daily.  - Set and communicate daily mobility goal to care team and patient/family/caregiver.   - Collaborate with rehabilitation services on mobility goals if consulted  - Perform Range of Motion  times a day.  - Reposition patient every  hours.  - Dangle patient  times a day  - Stand patient  times a day  - Ambulate patient  times a day  - Out of bed to chair  times a day   - Out of bed for meals  times a day  - Out of bed for toileting  - Record patient progress and toleration of activity level   Outcome: Adequate for Discharge     Problem: DISCHARGE PLANNING  Goal: Discharge to home or other facility with appropriate resources  Description:  INTERVENTIONS:  - Identify barriers to discharge w/patient and caregiver  - Arrange for needed discharge resources and transportation as appropriate  - Identify discharge learning needs (meds, wound care, etc.)  - Arrange for interpretive services to assist at discharge as needed  - Refer to Case Management Department for coordinating discharge planning if the patient needs post-hospital services based on physician/advanced practitioner order or complex needs related to functional status, cognitive ability, or social support system  Outcome: Adequate for Discharge     Problem: Knowledge Deficit  Goal: Patient/family/caregiver demonstrates understanding of disease process, treatment plan, medications, and discharge instructions  Description: Complete learning assessment and assess knowledge base.  Interventions:  - Provide teaching at level of understanding  - Provide teaching via preferred learning methods  Outcome: Adequate for Discharge

## 2024-10-28 NOTE — ANESTHESIA PREPROCEDURE EVALUATION
Procedure:  PRE ABLATION JOSE LUIS  Cardiac eps/afib ablation PFA (Chest)    Relevant Problems   CARDIO   (+) Atrial fibrillation and flutter (HCC)   (+) CAD (coronary artery disease)   (+) Essential hypertension      GI/HEPATIC   (+) Cancer of ascending colon (HCC)   (+) Gastrointestinal hemorrhage with melena      HEMATOLOGY   (+) Microcytic anemia        Physical Exam    Airway    Mallampati score: II         Dental   No notable dental hx     Cardiovascular      Pulmonary      Other Findings        Anesthesia Plan  ASA Score- 3     Anesthesia Type- general with ASA Monitors.         Additional Monitors:     Airway Plan: ETT.    Comment: I, Dr. Proctor, the attending physician, have personally seen and evaluated the patient prior to anesthetic care.  I have reviewed the pre-anesthetic record, and other medical records if appropriate to the anesthetic care.  If a CRNA is involved in the case, I have reviewed the CRNA assessment, if present, and agree.  The patient is in a suitable condition to proceed with my formulated anesthetic plan.  .       Plan Factors-    Chart reviewed.                      Induction- intravenous.    Postoperative Plan-         Informed Consent- Anesthetic plan and risks discussed with patient.  I personally reviewed this patient with the CRNA. Discussed and agreed on the Anesthesia Plan with the CRNA..

## 2024-10-28 NOTE — ANESTHESIA POSTPROCEDURE EVALUATION
Post-Op Assessment Note    CV Status:  Stable    Pain management: adequate       Mental Status:  Alert   PONV Controlled:  None   Airway Patency:  Patent     Post Op Vitals Reviewed: Yes    No anethesia notable event occurred.    Staff: Anesthesiologist           Last Filed PACU Vitals:  Vitals Value Taken Time   Temp 98.9 °F (37.2 °C) 10/22/24 1411   Pulse 72 10/22/24 1449   /95 10/22/24 1430   Resp 19 10/22/24 1449   SpO2 96 % 10/22/24 1449   Vitals shown include unfiled device data.    Modified Kasey:  No data recorded

## 2024-10-29 ENCOUNTER — TELEPHONE (OUTPATIENT)
Age: 64
End: 2024-10-29

## 2024-10-29 NOTE — TELEPHONE ENCOUNTER
I just need to clarify this blood work orders in this pt's chart, does he needs to repeat labs? Seems to be duplicate orders for blood work.

## 2024-10-29 NOTE — TELEPHONE ENCOUNTER
Received a call from Bernardino he has upcoming appt on Friday Nov 1st. He is asking does he need to have blood work prior to that appt    Please advise

## 2024-10-31 NOTE — TELEPHONE ENCOUNTER
Received in office Dofetilide 125 mcg 90 days supplies for pt.     Called pt unable to reach out, LVM informing office already received his Dofetilide medication and is ready for him to pick it up. If any other questions he can call back our office.

## 2024-11-01 ENCOUNTER — CLINICAL SUPPORT (OUTPATIENT)
Dept: CARDIOLOGY CLINIC | Facility: CLINIC | Age: 64
End: 2024-11-01
Payer: MEDICARE

## 2024-11-01 VITALS
BODY MASS INDEX: 33.33 KG/M2 | HEART RATE: 70 BPM | SYSTOLIC BLOOD PRESSURE: 120 MMHG | HEIGHT: 69 IN | WEIGHT: 225 LBS | DIASTOLIC BLOOD PRESSURE: 82 MMHG

## 2024-11-01 DIAGNOSIS — I48.91 ATRIAL FIBRILLATION AND FLUTTER (HCC): Primary | ICD-10-CM

## 2024-11-01 DIAGNOSIS — I48.92 ATRIAL FIBRILLATION AND FLUTTER (HCC): Primary | ICD-10-CM

## 2024-11-01 PROCEDURE — RECHECK

## 2024-11-01 PROCEDURE — 93000 ELECTROCARDIOGRAM COMPLETE: CPT

## 2024-11-01 NOTE — PROGRESS NOTES
Patient presents for one week EKG post Tikosyn start (125mcg bid).  Patient feels well.      /82  HR 70 bpm/NSR    QT/Qtc WNL    Emile Porras PA-C, reviewed EKG in office, no changes made.  Patient also picked up Tikosyn supply from Assistance program and told to contact office when has 2 weeks left so we can reorder.    Reviewed all follow-up appointments with patient.

## 2024-11-08 ENCOUNTER — TELEPHONE (OUTPATIENT)
Dept: CARDIOLOGY CLINIC | Facility: CLINIC | Age: 64
End: 2024-11-08

## 2024-11-08 NOTE — TELEPHONE ENCOUNTER
The [employer] Patient Assistance Program Patient Enrollment Forms have been completed and signed by Madyson Franco PA-C. Forms have been faxed to [employer] at [phone].     Documents will be scanned into patient's chart.

## 2024-11-22 ENCOUNTER — TELEPHONE (OUTPATIENT)
Age: 64
End: 2024-11-22

## 2024-11-22 NOTE — TELEPHONE ENCOUNTER
Caller: Bernardino Nunez     Doctor: Dr. Alcaraz    Call back #: [phone]    Reason for call: Patient got disconnected from the office about his medications. Patient would like a call back directly from the office. Thank you.

## 2024-11-22 NOTE — TELEPHONE ENCOUNTER
What's the update on patient assistance? I have no pending paperwork, has it been submitted? Can you call another office to get him samples.

## 2024-11-22 NOTE — TELEPHONE ENCOUNTER
Spoke with [employer] -she will notify Pt to  samples at [place] office- 4 sample bottles pulled .  LOT#[id number]  EXP 1/2027

## 2024-11-22 NOTE — TELEPHONE ENCOUNTER
"Patient is calling in to update us that he has only 3 days left of rivaroxaban (XARELTO) 20 mg tablet, he is still waiting to hear back from the \"patient assistance program\" he applied for. Pt cannot pay for medication out of pocket and wanted to know what the Doctor advises in this situation. Please give the pt a call back with further information on this matter. Thank you        [phone] (Mobile)       "

## 2024-11-22 NOTE — TELEPHONE ENCOUNTER
Relayed message to patient; patient is going to [facility] to  1 month supply of samples.     [facility] confirmed they have samples. Patient understood

## 2024-12-02 ENCOUNTER — ANTICOAG VISIT (OUTPATIENT)
Dept: CARDIOLOGY CLINIC | Facility: CLINIC | Age: 64
End: 2024-12-02

## 2024-12-02 DIAGNOSIS — I48.91 ATRIAL FIBRILLATION AND FLUTTER (HCC): Primary | Chronic | ICD-10-CM

## 2024-12-02 DIAGNOSIS — I48.92 ATRIAL FIBRILLATION AND FLUTTER (HCC): Primary | Chronic | ICD-10-CM

## 2024-12-03 NOTE — ASSESSMENT & PLAN NOTE
Wt Readings from Last 3 Encounters:   11/01/24 102 kg (225 lb)   10/22/24 98.9 kg (218 lb)   09/16/24 99.3 kg (218 lb 14.4 oz)     Appears euvolemic in office today

## 2024-12-03 NOTE — ASSESSMENT & PLAN NOTE
See A-fib/flutter history as below  10/22/24 underwent PFA A-fib/flutter ablation (RSPV + LSPV, PW, emperic CTI + anterior anand flutter line) by Dr. Burrows  Initiated on dofetilide during this hospital stay  ECHO (10/22/24) - EF 50%, small pericardial effusion present w/o evidence of tamponade  Current medication regimen:  AAD: dofetilide 125mcg BID (had borderline QTC on higher doses)  AC: Xarelto 20mg daily  Rate control: Metoprolol succinate 75mg BID

## 2024-12-03 NOTE — PROGRESS NOTES
Electrophysiology Follow Up  [facility]  [facility] [address], [place] [address]    Name: Bernardino Nunez  : 1960  MRN: [id number]    Assessment & Plan  Atrial fibrillation and flutter (HCC)  See A-fib/flutter history as below  10/22/24 underwent PFA A-fib/flutter ablation (RSPV + LSPV, PW, emperic CTI + anterior anand flutter line) by Dr. Burrows  Initiated on dofetilide during this hospital stay  ECHO (10/22/24) - EF 50%, small pericardial effusion present w/o evidence of tamponade  Current medication regimen:  AAD: dofetilide 125mcg BID (had borderline QTC on higher doses)  AC: Xarelto 20mg daily  Rate control: Metoprolol succinate 75mg BID   Heart failure with mid-range ejection fraction (HCC)  Wt Readings from Last 3 Encounters:   24 102 kg (225 lb)   10/22/24 98.9 kg (218 lb)   24 99.3 kg (218 lb 14.4 oz)     Appears euvolemic in office today  Coronary artery disease involving native heart, unspecified vessel or lesion type, unspecified whether angina present  Maintained on ASA + BB  Class 1 obesity due to excess calories with serious comorbidity and body mass index (BMI) of 33.0 to 33.9 in adult  Recommend weight loss via healthy diet and exercise       Discussion/Plan:    Patient recently underwent PFA afib/flutter ablation on 10/22/24 by Dr. Burrows    During his stay for ablation he was initiated on dofetilide 125mcg BID for rhythm control (had borderline QTC on higher doses)    Since this procedure they have been maintained on dofetilide, metoprolol, and xarelto as above    Since this procedure He reports he has been feeling well and denies acute cardiac complaint    He affirms his groin access sites have healed well    EKG in office today showing normal sinus rhythm with ventricular rate 69 bpm and QTC stable around 490ms    His latest loop interrogation was negative for repeat afib episode post-ablation    No acute  medication changes made today. Refill provided for xarelto.     States will need a spironolactone refill soon, tasked clinical staff to send refill request to his general cardiology team.    To continue scheduled loop monitoring moving forwards    Modifiable risk factors for atrial fibrillation were discussed today including weight loss, avoiding alcohol/tobacco products, and treatment of REGULO/HTN/DM     Patient has been instructed to follow up in our EP office in 6 monts or as needed. He will call our office with any questions or concerns in the meantime.    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%  Cardiac cath at that time showed 80% proximal ramus, EF thought to be out of proportion to level of CAD  Status post successful JOSE LUIS/cardioversion  Discharged on rate controlling medications, Eliquis  Subsequent improvement in LVEF (50% per echo 10/2018)  7/2020 - worsening dyspnea, found to be in recurrent rapid atrial flutter at time of stress echo  Slight drop in EF again noted, sent to ED for evaluation  New onset anemia found, workup revealed colon cancer requiring hemicolectomy and chemotherapy --- A-fib management delayed  9/2020 - recurrent rapid atrial fibrillation, status post successful cardioversion  1/2021 - underwent RF ablation with pulmonary vein isolation and posterior wall isolation  7/2021 - underwent [employer] loop recorder implantation for ongoing A-fib monitoring  9/2022 - echocardiogram showed LVEF 65%, improved with maintenance of normal sinus rhythm  2/2024 - noted to have increasing A-fib burden on loop recorder in the setting of medication noncompliance --- rate controlling medications restarted  At 1 point change to Coumadin anticoagulation due to cost of Eliquis ([id number] = 3)  7/2024 - readmitted with rapid atrial fibrillation, echo showed recurrent drop in LVEF to  45-50% --- rate control uptitrated  8/2024 - ongoing A-fib noted, Toprol-XL again increased --- ultimately A-fib ablation and antiarrhythmic initiation recommended  10/22/24 underwent PFA A-fib/flutter ablation (RSPV + LSPV, PW, emperic CTI + anterior anand flutter line) by Dr. Burrows  Initiated on dofetilide during this hospital stay     SVT:      VT/VF/PVC:     Device history:   Pacemaker:     Defibrillator:     BIV PPM:     BIV ICD:     ILR:    Interim History/HPI:   Interim history: Bernardino Nunez is a 64 y.o. male with a PMH of afib/flutter s/p ablation, CHF, CAD, and obesity.    He presents today for routine outpatient follow up given a recent PFA afib/flutter ablation on 10/22/24. Since this procedure He reports he has been feeling well and denies acute cardiac complaint. He affirms their groin access sites have healed well.     EKG: Normal sinus rhythm with ventricular rate 69 bpm, QRS 490ms    Review of Systems   Constitutional:  Negative for activity change, appetite change, chills, fatigue and fever.   HENT:  Negative for nosebleeds.    Respiratory:  Negative for chest tightness and shortness of breath.    Cardiovascular:  Negative for chest pain, palpitations and leg swelling.   Neurological:  Negative for dizziness, syncope, weakness and light-headedness.         OBJECTIVE:   Vitals:   There were no vitals taken for this visit.  There is no height or weight on file to calculate BMI.        Physical Exam:   Physical Exam  Constitutional:       General: He is not in acute distress.     Appearance: Normal appearance. He is not toxic-appearing.   HENT:      Head: Normocephalic and atraumatic.   Eyes:      General:         Right eye: No discharge.         Left eye: No discharge.   Cardiovascular:      Rate and Rhythm: Normal rate and regular rhythm.      Pulses: Normal pulses.   Pulmonary:      Effort: Pulmonary effort is normal.      Breath sounds: Normal breath sounds.   Musculoskeletal:      Right  lower leg: No edema.      Left lower leg: No edema.   Skin:     General: Skin is warm and dry.      Capillary Refill: Capillary refill takes less than 2 seconds.   Neurological:      Mental Status: He is alert.            Medications:      Current Outpatient Medications:     allopurinol (ZYLOPRIM) 100 mg tablet, Take 1 tablet (100 mg total) by mouth daily, Disp: 90 tablet, Rfl: 3    aspirin (ECOTRIN LOW STRENGTH) 81 mg EC tablet, Take 1 tablet (81 mg total) by mouth daily, Disp: 30 tablet, Rfl: 0    atorvastatin (LIPITOR) 40 mg tablet, Take 1 tablet (40 mg total) by mouth daily with dinner, Disp: 90 tablet, Rfl: 0    dofetilide (TIKOSYN) 125 mcg capsule, Take 1 capsule (125 mcg total) by mouth every 12 (twelve) hours, Disp: 60 capsule, Rfl: 6    furosemide (LASIX) 20 mg tablet, Take 1 tablet (20 mg total) by mouth daily, Disp: 90 tablet, Rfl: 0    losartan (COZAAR) 50 mg tablet, Take 1 tablet (50 mg total) by mouth daily, Disp: 90 tablet, Rfl: 0    magnesium Oxide (MAG-OX) 400 mg TABS, Take 1 tablet (400 mg total) by mouth 2 (two) times a day, Disp: 60 tablet, Rfl: 6    metoprolol succinate (TOPROL-XL) 50 mg 24 hr tablet, TAKE 1.5 TABLETS BY MOUTH 2 TIMES A DAY., Disp: 270 tablet, Rfl: 4    rivaroxaban (XARELTO) 20 mg tablet, Take 1 tablet (20 mg total) by mouth daily with breakfast, Disp: 30 tablet, Rfl: 6    spironolactone (ALDACTONE) 25 mg tablet, Take 0.5 tablets (12.5 mg total) by mouth daily, Disp: 45 tablet, Rfl: 0       Historical Information   Past Medical History:   Diagnosis Date    A-fib (HCC)     Cardiomyopathy, dilated (HCC) 12/18/2017    Colon cancer (HCC)     Colon polyp     Coronary artery disease     COVID-19 12/21/2021    GI bleeding     Gout     Herpes zoster     last assessed 12/18/17    Hypertension     Mass of cecum 08/25/2020    NSVT (nonsustained ventricular tachycardia) (HCC) 07/27/2018    Shingles        Past Surgical History:   Procedure Laterality Date    CARDIAC ELECTROPHYSIOLOGY  PROCEDURE N/A 10/22/2024    Procedure: Cardiac eps/afib ablation PFA;  Surgeon: Jonathan Burrows MD;  Location: [facility];  Service: Cardiology    ELECTRICAL CARDIOVERSION  12/15/2017         HEMICOLOECTOMY W/ ANASTOMOSIS N/A 2020    Procedure: RIGHT HEMICOLECTOMY WITH ILIOCOLIC ANASTAMOSIS;  Surgeon: ANT Villavicencio MD;  Location: [facility];  Service: Colorectal    IR PORT PLACEMENT  2020    IR PORT REMOVAL  2021       Social History     Substance and Sexual Activity   Alcohol Use Yes    Alcohol/week: 20.0 standard drinks of alcohol    Types: 20 Cans of beer per week    Comment: drink about 4 days a weeks usually drink beer     Social History     Substance and Sexual Activity   Drug Use No     Social History     Tobacco Use   Smoking Status Former    Current packs/day: 0.00    Types: Cigarettes    Quit date: 2017    Years since quittin.3   Smokeless Tobacco Never       Family History   Problem Relation Age of Onset    Coronary artery disease Mother     Alcohol abuse Father     Coronary artery disease Father     Colon cancer Neg Hx          Labs & Results:  Below is the patient's most recent value for Albumin, ALT, AST, BUN, Calcium, Chloride, Cholesterol, CO2, Creatinine, GFR, Glucose, HDL, Hematocrit, Hemoglobin, Hemoglobin A1C, LDL, Magnesium, Phosphorus, Platelets, Potassium, PSA, Sodium, Triglycerides, and WBC.   Lab Results   Component Value Date    ALT 41 10/15/2024    AST 22 10/15/2024    BUN 13 10/26/2024    CALCIUM 8.9 10/26/2024    CL 99 10/26/2024    CO2 31 10/26/2024    CREATININE 0.92 10/26/2024    HDL 55 2022    HCT 38.6 10/23/2024    HGB 12.5 10/23/2024    HGBA1C 5.1 2023    MG 1.9 10/26/2024    PHOS 4.1 2020    PLT  10/23/2024      Comment:      Unable to perform due to clumped platelets    K 4.0 10/26/2024    PSA 0.9 2022    TRIG 134 2022    WBC 6.56 10/23/2024     Note: for a comprehensive list of the patient's lab results, access  the Results Review activity.

## 2024-12-04 ENCOUNTER — REMOTE DEVICE CLINIC VISIT (OUTPATIENT)
Dept: CARDIOLOGY CLINIC | Facility: CLINIC | Age: 64
End: 2024-12-04

## 2024-12-04 ENCOUNTER — RESULTS FOLLOW-UP (OUTPATIENT)
Dept: CARDIOLOGY CLINIC | Facility: CLINIC | Age: 64
End: 2024-12-04

## 2024-12-04 DIAGNOSIS — I48.0 PAF (PAROXYSMAL ATRIAL FIBRILLATION) (HCC): Primary | ICD-10-CM

## 2024-12-04 PROCEDURE — RECHECK: Performed by: INTERNAL MEDICINE

## 2024-12-04 NOTE — PROGRESS NOTES
"MDT LNQ22/ ACTIVE SYSTEM IS MRI CONDITIONAL   NON-BILLABLE PER HM AND JM. CARELINK TRANSMISSION: LOOP RECORDER. PRESENTING RHYTHM NSR @ 75 BPM. BATTERY STATUS \"OK.\" NO PATIENT OR DEVICE ACTIVATED EPISODES. NORMAL DEVICE FUNCTION. DL   "

## 2024-12-06 ENCOUNTER — OFFICE VISIT (OUTPATIENT)
Dept: CARDIOLOGY CLINIC | Facility: CLINIC | Age: 64
End: 2024-12-06
Payer: MEDICARE

## 2024-12-06 VITALS
WEIGHT: 227.9 LBS | BODY MASS INDEX: 33.75 KG/M2 | SYSTOLIC BLOOD PRESSURE: 102 MMHG | HEART RATE: 68 BPM | HEIGHT: 69 IN | DIASTOLIC BLOOD PRESSURE: 70 MMHG

## 2024-12-06 DIAGNOSIS — E66.811 CLASS 1 OBESITY DUE TO EXCESS CALORIES WITH SERIOUS COMORBIDITY AND BODY MASS INDEX (BMI) OF 33.0 TO 33.9 IN ADULT: ICD-10-CM

## 2024-12-06 DIAGNOSIS — I25.10 CORONARY ARTERY DISEASE INVOLVING NATIVE HEART, UNSPECIFIED VESSEL OR LESION TYPE, UNSPECIFIED WHETHER ANGINA PRESENT: Chronic | ICD-10-CM

## 2024-12-06 DIAGNOSIS — I50.22 HEART FAILURE WITH MID-RANGE EJECTION FRACTION (HCC): ICD-10-CM

## 2024-12-06 DIAGNOSIS — I48.91 ATRIAL FIBRILLATION AND FLUTTER (HCC): Primary | Chronic | ICD-10-CM

## 2024-12-06 DIAGNOSIS — I48.0 PAF (PAROXYSMAL ATRIAL FIBRILLATION) (HCC): ICD-10-CM

## 2024-12-06 DIAGNOSIS — I48.92 ATRIAL FIBRILLATION AND FLUTTER (HCC): Primary | Chronic | ICD-10-CM

## 2024-12-06 DIAGNOSIS — E66.09 CLASS 1 OBESITY DUE TO EXCESS CALORIES WITH SERIOUS COMORBIDITY AND BODY MASS INDEX (BMI) OF 33.0 TO 33.9 IN ADULT: ICD-10-CM

## 2024-12-06 PROCEDURE — 93000 ELECTROCARDIOGRAM COMPLETE: CPT

## 2024-12-06 PROCEDURE — 99214 OFFICE O/P EST MOD 30 MIN: CPT

## 2024-12-06 NOTE — PROGRESS NOTES
Patient needs refill on spironolactone 25 mg tabs. He reported today at his office visit that he has not taking medication in three weeks due to not having it and no refills.

## 2024-12-10 DIAGNOSIS — I50.22 CHRONIC SYSTOLIC HEART FAILURE (HCC): ICD-10-CM

## 2024-12-10 RX ORDER — SPIRONOLACTONE 25 MG/1
12.5 TABLET ORAL DAILY
Qty: 45 TABLET | Refills: 1 | Status: SHIPPED | OUTPATIENT
Start: 2024-12-10

## 2024-12-10 NOTE — TELEPHONE ENCOUNTER
Reason for call: Pt called highly upset and shouting. He stated that he have requested spironolactone several times from the office and the pharmacy does not have it. He stated he spoke with someone in the office and they were to get to sent to the pharmacy and nothing, he state he have not taken spironolactone in one month. .    [x] Refill   [] Prior Auth  [] Other:     Office:   [] PCP/Provider -   [x] Specialty/Provider -     Medication: spironolactone     Dose/Frequency: 25 mg 1/2tablet daily    Quantity: 90day supply    Pharmacy: [address] [place]     Does the patient have enough for 3 days?   [] Yes   [x] No - Send as HP to POD

## 2024-12-30 ENCOUNTER — TELEPHONE (OUTPATIENT)
Dept: CARDIOLOGY CLINIC | Facility: CLINIC | Age: 64
End: 2024-12-30

## 2024-12-30 NOTE — TELEPHONE ENCOUNTER
Nica with [employer] called asking if we're able to wait until 1/6/25 for the pt's new enrollment to come in effect to fill the script since the wrong script dosage was sent

## 2024-12-31 DIAGNOSIS — I48.0 PAF (PAROXYSMAL ATRIAL FIBRILLATION) (HCC): ICD-10-CM

## 2024-12-31 RX ORDER — DOFETILIDE 0.12 MG/1
125 CAPSULE ORAL EVERY 12 HOURS SCHEDULED
Qty: 28 CAPSULE | Refills: 0 | Status: SHIPPED | OUTPATIENT
Start: 2024-12-31

## 2025-01-14 DIAGNOSIS — I50.22 CHRONIC SYSTOLIC HEART FAILURE (HCC): ICD-10-CM

## 2025-01-14 RX ORDER — FUROSEMIDE 20 MG/1
20 TABLET ORAL DAILY
Qty: 90 TABLET | Refills: 3 | OUTPATIENT
Start: 2025-01-14

## 2025-01-15 ENCOUNTER — TELEPHONE (OUTPATIENT)
Dept: CARDIOLOGY CLINIC | Facility: CLINIC | Age: 65
End: 2025-01-15

## 2025-01-15 DIAGNOSIS — I50.22 CHRONIC SYSTOLIC HEART FAILURE (HCC): ICD-10-CM

## 2025-01-15 RX ORDER — FUROSEMIDE 20 MG/1
20 TABLET ORAL DAILY
Qty: 90 TABLET | Refills: 3 | Status: SHIPPED | OUTPATIENT
Start: 2025-01-15

## 2025-01-15 NOTE — TELEPHONE ENCOUNTER
"Pt called back today and stated he has only 1 day left of Tikosyn 125 mcg. I informed pt that I can supply him with a curtesy script sent to his pharmacy, he stated he will not be paying for it since \"we dropped the ball and it's our fault for not getting his script in time\". I tried to inform him that [facility] had his script on backorder (for the original 250 mcg dosage) since October of last year and we have been periodically checking in with his order as well as putting a new script for 125 mcg instead. I also informed him on multiple occasions we called [facility] to check on the status of his meds to which they stated they have not been processed yet which is why he has yet to receive his medication.           He also stated he hasn't taken his Furosemide in 4 days and his feet are swelling a lot. I advised the pt that he has to contact his general cardiologist to get that filled as we don't manage that medication for him and he stated \"So I can speak to someone incompetent and uneducated?\" At that point I wished pt a good day and disconnected the call.      Please advise.  "

## 2025-01-17 NOTE — TELEPHONE ENCOUNTER
Received 3 bottles of Tikosyn 125mcg every 12 hours of 60 tablets each bottle via mail.    LOT#: [id number]  EXP: FEB 2026  SN: [id number]    Pt is aware and states he took his last tablet this AM so will be stopping by our office today to  the meds. He was also made aware to contact our office once he opens his last bottle so we can reorder it for him.

## 2025-01-23 DIAGNOSIS — I48.0 PAF (PAROXYSMAL ATRIAL FIBRILLATION) (HCC): ICD-10-CM

## 2025-01-23 NOTE — TELEPHONE ENCOUNTER
Samples of this drug were given to the patient, quantity 28, Lot Number [id number]. The following was discussed with the patient as indicated: administration, storage, potential interactions, side effects.

## 2025-02-03 ENCOUNTER — RA CDI HCC (OUTPATIENT)
Dept: OTHER | Facility: HOSPITAL | Age: 65
End: 2025-02-03

## 2025-02-11 ENCOUNTER — OFFICE VISIT (OUTPATIENT)
Age: 65
End: 2025-02-11
Payer: MEDICARE

## 2025-02-11 VITALS
HEART RATE: 70 BPM | BODY MASS INDEX: 32.73 KG/M2 | TEMPERATURE: 97.8 F | SYSTOLIC BLOOD PRESSURE: 124 MMHG | DIASTOLIC BLOOD PRESSURE: 90 MMHG | WEIGHT: 221 LBS | RESPIRATION RATE: 16 BRPM | OXYGEN SATURATION: 97 % | HEIGHT: 69 IN

## 2025-02-11 DIAGNOSIS — I48.91 ATRIAL FIBRILLATION AND FLUTTER (HCC): Chronic | ICD-10-CM

## 2025-02-11 DIAGNOSIS — Z12.5 SCREENING FOR PROSTATE CANCER: ICD-10-CM

## 2025-02-11 DIAGNOSIS — Z87.39 HISTORY OF GOUT: ICD-10-CM

## 2025-02-11 DIAGNOSIS — I10 ESSENTIAL HYPERTENSION: Chronic | ICD-10-CM

## 2025-02-11 DIAGNOSIS — I48.92 ATRIAL FIBRILLATION AND FLUTTER (HCC): Chronic | ICD-10-CM

## 2025-02-11 DIAGNOSIS — Z98.890 S/P ABLATION OF ATRIAL FIBRILLATION: Chronic | ICD-10-CM

## 2025-02-11 DIAGNOSIS — Z00.00 HEALTHCARE MAINTENANCE: ICD-10-CM

## 2025-02-11 DIAGNOSIS — Z90.49 S/P RIGHT HEMICOLECTOMY: Primary | Chronic | ICD-10-CM

## 2025-02-11 DIAGNOSIS — I25.10 CORONARY ARTERY DISEASE INVOLVING NATIVE HEART, UNSPECIFIED VESSEL OR LESION TYPE, UNSPECIFIED WHETHER ANGINA PRESENT: Chronic | ICD-10-CM

## 2025-02-11 DIAGNOSIS — C18.2 CANCER OF ASCENDING COLON (HCC): ICD-10-CM

## 2025-02-11 DIAGNOSIS — Z86.79 S/P ABLATION OF ATRIAL FIBRILLATION: Chronic | ICD-10-CM

## 2025-02-11 PROCEDURE — 99214 OFFICE O/P EST MOD 30 MIN: CPT | Performed by: INTERNAL MEDICINE

## 2025-02-11 PROCEDURE — G2211 COMPLEX E/M VISIT ADD ON: HCPCS | Performed by: INTERNAL MEDICINE

## 2025-02-11 NOTE — PROGRESS NOTES
Name: Bernardino Nunez      : 1960      MRN: [id number]  Encounter Provider: Pepe Chakraborty DO  Encounter Date: 2025   Encounter department: [facility] INTERNAL MEDICINE    Assessment & Plan       Preventive health issues were discussed with patient, and age appropriate screening tests were ordered as noted in patient's After Visit Summary. Personalized health advice and appropriate referrals for health education or preventive services given if needed, as noted in patient's After Visit Summary.    History of Present Illness     HPI   Patient Care Team:  Pepe Charkaborty DO as PCP - General (Internal Medicine)  Bryce Alcaraz MD as Cardiologist (Cardiology)  Jonathan Burrows MD (Cardiology)    Review of Systems  Medical History Reviewed by provider this encounter:       Annual Wellness Visit Questionnaire   Bernardino is here for his Initial Wellness visit.     Health Risk Assessment:   Patient rates overall health as fair. Patient feels that their physical health rating is same. Patient is satisfied with their life. Eyesight was rated as same. Hearing was rated as same. Patient feels that their emotional and mental health rating is same. Patients states they are never, rarely angry. Patient states they are sometimes unusually tired/fatigued. Pain experienced in the last 7 days has been none. Patient states that he has experienced no weight loss or gain in last 6 months.     Depression Screening:   PHQ-2 Score: 0  PHQ-9 Score: 0      Fall Risk Screening:   In the past year, patient has experienced: no history of falling in past year      Home Safety:  Patient does not have trouble with stairs inside or outside of their home. Patient has working smoke alarms and has working carbon monoxide detector. Home safety hazards include: none.     Nutrition:   Current diet is Regular.     Medications:   Patient is currently taking over-the-counter supplements. OTC medications include: see medication list.  Patient is able to manage medications.     Activities of Daily Living (ADLs)/Instrumental Activities of Daily Living (IADLs):   Walk and transfer into and out of bed and chair?: Yes  Dress and groom yourself?: Yes    Bathe or shower yourself?: Yes    Feed yourself? Yes  Do your laundry/housekeeping?: Yes  Manage your money, pay your bills and track your expenses?: Yes  Make your own meals?: Yes    Do your own shopping?: Yes    Previous Hospitalizations:   Any hospitalizations or ED visits within the last 12 months?: Yes    How many hospitalizations have you had in the last year?: 1-2    Advance Care Planning:   Living will: No    Durable POA for healthcare: No    Advanced directive: No      PREVENTIVE SCREENINGS      Cardiovascular Screening:    General: Screening Current      Diabetes Screening:     General: Screening Current      Colorectal Cancer Screening:     General: History Colorectal Cancer      Abdominal Aortic Aneurysm (AAA) Screening:    Risk factors include: tobacco use        Lung Cancer Screening:     General: Screening Not Indicated      Hepatitis C Screening:    General: Screening Current    Screening, Brief Intervention, and Referral to Treatment (SBIRT)     Screening  Typical number of drinks in a day: 4  Typical number of drinks in a week: 30  Interpretation: Low risk drinking behavior.    Single Item Drug Screening:  How often have you used an illegal drug (including marijuana) or a prescription medication for non-medical reasons in the past year? never    Single Item Drug Screen Score: 0  Interpretation: Negative screen for possible drug use disorder    Social Drivers of Health     Food Insecurity: No Food Insecurity (10/22/2024)    Nursing - Inadequate Food Risk Classification     Worried About Running Out of Food in the Last Year: Never true     Ran Out of Food in the Last Year: Never true     Ran Out of Food in the Last Year: Never true   Transportation Needs: No Transportation Needs  "(10/22/2024)    Nursing - Transportation Risk Classification     Lack of Transportation: No   Housing Stability: Unknown (10/22/2024)    Nursing: Inadequate Housing Risk Classification     Unable to Pay for Housing in the Last Year: No     Has Housin   Utilities: Not At Risk (10/22/2024)    Nursing - Utilities Risk Classification     Threatened with loss of utilities: No     No results found.    Objective   Pulse 70   Temp 97.8 °F (36.6 °C)   Resp 16   Ht 5' 9\" (1.753 m)   Wt 100 kg (221 lb)   SpO2 97%   BMI 32.64 kg/m²     Physical Exam    "

## 2025-02-11 NOTE — ASSESSMENT & PLAN NOTE
No exacerbations of his gouty arthritis.  Remains on allopurinol.  Recheck uric acid level    Orders:    Uric acid; Future

## 2025-02-11 NOTE — PROGRESS NOTES
Name: Bernardino Nunez      : 1960      MRN: [id number]  Encounter Provider: Pepe Chakraborty DO  Encounter Date: 2025   Encounter department: [facility] INTERNAL MEDICINE    Assessment & Plan  Atrial fibrillation and flutter (HCC)  History of atrial fibrillation status post ablation.  Remains on oral anticoagulants.  Knows to call immediately if any abnormal bleeding bruising or irregularity of his heart beat         Essential hypertension  Patient does have a history of hypertension.  He remains on medication including losartan and metoprolol in order to keep his blood pressure under control.  Blood pressure today in the office is showing adequate control and we will be checking to make sure his renal function is stable with labs to be performed in the near future.         Coronary artery disease involving native heart, unspecified vessel or lesion type, unspecified whether angina present  History of coronary artery disease.  Patient remains on high dose of atorvastatin 40 mg daily Tikosyn and spironolactone.  Blood pressure is stable.  Patient continues to follow-up with cardiology with no evidence of recurrence of disease.  Reminded the patient again the importance of watching his diet including intake of fats and cholesterol with his diet.         History of gout  No exacerbations of his gouty arthritis.  Remains on allopurinol.  Recheck uric acid level    Orders:  •  Uric acid; Future    S/P right hemicolectomy  As noted because of colon cancer underwent a right hemicolectomy.  Again we placed a consult for him to be seen by his colorectal specialist for reevaluation    Orders:  •  Ambulatory Referral to Colorectal Surgery; Future    Healthcare maintenance  Patient is now almost 65 and will be on Medicare.  Will be due for a initial Medicare wellness visit.  Patient was given a slip for complete labs to be performed prior to upcoming appointment.  Included with that appointment would be  an EKG and eye exam.  I will discuss the results of lab testing and whether further treatment is needed    Orders:  •  Comprehensive metabolic panel; Future  •  CBC and differential; Future  •  Lipid panel; Future  •  Urinalysis with microscopic; Future  •  PSA, Total Screen; Future  •  TSH, 3rd generation with Free T4 reflex; Future  •  Uric acid; Future    Screening for prostate cancer    Orders:  •  PSA, Total Screen; Future    Cancer of ascending colon (HCC)  Patient does have a history of hemicolectomy secondary to colon cancer.  He states he was having problems making an appointment for follow-up with his colorectal specialist and we will help arrange for this.  Patient will continue to need surveillance and we placed a consult for him to be seen by the surgeon who did perform the procedure in the past who he wants to stick with.         S/P ablation of atrial fibrillation  History of atrial fibrillation.  Patient did undergo ablation procedure successfully last year.  He remains on Xarelto as an anticoagulant.  Patient's heart rate was controlled with no ectopy appreciated on auscultation              History of Present Illness     Patient is a 64-year-old male who is here today for routine follow-up.  Patient had no labs performed prior to the visit but will have these performed in the near future.  Will prepare the patient for a initial Medicare wellness visit which will be performed in the near future.  Patient relates he is feeling well and he wishes to have reevaluation by his colorectal specialist status post hemicolectomy for colon cancer.  Review of Systems   Constitutional: Negative.    HENT: Negative.     Eyes: Negative.    Respiratory: Negative.     Cardiovascular: Negative.    Gastrointestinal: Negative.    Endocrine: Negative.    Genitourinary: Negative.    Musculoskeletal: Negative.    Skin: Negative.    Allergic/Immunologic: Negative.    Neurological: Negative.    Hematological: Negative.     Psychiatric/Behavioral: Negative.     Past Medical History:   Diagnosis Date   • A-fib (HCC)    • Cardiomyopathy, dilated (HCC) 2017   • Colon cancer (HCC)    • Colon polyp    • Coronary artery disease    • COVID-19 2021   • GI bleeding    • Gout    • Herpes zoster     last assessed 17   • Hypertension    • Mass of cecum 2020   • NSVT (nonsustained ventricular tachycardia) (HCC) 2018   • Shingles      Past Surgical History:   Procedure Laterality Date   • CARDIAC ELECTROPHYSIOLOGY PROCEDURE N/A 10/22/2024    Procedure: Cardiac eps/afib ablation PFA;  Surgeon: Jonathan Burrows MD;  Location: [facility];  Service: Cardiology   • ELECTRICAL CARDIOVERSION  12/15/2017        • HEMICOLOECTOMY W/ ANASTOMOSIS N/A 2020    Procedure: RIGHT HEMICOLECTOMY WITH ILIOCOLIC ANASTAMOSIS;  Surgeon: ANT Villavicencio MD;  Location: [facility] MAIN OR;  Service: Colorectal   • IR PORT PLACEMENT  2020   • IR PORT REMOVAL  2021     Family History   Problem Relation Age of Onset   • Coronary artery disease Mother    • Alcohol abuse Father    • Coronary artery disease Father    • Colon cancer Neg Hx      Social History     Tobacco Use   • Smoking status: Former     Current packs/day: 0.00     Types: Cigarettes     Quit date: 2017     Years since quittin.5   • Smokeless tobacco: Never   Vaping Use   • Vaping status: Never Used   Substance and Sexual Activity   • Alcohol use: Yes     Alcohol/week: 20.0 standard drinks of alcohol     Types: 20 Cans of beer per week     Comment: drink about 4 days a weeks usually drink beer   • Drug use: No   • Sexual activity: Yes     Partners: Female     Current Outpatient Medications on File Prior to Visit   Medication Sig   • allopurinol (ZYLOPRIM) 100 mg tablet Take 1 tablet (100 mg total) by mouth daily   • aspirin (ECOTRIN LOW STRENGTH) 81 mg EC tablet Take 1 tablet (81 mg total) by mouth daily   • atorvastatin (LIPITOR) 40 mg tablet Take 1 tablet  "(40 mg total) by mouth daily with dinner   • dofetilide (TIKOSYN) 125 mcg capsule Take 1 capsule (125 mcg total) by mouth every 12 (twelve) hours   • furosemide (LASIX) 20 mg tablet Take 1 tablet (20 mg total) by mouth daily   • losartan (COZAAR) 50 mg tablet Take 1 tablet (50 mg total) by mouth daily   • magnesium Oxide (MAG-OX) 400 mg TABS Take 1 tablet (400 mg total) by mouth 2 (two) times a day   • metoprolol succinate (TOPROL-XL) 50 mg 24 hr tablet TAKE 1.5 TABLETS BY MOUTH 2 TIMES A DAY.   • rivaroxaban (XARELTO) 20 mg tablet Take 1 tablet (20 mg total) by mouth daily with breakfast   • spironolactone (ALDACTONE) 25 mg tablet Take 0.5 tablets (12.5 mg total) by mouth daily     No Known Allergies  Immunization History   Administered Date(s) Administered   • COVID-19 PFIZER VACCINE 0.3 ML IM 04/03/2021, 04/25/2021   • INFLUENZA 11/12/2014, 10/14/2015, 11/09/2016, 10/11/2017   • Influenza Injectable, MDCK, Preservative Free, Quadrivalent, 0.5 mL 11/12/2019   • Influenza, injectable, quadrivalent, preservative free 0.5 mL 10/10/2018   • Influenza, seasonal, injectable 10/10/2017     Objective   /90   Pulse 70   Temp 97.8 °F (36.6 °C)   Resp 16   Ht 5' 9\" (1.753 m)   Wt 100 kg (221 lb)   SpO2 97%   BMI 32.64 kg/m²     Physical Exam  Vitals and nursing note reviewed.   Constitutional:       General: He is not in acute distress.     Appearance: Normal appearance. He is obese. He is not ill-appearing, toxic-appearing or diaphoretic.      Comments: Obese 64-year-old male who is awake alert in no acute distress oriented x 3   HENT:      Head: Normocephalic and atraumatic.      Right Ear: Tympanic membrane, ear canal and external ear normal. There is no impacted cerumen.      Left Ear: Tympanic membrane, ear canal and external ear normal. There is no impacted cerumen.      Nose: Nose normal. No congestion or rhinorrhea.      Mouth/Throat:      Mouth: Mucous membranes are moist.      Pharynx: Oropharynx is " clear. No oropharyngeal exudate or posterior oropharyngeal erythema.      Comments: Patient has significant obstruction of oral airway.  Would be a candidate for sleep apnea evaluation but he refuses  Eyes:      General: No scleral icterus.        Right eye: No discharge.         Left eye: No discharge.      Extraocular Movements: Extraocular movements intact.      Conjunctiva/sclera: Conjunctivae normal.      Pupils: Pupils are equal, round, and reactive to light.   Neck:      Vascular: No carotid bruit.   Cardiovascular:      Rate and Rhythm: Normal rate and regular rhythm.      Heart sounds: Normal heart sounds.      No friction rub. No gallop.   Pulmonary:      Effort: Pulmonary effort is normal. No respiratory distress.      Breath sounds: Normal breath sounds. No stridor. No wheezing, rhonchi or rales.   Chest:      Chest wall: No tenderness.   Abdominal:      General: Abdomen is flat. Bowel sounds are normal. There is no distension.      Palpations: Abdomen is soft. There is no mass.      Tenderness: There is no abdominal tenderness. There is no right CVA tenderness, left CVA tenderness, guarding or rebound.      Hernia: No hernia is present.   Musculoskeletal:         General: Normal range of motion.      Cervical back: Normal range of motion and neck supple. No rigidity or tenderness.   Lymphadenopathy:      Cervical: No cervical adenopathy.   Skin:     General: Skin is warm and dry.      Capillary Refill: Capillary refill takes less than 2 seconds.   Neurological:      General: No focal deficit present.      Mental Status: He is alert and oriented to person, place, and time. Mental status is at baseline.   Psychiatric:         Mood and Affect: Mood normal.         Behavior: Behavior normal.         Thought Content: Thought content normal.         Judgment: Judgment normal.

## 2025-02-11 NOTE — ASSESSMENT & PLAN NOTE
Patient does have a history of hypertension.  He remains on medication including losartan and metoprolol in order to keep his blood pressure under control.  Blood pressure today in the office is showing adequate control and we will be checking to make sure his renal function is stable with labs to be performed in the near future.

## 2025-02-11 NOTE — ASSESSMENT & PLAN NOTE
As noted because of colon cancer underwent a right hemicolectomy.  Again we placed a consult for him to be seen by his colorectal specialist for reevaluation    Orders:    Ambulatory Referral to Colorectal Surgery; Future

## 2025-02-11 NOTE — ASSESSMENT & PLAN NOTE
Patient is now almost 65 and will be on Medicare.  Will be due for a initial Medicare wellness visit.  Patient was given a slip for complete labs to be performed prior to upcoming appointment.  Included with that appointment would be an EKG and eye exam.  I will discuss the results of lab testing and whether further treatment is needed    Orders:    Comprehensive metabolic panel; Future    CBC and differential; Future    Lipid panel; Future    Urinalysis with microscopic; Future    PSA, Total Screen; Future    TSH, 3rd generation with Free T4 reflex; Future    Uric acid; Future

## 2025-02-11 NOTE — ASSESSMENT & PLAN NOTE
History of coronary artery disease.  Patient remains on high dose of atorvastatin 40 mg daily Tikosyn and spironolactone.  Blood pressure is stable.  Patient continues to follow-up with cardiology with no evidence of recurrence of disease.  Reminded the patient again the importance of watching his diet including intake of fats and cholesterol with his diet.

## 2025-02-11 NOTE — ASSESSMENT & PLAN NOTE
History of atrial fibrillation status post ablation.  Remains on oral anticoagulants.  Knows to call immediately if any abnormal bleeding bruising or irregularity of his heart beat

## 2025-02-11 NOTE — ASSESSMENT & PLAN NOTE
History of atrial fibrillation.  Patient did undergo ablation procedure successfully last year.  He remains on Xarelto as an anticoagulant.  Patient's heart rate was controlled with no ectopy appreciated on auscultation

## 2025-02-11 NOTE — ASSESSMENT & PLAN NOTE
Patient does have a history of hemicolectomy secondary to colon cancer.  He states he was having problems making an appointment for follow-up with his colorectal specialist and we will help arrange for this.  Patient will continue to need surveillance and we placed a consult for him to be seen by the surgeon who did perform the procedure in the past who he wants to stick with.

## 2025-03-06 ENCOUNTER — APPOINTMENT (OUTPATIENT)
Dept: LAB | Facility: CLINIC | Age: 65
End: 2025-03-06
Payer: MEDICARE

## 2025-03-06 DIAGNOSIS — Z00.00 HEALTHCARE MAINTENANCE: ICD-10-CM

## 2025-03-06 DIAGNOSIS — I48.0 PAF (PAROXYSMAL ATRIAL FIBRILLATION) (HCC): ICD-10-CM

## 2025-03-06 DIAGNOSIS — Z12.5 SCREENING FOR PROSTATE CANCER: ICD-10-CM

## 2025-03-06 DIAGNOSIS — Z87.39 HISTORY OF GOUT: ICD-10-CM

## 2025-03-06 LAB
ALBUMIN SERPL BCG-MCNC: 3.5 G/DL (ref 3.5–5)
ALP SERPL-CCNC: 65 U/L (ref 34–104)
ALT SERPL W P-5'-P-CCNC: 57 U/L (ref 7–52)
ANION GAP SERPL CALCULATED.3IONS-SCNC: 9 MMOL/L (ref 4–13)
AST SERPL W P-5'-P-CCNC: 69 U/L (ref 13–39)
BACTERIA UR QL AUTO: ABNORMAL /HPF
BASOPHILS # BLD AUTO: 0.07 THOUSANDS/ÂΜL (ref 0–0.1)
BASOPHILS NFR BLD AUTO: 1 % (ref 0–1)
BILIRUB SERPL-MCNC: 1.74 MG/DL (ref 0.2–1)
BILIRUB UR QL STRIP: NEGATIVE
BUN SERPL-MCNC: 15 MG/DL (ref 5–25)
CALCIUM SERPL-MCNC: 8.7 MG/DL (ref 8.4–10.2)
CHLORIDE SERPL-SCNC: 100 MMOL/L (ref 96–108)
CHOLEST SERPL-MCNC: 125 MG/DL (ref ?–200)
CLARITY UR: CLEAR
CO2 SERPL-SCNC: 26 MMOL/L (ref 21–32)
COLOR UR: YELLOW
CREAT SERPL-MCNC: 1.11 MG/DL (ref 0.6–1.3)
EOSINOPHIL # BLD AUTO: 0.05 THOUSAND/ÂΜL (ref 0–0.61)
EOSINOPHIL NFR BLD AUTO: 1 % (ref 0–6)
ERYTHROCYTE [DISTWIDTH] IN BLOOD BY AUTOMATED COUNT: 14.4 % (ref 11.6–15.1)
GFR SERPL CREATININE-BSD FRML MDRD: 69 ML/MIN/1.73SQ M
GLUCOSE P FAST SERPL-MCNC: 89 MG/DL (ref 65–99)
GLUCOSE UR STRIP-MCNC: NEGATIVE MG/DL
HCT VFR BLD AUTO: 44.8 % (ref 36.5–49.3)
HDLC SERPL-MCNC: 50 MG/DL
HGB BLD-MCNC: 14.4 G/DL (ref 12–17)
HGB UR QL STRIP.AUTO: NEGATIVE
HYALINE CASTS #/AREA URNS LPF: ABNORMAL /LPF
IMM GRANULOCYTES # BLD AUTO: 0.01 THOUSAND/UL (ref 0–0.2)
IMM GRANULOCYTES NFR BLD AUTO: 0 % (ref 0–2)
KETONES UR STRIP-MCNC: NEGATIVE MG/DL
LEUKOCYTE ESTERASE UR QL STRIP: NEGATIVE
LYMPHOCYTES # BLD AUTO: 1.91 THOUSANDS/ÂΜL (ref 0.6–4.47)
LYMPHOCYTES NFR BLD AUTO: 36 % (ref 14–44)
MAGNESIUM SERPL-MCNC: 1.8 MG/DL (ref 1.9–2.7)
MCH RBC QN AUTO: 32.8 PG (ref 26.8–34.3)
MCHC RBC AUTO-ENTMCNC: 32.1 G/DL (ref 31.4–37.4)
MCV RBC AUTO: 102 FL (ref 82–98)
MONOCYTES # BLD AUTO: 0.56 THOUSAND/ÂΜL (ref 0.17–1.22)
MONOCYTES NFR BLD AUTO: 11 % (ref 4–12)
NEUTROPHILS # BLD AUTO: 2.75 THOUSANDS/ÂΜL (ref 1.85–7.62)
NEUTS SEG NFR BLD AUTO: 51 % (ref 43–75)
NITRITE UR QL STRIP: NEGATIVE
NON-SQ EPI CELLS URNS QL MICRO: ABNORMAL /HPF
NONHDLC SERPL-MCNC: 75 MG/DL
NRBC BLD AUTO-RTO: 0 /100 WBCS
PH UR STRIP.AUTO: 5.5 [PH]
PLATELET # BLD AUTO: 219 THOUSANDS/UL (ref 149–390)
PMV BLD AUTO: 11.5 FL (ref 8.9–12.7)
POTASSIUM SERPL-SCNC: 4.1 MMOL/L (ref 3.5–5.3)
PROT SERPL-MCNC: 7.3 G/DL (ref 6.4–8.4)
PROT UR STRIP-MCNC: ABNORMAL MG/DL
PSA SERPL-MCNC: 0.71 NG/ML (ref 0–4)
RBC # BLD AUTO: 4.39 MILLION/UL (ref 3.88–5.62)
RBC #/AREA URNS AUTO: ABNORMAL /HPF
SODIUM SERPL-SCNC: 135 MMOL/L (ref 135–147)
SP GR UR STRIP.AUTO: 1.02 (ref 1–1.03)
TRIGL SERPL-MCNC: 414 MG/DL (ref ?–150)
TSH SERPL DL<=0.05 MIU/L-ACNC: 1.67 UIU/ML (ref 0.45–4.5)
URATE SERPL-MCNC: 6 MG/DL (ref 3.5–8.5)
UROBILINOGEN UR STRIP-ACNC: <2 MG/DL
WBC # BLD AUTO: 5.35 THOUSAND/UL (ref 4.31–10.16)
WBC #/AREA URNS AUTO: ABNORMAL /HPF

## 2025-03-06 PROCEDURE — 84443 ASSAY THYROID STIM HORMONE: CPT

## 2025-03-06 PROCEDURE — G0103 PSA SCREENING: HCPCS

## 2025-03-06 PROCEDURE — 85025 COMPLETE CBC W/AUTO DIFF WBC: CPT

## 2025-03-06 PROCEDURE — 80053 COMPREHEN METABOLIC PANEL: CPT

## 2025-03-06 PROCEDURE — 36415 COLL VENOUS BLD VENIPUNCTURE: CPT

## 2025-03-06 PROCEDURE — 80061 LIPID PANEL: CPT

## 2025-03-06 PROCEDURE — 81001 URINALYSIS AUTO W/SCOPE: CPT

## 2025-03-06 PROCEDURE — 84550 ASSAY OF BLOOD/URIC ACID: CPT

## 2025-03-06 PROCEDURE — 83735 ASSAY OF MAGNESIUM: CPT

## 2025-03-11 ENCOUNTER — OFFICE VISIT (OUTPATIENT)
Age: 65
End: 2025-03-11
Payer: MEDICARE

## 2025-03-11 VITALS
TEMPERATURE: 98.3 F | WEIGHT: 217 LBS | HEART RATE: 91 BPM | HEIGHT: 69 IN | DIASTOLIC BLOOD PRESSURE: 90 MMHG | BODY MASS INDEX: 32.14 KG/M2 | RESPIRATION RATE: 16 BRPM | OXYGEN SATURATION: 96 % | SYSTOLIC BLOOD PRESSURE: 130 MMHG

## 2025-03-11 DIAGNOSIS — E66.09 CLASS 1 OBESITY DUE TO EXCESS CALORIES WITH SERIOUS COMORBIDITY AND BODY MASS INDEX (BMI) OF 31.0 TO 31.9 IN ADULT: ICD-10-CM

## 2025-03-11 DIAGNOSIS — Z86.79 S/P ABLATION OF ATRIAL FIBRILLATION: Chronic | ICD-10-CM

## 2025-03-11 DIAGNOSIS — I25.10 CORONARY ARTERY DISEASE INVOLVING NATIVE HEART, UNSPECIFIED VESSEL OR LESION TYPE, UNSPECIFIED WHETHER ANGINA PRESENT: Chronic | ICD-10-CM

## 2025-03-11 DIAGNOSIS — Z98.890 S/P ABLATION OF ATRIAL FIBRILLATION: Chronic | ICD-10-CM

## 2025-03-11 DIAGNOSIS — Z12.11 SCREENING FOR COLORECTAL CANCER: ICD-10-CM

## 2025-03-11 DIAGNOSIS — I48.92 ATRIAL FIBRILLATION AND FLUTTER (HCC): Chronic | ICD-10-CM

## 2025-03-11 DIAGNOSIS — Z12.12 SCREENING FOR COLORECTAL CANCER: ICD-10-CM

## 2025-03-11 DIAGNOSIS — I10 ESSENTIAL HYPERTENSION: Chronic | ICD-10-CM

## 2025-03-11 DIAGNOSIS — I48.91 ATRIAL FIBRILLATION AND FLUTTER (HCC): Chronic | ICD-10-CM

## 2025-03-11 DIAGNOSIS — R63.4 WEIGHT LOSS, ABNORMAL: ICD-10-CM

## 2025-03-11 DIAGNOSIS — Z00.00 MEDICARE ANNUAL WELLNESS VISIT, SUBSEQUENT: ICD-10-CM

## 2025-03-11 DIAGNOSIS — Z87.39 HISTORY OF GOUT: ICD-10-CM

## 2025-03-11 DIAGNOSIS — E66.811 CLASS 1 OBESITY DUE TO EXCESS CALORIES WITH SERIOUS COMORBIDITY AND BODY MASS INDEX (BMI) OF 31.0 TO 31.9 IN ADULT: ICD-10-CM

## 2025-03-11 DIAGNOSIS — R79.89 LIVER FUNCTION TEST ABNORMALITY: Primary | ICD-10-CM

## 2025-03-11 PROCEDURE — G2211 COMPLEX E/M VISIT ADD ON: HCPCS | Performed by: INTERNAL MEDICINE

## 2025-03-11 PROCEDURE — G0439 PPPS, SUBSEQ VISIT: HCPCS | Performed by: INTERNAL MEDICINE

## 2025-03-11 PROCEDURE — 99214 OFFICE O/P EST MOD 30 MIN: CPT | Performed by: INTERNAL MEDICINE

## 2025-03-11 NOTE — ASSESSMENT & PLAN NOTE
Diastolic pressure is chronically elevated.  Pressure today is 90.  This is unchanged with previous evaluations including cardiology has been made no medication changes

## 2025-03-11 NOTE — ASSESSMENT & PLAN NOTE
History of atrial fibrillation with ablation procedures in the past.  She remains on antirhythm is specifically Tikosyn and also for rate controlled metoprolol.  Along with this he is on Xarelto as an anticoagulant.  To continue to follow-up with cardiology.  Denies any chest pain or pressure and no specific increasing shortness of breath with activity.

## 2025-03-11 NOTE — PROGRESS NOTES
Name: Bernardino Nunez      : 1960      MRN: [id number]  Encounter Provider: Pepe Chakraborty DO  Encounter Date: 3/11/2025   Encounter department: [facility] INTERNAL MEDICINE    Assessment & Plan  Screening for colorectal cancer    Orders:  •  iFOBT/FIT; Future    Coronary artery disease involving native heart, unspecified vessel or lesion type, unspecified whether angina present  Coronary artery disease with no evidence of recurrence.  Does have a history of heart failure and an midrange ejection fraction.  They continue to follow-up with cardiology and they continue to monitor his cardiac function.         Medicare annual wellness visit, subsequent  64-year-old male who history of medical problems outlined previously who is here today for repeat Medicare wellness visit.  He did have labs performed prior to the visit today and we did discuss results at length with the patient.  Patient states in general doing about the same with no new specific complaints or concerns.  He continues to follow-up with his cardiologist intends to make an appointment in the near future for follow-up repeat colonoscopy with his colorectal specialist.  He does have a concern with ongoing weight loss of unknown etiology.  States that he has no changes with his appetite and no bowel or bladder difficulties.  He will be going back to be seen by his colorectal specialist for evaluation repeat colonoscopy with a history of colon cancer.  Also establish care with a gastroenterologist for abnormalities with liver function testing.         Essential hypertension  Diastolic pressure is chronically elevated.  Pressure today is 90.  This is unchanged with previous evaluations including cardiology has been made no medication changes         Atrial fibrillation and flutter (HCC)  Heart rate is controlled with some ectopy on exam.  Remains on medication as per cardiology.         Class 1 obesity due to excess calories with serious  comorbidity and body mass index (BMI) of 31.0 to 31.9 in adult           Liver function test abnormality    Orders:  •  Ambulatory Referral to Gastroenterology; Future    Weight loss, abnormal  States that his weight loss is not intentional.  Has had no changes with his activity level or diet.  Lab testing does have an elevation in liver function testing and we will arrange for evaluation by gastroenterology to see if this is related to his weight loss.  Physical exam as noted with no acute abnormalities.    Orders:  •  Ambulatory Referral to Gastroenterology; Future    S/P ablation of atrial fibrillation  History of atrial fibrillation with ablation procedures in the past.  She remains on antirhythm is specifically Tikosyn and also for rate controlled metoprolol.  Along with this he is on Xarelto as an anticoagulant.  To continue to follow-up with cardiology.  Denies any chest pain or pressure and no specific increasing shortness of breath with activity.         History of gout  Remains on allopurinol and uric acid level is stable              History of Present Illness     Patient is a 64-year-old male history of medical problems outlined previously who is here today for repeat Medicare wellness visit.  He did have extensive lab testing performed prior to the visit and we did discuss the results at length with the patient.  Patient states that he is doing relatively well but he is having continue slow but steady weight loss unknown etiology with no change in diet.  He is due for reevaluation colorectal screening with his colon rectal's per physician.  Continues to follow-up with cardiology.  Review of Systems   Constitutional:  Positive for unexpected weight change. Negative for activity change, appetite change, chills, diaphoresis, fatigue and fever.   HENT: Negative.     Eyes: Negative.    Respiratory: Negative.     Cardiovascular: Negative.    Gastrointestinal: Negative.    Endocrine: Negative.     Genitourinary: Negative.    Musculoskeletal: Negative.    Skin: Negative.    Allergic/Immunologic: Negative.    Neurological: Negative.    Hematological: Negative.    Psychiatric/Behavioral: Negative.     Past Medical History:   Diagnosis Date   • A-fib (HCC)    • Cardiomyopathy, dilated (HCC) 2017   • Colon cancer (HCC)    • Colon polyp    • Coronary artery disease    • COVID-19 2021   • GI bleeding    • Gout    • Herpes zoster     last assessed 17   • Hypertension    • Mass of cecum 2020   • NSVT (nonsustained ventricular tachycardia) (HCC) 2018   • Shingles      Past Surgical History:   Procedure Laterality Date   • CARDIAC ELECTROPHYSIOLOGY PROCEDURE N/A 10/22/2024    Procedure: Cardiac eps/afib ablation PFA;  Surgeon: Jonathan Burrows MD;  [facility];  Service: Cardiology   • ELECTRICAL CARDIOVERSION  12/15/2017        • HEMICOLOECTOMY W/ ANASTOMOSIS N/A 2020    Procedure: RIGHT HEMICOLECTOMY WITH ILIOCOLIC ANASTAMOSIS;  Surgeon: ANT Villavicencio MD;  Location: [facility];  Service: Colorectal   • IR PORT PLACEMENT  2020   • IR PORT REMOVAL  2021     Family History   Problem Relation Age of Onset   • Coronary artery disease Mother    • Alcohol abuse Father    • Coronary artery disease Father    • Colon cancer Neg Hx      Social History     Tobacco Use   • Smoking status: Former     Current packs/day: 0.00     Types: Cigarettes     Quit date: 2017     Years since quittin.5   • Smokeless tobacco: Never   Vaping Use   • Vaping status: Never Used   Substance and Sexual Activity   • Alcohol use: Yes     Alcohol/week: 20.0 standard drinks of alcohol     Types: 20 Cans of beer per week     Comment: drink about 4 days a weeks usually drink beer   • Drug use: No   • Sexual activity: Yes     Partners: Female     Current Outpatient Medications on File Prior to Visit   Medication Sig   • allopurinol (ZYLOPRIM) 100 mg tablet Take 1 tablet (100 mg  "total) by mouth daily   • aspirin (ECOTRIN LOW STRENGTH) 81 mg EC tablet Take 1 tablet (81 mg total) by mouth daily   • atorvastatin (LIPITOR) 40 mg tablet Take 1 tablet (40 mg total) by mouth daily with dinner   • dofetilide (TIKOSYN) 125 mcg capsule Take 1 capsule (125 mcg total) by mouth every 12 (twelve) hours   • furosemide (LASIX) 20 mg tablet Take 1 tablet (20 mg total) by mouth daily   • losartan (COZAAR) 50 mg tablet Take 1 tablet (50 mg total) by mouth daily   • magnesium Oxide (MAG-OX) 400 mg TABS Take 1 tablet (400 mg total) by mouth 2 (two) times a day   • metoprolol succinate (TOPROL-XL) 50 mg 24 hr tablet TAKE 1.5 TABLETS BY MOUTH 2 TIMES A DAY.   • rivaroxaban (XARELTO) 20 mg tablet Take 1 tablet (20 mg total) by mouth daily with breakfast   • spironolactone (ALDACTONE) 25 mg tablet Take 0.5 tablets (12.5 mg total) by mouth daily     No Known Allergies  Immunization History   Administered Date(s) Administered   • COVID-19 PFIZER VACCINE 0.3 ML IM 04/03/2021, 04/25/2021   • INFLUENZA 11/12/2014, 10/14/2015, 11/09/2016, 10/11/2017   • Influenza Injectable, MDCK, Preservative Free, Quadrivalent, 0.5 mL 11/12/2019   • Influenza, injectable, quadrivalent, preservative free 0.5 mL 10/10/2018   • Influenza, seasonal, injectable 10/10/2017     Objective   /90   Pulse 91   Temp 98.3 °F (36.8 °C)   Resp 16   Ht 5' 9\" (1.753 m)   Wt 98.4 kg (217 lb)   SpO2 96%   BMI 32.05 kg/m²     Physical Exam  Vitals and nursing note reviewed.   Constitutional:       General: He is not in acute distress.     Appearance: Normal appearance. He is obese. He is not ill-appearing, toxic-appearing or diaphoretic.      Comments: 64-year-old male who is awake alert, obese, flat affect   HENT:      Head: Normocephalic and atraumatic.      Right Ear: Tympanic membrane, ear canal and external ear normal. There is no impacted cerumen.      Left Ear: Tympanic membrane, ear canal and external ear normal. There is no impacted " cerumen.      Nose: Nose normal. No congestion or rhinorrhea.      Mouth/Throat:      Mouth: Mucous membranes are moist.      Pharynx: Oropharynx is clear. No oropharyngeal exudate or posterior oropharyngeal erythema.      Comments: Patient has significant obstruction of oral airway.  Refuses evaluation for CPAP  Eyes:      General: No scleral icterus.        Right eye: No discharge.         Left eye: No discharge.      Extraocular Movements: Extraocular movements intact.      Conjunctiva/sclera: Conjunctivae normal.      Pupils: Pupils are equal, round, and reactive to light.   Neck:      Vascular: No carotid bruit.   Cardiovascular:      Rate and Rhythm: Normal rate and regular rhythm.      Heart sounds: Normal heart sounds.      No friction rub. No gallop.   Pulmonary:      Effort: Pulmonary effort is normal. No respiratory distress.      Breath sounds: Normal breath sounds. No stridor. No wheezing, rhonchi or rales.   Chest:      Chest wall: No tenderness.   Abdominal:      General: Abdomen is flat. Bowel sounds are normal. There is no distension.      Palpations: Abdomen is soft. There is no mass.      Tenderness: There is no abdominal tenderness. There is no right CVA tenderness, left CVA tenderness, guarding or rebound.      Hernia: No hernia is present.   Genitourinary:     Comments: Defers evaluation states he will be seeing his colorectal physician in near future for follow-up of colon cancer.  PSA was checked  Musculoskeletal:         General: No swelling, tenderness, deformity or signs of injury. Normal range of motion.      Cervical back: Normal range of motion and neck supple. No rigidity or tenderness.      Right lower leg: No edema.      Left lower leg: No edema.   Lymphadenopathy:      Cervical: No cervical adenopathy.   Skin:     General: Skin is warm and dry.      Capillary Refill: Capillary refill takes less than 2 seconds.      Coloration: Skin is not jaundiced or pale.      Findings: No  bruising, erythema, lesion or rash.   Neurological:      General: No focal deficit present.      Mental Status: He is alert and oriented to person, place, and time. Mental status is at baseline.      Cranial Nerves: No cranial nerve deficit.      Sensory: No sensory deficit.      Motor: No weakness.      Coordination: Coordination normal.      Gait: Gait normal.      Deep Tendon Reflexes: Reflexes normal.   Psychiatric:         Mood and Affect: Mood normal.         Behavior: Behavior normal.         Thought Content: Thought content normal.         Judgment: Judgment normal.

## 2025-03-11 NOTE — ASSESSMENT & PLAN NOTE
States that his weight loss is not intentional.  Has had no changes with his activity level or diet.  Lab testing does have an elevation in liver function testing and we will arrange for evaluation by gastroenterology to see if this is related to his weight loss.  Physical exam as noted with no acute abnormalities.    Orders:    Ambulatory Referral to Gastroenterology; Future

## 2025-03-11 NOTE — ASSESSMENT & PLAN NOTE
64-year-old male who history of medical problems outlined previously who is here today for repeat Medicare wellness visit.  He did have labs performed prior to the visit today and we did discuss results at length with the patient.  Patient states in general doing about the same with no new specific complaints or concerns.  He continues to follow-up with his cardiologist intends to make an appointment in the near future for follow-up repeat colonoscopy with his colorectal specialist.  He does have a concern with ongoing weight loss of unknown etiology.  States that he has no changes with his appetite and no bowel or bladder difficulties.  He will be going back to be seen by his colorectal specialist for evaluation repeat colonoscopy with a history of colon cancer.  Also establish care with a gastroenterologist for abnormalities with liver function testing.

## 2025-03-11 NOTE — ASSESSMENT & PLAN NOTE
Heart rate is controlled with some ectopy on exam.  Remains on medication as per cardiology.

## 2025-03-11 NOTE — ASSESSMENT & PLAN NOTE
Coronary artery disease with no evidence of recurrence.  Does have a history of heart failure and an midrange ejection fraction.  They continue to follow-up with cardiology and they continue to monitor his cardiac function.

## 2025-03-11 NOTE — PROGRESS NOTES
Name: Bernardino Nunez      : 1960      MRN: [id number]  Encounter Provider: Pepe Chakraborty DO  Encounter Date: 3/11/2025   Encounter department: [facility] INTERNAL MEDICINE    Assessment & Plan       Preventive health issues were discussed with patient, and age appropriate screening tests were ordered as noted in patient's After Visit Summary. Personalized health advice and appropriate referrals for health education or preventive services given if needed, as noted in patient's After Visit Summary.    History of Present Illness     HPI   Patient Care Team:  Pepe Chakraborty DO as PCP - General (Internal Medicine)  Bryce Alcaraz MD as Cardiologist (Cardiology)  Jonathan Burrows MD (Cardiology)    Review of Systems  Medical History Reviewed by provider this encounter:       Annual Wellness Visit Questionnaire   Bernardino is here for his Subsequent Wellness visit.     Health Risk Assessment:   Patient rates overall health as fair. Patient feels that their physical health rating is same. Patient is satisfied with their life. Eyesight was rated as same. Hearing was rated as same. Patient feels that their emotional and mental health rating is same. Patients states they are never, rarely angry. Patient states they are sometimes unusually tired/fatigued. Pain experienced in the last 7 days has been none. Patient states that he has experienced no weight loss or gain in last 6 months.     Depression Screening:   PHQ-2 Score: 0      Fall Risk Screening:   In the past year, patient has experienced: no history of falling in past year      Home Safety:  Patient does not have trouble with stairs inside or outside of their home. Patient has working smoke alarms and has working carbon monoxide detector. Home safety hazards include: none.     Nutrition:   Current diet is Regular.     Medications:   Patient is currently taking over-the-counter supplements. OTC medications include: see medication list. Patient is able to  manage medications.     Activities of Daily Living (ADLs)/Instrumental Activities of Daily Living (IADLs):   Walk and transfer into and out of bed and chair?: Yes  Dress and groom yourself?: Yes    Bathe or shower yourself?: Yes    Feed yourself? Yes  Do your laundry/housekeeping?: Yes  Manage your money, pay your bills and track your expenses?: Yes  Make your own meals?: Yes    Do your own shopping?: Yes    Previous Hospitalizations:   Any hospitalizations or ED visits within the last 12 months?: Yes    How many hospitalizations have you had in the last year?: 1-2    Advance Care Planning:   Living will: No    Durable POA for healthcare: No    Advanced directive: No      PREVENTIVE SCREENINGS      Cardiovascular Screening:    General: Screening Current      Diabetes Screening:     General: Screening Current      Colorectal Cancer Screening:     General: History Colorectal Cancer      Prostate Cancer Screening:    General: Screening Current      Abdominal Aortic Aneurysm (AAA) Screening:    Risk factors include: tobacco use        Lung Cancer Screening:     General: Screening Not Indicated      Hepatitis C Screening:    General: Screening Current    Screening, Brief Intervention, and Referral to Treatment (SBIRT)     Screening  Typical number of drinks in a day: 3  Typical number of drinks in a week: 21  Interpretation: Low risk drinking behavior.    Single Item Drug Screening:  How often have you used an illegal drug (including marijuana) or a prescription medication for non-medical reasons in the past year? never    Single Item Drug Screen Score: 0  Interpretation: Negative screen for possible drug use disorder    Social Drivers of Health     Food Insecurity: No Food Insecurity (2/11/2025)    Hunger Vital Sign     Worried About Running Out of Food in the Last Year: Never true     Ran Out of Food in the Last Year: Never true   Transportation Needs: No Transportation Needs (2/11/2025)    PRAPARE - Transportation      "Lack of Transportation (Medical): No     Lack of Transportation (Non-Medical): No   Housing Stability: Low Risk  (2/11/2025)    Housing Stability Vital Sign     Unable to Pay for Housing in the Last Year: No     Number of Times Moved in the Last Year: 0     Homeless in the Last Year: No   Utilities: Not At Risk (2/11/2025)    [facility] Utilities     Threatened with loss of utilities: No     No results found.    Objective   Pulse 91   Temp 98.3 °F (36.8 °C)   Resp 16   Ht 5' 9\" (1.753 m)   Wt 98.4 kg (217 lb)   SpO2 96%   BMI 32.05 kg/m²     Physical Exam    "

## 2025-03-13 ENCOUNTER — REMOTE DEVICE CLINIC VISIT (OUTPATIENT)
Dept: CARDIOLOGY CLINIC | Facility: CLINIC | Age: 65
End: 2025-03-13

## 2025-03-13 ENCOUNTER — RESULTS FOLLOW-UP (OUTPATIENT)
Dept: CARDIOLOGY CLINIC | Facility: CLINIC | Age: 65
End: 2025-03-13

## 2025-03-13 ENCOUNTER — RESULTS FOLLOW-UP (OUTPATIENT)
Dept: NON INVASIVE DIAGNOSTICS | Facility: HOSPITAL | Age: 65
End: 2025-03-13

## 2025-03-13 DIAGNOSIS — I48.91 ATRIAL FIBRILLATION WITH RVR (HCC): Primary | ICD-10-CM

## 2025-03-13 PROCEDURE — RECHECK: Performed by: INTERNAL MEDICINE

## 2025-03-13 NOTE — PROGRESS NOTES
"MDT LNQ22/ ACTIVE SYSTEM IS MRI CONDITIONAL   NON-BILLABLE PER HM AND JM. CARELINK TRANSMISSION: LOOP RECORDER. PRESENTING RHYTHM NSR @ 74 BPM. BATTERY STATUS \"OK.\" NO PATIENT OR DEVICE ACTIVATED EPISODES. HOWEVER THERE IS AN EGRAM SHOWING PACs AND PVCs. HX OF PAF. PT TAKES XARELTO, TIKOSYN ADN METOPROLOL SUCC. NORMAL DEVICE FUNCTION. DL   "

## 2025-03-13 NOTE — TELEPHONE ENCOUNTER
----- Message from Madyson Franco PA-C sent at 3/13/2025  1:10 PM EDT -----  Please let this patient know that his magnesium is stable but still on the low side. Please make sure he has been taking his magnesium twice a day, and we will continue to monitor moving forward. Thanks!

## 2025-03-19 ENCOUNTER — CONSULT (OUTPATIENT)
Age: 65
End: 2025-03-19
Payer: MEDICARE

## 2025-03-19 ENCOUNTER — PREP FOR PROCEDURE (OUTPATIENT)
Age: 65
End: 2025-03-19

## 2025-03-19 ENCOUNTER — TELEPHONE (OUTPATIENT)
Age: 65
End: 2025-03-19

## 2025-03-19 VITALS
SYSTOLIC BLOOD PRESSURE: 138 MMHG | BODY MASS INDEX: 32.14 KG/M2 | HEIGHT: 69 IN | WEIGHT: 217 LBS | DIASTOLIC BLOOD PRESSURE: 82 MMHG

## 2025-03-19 DIAGNOSIS — C18.2 CANCER OF ASCENDING COLON (HCC): Primary | ICD-10-CM

## 2025-03-19 DIAGNOSIS — Z90.49 S/P RIGHT HEMICOLECTOMY: Chronic | ICD-10-CM

## 2025-03-19 DIAGNOSIS — Z90.49 S/P RIGHT HEMICOLECTOMY: ICD-10-CM

## 2025-03-19 PROCEDURE — 99214 OFFICE O/P EST MOD 30 MIN: CPT | Performed by: COLON & RECTAL SURGERY

## 2025-03-19 NOTE — PROGRESS NOTES
Colon and Rectal Surgery   Bernardino Nunez 64 y.o. male MRN [id number]  Encounter: [id number]  03/19/25 1:18 PM            Assessment: Bernardino Nunez is a 64 y.o. male who had T3N1a ascending colon cancer in 2020. Small, indeterminate, pulmonary nodules were present at that time.      Plan:   Cancer of ascending colon (HCC)  The patient is here for surveillance of colon cancer.  He had fallen off of surveillance plan after receiving chemotherapy.  This was for a T3 N1a ascending colon cancer.  Metastatic disease was never proven but there were some small pulmonary nodules at the time of his diagnosis.    He is doing quite well at this time.  His exam is benign.  I recommend colonoscopy and a CEA level.  He is agreeable to this.  Colonoscopy risks, not limited to bleeding, perforated colon, need for surgery, and missed lesions were discussed. Alternatives were discussed. Questions were answered. He agreed to the procedure.        Subjective     HPI    Bernardino Nunez is a 64 y.o. male with a personal history of ascending colon cancer, status post right hemicolectomy with iliocolic anastomosis on 08/28/2020 and chemotherapy.    The patient notes 7-10 bowel movements, liquid stools, with related occasional urgency and rarely episodes of incontinence.     Last CT chest abdomen and pelvis on 1/17/2023.     Last colonoscopy performed on 11/24/2021, with a 1 year recall.       Lab Results   Component Value Date    CEA 1.9 01/20/2023     Lab Results   Component Value Date    WBC 5.35 03/06/2025    HGB 14.4 03/06/2025    HCT 44.8 03/06/2025     (H) 03/06/2025     03/06/2025     Lab Results   Component Value Date    SODIUM 135 03/06/2025    K 4.1 03/06/2025     03/06/2025    CO2 26 03/06/2025    AGAP 9 03/06/2025    BUN 15 03/06/2025    CREATININE 1.11 03/06/2025    GLUC 100 10/26/2024    GLUF 89 03/06/2025    CALCIUM 8.7 03/06/2025    AST 69 (H) 03/06/2025    ALT 57 (H) 03/06/2025    ALKPHOS 65 03/06/2025     TP 7.3 03/06/2025    TBILI 1.74 (H) 03/06/2025    EGFR 69 03/06/2025     Historical Information   Past Medical History:   Diagnosis Date    A-fib (HCC)     Cardiomyopathy, dilated (HCC) 12/18/2017    Colon cancer (HCC)     Colon polyp     Coronary artery disease     COVID-19 12/21/2021    GI bleeding     Gout     Herpes zoster     last assessed 12/18/17    Hypertension     Mass of cecum 08/25/2020    NSVT (nonsustained ventricular tachycardia) (HCC) 07/27/2018    Shingles      Past Surgical History:   Procedure Laterality Date    CARDIAC ELECTROPHYSIOLOGY PROCEDURE N/A 10/22/2024    Procedure: Cardiac eps/afib ablation PFA;  Surgeon: Jonathan Burrows MD;  [facility];  Service: Cardiology    ELECTRICAL CARDIOVERSION  12/15/2017         HEMICOLOECTOMY W/ ANASTOMOSIS N/A 8/28/2020    Procedure: RIGHT HEMICOLECTOMY WITH ILIOCOLIC ANASTAMOSIS;  Surgeon: ANT Villavicencio MD;  Location: [facility];  Service: Colorectal    IR PORT PLACEMENT  9/21/2020    IR PORT REMOVAL  4/5/2021       Meds/Allergies       Current Outpatient Medications:     allopurinol (ZYLOPRIM) 100 mg tablet, Take 1 tablet (100 mg total) by mouth daily, Disp: 90 tablet, Rfl: 3    atorvastatin (LIPITOR) 40 mg tablet, Take 1 tablet (40 mg total) by mouth daily with dinner, Disp: 90 tablet, Rfl: 0    dofetilide (TIKOSYN) 125 mcg capsule, Take 1 capsule (125 mcg total) by mouth every 12 (twelve) hours, Disp: 28 capsule, Rfl: 0    furosemide (LASIX) 20 mg tablet, Take 1 tablet (20 mg total) by mouth daily, Disp: 90 tablet, Rfl: 3    losartan (COZAAR) 50 mg tablet, Take 1 tablet (50 mg total) by mouth daily, Disp: 90 tablet, Rfl: 0    magnesium Oxide (MAG-OX) 400 mg TABS, Take 1 tablet (400 mg total) by mouth 2 (two) times a day, Disp: 60 tablet, Rfl: 6    metoprolol succinate (TOPROL-XL) 50 mg 24 hr tablet, TAKE 1.5 TABLETS BY MOUTH 2 TIMES A DAY., Disp: 270 tablet, Rfl: 4    rivaroxaban (XARELTO) 20 mg tablet, Take 1 tablet (20 mg  "total) by mouth daily with breakfast, Disp: 28 tablet, Rfl: 0    spironolactone (ALDACTONE) 25 mg tablet, Take 0.5 tablets (12.5 mg total) by mouth daily, Disp: 45 tablet, Rfl: 1    aspirin (ECOTRIN LOW STRENGTH) 81 mg EC tablet, Take 1 tablet (81 mg total) by mouth daily (Patient not taking: Reported on 3/19/2025), Disp: 30 tablet, Rfl: 0  No Known Allergies    Social History   Social History     Substance and Sexual Activity   Drug Use No     Social History     Tobacco Use   Smoking Status Former    Current packs/day: 0.00    Types: Cigarettes    Quit date: 2017    Years since quittin.6   Smokeless Tobacco Never         Family History   Problem Relation Age of Onset    Coronary artery disease Mother     Alcohol abuse Father     Coronary artery disease Father     Colon cancer Neg Hx          Review of Systems   Constitutional: Negative.    Respiratory: Negative.     Cardiovascular: Negative.    Gastrointestinal:  Positive for diarrhea. Negative for abdominal distention, abdominal pain, anal bleeding, blood in stool, constipation and nausea.       Objective   Current Vitals:  Vitals:    25 1251   BP: 138/82   Weight: 98.4 kg (217 lb)   Height: 5' 9\" (1.753 m)         Physical Exam  Constitutional:       Appearance: Normal appearance.   Cardiovascular:      Rate and Rhythm: Normal rate and regular rhythm.   Pulmonary:      Effort: Pulmonary effort is normal.      Breath sounds: Normal breath sounds.   Abdominal:      General: Abdomen is flat.      Palpations: Abdomen is soft. There is no mass.      Tenderness: There is no abdominal tenderness. There is no guarding.   Neurological:      General: No focal deficit present.      Mental Status: He is alert and oriented to person, place, and time.   Psychiatric:         Mood and Affect: Mood normal.         Behavior: Behavior normal.               "

## 2025-03-19 NOTE — ASSESSMENT & PLAN NOTE
The patient is here for surveillance of colon cancer.  He had fallen off of surveillance plan after receiving chemotherapy.  This was for a T3 N1a ascending colon cancer.  Metastatic disease was never proven but there were some small pulmonary nodules at the time of his diagnosis.    He is doing quite well at this time.  His exam is benign.  I recommend colonoscopy and a CEA level.  He is agreeable to this.  Colonoscopy risks, not limited to bleeding, perforated colon, need for surgery, and missed lesions were discussed. Alternatives were discussed. Questions were answered. He agreed to the procedure.

## 2025-03-19 NOTE — TELEPHONE ENCOUNTER
OV 3/19, hx of ca; BMI 32; on Xarelto, prescribed by Dr. Burrows         ----- Message from ANT Villavicencio MD sent at 3/19/2025  1:18 PM EDT -----  colonoscopy at [facility]

## 2025-03-19 NOTE — LETTER
Bernardino Mayra  [address] [facility] [address]        Location:  [facility] - [address] [place] [id number] - Sherice Rivera - Entrance A - 1st Floor    Performing Physician: ROBERTO CARLOS Villavicencio MD      DATE OF PROCEDURE: 5/12/2025 TIME OF PROCEDURE:                                 The OR/GI Lab will contact you the evening prior to your procedure with your exact arrival time.    We kindly ask that you immediately notify us of any need to cancel or reschedule your procedure.      WEEK BEFORE THE PROCEDURE:  Do not take Iron tablets for one week  5 days prior to the appointment AVOID vegetables and fruits with skins or seeds, nuts, corn, popcorn, and whole grain breads.  Purchase: One (1) 238-gram container of Miralax (polyethylene glycol 3350), four (4) 5 mg Dulcolax (bisacodyl) tablets, and 64-ounces of Gatorade (sports drink) - no red, orange, or purple. These may be purchased at any pharmacy without a prescription. Generic products are permissible.  Arrange responsible transportation for day of the procedure.      DAY BEFORE THE PROCEDURE:  CLEAR liquids only for entire day prior. Nothing red, orange or purple.  You MAY have:  Soda  Water  Broth Gatorade  Jell-O  Popsicles Coffee/tea without  Milk/creamer     YOU MAY NOT HAVE:  Solid foods  Milk and milk products  Juice with pulp          BOWEL PREPARATION: Includes: One (1) 238-gram container of Miralax (polyethylene glycol 3350), four (4) 5 mg Dulcolax (bisacodyl) tablets, and 64-ounces of Gatorade (sports drink). Preparation may be refrigerated. Entire bowel prep should be completed.    Afternoon before the procedure (2:00 pm - 5:00 pm):  Take two (2) 5 mg Dulcolax laxative tablets.    Evening before the procedure (6:00 pm):  Mix entire container of Miralax with 64-ounces of Gatorade and shake until all medication is dissolved.  Begin drinking solution. Drink an eight (8) ounce cup every 10-15 minutes until you have consumed half  (32 ounces) of the solution. Refrigerate remaining solution.    Night before the procedure (8:00 pm):  Take two (2) 5 mg Dulcolax laxative tablets.    Beginning 5 hours before your procedure:  Drink the remaining amount of prepared solution (32 ounces). Drink an eight (8) ounce cup every 10-15 minutes until you have consumed the remaining solution.      Bowel prep should be completed 4 hours prior to procedure time.  NOTHING TO EAT OR DRINK AFTER MIDNIGHT -EXCEPT YOUR BOWEL PREP                                                                                                                                                                                DAY OF THE PROCEDURE:  You may brush your teeth.  Leave all jewelry at home. Take out any facial piercings, if able.  Please arrive for your procedure as indicated by the OR / GI Lab / Endoscopy Unit. The hospital will contact you the day before with your exact arrival time.  Make sure you have arranged ahead of time for a responsible adult (18 or older) to accompany and drive you home after the procedure. Please discuss any transportation concerns with our staff prior to your procedure.  The effects of the anesthesia can persist for 24 hours. After receiving the sedation, you must exercise caution before engaging in any activity that could harm yourself and others (such as driving a car). Do not make any important decisions or do not drink any alcoholic beverages during this time period.  After your procedure, you may have anything you’d like to eat or drink. You will probably want to start with something light. Please include plenty of fluids. Avoid items that cause gas such as sodas and salads.      SPECIAL INSTRUCTIONS:    For patients currently taking blood thinners and/or antiplatelet therapy our office will contact the prescribing provider. Our office will contact you with any required changes to your medication regimen.  Blood thinner (i.e. - Coumadin, Pradaxa,  Lovenox, Xarelto, Eliquis)                Diabetes:  If you are Diabetic, please see separate Diabetic Instruction Sheet.  Prescribed medications:  Do not stop your aspirin, or any of your other medications (unless instructed otherwise).  Take the rest of your prescribed medications with small sips of water at least 2 hours prior to your procedure.      NOTHING TO EAT OR DRINK (INCLUDING CHEWING GUM) AFTER 12 MIDNIGHT PRIOR TO YOUR PROCEDURE EXCEPT YOUR BOWEL PREP.        For any questions or concerns related to your bowel preparation or pre-procedure instructions, please contact our office.  Thank you for choosing [facility]’s [facility]!    Revised 1/2023    [phone]

## 2025-03-21 DIAGNOSIS — I48.0 PAF (PAROXYSMAL ATRIAL FIBRILLATION) (HCC): ICD-10-CM

## 2025-03-21 NOTE — TELEPHONE ENCOUNTER
Called [employer] and requested a 90 day supply of Tikosyn 125mcg BID. Order ID# [id number].    Will contact pt once it arrives in the office which is 7-10 business days from the release date of 03/23/25.    Placed a 1 month supply of Xarelto 20mg at the  for pt to .

## 2025-03-21 NOTE — TELEPHONE ENCOUNTER
Patient is on his last bottle of Tikosyn needs reorder per the program    Also needs Xarelto samples he is on his last bottle as well. Please have samples available for  at , he will be in sometime next week to .    Please call patient when Tikosyn arrives in office for .

## 2025-03-21 NOTE — TELEPHONE ENCOUNTER
Samples of this drug were given to the patient, quantity 4 bottles, Lot Number [id number]. The following was discussed with the patient as indicated: administration, storage, potential interactions, side effects.

## 2025-03-25 ENCOUNTER — TELEPHONE (OUTPATIENT)
Dept: CARDIOLOGY CLINIC | Facility: CLINIC | Age: 65
End: 2025-03-25

## 2025-04-10 PROBLEM — Z00.00 MEDICARE ANNUAL WELLNESS VISIT, SUBSEQUENT: Status: RESOLVED | Noted: 2022-08-08 | Resolved: 2025-04-10

## 2025-04-15 ENCOUNTER — APPOINTMENT (OUTPATIENT)
Dept: LAB | Facility: CLINIC | Age: 65
End: 2025-04-15
Payer: MEDICARE

## 2025-04-15 DIAGNOSIS — C18.2 CANCER OF ASCENDING COLON (HCC): ICD-10-CM

## 2025-04-15 DIAGNOSIS — Z12.12 SCREENING FOR COLORECTAL CANCER: ICD-10-CM

## 2025-04-15 DIAGNOSIS — Z12.11 SCREENING FOR COLORECTAL CANCER: ICD-10-CM

## 2025-04-15 LAB — CEA SERPL-MCNC: 3.5 NG/ML (ref 0–3)

## 2025-04-15 PROCEDURE — 82378 CARCINOEMBRYONIC ANTIGEN: CPT

## 2025-04-15 PROCEDURE — 36415 COLL VENOUS BLD VENIPUNCTURE: CPT

## 2025-04-30 ENCOUNTER — RESULTS FOLLOW-UP (OUTPATIENT)
Dept: OTHER | Facility: HOSPITAL | Age: 65
End: 2025-04-30

## 2025-05-12 ENCOUNTER — HOSPITAL ENCOUNTER (OUTPATIENT)
Dept: RADIOLOGY | Facility: HOSPITAL | Age: 65
Discharge: HOME | End: 2025-05-12
Attending: COLON & RECTAL SURGERY
Payer: MEDICARE

## 2025-05-12 DIAGNOSIS — R97.0 ELEVATED CEA: ICD-10-CM

## 2025-05-12 DIAGNOSIS — C18.2 CANCER OF ASCENDING COLON (HCC): ICD-10-CM

## 2025-05-12 PROCEDURE — 71260 CT THORAX DX C+: CPT

## 2025-05-12 PROCEDURE — 74177 CT ABD & PELVIS W/CONTRAST: CPT

## 2025-05-12 RX ADMIN — IOHEXOL 90 ML: 350 INJECTION, SOLUTION INTRAVENOUS at 12:17

## 2025-05-19 ENCOUNTER — RESULTS FOLLOW-UP (OUTPATIENT)
Dept: OTHER | Facility: HOSPITAL | Age: 65
End: 2025-05-19

## 2025-05-19 NOTE — ED ATTENDING ATTESTATION
Donte Rendon MD, saw and evaluated the patient  I have discussed the patient with the resident/non-physician practitioner and agree with the resident's/non-physician practitioner's findings, Plan of Care, and MDM as documented in the resident's/non-physician practitioner's note, except where noted  All available labs and Radiology studies were reviewed  At this point I agree with the current assessment done in the Emergency Department  I have conducted an independent evaluation of this patient a history and physical is as follows: The patient presents after a syncopal episode    The patient states he worked all day he went to a bar he had to but wiser light beers while he was sitting at the bar stool he became lightheaded he said things started to spin he got very nauseous and sweaty and then he slumped over onto his friend who was in the stool next to him his other friend grabbed his arm he did not fall to the ground and did not strike his head patient has a history of paroxysmal atrial fibrillation that required cardioversion in the past he also were Life Vest for period of time   according to the patient has no known coronary disease however reviewing his cardiac catheterization he has an 80% lesion he apparently had an initial ejection fraction of 20% range which had improved to 40% after was cardioverted   at the present time the patient is asymptomatic he has no chest pain or chest pressure  EXAM:   Const:   well appearing   NAD     HEENT:  NCAT    sclera anicteric conjunctiva pink   throat clear, MMM    Neck:   supple  no meningismus  no jvd   no bruits  no  midline tenderness   Lungs:   clear  CW non-tender   No creiptation  Heart:   RRR no m/g/r  Normal pulses  Abd:   soft nt nd pos bs   Ext:    normal nontender  No edema  Neruo:   CN 2 -12 intact  motor intact 5/5 sensory intact cerebellar intact       Gait normal    IMPRESSION: syncope  PLAN: concern for a arrhythmia in a patient with Here with grandma.     patient will be admitted to the hospital if he is agreeable   high-risk syncope   chest x-ray negative EKG no acute ischemic changes noted   troponin 0 16 patient given aspirin  Critical Care Time  CritCare Time    Procedures

## 2025-06-04 ENCOUNTER — REMOTE DEVICE CLINIC VISIT (OUTPATIENT)
Dept: CARDIOLOGY CLINIC | Facility: CLINIC | Age: 65
End: 2025-06-04
Payer: MEDICARE

## 2025-06-04 ENCOUNTER — RESULTS FOLLOW-UP (OUTPATIENT)
Dept: CARDIOLOGY CLINIC | Facility: CLINIC | Age: 65
End: 2025-06-04

## 2025-06-04 DIAGNOSIS — I48.0 PAF (PAROXYSMAL ATRIAL FIBRILLATION) (HCC): Primary | ICD-10-CM

## 2025-06-04 PROCEDURE — 93298 REM INTERROG DEV EVAL SCRMS: CPT | Performed by: INTERNAL MEDICINE

## 2025-06-04 NOTE — PROGRESS NOTES
"MDT LNQ22/ ACTIVE SYSTEM IS MRI CONDITIONAL   NON-BILLABLE. CARELINK TRANSMISSION: NONBILLABLE PER HM AND JM. LOOP RECORDER. PRESENTING RHYTHM NSR @ 75 BPM. BATTERY STATUS \"OK.\" NO PATIENT OR DEVICE ACTIVATED EPISODES. NORMAL DEVICE FUNCTION. DL   "

## 2025-06-11 ENCOUNTER — TELEPHONE (OUTPATIENT)
Dept: NON INVASIVE DIAGNOSTICS | Facility: CLINIC | Age: 65
End: 2025-06-11

## 2025-06-11 DIAGNOSIS — I48.0 PAF (PAROXYSMAL ATRIAL FIBRILLATION) (HCC): ICD-10-CM

## 2025-06-11 NOTE — TELEPHONE ENCOUNTER
Refill for Tikosyn was placed with [employer] assistance program. We will be receiving the mediation in 7-10 business days. They informed us the patient has 0 refills and will be expiring from the program 9/6/2025.     Order ID # [id number]    The Xarelto samples will be ready for the patient to  on Friday.

## 2025-06-11 NOTE — TELEPHONE ENCOUNTER
Patient is on his last bottle of Tikosyn and needs reorder per the program.    Also needs Xarelto samples. He has appointment on Friday with Dr. Burrows and would like them available for  that day.     Please call patient when Tikosyn arrives in office for .

## 2025-06-12 DIAGNOSIS — I50.22 CHRONIC SYSTOLIC HEART FAILURE (HCC): ICD-10-CM

## 2025-06-12 RX ORDER — SPIRONOLACTONE 25 MG/1
12.5 TABLET ORAL DAILY
Qty: 45 TABLET | Refills: 1 | Status: SHIPPED | OUTPATIENT
Start: 2025-06-12

## 2025-06-12 NOTE — TELEPHONE ENCOUNTER
Yecenia arrived in office for the patient to be picked up tomorrow.     180 total capsules   Lot # [id number]   Exp: Feb 2026

## 2025-06-13 ENCOUNTER — OFFICE VISIT (OUTPATIENT)
Dept: CARDIOLOGY CLINIC | Facility: CLINIC | Age: 65
End: 2025-06-13
Payer: MEDICARE

## 2025-06-13 VITALS
WEIGHT: 220 LBS | BODY MASS INDEX: 32.58 KG/M2 | SYSTOLIC BLOOD PRESSURE: 150 MMHG | HEART RATE: 85 BPM | HEIGHT: 69 IN | DIASTOLIC BLOOD PRESSURE: 90 MMHG

## 2025-06-13 DIAGNOSIS — I48.91 ATRIAL FIBRILLATION AND FLUTTER (HCC): Primary | ICD-10-CM

## 2025-06-13 DIAGNOSIS — I48.92 ATRIAL FIBRILLATION AND FLUTTER (HCC): Primary | ICD-10-CM

## 2025-06-13 LAB
ATRIAL RATE: 85 BPM
P AXIS: 60 DEGREES
PR INTERVAL: 140 MS
QRS AXIS: 65 DEGREES
QRSD INTERVAL: 92 MS
QT INTERVAL: 406 MS
QTC INTERVAL: 483 MS
T WAVE AXIS: 63 DEGREES
VENTRICULAR RATE: 85 BPM

## 2025-06-13 PROCEDURE — 99215 OFFICE O/P EST HI 40 MIN: CPT | Performed by: INTERNAL MEDICINE

## 2025-06-13 PROCEDURE — 93000 ELECTROCARDIOGRAM COMPLETE: CPT | Performed by: INTERNAL MEDICINE

## 2025-06-13 NOTE — PROGRESS NOTES
EPS Follow-up Patient Evaluation - Bernardino Nunez 65 y.o. male MRN: [id number]        CC/HPI:   It was a pleasure to see Bernardino Nunez in our arrhythmia clinic at [facility]. As you know he is a 65 y.o.  Man with recovered non-ischemic/tachy-mediated cardiomyopathy due to atrial fibrillation, colon cancer status post hemicolectomy (on chemotherapy), coronary artery disease (left heart catheterization 2018-80% ramus, 60% PDA), morbid obesity,.     He was diagnosed with atrial fibrillation on several years ago. He had DCCV on 12/15/2017.  His sinus rhythm was maintained with metoprolol and digoxin. He was setup for outpatient stress test on 8/21/2020 but was noted to be in atrial fibrillation with RVR on test day. He was sent over to ER where he was found to be anemic. Further work-up revealed Stage III colon cancer. He is now status post right hemicolectomy s/p ileocolic anastomosis.  He is currently on chemotherapy which is going to be completed in December 2020.  He underwent cardioversion on 9/24/2020. However at a follow-up appointment, he was noted to be back in atrial fibrillation. He was asymptomatic at the time though. Rate control strategy was employed and diltiazem 120 mg daily dose was started.      He had a an echocardiogram in August 2020 that showed ejection fraction of 50%.  He did not have any overt signs of congestive heart failure and therefore sinus rhythm was not restored.  He is currently maintained on metoprolol succinate 100 mg twice daily, spironolactone 12.5 mg daily and diltiazem 120 mg daily.    4/30/2021:  He reported doing well since cardioversion. We obtained Ziopatch in November of 2020.  It showed small burden of atrial fibrillation however his ventricular rates were elevated during those episodes.  We discussed increasing AV mami blocking agents but he was already on metoprolol high dose and diltiazem.  We discussed alternative option would be an  ablation which he agreed to it.  He underwent the procedure on January 25th 2021.  He had posterior wall isolation and pulmonary vein isolation.  He did have significant scar on the posterior wall.  He reports doing well since the procedure.  He denies any palpitations.  He denies any swelling in lower extremities.  He does report urinating frequently because of the water pill.  He also has loose bowel movements which he believes is due to Entresto.    Office visit 2/29/24:  Patient's loop monitor had shown brief episodes of atrial fibrillation with RVR.  He reportd today he had not been taking medications for the past 6-7 months due to financial hardships. He also drinks beers 4 times a week, and drinks about 5 beers in one setting. He had only taken Eliquis and allopurinol. He did not feel any of the afib episodes. He is dealing with his mother who has colostomy bag.     Interval history:   Bernardino presents for a follow-up. He underwent ablation on 10/22/24 with RSPV, LSPV re-isolation, PW isolation, empiric CTI line and anterior mitral flutter line. He was also started on dofetilide.     Remains in normal rhythm. He does not check BP at home.     He denies any chest pain, nausea/vomiting, dyspnea on exertion, fever/chills, swelling in legs, orthopnea, PND or syncope.     Past Medical History:  Past Medical History:   Diagnosis Date    A-fib (HCC)     Cardiomyopathy, dilated (HCC) 12/18/2017    Colon cancer (HCC)     Colon polyp     Coronary artery disease     COVID-19 12/21/2021    GI bleeding     Gout     Herpes zoster     last assessed 12/18/17    Hypertension     Mass of cecum 08/25/2020    NSVT (nonsustained ventricular tachycardia) ([facility]) 07/27/2018    Shingles        Medications:      Current Outpatient Medications:     allopurinol (ZYLOPRIM) 100 mg tablet, Take 1 tablet (100 mg total) by mouth daily, Disp: 90 tablet, Rfl: 3    aspirin (ECOTRIN LOW STRENGTH) 81 mg EC tablet, Take 1 tablet (81 mg total) by  mouth daily, Disp: 30 tablet, Rfl: 0    atorvastatin (LIPITOR) 40 mg tablet, Take 1 tablet (40 mg total) by mouth daily with dinner, Disp: 90 tablet, Rfl: 0    dofetilide (TIKOSYN) 125 mcg capsule, Take 1 capsule (125 mcg total) by mouth every 12 (twelve) hours, Disp: 28 capsule, Rfl: 0    furosemide (LASIX) 20 mg tablet, Take 1 tablet (20 mg total) by mouth daily, Disp: 90 tablet, Rfl: 3    losartan (COZAAR) 50 mg tablet, Take 1 tablet (50 mg total) by mouth daily, Disp: 90 tablet, Rfl: 0    magnesium Oxide (MAG-OX) 400 mg TABS, Take 1 tablet (400 mg total) by mouth 2 (two) times a day, Disp: 60 tablet, Rfl: 6    metoprolol succinate (TOPROL-XL) 50 mg 24 hr tablet, TAKE 1.5 TABLETS BY MOUTH 2 TIMES A DAY., Disp: 270 tablet, Rfl: 4    rivaroxaban (XARELTO) 20 mg tablet, Take 1 tablet (20 mg total) by mouth daily with breakfast, Disp: 56 tablet, Rfl: 0    spironolactone (ALDACTONE) 25 mg tablet, TAKE 1/2 TABLET BY MOUTH EVERY DAY, Disp: 45 tablet, Rfl: 1     Family History   Problem Relation Name Age of Onset    Coronary artery disease Mother      Alcohol abuse Father      Coronary artery disease Father      Colon cancer Neg Hx       Social History     Socioeconomic History    Marital status: Single     Spouse name: Not on file    Number of children: Not on file    Years of education: Not on file    Highest education level: Not on file   Occupational History    Not on file   Tobacco Use    Smoking status: Former     Current packs/day: 0.00     Types: Cigarettes     Quit date: 2017     Years since quittin.8    Smokeless tobacco: Never   Vaping Use    Vaping status: Never Used   Substance and Sexual Activity    Alcohol use: Yes     Alcohol/week: 20.0 standard drinks of alcohol     Types: 20 Cans of beer per week     Comment: drink about 4 days a weeks usually drink beer    Drug use: No    Sexual activity: Yes     Partners: Female   Other Topics Concern    Not on file   Social History Narrative    Not on file      Social Drivers of Health     Financial Resource Strain: Not on file   Food Insecurity: No Food Insecurity (3/11/2025)    Nursing - Inadequate Food Risk Classification     Worried About Running Out of Food in the Last Year: Never true     Ran Out of Food in the Last Year: Never true     Ran Out of Food in the Last Year: Never true   Transportation Needs: No Transportation Needs (3/11/2025)    PRAPARE - Transportation     Lack of Transportation (Medical): No     Lack of Transportation (Non-Medical): No   Physical Activity: Not on file   Stress: Not on file   Social Connections: Not on file   Intimate Partner Violence: Unknown (10/22/2024)    Nursing IPS     Feels Physically and Emotionally Safe: Not on file     Physically Hurt by Someone: Not on file     Humiliated or Emotionally Abused by Someone: Not on file     Physically Hurt by Someone: No     Hurt or Threatened by Someone: No   Housing Stability: Low Risk  (3/11/2025)    Housing Stability Vital Sign     Unable to Pay for Housing in the Last Year: No     Number of Times Moved in the Last Year: 0     Homeless in the Last Year: No     Social History     Tobacco Use   Smoking Status Former    Current packs/day: 0.00    Types: Cigarettes    Quit date: 2017    Years since quittin.8   Smokeless Tobacco Never     Social History     Substance and Sexual Activity   Alcohol Use Yes    Alcohol/week: 20.0 standard drinks of alcohol    Types: 20 Cans of beer per week    Comment: drink about 4 days a weeks usually drink beer       Review of Systems   Constitutional: Negative for chills, fever and malaise/fatigue.   HENT: Negative.     Eyes:  Negative for blurred vision and double vision.   Cardiovascular:  Negative for chest pain, dyspnea on exertion, leg swelling, near-syncope, orthopnea, palpitations, paroxysmal nocturnal dyspnea and syncope.   Respiratory:  Negative for cough and sputum production.    Endocrine: Negative.    Skin: Negative.  Negative for rash.  "  Musculoskeletal: Negative.  Negative for arthritis and joint pain.   Gastrointestinal:  Negative for abdominal pain, nausea and vomiting.   Genitourinary: Negative.    Neurological: Negative.  Negative for dizziness and light-headedness.   Psychiatric/Behavioral: Negative.  The patient is not nervous/anxious.         Objective:     Vitals: Blood pressure 150/90, pulse 85, height 5' 9\" (1.753 m), weight 99.8 kg (220 lb)., Body mass index is 32.49 kg/m².,        Physical Exam:    GEN: Bernardino Nunez appears well, alert and oriented x 3, pleasant and cooperative; morbidly obese.    HEENT: pupils equal, round, and reactive to light; extraocular muscles intact  NECK: supple, no carotid bruits   HEART: regular rhythm, normal S1 and S2, no murmurs, clicks, gallops or rubs   LUNGS: clear to auscultation bilaterally; no wheezes, rales, or rhonchi   ABDOMEN: normal bowel sounds, soft, no tenderness, no distention  EXTREMITIES: peripheral pulses normal; no clubbing, cyanosis, or edema  NEURO: no focal findings   SKIN: normal without suspicious lesions on exposed skin      Labs & Results:  Below is the patient's most recent value for Albumin, ALT, AST, BUN, Calcium, Chloride, Cholesterol, CO2, Creatinine, GFR, Glucose, HDL, Hematocrit, Hemoglobin, Hemoglobin A1C, LDL, Magnesium, Phosphorus, Platelets, Potassium, PSA, Sodium, Triglycerides, and WBC.   Lab Results   Component Value Date    ALT 57 (H) 03/06/2025    AST 69 (H) 03/06/2025    BUN 15 03/06/2025    CALCIUM 8.7 03/06/2025     03/06/2025    CO2 26 03/06/2025    CREATININE 1.11 03/06/2025    HDL 50 03/06/2025    HCT 44.8 03/06/2025    HGB 14.4 03/06/2025    HGBA1C 5.1 01/14/2023    MG 1.8 (L) 03/06/2025    PHOS 4.1 08/29/2020     03/06/2025    K 4.1 03/06/2025    PSA 0.715 03/06/2025    TRIG 414 (H) 03/06/2025    WBC 5.35 03/06/2025     Note: for a comprehensive list of the patient's lab results, access the Results Review activity.          Cardiac " testing:     I personally reviewed the ECG performed in the clinic on 25. It reveals normal sinus rhythm at 85 beats per minute.    Echocardiograms:  Results for orders placed during the hospital encounter of 10/17/18   Echo complete with contrast if indicated    Narrative [address] [id number] [phone]    Transthoracic Echocardiogram  2D, M-mode, Doppler, and Color Doppler    Study date:  17-Oct-2018    Patient: HERMINIA LOTT  MR number: [id number]  Account number: [id number]  : 1960  Age: 58 years  Gender: Male  Status: Outpatient  Location: [address] [place] [address]  Height: 69 in  Weight: 216 lb  BP: 140/ 82 mmHg    Indications: Heart Failure    Diagnoses: I50.9 - Heart failure, unspecified    Sonographer:  SANKET Samayoa  Primary Physician:  Anaya Gilbert DO  Referring Physician:  Solitario Murray MD  Group:  [facility]  Interpreting Physician:  Emigdio Doss MD    SUMMARY    LEFT VENTRICLE:  Systolic function was at the lower limits of normal. Ejection fraction was estimated to be 50 %.  There were no regional wall motion abnormalities.  Wall thickness was mildly to moderately increased.  Doppler parameters were consistent with abnormal left ventricular relaxation (grade 1 diastolic dysfunction).    RIGHT VENTRICLE:  The size was normal.  Systolic function was normal.    PERICARDIUM:  A trace pericardial effusion was identified. There was no evidence of hemodynamic compromise.    COMPARISONS:  There has been no significant interval change. Comparison was made with the previous study of 29-Mar-2018.    HISTORY: PRIOR HISTORY: MI, CAD, HTN, AFIB, SVT    PROCEDURE: The study was performed in the [address] [place] [address]. This was a routine study. The transthoracic approach was used. The study included complete 2D imaging, M-mode, complete spectral Doppler, and color Doppler. The  heart rate  was 61 bpm, at the start of the study. Images were obtained from the parasternal, apical, subcostal, and suprasternal notch acoustic windows. Echocardiographic views were limited due to lung interference. Image quality was  adequate.    LEFT VENTRICLE: Size was normal. Systolic function was at the lower limits of normal. Ejection fraction was estimated to be 50 %. There were no regional wall motion abnormalities. Wall thickness was mildly to moderately increased. DOPPLER:  Doppler parameters were consistent with abnormal left ventricular relaxation (grade 1 diastolic dysfunction).    RIGHT VENTRICLE: The size was normal. Systolic function was normal. Wall thickness was normal.    LEFT ATRIUM: Size was at the upper limits of normal.    RIGHT ATRIUM: Size was normal.    MITRAL VALVE: Valve structure was normal. There was normal leaflet separation. DOPPLER: The transmitral velocity was within the normal range. There was no evidence for stenosis. There was no significant regurgitation.    AORTIC VALVE: The valve was trileaflet. Leaflets exhibited normal thickness and normal cuspal separation. DOPPLER: Transaortic velocity was within the normal range. There was no evidence for stenosis. There was no significant  regurgitation.    TRICUSPID VALVE: The valve structure was normal. There was normal leaflet separation. DOPPLER: The transtricuspid velocity was within the normal range. There was no evidence for stenosis. There was no significant regurgitation.    PULMONIC VALVE: Leaflets exhibited normal thickness, no calcification, and normal cuspal separation. DOPPLER: The transpulmonic velocity was within the normal range. There was no significant regurgitation.    PERICARDIUM: A trace pericardial effusion was identified. There was no evidence of hemodynamic compromise.    AORTA: The root exhibited normal size.    SYSTEMIC VEINS: IVC: The inferior vena cava was normal in size. Respirophasic changes were normal.    SYSTEM  MEASUREMENT TABLES    2D  %FS: 26.08 %  Ao Diam: 4.01 cm  EDV(Teich): 105.38 ml  EF Biplane: 52.16 %  EF(Cube): 59.62 %  EF(Teich): 51.15 %  ESV(Cube): 43.52 ml  ESV(Teich): 51.48 ml  IVSd: 1.45 cm  LA Area: 20.83 cm2  LA Diam: 3.55 cm  LVEDV MOD A2C: 129.95 ml  LVEDV MOD A4C: 153.5 ml  LVEDV MOD BP: 146.59 ml  LVEF MOD A2C: 47.85 %  LVEF MOD A4C: 59.43 %  LVESV MOD A2C: 67.77 ml  LVESV MOD A4C: 62.27 ml  LVESV MOD BP: 70.12 ml  LVIDd: 4.76 cm  LVIDs: 3.52 cm  LVLd A2C: 8.67 cm  LVLd A4C: 8.72 cm  LVLs A2C: 7.68 cm  LVLs A4C: 7.6 cm  LVPWd: 1.36 cm  RA Area: 17.39 cm2  RV Diam.: 3.55 cm  SV MOD A2C: 62.18 ml  SV MOD A4C: 91.23 ml  SV(Cube): 64.25 ml  SV(Teich): 53.9 ml    MM  TAPSE: 1.49 cm    PW  E': 0.04 m/s  E/E': 23.04  MV A Brennan: 0.66 m/s  MV Dec Hickman: 3.43 m/s2  MV DecT: 248.53 ms  MV E Brennan: 0.85 m/s  MV E/A Ratio: 1.3    [employer] Commission Accredited Echocardiography Laboratory    Prepared and electronically signed by    Emigdio Doss MD  Signed 18-Oct-2018 11:44:59       Results for orders placed during the hospital encounter of 20   JOSE LUIS    Narrative [address] [address] [phone]    Transesophageal Echocardiogram  2D, Doppler, and Color Doppler    Study date:  28-Aug-2020    Patient: HERMINIA LOTT  MR number: [id number]  Account number: [id number]  : 1960  Age: 60 years  Gender: Male  Status: Inpatient  Location: Operating room  Height: 70 in  Weight: 150 lb  BP:    Indications: TIA    Diagnoses: G45.9 - Transient cerebral ischemic attack, unspecified    Sonographer:  SANKET Flor  Referring Physician:  Jose Herzog MD  Group:  [facility]  Cardiology Fellow:  Viola Wilburn MD  Interpreting Physician:  Jose Herzog MD    SUMMARY    LEFT VENTRICLE:  Systolic function was at the lower limits of normal. Ejection fraction was estimated to be 50 %.  There were no regional wall motion  "abnormalities.  Wall thickness was normal.    RIGHT VENTRICLE:  The size was normal.  Systolic function was normal.  Wall thickness was normal.    LEFT ATRIAL APPENDAGE:  No thrombus was identified.  There was mild spontaneous echo contrast (\"smoke\") in the appendage.    MITRAL VALVE:  There was trace regurgitation.    HISTORY: PRIOR HISTORY: HTN, Afib, REGULO    PROCEDURE: The procedure was performed in the operating room. This was a routine study. The risks and alternatives of the procedure were explained to the patient and informed consent was obtained. The transesophageal approach was used. The  study included complete 2D imaging, complete spectral Doppler, and color Doppler. The heart rate was 102 bpm, at the start of the study. An adult omniplane probe was inserted by the cardiology fellow under direct supervision of the  attending cardiologist. Intubated with ease. One intubation attempt(s). There was no blood detected on the probe. Image quality was adequate. MEDICATIONS: Anesthesia.    LEFT VENTRICLE: Size was normal. Systolic function was at the lower limits of normal. Ejection fraction was estimated to be 50 %. There were no regional wall motion abnormalities. Wall thickness was normal.    RIGHT VENTRICLE: The size was normal. Systolic function was normal. Wall thickness was normal.    LEFT ATRIUM: Size was normal. No thrombus was identified. APPENDAGE: No thrombus was identified. There was mild spontaneous echo contrast (\"smoke\") in the appendage.    ATRIAL SEPTUM: No defect or patent foramen ovale was identified.    RIGHT ATRIUM: Size was normal. No thrombus was identified.    MITRAL VALVE: Valve structure was normal. There was normal leaflet separation. There was no echocardiographic evidence of vegetation. DOPPLER: There was trace regurgitation.    AORTIC VALVE: The valve was trileaflet. Leaflets exhibited normal thickness and normal cuspal separation. There was no echocardiographic evidence of " vegetation. DOPPLER: There was no regurgitation.    TRICUSPID VALVE: The valve structure was normal. There was normal leaflet separation. There was no echocardiographic evidence of vegetation. DOPPLER: There was no regurgitation.    PULMONIC VALVE: Leaflets exhibited normal thickness, no calcification, and normal cuspal separation. There was no echocardiographic evidence of vegetation. DOPPLER: There was trace regurgitation.    PERICARDIUM: There was no pericardial effusion. The pericardium was normal in appearance.    AORTA: The root exhibited normal size. There was no atheroma. There was no evidence for dissection. There was no evidence for aneurysm.    [facility] Accredited Echocardiography Laboratory    Prepared and electronically signed by    Jose Herzog MD  Signed 28-Aug-2020 14:25:55         Catheterizations:   No results found for this or any previous visit.    Stress Tests:  Results for orders placed during the hospital encounter of 18   NM myocardial perfusion spect (stress and/or [place] [id number]  [phone]    Stress Gated SPECT Myocardial Perfusion Imaging after Regadenoson    Patient: HERMINIA LOTT  MR number: [id number]  Account number: [id number]  : 1960  Age: 57 years  Gender: Male  Status: Outpatient  Location: [address] [place] [address] [facility]  Height: 69 in  Weight: 189 lb  BP: 120/ 90 mmHg    Allergies: NO KNOWN ALLERGIES    Diagnosis: I50.22 - Chronic systolic (congestive) heart failure    Referring Physician:  Solitario Murray MD  Primary Physician:  Uma Tracey MD  Technician:  SUN Rizzo  RN:  Carine Raines RN  Group:  [facility]  Cardiology Fellow:  Dr. Hayden Huitron  Report Prepared by::  Carine Raines RN  Interpreting Physician:  Dre Butler DO    INDICATIONS: Assessment of cardiomyopathy.    HISTORY: Cardiomyopathy, Lifevest.  The patient is a 57 year old  male. Chest pain status: chest pain. Other symptoms: edema. Coronary artery disease risk factors: none. Medications: a beta blocker and a lipid lowering agent.    PHYSICAL EXAM: Baseline physical exam screening: no wheezes audible.    REST ECG: Normal sinus rhythm.    PROCEDURE: The study was performed at the [address] [place] [address] [facility]. A regadenoson infusion pharmacologic stress test was performed. Gated SPECT myocardial perfusion imaging was performed during stress. Systolic blood pressure was  120 mmHg, at the start of the study. Diastolic blood pressure was 90 mmHg, at the start of the study. The heart rate was 65 bpm, at the start of the study. IV double checked.  Regadenoson protocol:  HR bpm SBP mmHg DBP mmHg Symptoms  Baseline 65 120 90 none  Immediate 81 122 92 mild dyspnea  1 min 73 120 90 none  No medications or fluids given.    STRESS SUMMARY: Duration of pharmacologic stress was 3 min and 0 sec. Maximal heart rate during stress was 91 bpm. The heart rate response to stress was normal. Normal blood pressure response to regadenosan. The rate-pressure product for  the peak heart rate and blood pressure was 77034. There was no chest pain during stress. The stress test was terminated due to protocol completion. Pre oxygen saturation: 100 %. Peak oxygen saturation: 99 %. The stress ECG was negative for  ischemia and normal. There were no stress arrhythmias or conduction abnormalities.    ISOTOPE ADMINISTRATION:  Resting isotope administration Stress isotope administration  Agent Tetrofosmin Tetrofosmin  Dose 10.12 mCi 30.9 mCi  Date 02/19/2018 02/19/2018  Injection-image interval 47 min 52 min    The radiopharmaceutical was injected at the peak effect of pharmacologic stress.    MYOCARDIAL PERFUSION IMAGING:  The image quality was good. Rotating projection images reveal mild patient motion. Left ventricular size was normal. The TID ratio was 1.17.    PERFUSION  DEFECTS:  -  There was a moderate-sized, moderately severe, partially reversible myocardial perfusion defect of the basal to mid inferolateral wall.    GATED SPECT:  The calculated left ventricular ejection fraction was 47 %. Left ventricular ejection fraction was mildly decreased by visual estimate.    SUMMARY:  -  Stress results: There was no chest pain during stress.  -  ECG conclusions: The stress ECG was negative for ischemia and normal.  -  Perfusion imaging: There was a moderate-sized, moderately severe, partially reversible myocardial perfusion defect of the basal to mid inferolateral wall.  -  Gated SPECT: The calculated left ventricular ejection fraction was 47 %. Left ventricular ejection fraction was mildly decreased by visual estimate.    IMPRESSIONS: Abnormal study after vasodilation and low level exercise without reproduction of symptoms. Partially reversible defect of the inferolateral wall. Fixed defect Apical lateral. Left ventricular systolic function was reduced,  with regional wall motion abnormalities.    Prepared and signed by    Dre Butler DO  Signed 02/19/2018 14:38:42         Holter monitor -   No results found for this or any previous visit.  No results found for this or any previous visit.      ASSESSMENT/PLAN:  Paroxysmal atrial fibrillation   In sinus rhythm today  Had symptoms with atrial fibrillation in the past (fatigue, dyspnea on exertion) though did not have any at time of office with Dr. Alcaraz when he was in atrial fibrillation   Recent occurrence in setting of newly discovered colon cancer  Discussed options of rate control vs. Rhythm control (AAD or ablation)   Amanda Raymond in Nov 2020 to evaluate burden, it showed atrial fibrillation with rates in 170s  Discussed increasing AV mami agents but was already on diltiazem, metoprolol and digoxin.   Discussed ablation option and patient opted to proceed with it  He had PVI and posterior wall on 1/25/2021.   He reports  feelign well since then without any symptoms  S/p loop monitor 7/24/21  Has been having atrial fibrillation with RVR  Has not taken medications except Eliquis due to financial hardship  He agreed to take the medications today; refill sent to pharmacy for most medications except Entresto  Repeat ablation and Tikosyn loading in October - had RSPV, LSPV reisolated and PW isolation, empiric CTI line placement, as well mitral flutter ablation with anterior mitral line  Maintained on Tikosyn ; Qtc stable. Continue Tikosyn, metoprolol and Xarelto  No arrhythmias on the loop monitor    Non-ischemic cardiomyopathy   EF improved to 50%  Maintained on metoprolol, losarant, spironolactone and Lasix   Euvolemic in office today    Colon cancer  Stage III colon cancer discovered in Aguust 2020  S/p right hemicolectomy and ileocolic anastomosis  Undergoing chemotherapy; finished in Dec 2020    CAD  Left heart catheterization 2018  80% ramus, 60% PDA  No history of stents   Continue statin, beta-blocker;      Follow-up in 1 year

## 2025-06-16 ENCOUNTER — ANESTHESIA EVENT (OUTPATIENT)
Dept: GASTROENTEROLOGY | Facility: HOSPITAL | Age: 65
End: 2025-06-16
Payer: MEDICARE

## 2025-06-16 ENCOUNTER — ANESTHESIA (OUTPATIENT)
Dept: GASTROENTEROLOGY | Facility: HOSPITAL | Age: 65
End: 2025-06-16
Payer: MEDICARE

## 2025-06-16 ENCOUNTER — HOSPITAL ENCOUNTER (OUTPATIENT)
Dept: GASTROENTEROLOGY | Facility: HOSPITAL | Age: 65
Setting detail: OUTPATIENT SURGERY
Discharge: HOME | End: 2025-06-16
Attending: COLON & RECTAL SURGERY
Payer: MEDICARE

## 2025-06-16 VITALS
HEART RATE: 68 BPM | DIASTOLIC BLOOD PRESSURE: 88 MMHG | SYSTOLIC BLOOD PRESSURE: 139 MMHG | RESPIRATION RATE: 16 BRPM | TEMPERATURE: 96.7 F | OXYGEN SATURATION: 97 %

## 2025-06-16 DIAGNOSIS — Z90.49 S/P RIGHT HEMICOLECTOMY: ICD-10-CM

## 2025-06-16 DIAGNOSIS — C18.2 CANCER OF ASCENDING COLON (HCC): ICD-10-CM

## 2025-06-16 PROCEDURE — G0105 COLORECTAL SCRN; HI RISK IND: HCPCS | Performed by: COLON & RECTAL SURGERY

## 2025-06-16 RX ORDER — LIDOCAINE HYDROCHLORIDE 10 MG/ML
INJECTION, SOLUTION EPIDURAL; INFILTRATION; INTRACAUDAL; PERINEURAL AS NEEDED
Status: DISCONTINUED | OUTPATIENT
Start: 2025-06-16 | End: 2025-06-16

## 2025-06-16 RX ORDER — PROPOFOL 10 MG/ML
INJECTION, EMULSION INTRAVENOUS CONTINUOUS PRN
Status: DISCONTINUED | OUTPATIENT
Start: 2025-06-16 | End: 2025-06-16

## 2025-06-16 RX ORDER — PROPOFOL 10 MG/ML
INJECTION, EMULSION INTRAVENOUS AS NEEDED
Status: DISCONTINUED | OUTPATIENT
Start: 2025-06-16 | End: 2025-06-16

## 2025-06-16 RX ORDER — SODIUM CHLORIDE 9 MG/ML
INJECTION, SOLUTION INTRAVENOUS CONTINUOUS PRN
Status: DISCONTINUED | OUTPATIENT
Start: 2025-06-16 | End: 2025-06-16

## 2025-06-16 RX ADMIN — LIDOCAINE HYDROCHLORIDE 5 ML: 10 INJECTION, SOLUTION EPIDURAL; INFILTRATION; INTRACAUDAL; PERINEURAL at 10:28

## 2025-06-16 RX ADMIN — PROPOFOL 120 MCG/KG/MIN: 10 INJECTION, EMULSION INTRAVENOUS at 10:28

## 2025-06-16 RX ADMIN — SODIUM CHLORIDE: 0.9 INJECTION, SOLUTION INTRAVENOUS at 10:28

## 2025-06-16 RX ADMIN — PROPOFOL 120 MG: 10 INJECTION, EMULSION INTRAVENOUS at 10:28

## 2025-06-16 NOTE — H&P
History and Physical   Colon and Rectal Surgery   Bernardino Nunez 65 y.o. male MRN: [id number]  Unit/Bed#:  Encounter: [id number]  06/16/25   10:19 AM      CC:  History of colon cancer    History of Present Illness   HPI:  Bernardino Nunez is a 65 y.o. male with no GI symptoms.  Historical Information   Past Medical History[1]  Past Surgical History[2]    Meds/Allergies     Not in a hospital admission.    Current Medications[3]    Allergies[4]      Social History   Social History     Substance and Sexual Activity   Alcohol Use Yes    Alcohol/week: 20.0 standard drinks of alcohol    Types: 20 Cans of beer per week    Comment: drink about 4 days a weeks usually drink beer     Social History     Substance and Sexual Activity   Drug Use No     Tobacco Use History[5]      Family History: Family History[6]      Objective     Current Vitals:   Blood Pressure: (!) 161/103 (06/16/25 1003)  Pulse: 77 (06/16/25 1003)  Temperature: 97.6 °F (36.4 °C) (06/16/25 1003)  Temp Source: Tympanic (06/16/25 1003)  Respirations: 20 (06/16/25 1003)  SpO2: 99 % (06/16/25 1003)  No intake or output data in the 24 hours ending 06/16/25 1019    Physical Exam:  General:  Well nourished, no distress.  Neuro: Alert and oriented  Eyes:Sclera anicteric, conjunctiva pink.  Pulm: Clear to auscultation bilaterally. No respiratory Distress.   CV:  Regular rate and rhythm. No murmurs.  Abdomen:  Soft, flat, non-tender, without masses or hepatosplenomegaly.    Lab Results:       ASSESSMENT:  Bernardino Nunez is a 65 y.o. male for surveillance.  PLAN:  Colonoscopy.  Risks , including, but not limited to, bleeding, perforation, missed lesions, and potential need for surgery, were reviewed. Alternatives to colonoscopy were discussed.  ROBERTO CARLOS Villavicencio MD       [1]   Past Medical History:  Diagnosis Date    A-fib (HCC)     Cardiomyopathy, dilated (HCC) 12/18/2017    Colon cancer (HCC)     Colon polyp     Coronary artery disease     COVID-19 12/21/2021    GI  bleeding     Gout     Herpes zoster     last assessed 12/18/17    Hypertension     Mass of cecum 08/25/2020    NSVT (nonsustained ventricular tachycardia) (HCC) 07/27/2018    Shingles    [2]   Past Surgical History:  Procedure Laterality Date    CARDIAC ELECTROPHYSIOLOGY PROCEDURE N/A 10/22/2024    Procedure: Cardiac eps/afib ablation PFA;  Surgeon: Jonathan Burrows MD;  Location: [facility];  Service: Cardiology    COLONOSCOPY      ELECTRICAL CARDIOVERSION  12/15/2017         HEMICOLOECTOMY W/ ANASTOMOSIS N/A 08/28/2020    Procedure: RIGHT HEMICOLECTOMY WITH ILIOCOLIC ANASTAMOSIS;  Surgeon: ANT Villavicencio MD;  Location: [facility];  Service: Colorectal    IR PORT PLACEMENT  09/21/2020    IR PORT REMOVAL  04/05/2021   [3]   Current Outpatient Medications:     allopurinol (ZYLOPRIM) 100 mg tablet, Take 1 tablet (100 mg total) by mouth daily, Disp: 90 tablet, Rfl: 3    aspirin (ECOTRIN LOW STRENGTH) 81 mg EC tablet, Take 1 tablet (81 mg total) by mouth daily, Disp: 30 tablet, Rfl: 0    atorvastatin (LIPITOR) 40 mg tablet, Take 1 tablet (40 mg total) by mouth daily with dinner, Disp: 90 tablet, Rfl: 0    dofetilide (TIKOSYN) 125 mcg capsule, Take 1 capsule (125 mcg total) by mouth every 12 (twelve) hours, Disp: 28 capsule, Rfl: 0    furosemide (LASIX) 20 mg tablet, Take 1 tablet (20 mg total) by mouth daily, Disp: 90 tablet, Rfl: 3    losartan (COZAAR) 50 mg tablet, Take 1 tablet (50 mg total) by mouth daily, Disp: 90 tablet, Rfl: 0    magnesium Oxide (MAG-OX) 400 mg TABS, Take 1 tablet (400 mg total) by mouth 2 (two) times a day, Disp: 60 tablet, Rfl: 6    metoprolol succinate (TOPROL-XL) 50 mg 24 hr tablet, TAKE 1.5 TABLETS BY MOUTH 2 TIMES A DAY., Disp: 270 tablet, Rfl: 4    spironolactone (ALDACTONE) 25 mg tablet, TAKE 1/2 TABLET BY MOUTH EVERY DAY, Disp: 45 tablet, Rfl: 1    rivaroxaban (XARELTO) 20 mg tablet, Take 1 tablet (20 mg total) by mouth daily with breakfast, Disp: 56 tablet, Rfl: 0  [4] No  Known Allergies  [5]   Social History  Tobacco Use   Smoking Status Former    Current packs/day: 0.00    Types: Cigarettes    Quit date: 2017    Years since quittin.8   Smokeless Tobacco Never   [6]   Family History  Problem Relation Name Age of Onset    Coronary artery disease Mother      Alcohol abuse Father      Coronary artery disease Father      Colon cancer Neg Hx

## 2025-06-16 NOTE — ANESTHESIA PREPROCEDURE EVALUATION
Procedure:  COLONOSCOPY    Relevant Problems   CARDIO   (+) Atrial fibrillation and flutter (HCC)   (+) CAD (coronary artery disease)   (+) Essential hypertension      GI/HEPATIC   (+) Cancer of ascending colon (HCC)   (+) Gastrointestinal hemorrhage with melena      HEMATOLOGY   (+) Microcytic anemia        Physical Exam    Airway     Mallampati score: II  TM Distance: >3 FB  Neck ROM: full      Cardiovascular  Rhythm: regular, Rate: normalCardiovascular exam normal    Dental       Pulmonary   Decreased breath sounds    Neurological    He appears awake, alert and oriented x3.      Other Findings  Recovered non-ischemic/tachy-mediated cardiomyopathy due to atrial fibrillation, colon cancer status post hemicolectomy (on chemotherapy), coronary artery disease (left heart catheterization 2018-80% ramus, 60% PDA), morbid obesity,.     Normal device function 6/2025 Recovered non-ischemic/tachy-mediated cardiomyopathy due to atrial fibrillation, colon cancer status post hemicolectomy (on chemotherapy), coronary artery disease (left heart catheterization 2018-80% ramus, 60% PDA), morbid obesity,.     Normal device function 6/2025         Anesthesia Plan  ASA Score- 3     Anesthesia Type- IV sedation with anesthesia with ASA Monitors.         Additional Monitors:     Airway Plan: natural airway.           Plan Factors-Exercise tolerance (METS): >4 METS.    Chart reviewed. EKG reviewed. Imaging results reviewed. Existing labs reviewed. Patient summary reviewed.    Patient is not a current smoker.  Patient did not smoke on day of surgery.            Induction- intravenous.    Postoperative Plan- .   Monitoring Plan - Monitoring plan - standard ASA monitoring      Perioperative Resuscitation Plan - Level 1 - Full Code.       Informed Consent- Anesthetic plan and risks discussed with patient.  I personally reviewed this patient with the CRNA. Discussed and agreed on the Anesthesia Plan with the CRNA..      NPO Status:  No  vitals data found for the desired time range.

## 2025-06-16 NOTE — ANESTHESIA POSTPROCEDURE EVALUATION
Post-Op Assessment Note    CV Status:  Stable  Pain Score: 0    Pain management: adequate       Mental Status:  Arousable   Hydration Status:  Euvolemic   PONV Controlled:  Controlled   Airway Patency:  Patent     Post Op Vitals Reviewed: Yes    No anethesia notable event occurred.    Staff: Anesthesiologist, CRNA           Last Filed PACU Vitals:  Vitals Value Taken Time   Temp 96.7 °F (35.9 °C) 06/16/25 10:44   Pulse 68 06/16/25 10:44   /74 06/16/25 10:44   Resp 16 06/16/25 10:44   SpO2 93 % room air 06/16/25 10:44       Modified Kasey:     Vitals Value Taken Time   Activity 2 06/16/25 10:45   Respiration 2 06/16/25 10:45   Circulation 2 06/16/25 10:45   Consciousness 1 06/16/25 10:45   Oxygen Saturation 2 06/16/25 10:45     Modified Kasey Score: 9

## 2025-06-24 LAB
ATRIAL RATE: 85 BPM
P AXIS: 60 DEGREES
PR INTERVAL: 140 MS
QRS AXIS: 65 DEGREES
QRSD INTERVAL: 92 MS
QT INTERVAL: 406 MS
QTC INTERVAL: 483 MS
T WAVE AXIS: 63 DEGREES
VENTRICULAR RATE: 85 BPM

## (undated) DEVICE — SUT PDS II 1 CTX-B 36 IN ZB371

## (undated) DEVICE — Device: Brand: REFERENCE PATCH CARTO 3

## (undated) DEVICE — SUT VICRYL 0 REEL 54 IN J287G

## (undated) DEVICE — PROXIMATE LINEAR CUTTER RELOAD, BLUE, 75MM: Brand: PROXIMATE

## (undated) DEVICE — Device: Brand: WEBSTER CS

## (undated) DEVICE — GLOVE INDICATOR PI UNDERGLOVE SZ 8 BLUE

## (undated) DEVICE — IRRIG ENDO FLO TUBING

## (undated) DEVICE — INTENDED FOR TISSUE SEPARATION, AND OTHER PROCEDURES THAT REQUIRE A SHARP SURGICAL BLADE TO PUNCTURE OR CUT.: Brand: BARD-PARKER SAFETY BLADES SIZE 11, STERILE

## (undated) DEVICE — PINNACLE INTRODUCER SHEATH: Brand: PINNACLE

## (undated) DEVICE — ACCESS PLATFORM FOR MINIMALLY INVASIVE SURGERY.: Brand: GELPORT® LAPAROSCOPIC  SYSTEM

## (undated) DEVICE — Device: Brand: PENTARAY NAV

## (undated) DEVICE — ADHESIVE SKIN HIGH VISCOSITY EXOFIN 1ML

## (undated) DEVICE — GLOVE PI ULTRA TOUCH SZ.7.5

## (undated) DEVICE — BETHLEHEM MAJOR GENERAL PACK: Brand: CARDINAL HEALTH

## (undated) DEVICE — TRAY FOLEY 16FR URIMETER SILICONE SURESTEP

## (undated) DEVICE — CATH ABLATION FARAWAVE PULSED FIELD 31MM

## (undated) DEVICE — SUT VICRYL 0 UR-6 27 IN J603H

## (undated) DEVICE — 1 X VERSACROSS CONNECT TRANSSEPTAL DILATOR;  1 X VERSACROSS RF WIRE (INCLUDING 1 X CONNECTOR CABLE (SINGLE USE)): Brand: VERSACROSS CONNECT ACCESS SOLUTION FOR FARADRIVE

## (undated) DEVICE — INSUFLATION TUBING INSUFLOW (LEXION)

## (undated) DEVICE — SUT SILK 2-0 TIES 144 IN LA55G

## (undated) DEVICE — TROCAR: Brand: KII FIOS FIRST ENTRY

## (undated) DEVICE — POOLE SUCTION HANDLE: Brand: CARDINAL HEALTH

## (undated) DEVICE — SOFT SILICONE HYDROCELLULAR SACRUM DRESSING WITH LOCK AWAY LAYER: Brand: ALLEVYN LIFE SACRUM (LARGE) PACK OF 10

## (undated) DEVICE — TROCAR: Brand: KII SLEEVE

## (undated) DEVICE — ANTI-FOG SOLUTION WITH FOAM PAD: Brand: DEVON

## (undated) DEVICE — ENSEAL LAPAROSCOPIC TISSUE SEALER G2 CURVED JAW FOR USE WITH G2 GENERATOR 5MM DIAMETER 35CM SHAFT LENGTH: Brand: ENSEAL

## (undated) DEVICE — CHLORAPREP HI-LITE 26ML ORANGE

## (undated) DEVICE — PROXIMATE RELOADABLE LINEAR CUTTER WITH SAFETY LOCK-OUT, 75MM: Brand: PROXIMATE

## (undated) DEVICE — SUT MONOCRYL 4-0 PS-2 18 IN Y496G

## (undated) DEVICE — ROSEN CURVED WIRE GUIDE: Brand: ROSEN

## (undated) DEVICE — CABLE CATH CONNECTION FARASTAR

## (undated) DEVICE — PLUMEPEN PRO 10FT

## (undated) DEVICE — 3000CC GUARDIAN II: Brand: GUARDIAN

## (undated) DEVICE — SHEATH STEERABLE FARADRIVE

## (undated) DEVICE — 3M™ IOBAN™ 2 ANTIMICROBIAL INCISE DRAPE 6650EZ: Brand: IOBAN™ 2

## (undated) DEVICE — SOUNDSTAR ECO 8F G CATHETER: Brand: SOUNDSTAR